# Patient Record
Sex: MALE | Race: WHITE | Employment: OTHER | URBAN - NONMETROPOLITAN AREA
[De-identification: names, ages, dates, MRNs, and addresses within clinical notes are randomized per-mention and may not be internally consistent; named-entity substitution may affect disease eponyms.]

---

## 2017-01-25 ENCOUNTER — OFFICE VISIT (OUTPATIENT)
Dept: UROLOGY | Age: 67
End: 2017-01-25

## 2017-01-25 VITALS
DIASTOLIC BLOOD PRESSURE: 80 MMHG | SYSTOLIC BLOOD PRESSURE: 130 MMHG | WEIGHT: 295 LBS | HEIGHT: 71 IN | BODY MASS INDEX: 41.3 KG/M2

## 2017-01-25 DIAGNOSIS — R33.9 URINARY RETENTION: Primary | ICD-10-CM

## 2017-01-25 DIAGNOSIS — N31.9 NEUROGENIC BLADDER: ICD-10-CM

## 2017-01-25 LAB
BILIRUBIN URINE: NEGATIVE
BLOOD URINE, POC: NEGATIVE
CHARACTER, URINE: CLEAR
COLOR, URINE: YELLOW
GLUCOSE URINE: NEGATIVE MG/DL
KETONES, URINE: NEGATIVE
LEUKOCYTE CLUMPS, URINE: NEGATIVE
NITRITE, URINE: NEGATIVE
PH, URINE: 7.5
POST VOID RESIDUAL (PVR): 14 ML
PROTEIN, URINE: NEGATIVE MG/DL
SPECIFIC GRAVITY, URINE: 1.01 (ref 1–1.03)
UROBILINOGEN, URINE: 0.2 EU/DL

## 2017-01-25 PROCEDURE — G8419 CALC BMI OUT NRM PARAM NOF/U: HCPCS | Performed by: NURSE PRACTITIONER

## 2017-01-25 PROCEDURE — 1123F ACP DISCUSS/DSCN MKR DOCD: CPT | Performed by: NURSE PRACTITIONER

## 2017-01-25 PROCEDURE — 1036F TOBACCO NON-USER: CPT | Performed by: NURSE PRACTITIONER

## 2017-01-25 PROCEDURE — G8427 DOCREV CUR MEDS BY ELIG CLIN: HCPCS | Performed by: NURSE PRACTITIONER

## 2017-01-25 PROCEDURE — G8599 NO ASA/ANTIPLAT THER USE RNG: HCPCS | Performed by: NURSE PRACTITIONER

## 2017-01-25 PROCEDURE — G8484 FLU IMMUNIZE NO ADMIN: HCPCS | Performed by: NURSE PRACTITIONER

## 2017-01-25 PROCEDURE — 3017F COLORECTAL CA SCREEN DOC REV: CPT | Performed by: NURSE PRACTITIONER

## 2017-01-25 PROCEDURE — 81003 URINALYSIS AUTO W/O SCOPE: CPT | Performed by: NURSE PRACTITIONER

## 2017-01-25 PROCEDURE — 4040F PNEUMOC VAC/ADMIN/RCVD: CPT | Performed by: NURSE PRACTITIONER

## 2017-01-25 PROCEDURE — 51798 US URINE CAPACITY MEASURE: CPT | Performed by: NURSE PRACTITIONER

## 2017-01-25 PROCEDURE — 99213 OFFICE O/P EST LOW 20 MIN: CPT | Performed by: NURSE PRACTITIONER

## 2017-01-25 RX ORDER — OXYBUTYNIN CHLORIDE 10 MG/1
10 TABLET, EXTENDED RELEASE ORAL DAILY
Qty: 30 TABLET | Refills: 2 | Status: SHIPPED | OUTPATIENT
Start: 2017-01-25 | End: 2017-03-21 | Stop reason: SDUPTHER

## 2017-01-25 RX ORDER — FUROSEMIDE 20 MG/1
20 TABLET ORAL 2 TIMES DAILY
COMMUNITY
End: 2019-08-06 | Stop reason: ALTCHOICE

## 2017-01-25 RX ORDER — POTASSIUM CHLORIDE 1.5 G/1.77G
20 POWDER, FOR SOLUTION ORAL 2 TIMES DAILY
COMMUNITY
End: 2019-08-06 | Stop reason: ALTCHOICE

## 2017-03-21 ENCOUNTER — OFFICE VISIT (OUTPATIENT)
Dept: UROLOGY | Age: 67
End: 2017-03-21

## 2017-03-21 VITALS — WEIGHT: 285 LBS | BODY MASS INDEX: 39.9 KG/M2 | HEIGHT: 71 IN

## 2017-03-21 DIAGNOSIS — R35.0 FREQUENCY OF URINATION: Primary | ICD-10-CM

## 2017-03-21 DIAGNOSIS — R33.9 RETENTION OF URINE: ICD-10-CM

## 2017-03-21 DIAGNOSIS — R39.15 URGENCY OF URINATION: ICD-10-CM

## 2017-03-21 LAB
BILIRUBIN, POC: NORMAL
BLOOD URINE, POC: NORMAL
CLARITY, POC: NORMAL
COLOR, POC: NORMAL
GLUCOSE URINE, POC: NORMAL
KETONES, POC: NORMAL
LEUKOCYTE EST, POC: NORMAL
NITRITE, POC: NORMAL
PH, POC: NORMAL
POST VOID RESIDUAL (PVR): 17 ML
PROTEIN, POC: NORMAL
SPECIFIC GRAVITY, POC: NORMAL
UROBILINOGEN, POC: NORMAL

## 2017-03-21 PROCEDURE — 4040F PNEUMOC VAC/ADMIN/RCVD: CPT | Performed by: NURSE PRACTITIONER

## 2017-03-21 PROCEDURE — G8417 CALC BMI ABV UP PARAM F/U: HCPCS | Performed by: NURSE PRACTITIONER

## 2017-03-21 PROCEDURE — 81002 URINALYSIS NONAUTO W/O SCOPE: CPT | Performed by: NURSE PRACTITIONER

## 2017-03-21 PROCEDURE — G8484 FLU IMMUNIZE NO ADMIN: HCPCS | Performed by: NURSE PRACTITIONER

## 2017-03-21 PROCEDURE — 1036F TOBACCO NON-USER: CPT | Performed by: NURSE PRACTITIONER

## 2017-03-21 PROCEDURE — 99213 OFFICE O/P EST LOW 20 MIN: CPT | Performed by: NURSE PRACTITIONER

## 2017-03-21 PROCEDURE — 51798 US URINE CAPACITY MEASURE: CPT | Performed by: NURSE PRACTITIONER

## 2017-03-21 PROCEDURE — G8599 NO ASA/ANTIPLAT THER USE RNG: HCPCS | Performed by: NURSE PRACTITIONER

## 2017-03-21 PROCEDURE — 3017F COLORECTAL CA SCREEN DOC REV: CPT | Performed by: NURSE PRACTITIONER

## 2017-03-21 PROCEDURE — G8427 DOCREV CUR MEDS BY ELIG CLIN: HCPCS | Performed by: NURSE PRACTITIONER

## 2017-03-21 PROCEDURE — 1123F ACP DISCUSS/DSCN MKR DOCD: CPT | Performed by: NURSE PRACTITIONER

## 2017-03-21 RX ORDER — OXYBUTYNIN CHLORIDE 10 MG/1
10 TABLET, EXTENDED RELEASE ORAL DAILY
Qty: 30 TABLET | Refills: 11 | Status: SHIPPED | OUTPATIENT
Start: 2017-03-21 | End: 2017-06-06

## 2017-03-21 RX ORDER — TAMSULOSIN HYDROCHLORIDE 0.4 MG/1
0.4 CAPSULE ORAL DAILY
Qty: 30 CAPSULE | Refills: 11 | Status: SHIPPED | OUTPATIENT
Start: 2017-03-21 | End: 2018-04-04 | Stop reason: SDUPTHER

## 2017-06-06 ENCOUNTER — OFFICE VISIT (OUTPATIENT)
Dept: UROLOGY | Age: 67
End: 2017-06-06

## 2017-06-06 VITALS — WEIGHT: 295 LBS | BODY MASS INDEX: 41.3 KG/M2 | HEIGHT: 71 IN

## 2017-06-06 DIAGNOSIS — R35.0 FREQUENCY OF URINATION: Primary | ICD-10-CM

## 2017-06-06 LAB
BILIRUBIN, POC: NORMAL
BLOOD URINE, POC: NORMAL
CLARITY, POC: NORMAL
COLOR, POC: NORMAL
GLUCOSE URINE, POC: NORMAL
KETONES, POC: NORMAL
LEUKOCYTE EST, POC: NORMAL
NITRITE, POC: NORMAL
PH, POC: NORMAL
PROTEIN, POC: NORMAL
SPECIFIC GRAVITY, POC: NORMAL
UROBILINOGEN, POC: NORMAL

## 2017-06-06 PROCEDURE — G8427 DOCREV CUR MEDS BY ELIG CLIN: HCPCS | Performed by: NURSE PRACTITIONER

## 2017-06-06 PROCEDURE — 99213 OFFICE O/P EST LOW 20 MIN: CPT | Performed by: NURSE PRACTITIONER

## 2017-06-06 PROCEDURE — 3017F COLORECTAL CA SCREEN DOC REV: CPT | Performed by: NURSE PRACTITIONER

## 2017-06-06 PROCEDURE — G8599 NO ASA/ANTIPLAT THER USE RNG: HCPCS | Performed by: NURSE PRACTITIONER

## 2017-06-06 PROCEDURE — 1123F ACP DISCUSS/DSCN MKR DOCD: CPT | Performed by: NURSE PRACTITIONER

## 2017-06-06 PROCEDURE — 1036F TOBACCO NON-USER: CPT | Performed by: NURSE PRACTITIONER

## 2017-06-06 PROCEDURE — 81002 URINALYSIS NONAUTO W/O SCOPE: CPT | Performed by: NURSE PRACTITIONER

## 2017-06-06 PROCEDURE — G8417 CALC BMI ABV UP PARAM F/U: HCPCS | Performed by: NURSE PRACTITIONER

## 2017-06-06 PROCEDURE — 4040F PNEUMOC VAC/ADMIN/RCVD: CPT | Performed by: NURSE PRACTITIONER

## 2017-06-06 RX ORDER — OXYBUTYNIN CHLORIDE 15 MG/1
15 TABLET, EXTENDED RELEASE ORAL DAILY
Qty: 30 TABLET | Refills: 2 | Status: SHIPPED | OUTPATIENT
Start: 2017-06-06 | End: 2017-08-15 | Stop reason: SDUPTHER

## 2017-07-18 ENCOUNTER — OFFICE VISIT (OUTPATIENT)
Dept: UROLOGY | Age: 67
End: 2017-07-18
Payer: MEDICARE

## 2017-07-18 VITALS — WEIGHT: 295 LBS | BODY MASS INDEX: 41.3 KG/M2 | HEIGHT: 71 IN

## 2017-07-18 DIAGNOSIS — N40.1 BPH (BENIGN PROSTATIC HYPERTROPHY) WITH URINARY OBSTRUCTION: ICD-10-CM

## 2017-07-18 DIAGNOSIS — N13.8 BPH (BENIGN PROSTATIC HYPERTROPHY) WITH URINARY OBSTRUCTION: ICD-10-CM

## 2017-07-18 DIAGNOSIS — R33.9 URINARY RETENTION: Primary | ICD-10-CM

## 2017-07-18 LAB
BILIRUBIN, POC: NORMAL
BLOOD URINE, POC: NORMAL
CLARITY, POC: CLEAR
COLOR, POC: YELLOW
GLUCOSE URINE, POC: NORMAL
KETONES, POC: NORMAL
LEUKOCYTE EST, POC: NORMAL
NITRITE, POC: NORMAL
PH, POC: NORMAL
POST VOID RESIDUAL (PVR): 5 ML
PROTEIN, POC: NORMAL
SPECIFIC GRAVITY, POC: NORMAL
UROBILINOGEN, POC: NORMAL

## 2017-07-18 PROCEDURE — 1123F ACP DISCUSS/DSCN MKR DOCD: CPT | Performed by: NURSE PRACTITIONER

## 2017-07-18 PROCEDURE — 3017F COLORECTAL CA SCREEN DOC REV: CPT | Performed by: NURSE PRACTITIONER

## 2017-07-18 PROCEDURE — 51798 US URINE CAPACITY MEASURE: CPT | Performed by: NURSE PRACTITIONER

## 2017-07-18 PROCEDURE — G8427 DOCREV CUR MEDS BY ELIG CLIN: HCPCS | Performed by: NURSE PRACTITIONER

## 2017-07-18 PROCEDURE — 81002 URINALYSIS NONAUTO W/O SCOPE: CPT | Performed by: NURSE PRACTITIONER

## 2017-07-18 PROCEDURE — 4040F PNEUMOC VAC/ADMIN/RCVD: CPT | Performed by: NURSE PRACTITIONER

## 2017-07-18 PROCEDURE — G8599 NO ASA/ANTIPLAT THER USE RNG: HCPCS | Performed by: NURSE PRACTITIONER

## 2017-07-18 PROCEDURE — 1036F TOBACCO NON-USER: CPT | Performed by: NURSE PRACTITIONER

## 2017-07-18 PROCEDURE — 99213 OFFICE O/P EST LOW 20 MIN: CPT | Performed by: NURSE PRACTITIONER

## 2017-07-18 PROCEDURE — G8417 CALC BMI ABV UP PARAM F/U: HCPCS | Performed by: NURSE PRACTITIONER

## 2017-08-14 ENCOUNTER — HOSPITAL ENCOUNTER (OUTPATIENT)
Age: 67
Discharge: HOME | End: 2017-08-14
Payer: MEDICARE

## 2017-08-14 DIAGNOSIS — N40.1 BPH (BENIGN PROSTATIC HYPERTROPHY) WITH URINARY OBSTRUCTION: ICD-10-CM

## 2017-08-14 DIAGNOSIS — N13.8 BPH (BENIGN PROSTATIC HYPERTROPHY) WITH URINARY OBSTRUCTION: ICD-10-CM

## 2017-08-14 LAB — PROSTATE SPECIFIC ANTIGEN: 1.26 NG/ML (ref 0–1)

## 2017-08-14 PROCEDURE — 84153 ASSAY OF PSA TOTAL: CPT

## 2017-08-14 PROCEDURE — 36415 COLL VENOUS BLD VENIPUNCTURE: CPT

## 2017-08-15 ENCOUNTER — OFFICE VISIT (OUTPATIENT)
Dept: UROLOGY | Age: 67
End: 2017-08-15
Payer: MEDICARE

## 2017-08-15 VITALS — WEIGHT: 295 LBS | HEIGHT: 71 IN | BODY MASS INDEX: 41.3 KG/M2

## 2017-08-15 DIAGNOSIS — R33.8 BPH (BENIGN PROSTATIC HYPERTROPHY) WITH URINARY RETENTION: Primary | ICD-10-CM

## 2017-08-15 DIAGNOSIS — N40.1 BPH (BENIGN PROSTATIC HYPERTROPHY) WITH URINARY RETENTION: Primary | ICD-10-CM

## 2017-08-15 LAB
BILIRUBIN, POC: NORMAL
BLOOD URINE, POC: NORMAL
CLARITY, POC: CLEAR
COLOR, POC: YELLOW
GLUCOSE URINE, POC: NORMAL
KETONES, POC: NORMAL
LEUKOCYTE EST, POC: NORMAL
NITRITE, POC: NORMAL
PH, POC: NORMAL
POST VOID RESIDUAL (PVR): 28 ML
PROTEIN, POC: NORMAL
SPECIFIC GRAVITY, POC: NORMAL
UROBILINOGEN, POC: NORMAL

## 2017-08-15 PROCEDURE — G8599 NO ASA/ANTIPLAT THER USE RNG: HCPCS | Performed by: NURSE PRACTITIONER

## 2017-08-15 PROCEDURE — 99213 OFFICE O/P EST LOW 20 MIN: CPT | Performed by: NURSE PRACTITIONER

## 2017-08-15 PROCEDURE — G8417 CALC BMI ABV UP PARAM F/U: HCPCS | Performed by: NURSE PRACTITIONER

## 2017-08-15 PROCEDURE — G8427 DOCREV CUR MEDS BY ELIG CLIN: HCPCS | Performed by: NURSE PRACTITIONER

## 2017-08-15 PROCEDURE — 1036F TOBACCO NON-USER: CPT | Performed by: NURSE PRACTITIONER

## 2017-08-15 PROCEDURE — 4040F PNEUMOC VAC/ADMIN/RCVD: CPT | Performed by: NURSE PRACTITIONER

## 2017-08-15 PROCEDURE — 3017F COLORECTAL CA SCREEN DOC REV: CPT | Performed by: NURSE PRACTITIONER

## 2017-08-15 PROCEDURE — 81002 URINALYSIS NONAUTO W/O SCOPE: CPT | Performed by: NURSE PRACTITIONER

## 2017-08-15 PROCEDURE — 51798 US URINE CAPACITY MEASURE: CPT | Performed by: NURSE PRACTITIONER

## 2017-08-15 PROCEDURE — 1123F ACP DISCUSS/DSCN MKR DOCD: CPT | Performed by: NURSE PRACTITIONER

## 2017-08-15 RX ORDER — OXYBUTYNIN CHLORIDE 15 MG/1
15 TABLET, EXTENDED RELEASE ORAL 2 TIMES DAILY
Qty: 60 TABLET | Refills: 5 | Status: SHIPPED | OUTPATIENT
Start: 2017-08-15 | End: 2017-12-19 | Stop reason: SDUPTHER

## 2017-12-19 ENCOUNTER — OFFICE VISIT (OUTPATIENT)
Dept: UROLOGY | Age: 67
End: 2017-12-19
Payer: MEDICARE

## 2017-12-19 VITALS — HEIGHT: 71 IN | BODY MASS INDEX: 39.9 KG/M2 | WEIGHT: 285 LBS

## 2017-12-19 DIAGNOSIS — R35.0 FREQUENCY OF URINATION: Primary | ICD-10-CM

## 2017-12-19 LAB
BILIRUBIN, POC: NORMAL
BLOOD URINE, POC: NORMAL
CLARITY, POC: NORMAL
COLOR, POC: NORMAL
GLUCOSE URINE, POC: NORMAL
KETONES, POC: NORMAL
LEUKOCYTE EST, POC: NORMAL
NITRITE, POC: NORMAL
PH, POC: NORMAL
POST VOID RESIDUAL (PVR): 29 ML
PROTEIN, POC: NORMAL
SPECIFIC GRAVITY, POC: NORMAL
UROBILINOGEN, POC: NORMAL

## 2017-12-19 PROCEDURE — G8484 FLU IMMUNIZE NO ADMIN: HCPCS | Performed by: NURSE PRACTITIONER

## 2017-12-19 PROCEDURE — 51798 US URINE CAPACITY MEASURE: CPT | Performed by: NURSE PRACTITIONER

## 2017-12-19 PROCEDURE — G8427 DOCREV CUR MEDS BY ELIG CLIN: HCPCS | Performed by: NURSE PRACTITIONER

## 2017-12-19 PROCEDURE — G8417 CALC BMI ABV UP PARAM F/U: HCPCS | Performed by: NURSE PRACTITIONER

## 2017-12-19 PROCEDURE — 1123F ACP DISCUSS/DSCN MKR DOCD: CPT | Performed by: NURSE PRACTITIONER

## 2017-12-19 PROCEDURE — 81002 URINALYSIS NONAUTO W/O SCOPE: CPT | Performed by: NURSE PRACTITIONER

## 2017-12-19 PROCEDURE — 4040F PNEUMOC VAC/ADMIN/RCVD: CPT | Performed by: NURSE PRACTITIONER

## 2017-12-19 PROCEDURE — 99213 OFFICE O/P EST LOW 20 MIN: CPT | Performed by: NURSE PRACTITIONER

## 2017-12-19 PROCEDURE — G8599 NO ASA/ANTIPLAT THER USE RNG: HCPCS | Performed by: NURSE PRACTITIONER

## 2017-12-19 PROCEDURE — 3017F COLORECTAL CA SCREEN DOC REV: CPT | Performed by: NURSE PRACTITIONER

## 2017-12-19 PROCEDURE — 1036F TOBACCO NON-USER: CPT | Performed by: NURSE PRACTITIONER

## 2017-12-19 RX ORDER — CLONIDINE HYDROCHLORIDE 0.1 MG/1
0.1 TABLET ORAL DAILY
COMMUNITY
End: 2018-06-19

## 2017-12-19 RX ORDER — OXYBUTYNIN CHLORIDE 15 MG/1
15 TABLET, EXTENDED RELEASE ORAL 2 TIMES DAILY
Qty: 180 TABLET | Refills: 3 | Status: SHIPPED | OUTPATIENT
Start: 2017-12-19 | End: 2018-03-26 | Stop reason: SDUPTHER

## 2017-12-19 RX ORDER — METOPROLOL SUCCINATE 50 MG/1
50 TABLET, EXTENDED RELEASE ORAL DAILY
COMMUNITY
End: 2018-06-19

## 2017-12-19 NOTE — PROGRESS NOTES
Smokeless tobacco: Never Used    Alcohol use No       Family History   Problem Relation Age of Onset    Arthritis Mother     Vision Loss Father     Cancer Brother     Stroke Maternal Uncle     Prostate Cancer Neg Hx        Review of Systems  ROS  No problems with ears, nose or throat. No problems with eyes. No chest pain, shortness of breath, abdominal pain, extremity pain or weakness, and no neurological deficits. No rashes. No swollen glands or lymph nodes.  symptoms per HPI. The remainder of the review of symptoms is negative. Exam  Vitals:    12/19/17 0945   Weight: 285 lb (129.3 kg)   Height: 5' 11\" (1.803 m)     Nursing note and vitals reviewed. Constitutional: Alert and oriented times 3, no acute distress and cooperative to examination with appropriate mood and affect. HENT:   Head:        Normocephalic and atraumatic. Mouth/Throat:         Mucous membranes are normal.   Eyes:         EOM are normal. No scleral icterus. PERRLA. Pulmonary/Chest:      Chest symmetric with normal A/P diameter,  No audible wheezes, rales, or rhonchi noted. Normal respiratory rate and rhthym. No use of accessory muscles. Abdominal:         Soft. No tenderness. Musculoskeletal:         Normal range of motion. No tenderness of lower extremities. Psychiatric:        Normal mood and affect. Labs   Urine dip demonstrates   Results for POC orders placed in visit on 12/19/17   poct post void residual   Result Value Ref Range    post void residual 29 ml   POCT Urinalysis no Micro   Result Value Ref Range    Color, UA      Clarity, UA      Glucose, UA POC      Bilirubin, UA      Ketones, UA      Spec Grav, UA      Blood, UA POC neg     pH, UA      Protein, UA POC      Urobilinogen, UA      Leukocytes, UA neg     Nitrite, UA neg          Lab Results   Component Value Date    PSA 1.26 (H) 08/14/2017       Assessment & Plan  BPH with LUTS (Urgency, frequency, nocturia)    Continue oxybutynin to 15 mg to BID.

## 2018-03-27 RX ORDER — OXYBUTYNIN CHLORIDE 15 MG/1
15 TABLET, EXTENDED RELEASE ORAL 2 TIMES DAILY
Qty: 180 TABLET | Refills: 3 | Status: SHIPPED | OUTPATIENT
Start: 2018-03-27 | End: 2018-06-19 | Stop reason: ALTCHOICE

## 2018-04-04 DIAGNOSIS — R39.15 URGENCY OF URINATION: ICD-10-CM

## 2018-04-04 DIAGNOSIS — R33.9 RETENTION OF URINE: ICD-10-CM

## 2018-04-04 RX ORDER — TAMSULOSIN HYDROCHLORIDE 0.4 MG/1
0.4 CAPSULE ORAL DAILY
Qty: 30 CAPSULE | Refills: 11 | Status: SHIPPED | OUTPATIENT
Start: 2018-04-04 | End: 2019-03-19 | Stop reason: SDUPTHER

## 2018-06-19 ENCOUNTER — OFFICE VISIT (OUTPATIENT)
Dept: UROLOGY | Age: 68
End: 2018-06-19
Payer: MEDICARE

## 2018-06-19 ENCOUNTER — HOSPITAL ENCOUNTER (OUTPATIENT)
Age: 68
Discharge: HOME | End: 2018-06-19
Payer: MEDICARE

## 2018-06-19 VITALS
BODY MASS INDEX: 39.2 KG/M2 | WEIGHT: 280 LBS | SYSTOLIC BLOOD PRESSURE: 132 MMHG | HEIGHT: 71 IN | DIASTOLIC BLOOD PRESSURE: 90 MMHG

## 2018-06-19 DIAGNOSIS — N32.81 OAB (OVERACTIVE BLADDER): ICD-10-CM

## 2018-06-19 DIAGNOSIS — N40.1 BENIGN PROSTATIC HYPERPLASIA WITH URINARY FREQUENCY: Primary | ICD-10-CM

## 2018-06-19 DIAGNOSIS — N40.1 BENIGN PROSTATIC HYPERPLASIA WITH URINARY FREQUENCY: ICD-10-CM

## 2018-06-19 DIAGNOSIS — R35.0 BENIGN PROSTATIC HYPERPLASIA WITH URINARY FREQUENCY: Primary | ICD-10-CM

## 2018-06-19 DIAGNOSIS — R35.0 BENIGN PROSTATIC HYPERPLASIA WITH URINARY FREQUENCY: ICD-10-CM

## 2018-06-19 LAB
BILIRUBIN, POC: NORMAL
BLOOD URINE, POC: NORMAL
CLARITY, POC: CLEAR
COLOR, POC: YELLOW
GLUCOSE URINE, POC: NORMAL
KETONES, POC: NORMAL
LEUKOCYTE EST, POC: NORMAL
NITRITE, POC: NORMAL
PH, POC: NORMAL
POST VOID RESIDUAL (PVR): 57 ML
PROTEIN, POC: NORMAL
SPECIFIC GRAVITY, POC: NORMAL
UROBILINOGEN, POC: NORMAL

## 2018-06-19 PROCEDURE — 81002 URINALYSIS NONAUTO W/O SCOPE: CPT | Performed by: NURSE PRACTITIONER

## 2018-06-19 PROCEDURE — 3017F COLORECTAL CA SCREEN DOC REV: CPT | Performed by: NURSE PRACTITIONER

## 2018-06-19 PROCEDURE — 84153 ASSAY OF PSA TOTAL: CPT

## 2018-06-19 PROCEDURE — G8427 DOCREV CUR MEDS BY ELIG CLIN: HCPCS | Performed by: NURSE PRACTITIONER

## 2018-06-19 PROCEDURE — 1123F ACP DISCUSS/DSCN MKR DOCD: CPT | Performed by: NURSE PRACTITIONER

## 2018-06-19 PROCEDURE — G8599 NO ASA/ANTIPLAT THER USE RNG: HCPCS | Performed by: NURSE PRACTITIONER

## 2018-06-19 PROCEDURE — 51798 US URINE CAPACITY MEASURE: CPT | Performed by: NURSE PRACTITIONER

## 2018-06-19 PROCEDURE — 99213 OFFICE O/P EST LOW 20 MIN: CPT | Performed by: NURSE PRACTITIONER

## 2018-06-19 PROCEDURE — 4040F PNEUMOC VAC/ADMIN/RCVD: CPT | Performed by: NURSE PRACTITIONER

## 2018-06-19 PROCEDURE — 36415 COLL VENOUS BLD VENIPUNCTURE: CPT

## 2018-06-19 PROCEDURE — 1036F TOBACCO NON-USER: CPT | Performed by: NURSE PRACTITIONER

## 2018-06-19 PROCEDURE — G8417 CALC BMI ABV UP PARAM F/U: HCPCS | Performed by: NURSE PRACTITIONER

## 2018-06-19 RX ORDER — IRBESARTAN AND HYDROCHLOROTHIAZIDE 300; 12.5 MG/1; MG/1
1 TABLET, FILM COATED ORAL DAILY
COMMUNITY
End: 2019-08-06 | Stop reason: ALTCHOICE

## 2018-06-20 LAB — PROSTATE SPECIFIC ANTIGEN: 1.64 NG/ML (ref 0–1)

## 2018-06-29 ENCOUNTER — TELEPHONE (OUTPATIENT)
Dept: UROLOGY | Age: 68
End: 2018-06-29

## 2018-06-29 RX ORDER — FESOTERODINE FUMARATE 4 MG/1
4 TABLET, EXTENDED RELEASE ORAL DAILY
Qty: 30 TABLET | Refills: 6 | Status: SHIPPED | OUTPATIENT
Start: 2018-06-29 | End: 2018-07-03

## 2018-06-29 NOTE — TELEPHONE ENCOUNTER
Patient's wife states that myrbetriq raised his blood pressure to 181/96. Wonders if there is another option to try.  Please advise    [facility] in [place]  DOLV: 6/19/18

## 2018-07-02 NOTE — TELEPHONE ENCOUNTER
Called patient and spoke to both him and wife, they stated that the Birda Leena was very expensive and that they did not get it filled(going to think about it first). Advised to call their insurance and to see what is in their plan and what they will cover. They agreed to do so and will call us and let us know.

## 2018-07-03 RX ORDER — OXYBUTYNIN CHLORIDE 15 MG/1
15 TABLET, EXTENDED RELEASE ORAL DAILY
Qty: 30 TABLET | Refills: 2 | Status: SHIPPED | OUTPATIENT
Start: 2018-07-03 | End: 2019-01-07

## 2018-07-30 ENCOUNTER — PROCEDURE VISIT (OUTPATIENT)
Dept: UROLOGY | Age: 68
End: 2018-07-30
Payer: MEDICARE

## 2018-07-30 VITALS
HEIGHT: 71 IN | SYSTOLIC BLOOD PRESSURE: 138 MMHG | DIASTOLIC BLOOD PRESSURE: 82 MMHG | BODY MASS INDEX: 41.34 KG/M2 | WEIGHT: 295.3 LBS

## 2018-07-30 DIAGNOSIS — N32.81 OAB (OVERACTIVE BLADDER): ICD-10-CM

## 2018-07-30 DIAGNOSIS — N40.0 BENIGN PROSTATIC HYPERPLASIA, UNSPECIFIED WHETHER LOWER URINARY TRACT SYMPTOMS PRESENT: Primary | ICD-10-CM

## 2018-07-30 LAB
BILIRUBIN URINE: NEGATIVE
BLOOD URINE, POC: NEGATIVE
CHARACTER, URINE: CLEAR
COLOR, URINE: YELLOW
GLUCOSE URINE: NEGATIVE MG/DL
KETONES, URINE: NEGATIVE
LEUKOCYTE CLUMPS, URINE: NEGATIVE
NITRITE, URINE: NEGATIVE
PH, URINE: 7
PROTEIN, URINE: NEGATIVE MG/DL
SPECIFIC GRAVITY, URINE: 1.02 (ref 1–1.03)
UROBILINOGEN, URINE: 1 EU/DL

## 2018-07-30 PROCEDURE — 52000 CYSTOURETHROSCOPY: CPT | Performed by: UROLOGY

## 2018-07-30 PROCEDURE — 99999 PR OFFICE/OUTPT VISIT,PROCEDURE ONLY: CPT | Performed by: UROLOGY

## 2018-07-30 PROCEDURE — 81003 URINALYSIS AUTO W/O SCOPE: CPT | Performed by: UROLOGY

## 2018-07-30 RX ORDER — LABETALOL 100 MG/1
100 TABLET, FILM COATED ORAL 2 TIMES DAILY
Status: ON HOLD | COMMUNITY
End: 2021-06-15 | Stop reason: HOSPADM

## 2018-07-30 RX ORDER — FINASTERIDE 5 MG/1
5 TABLET, FILM COATED ORAL DAILY
Qty: 90 TABLET | Refills: 3 | Status: SHIPPED | OUTPATIENT
Start: 2018-07-30 | End: 2019-07-16 | Stop reason: SDUPTHER

## 2018-07-30 NOTE — PROGRESS NOTES
Mr. Hina Ge was seen in follow up for cystoscopy as per plan developed by Didier Kay CNP for BPH with urinary frequency. Cystoscopy was performed without difficulty and it was well tolerated. Past Medical History:   Diagnosis Date    Arthritis     Back pain     Diarrhea     Hypertension     PFO (patent foramen ovale) 8/2012    noted on ROSITA following stroke    Rotator cuff injury     Stroke [facility])     August 19/2012    TIA (transient ischemic attack) 05/2018       Past Surgical History:   Procedure Laterality Date    CHOLECYSTECTOMY  06/25/2017    CYST INCISION AND DRAINAGE      ERCP  06/23/2017    ROTATOR CUFF REPAIR Left 3-12-13       Current Outpatient Prescriptions on File Prior to Visit   Medication Sig Dispense Refill    oxybutynin (DITROPAN XL) 15 MG extended release tablet Take 1 tablet by mouth daily 30 tablet 2    SUPER B COMPLEX/C PO Take by mouth      Multiple Vitamins-Minerals (PRESERVISION AREDS PO) Take by mouth      VITAMIN K PO Take by mouth      irbesartan-hydrochlorothiazide (AVALIDE) 300-12.5 MG per tablet Take 1 tablet by mouth daily      tamsulosin (FLOMAX) 0.4 MG capsule Take 1 capsule by mouth daily 30 capsule 11    potassium chloride (KLOR-CON) 20 MEQ packet Take 20 mEq by mouth 2 times daily      vitamin E 1000 UNITS capsule Take 1,000 Units by mouth daily      Ascorbic Acid (VITAMIN C) 500 MG tablet Take 500 mg by mouth.  Coenzyme Q10 (CO Q 10 PO) Take  by mouth.  VITAMIN D, CHOLECALCIFEROL, PO Take 4,000 Units by mouth.  furosemide (LASIX) 20 MG tablet Take 20 mg by mouth 2 times daily      [DISCONTINUED] oxybutynin (DITROPAN) 5 MG tablet Take up to 1 tablet 4 times a day as needed. 120 tablet 3     No current facility-administered medications on file prior to visit.         No Known Allergies    Family History   Problem Relation Age of Onset    Arthritis Mother     Vision Loss Father     Cancer Brother     Stroke Maternal Uncle     Prostate cystoscope was passed per urethra into the bladder. The urethra was evaluated on the way in and then again on the way out and was found to be normal.  The prostate was moderately obstructing with a large median lobe. The bladder was evaluated in its endoscopic entirety and found to be normal.  There were no tumors, stones, ulcers or foreign bodies. There were no mucosal abnormalities. The ureteral orifices were seen and were normal.  The scope was removed. The patient tolerated the procedure and there were no complications. A dose of 500 mg ciprofloxacin was given for the procedure. Impression: Large median lobe and obstructing prostate. UA today is negative. Most recent PSA is 1.64 which has increased from 1.26 the year before. He is currently on Flomax 0.4 mg daily  He tried Ditropan in the past with no improvement. He was then put on Myrbetriq. Today he reports nocturia of 2-3 times nightly as well as urinary frequency about every 1 hour during the day. He is also experiencing urinary urgency with some urge incontinence. Plan:    Return in 8 weeks with Mora Burgess. PVR on arrival  Start Finasteride. Continue Flomax   Stop Myrbetriq due to BP issues with it.

## 2018-09-18 ENCOUNTER — OFFICE VISIT (OUTPATIENT)
Dept: UROLOGY | Age: 68
End: 2018-09-18
Payer: MEDICARE

## 2018-09-18 VITALS
BODY MASS INDEX: 37.8 KG/M2 | HEIGHT: 71 IN | WEIGHT: 270 LBS | DIASTOLIC BLOOD PRESSURE: 62 MMHG | SYSTOLIC BLOOD PRESSURE: 118 MMHG

## 2018-09-18 DIAGNOSIS — R39.15 URINARY URGENCY: ICD-10-CM

## 2018-09-18 DIAGNOSIS — N40.1 BPH WITH OBSTRUCTION/LOWER URINARY TRACT SYMPTOMS: Primary | ICD-10-CM

## 2018-09-18 DIAGNOSIS — N13.8 BPH WITH OBSTRUCTION/LOWER URINARY TRACT SYMPTOMS: Primary | ICD-10-CM

## 2018-09-18 LAB — POST VOID RESIDUAL (PVR): 71 ML

## 2018-09-18 PROCEDURE — 99213 OFFICE O/P EST LOW 20 MIN: CPT | Performed by: NURSE PRACTITIONER

## 2018-09-18 PROCEDURE — G8428 CUR MEDS NOT DOCUMENT: HCPCS | Performed by: NURSE PRACTITIONER

## 2018-09-18 PROCEDURE — 1036F TOBACCO NON-USER: CPT | Performed by: NURSE PRACTITIONER

## 2018-09-18 PROCEDURE — 81003 URINALYSIS AUTO W/O SCOPE: CPT | Performed by: NURSE PRACTITIONER

## 2018-09-18 PROCEDURE — 51798 US URINE CAPACITY MEASURE: CPT | Performed by: NURSE PRACTITIONER

## 2018-09-18 PROCEDURE — 4040F PNEUMOC VAC/ADMIN/RCVD: CPT | Performed by: NURSE PRACTITIONER

## 2018-09-18 PROCEDURE — G8417 CALC BMI ABV UP PARAM F/U: HCPCS | Performed by: NURSE PRACTITIONER

## 2018-09-18 PROCEDURE — 1101F PT FALLS ASSESS-DOCD LE1/YR: CPT | Performed by: NURSE PRACTITIONER

## 2018-09-18 PROCEDURE — 1123F ACP DISCUSS/DSCN MKR DOCD: CPT | Performed by: NURSE PRACTITIONER

## 2018-09-18 PROCEDURE — 3017F COLORECTAL CA SCREEN DOC REV: CPT | Performed by: NURSE PRACTITIONER

## 2018-09-18 PROCEDURE — G8599 NO ASA/ANTIPLAT THER USE RNG: HCPCS | Performed by: NURSE PRACTITIONER

## 2018-09-18 RX ORDER — TRAMADOL HYDROCHLORIDE 50 MG/1
50 TABLET ORAL EVERY 4 HOURS PRN
COMMUNITY
End: 2019-08-06 | Stop reason: ALTCHOICE

## 2018-09-18 NOTE — PROGRESS NOTES
oriented. No cranial nerve deficit. Lily Garcia Psychiatric:        Normal mood and affect. Labs   Urine dip demonstrates   Results for POC orders placed in visit on 09/18/18   poct post void residual   Result Value Ref Range    post void residual  ml       Patients recent PSA values are as follows  Lab Results   Component Value Date    PSA 1.64 (H) 06/19/2018    PSA 1.26 (H) 08/14/2017        Recent BUN/Creatinine:  Lab Results   Component Value Date    BUN 15 06/22/2017    CREATININE 1.39 06/22/2017       Radiology  No recent imaging        Assessment & Plan:     BPH w/ LUTS    [place] f/u today for BPH w/LUTS. LUTs improving with addition of Finasteride. Discussed max benefit of medication is around 6 months, anticipate urinary symptoms to continue to improve. He remains on Flomax & Ditropan. PVR is 71 ml. PSA appropriate at 1.64     Return in about 6 months (around 3/18/2019) for BPH.     MAGDA García  Urology

## 2019-03-19 ENCOUNTER — OFFICE VISIT (OUTPATIENT)
Dept: UROLOGY | Age: 69
End: 2019-03-19
Payer: MEDICARE

## 2019-03-19 VITALS
BODY MASS INDEX: 41.3 KG/M2 | WEIGHT: 295 LBS | DIASTOLIC BLOOD PRESSURE: 60 MMHG | HEIGHT: 71 IN | SYSTOLIC BLOOD PRESSURE: 124 MMHG

## 2019-03-19 DIAGNOSIS — N40.1 BPH WITH OBSTRUCTION/LOWER URINARY TRACT SYMPTOMS: Primary | ICD-10-CM

## 2019-03-19 DIAGNOSIS — R39.15 URGENCY OF URINATION: ICD-10-CM

## 2019-03-19 DIAGNOSIS — N13.8 BPH WITH OBSTRUCTION/LOWER URINARY TRACT SYMPTOMS: Primary | ICD-10-CM

## 2019-03-19 PROCEDURE — G8599 NO ASA/ANTIPLAT THER USE RNG: HCPCS | Performed by: NURSE PRACTITIONER

## 2019-03-19 PROCEDURE — 99213 OFFICE O/P EST LOW 20 MIN: CPT | Performed by: NURSE PRACTITIONER

## 2019-03-19 PROCEDURE — 1123F ACP DISCUSS/DSCN MKR DOCD: CPT | Performed by: NURSE PRACTITIONER

## 2019-03-19 PROCEDURE — 4040F PNEUMOC VAC/ADMIN/RCVD: CPT | Performed by: NURSE PRACTITIONER

## 2019-03-19 PROCEDURE — 81002 URINALYSIS NONAUTO W/O SCOPE: CPT | Performed by: NURSE PRACTITIONER

## 2019-03-19 PROCEDURE — 3017F COLORECTAL CA SCREEN DOC REV: CPT | Performed by: NURSE PRACTITIONER

## 2019-03-19 PROCEDURE — G8484 FLU IMMUNIZE NO ADMIN: HCPCS | Performed by: NURSE PRACTITIONER

## 2019-03-19 PROCEDURE — 1101F PT FALLS ASSESS-DOCD LE1/YR: CPT | Performed by: NURSE PRACTITIONER

## 2019-03-19 PROCEDURE — G8417 CALC BMI ABV UP PARAM F/U: HCPCS | Performed by: NURSE PRACTITIONER

## 2019-03-19 PROCEDURE — G8427 DOCREV CUR MEDS BY ELIG CLIN: HCPCS | Performed by: NURSE PRACTITIONER

## 2019-03-19 PROCEDURE — 1036F TOBACCO NON-USER: CPT | Performed by: NURSE PRACTITIONER

## 2019-03-19 RX ORDER — OXYBUTYNIN CHLORIDE 15 MG/1
15 TABLET, EXTENDED RELEASE ORAL DAILY
Qty: 90 TABLET | Refills: 3 | Status: SHIPPED | OUTPATIENT
Start: 2019-03-19 | End: 2019-04-30 | Stop reason: SDUPTHER

## 2019-03-19 RX ORDER — COVID-19 ANTIGEN TEST
KIT MISCELLANEOUS
COMMUNITY
End: 2019-08-06 | Stop reason: ALTCHOICE

## 2019-03-19 RX ORDER — TAMSULOSIN HYDROCHLORIDE 0.4 MG/1
0.4 CAPSULE ORAL DAILY
Qty: 90 CAPSULE | Refills: 3 | Status: SHIPPED | OUTPATIENT
Start: 2019-03-19 | End: 2020-01-14 | Stop reason: SDUPTHER

## 2019-04-23 ENCOUNTER — TELEPHONE (OUTPATIENT)
Dept: UROLOGY | Age: 69
End: 2019-04-23

## 2019-04-23 NOTE — TELEPHONE ENCOUNTER
When the refill for the ditropan xl 15 mg was sent to the pharmacy it was changed to daily. He was taking it BID. Is it suppose to be BID or daily. Please advise. Thank you.

## 2019-04-29 ENCOUNTER — TELEPHONE (OUTPATIENT)
Dept: UROLOGY | Age: 69
End: 2019-04-29

## 2019-04-29 ENCOUNTER — HOSPITAL ENCOUNTER (OUTPATIENT)
Age: 69
Discharge: HOME | End: 2019-04-29
Payer: MEDICARE

## 2019-04-29 DIAGNOSIS — R39.15 URINARY URGENCY: ICD-10-CM

## 2019-04-29 DIAGNOSIS — R35.0 FREQUENCY OF URINATION: ICD-10-CM

## 2019-04-29 DIAGNOSIS — R39.15 URINARY URGENCY: Primary | ICD-10-CM

## 2019-04-29 LAB
AMORPHOUS: NORMAL
BACTERIA: NORMAL
BILIRUBIN URINE: NEGATIVE
BLOOD, URINE: NEGATIVE
CASTS UA: NORMAL /LPF
CHARACTER, URINE: CLEAR
COLOR: YELLOW
CRYSTALS, UA: NORMAL
EPITHELIAL CELLS, UA: NORMAL /HPF
GLUCOSE, URINE: NEGATIVE MG/DL
KETONES, URINE: NEGATIVE
LEUKOCYTE ESTERASE, URINE: NEGATIVE
MUCUS: NORMAL
NITRITE, URINE: NEGATIVE
PH UA: 5 (ref 5–9)
PROTEIN UA: NEGATIVE MG/DL
RBC UA: NORMAL /HPF
REFLEX TO URINE C & S: NORMAL
SPECIFIC GRAVITY UA: >= 1.03 (ref 1–1.03)
UROBILINOGEN, URINE: 0.2 EU/DL (ref 0–1)
WBC UA: NORMAL /HPF

## 2019-04-29 PROCEDURE — 81001 URINALYSIS AUTO W/SCOPE: CPT

## 2019-04-29 NOTE — TELEPHONE ENCOUNTER
Lets get a urine to assure no infection  Lets call pharmacy to double check that he is taking the 15 mg XR  Would be beneficial to get a bladder scan to assure he is emptying his bladder well and not in retention leading to his urinary complaints.  Thanks    [employer]

## 2019-04-29 NOTE — TELEPHONE ENCOUNTER
I called [facility] and spoke to Rochelle Franklin who stated Ditropan 15mg xl was last filled on 04/15/2019. I called the patient and spoke to Oniel Posey. She voiced understanding to have the urine checked. An appointment was scheduled for 04/30/2019 at 63 for PVR. Thank you.

## 2019-04-29 NOTE — TELEPHONE ENCOUNTER
Stephanie Driscoll the patient wife stated after the ditropan was changed to 15 mg  xl daily, it is not working. He is having more frequency, urgency, and increased in incontinence. He is up more at night voiding 3-4 times. He has a good strong stream. He denies burning, fever or chills. Please advise. Thank you.

## 2019-04-30 ENCOUNTER — NURSE ONLY (OUTPATIENT)
Dept: UROLOGY | Age: 69
End: 2019-04-30
Payer: MEDICARE

## 2019-04-30 DIAGNOSIS — R39.15 URINARY URGENCY: Primary | ICD-10-CM

## 2019-04-30 LAB — POST VOID RESIDUAL (PVR): 56 ML

## 2019-04-30 PROCEDURE — 51798 US URINE CAPACITY MEASURE: CPT | Performed by: NURSE PRACTITIONER

## 2019-04-30 RX ORDER — OXYBUTYNIN CHLORIDE 15 MG/1
15 TABLET, EXTENDED RELEASE ORAL 2 TIMES DAILY
Qty: 180 TABLET | Refills: 3 | Status: SHIPPED | OUTPATIENT
Start: 2019-04-30 | End: 2019-10-01

## 2019-04-30 NOTE — PROGRESS NOTES
Has increased urinary frequency, urgency with urge incontinence.  Was taking Ditropan 15 mg XR BID which did offer improvement, will increase back to dose, PVR was 56 ml.  F/u in 3 months in [facility], MAGDA  Urology

## 2019-04-30 NOTE — PROGRESS NOTES
Patient is complaining of urinary frequency and urgency. Patient was given new rx of Ditropan XL at his last visit. Patient had increased his Ditropan XL to BID for 2-3 days over the weekend with slight improvement. Patient wife stated that she had him discontinue BID until today's office visit. Patient had urinalysis done last night in [place]. PVR  Is 56. Patient states that he would rather have Ditropan 5mg and not the XL.

## 2019-07-16 ENCOUNTER — OFFICE VISIT (OUTPATIENT)
Dept: UROLOGY | Age: 69
End: 2019-07-16
Payer: MEDICARE

## 2019-07-16 VITALS — WEIGHT: 283 LBS | BODY MASS INDEX: 39.62 KG/M2 | HEIGHT: 71 IN

## 2019-07-16 DIAGNOSIS — N13.8 BPH WITH OBSTRUCTION/LOWER URINARY TRACT SYMPTOMS: Primary | ICD-10-CM

## 2019-07-16 DIAGNOSIS — N32.81 OAB (OVERACTIVE BLADDER): ICD-10-CM

## 2019-07-16 DIAGNOSIS — N40.1 BPH WITH OBSTRUCTION/LOWER URINARY TRACT SYMPTOMS: Primary | ICD-10-CM

## 2019-07-16 DIAGNOSIS — R39.15 URINARY URGENCY: ICD-10-CM

## 2019-07-16 PROCEDURE — G8417 CALC BMI ABV UP PARAM F/U: HCPCS | Performed by: NURSE PRACTITIONER

## 2019-07-16 PROCEDURE — 81002 URINALYSIS NONAUTO W/O SCOPE: CPT | Performed by: NURSE PRACTITIONER

## 2019-07-16 PROCEDURE — 99213 OFFICE O/P EST LOW 20 MIN: CPT | Performed by: NURSE PRACTITIONER

## 2019-07-16 PROCEDURE — 4040F PNEUMOC VAC/ADMIN/RCVD: CPT | Performed by: NURSE PRACTITIONER

## 2019-07-16 PROCEDURE — 3017F COLORECTAL CA SCREEN DOC REV: CPT | Performed by: NURSE PRACTITIONER

## 2019-07-16 PROCEDURE — G8428 CUR MEDS NOT DOCUMENT: HCPCS | Performed by: NURSE PRACTITIONER

## 2019-07-16 PROCEDURE — 1123F ACP DISCUSS/DSCN MKR DOCD: CPT | Performed by: NURSE PRACTITIONER

## 2019-07-16 PROCEDURE — 1036F TOBACCO NON-USER: CPT | Performed by: NURSE PRACTITIONER

## 2019-07-16 PROCEDURE — G8599 NO ASA/ANTIPLAT THER USE RNG: HCPCS | Performed by: NURSE PRACTITIONER

## 2019-07-16 RX ORDER — FINASTERIDE 5 MG/1
5 TABLET, FILM COATED ORAL DAILY
Qty: 90 TABLET | Refills: 3 | Status: SHIPPED | OUTPATIENT
Start: 2019-07-16 | End: 2020-01-14 | Stop reason: SDUPTHER

## 2019-07-16 NOTE — PROGRESS NOTES
oriented. No cranial nerve deficit. Clemencia Lange Psychiatric:        Normal mood and affect. Labs   Urine dip demonstrates   Results for POC orders placed in visit on 07/16/19   POCT Urinalysis no Micro   Result Value Ref Range    Color, UA      Clarity, UA      Glucose, UA POC      Bilirubin, UA      Ketones, UA      Spec Grav, UA      Blood, UA POC neg     pH, UA      Protein, UA POC      Urobilinogen, UA      Leukocytes, UA neg     Nitrite, UA neg        Patients recent PSA values are as follows  Lab Results   Component Value Date    PSA 1.64 (H) 06/19/2018    PSA 1.26 (H) 08/14/2017        Recent BUN/Creatinine:  Lab Results   Component Value Date    BUN 15 06/22/2017    CREATININE 1.39 06/22/2017       Radiology  No recent imaging        Assessment & Plan:     BPH w/ LUTS  Urinary Urgency     [place] f/u today for BPH. His LUTS are not well controlled. Reports weak stream with urgency and urge incontinence. He remains on Flomax, Finasteride and Ditropan. Cysto in 07/2018 showed moderate obstructing prostate with large medium lobe. Recommended surgical intervention with TURP which they agree to pursue. Needs scheduled for TURP w/ Dr. Padilla Nearing in the near future. Needs medical clearance    I described the procedure in detail and also described the associated risks and benefits at length. We discussed possible alternative therapies. We discussed the risks and benefits of not undergoing therapy. Patient understands these risks and benefits and desires to proceed. Return for TURP f/u .     MAGDA Bender  Urology

## 2019-07-17 ENCOUNTER — TELEPHONE (OUTPATIENT)
Dept: UROLOGY | Age: 69
End: 2019-07-17

## 2019-07-17 DIAGNOSIS — N13.8 BPH WITH OBSTRUCTION/LOWER URINARY TRACT SYMPTOMS: Primary | ICD-10-CM

## 2019-07-17 DIAGNOSIS — Z01.818 PRE-OP TESTING: ICD-10-CM

## 2019-07-17 DIAGNOSIS — N40.1 BPH WITH OBSTRUCTION/LOWER URINARY TRACT SYMPTOMS: Primary | ICD-10-CM

## 2019-07-24 ENCOUNTER — HOSPITAL ENCOUNTER (OUTPATIENT)
Dept: GENERAL RADIOLOGY | Age: 69
Discharge: HOME | End: 2019-07-24
Payer: MEDICARE

## 2019-07-24 ENCOUNTER — HOSPITAL ENCOUNTER (OUTPATIENT)
Age: 69
Discharge: HOME | End: 2019-07-24
Payer: MEDICARE

## 2019-07-24 DIAGNOSIS — Z01.818 PRE-OP TESTING: ICD-10-CM

## 2019-07-24 DIAGNOSIS — N40.1 BPH WITH OBSTRUCTION/LOWER URINARY TRACT SYMPTOMS: ICD-10-CM

## 2019-07-24 DIAGNOSIS — N13.8 BPH WITH OBSTRUCTION/LOWER URINARY TRACT SYMPTOMS: ICD-10-CM

## 2019-07-24 LAB
ANION GAP SERPL CALCULATED.3IONS-SCNC: 15 MEQ/L (ref 8–16)
BASOPHILS # BLD: 1.1 %
BASOPHILS ABSOLUTE: 0.1 THOU/MM3 (ref 0–0.1)
BUN BLDV-MCNC: 12 MG/DL (ref 7–22)
CALCIUM SERPL-MCNC: 9.3 MG/DL (ref 8.5–10.5)
CHLORIDE BLD-SCNC: 106 MEQ/L (ref 98–111)
CO2: 23 MEQ/L (ref 23–33)
CREAT SERPL-MCNC: 1 MG/DL (ref 0.4–1.2)
EKG ATRIAL RATE: 56 BPM
EKG P AXIS: 1 DEGREES
EKG P-R INTERVAL: 192 MS
EKG Q-T INTERVAL: 436 MS
EKG QRS DURATION: 110 MS
EKG QTC CALCULATION (BAZETT): 420 MS
EKG R AXIS: 26 DEGREES
EKG T AXIS: 52 DEGREES
EKG VENTRICULAR RATE: 56 BPM
EOSINOPHIL # BLD: 6.8 %
EOSINOPHILS ABSOLUTE: 0.5 THOU/MM3 (ref 0–0.4)
ERYTHROCYTE [DISTWIDTH] IN BLOOD BY AUTOMATED COUNT: 14.1 % (ref 11.5–14.5)
ERYTHROCYTE [DISTWIDTH] IN BLOOD BY AUTOMATED COUNT: 45.3 FL (ref 35–45)
GFR SERPL CREATININE-BSD FRML MDRD: 74 ML/MIN/1.73M2
GLUCOSE BLD-MCNC: 107 MG/DL (ref 70–108)
HCT VFR BLD CALC: 41.2 % (ref 42–52)
HEMOGLOBIN: 13.4 GM/DL (ref 14–18)
IMMATURE GRANS (ABS): 0.02 THOU/MM3 (ref 0–0.07)
IMMATURE GRANULOCYTES: 0 %
LYMPHOCYTES # BLD: 19.3 %
LYMPHOCYTES ABSOLUTE: 1.4 THOU/MM3 (ref 1–4.8)
MCH RBC QN AUTO: 28.9 PG (ref 26–33)
MCHC RBC AUTO-ENTMCNC: 32.5 GM/DL (ref 32.2–35.5)
MCV RBC AUTO: 88.8 FL (ref 80–94)
MONOCYTES # BLD: 7.2 %
MONOCYTES ABSOLUTE: 0.5 THOU/MM3 (ref 0.4–1.3)
NUCLEATED RED BLOOD CELLS: 0 /100 WBC
PLATELET # BLD: 262 THOU/MM3 (ref 130–400)
PMV BLD AUTO: 11.4 FL (ref 9.4–12.4)
POTASSIUM SERPL-SCNC: 4 MEQ/L (ref 3.5–5.2)
PROSTATE SPECIFIC ANTIGEN: 0.66 NG/ML (ref 0–1)
RBC # BLD: 4.64 MILL/MM3 (ref 4.7–6.1)
SEG NEUTROPHILS: 65.3 %
SEGMENTED NEUTROPHILS ABSOLUTE COUNT: 4.9 THOU/MM3 (ref 1.8–7.7)
SODIUM BLD-SCNC: 144 MEQ/L (ref 135–145)
WBC # BLD: 7.5 THOU/MM3 (ref 4.8–10.8)

## 2019-07-24 PROCEDURE — 71046 X-RAY EXAM CHEST 2 VIEWS: CPT

## 2019-07-24 PROCEDURE — 36415 COLL VENOUS BLD VENIPUNCTURE: CPT

## 2019-07-24 PROCEDURE — 85025 COMPLETE CBC W/AUTO DIFF WBC: CPT

## 2019-07-24 PROCEDURE — 84153 ASSAY OF PSA TOTAL: CPT

## 2019-07-24 PROCEDURE — 93005 ELECTROCARDIOGRAM TRACING: CPT | Performed by: NURSE PRACTITIONER

## 2019-07-24 PROCEDURE — 80048 BASIC METABOLIC PNL TOTAL CA: CPT

## 2019-07-25 PROCEDURE — 93010 ELECTROCARDIOGRAM REPORT: CPT | Performed by: INTERNAL MEDICINE

## 2019-08-06 ENCOUNTER — HOSPITAL ENCOUNTER (OUTPATIENT)
Age: 69
Discharge: HOME | End: 2019-08-06
Payer: MEDICARE

## 2019-08-06 DIAGNOSIS — N40.1 BPH WITH OBSTRUCTION/LOWER URINARY TRACT SYMPTOMS: ICD-10-CM

## 2019-08-06 DIAGNOSIS — Z01.818 PRE-OP TESTING: ICD-10-CM

## 2019-08-06 DIAGNOSIS — N13.8 BPH WITH OBSTRUCTION/LOWER URINARY TRACT SYMPTOMS: ICD-10-CM

## 2019-08-06 LAB
AMORPHOUS: NORMAL
BACTERIA: NORMAL
BILIRUBIN URINE: NEGATIVE
BLOOD, URINE: NEGATIVE
CASTS UA: NORMAL /LPF
CHARACTER, URINE: CLEAR
COLOR: YELLOW
CRYSTALS, UA: NORMAL
EPITHELIAL CELLS, UA: NORMAL /HPF
GLUCOSE, URINE: NEGATIVE MG/DL
KETONES, URINE: NEGATIVE
LEUKOCYTE ESTERASE, URINE: NEGATIVE
MUCUS: NORMAL
NITRITE, URINE: NEGATIVE
PH UA: 5.5 (ref 5–9)
PROTEIN UA: NEGATIVE MG/DL
RBC UA: NORMAL /HPF
REFLEX TO URINE C & S: NORMAL
SPECIFIC GRAVITY UA: 1.01 (ref 1–1.03)
UROBILINOGEN, URINE: 0.2 EU/DL (ref 0–1)
WBC UA: NORMAL /HPF

## 2019-08-06 PROCEDURE — 81001 URINALYSIS AUTO W/SCOPE: CPT

## 2019-08-06 RX ORDER — IRBESARTAN 300 MG/1
300 TABLET ORAL DAILY
COMMUNITY
End: 2020-05-16

## 2019-08-06 NOTE — PROGRESS NOTES
In preparation for their surgical procedure above patient was screened for Obstructive Sleep Apnea (SAM) using the STOP-Bang Questionnaire by the Pre-Admission Testing department. This is a pre-surgical screening tool for patient safety and serves as a recommendation, this WILL NOT cause cancellation of surgery. STOP-Bang Questionnaire  * Do you currently see a pulmonologist?  No     If yes STOP, do not complete. }.    1.  Do you snore loudly (able to be heard in the next room)? No    2. Do you often feel tired or sleepy during the daytime? No       3. Has anyone ever told you that you stop breathing during your sleep? No    4. Do you have or are you being treated for high blood pressure? Yes      5. BMI more than 35? BMI (Calculated): 39.1        Yes    6. Age over 48 years? 68 y.o. Yes    7. Neck Circumference greater than 17 inches for male or 16 inches for female? Measured           (visits only)            Not Applicable    8. Gender Male? Yes      TOTAL SCORE: 4    SAM - Low Risk : Yes to 0 - 2 questions  SAM - Intermediate Risk : Yes to 3 - 4 questions  SAM - High Risk : Yes to 5 - 8 questions    Adapted from:   STOP Questionnaire: A Tool to Screen Patients for Obstructive Sleep Apnea   REUBEN DominguezPCamiC., ALEXIS Rosario.B.B.S., Lauren Jack M.D., Jignesh [place]. Kristan Bright, Ph.D., ALEXIS Hernandez.B.B.S., Von Umaña M.Sc., Lawyer Rola M.D., Wes Miranda. REUBEN LopezP.C.    Anesthesiology 2008; 129:912-23 Copyright 2008, the [address] of Anesthesi[employer], [facility].   ----------------------------------------------------------------------------------------------------------------

## 2019-08-06 NOTE — PROGRESS NOTES
PAT call attempted patient unavailable left message with instructions    NPO after midnight  Bring insurance info and drivers license  Wear comfortable clean clothing  Do not bring jewelry   Shower night before and morning of surgery with a liquid antibacterial soap  Bring medications in original bottles  Follow all instructions give by your physician   needed at discharge  Call [phone] for any questions

## 2019-08-07 ENCOUNTER — PREP FOR PROCEDURE (OUTPATIENT)
Dept: UROLOGY | Age: 69
End: 2019-08-07

## 2019-08-07 RX ORDER — SODIUM CHLORIDE 9 MG/ML
INJECTION, SOLUTION INTRAVENOUS CONTINUOUS
Status: CANCELLED | OUTPATIENT
Start: 2019-08-13

## 2019-08-09 ENCOUNTER — TELEPHONE (OUTPATIENT)
Dept: UROLOGY | Age: 69
End: 2019-08-09

## 2019-08-13 ENCOUNTER — ANESTHESIA EVENT (OUTPATIENT)
Dept: OPERATING ROOM | Age: 69
End: 2019-08-13
Payer: MEDICARE

## 2019-08-13 ENCOUNTER — HOSPITAL ENCOUNTER (OUTPATIENT)
Age: 69
Discharge: HOME | End: 2019-08-14
Attending: UROLOGY | Admitting: UROLOGY
Payer: MEDICARE

## 2019-08-13 ENCOUNTER — ANESTHESIA (OUTPATIENT)
Dept: OPERATING ROOM | Age: 69
End: 2019-08-13
Payer: MEDICARE

## 2019-08-13 VITALS
OXYGEN SATURATION: 99 % | TEMPERATURE: 98.1 F | RESPIRATION RATE: 10 BRPM | DIASTOLIC BLOOD PRESSURE: 58 MMHG | SYSTOLIC BLOOD PRESSURE: 102 MMHG

## 2019-08-13 DIAGNOSIS — G89.18 POST-OP PAIN: Primary | ICD-10-CM

## 2019-08-13 PROBLEM — N13.8 BPH WITH URINARY OBSTRUCTION: Status: ACTIVE | Noted: 2019-08-13

## 2019-08-13 PROBLEM — N40.1 BPH WITH URINARY OBSTRUCTION: Status: ACTIVE | Noted: 2019-08-13

## 2019-08-13 LAB — POTASSIUM SERPL-SCNC: 4 MEQ/L (ref 3.5–5.2)

## 2019-08-13 PROCEDURE — 2580000003 HC RX 258: Performed by: NURSE PRACTITIONER

## 2019-08-13 PROCEDURE — 3700000000 HC ANESTHESIA ATTENDED CARE: Performed by: UROLOGY

## 2019-08-13 PROCEDURE — 96368 THER/DIAG CONCURRENT INF: CPT

## 2019-08-13 PROCEDURE — 88342 IMHCHEM/IMCYTCHM 1ST ANTB: CPT

## 2019-08-13 PROCEDURE — 2580000003 HC RX 258: Performed by: UROLOGY

## 2019-08-13 PROCEDURE — 3700000001 HC ADD 15 MINUTES (ANESTHESIA): Performed by: UROLOGY

## 2019-08-13 PROCEDURE — 6370000000 HC RX 637 (ALT 250 FOR IP): Performed by: NURSE PRACTITIONER

## 2019-08-13 PROCEDURE — 6360000002 HC RX W HCPCS: Performed by: UROLOGY

## 2019-08-13 PROCEDURE — 7100000000 HC PACU RECOVERY - FIRST 15 MIN: Performed by: UROLOGY

## 2019-08-13 PROCEDURE — 6360000002 HC RX W HCPCS: Performed by: NURSE PRACTITIONER

## 2019-08-13 PROCEDURE — 88305 TISSUE EXAM BY PATHOLOGIST: CPT

## 2019-08-13 PROCEDURE — 2709999900 HC NON-CHARGEABLE SUPPLY

## 2019-08-13 PROCEDURE — 36415 COLL VENOUS BLD VENIPUNCTURE: CPT

## 2019-08-13 PROCEDURE — 6360000002 HC RX W HCPCS: Performed by: ANESTHESIOLOGY

## 2019-08-13 PROCEDURE — 2500000003 HC RX 250 WO HCPCS

## 2019-08-13 PROCEDURE — 96360 HYDRATION IV INFUSION INIT: CPT

## 2019-08-13 PROCEDURE — 7100000001 HC PACU RECOVERY - ADDTL 15 MIN: Performed by: UROLOGY

## 2019-08-13 PROCEDURE — 84132 ASSAY OF SERUM POTASSIUM: CPT

## 2019-08-13 PROCEDURE — 6360000002 HC RX W HCPCS

## 2019-08-13 PROCEDURE — 2709999900 HC NON-CHARGEABLE SUPPLY: Performed by: UROLOGY

## 2019-08-13 PROCEDURE — 2720000010 HC SURG SUPPLY STERILE: Performed by: UROLOGY

## 2019-08-13 PROCEDURE — 6370000000 HC RX 637 (ALT 250 FOR IP)

## 2019-08-13 PROCEDURE — 3600000003 HC SURGERY LEVEL 3 BASE: Performed by: UROLOGY

## 2019-08-13 PROCEDURE — 2580000003 HC RX 258

## 2019-08-13 PROCEDURE — 52601 PROSTATECTOMY (TURP): CPT | Performed by: UROLOGY

## 2019-08-13 PROCEDURE — 3600000013 HC SURGERY LEVEL 3 ADDTL 15MIN: Performed by: UROLOGY

## 2019-08-13 PROCEDURE — 96361 HYDRATE IV INFUSION ADD-ON: CPT

## 2019-08-13 RX ORDER — SODIUM CHLORIDE 9 MG/ML
INJECTION, SOLUTION INTRAVENOUS CONTINUOUS
Status: DISCONTINUED | OUTPATIENT
Start: 2019-08-13 | End: 2019-08-13

## 2019-08-13 RX ORDER — HYDRALAZINE HYDROCHLORIDE 20 MG/ML
5 INJECTION INTRAMUSCULAR; INTRAVENOUS EVERY 10 MIN PRN
Status: DISCONTINUED | OUTPATIENT
Start: 2019-08-13 | End: 2019-08-13 | Stop reason: HOSPADM

## 2019-08-13 RX ORDER — MORPHINE SULFATE 2 MG/ML
2 INJECTION, SOLUTION INTRAMUSCULAR; INTRAVENOUS
Status: DISCONTINUED | OUTPATIENT
Start: 2019-08-13 | End: 2019-08-14 | Stop reason: HOSPADM

## 2019-08-13 RX ORDER — HYDROCODONE BITARTRATE AND ACETAMINOPHEN 5; 325 MG/1; MG/1
1 TABLET ORAL EVERY 4 HOURS PRN
Status: DISCONTINUED | OUTPATIENT
Start: 2019-08-13 | End: 2019-08-14 | Stop reason: HOSPADM

## 2019-08-13 RX ORDER — DOCUSATE SODIUM 100 MG/1
100 CAPSULE, LIQUID FILLED ORAL 2 TIMES DAILY
Status: DISCONTINUED | OUTPATIENT
Start: 2019-08-13 | End: 2019-08-14 | Stop reason: HOSPADM

## 2019-08-13 RX ORDER — CIPROFLOXACIN 2 MG/ML
400 INJECTION, SOLUTION INTRAVENOUS EVERY 12 HOURS
Status: COMPLETED | OUTPATIENT
Start: 2019-08-13 | End: 2019-08-14

## 2019-08-13 RX ORDER — MORPHINE SULFATE 2 MG/ML
2 INJECTION, SOLUTION INTRAMUSCULAR; INTRAVENOUS EVERY 5 MIN PRN
Status: DISCONTINUED | OUTPATIENT
Start: 2019-08-13 | End: 2019-08-13 | Stop reason: HOSPADM

## 2019-08-13 RX ORDER — LABETALOL 20 MG/4 ML (5 MG/ML) INTRAVENOUS SYRINGE
5 EVERY 5 MIN PRN
Status: DISCONTINUED | OUTPATIENT
Start: 2019-08-13 | End: 2019-08-13 | Stop reason: HOSPADM

## 2019-08-13 RX ORDER — MORPHINE SULFATE 4 MG/ML
4 INJECTION, SOLUTION INTRAMUSCULAR; INTRAVENOUS
Status: DISCONTINUED | OUTPATIENT
Start: 2019-08-13 | End: 2019-08-14 | Stop reason: HOSPADM

## 2019-08-13 RX ORDER — LOSARTAN POTASSIUM 100 MG/1
100 TABLET ORAL DAILY
Status: DISCONTINUED | OUTPATIENT
Start: 2019-08-14 | End: 2019-08-14 | Stop reason: HOSPADM

## 2019-08-13 RX ORDER — LABETALOL 100 MG/1
100 TABLET, FILM COATED ORAL 2 TIMES DAILY
Status: DISCONTINUED | OUTPATIENT
Start: 2019-08-13 | End: 2019-08-14 | Stop reason: HOSPADM

## 2019-08-13 RX ORDER — SODIUM CHLORIDE 9 MG/ML
INJECTION, SOLUTION INTRAVENOUS CONTINUOUS
Status: DISCONTINUED | OUTPATIENT
Start: 2019-08-13 | End: 2019-08-14 | Stop reason: HOSPADM

## 2019-08-13 RX ORDER — DEXAMETHASONE SODIUM PHOSPHATE 4 MG/ML
INJECTION, SOLUTION INTRA-ARTICULAR; INTRALESIONAL; INTRAMUSCULAR; INTRAVENOUS; SOFT TISSUE PRN
Status: DISCONTINUED | OUTPATIENT
Start: 2019-08-13 | End: 2019-08-13 | Stop reason: SDUPTHER

## 2019-08-13 RX ORDER — BISACODYL 10 MG
10 SUPPOSITORY, RECTAL RECTAL DAILY PRN
Status: DISCONTINUED | OUTPATIENT
Start: 2019-08-13 | End: 2019-08-14 | Stop reason: HOSPADM

## 2019-08-13 RX ORDER — FINASTERIDE 5 MG/1
5 TABLET, FILM COATED ORAL DAILY
Status: DISCONTINUED | OUTPATIENT
Start: 2019-08-14 | End: 2019-08-14 | Stop reason: HOSPADM

## 2019-08-13 RX ORDER — DIPHENHYDRAMINE HYDROCHLORIDE 50 MG/ML
12.5 INJECTION INTRAMUSCULAR; INTRAVENOUS
Status: DISCONTINUED | OUTPATIENT
Start: 2019-08-13 | End: 2019-08-13 | Stop reason: HOSPADM

## 2019-08-13 RX ORDER — ATROPA BELLADONNA AND OPIUM 16.2; 3 MG/1; MG/1
SUPPOSITORY RECTAL
Status: COMPLETED
Start: 2019-08-13 | End: 2019-08-13

## 2019-08-13 RX ORDER — SODIUM CHLORIDE 9 MG/ML
INJECTION, SOLUTION INTRAVENOUS CONTINUOUS PRN
Status: DISCONTINUED | OUTPATIENT
Start: 2019-08-13 | End: 2019-08-13 | Stop reason: SDUPTHER

## 2019-08-13 RX ORDER — SODIUM CHLORIDE 0.9 % (FLUSH) 0.9 %
10 SYRINGE (ML) INJECTION EVERY 12 HOURS SCHEDULED
Status: DISCONTINUED | OUTPATIENT
Start: 2019-08-13 | End: 2019-08-14 | Stop reason: HOSPADM

## 2019-08-13 RX ORDER — ONDANSETRON 2 MG/ML
4 INJECTION INTRAMUSCULAR; INTRAVENOUS EVERY 6 HOURS PRN
Status: DISCONTINUED | OUTPATIENT
Start: 2019-08-13 | End: 2019-08-14 | Stop reason: HOSPADM

## 2019-08-13 RX ORDER — HYDROCODONE BITARTRATE AND ACETAMINOPHEN 5; 325 MG/1; MG/1
2 TABLET ORAL EVERY 4 HOURS PRN
Status: DISCONTINUED | OUTPATIENT
Start: 2019-08-13 | End: 2019-08-14 | Stop reason: HOSPADM

## 2019-08-13 RX ORDER — MEPERIDINE HYDROCHLORIDE 25 MG/ML
12.5 INJECTION INTRAMUSCULAR; INTRAVENOUS; SUBCUTANEOUS EVERY 5 MIN PRN
Status: DISCONTINUED | OUTPATIENT
Start: 2019-08-13 | End: 2019-08-13 | Stop reason: HOSPADM

## 2019-08-13 RX ORDER — ONDANSETRON 2 MG/ML
INJECTION INTRAMUSCULAR; INTRAVENOUS PRN
Status: DISCONTINUED | OUTPATIENT
Start: 2019-08-13 | End: 2019-08-13 | Stop reason: SDUPTHER

## 2019-08-13 RX ORDER — ONDANSETRON 2 MG/ML
4 INJECTION INTRAMUSCULAR; INTRAVENOUS
Status: DISCONTINUED | OUTPATIENT
Start: 2019-08-13 | End: 2019-08-13 | Stop reason: HOSPADM

## 2019-08-13 RX ORDER — HYDROCHLOROTHIAZIDE 12.5 MG/1
12.5 CAPSULE, GELATIN COATED ORAL DAILY
Status: DISCONTINUED | OUTPATIENT
Start: 2019-08-14 | End: 2019-08-14 | Stop reason: HOSPADM

## 2019-08-13 RX ORDER — SODIUM CHLORIDE 0.9 % (FLUSH) 0.9 %
10 SYRINGE (ML) INJECTION PRN
Status: DISCONTINUED | OUTPATIENT
Start: 2019-08-13 | End: 2019-08-14 | Stop reason: HOSPADM

## 2019-08-13 RX ORDER — KETOROLAC TROMETHAMINE 30 MG/ML
INJECTION, SOLUTION INTRAMUSCULAR; INTRAVENOUS PRN
Status: DISCONTINUED | OUTPATIENT
Start: 2019-08-13 | End: 2019-08-13 | Stop reason: SDUPTHER

## 2019-08-13 RX ORDER — TAMSULOSIN HYDROCHLORIDE 0.4 MG/1
0.4 CAPSULE ORAL DAILY
Status: DISCONTINUED | OUTPATIENT
Start: 2019-08-14 | End: 2019-08-14 | Stop reason: HOSPADM

## 2019-08-13 RX ORDER — LIDOCAINE HYDROCHLORIDE 20 MG/ML
INJECTION, SOLUTION INFILTRATION; PERINEURAL PRN
Status: DISCONTINUED | OUTPATIENT
Start: 2019-08-13 | End: 2019-08-13 | Stop reason: SDUPTHER

## 2019-08-13 RX ORDER — FENTANYL CITRATE 50 UG/ML
INJECTION, SOLUTION INTRAMUSCULAR; INTRAVENOUS PRN
Status: DISCONTINUED | OUTPATIENT
Start: 2019-08-13 | End: 2019-08-13 | Stop reason: SDUPTHER

## 2019-08-13 RX ORDER — MIDAZOLAM HYDROCHLORIDE 1 MG/ML
INJECTION INTRAMUSCULAR; INTRAVENOUS PRN
Status: DISCONTINUED | OUTPATIENT
Start: 2019-08-13 | End: 2019-08-13 | Stop reason: SDUPTHER

## 2019-08-13 RX ORDER — ATROPA BELLADONNA AND OPIUM 16.2; 3 MG/1; MG/1
30 SUPPOSITORY RECTAL EVERY 8 HOURS PRN
Status: DISCONTINUED | OUTPATIENT
Start: 2019-08-13 | End: 2019-08-14 | Stop reason: HOSPADM

## 2019-08-13 RX ORDER — FENTANYL CITRATE 50 UG/ML
50 INJECTION, SOLUTION INTRAMUSCULAR; INTRAVENOUS EVERY 5 MIN PRN
Status: DISCONTINUED | OUTPATIENT
Start: 2019-08-13 | End: 2019-08-13 | Stop reason: HOSPADM

## 2019-08-13 RX ORDER — PROPOFOL 10 MG/ML
INJECTION, EMULSION INTRAVENOUS PRN
Status: DISCONTINUED | OUTPATIENT
Start: 2019-08-13 | End: 2019-08-13 | Stop reason: SDUPTHER

## 2019-08-13 RX ADMIN — LABETALOL HCL 100 MG: 100 TABLET, FILM COATED ORAL at 22:39

## 2019-08-13 RX ADMIN — FENTANYL CITRATE 50 MCG: 50 INJECTION INTRAMUSCULAR; INTRAVENOUS at 12:36

## 2019-08-13 RX ADMIN — DEXAMETHASONE SODIUM PHOSPHATE 8 MG: 4 INJECTION, SOLUTION INTRAMUSCULAR; INTRAVENOUS at 12:12

## 2019-08-13 RX ADMIN — SODIUM CHLORIDE: 9 INJECTION, SOLUTION INTRAVENOUS at 10:45

## 2019-08-13 RX ADMIN — SODIUM CHLORIDE: 9 INJECTION, SOLUTION INTRAVENOUS at 14:50

## 2019-08-13 RX ADMIN — SODIUM CHLORIDE: 9 INJECTION, SOLUTION INTRAVENOUS at 12:40

## 2019-08-13 RX ADMIN — HYDROCODONE BITARTRATE AND ACETAMINOPHEN 2 TABLET: 5; 325 TABLET ORAL at 15:24

## 2019-08-13 RX ADMIN — MORPHINE SULFATE 2 MG: 2 INJECTION, SOLUTION INTRAMUSCULAR; INTRAVENOUS at 13:50

## 2019-08-13 RX ADMIN — DOCUSATE SODIUM 100 MG: 100 CAPSULE, LIQUID FILLED ORAL at 22:38

## 2019-08-13 RX ADMIN — OXYBUTYNIN CHLORIDE 15 MG: 5 TABLET, FILM COATED, EXTENDED RELEASE ORAL at 22:40

## 2019-08-13 RX ADMIN — ONDANSETRON HYDROCHLORIDE 4 MG: 4 INJECTION, SOLUTION INTRAMUSCULAR; INTRAVENOUS at 12:12

## 2019-08-13 RX ADMIN — MIDAZOLAM HYDROCHLORIDE 2 MG: 1 INJECTION, SOLUTION INTRAMUSCULAR; INTRAVENOUS at 12:10

## 2019-08-13 RX ADMIN — FENTANYL CITRATE 50 MCG: 50 INJECTION, SOLUTION INTRAMUSCULAR; INTRAVENOUS at 13:29

## 2019-08-13 RX ADMIN — PROPOFOL 200 MG: 10 INJECTION, EMULSION INTRAVENOUS at 12:09

## 2019-08-13 RX ADMIN — KETOROLAC TROMETHAMINE 30 MG: 30 INJECTION, SOLUTION INTRAMUSCULAR; INTRAVENOUS at 12:12

## 2019-08-13 RX ADMIN — ATROPA BELLADONNA AND OPIUM 30 MG: 16.2; 3 SUPPOSITORY RECTAL at 13:32

## 2019-08-13 RX ADMIN — FENTANYL CITRATE 50 MCG: 50 INJECTION INTRAMUSCULAR; INTRAVENOUS at 12:18

## 2019-08-13 RX ADMIN — CIPROFLOXACIN 400 MG: 2 INJECTION, SOLUTION INTRAVENOUS at 17:13

## 2019-08-13 RX ADMIN — SODIUM CHLORIDE: 9 INJECTION, SOLUTION INTRAVENOUS at 12:09

## 2019-08-13 RX ADMIN — FENTANYL CITRATE 50 MCG: 50 INJECTION INTRAMUSCULAR; INTRAVENOUS at 12:12

## 2019-08-13 RX ADMIN — CEFTRIAXONE SODIUM 2 G: 2 INJECTION, POWDER, FOR SOLUTION INTRAMUSCULAR; INTRAVENOUS at 12:13

## 2019-08-13 RX ADMIN — LIDOCAINE HYDROCHLORIDE 60 MG: 20 INJECTION, SOLUTION INFILTRATION; PERINEURAL at 12:09

## 2019-08-13 ASSESSMENT — PULMONARY FUNCTION TESTS
PIF_VALUE: 10
PIF_VALUE: 12
PIF_VALUE: 13
PIF_VALUE: 10
PIF_VALUE: 11
PIF_VALUE: 12
PIF_VALUE: 10
PIF_VALUE: 12
PIF_VALUE: 10
PIF_VALUE: 10
PIF_VALUE: 11
PIF_VALUE: 11
PIF_VALUE: 10
PIF_VALUE: 11
PIF_VALUE: 13
PIF_VALUE: 14
PIF_VALUE: 10
PIF_VALUE: 1
PIF_VALUE: 1
PIF_VALUE: 0
PIF_VALUE: 11
PIF_VALUE: 10
PIF_VALUE: 11
PIF_VALUE: 13
PIF_VALUE: 3
PIF_VALUE: 15
PIF_VALUE: 9
PIF_VALUE: 12
PIF_VALUE: 11
PIF_VALUE: 12
PIF_VALUE: 10
PIF_VALUE: 12
PIF_VALUE: 3
PIF_VALUE: 10
PIF_VALUE: 11
PIF_VALUE: 10
PIF_VALUE: 2
PIF_VALUE: 11
PIF_VALUE: 10
PIF_VALUE: 10
PIF_VALUE: 1
PIF_VALUE: 11
PIF_VALUE: 12
PIF_VALUE: 12
PIF_VALUE: 11
PIF_VALUE: 9
PIF_VALUE: 12
PIF_VALUE: 11
PIF_VALUE: 12
PIF_VALUE: 1
PIF_VALUE: 0
PIF_VALUE: 10

## 2019-08-13 ASSESSMENT — PAIN DESCRIPTION - PROGRESSION: CLINICAL_PROGRESSION: NOT CHANGED

## 2019-08-13 ASSESSMENT — PAIN DESCRIPTION - DESCRIPTORS: DESCRIPTORS: DISCOMFORT

## 2019-08-13 ASSESSMENT — PAIN - FUNCTIONAL ASSESSMENT
PAIN_FUNCTIONAL_ASSESSMENT: 0-10
PAIN_FUNCTIONAL_ASSESSMENT: ACTIVITIES ARE NOT PREVENTED

## 2019-08-13 ASSESSMENT — PAIN SCALES - GENERAL
PAINLEVEL_OUTOF10: 0
PAINLEVEL_OUTOF10: 6
PAINLEVEL_OUTOF10: 0
PAINLEVEL_OUTOF10: 7
PAINLEVEL_OUTOF10: 4
PAINLEVEL_OUTOF10: 1
PAINLEVEL_OUTOF10: 0
PAINLEVEL_OUTOF10: 8

## 2019-08-13 ASSESSMENT — PAIN DESCRIPTION - ORIENTATION: ORIENTATION: MID

## 2019-08-13 ASSESSMENT — PAIN DESCRIPTION - ONSET: ONSET: ON-GOING

## 2019-08-13 ASSESSMENT — PAIN DESCRIPTION - LOCATION: LOCATION: PENIS

## 2019-08-13 ASSESSMENT — PAIN DESCRIPTION - PAIN TYPE: TYPE: ACUTE PAIN

## 2019-08-13 ASSESSMENT — PAIN DESCRIPTION - FREQUENCY: FREQUENCY: CONTINUOUS

## 2019-08-13 NOTE — BRIEF OP NOTE
Brief Postoperative Note  ______________________________________________________________    Patient: Naomi Matos  YOB: 1950  MRN: [id number]  Date of Procedure: 8/13/2019    Pre-Op Diagnosis: BPH WITH OBSTRUCTION    Post-Op Diagnosis: Same       Procedure(s):  TRANSURETHRAL RESECTION PROSTATE    Anesthesia: General    Surgeon(s):  Carmen Mckenna MD    Assistant: none    Estimated Blood Loss (mL): 844 cc    Complications: none    Specimens:   ID Type Source Tests Collected by Time Destination   A : Prostate tissue Tissue Prostate SURGICAL PATHOLOGY Carmen Mckenna MD 8/13/2019 1238        Implants:  * No implants in log *      Drains:   Urethral Catheter Latex 24 fr (Active)   Catheter Indications Urology/Urologist seeing this patient or inserted indwelling catheter 8/13/2019  1:06 PM   Site Assessment [place] 8/13/2019  1:06 PM   Urine Color Colorless 8/13/2019  1:06 PM   Urine Appearance Clear 8/13/2019  1:06 PM       Findings: excellent channel developed    Camren Mckenna MD  Date: 8/13/2019

## 2019-08-13 NOTE — INTERVAL H&P NOTE
[facility]  History and Physical Update    Pt Name: Liz Sibley  MRN: [id number]  YOB: 1950  Date of evaluation: 8/13/2019    [] I have examined the patient and reviewed the H&P/Consult and there are no changes to the patient or plans. [x] I have examined the patient and reviewed the H&P/Consult and have noted the following changes: No changes per patient.         Fabricio Perez MD  Electronically signed 8/13/2019 at 1:28 PM

## 2019-08-13 NOTE — H&P
daily        vitamin E 1000 UNITS capsule Take 1,000 Units by mouth daily        Coenzyme Q10 (CO Q 10 PO) Take  by mouth.          No current facility-administered medications for this visit.             Past Medical History  Sheldon Webb  has a past medical history of Arthritis, Back pain, Diarrhea, Hypertension, PFO (patent foramen ovale), Rotator cuff injury, Stroke ([address] and TIA (transient ischemic attack).     Past Surgical History  The patient  has a past surgical history that includes Rotator cuff repair (Left, 3-12-13); cyst incision and drainage; Cholecystectomy (06/25/2017); and ERCP (06/23/2017).    Family History  This patient's family history includes Arthritis in his mother; Cancer in his brother; Stroke in his maternal uncle; Vision Loss in his father.  Odette Andrews  reports that he has never smoked. He has never used smokeless tobacco. He reports that he does not drink alcohol or use drugs.     Subjective:      Review of Systems  No problems with ears, nose or throat. No problems with eyes. No chest pain, shortness of breath, abdominal pain, extremity pain or weakness, and no neurological deficits. No rashes.  symptoms per HPI. The remainder of the review of symptoms is negative.     Objective:      PE:   Vitals       Vitals:     07/16/19 1043   Weight: 283 lb (128.4 kg)   Height: 5' 11\" (1.803 m)            Constitutional: Alert and oriented times 3, no acute distress and cooperative to examination with appropriate mood and affect. HENT:   Head:        Normocephalic and atraumatic. Mouth/Throat:         Mucous membranes are normal.   Eyes:         EOM are normal. No scleral icterus. PERRLA. Neck:        Supple, symmetrical, trachea midline  Pulmonary/Chest:      Chest symmetric with normal A/P diameter, Normal respiratory rate and rhthym. No use of accessory muscles. Abdominal:         Soft. No tenderness. Bowel sounds present.   Musculoskeletal:         Normal range of

## 2019-08-13 NOTE — OP NOTE
[facility] 58  RECORD OF OPERATION     PATIENT NAME: Lesley Mckenzie               MEDICAL RECORD NO. [id number]                DATE OF PROCEDURE: 8/13/2019  SURGEON: Ran Guzmán MD  PRIMARY CARE PHYSICIAN: Asael Holloway DO        PREOPERATIVE DIAGNOSIS: BPH with obstruction      POSTOPERATIVE DIAGNOSIS: BPH with obstruction      PROCEDURE PERFORMED: TURP using bipolar loop     SURGEON: Isabelle Guzmán MD    ASSISTANT(S): None     ANESTHESIA: general     BLOOD LOSS:  150 cc     SPECIMENS: prostate tissue     COMPLICATIONS:  None immediately appreciated. DISCUSSION:  Aristeo De Oliveira is a 68y.o.-year-old male who has a diagnosis of BPH with obstruction. After a history and physical examination was performed, potential diagnostic and therapeutic modalities were discussed with the patient. TURP was recommended and a discussion of the risks, possible complications, possible side effects, along with the anticipated benefits were reviewed. He was given the opportunity to ask questions. Once answered, informed consent was obtained. He was brought to the operating on 8/13/2019 for the procedure. PROCEDURE: Aristeo De Oliveira was brought to the operating room and placed supine on the cystoscopy table. After initiation of general anesthesia, he was placed in a dorsal lithotomy position and his penis and groin were prepped and he was draped in the standard fashion for cystoscopy. A continuous irrigation bipolar resectoscope was passed per urethra and then pulled back into the prostatic fossa. The obstructing tissue was visualized. Plasma TURP was performed and the prostatic fossa was opened widely from the bladder neck out to the verumontanum. A very nice channel through the prostatic urethra was created. All bleeding was stopped with cautery current and following the obtaining of hemostasis, a 24 Syriac 3-way catheter was passed.   This was done using a catheter guide and it was passed without

## 2019-08-13 NOTE — ANESTHESIA PRE PROCEDURE
Value Date     07/24/2019    K 4.0 08/13/2019     07/24/2019    CO2 23 07/24/2019    BUN 12 07/24/2019    CREATININE 1.0 07/24/2019    LABGLOM 74 07/24/2019    GLUCOSE 107 07/24/2019    GLUCOSE 110 06/22/2017    PROT 8.1 06/22/2017    CALCIUM 9.3 07/24/2019    BILITOT 6.6 06/22/2017    ALKPHOS 155 06/22/2017     06/22/2017     06/22/2017       POC Tests: No results for input(s): POCGLU, POCNA, POCK, POCCL, POCBUN, POCHEMO, POCHCT in the last 72 hours. Coags:   Lab Results   Component Value Date    INR 1.04 11/06/2012       HCG (If Applicable): No results found for: PREGTESTUR, PREGSERUM, HCG, HCGQUANT     ABGs: No results found for: PHART, PO2ART, QWF4KTY, OFZ7LOT, BEART, D2SCJLGI     Type & Screen (If Applicable):  No results found for: LABABO, LABRH    Anesthesia Evaluation    Airway: Mallampati: III       Mouth opening: > = 3 FB Dental:          Pulmonary:       (-) COPD                           Cardiovascular:    (+) hypertension:,     (-) CAD    ECG reviewed  Rhythm: regular                      Neuro/Psych:   (+) CVA: residual symptoms and no interval change, TIA,              ROS comment: Left side weakness. GI/Hepatic/Renal:             Endo/Other:        (-) diabetes mellitus               Abdominal:   (+) obese,         Vascular:                                        Anesthesia Plan      general     ASA 3       Induction: intravenous. Anesthetic plan and risks discussed with patient. Plan discussed with CRNA.                   Melanie Valdes MD   8/13/2019

## 2019-08-14 VITALS
RESPIRATION RATE: 18 BRPM | DIASTOLIC BLOOD PRESSURE: 65 MMHG | HEART RATE: 56 BPM | WEIGHT: 284.8 LBS | BODY MASS INDEX: 39.87 KG/M2 | TEMPERATURE: 98.1 F | SYSTOLIC BLOOD PRESSURE: 149 MMHG | HEIGHT: 71 IN | OXYGEN SATURATION: 93 %

## 2019-08-14 LAB
ANION GAP SERPL CALCULATED.3IONS-SCNC: 14 MEQ/L (ref 8–16)
BUN BLDV-MCNC: 16 MG/DL (ref 7–22)
CALCIUM SERPL-MCNC: 8.5 MG/DL (ref 8.5–10.5)
CHLORIDE BLD-SCNC: 106 MEQ/L (ref 98–111)
CO2: 24 MEQ/L (ref 23–33)
CREAT SERPL-MCNC: 1.2 MG/DL (ref 0.4–1.2)
ERYTHROCYTE [DISTWIDTH] IN BLOOD BY AUTOMATED COUNT: 13.5 % (ref 11.5–14.5)
ERYTHROCYTE [DISTWIDTH] IN BLOOD BY AUTOMATED COUNT: 44.1 FL (ref 35–45)
GFR SERPL CREATININE-BSD FRML MDRD: 60 ML/MIN/1.73M2
GLUCOSE BLD-MCNC: 150 MG/DL (ref 70–108)
HCT VFR BLD CALC: 40 % (ref 42–52)
HEMOGLOBIN: 12.9 GM/DL (ref 14–18)
MCH RBC QN AUTO: 28.8 PG (ref 26–33)
MCHC RBC AUTO-ENTMCNC: 32.3 GM/DL (ref 32.2–35.5)
MCV RBC AUTO: 89.3 FL (ref 80–94)
PLATELET # BLD: 223 THOU/MM3 (ref 130–400)
PMV BLD AUTO: 10.8 FL (ref 9.4–12.4)
POTASSIUM SERPL-SCNC: 4.3 MEQ/L (ref 3.5–5.2)
RBC # BLD: 4.48 MILL/MM3 (ref 4.7–6.1)
SODIUM BLD-SCNC: 144 MEQ/L (ref 135–145)
WBC # BLD: 10.9 THOU/MM3 (ref 4.8–10.8)

## 2019-08-14 PROCEDURE — 96366 THER/PROPH/DIAG IV INF ADDON: CPT

## 2019-08-14 PROCEDURE — 2580000003 HC RX 258: Performed by: NURSE PRACTITIONER

## 2019-08-14 PROCEDURE — 2709999900 HC NON-CHARGEABLE SUPPLY

## 2019-08-14 PROCEDURE — 6370000000 HC RX 637 (ALT 250 FOR IP): Performed by: NURSE PRACTITIONER

## 2019-08-14 PROCEDURE — 99024 POSTOP FOLLOW-UP VISIT: CPT | Performed by: NURSE PRACTITIONER

## 2019-08-14 PROCEDURE — 36415 COLL VENOUS BLD VENIPUNCTURE: CPT

## 2019-08-14 PROCEDURE — 6360000002 HC RX W HCPCS: Performed by: NURSE PRACTITIONER

## 2019-08-14 PROCEDURE — 80048 BASIC METABOLIC PNL TOTAL CA: CPT

## 2019-08-14 PROCEDURE — 85027 COMPLETE CBC AUTOMATED: CPT

## 2019-08-14 PROCEDURE — 96360 HYDRATION IV INFUSION INIT: CPT

## 2019-08-14 PROCEDURE — 96361 HYDRATE IV INFUSION ADD-ON: CPT

## 2019-08-14 RX ORDER — HYDROCODONE BITARTRATE AND ACETAMINOPHEN 5; 325 MG/1; MG/1
1 TABLET ORAL EVERY 6 HOURS PRN
Qty: 12 TABLET | Refills: 0 | Status: SHIPPED | OUTPATIENT
Start: 2019-08-14 | End: 2019-08-17

## 2019-08-14 RX ORDER — CIPROFLOXACIN 500 MG/1
500 TABLET, FILM COATED ORAL 2 TIMES DAILY
Qty: 6 TABLET | Refills: 0 | Status: SHIPPED | OUTPATIENT
Start: 2019-08-14 | End: 2019-08-17

## 2019-08-14 RX ADMIN — FINASTERIDE 5 MG: 5 TABLET, FILM COATED ORAL at 07:53

## 2019-08-14 RX ADMIN — LABETALOL HCL 100 MG: 100 TABLET, FILM COATED ORAL at 07:54

## 2019-08-14 RX ADMIN — HYDROCHLOROTHIAZIDE 12.5 MG: 12.5 CAPSULE ORAL at 07:53

## 2019-08-14 RX ADMIN — DOCUSATE SODIUM 100 MG: 100 CAPSULE, LIQUID FILLED ORAL at 07:52

## 2019-08-14 RX ADMIN — TAMSULOSIN HYDROCHLORIDE 0.4 MG: 0.4 CAPSULE ORAL at 07:55

## 2019-08-14 RX ADMIN — SODIUM CHLORIDE: 9 INJECTION, SOLUTION INTRAVENOUS at 02:41

## 2019-08-14 RX ADMIN — OXYBUTYNIN CHLORIDE 15 MG: 5 TABLET, FILM COATED, EXTENDED RELEASE ORAL at 07:55

## 2019-08-14 RX ADMIN — CIPROFLOXACIN 400 MG: 2 INJECTION, SOLUTION INTRAVENOUS at 04:32

## 2019-08-14 ASSESSMENT — PAIN DESCRIPTION - PROGRESSION
CLINICAL_PROGRESSION: NOT CHANGED
CLINICAL_PROGRESSION: NOT CHANGED

## 2019-08-14 ASSESSMENT — PAIN DESCRIPTION - ORIENTATION
ORIENTATION: MID
ORIENTATION: MID

## 2019-08-14 ASSESSMENT — PAIN - FUNCTIONAL ASSESSMENT
PAIN_FUNCTIONAL_ASSESSMENT: ACTIVITIES ARE NOT PREVENTED
PAIN_FUNCTIONAL_ASSESSMENT: ACTIVITIES ARE NOT PREVENTED

## 2019-08-14 ASSESSMENT — PAIN DESCRIPTION - LOCATION
LOCATION: PENIS
LOCATION: PENIS

## 2019-08-14 ASSESSMENT — PAIN SCALES - GENERAL
PAINLEVEL_OUTOF10: 0
PAINLEVEL_OUTOF10: 1
PAINLEVEL_OUTOF10: 1

## 2019-08-14 ASSESSMENT — PAIN DESCRIPTION - ONSET
ONSET: ON-GOING
ONSET: ON-GOING

## 2019-08-14 ASSESSMENT — PAIN DESCRIPTION - FREQUENCY
FREQUENCY: CONTINUOUS
FREQUENCY: CONTINUOUS

## 2019-08-14 ASSESSMENT — PAIN DESCRIPTION - PAIN TYPE
TYPE: ACUTE PAIN
TYPE: ACUTE PAIN

## 2019-08-14 ASSESSMENT — PAIN DESCRIPTION - DESCRIPTORS
DESCRIPTORS: DISCOMFORT
DESCRIPTORS: DISCOMFORT

## 2019-08-14 NOTE — DISCHARGE SUMMARY
Patient Identification  Tiffany Pryor is a 68 y.o. male. :  1950  Admit Date:  2019    Discharge date: 19                                    Disposition: home    Discharge Diagnoses:   Patient Active Problem List   Diagnosis    Right basal ganglia embolic stroke [address] [address]    Urinary retention    BPH with urinary obstruction       Consults: none    Surgery: TURP    Patient Instructions: Activity: no heavy lifting, pushing, pulling for 6 weeks, no driving while on analgesics. Diet: As tolerated  Follow-up with Emil on 19. Hospital course: Patient underwent TURP with no complications. Post-operatively he remained stable. Patient is tolerating diet, urinating adequately with catheter, pain is minimal, ambulating well with assistance, having flatus and BM.       Time spent for discharge 25 minutes     MAGDA Gallardo  Urology

## 2019-08-14 NOTE — CARE COORDINATION
Plan is for reyes catheter to be removed prior to discharge. 8/14/19, 11:25 AM    Discharge plan discussed by  and . Discharge plan reviewed with patient/ family. Patient/ family verbalize understanding of discharge plan and are in agreement with plan. Understanding was demonstrated using the teach back method.

## 2019-08-16 ENCOUNTER — NURSE ONLY (OUTPATIENT)
Dept: UROLOGY | Age: 69
End: 2019-08-16

## 2019-08-16 DIAGNOSIS — N40.1 BPH WITH URINARY OBSTRUCTION: ICD-10-CM

## 2019-08-16 DIAGNOSIS — N13.8 BPH WITH URINARY OBSTRUCTION: ICD-10-CM

## 2019-08-19 ENCOUNTER — HOSPITAL ENCOUNTER (OUTPATIENT)
Age: 69
Discharge: HOME | End: 2019-08-19
Payer: MEDICARE

## 2019-08-19 ENCOUNTER — TELEPHONE (OUTPATIENT)
Dept: UROLOGY | Age: 69
End: 2019-08-19

## 2019-08-19 DIAGNOSIS — R39.15 URINARY URGENCY: ICD-10-CM

## 2019-08-19 DIAGNOSIS — R30.0 DYSURIA: ICD-10-CM

## 2019-08-19 DIAGNOSIS — R35.0 FREQUENCY OF URINATION: ICD-10-CM

## 2019-08-19 DIAGNOSIS — R39.15 URINARY URGENCY: Primary | ICD-10-CM

## 2019-08-19 LAB
AMORPHOUS: ABNORMAL
BACTERIA: ABNORMAL
BILIRUBIN URINE: NEGATIVE
BLOOD, URINE: ABNORMAL
CASTS UA: ABNORMAL /LPF
CHARACTER, URINE: ABNORMAL
COLOR: ABNORMAL
CRYSTALS, UA: ABNORMAL
EPITHELIAL CELLS, UA: ABNORMAL /HPF
GLUCOSE, URINE: NEGATIVE MG/DL
KETONES, URINE: NEGATIVE
LEUKOCYTE ESTERASE, URINE: ABNORMAL
MUCUS: ABNORMAL
NITRITE, URINE: NEGATIVE
PH UA: 6.5 (ref 5–9)
PROTEIN UA: 100 MG/DL
RBC UA: > 200 /HPF
REFLEX TO URINE C & S: ABNORMAL
SPECIFIC GRAVITY UA: 1.02 (ref 1–1.03)
UROBILINOGEN, URINE: 0.2 EU/DL (ref 0–1)
WBC UA: ABNORMAL /HPF

## 2019-08-19 PROCEDURE — 81001 URINALYSIS AUTO W/SCOPE: CPT

## 2019-08-19 PROCEDURE — 87086 URINE CULTURE/COLONY COUNT: CPT

## 2019-08-19 RX ORDER — SULFAMETHOXAZOLE AND TRIMETHOPRIM 800; 160 MG/1; MG/1
1 TABLET ORAL 2 TIMES DAILY
Qty: 14 TABLET | Refills: 0 | Status: SHIPPED | OUTPATIENT
Start: 2019-08-19 | End: 2019-08-26

## 2019-08-21 ENCOUNTER — OFFICE VISIT (OUTPATIENT)
Dept: UROLOGY | Age: 69
End: 2019-08-21
Payer: MEDICARE

## 2019-08-21 VITALS
BODY MASS INDEX: 40.46 KG/M2 | DIASTOLIC BLOOD PRESSURE: 68 MMHG | HEIGHT: 71 IN | WEIGHT: 289 LBS | SYSTOLIC BLOOD PRESSURE: 120 MMHG

## 2019-08-21 DIAGNOSIS — N40.1 BPH WITH URINARY OBSTRUCTION: Primary | ICD-10-CM

## 2019-08-21 DIAGNOSIS — N13.8 BPH WITH URINARY OBSTRUCTION: Primary | ICD-10-CM

## 2019-08-21 LAB
BILIRUBIN URINE: NEGATIVE
BLOOD URINE, POC: ABNORMAL
CHARACTER, URINE: CLEAR
COLOR, URINE: YELLOW
GLUCOSE URINE: NEGATIVE MG/DL
KETONES, URINE: NEGATIVE
LEUKOCYTE CLUMPS, URINE: ABNORMAL
NITRITE, URINE: NEGATIVE
PH, URINE: 7 (ref 5–9)
POST VOID RESIDUAL (PVR): 164 ML
PROTEIN, URINE: 100 MG/DL
SPECIFIC GRAVITY, URINE: 1.01 (ref 1–1.03)
URINE CULTURE REFLEX: NORMAL
UROBILINOGEN, URINE: 0.2 EU/DL (ref 0–1)

## 2019-08-21 PROCEDURE — 51798 US URINE CAPACITY MEASURE: CPT | Performed by: NURSE PRACTITIONER

## 2019-08-21 PROCEDURE — 99024 POSTOP FOLLOW-UP VISIT: CPT | Performed by: NURSE PRACTITIONER

## 2019-08-21 PROCEDURE — 81003 URINALYSIS AUTO W/O SCOPE: CPT | Performed by: NURSE PRACTITIONER

## 2019-08-21 NOTE — PROGRESS NOTES
of Arthritis, Back pain, Diarrhea, Hypertension, PFO (patent foramen ovale), Rotator cuff injury, Stroke ([address] and TIA (transient ischemic attack). Past Surgical History  The patient  has a past surgical history that includes Rotator cuff repair (Left, 3-12-13); cyst incision and drainage; Cholecystectomy (06/25/2017); ERCP (06/23/2017); and TURP (N/A, 8/13/2019). Family History  This patient's family history includes Arthritis in his mother; Cancer in his brother; Stroke in his maternal uncle; Vision Loss in his father. Social History  Lopez Leslie  reports that he has never smoked. He has never used smokeless tobacco. He reports that he does not drink alcohol or use drugs. Subjective:     Review of Systems  No problems with ears, nose or throat. No problems with eyes. No chest pain, shortness of breath, abdominal pain, extremity pain or weakness, and no neurological deficits. No rashes.  symptoms per HPI. The remainder of the review of symptoms is negative. Objective:     PE:   Vitals:    08/21/19 1355   BP: 120/68   Weight: 289 lb (131.1 kg)   Height: 5' 11\" (1.803 m)       Constitutional: Alert and oriented times 3, no acute distress and cooperative to examination with appropriate mood and affect. HENT:   Head:        Normocephalic and atraumatic. Mouth/Throat:         Mucous membranes are normal.   Eyes:         EOM are normal. No scleral icterus. PERRLA. Neck:        Supple, symmetrical, trachea midline  Pulmonary/Chest:      Chest symmetric with normal A/P diameter. Normal respiratory rate and rhthym. No use of accessory muscles. Abdominal:         Soft. No tenderness. Bowel sounds present. Musculoskeletal:         Normal range of motion. No edema or tenderness of lower extremities. Extremities: No cyanosis, clubbing, or edema present. Neurological:        Alert and oriented. No cranial nerve deficit. Psychiatric:        Normal mood and affect.       Labs   Urine dip demonstrates   Results for POC orders placed in visit on 08/21/19   POCT Urinalysis No Micro (Auto)   Result Value Ref Range    Glucose, Ur Negative NEGATIVE mg/dl    Bilirubin Urine Negative     Ketones, Urine Negative NEGATIVE    Specific Gravity, Urine 1.015 1.002 - 1.03    Blood, UA POC Moderate (A) NEGATIVE    pH, Urine 7.00 5.0 - 9.0    Protein, Urine 100 (A) NEGATIVE mg/dl    Urobilinogen, Urine 0.20 0.0 - 1.0 eu/dl    Nitrite, Urine Negative NEGATIVE    Leukocyte Clumps, Urine Small (A) NEGATIVE    Color, Urine Yellow YELLOW-STR    Character, Urine Clear [id number]   poct post void residual   Result Value Ref Range    post void residual 164 ml       Patients recent PSA values are as follows  Lab Results   Component Value Date    PSA 0.66 07/24/2019    PSA 1.64 (H) 06/19/2018    PSA 1.26 (H) 08/14/2017        Recent BUN/Creatinine:  Lab Results   Component Value Date    BUN 16 08/14/2019    CREATININE 1.2 08/14/2019       Surgical Pathology  FINAL DIAGNOSIS:  Prostate, TURP:   Focal high-grade prostatic intraepithelial neoplasia (high-grade-PIN).  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 Assessment & Plan:     BPH w/ Obstruction    [employer] is s/p TURP using bipolar loop with Dr. Ravi Olsen on 08/13/19. Surgical pathology revealed focal high-grade prostatic intraepithelial neoplasia, glandular and stromal hyperplasia with chronic inflammation, 14 grams removed. Pathology was discussed with the patient. He is doing okay, frequency/urgency still present, discussed this should continue to improve as he heals. Continue Flomax and finasteride as well as oxybutynin for time being. Plan to follow-up in 6 weeks in [facility], anticipate urinary symptoms to continue to improve    Return in about 6 weeks (around 10/2/2019) for BPH.     MAGDA Riley  Urology

## 2019-09-09 ENCOUNTER — HOSPITAL ENCOUNTER (OUTPATIENT)
Age: 69
Discharge: HOME | End: 2019-09-09
Payer: MEDICARE

## 2019-09-09 ENCOUNTER — TELEPHONE (OUTPATIENT)
Dept: UROLOGY | Age: 69
End: 2019-09-09

## 2019-09-09 DIAGNOSIS — R35.0 FREQUENCY OF URINATION: ICD-10-CM

## 2019-09-09 DIAGNOSIS — R30.0 DYSURIA: ICD-10-CM

## 2019-09-09 DIAGNOSIS — R39.15 URINARY URGENCY: ICD-10-CM

## 2019-09-09 DIAGNOSIS — R39.15 URINARY URGENCY: Primary | ICD-10-CM

## 2019-09-09 LAB
AMORPHOUS: ABNORMAL
BACTERIA: ABNORMAL
BILIRUBIN URINE: NEGATIVE
BLOOD, URINE: ABNORMAL
CASTS UA: ABNORMAL /LPF
CHARACTER, URINE: ABNORMAL
COLOR: YELLOW
CRYSTALS, UA: ABNORMAL
EPITHELIAL CELLS, UA: ABNORMAL /HPF
GLUCOSE, URINE: NEGATIVE MG/DL
KETONES, URINE: NEGATIVE
LEUKOCYTE ESTERASE, URINE: ABNORMAL
MUCUS: ABNORMAL
NITRITE, URINE: NEGATIVE
PH UA: 5.5 (ref 5–9)
PROTEIN UA: 30 MG/DL
RBC UA: ABNORMAL /HPF
REFLEX TO URINE C & S: ABNORMAL
SPECIFIC GRAVITY UA: 1.02 (ref 1–1.03)
UROBILINOGEN, URINE: 0.2 EU/DL (ref 0–1)
WBC UA: ABNORMAL /HPF

## 2019-09-09 PROCEDURE — 87086 URINE CULTURE/COLONY COUNT: CPT

## 2019-09-09 PROCEDURE — 81001 URINALYSIS AUTO W/SCOPE: CPT

## 2019-09-09 RX ORDER — CIPROFLOXACIN 500 MG/1
500 TABLET, FILM COATED ORAL 2 TIMES DAILY
Qty: 14 TABLET | Refills: 0 | Status: SHIPPED | OUTPATIENT
Start: 2019-09-09 | End: 2019-09-16

## 2019-09-10 LAB
ORGANISM: ABNORMAL
URINE CULTURE REFLEX: ABNORMAL

## 2019-09-12 ENCOUNTER — TELEPHONE (OUTPATIENT)
Dept: UROLOGY | Age: 69
End: 2019-09-12

## 2019-09-12 NOTE — TELEPHONE ENCOUNTER
The patient was advised of the urine results, to finish the antibiotics and discuss the incontinence at his next office visit. He voiced understanding.

## 2019-09-12 NOTE — TELEPHONE ENCOUNTER
The patient would like the urine results. The burning has improved and he stopped the AZO but will take it if the burning returns. He is still taking the the Cipro. He still has a problem with urgency and incontinence. Is there anything that can be done to improve that? Please advise.  Thank you

## 2019-09-17 RX ORDER — CIPROFLOXACIN 500 MG/1
500 TABLET, FILM COATED ORAL 2 TIMES DAILY
Qty: 42 TABLET | Refills: 0 | Status: SHIPPED | OUTPATIENT
Start: 2019-09-17 | End: 2019-10-08

## 2019-10-01 ENCOUNTER — OFFICE VISIT (OUTPATIENT)
Dept: UROLOGY | Age: 69
End: 2019-10-01
Payer: MEDICARE

## 2019-10-01 VITALS
SYSTOLIC BLOOD PRESSURE: 114 MMHG | DIASTOLIC BLOOD PRESSURE: 60 MMHG | HEIGHT: 71 IN | BODY MASS INDEX: 40.46 KG/M2 | WEIGHT: 289 LBS

## 2019-10-01 DIAGNOSIS — R39.15 URINARY URGENCY: ICD-10-CM

## 2019-10-01 DIAGNOSIS — N40.1 BPH WITH URINARY OBSTRUCTION: Primary | ICD-10-CM

## 2019-10-01 DIAGNOSIS — N39.41 URGE INCONTINENCE: ICD-10-CM

## 2019-10-01 DIAGNOSIS — N13.8 BPH WITH URINARY OBSTRUCTION: Primary | ICD-10-CM

## 2019-10-01 LAB
BILIRUBIN, POC: NORMAL
BLOOD URINE, POC: NORMAL
CLARITY, POC: NORMAL
COLOR, POC: NORMAL
GLUCOSE URINE, POC: NORMAL
KETONES, POC: NORMAL
LEUKOCYTE EST, POC: NORMAL
NITRITE, POC: NORMAL
PH, POC: NORMAL
POST VOID RESIDUAL (PVR): 154 ML
PROTEIN, POC: NORMAL
SPECIFIC GRAVITY, POC: NORMAL
UROBILINOGEN, POC: NORMAL

## 2019-10-01 PROCEDURE — 51798 US URINE CAPACITY MEASURE: CPT | Performed by: NURSE PRACTITIONER

## 2019-10-01 PROCEDURE — 99024 POSTOP FOLLOW-UP VISIT: CPT | Performed by: NURSE PRACTITIONER

## 2019-10-01 PROCEDURE — 81002 URINALYSIS NONAUTO W/O SCOPE: CPT | Performed by: NURSE PRACTITIONER

## 2019-10-01 RX ORDER — FESOTERODINE FUMARATE 4 MG/1
4 TABLET, EXTENDED RELEASE ORAL DAILY
Qty: 30 TABLET | Refills: 6 | Status: ON HOLD | OUTPATIENT
Start: 2019-10-01 | End: 2019-12-17 | Stop reason: ALTCHOICE

## 2019-11-26 ENCOUNTER — OFFICE VISIT (OUTPATIENT)
Dept: UROLOGY | Age: 69
End: 2019-11-26
Payer: MEDICARE

## 2019-11-26 ENCOUNTER — TELEPHONE (OUTPATIENT)
Dept: UROLOGY | Age: 69
End: 2019-11-26

## 2019-11-26 VITALS
HEIGHT: 71 IN | SYSTOLIC BLOOD PRESSURE: 114 MMHG | DIASTOLIC BLOOD PRESSURE: 70 MMHG | WEIGHT: 293.9 LBS | BODY MASS INDEX: 41.15 KG/M2

## 2019-11-26 DIAGNOSIS — Z01.818 PREOPERATIVE TESTING: ICD-10-CM

## 2019-11-26 DIAGNOSIS — N39.41 URGE INCONTINENCE: Primary | ICD-10-CM

## 2019-11-26 DIAGNOSIS — N13.8 BPH WITH URINARY OBSTRUCTION: ICD-10-CM

## 2019-11-26 DIAGNOSIS — N40.1 BPH WITH URINARY OBSTRUCTION: ICD-10-CM

## 2019-11-26 LAB
BILIRUBIN URINE: NEGATIVE
BLOOD URINE, POC: NEGATIVE
CHARACTER, URINE: CLEAR
COLOR, URINE: YELLOW
GLUCOSE URINE: NEGATIVE MG/DL
KETONES, URINE: NEGATIVE
LEUKOCYTE CLUMPS, URINE: ABNORMAL
NITRITE, URINE: NEGATIVE
PH, URINE: 6.5 (ref 5–9)
POST VOID RESIDUAL (PVR): 0 ML
PROTEIN, URINE: NEGATIVE MG/DL
SPECIFIC GRAVITY, URINE: 1.02 (ref 1–1.03)
UROBILINOGEN, URINE: 0.2 EU/DL (ref 0–1)

## 2019-11-26 PROCEDURE — 1036F TOBACCO NON-USER: CPT | Performed by: UROLOGY

## 2019-11-26 PROCEDURE — 81003 URINALYSIS AUTO W/O SCOPE: CPT | Performed by: UROLOGY

## 2019-11-26 PROCEDURE — 1123F ACP DISCUSS/DSCN MKR DOCD: CPT | Performed by: UROLOGY

## 2019-11-26 PROCEDURE — G8484 FLU IMMUNIZE NO ADMIN: HCPCS | Performed by: UROLOGY

## 2019-11-26 PROCEDURE — 4040F PNEUMOC VAC/ADMIN/RCVD: CPT | Performed by: UROLOGY

## 2019-11-26 PROCEDURE — 51798 US URINE CAPACITY MEASURE: CPT | Performed by: UROLOGY

## 2019-11-26 PROCEDURE — G8427 DOCREV CUR MEDS BY ELIG CLIN: HCPCS | Performed by: UROLOGY

## 2019-11-26 PROCEDURE — 3017F COLORECTAL CA SCREEN DOC REV: CPT | Performed by: UROLOGY

## 2019-11-26 PROCEDURE — 99214 OFFICE O/P EST MOD 30 MIN: CPT | Performed by: UROLOGY

## 2019-11-26 PROCEDURE — G8417 CALC BMI ABV UP PARAM F/U: HCPCS | Performed by: UROLOGY

## 2019-11-26 PROCEDURE — G8599 NO ASA/ANTIPLAT THER USE RNG: HCPCS | Performed by: UROLOGY

## 2019-11-27 ENCOUNTER — PREP FOR PROCEDURE (OUTPATIENT)
Dept: UROLOGY | Age: 69
End: 2019-11-27

## 2019-11-27 RX ORDER — SODIUM CHLORIDE 9 MG/ML
INJECTION, SOLUTION INTRAVENOUS CONTINUOUS
Status: CANCELLED | OUTPATIENT
Start: 2019-12-17

## 2019-11-29 ENCOUNTER — HOSPITAL ENCOUNTER (OUTPATIENT)
Age: 69
Discharge: HOME | End: 2019-11-29
Payer: MEDICARE

## 2019-11-29 DIAGNOSIS — N39.41 URGE INCONTINENCE: ICD-10-CM

## 2019-11-29 DIAGNOSIS — Z01.818 PREOPERATIVE TESTING: ICD-10-CM

## 2019-11-29 LAB
ANION GAP SERPL CALCULATED.3IONS-SCNC: 15 MEQ/L (ref 8–16)
BASOPHILS # BLD: 1.3 %
BASOPHILS ABSOLUTE: 0.1 THOU/MM3 (ref 0–0.1)
BUN BLDV-MCNC: 17 MG/DL (ref 7–22)
CALCIUM SERPL-MCNC: 9.5 MG/DL (ref 8.5–10.5)
CHLORIDE BLD-SCNC: 104 MEQ/L (ref 98–111)
CO2: 25 MEQ/L (ref 23–33)
CREAT SERPL-MCNC: 1.2 MG/DL (ref 0.4–1.2)
EOSINOPHIL # BLD: 7.2 %
EOSINOPHILS ABSOLUTE: 0.6 THOU/MM3 (ref 0–0.4)
ERYTHROCYTE [DISTWIDTH] IN BLOOD BY AUTOMATED COUNT: 13.9 % (ref 11.5–14.5)
ERYTHROCYTE [DISTWIDTH] IN BLOOD BY AUTOMATED COUNT: 44.8 FL (ref 35–45)
GFR SERPL CREATININE-BSD FRML MDRD: 60 ML/MIN/1.73M2
GLUCOSE BLD-MCNC: 107 MG/DL (ref 70–108)
HCT VFR BLD CALC: 42.9 % (ref 42–52)
HEMOGLOBIN: 14 GM/DL (ref 14–18)
IMMATURE GRANS (ABS): 0.03 THOU/MM3 (ref 0–0.07)
IMMATURE GRANULOCYTES: 0.4 %
LYMPHOCYTES # BLD: 22.2 %
LYMPHOCYTES ABSOLUTE: 1.8 THOU/MM3 (ref 1–4.8)
MCH RBC QN AUTO: 29.2 PG (ref 26–33)
MCHC RBC AUTO-ENTMCNC: 32.6 GM/DL (ref 32.2–35.5)
MCV RBC AUTO: 89.4 FL (ref 80–94)
MONOCYTES # BLD: 8.9 %
MONOCYTES ABSOLUTE: 0.7 THOU/MM3 (ref 0.4–1.3)
NUCLEATED RED BLOOD CELLS: 0 /100 WBC
PLATELET # BLD: 254 THOU/MM3 (ref 130–400)
PMV BLD AUTO: 11.2 FL (ref 9.4–12.4)
POTASSIUM SERPL-SCNC: 4.3 MEQ/L (ref 3.5–5.2)
RBC # BLD: 4.8 MILL/MM3 (ref 4.7–6.1)
SEG NEUTROPHILS: 60 %
SEGMENTED NEUTROPHILS ABSOLUTE COUNT: 4.9 THOU/MM3 (ref 1.8–7.7)
SODIUM BLD-SCNC: 144 MEQ/L (ref 135–145)
WBC # BLD: 8.2 THOU/MM3 (ref 4.8–10.8)

## 2019-11-29 PROCEDURE — 36415 COLL VENOUS BLD VENIPUNCTURE: CPT

## 2019-11-29 PROCEDURE — 80048 BASIC METABOLIC PNL TOTAL CA: CPT

## 2019-11-29 PROCEDURE — 85025 COMPLETE CBC W/AUTO DIFF WBC: CPT

## 2019-12-17 ENCOUNTER — ANESTHESIA EVENT (OUTPATIENT)
Dept: OPERATING ROOM | Age: 69
End: 2019-12-17
Payer: MEDICARE

## 2019-12-17 ENCOUNTER — ANESTHESIA (OUTPATIENT)
Dept: OPERATING ROOM | Age: 69
End: 2019-12-17
Payer: MEDICARE

## 2019-12-17 ENCOUNTER — HOSPITAL ENCOUNTER (OUTPATIENT)
Age: 69
Setting detail: OUTPATIENT SURGERY
Discharge: HOME | End: 2019-12-17
Attending: UROLOGY | Admitting: UROLOGY
Payer: MEDICARE

## 2019-12-17 VITALS
RESPIRATION RATE: 16 BRPM | SYSTOLIC BLOOD PRESSURE: 122 MMHG | HEART RATE: 59 BPM | HEIGHT: 71 IN | OXYGEN SATURATION: 94 % | DIASTOLIC BLOOD PRESSURE: 58 MMHG | TEMPERATURE: 98.1 F | BODY MASS INDEX: 40.52 KG/M2 | WEIGHT: 289.4 LBS

## 2019-12-17 VITALS — DIASTOLIC BLOOD PRESSURE: 60 MMHG | SYSTOLIC BLOOD PRESSURE: 134 MMHG | OXYGEN SATURATION: 92 %

## 2019-12-17 PROCEDURE — C1769 GUIDE WIRE: HCPCS | Performed by: UROLOGY

## 2019-12-17 PROCEDURE — 2580000003 HC RX 258

## 2019-12-17 PROCEDURE — 3700000001 HC ADD 15 MINUTES (ANESTHESIA): Performed by: UROLOGY

## 2019-12-17 PROCEDURE — 7100000011 HC PHASE II RECOVERY - ADDTL 15 MIN: Performed by: UROLOGY

## 2019-12-17 PROCEDURE — 3600000014 HC SURGERY LEVEL 4 ADDTL 15MIN: Performed by: UROLOGY

## 2019-12-17 PROCEDURE — 2709999900 HC NON-CHARGEABLE SUPPLY: Performed by: UROLOGY

## 2019-12-17 PROCEDURE — 6360000002 HC RX W HCPCS: Performed by: NURSE ANESTHETIST, CERTIFIED REGISTERED

## 2019-12-17 PROCEDURE — 6360000002 HC RX W HCPCS: Performed by: UROLOGY

## 2019-12-17 PROCEDURE — 3700000000 HC ANESTHESIA ATTENDED CARE: Performed by: UROLOGY

## 2019-12-17 PROCEDURE — 7100000010 HC PHASE II RECOVERY - FIRST 15 MIN: Performed by: UROLOGY

## 2019-12-17 PROCEDURE — 3600000004 HC SURGERY LEVEL 4 BASE: Performed by: UROLOGY

## 2019-12-17 PROCEDURE — 2500000003 HC RX 250 WO HCPCS: Performed by: NURSE ANESTHETIST, CERTIFIED REGISTERED

## 2019-12-17 RX ORDER — LABETALOL 20 MG/4 ML (5 MG/ML) INTRAVENOUS SYRINGE
5 EVERY 10 MIN PRN
Status: DISCONTINUED | OUTPATIENT
Start: 2019-12-17 | End: 2019-12-17 | Stop reason: HOSPADM

## 2019-12-17 RX ORDER — DIPHENHYDRAMINE HYDROCHLORIDE 50 MG/ML
12.5 INJECTION INTRAMUSCULAR; INTRAVENOUS
Status: DISCONTINUED | OUTPATIENT
Start: 2019-12-17 | End: 2019-12-17 | Stop reason: HOSPADM

## 2019-12-17 RX ORDER — SODIUM CHLORIDE 9 MG/ML
INJECTION, SOLUTION INTRAVENOUS CONTINUOUS
Status: DISCONTINUED | OUTPATIENT
Start: 2019-12-17 | End: 2019-12-17 | Stop reason: HOSPADM

## 2019-12-17 RX ORDER — METOCLOPRAMIDE HYDROCHLORIDE 5 MG/ML
10 INJECTION INTRAMUSCULAR; INTRAVENOUS
Status: DISCONTINUED | OUTPATIENT
Start: 2019-12-17 | End: 2019-12-17 | Stop reason: HOSPADM

## 2019-12-17 RX ORDER — 0.9 % SODIUM CHLORIDE 0.9 %
500 INTRAVENOUS SOLUTION INTRAVENOUS
Status: DISCONTINUED | OUTPATIENT
Start: 2019-12-17 | End: 2019-12-17 | Stop reason: HOSPADM

## 2019-12-17 RX ORDER — HYDRALAZINE HYDROCHLORIDE 20 MG/ML
5 INJECTION INTRAMUSCULAR; INTRAVENOUS EVERY 10 MIN PRN
Status: DISCONTINUED | OUTPATIENT
Start: 2019-12-17 | End: 2019-12-17 | Stop reason: HOSPADM

## 2019-12-17 RX ORDER — MIDAZOLAM HYDROCHLORIDE 1 MG/ML
INJECTION INTRAMUSCULAR; INTRAVENOUS PRN
Status: DISCONTINUED | OUTPATIENT
Start: 2019-12-17 | End: 2019-12-17 | Stop reason: SDUPTHER

## 2019-12-17 RX ORDER — PROPOFOL 10 MG/ML
INJECTION, EMULSION INTRAVENOUS PRN
Status: DISCONTINUED | OUTPATIENT
Start: 2019-12-17 | End: 2019-12-17 | Stop reason: SDUPTHER

## 2019-12-17 RX ORDER — FENTANYL CITRATE 50 UG/ML
INJECTION, SOLUTION INTRAMUSCULAR; INTRAVENOUS PRN
Status: DISCONTINUED | OUTPATIENT
Start: 2019-12-17 | End: 2019-12-17 | Stop reason: SDUPTHER

## 2019-12-17 RX ORDER — LIDOCAINE HCL/PF 100 MG/5ML
SYRINGE (ML) INJECTION PRN
Status: DISCONTINUED | OUTPATIENT
Start: 2019-12-17 | End: 2019-12-17 | Stop reason: SDUPTHER

## 2019-12-17 RX ORDER — ONDANSETRON 2 MG/ML
4 INJECTION INTRAMUSCULAR; INTRAVENOUS
Status: DISCONTINUED | OUTPATIENT
Start: 2019-12-17 | End: 2019-12-17 | Stop reason: HOSPADM

## 2019-12-17 RX ADMIN — Medication 100 MG: at 11:40

## 2019-12-17 RX ADMIN — PROPOFOL 150 MG: 10 INJECTION, EMULSION INTRAVENOUS at 11:40

## 2019-12-17 RX ADMIN — Medication 3 G: at 11:40

## 2019-12-17 RX ADMIN — FENTANYL CITRATE 100 MCG: 50 INJECTION INTRAMUSCULAR; INTRAVENOUS at 11:42

## 2019-12-17 RX ADMIN — SODIUM CHLORIDE: 9 INJECTION, SOLUTION INTRAVENOUS at 11:49

## 2019-12-17 RX ADMIN — MIDAZOLAM HYDROCHLORIDE 2 MG: 1 INJECTION, SOLUTION INTRAMUSCULAR; INTRAVENOUS at 11:37

## 2019-12-17 RX ADMIN — SODIUM CHLORIDE: 9 INJECTION, SOLUTION INTRAVENOUS at 09:17

## 2019-12-17 ASSESSMENT — PULMONARY FUNCTION TESTS
PIF_VALUE: 0
PIF_VALUE: 1

## 2019-12-17 ASSESSMENT — PAIN - FUNCTIONAL ASSESSMENT: PAIN_FUNCTIONAL_ASSESSMENT: 0-10

## 2020-01-14 ENCOUNTER — OFFICE VISIT (OUTPATIENT)
Dept: UROLOGY | Age: 70
End: 2020-01-14
Payer: MEDICARE

## 2020-01-14 ENCOUNTER — TELEPHONE (OUTPATIENT)
Dept: UROLOGY | Age: 70
End: 2020-01-14

## 2020-01-14 VITALS
HEIGHT: 71 IN | SYSTOLIC BLOOD PRESSURE: 130 MMHG | DIASTOLIC BLOOD PRESSURE: 62 MMHG | BODY MASS INDEX: 41.89 KG/M2 | WEIGHT: 299.2 LBS

## 2020-01-14 LAB
BILIRUBIN URINE: NEGATIVE
BLOOD URINE, POC: NEGATIVE
CHARACTER, URINE: CLEAR
COLOR, URINE: YELLOW
GLUCOSE URINE: NEGATIVE MG/DL
KETONES, URINE: NEGATIVE
LEUKOCYTE CLUMPS, URINE: NEGATIVE
NITRITE, URINE: NEGATIVE
PH, URINE: 5.5 (ref 5–9)
PROTEIN, URINE: NEGATIVE MG/DL
SPECIFIC GRAVITY, URINE: >= 1.03 (ref 1–1.03)
UROBILINOGEN, URINE: 0.2 EU/DL (ref 0–1)

## 2020-01-14 PROCEDURE — 4040F PNEUMOC VAC/ADMIN/RCVD: CPT | Performed by: UROLOGY

## 2020-01-14 PROCEDURE — 1036F TOBACCO NON-USER: CPT | Performed by: UROLOGY

## 2020-01-14 PROCEDURE — 3017F COLORECTAL CA SCREEN DOC REV: CPT | Performed by: UROLOGY

## 2020-01-14 PROCEDURE — 1123F ACP DISCUSS/DSCN MKR DOCD: CPT | Performed by: UROLOGY

## 2020-01-14 PROCEDURE — G8484 FLU IMMUNIZE NO ADMIN: HCPCS | Performed by: UROLOGY

## 2020-01-14 PROCEDURE — G8417 CALC BMI ABV UP PARAM F/U: HCPCS | Performed by: UROLOGY

## 2020-01-14 PROCEDURE — G8427 DOCREV CUR MEDS BY ELIG CLIN: HCPCS | Performed by: UROLOGY

## 2020-01-14 PROCEDURE — 99214 OFFICE O/P EST MOD 30 MIN: CPT | Performed by: UROLOGY

## 2020-01-14 PROCEDURE — 81003 URINALYSIS AUTO W/O SCOPE: CPT | Performed by: UROLOGY

## 2020-01-14 RX ORDER — FINASTERIDE 5 MG/1
5 TABLET, FILM COATED ORAL DAILY
Qty: 90 TABLET | Refills: 3 | Status: SHIPPED | OUTPATIENT
Start: 2020-01-14 | End: 2020-10-14 | Stop reason: ALTCHOICE

## 2020-01-14 RX ORDER — TAMSULOSIN HYDROCHLORIDE 0.4 MG/1
0.4 CAPSULE ORAL DAILY
Qty: 90 CAPSULE | Refills: 3 | Status: SHIPPED | OUTPATIENT
Start: 2020-01-14 | End: 2020-10-14

## 2020-01-14 NOTE — TELEPHONE ENCOUNTER
DO NOT TAKE ASPIRIN, PLAVIX, FISH OIL, GLUCOSAMINE CHONDROITIN, MULTIVITAMINS, VITAMIN A, VITAMIN E, VITAMIN B, COUMADIN, OR MOTRIN-LIKE DRUGS 7 DAYS PRIOR TO SURGERY AND 3 DAYS FOLLOWING     Yasemindiego Berny 1950 Diagnosis: Urinary Urgency,BPH with Obstruction    Surgical Physician: Dr. Thresea Spurling have been scheduled for the procedure marked below:      Surgery: Cystoscopy with Bladder Botox          Date: 2/18/20     Anesthesia: MAC     Place of Service: [address] [place] [address]         Please be at the Outpatient Department Second Floor Same Day Surgery by:  9:00 am        INSTRUCTIONS AS MARKED BELOW:    1.  DO NOT eat or drink anything after midnight before surgery. 2.  We prefer you shower or bathe with an antibacterial soap (Dial) the morning of surgery. 3.  Please ensure to have a  with you to transport you home. 4.  Please bring a current medication list, photo ID and insurance card(s) with you  5. Okay to take Tylenol  6. If you take Glucophage or Metformin, hold 48-hours prior to surgery  7. Take blood pressure or heart medication as directed, if taken in the morning take with a small sip of water  8. PLEASE BRING THIS LETTER WITH YOU AND SHOW IT TO THE  AT [place] [address]. 9.  Please send a copy to the Family Dr: Peterson Schneider, DO  10 May assist with your Robotic surgery  11. Do the urinalysis 7 days prior to surgery date. Order included. 12. You should receive a follow up appointment upon discharge from the hospital.  If you do not the office will call in 1-2 days to schedule.     Date: 1/14/2020

## 2020-01-14 NOTE — PROGRESS NOTES
Herminia Guzman MD        [address] [place] [id number]  Dept: [phone]  Dept Fax: [phone]: [address] [place] [address] Urology Office Note -     Patient:  Balta Penn  YOB: 1950    The patient is a 68 y.o. male who presents today for evaluation of the following problems:   Chief Complaint   Patient presents with    Follow-up     Surg. 12/17/19 Cystoscopy with urethral dilation, urge incontinence. HISTORY OF PRESENT ILLNESS:     BPH/urethral stricture  Onset was  Months ago  Overall, the problem(s) are unchanged  Severity is described as mild  Associated Symptoms: No dysuria, no gross hematuria. Current Pain Severity: 0    S/p TURP in months past  Worsening denovo urgency. Recent cystoscopy demonstrating mild fossa navicularis stricture  Many failed medications  Present with wife in office today, very frustrated    Summary of Previous Records:  Balta Penn f/u today for BPH. He is not doing well, he reports urinary urgency with urge incontinence. He is wearing a depends, soiling multiple per day. Urinary symptoms are worse than before his TURP. Wife and patient frustrated with urinary symptoms. He remains on Flomax, Finasteride & Oybutynin 15 mg XR.  ml  Urinalysis is negative, on Cipro currently  He is s/p TURP using bipolar loop with Dr. Kanwal Ellis on 08/13/19. Surgical pathology revealed focal high-grade prostatic intraepithelial neoplasia, glandular and stromal hyperplasia with chronic inflammation, 14 grams removed. He comes in today with his wife. Hx is obtained from the patient and the medical record.          Requested/reviewed records from Shana Thomas DO office and/or outside [de-identified]    (Patient's old records have been requested, reviewed and pertinent findings summarized in today's note.)    Procedures Today: N/A    Last several PSA's:  Lab Results Neg Hx      Outpatient Medications Marked as Taking for the 1/14/20 encounter (Office Visit) with Bright Jorgensen MD   Medication Sig Dispense Refill    tamsulosin (FLOMAX) 0.4 MG capsule Take 1 capsule by mouth daily 90 capsule 3    finasteride (PROSCAR) 5 MG tablet Take 1 tablet by mouth daily 90 tablet 3    HYDROCHLOROTHIAZIDE PO Take 12.5 mg by mouth daily      Potassium 99 MG TABS Take 3 tablets by mouth daily      irbesartan (AVAPRO) 300 MG tablet Take 300 mg by mouth daily      labetalol (NORMODYNE) 200 MG tablet Take 100 mg by mouth 2 times daily       Multiple Vitamins-Minerals (PRESERVISION AREDS PO) Take by mouth      Ascorbic Acid (VITAMIN C) 500 MG tablet Take 500 mg by mouth. Patient has no known allergies. Social History     Tobacco Use   Smoking Status Never Smoker   Smokeless Tobacco Never Used      (If patient a smoker, smoking cessation counseling offered)   Social History     Substance and Sexual Activity   Alcohol Use No       REVIEW OF SYSTEMS:  Constitutional: negative  Eyes: negative  Respiratory: negative  Cardiovascular: negative  Gastrointestinal: negative  Genitourinary: see HPI  Musculoskeletal: negative  Skin: negative   Neurological: negative  Hematological/Lymphatic: negative  Psychological: negative        Physical Exam:    This a 68 y.o. male  Vitals:    01/14/20 0844   BP: 130/62     Body mass index is 41.73 kg/m². Constitutional: Patient in no acute distress;         Assessment and Plan        1. BPH with urinary obstruction    2. Urgency of urination    3. 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 He is still bothered by symptoms. We did discuss botox. Discussed retention risk. Discussed interstim as possibility. Patient elects to proceed with botox 100 units.       Prescriptions Ordered:  Orders Placed This Encounter   Medications    tamsulosin

## 2020-01-14 NOTE — TELEPHONE ENCOUNTER
Patient scheduled for surgery with Dr. Jil Caballero. All pre op testing done. Patient will need urinalysis prior to surgery. Surgery consent signed. Surgery instructions to be mailed to patient.

## 2020-01-23 ENCOUNTER — PREP FOR PROCEDURE (OUTPATIENT)
Dept: UROLOGY | Age: 70
End: 2020-01-23

## 2020-01-23 RX ORDER — SODIUM CHLORIDE 9 MG/ML
INJECTION, SOLUTION INTRAVENOUS CONTINUOUS
Status: CANCELLED | OUTPATIENT
Start: 2020-02-18

## 2020-02-11 ENCOUNTER — NURSE ONLY (OUTPATIENT)
Dept: LAB | Age: 70
End: 2020-02-11

## 2020-02-11 LAB
BACTERIA: ABNORMAL /HPF
BILIRUBIN URINE: NEGATIVE
BLOOD, URINE: NEGATIVE
CASTS 2: ABNORMAL /LPF
CASTS UA: ABNORMAL /LPF
CHARACTER, URINE: CLEAR
COLOR: YELLOW
CRYSTALS, UA: ABNORMAL
EPITHELIAL CELLS, UA: ABNORMAL /HPF
GLUCOSE URINE: NEGATIVE MG/DL
KETONES, URINE: ABNORMAL
LEUKOCYTE ESTERASE, URINE: ABNORMAL
MISCELLANEOUS 2: ABNORMAL
NITRITE, URINE: NEGATIVE
PH UA: 5 (ref 5–9)
PROTEIN UA: NEGATIVE
RBC URINE: ABNORMAL /HPF
RENAL EPITHELIAL, UA: ABNORMAL
SPECIFIC GRAVITY, URINE: 1.02 (ref 1–1.03)
UROBILINOGEN, URINE: 0.2 EU/DL (ref 0–1)
WBC UA: ABNORMAL /HPF
YEAST: ABNORMAL

## 2020-02-11 NOTE — PROGRESS NOTES
In preparation for their surgical procedure above patient was screened for Obstructive Sleep Apnea (SAM) using the STOP-Bang Questionnaire by the Pre-Admission Testing department. This is a pre-surgical screening tool for patient safety and serves as a recommendation, this WILL NOT cause cancellation of surgery. STOP-Bang Questionnaire  * Do you currently see a pulmonologist?  No     If yes STOP, do not complete. Patient follows with  .    1. Do you snore loudly (able to be heard in the next room)? No    2. Do you often feel tired or sleepy during the daytime? No       3. Has anyone ever told you that you stop breathing during your sleep? No    4. Do you have or are you being treated for high blood pressure? Yes      5. BMI more than 35? BMI (Calculated): 41        Yes    6. Age over 48 years? 68 y.o. Yes    7. Neck Circumference greater than 17 inches for male or 16 inches for female? Measured           (visits only)            Not Applicable    8. Gender Male? Yes      TOTAL SCORE: 4    SAM - Low Risk : Yes to 0 - 2 questions  SAM - Intermediate Risk : Yes to 3 - 4 questions  SAM - High Risk : Yes to 5 - 8 questions    Adapted from:   STOP Questionnaire: A Tool to Screen Patients for Obstructive Sleep Apnea   MARLENA Rascon.C.P.C., Nazario Russell M.B.B.S., Stephan Cheung M.D., Kareen Mohamud. Chantelle Fay, Ph.D., ALEXIS Suárez.B.B.S., ALEXIS Vera.Sc., Savage Miranda M.D., Leta Prince. ARTHUR Barnard.P.C.    Anesthesiology 2008; 446:453-51 Copyright 2008, the [address] of Anesthesiologists, [facility].   ----------------------------------------------------------------------------------------------------------------

## 2020-02-13 ENCOUNTER — TELEPHONE (OUTPATIENT)
Dept: UROLOGY | Age: 70
End: 2020-02-13

## 2020-02-13 LAB
ORGANISM: ABNORMAL
URINE CULTURE REFLEX: ABNORMAL

## 2020-02-13 NOTE — TELEPHONE ENCOUNTER
Urine is positive for infection. Please call patient and see if he is having symptoms related. If he is symptomatic, will prescribe antibiotic.

## 2020-02-18 ENCOUNTER — ANESTHESIA EVENT (OUTPATIENT)
Dept: OPERATING ROOM | Age: 70
End: 2020-02-18
Payer: MEDICARE

## 2020-02-18 ENCOUNTER — HOSPITAL ENCOUNTER (OUTPATIENT)
Age: 70
Setting detail: OUTPATIENT SURGERY
Discharge: HOME | End: 2020-02-18
Attending: UROLOGY | Admitting: UROLOGY
Payer: MEDICARE

## 2020-02-18 ENCOUNTER — ANESTHESIA (OUTPATIENT)
Dept: OPERATING ROOM | Age: 70
End: 2020-02-18
Payer: MEDICARE

## 2020-02-18 VITALS
HEART RATE: 55 BPM | RESPIRATION RATE: 16 BRPM | WEIGHT: 297.4 LBS | DIASTOLIC BLOOD PRESSURE: 60 MMHG | SYSTOLIC BLOOD PRESSURE: 122 MMHG | TEMPERATURE: 97.2 F | HEIGHT: 71 IN | OXYGEN SATURATION: 94 % | BODY MASS INDEX: 41.64 KG/M2

## 2020-02-18 VITALS — OXYGEN SATURATION: 97 % | SYSTOLIC BLOOD PRESSURE: 150 MMHG | DIASTOLIC BLOOD PRESSURE: 63 MMHG

## 2020-02-18 LAB — POTASSIUM SERPL-SCNC: 4 MEQ/L (ref 3.5–5.2)

## 2020-02-18 PROCEDURE — 6360000002 HC RX W HCPCS: Performed by: NURSE ANESTHETIST, CERTIFIED REGISTERED

## 2020-02-18 PROCEDURE — 2709999900 HC NON-CHARGEABLE SUPPLY: Performed by: UROLOGY

## 2020-02-18 PROCEDURE — 3600000003 HC SURGERY LEVEL 3 BASE: Performed by: UROLOGY

## 2020-02-18 PROCEDURE — 6360000002 HC RX W HCPCS: Performed by: UROLOGY

## 2020-02-18 PROCEDURE — 3700000000 HC ANESTHESIA ATTENDED CARE: Performed by: UROLOGY

## 2020-02-18 PROCEDURE — 36415 COLL VENOUS BLD VENIPUNCTURE: CPT

## 2020-02-18 PROCEDURE — 3600000013 HC SURGERY LEVEL 3 ADDTL 15MIN: Performed by: UROLOGY

## 2020-02-18 PROCEDURE — 84132 ASSAY OF SERUM POTASSIUM: CPT

## 2020-02-18 PROCEDURE — 7100000010 HC PHASE II RECOVERY - FIRST 15 MIN: Performed by: UROLOGY

## 2020-02-18 PROCEDURE — 2580000003 HC RX 258

## 2020-02-18 PROCEDURE — 2500000003 HC RX 250 WO HCPCS: Performed by: NURSE ANESTHETIST, CERTIFIED REGISTERED

## 2020-02-18 PROCEDURE — 3700000001 HC ADD 15 MINUTES (ANESTHESIA): Performed by: UROLOGY

## 2020-02-18 PROCEDURE — 7100000011 HC PHASE II RECOVERY - ADDTL 15 MIN: Performed by: UROLOGY

## 2020-02-18 RX ORDER — ONDANSETRON 2 MG/ML
INJECTION INTRAMUSCULAR; INTRAVENOUS PRN
Status: DISCONTINUED | OUTPATIENT
Start: 2020-02-18 | End: 2020-02-18 | Stop reason: SDUPTHER

## 2020-02-18 RX ORDER — SODIUM CHLORIDE 9 MG/ML
INJECTION, SOLUTION INTRAVENOUS CONTINUOUS
Status: DISCONTINUED | OUTPATIENT
Start: 2020-02-18 | End: 2020-02-18 | Stop reason: HOSPADM

## 2020-02-18 RX ORDER — CEPHALEXIN 500 MG/1
500 CAPSULE ORAL 3 TIMES DAILY
Qty: 9 CAPSULE | Refills: 0 | Status: SHIPPED | OUTPATIENT
Start: 2020-02-18 | End: 2020-02-21

## 2020-02-18 RX ORDER — LIDOCAINE HCL/PF 100 MG/5ML
SYRINGE (ML) INJECTION PRN
Status: DISCONTINUED | OUTPATIENT
Start: 2020-02-18 | End: 2020-02-18 | Stop reason: SDUPTHER

## 2020-02-18 RX ORDER — FENTANYL CITRATE 50 UG/ML
INJECTION, SOLUTION INTRAMUSCULAR; INTRAVENOUS PRN
Status: DISCONTINUED | OUTPATIENT
Start: 2020-02-18 | End: 2020-02-18 | Stop reason: SDUPTHER

## 2020-02-18 RX ORDER — PROPOFOL 10 MG/ML
INJECTION, EMULSION INTRAVENOUS PRN
Status: DISCONTINUED | OUTPATIENT
Start: 2020-02-18 | End: 2020-02-18 | Stop reason: SDUPTHER

## 2020-02-18 RX ADMIN — FENTANYL CITRATE 100 MCG: 50 INJECTION, SOLUTION INTRAMUSCULAR; INTRAVENOUS at 10:53

## 2020-02-18 RX ADMIN — Medication 2 G: at 10:55

## 2020-02-18 RX ADMIN — Medication 60 MG: at 10:53

## 2020-02-18 RX ADMIN — ONDANSETRON HYDROCHLORIDE 4 MG: 4 INJECTION, SOLUTION INTRAMUSCULAR; INTRAVENOUS at 10:55

## 2020-02-18 RX ADMIN — PROPOFOL 200 MG: 10 INJECTION, EMULSION INTRAVENOUS at 10:53

## 2020-02-18 RX ADMIN — SODIUM CHLORIDE: 9 INJECTION, SOLUTION INTRAVENOUS at 10:05

## 2020-02-18 ASSESSMENT — PULMONARY FUNCTION TESTS
PIF_VALUE: 0
PIF_VALUE: 1
PIF_VALUE: 0
PIF_VALUE: 1
PIF_VALUE: 0
PIF_VALUE: 1
PIF_VALUE: 0

## 2020-02-18 ASSESSMENT — PAIN SCALES - GENERAL
PAINLEVEL_OUTOF10: 2
PAINLEVEL_OUTOF10: 0

## 2020-02-18 ASSESSMENT — PAIN DESCRIPTION - LOCATION: LOCATION: ABDOMEN

## 2020-02-18 ASSESSMENT — PAIN DESCRIPTION - PAIN TYPE: TYPE: SURGICAL PAIN

## 2020-02-18 ASSESSMENT — PAIN DESCRIPTION - ORIENTATION: ORIENTATION: LOWER

## 2020-02-18 NOTE — H&P
Jasmyne Ochoa MD  History and Physical    Patient:  Jono Marie  MRN: [id number]  YOB: 1950    HISTORY OF PRESENT ILLNESS:     The patient is a 68 y.o. male who presents with oab. Here for procedure. Patient's old records, notes and chart reviewed and summarized above. Jasmyne Ochoa MD independently reviewed the images and verified the radiology reports from:    No results found. Past Medical History:    Past Medical History:   Diagnosis Date    Arthritis     Back pain     Diarrhea     Hypertension     PFO (patent foramen ovale) 8/2012    noted on ROSITA following stroke    Rotator cuff injury     Stroke [facility])     August 19/2012    TIA (transient ischemic attack) 05/2018       Past Surgical History:    Past Surgical History:   Procedure Laterality Date    CHOLECYSTECTOMY  06/25/2017    CYST INCISION AND DRAINAGE      CYSTOSCOPY N/A 12/17/2019    CYSTOSCOPY, WITH  URETHRAL DILATION performed by Bonne Meckel, MD at [address] [place] [address]  06/23/2017    ROTATOR CUFF REPAIR Left 03/12/2013    right 4/2019    TURP N/A 8/13/2019    TRANSURETHRAL RESECTION PROSTATE performed by Jaja Chavez MD at [facility]       Medications Prior to Admission:    Prior to Admission medications    Medication Sig Start Date End Date Taking?  Authorizing Provider   tamsulosin (FLOMAX) 0.4 MG capsule Take 1 capsule by mouth daily 1/14/20  Yes Bonne Meckel, MD   finasteride (PROSCAR) 5 MG tablet Take 1 tablet by mouth daily 1/14/20 1/13/21 Yes Bonne Meckel, MD   HYDROCHLOROTHIAZIDE PO Take 12.5 mg by mouth daily   Yes Historical Provider, MD   irbesartan (AVAPRO) 300 MG tablet Take 300 mg by mouth daily   Yes Historical Provider, MD   labetalol (NORMODYNE) 200 MG tablet Take 100 mg by mouth 2 times daily    Yes Historical Provider, MD   Potassium 99 MG TABS Take 3 tablets by mouth daily    Historical Provider, MD   Multiple Vitamins-Minerals (PRESERVISION AREDS PO) Take by mouth    Historical Provider, MD   Ascorbic negative  Genitourinary: no acute issues  Musculoskeletal: negative  Skin: negative   Neurological: negative  Hematological/Lymphatic: negative  Psychological: negative    Physical Exam:      No data found. Constitutional: Patient in no acute distress; Neuro: alert and oriented to person place and time. Psych: Mood and affect normal.  Skin: Normal  Lungs: Respiratory effort normal, CTA  Cardiovascular:  Normal peripheral pulses; no murmur. Normal rhythm  Abdomen: Soft, non-tender, non-distended with no CVA, flank pain, hepatosplenomegaly or hernia. Kidneys normal.  Bladder non-tender and not distended. LABS:   No results for input(s): WBC, HGB, HCT, MCV, PLT in the last 72 hours. No results for input(s): NA, K, CL, CO2, PHOS, BUN, CREATININE in the last 72 hours. Invalid input(s): CA  Lab Results   Component Value Date    PSA 0.66 07/24/2019    PSA 1.64 (H) 06/19/2018    PSA 1.26 (H) 08/14/2017         Urinalysis: No results for input(s): COLORU, PHUR, LABCAST, WBCUA, RBCUA, MUCUS, TRICHOMONAS, YEAST, BACTERIA, CLARITYU, SPECGRAV, LEUKOCYTESUR, UROBILINOGEN, BILIRUBINUR, BLOODU in the last 72 hours.     Invalid input(s): NITRATE, GLUCOSEUKETONESUAMORPHOUS     -----------------------------------------------------------------      Assessment and Plan     Impression:    Patient Active Problem List   Diagnosis    Right basal ganglia embolic stroke ([facility] [address]    BPH with urinary obstruction       Plan:     Consent obtained; cysto botox 100 units in OR today    [address] Ran Saenz MD  9:12 AM 2/18/2020

## 2020-02-18 NOTE — ANESTHESIA POSTPROCEDURE EVALUATION
Department of Anesthesiology  Postprocedure Note    Patient: Tamara Steinberg  MRN: [id number]  YOB: 1950  Date of evaluation: 2/18/2020  Time:  11:32 AM     Procedure Summary     Date:  02/18/20 Room / Location:  [address] [place]    Anesthesia Start:  4752 Anesthesia Stop:  3536    Procedure:  CYSTOSCOPY WITH BLADDER BOTOX (N/A Bladder) Diagnosis:  (URINARY URGENCY, BPH WITH OBSTRUCTION)    Surgeon:  Yoana Watson MD Responsible Provider:  Ronny Amaya DO    Anesthesia Type:  MAC ASA Status:  3          Anesthesia Type: MAC    Marguerite Phase I: Marguerite Score: 10    Marguerite Phase II: Marguerite Score: 10    Last vitals: Reviewed and per EMR flowsheets. Anesthesia Post Evaluation    Comments: Carrie Correia  POST-ANESTHESIA NOTE       Name:  Tamara Steinberg                                         Age:  68 y.o. MRN:  [id number]      Last Vitals:  BP (!) 111/57   Pulse 58   Temp 97.2 °F (36.2 °C)   Resp 16   Ht 5' 11\" (1.803 m)   Wt 297 lb 6.4 oz (134.9 kg)   SpO2 92%   BMI 41.48 kg/m²   Patient Vitals in the past 4 hrs:  02/18/20 1122, BP:(!) 111/57, Temp:97.2 °F (36.2 °C), Pulse:58, Resp:16, SpO2:92 %    Level of Consciousness:  Awake    Respiratory:  Stable    Oxygen Saturation:  Stable    Cardiovascular:  Stable    Hydration:  Adequate    PONV:  Stable    Post-op Pain:  Adequate analgesia    Post-op Assessment:  No apparent anesthetic complications    Additional Follow-Up / Treatment / Comment:  None Peder Aase. Shirk, DO  February 18, 2020   11:32 AM

## 2020-02-18 NOTE — PROGRESS NOTES
Wife at bedside. Pt denies any discomfort or needs at this time. Call light within pt reach. yes yes

## 2020-02-19 NOTE — OP NOTE
Patient:  Aziza Ribeiro  MRN: [id number]  YOB: 1950    FACILITY: [address] [place] [address] 93.: 2/18/2020    SURGEON: Kendrick Torre MD     ASSISTANT: none    PREOPERATIVE DIAGNOSIS: URINARY URGENCY, BPH WITH OBSTRUCTION    POSTOPERATIVE DIAGNOSIS: as above    PROCEDURE PERFORMED: CYSTOSCOPY WITH BLADDER BOTOX    1. Cystourethroscopy  2. Botox injection    ANESTHESIA: Monitor Anesthesia Care    ESTIMATED BLOOD LOSS: 0 ml    COMPLICATIONS: None immediate    DRAINS: none    SPECIMENS: 0 ml    INJECTION: 100 IU of onabotulinumA toxin. INDICATIONS FOR PROCEDURE:  The patient is a 68 y.o. male who presents today with URINARY URGENCY, BPH WITH OBSTRUCTION here for CYSTOSCOPY WITH BLADDER BOTOX. After risks, benefits and alternatives of the procedure were discussed with the patient, the patient elected to proceed. DETAILS OF THE PROCEDURE:  The patient was correctly identified in the preoperative holding area. he was brought back to the operating room and placed in the dorsal lithotomy position. EPC cuffs were on, in place, and fully functional. Conscious sedation was administered by anesthesia. he was given antibiotic prophylaxis. The patient was then prepped and draped in the usual sterile fashion. After appropriate time-out was performed with all parties consenting, a 22-Yoruba cystoscope with a 30-degree lens was inserted into the bladder. A thorough and complete cystoscopy was then performed which showed no abnormalities of the bladder mucosa. The ureteral orifices were patent in the orthotopic location. An injection needle was then inserted into the cystoscope. 100 units of botulinum toxin were then systematically and sequentially injected into the posterior bladder wall with care to avoid the trigone and the ureteral orifices. A total of 20 injections with 0.5 mL per site were injected.   There was minimal bleeding following the procedure, and appropriate hemostasis was

## 2020-02-24 ENCOUNTER — TELEPHONE (OUTPATIENT)
Dept: UROLOGY | Age: 70
End: 2020-02-24

## 2020-02-24 NOTE — TELEPHONE ENCOUNTER
botox takes 1-2 weeks to take effect. If scrotal swelling worsens, it becomes painful, fever or chills please call. Elevate scrotum.

## 2020-03-10 ENCOUNTER — OFFICE VISIT (OUTPATIENT)
Dept: UROLOGY | Age: 70
End: 2020-03-10
Payer: MEDICARE

## 2020-03-10 VITALS
HEIGHT: 71 IN | WEIGHT: 290 LBS | SYSTOLIC BLOOD PRESSURE: 112 MMHG | BODY MASS INDEX: 40.6 KG/M2 | DIASTOLIC BLOOD PRESSURE: 62 MMHG

## 2020-03-10 LAB
BILIRUBIN URINE: NEGATIVE
BLOOD URINE, POC: NEGATIVE
CHARACTER, URINE: CLEAR
COLOR, URINE: YELLOW
GLUCOSE URINE: NEGATIVE MG/DL
KETONES, URINE: NEGATIVE
LEUKOCYTE CLUMPS, URINE: NEGATIVE
NITRITE, URINE: NEGATIVE
PH, URINE: 6 (ref 5–9)
POST VOID RESIDUAL (PVR): 0 ML
PROTEIN, URINE: NEGATIVE MG/DL
SPECIFIC GRAVITY, URINE: >= 1.03 (ref 1–1.03)
UROBILINOGEN, URINE: 0.2 EU/DL (ref 0–1)

## 2020-03-10 PROCEDURE — G8427 DOCREV CUR MEDS BY ELIG CLIN: HCPCS | Performed by: UROLOGY

## 2020-03-10 PROCEDURE — 99213 OFFICE O/P EST LOW 20 MIN: CPT | Performed by: UROLOGY

## 2020-03-10 PROCEDURE — 3017F COLORECTAL CA SCREEN DOC REV: CPT | Performed by: UROLOGY

## 2020-03-10 PROCEDURE — 4040F PNEUMOC VAC/ADMIN/RCVD: CPT | Performed by: UROLOGY

## 2020-03-10 PROCEDURE — 1036F TOBACCO NON-USER: CPT | Performed by: UROLOGY

## 2020-03-10 PROCEDURE — 1123F ACP DISCUSS/DSCN MKR DOCD: CPT | Performed by: UROLOGY

## 2020-03-10 PROCEDURE — G8417 CALC BMI ABV UP PARAM F/U: HCPCS | Performed by: UROLOGY

## 2020-03-10 PROCEDURE — 51798 US URINE CAPACITY MEASURE: CPT | Performed by: UROLOGY

## 2020-03-10 PROCEDURE — 81003 URINALYSIS AUTO W/O SCOPE: CPT | Performed by: UROLOGY

## 2020-03-10 PROCEDURE — G8484 FLU IMMUNIZE NO ADMIN: HCPCS | Performed by: UROLOGY

## 2020-03-10 RX ORDER — OXYCODONE HYDROCHLORIDE AND ACETAMINOPHEN 5; 325 MG/1; MG/1
1 TABLET ORAL EVERY 4 HOURS PRN
COMMUNITY
End: 2021-05-26

## 2020-03-10 NOTE — PROGRESS NOTES
Payton Cyr MD        [address] [facility] [address] [address]  Dept: [phone]  Dept Fax: [phone]: [address] [address] Urology Office Note -     Patient:  Rena Dunaway  YOB: 1950    The patient is a 68 y.o. male who presents today for evaluation of the following problems:   Chief Complaint   Patient presents with    Post-Op Check     Bladder Botox         HISTORY OF PRESENT ILLNESS:     BPH/urethral stricture  Onset was  Months ago  Overall, the problem(s) are better  Severity is described as mild  Associated Symptoms: No dysuria, no gross hematuria. Current Pain Severity: 0     S/p botox  Voiding well with about 30-40% improvement  Had recent shoulder surgery    Summary of Previous Records:  S/p TURP in months past  Worsening denovo urgency. Recent cystoscopy demonstrating mild fossa navicularis stricture  Many failed medications  Present with wife in office today, very frustrated  He is s/p TURP using bipolar loop with Dr. Zo Burns on 08/13/19. Surgical pathology revealed focal high-grade prostatic intraepithelial neoplasia, glandular and stromal hyperplasia with chronic inflammation, 14 grams removed. He comes in today with his wife. Hx is obtained from the patient and the medical record.          Requested/reviewed records from [facility],  office and/or outside [de-identified]    (Patient's old records have been requested, reviewed and pertinent findings summarized in today's note.)    Procedures Today: N/A    Last several PSA's:  Lab Results   Component Value Date    PSA 0.66 07/24/2019    PSA 1.64 (H) 06/19/2018    PSA 1.26 (H) 08/14/2017       Last total testosterone:  No results found for: TESTOSTERONE    Urinalysis today:  Results for POC orders placed in visit on 03/10/20   POCT Urinalysis No Micro (Auto)   Result Value Ref Range    Glucose, Ur Negative NEGATIVE mg/dl Bilirubin Urine Negative     Ketones, Urine Negative NEGATIVE    Specific Gravity, Urine >= 1.030 1.002 - 1.03    Blood, UA POC Negative NEGATIVE    pH, Urine 6.00 5.0 - 9.0    Protein, Urine Negative NEGATIVE mg/dl    Urobilinogen, Urine 0.20 0.0 - 1.0 eu/dl    Nitrite, Urine Negative NEGATIVE    Leukocyte Clumps, Urine Negative NEGATIVE    Color, Urine Yellow YELLOW-STR    Character, Urine Clear [id number]   poct post void residual   Result Value Ref Range    post void residual 0 ml       Last BUN and creatinine:  Lab Results   Component Value Date    BUN 17 11/29/2019     Lab Results   Component Value Date    CREATININE 1.2 11/29/2019       Imaging Reviewed during this Office Visit:   Suha Mandujano MD independently reviewed the images and verified the radiology reports from:    No results found.     PAST MEDICAL, FAMILY AND SOCIAL HISTORY:  Past Medical History:   Diagnosis Date    Arthritis     Back pain     Diarrhea     Hypertension     PFO (patent foramen ovale) 8/2012    noted on ROSITA following stroke    Rotator cuff injury     Stroke [facility])     August 19/2012    TIA (transient ischemic attack) 05/2018     Past Surgical History:   Procedure Laterality Date    CHOLECYSTECTOMY  06/25/2017    CYST INCISION AND DRAINAGE      CYSTOSCOPY N/A 12/17/2019    CYSTOSCOPY, WITH  URETHRAL DILATION performed by Delano Richter MD at [address] [facility] 2/18/2020    CYSTOSCOPY WITH BLADDER BOTOX performed by Delano Richter MD at [address] [place] [address]  06/23/2017    ROTATOR CUFF REPAIR Left 03/12/2013    right 4/2019    SHOULDER SURGERY Right 03/02/2020    TURP N/A 8/13/2019    TRANSURETHRAL RESECTION PROSTATE performed by Pipo Jack MD at [address] [place] [address] History   Problem Relation Age of Onset    Arthritis Mother     Vision Loss Father     Cancer Brother     Stroke Maternal Uncle     Prostate Cancer Neg Hx      Outpatient Medications Marked as Taking for the 3/10/20 encounter (Office Visit) with Radha Sutton Vandana Pratt MD   Medication Sig Dispense Refill    oxyCODONE-acetaminophen (PERCOCET) 5-325 MG per tablet Take 1 tablet by mouth every 4 hours as needed for Pain.  tamsulosin (FLOMAX) 0.4 MG capsule Take 1 capsule by mouth daily 90 capsule 3    finasteride (PROSCAR) 5 MG tablet Take 1 tablet by mouth daily 90 tablet 3    HYDROCHLOROTHIAZIDE PO Take 12.5 mg by mouth daily      Potassium 99 MG TABS Take 3 tablets by mouth daily      irbesartan (AVAPRO) 300 MG tablet Take 300 mg by mouth daily      labetalol (NORMODYNE) 200 MG tablet Take 100 mg by mouth 2 times daily       Multiple Vitamins-Minerals (PRESERVISION AREDS PO) Take by mouth      Ascorbic Acid (VITAMIN C) 500 MG tablet Take 500 mg by mouth. Patient has no known allergies. Social History     Tobacco Use   Smoking Status Never Smoker   Smokeless Tobacco Never Used      (If patient a smoker, smoking cessation counseling offered)   Social History     Substance and Sexual Activity   Alcohol Use No       REVIEW OF SYSTEMS:  Constitutional: negative  Eyes: negative  Respiratory: negative  Cardiovascular: negative  Gastrointestinal: negative  Genitourinary: see HPI  Musculoskeletal: negative  Skin: negative   Neurological: negative  Hematological/Lymphatic: negative  Psychological: negative        Physical Exam:    This a 68 y.o. male  Vitals:    03/10/20 1149   BP: 112/62     Body mass index is 40.45 kg/m². Constitutional: Patient in no acute distress;         Assessment and Plan        1. Urge incontinence    2. BPH with urinary obstruction               Plan:      Some mild improvement with stream  Had shoulder replacement recently  3 month follow up with karel. Did discuss interstim at this time. Will hold off on this as he is satisfied with the botox. Prescriptions Ordered:  No orders of the defined types were placed in this encounter.      Orders Placed:  Orders Placed This Encounter   Procedures    POCT Urinalysis No Micro (Auto)    poct post void residual     Bladder scan            KAUSHIK Soriano MD

## 2020-04-13 ENCOUNTER — HOSPITAL ENCOUNTER (OUTPATIENT)
Age: 70
Discharge: HOME | End: 2020-04-13
Payer: MEDICARE

## 2020-04-13 LAB
C-REACTIVE PROTEIN: 1.68 MG/DL (ref 0–1)
ERYTHROCYTE [DISTWIDTH] IN BLOOD BY AUTOMATED COUNT: 14.3 % (ref 11.5–14.5)
ERYTHROCYTE [DISTWIDTH] IN BLOOD BY AUTOMATED COUNT: 48.1 FL (ref 35–45)
HCT VFR BLD CALC: 41.9 % (ref 42–52)
HEMOGLOBIN: 13 GM/DL (ref 14–18)
MCH RBC QN AUTO: 28.7 PG (ref 26–33)
MCHC RBC AUTO-ENTMCNC: 31 GM/DL (ref 32.2–35.5)
MCV RBC AUTO: 92.5 FL (ref 80–94)
PLATELET # BLD: 299 THOU/MM3 (ref 130–400)
PMV BLD AUTO: 11.3 FL (ref 9.4–12.4)
RBC # BLD: 4.53 MILL/MM3 (ref 4.7–6.1)
SEDIMENTATION RATE, ERYTHROCYTE: 18 MM/HR (ref 0–10)
WBC # BLD: 11.6 THOU/MM3 (ref 4.8–10.8)

## 2020-04-13 PROCEDURE — 86140 C-REACTIVE PROTEIN: CPT

## 2020-04-13 PROCEDURE — 85027 COMPLETE CBC AUTOMATED: CPT

## 2020-04-13 PROCEDURE — 36415 COLL VENOUS BLD VENIPUNCTURE: CPT

## 2020-04-13 PROCEDURE — 85651 RBC SED RATE NONAUTOMATED: CPT

## 2020-04-29 ENCOUNTER — HOSPITAL ENCOUNTER (OUTPATIENT)
Age: 70
Discharge: HOME | End: 2020-04-29
Payer: MEDICARE

## 2020-04-29 ENCOUNTER — HOSPITAL ENCOUNTER (OUTPATIENT)
Dept: GENERAL RADIOLOGY | Age: 70
Discharge: HOME | End: 2020-04-29
Payer: MEDICARE

## 2020-04-29 PROCEDURE — 72052 X-RAY EXAM NECK SPINE 6/>VWS: CPT

## 2020-05-16 ENCOUNTER — APPOINTMENT (OUTPATIENT)
Dept: GENERAL RADIOLOGY | Age: 70
End: 2020-05-16
Payer: MEDICARE

## 2020-05-16 ENCOUNTER — HOSPITAL ENCOUNTER (EMERGENCY)
Age: 70
Discharge: HOME | End: 2020-05-16
Attending: EMERGENCY MEDICINE
Payer: MEDICARE

## 2020-05-16 ENCOUNTER — APPOINTMENT (OUTPATIENT)
Dept: CT IMAGING | Age: 70
End: 2020-05-16
Payer: MEDICARE

## 2020-05-16 VITALS
WEIGHT: 280 LBS | OXYGEN SATURATION: 95 % | TEMPERATURE: 98.7 F | SYSTOLIC BLOOD PRESSURE: 103 MMHG | DIASTOLIC BLOOD PRESSURE: 84 MMHG | RESPIRATION RATE: 16 BRPM | BODY MASS INDEX: 39.2 KG/M2 | HEIGHT: 71 IN | HEART RATE: 67 BPM

## 2020-05-16 LAB
ALBUMIN SERPL-MCNC: 3.9 G/DL (ref 3.5–5.1)
ALP BLD-CCNC: 112 U/L (ref 38–126)
ALT SERPL-CCNC: 13 U/L (ref 11–66)
ANION GAP SERPL CALCULATED.3IONS-SCNC: 13 MEQ/L (ref 8–16)
AST SERPL-CCNC: 13 U/L (ref 5–40)
BACTERIA: ABNORMAL /HPF
BASOPHILS # BLD: 0.7 %
BASOPHILS ABSOLUTE: 0.1 THOU/MM3 (ref 0–0.1)
BILIRUB SERPL-MCNC: 0.6 MG/DL (ref 0.3–1.2)
BILIRUBIN URINE: NEGATIVE
BLOOD, URINE: NEGATIVE
BUN BLDV-MCNC: 18 MG/DL (ref 7–22)
CALCIUM SERPL-MCNC: 9.6 MG/DL (ref 8.5–10.5)
CASTS 2: ABNORMAL /LPF
CASTS UA: ABNORMAL /LPF
CHARACTER, URINE: ABNORMAL
CHLORIDE BLD-SCNC: 101 MEQ/L (ref 98–111)
CO2: 23 MEQ/L (ref 23–33)
COLOR: YELLOW
CREAT SERPL-MCNC: 1.2 MG/DL (ref 0.4–1.2)
CRYSTALS, UA: ABNORMAL
EKG ATRIAL RATE: 69 BPM
EKG P AXIS: 33 DEGREES
EKG P-R INTERVAL: 194 MS
EKG Q-T INTERVAL: 386 MS
EKG QRS DURATION: 100 MS
EKG QTC CALCULATION (BAZETT): 413 MS
EKG R AXIS: 62 DEGREES
EKG T AXIS: 59 DEGREES
EKG VENTRICULAR RATE: 69 BPM
EOSINOPHIL # BLD: 3.8 %
EOSINOPHILS ABSOLUTE: 0.4 THOU/MM3 (ref 0–0.4)
EPITHELIAL CELLS, UA: ABNORMAL /HPF
ERYTHROCYTE [DISTWIDTH] IN BLOOD BY AUTOMATED COUNT: 14.4 % (ref 11.5–14.5)
ERYTHROCYTE [DISTWIDTH] IN BLOOD BY AUTOMATED COUNT: 46.3 FL (ref 35–45)
GFR SERPL CREATININE-BSD FRML MDRD: 60 ML/MIN/1.73M2
GLUCOSE BLD-MCNC: 125 MG/DL (ref 70–108)
GLUCOSE URINE: NEGATIVE MG/DL
HCT VFR BLD CALC: 39.5 % (ref 42–52)
HEMOGLOBIN: 12.8 GM/DL (ref 14–18)
IMMATURE GRANS (ABS): 0.04 THOU/MM3 (ref 0–0.07)
IMMATURE GRANULOCYTES: 0.3 %
KETONES, URINE: ABNORMAL
LEUKOCYTE ESTERASE, URINE: ABNORMAL
LYMPHOCYTES # BLD: 11.1 %
LYMPHOCYTES ABSOLUTE: 1.3 THOU/MM3 (ref 1–4.8)
MCH RBC QN AUTO: 28.7 PG (ref 26–33)
MCHC RBC AUTO-ENTMCNC: 32.4 GM/DL (ref 32.2–35.5)
MCV RBC AUTO: 88.6 FL (ref 80–94)
MISCELLANEOUS 2: ABNORMAL
MONOCYTES # BLD: 7.6 %
MONOCYTES ABSOLUTE: 0.9 THOU/MM3 (ref 0.4–1.3)
NITRITE, URINE: NEGATIVE
NUCLEATED RED BLOOD CELLS: 0 /100 WBC
OSMOLALITY CALCULATION: 277.2 MOSMOL/KG (ref 275–300)
PH UA: 5 (ref 5–9)
PLATELET # BLD: 231 THOU/MM3 (ref 130–400)
PMV BLD AUTO: 10.6 FL (ref 9.4–12.4)
POTASSIUM SERPL-SCNC: 4.2 MEQ/L (ref 3.5–5.2)
PRO-BNP: 199.7 PG/ML (ref 0–900)
PROTEIN UA: NEGATIVE
RBC # BLD: 4.46 MILL/MM3 (ref 4.7–6.1)
RBC URINE: ABNORMAL /HPF
RENAL EPITHELIAL, UA: ABNORMAL
SEG NEUTROPHILS: 76.5 %
SEGMENTED NEUTROPHILS ABSOLUTE COUNT: 8.8 THOU/MM3 (ref 1.8–7.7)
SODIUM BLD-SCNC: 137 MEQ/L (ref 135–145)
SPECIFIC GRAVITY, URINE: 1.02 (ref 1–1.03)
TOTAL CK: 59 U/L (ref 55–170)
TOTAL PROTEIN: 6.5 G/DL (ref 6.1–8)
TROPONIN T: < 0.01 NG/ML
UROBILINOGEN, URINE: 1 EU/DL (ref 0–1)
WBC # BLD: 11.5 THOU/MM3 (ref 4.8–10.8)
WBC UA: ABNORMAL /HPF
YEAST: ABNORMAL

## 2020-05-16 PROCEDURE — 83880 ASSAY OF NATRIURETIC PEPTIDE: CPT

## 2020-05-16 PROCEDURE — 81001 URINALYSIS AUTO W/SCOPE: CPT

## 2020-05-16 PROCEDURE — 2580000003 HC RX 258: Performed by: EMERGENCY MEDICINE

## 2020-05-16 PROCEDURE — 84484 ASSAY OF TROPONIN QUANT: CPT

## 2020-05-16 PROCEDURE — 87077 CULTURE AEROBIC IDENTIFY: CPT

## 2020-05-16 PROCEDURE — 36415 COLL VENOUS BLD VENIPUNCTURE: CPT

## 2020-05-16 PROCEDURE — 87086 URINE CULTURE/COLONY COUNT: CPT

## 2020-05-16 PROCEDURE — 82550 ASSAY OF CK (CPK): CPT

## 2020-05-16 PROCEDURE — 71045 X-RAY EXAM CHEST 1 VIEW: CPT

## 2020-05-16 PROCEDURE — 85025 COMPLETE CBC W/AUTO DIFF WBC: CPT

## 2020-05-16 PROCEDURE — 99284 EMERGENCY DEPT VISIT MOD MDM: CPT

## 2020-05-16 PROCEDURE — 76376 3D RENDER W/INTRP POSTPROCES: CPT

## 2020-05-16 PROCEDURE — 80053 COMPREHEN METABOLIC PANEL: CPT

## 2020-05-16 PROCEDURE — 93005 ELECTROCARDIOGRAM TRACING: CPT | Performed by: EMERGENCY MEDICINE

## 2020-05-16 PROCEDURE — 87186 SC STD MICRODIL/AGAR DIL: CPT

## 2020-05-16 PROCEDURE — 74176 CT ABD & PELVIS W/O CONTRAST: CPT

## 2020-05-16 PROCEDURE — 93010 ELECTROCARDIOGRAM REPORT: CPT | Performed by: INTERNAL MEDICINE

## 2020-05-16 RX ORDER — 0.9 % SODIUM CHLORIDE 0.9 %
1000 INTRAVENOUS SOLUTION INTRAVENOUS ONCE
Status: COMPLETED | OUTPATIENT
Start: 2020-05-16 | End: 2020-05-16

## 2020-05-16 RX ORDER — TELMISARTAN 40 MG/1
40 TABLET ORAL DAILY
COMMUNITY
End: 2020-10-14 | Stop reason: ALTCHOICE

## 2020-05-16 RX ADMIN — SODIUM CHLORIDE 1000 ML: 9 INJECTION, SOLUTION INTRAVENOUS at 03:37

## 2020-05-16 ASSESSMENT — ENCOUNTER SYMPTOMS
PHOTOPHOBIA: 0
DIARRHEA: 0
CONSTIPATION: 0
NAUSEA: 0
COUGH: 0
CHEST TIGHTNESS: 0
EYE ITCHING: 0
RHINORRHEA: 0
ABDOMINAL DISTENTION: 0
SORE THROAT: 0
STRIDOR: 0
SHORTNESS OF BREATH: 0
BACK PAIN: 1
ABDOMINAL PAIN: 0
WHEEZING: 0
VOMITING: 0
EYE PAIN: 0
EYE REDNESS: 0
EYE DISCHARGE: 0

## 2020-05-16 NOTE — ED PROVIDER NOTES
AST 13 5 - 40 U/L    Alkaline Phosphatase 112 38 - 126 U/L    Total Protein 6.5 6.1 - 8.0 g/dL    Alb 3.9 3.5 - 5.1 g/dL    Total Bilirubin 0.6 0.3 - 1.2 mg/dL    ALT 13 11 - 66 U/L   Troponin   Result Value Ref Range    Troponin T < 0.010 ng/ml   Brain Natriuretic Peptide   Result Value Ref Range    Pro-.7 0.0 - 900.0 pg/mL   CK   Result Value Ref Range    Total CK 59 55 - 170 U/L   Anion Gap   Result Value Ref Range    Anion Gap 13.0 8.0 - 16.0 meq/L   Glomerular Filtration Rate, Estimated   Result Value Ref Range    Est, Glom Filt Rate 60 (A) ml/min/1.73m2   Osmolality   Result Value Ref Range    Osmolality Calc 277.2 275.0 - 300 mOsmol/kg   Urine with Reflexed Micro   Result Value Ref Range    Glucose, Ur NEGATIVE NEGATIVE mg/dl    Bilirubin Urine NEGATIVE NEGATIVE    Ketones, Urine TRACE (A) NEGATIVE    Specific Gravity, Urine 1.024 1.002 - 1.03    Blood, Urine NEGATIVE NEGATIVE    pH, UA 5.0 5.0 - 9.0    Protein, UA NEGATIVE NEGATIVE    Urobilinogen, Urine 1.0 0.0 - 1.0 eu/dl    Nitrite, Urine NEGATIVE NEGATIVE    Leukocyte Esterase, Urine MODERATE (A) NEGATIVE    Color, UA YELLOW STRAW-YELL    Character, Urine CLOUDY (A) CLEAR-SL C    RBC, UA 5-10 0-2/hpf /hpf    WBC, UA 10-15 0-4/hpf /hpf    Epithelial Cells, UA 3-5 3-5/hpf /hpf    Bacteria, UA NONE SEEN FEW/NONE S /hpf    Casts UA >15 HYALINE NONE SEEN /lpf    Crystals, UA NONE SEEN NONE SEEN    Renal Epithelial, UA NONE SEEN NONE SEEN    Yeast, UA NONE SEEN NONE SEEN    CASTS 2 NONE SEEN NONE SEEN /lpf    MISCELLANEOUS 2 NONE SEEN        EMERGENCY DEPARTMENT COURSE:   Vitals:    Vitals:    05/16/20 0324 05/16/20 0414   BP: 103/84    Pulse: 67    Resp: 18 16   Temp: 98.7 °F (37.1 °C)    TempSrc: Oral    SpO2: 94% 95%   Weight: 280 lb (127 kg)    Height: 5' 11\" (1.803 m)        3:40 PM    Patient is seen and evaluated in a timely fashion.      Action:     Large bore IV  Tele monitor  Orthostatic vital  EKG  Labs  CXR  CT abdomen and pelvis with lumbar

## 2020-05-18 LAB
ORGANISM: ABNORMAL
URINE CULTURE REFLEX: ABNORMAL

## 2020-05-19 ENCOUNTER — TELEPHONE (OUTPATIENT)
Dept: PHARMACY | Age: 70
End: 2020-05-19

## 2020-05-27 ENCOUNTER — HOSPITAL ENCOUNTER (OUTPATIENT)
Dept: NUCLEAR MEDICINE | Age: 70
Discharge: HOME | End: 2020-05-27
Payer: MEDICARE

## 2020-05-27 PROCEDURE — A9503 TC99M MEDRONATE: HCPCS | Performed by: FAMILY MEDICINE

## 2020-05-27 PROCEDURE — 78306 BONE IMAGING WHOLE BODY: CPT

## 2020-05-27 PROCEDURE — 3430000000 HC RX DIAGNOSTIC RADIOPHARMACEUTICAL: Performed by: FAMILY MEDICINE

## 2020-05-27 RX ORDER — TC 99M MEDRONATE 20 MG/10ML
24 INJECTION, POWDER, LYOPHILIZED, FOR SOLUTION INTRAVENOUS
Status: COMPLETED | OUTPATIENT
Start: 2020-05-27 | End: 2020-05-27

## 2020-05-27 RX ADMIN — TC 99M MEDRONATE 24 MILLICURIE: 20 INJECTION, POWDER, LYOPHILIZED, FOR SOLUTION INTRAVENOUS at 10:45

## 2020-07-23 ENCOUNTER — HOSPITAL ENCOUNTER (OUTPATIENT)
Dept: GENERAL RADIOLOGY | Age: 70
Discharge: HOME | End: 2020-07-23
Payer: MEDICARE

## 2020-07-23 ENCOUNTER — HOSPITAL ENCOUNTER (OUTPATIENT)
Dept: INTERVENTIONAL RADIOLOGY/VASCULAR | Age: 70
Discharge: HOME | End: 2020-07-23
Payer: MEDICARE

## 2020-07-23 ENCOUNTER — HOSPITAL ENCOUNTER (OUTPATIENT)
Dept: OTHER | Age: 70
Discharge: HOME | End: 2020-07-23
Payer: MEDICARE

## 2020-07-23 VITALS
RESPIRATION RATE: 18 BRPM | DIASTOLIC BLOOD PRESSURE: 66 MMHG | WEIGHT: 280 LBS | BODY MASS INDEX: 39.2 KG/M2 | HEIGHT: 71 IN | TEMPERATURE: 97.3 F | OXYGEN SATURATION: 96 % | SYSTOLIC BLOOD PRESSURE: 139 MMHG | HEART RATE: 67 BPM

## 2020-07-23 LAB
CREAT SERPL-MCNC: 0.9 MG/DL (ref 0.4–1.2)
GFR SERPL CREATININE-BSD FRML MDRD: 83 ML/MIN/1.73M2

## 2020-07-23 PROCEDURE — 2709999900 HC NON-CHARGEABLE SUPPLY

## 2020-07-23 PROCEDURE — C1769 GUIDE WIRE: HCPCS

## 2020-07-23 PROCEDURE — C1751 CATH, INF, PER/CENT/MIDLINE: HCPCS

## 2020-07-23 PROCEDURE — 71045 X-RAY EXAM CHEST 1 VIEW: CPT

## 2020-07-23 PROCEDURE — 82565 ASSAY OF CREATININE: CPT

## 2020-07-23 PROCEDURE — 36415 COLL VENOUS BLD VENIPUNCTURE: CPT

## 2020-07-23 PROCEDURE — 36573 INSJ PICC RS&I 5 YR+: CPT

## 2020-07-23 PROCEDURE — 6360000002 HC RX W HCPCS: Performed by: INTERNAL MEDICINE

## 2020-07-23 PROCEDURE — C1894 INTRO/SHEATH, NON-LASER: HCPCS

## 2020-07-23 PROCEDURE — 36597 REPOSITION VENOUS CATHETER: CPT | Performed by: RADIOLOGY

## 2020-07-23 PROCEDURE — 2580000003 HC RX 258: Performed by: INTERNAL MEDICINE

## 2020-07-23 PROCEDURE — 96365 THER/PROPH/DIAG IV INF INIT: CPT

## 2020-07-23 PROCEDURE — 76937 US GUIDE VASCULAR ACCESS: CPT

## 2020-07-23 RX ORDER — SODIUM CHLORIDE 0.9 % (FLUSH) 0.9 %
10 SYRINGE (ML) INJECTION PRN
Status: DISCONTINUED | OUTPATIENT
Start: 2020-07-23 | End: 2020-07-24 | Stop reason: HOSPADM

## 2020-07-23 RX ORDER — LIDOCAINE HYDROCHLORIDE 10 MG/ML
5 INJECTION, SOLUTION EPIDURAL; INFILTRATION; INTRACAUDAL; PERINEURAL ONCE
Status: DISCONTINUED | OUTPATIENT
Start: 2020-07-23 | End: 2020-07-24 | Stop reason: HOSPADM

## 2020-07-23 RX ORDER — SODIUM CHLORIDE 0.9 % (FLUSH) 0.9 %
10 SYRINGE (ML) INJECTION EVERY 12 HOURS SCHEDULED
Status: DISCONTINUED | OUTPATIENT
Start: 2020-07-23 | End: 2020-07-24 | Stop reason: HOSPADM

## 2020-07-23 RX ADMIN — CEFTRIAXONE 2 G: 2 INJECTION, POWDER, FOR SOLUTION INTRAMUSCULAR; INTRAVENOUS at 09:47

## 2020-07-23 ASSESSMENT — PAIN - FUNCTIONAL ASSESSMENT: PAIN_FUNCTIONAL_ASSESSMENT: 0-10

## 2020-07-23 NOTE — PROGRESS NOTES
Patient being discharged in stable condition to IR for PICC placement. Written and verbal instructions given to patient and family, they voice no questions or concerns.

## 2020-07-23 NOTE — PROGRESS NOTES
PICC Procedure Note    Luna Duff   Admitted- 7/23/2020  8:59 AM  Admission diagnosis- No admission diagnoses are documented for this encounter. Attending Physician- No att. providers found  Ordering Physician-Dr. Ale Garner  Indication for Insertion: Antibiotic Therapy    Catheter Insertion Date- 7/23/2020   Lot Number- [id number]  Gauge-4  Lumen-single    Insertion Site- NIKOLAS Basilic  Vein Diameter- 3 mm  Catheter Length- 46 cm  Internal Length- 46 cm  Exposed Catheter Length- 0cm   Upper Arm Circumference- 38cm  Tip Confirmation System Bundle met- Yes  If X-ray required, Tip Location- yes-Left-sided PICC line is seen crossing midline but then extending superiorly towards the brachiocephalic vein recommend retraction and repositioning. Radiologist- Dr. Steven Diana    PICC insertion successful- YES attempted to repostion .  Patient sent to IR to uncoil PICC  Ultrasound- yes    Okay To Use PICC- YES, AFTER IR to uncoil PICC    Electronically signed by Jodee Dooley RN on 7/23/2020 at 1:31 PM

## 2020-07-23 NOTE — PROGRESS NOTES
12:50 -This RN received call from Leidy Rock radiologist in regards to single lumen PICC in NIKOLAS basilic vein. Radiologist reported the following finding from stat chest x-ray: Left-sided PICC line is seen crossing midline but then extending superiorly towards the brachiocephalic vein recommend retraction and repositioning. [id number] This RN attempted to retract and reposition single lumen PICC in NIKOLAS unsuccessfully. 1315- This RN called IR to reposition PICC in [facility] 34. IR agreeable and will be coming to get the patient now. Cecy Alcala RN  Updated and aware patient is going to IR to have PICC repositioned. Patient updated and aware of finding and verbalized understanding. Awake/Cooperative

## 2020-07-24 ENCOUNTER — HOSPITAL ENCOUNTER (OUTPATIENT)
Dept: GENERAL RADIOLOGY | Age: 70
Discharge: HOME | End: 2020-07-24
Payer: MEDICARE

## 2020-07-24 VITALS
RESPIRATION RATE: 16 BRPM | DIASTOLIC BLOOD PRESSURE: 74 MMHG | HEART RATE: 64 BPM | OXYGEN SATURATION: 96 % | TEMPERATURE: 97.2 F | SYSTOLIC BLOOD PRESSURE: 160 MMHG

## 2020-07-24 DIAGNOSIS — Z96.619 INFECTION OF PROSTHETIC SHOULDER JOINT, SUBSEQUENT ENCOUNTER: ICD-10-CM

## 2020-07-24 DIAGNOSIS — T84.59XD INFECTION OF PROSTHETIC SHOULDER JOINT, SUBSEQUENT ENCOUNTER: ICD-10-CM

## 2020-07-24 DIAGNOSIS — A49.8 PROPIONIBACTERIUM INFECTION: Primary | ICD-10-CM

## 2020-07-24 PROBLEM — T84.59XA INFECTION OF PROSTHETIC SHOULDER JOINT (HCC): Status: ACTIVE | Noted: 2020-07-24

## 2020-07-24 PROCEDURE — 2580000003 HC RX 258: Performed by: INTERNAL MEDICINE

## 2020-07-24 PROCEDURE — 6360000002 HC RX W HCPCS: Performed by: INTERNAL MEDICINE

## 2020-07-24 PROCEDURE — 96365 THER/PROPH/DIAG IV INF INIT: CPT

## 2020-07-24 PROCEDURE — 2709999900 HC NON-CHARGEABLE SUPPLY

## 2020-07-24 RX ORDER — SODIUM CHLORIDE 0.9 % (FLUSH) 0.9 %
5 SYRINGE (ML) INJECTION PRN
Status: CANCELLED | OUTPATIENT
Start: 2020-07-24

## 2020-07-24 RX ORDER — SODIUM CHLORIDE 0.9 % (FLUSH) 0.9 %
10 SYRINGE (ML) INJECTION PRN
Status: CANCELLED | OUTPATIENT
Start: 2020-07-24

## 2020-07-24 RX ORDER — SODIUM CHLORIDE 0.9 % (FLUSH) 0.9 %
10 SYRINGE (ML) INJECTION PRN
Status: CANCELLED | OUTPATIENT
Start: 2020-07-25

## 2020-07-24 RX ORDER — HEPARIN SODIUM (PORCINE) LOCK FLUSH IV SOLN 100 UNIT/ML 100 UNIT/ML
500 SOLUTION INTRAVENOUS PRN
Status: CANCELLED | OUTPATIENT
Start: 2020-07-25

## 2020-07-24 RX ORDER — SODIUM CHLORIDE 0.9 % (FLUSH) 0.9 %
5 SYRINGE (ML) INJECTION PRN
Status: CANCELLED | OUTPATIENT
Start: 2020-07-25

## 2020-07-24 RX ORDER — HEPARIN SODIUM (PORCINE) LOCK FLUSH IV SOLN 100 UNIT/ML 100 UNIT/ML
500 SOLUTION INTRAVENOUS PRN
Status: CANCELLED | OUTPATIENT
Start: 2020-07-24

## 2020-07-24 RX ADMIN — CEFTRIAXONE 2 G: 2 INJECTION, POWDER, FOR SOLUTION INTRAMUSCULAR; INTRAVENOUS at 12:25

## 2020-07-24 NOTE — ED NOTES
IV invanz infused and discontinued. Picc line flushed with 10 ml. 0.9 NS. Pulse regular. Extremities warm. Respirations regular and quiet. Mucous membranes pink & moist. Alert and oriented times 3. No nausea or vomiting. Range of motion within patient's limits. Skin pink, warm and dry. Calm and cooperative.      Duy Jerome RN  07/24/20 1178

## 2020-07-24 NOTE — ED NOTES
PT. Presents ambulatory with cane to exam 3 for outpt. IV antibiotics.  Pt. Has picc line to left upper arm.  __x__ Safety:       (Environmental)  [place] to environment   Ensure ID band is correct and in place/ allergy band as needed   Assess for fall risk   Initiate fall precautions as applicable (fall band, side rails, etc.)   Call light within reach   Bed in low position/ wheels locked    __x__ Pain:        Assess pain level and characteristics   Administer analgesics as ordered   Assess effectiveness of pain management and report to MD as needed    __x__ Knowledge Deficit:   Assess baseline knowledge   Provide teaching at level of understanding   Provide teaching via preferred learning method   Evaluate teaching effectiveness    __x__ Hemodynamic/Respiratory Status:       (Pre and Post Procedure Monitoring)   Assess/Monitor vital signs and LOC   Assess Baseline SpO2 prior to any sedation   Obtain weight/height   Assess vital signs/ LOC until patient meets discharge criteria   Monitor procedure site and notify MD of any issues    __x__ Infection-Risk of Central Venous Catheter:   Monitor for infection signs and symptoms (catheter site redness, temperature elevation, etc)   Assess for infection risks   Educate regarding infection prevention   Manage central venous catheter (flushes/ dressing changes per protocol)                   Olayinka Atkinson RN  07/24/20 7038

## 2020-07-25 ENCOUNTER — HOSPITAL ENCOUNTER (OUTPATIENT)
Dept: GENERAL RADIOLOGY | Age: 70
Discharge: HOME | End: 2020-07-25
Payer: MEDICARE

## 2020-07-25 VITALS
SYSTOLIC BLOOD PRESSURE: 134 MMHG | RESPIRATION RATE: 18 BRPM | OXYGEN SATURATION: 97 % | TEMPERATURE: 97.5 F | HEART RATE: 63 BPM | DIASTOLIC BLOOD PRESSURE: 63 MMHG

## 2020-07-25 DIAGNOSIS — Z96.619 INFECTION OF PROSTHETIC SHOULDER JOINT, SUBSEQUENT ENCOUNTER: ICD-10-CM

## 2020-07-25 DIAGNOSIS — A49.8 PROPIONIBACTERIUM INFECTION: Primary | ICD-10-CM

## 2020-07-25 DIAGNOSIS — T84.59XD INFECTION OF PROSTHETIC SHOULDER JOINT, SUBSEQUENT ENCOUNTER: ICD-10-CM

## 2020-07-25 PROCEDURE — 2580000003 HC RX 258: Performed by: INTERNAL MEDICINE

## 2020-07-25 PROCEDURE — 2709999900 HC NON-CHARGEABLE SUPPLY

## 2020-07-25 PROCEDURE — 96365 THER/PROPH/DIAG IV INF INIT: CPT

## 2020-07-25 PROCEDURE — 6360000002 HC RX W HCPCS: Performed by: INTERNAL MEDICINE

## 2020-07-25 RX ORDER — SODIUM CHLORIDE 0.9 % (FLUSH) 0.9 %
5 SYRINGE (ML) INJECTION PRN
Status: CANCELLED | OUTPATIENT
Start: 2020-07-26

## 2020-07-25 RX ORDER — HEPARIN SODIUM (PORCINE) LOCK FLUSH IV SOLN 100 UNIT/ML 100 UNIT/ML
500 SOLUTION INTRAVENOUS PRN
Status: CANCELLED | OUTPATIENT
Start: 2020-07-26

## 2020-07-25 RX ORDER — SODIUM CHLORIDE 0.9 % (FLUSH) 0.9 %
10 SYRINGE (ML) INJECTION PRN
Status: CANCELLED | OUTPATIENT
Start: 2020-07-26

## 2020-07-25 RX ADMIN — CEFTRIAXONE 2 G: 2 INJECTION, POWDER, FOR SOLUTION INTRAMUSCULAR; INTRAVENOUS at 11:57

## 2020-07-25 NOTE — PROGRESS NOTES
Presents ambulatory for infusion of IV antibiotic. Patient is alert and cooperative. Skin warm and dry. Color normal for ethnicity. Denies complaints or concerns.

## 2020-07-26 ENCOUNTER — HOSPITAL ENCOUNTER (OUTPATIENT)
Dept: GENERAL RADIOLOGY | Age: 70
Discharge: HOME | End: 2020-07-26
Payer: MEDICARE

## 2020-07-26 VITALS
RESPIRATION RATE: 20 BRPM | SYSTOLIC BLOOD PRESSURE: 131 MMHG | HEART RATE: 62 BPM | TEMPERATURE: 97 F | DIASTOLIC BLOOD PRESSURE: 63 MMHG

## 2020-07-26 DIAGNOSIS — Z96.619 INFECTION OF PROSTHETIC SHOULDER JOINT, SUBSEQUENT ENCOUNTER: ICD-10-CM

## 2020-07-26 DIAGNOSIS — A49.8 PROPIONIBACTERIUM INFECTION: Primary | ICD-10-CM

## 2020-07-26 DIAGNOSIS — T84.59XD INFECTION OF PROSTHETIC SHOULDER JOINT, SUBSEQUENT ENCOUNTER: ICD-10-CM

## 2020-07-26 PROCEDURE — 96365 THER/PROPH/DIAG IV INF INIT: CPT

## 2020-07-26 PROCEDURE — 2580000003 HC RX 258: Performed by: INTERNAL MEDICINE

## 2020-07-26 PROCEDURE — 2709999900 HC NON-CHARGEABLE SUPPLY

## 2020-07-26 PROCEDURE — 6360000002 HC RX W HCPCS: Performed by: INTERNAL MEDICINE

## 2020-07-26 RX ORDER — FINASTERIDE 5 MG/1
5 TABLET, FILM COATED ORAL DAILY
COMMUNITY
End: 2020-07-26

## 2020-07-26 RX ORDER — CEFTRIAXONE 2 G/1
2 INJECTION, POWDER, FOR SOLUTION INTRAMUSCULAR; INTRAVENOUS EVERY 24 HOURS
COMMUNITY
Start: 2020-07-21 | End: 2020-08-11

## 2020-07-26 RX ORDER — HEPARIN SODIUM (PORCINE) LOCK FLUSH IV SOLN 100 UNIT/ML 100 UNIT/ML
500 SOLUTION INTRAVENOUS PRN
Status: CANCELLED | OUTPATIENT
Start: 2020-07-27

## 2020-07-26 RX ORDER — SODIUM CHLORIDE 0.9 % (FLUSH) 0.9 %
10 SYRINGE (ML) INJECTION PRN
Status: CANCELLED | OUTPATIENT
Start: 2020-07-27

## 2020-07-26 RX ORDER — HYDROCHLOROTHIAZIDE 12.5 MG/1
TABLET ORAL
Status: ON HOLD | COMMUNITY
Start: 2020-07-13 | End: 2022-08-02 | Stop reason: HOSPADM

## 2020-07-26 RX ORDER — LABETALOL 100 MG/1
100 TABLET, FILM COATED ORAL 2 TIMES DAILY
COMMUNITY
End: 2020-07-26

## 2020-07-26 RX ORDER — SODIUM CHLORIDE 0.9 % (FLUSH) 0.9 %
5 SYRINGE (ML) INJECTION PRN
Status: CANCELLED | OUTPATIENT
Start: 2020-07-27

## 2020-07-26 RX ADMIN — CEFTRIAXONE 2 G: 2 INJECTION, POWDER, FOR SOLUTION INTRAMUSCULAR; INTRAVENOUS at 12:03

## 2020-07-26 NOTE — PROGRESS NOTES
Presents ambulatory for infusion of IV antibiotics. Patient is alert and cooperative. Skin warm and dry. Color normal for ethnicity. Denies complaints or concerns.

## 2020-07-26 NOTE — PLAN OF CARE
Patient and wife advised that if patient needs to use a wheelchair, he is welcome to do that. Advise nurse and she will transport patient for them. Patient appears tired at times when walking in. Verbalizes understanding.

## 2020-07-27 ENCOUNTER — HOSPITAL ENCOUNTER (OUTPATIENT)
Dept: GENERAL RADIOLOGY | Age: 70
Discharge: HOME | End: 2020-07-27
Payer: MEDICARE

## 2020-07-27 VITALS
OXYGEN SATURATION: 96 % | HEART RATE: 60 BPM | RESPIRATION RATE: 16 BRPM | HEIGHT: 71 IN | SYSTOLIC BLOOD PRESSURE: 128 MMHG | BODY MASS INDEX: 39.2 KG/M2 | WEIGHT: 280 LBS | TEMPERATURE: 98.2 F | DIASTOLIC BLOOD PRESSURE: 58 MMHG

## 2020-07-27 DIAGNOSIS — Z96.619 INFECTION OF PROSTHETIC SHOULDER JOINT, SUBSEQUENT ENCOUNTER: ICD-10-CM

## 2020-07-27 DIAGNOSIS — A49.8 PROPIONIBACTERIUM INFECTION: Primary | ICD-10-CM

## 2020-07-27 DIAGNOSIS — T84.59XD INFECTION OF PROSTHETIC SHOULDER JOINT, SUBSEQUENT ENCOUNTER: ICD-10-CM

## 2020-07-27 LAB
ANION GAP SERPL CALCULATED.3IONS-SCNC: 13 MEQ/L (ref 8–16)
BUN BLDV-MCNC: 15 MG/DL (ref 7–22)
CALCIUM SERPL-MCNC: 8.8 MG/DL (ref 8.5–10.5)
CHLORIDE BLD-SCNC: 104 MEQ/L (ref 98–111)
CO2: 24 MEQ/L (ref 23–33)
CREAT SERPL-MCNC: 0.9 MG/DL (ref 0.4–1.2)
GFR SERPL CREATININE-BSD FRML MDRD: 83 ML/MIN/1.73M2
GLUCOSE BLD-MCNC: 104 MG/DL (ref 70–108)
POTASSIUM SERPL-SCNC: 4 MEQ/L (ref 3.5–5.2)
SODIUM BLD-SCNC: 141 MEQ/L (ref 135–145)

## 2020-07-27 PROCEDURE — 36415 COLL VENOUS BLD VENIPUNCTURE: CPT

## 2020-07-27 PROCEDURE — 80048 BASIC METABOLIC PNL TOTAL CA: CPT

## 2020-07-27 PROCEDURE — 2580000003 HC RX 258: Performed by: INTERNAL MEDICINE

## 2020-07-27 PROCEDURE — 96365 THER/PROPH/DIAG IV INF INIT: CPT

## 2020-07-27 PROCEDURE — 85027 COMPLETE CBC AUTOMATED: CPT

## 2020-07-27 PROCEDURE — 6360000002 HC RX W HCPCS

## 2020-07-27 PROCEDURE — 2709999900 HC NON-CHARGEABLE SUPPLY

## 2020-07-27 PROCEDURE — 6360000002 HC RX W HCPCS: Performed by: INTERNAL MEDICINE

## 2020-07-27 RX ORDER — SODIUM CHLORIDE 0.9 % (FLUSH) 0.9 %
5 SYRINGE (ML) INJECTION PRN
Status: CANCELLED | OUTPATIENT
Start: 2020-07-28

## 2020-07-27 RX ORDER — CEFTRIAXONE 2 G/1
INJECTION, POWDER, FOR SOLUTION INTRAMUSCULAR; INTRAVENOUS
Status: COMPLETED
Start: 2020-07-27 | End: 2020-07-27

## 2020-07-27 RX ORDER — SODIUM CHLORIDE 0.9 % (FLUSH) 0.9 %
10 SYRINGE (ML) INJECTION PRN
Status: CANCELLED | OUTPATIENT
Start: 2020-07-28

## 2020-07-27 RX ORDER — HEPARIN SODIUM (PORCINE) LOCK FLUSH IV SOLN 100 UNIT/ML 100 UNIT/ML
500 SOLUTION INTRAVENOUS PRN
Status: CANCELLED | OUTPATIENT
Start: 2020-07-28

## 2020-07-27 RX ADMIN — CEFTRIAXONE 2000 MG: 2 INJECTION, POWDER, FOR SOLUTION INTRAMUSCULAR; INTRAVENOUS at 12:22

## 2020-07-27 RX ADMIN — CEFTRIAXONE 2 G: 2 INJECTION, POWDER, FOR SOLUTION INTRAMUSCULAR; INTRAVENOUS at 12:23

## 2020-07-27 NOTE — PROGRESS NOTES
IV infused and discontinued. IV site flushed with 10ml . 9NS and new swab cap applied. Pt is pink, warm and dry, breathing with ease. Mucous membranes are pink and moist. Pt is alert and oriented. No nausea or vomiting. Walking without difficulty. Pt is calm and cooperative, no complaints. Pt released in stable condition.

## 2020-07-28 ENCOUNTER — HOSPITAL ENCOUNTER (OUTPATIENT)
Dept: GENERAL RADIOLOGY | Age: 70
Discharge: HOME | End: 2020-07-28
Payer: MEDICARE

## 2020-07-28 VITALS
SYSTOLIC BLOOD PRESSURE: 111 MMHG | TEMPERATURE: 97.9 F | HEART RATE: 61 BPM | RESPIRATION RATE: 18 BRPM | DIASTOLIC BLOOD PRESSURE: 56 MMHG | OXYGEN SATURATION: 94 %

## 2020-07-28 DIAGNOSIS — Z96.619 INFECTION OF PROSTHETIC SHOULDER JOINT, SUBSEQUENT ENCOUNTER: ICD-10-CM

## 2020-07-28 DIAGNOSIS — A49.8 PROPIONIBACTERIUM INFECTION: Primary | ICD-10-CM

## 2020-07-28 DIAGNOSIS — T84.59XD INFECTION OF PROSTHETIC SHOULDER JOINT, SUBSEQUENT ENCOUNTER: ICD-10-CM

## 2020-07-28 LAB
ERYTHROCYTE [DISTWIDTH] IN BLOOD BY AUTOMATED COUNT: 14.6 % (ref 11.5–14.5)
ERYTHROCYTE [DISTWIDTH] IN BLOOD BY AUTOMATED COUNT: 48.5 FL (ref 35–45)
HCT VFR BLD CALC: 36.4 % (ref 42–52)
HEMOGLOBIN: 11.4 GM/DL (ref 14–18)
MCH RBC QN AUTO: 28.6 PG (ref 26–33)
MCHC RBC AUTO-ENTMCNC: 31.3 GM/DL (ref 32.2–35.5)
MCV RBC AUTO: 91.5 FL (ref 80–94)
PLATELET # BLD: 277 THOU/MM3 (ref 130–400)
PMV BLD AUTO: 11.5 FL (ref 9.4–12.4)
RBC # BLD: 3.98 MILL/MM3 (ref 4.7–6.1)
WBC # BLD: 8.3 THOU/MM3 (ref 4.8–10.8)

## 2020-07-28 PROCEDURE — 2580000003 HC RX 258: Performed by: INTERNAL MEDICINE

## 2020-07-28 PROCEDURE — 96374 THER/PROPH/DIAG INJ IV PUSH: CPT

## 2020-07-28 PROCEDURE — 2709999900 HC NON-CHARGEABLE SUPPLY

## 2020-07-28 PROCEDURE — 6360000002 HC RX W HCPCS: Performed by: INTERNAL MEDICINE

## 2020-07-28 RX ORDER — SODIUM CHLORIDE 0.9 % (FLUSH) 0.9 %
10 SYRINGE (ML) INJECTION PRN
Status: CANCELLED | OUTPATIENT
Start: 2020-07-29

## 2020-07-28 RX ORDER — HEPARIN SODIUM (PORCINE) LOCK FLUSH IV SOLN 100 UNIT/ML 100 UNIT/ML
500 SOLUTION INTRAVENOUS PRN
Status: CANCELLED | OUTPATIENT
Start: 2020-07-29

## 2020-07-28 RX ORDER — SODIUM CHLORIDE 0.9 % (FLUSH) 0.9 %
5 SYRINGE (ML) INJECTION PRN
Status: CANCELLED | OUTPATIENT
Start: 2020-07-29

## 2020-07-28 RX ADMIN — CEFTRIAXONE 2 G: 2 INJECTION, POWDER, FOR SOLUTION INTRAMUSCULAR; INTRAVENOUS at 12:30

## 2020-07-28 NOTE — FLOWSHEET NOTE
No s/s of reaction. Pt denies copy of AVS. Medication education reviewed. Pt breathing with ease. D/c in stable condition.

## 2020-07-28 NOTE — PROGRESS NOTES
Pt here for outpt infusion. Pt denies SOB denies fever. Pt alert and oriented. Skin pink warm and dry.

## 2020-07-28 NOTE — PROGRESS NOTES
___x_ Safety:       (Environmental)   [place] to environment   Ensure ID band is correct and in place/ allergy band as needed   Assess for fall risk   Initiate fall precautions as applicable (fall band, side rails, etc.)   Call light within reach   Bed in low position/ wheels locked    ___xx_ Pain:        Assess pain level and characteristics   Administer analgesics as ordered   Assess effectiveness of pain management and report to MD as needed    ___x Knowledge Deficit:   Assess baseline knowledge   Provide teaching at level of understanding   Provide teaching via preferred learning method   Evaluate teaching effectiveness    ___x_ Hemodynamic/Respiratory Status:       (Pre and Post Procedure Monitoring)   Assess/Monitor vital signs and LOC   Assess Baseline SpO2 prior to any sedation   Obtain weight/height   Assess vital signs/ LOC until patient meets discharge criteria   Monitor procedure site and notify MD of any issues    ___x_ Infection-Risk of Central Venous Catheter:   Monitor for infection signs and symptoms (catheter site redness, temperature elevation, etc)   Assess for infection risks   Educate regarding infection prevention   Manage central venous catheter (flushes/ dressing changes per protocol)

## 2020-07-29 ENCOUNTER — HOSPITAL ENCOUNTER (OUTPATIENT)
Dept: GENERAL RADIOLOGY | Age: 70
Discharge: HOME | End: 2020-07-29
Payer: MEDICARE

## 2020-07-29 VITALS
OXYGEN SATURATION: 95 % | HEART RATE: 68 BPM | DIASTOLIC BLOOD PRESSURE: 60 MMHG | TEMPERATURE: 98.5 F | SYSTOLIC BLOOD PRESSURE: 131 MMHG | RESPIRATION RATE: 16 BRPM

## 2020-07-29 DIAGNOSIS — Z96.619 INFECTION OF PROSTHETIC SHOULDER JOINT, SUBSEQUENT ENCOUNTER: ICD-10-CM

## 2020-07-29 DIAGNOSIS — A49.8 PROPIONIBACTERIUM INFECTION: Primary | ICD-10-CM

## 2020-07-29 DIAGNOSIS — T84.59XD INFECTION OF PROSTHETIC SHOULDER JOINT, SUBSEQUENT ENCOUNTER: ICD-10-CM

## 2020-07-29 PROCEDURE — 6360000002 HC RX W HCPCS: Performed by: INTERNAL MEDICINE

## 2020-07-29 PROCEDURE — 96365 THER/PROPH/DIAG IV INF INIT: CPT

## 2020-07-29 PROCEDURE — 2580000003 HC RX 258: Performed by: INTERNAL MEDICINE

## 2020-07-29 PROCEDURE — 2709999900 HC NON-CHARGEABLE SUPPLY

## 2020-07-29 RX ORDER — SODIUM CHLORIDE 0.9 % (FLUSH) 0.9 %
5 SYRINGE (ML) INJECTION PRN
Status: CANCELLED | OUTPATIENT
Start: 2020-07-30

## 2020-07-29 RX ORDER — SODIUM CHLORIDE 0.9 % (FLUSH) 0.9 %
10 SYRINGE (ML) INJECTION PRN
Status: CANCELLED | OUTPATIENT
Start: 2020-07-30

## 2020-07-29 RX ORDER — HEPARIN SODIUM (PORCINE) LOCK FLUSH IV SOLN 100 UNIT/ML 100 UNIT/ML
500 SOLUTION INTRAVENOUS PRN
Status: CANCELLED | OUTPATIENT
Start: 2020-07-30

## 2020-07-29 RX ADMIN — CEFTRIAXONE 2 G: 2 INJECTION, POWDER, FOR SOLUTION INTRAMUSCULAR; INTRAVENOUS at 11:59

## 2020-07-29 NOTE — PROGRESS NOTES
_MET___ Safety:       (Environmental)   [place] to environment   Ensure ID band is correct and in place/ allergy band as needed   Assess for fall risk   Initiate fall precautions as applicable (fall band, side rails, etc.)   Call light within reach   Bed in low position/ wheels locked    _MET___ Pain:        Assess pain level and characteristics   Administer analgesics as ordered   Assess effectiveness of pain management and report to MD as needed    _MET___ Knowledge Deficit:   Assess baseline knowledge   Provide teaching at level of understanding   Provide teaching via preferred learning method   Evaluate teaching effectiveness    _MET___ Hemodynamic/Respiratory Status:       (Pre and Post Procedure Monitoring)   Assess/Monitor vital signs and LOC   Assess Baseline SpO2 prior to any sedation   Obtain weight/height   Assess vital signs/ LOC until patient meets discharge criteria   Monitor procedure site and notify MD of any issues    __MET__ Infection-Risk of Central Venous Catheter:   Monitor for infection signs and symptoms (catheter site redness, temperature elevation, etc)   Assess for infection risks   Educate regarding infection prevention   Manage central venous catheter (flushes/ dressing changes per protocol)

## 2020-07-29 NOTE — PLAN OF CARE
Patient and wife advised that if patient needs a wheelchair to get in or out of the building, that we would be happy to assist with this.

## 2020-07-29 NOTE — PROGRESS NOTES
Presents ambulatory for infusion of IV antibiotics. Denies complaints or concern. Patient is alert and cooperative. Skin warm and dry. Color normal for ethnicity.

## 2020-07-30 ENCOUNTER — HOSPITAL ENCOUNTER (OUTPATIENT)
Dept: GENERAL RADIOLOGY | Age: 70
Discharge: HOME | End: 2020-07-30
Payer: MEDICARE

## 2020-07-30 VITALS
RESPIRATION RATE: 16 BRPM | TEMPERATURE: 98.3 F | HEART RATE: 64 BPM | DIASTOLIC BLOOD PRESSURE: 60 MMHG | SYSTOLIC BLOOD PRESSURE: 112 MMHG | OXYGEN SATURATION: 96 %

## 2020-07-30 DIAGNOSIS — A49.8 PROPIONIBACTERIUM INFECTION: Primary | ICD-10-CM

## 2020-07-30 DIAGNOSIS — Z96.619 INFECTION OF PROSTHETIC SHOULDER JOINT, SUBSEQUENT ENCOUNTER: ICD-10-CM

## 2020-07-30 DIAGNOSIS — T84.59XD INFECTION OF PROSTHETIC SHOULDER JOINT, SUBSEQUENT ENCOUNTER: ICD-10-CM

## 2020-07-30 PROCEDURE — 6360000002 HC RX W HCPCS: Performed by: INTERNAL MEDICINE

## 2020-07-30 PROCEDURE — 2709999900 HC NON-CHARGEABLE SUPPLY

## 2020-07-30 PROCEDURE — 2580000003 HC RX 258: Performed by: INTERNAL MEDICINE

## 2020-07-30 PROCEDURE — 96365 THER/PROPH/DIAG IV INF INIT: CPT

## 2020-07-30 RX ORDER — HEPARIN SODIUM (PORCINE) LOCK FLUSH IV SOLN 100 UNIT/ML 100 UNIT/ML
500 SOLUTION INTRAVENOUS PRN
Status: CANCELLED | OUTPATIENT
Start: 2020-07-31

## 2020-07-30 RX ORDER — SODIUM CHLORIDE 0.9 % (FLUSH) 0.9 %
5 SYRINGE (ML) INJECTION PRN
Status: CANCELLED | OUTPATIENT
Start: 2020-07-31

## 2020-07-30 RX ORDER — SODIUM CHLORIDE 0.9 % (FLUSH) 0.9 %
10 SYRINGE (ML) INJECTION PRN
Status: CANCELLED | OUTPATIENT
Start: 2020-07-31

## 2020-07-30 RX ADMIN — CEFTRIAXONE SODIUM 2 G: 2 INJECTION, POWDER, FOR SOLUTION INTRAMUSCULAR; INTRAVENOUS at 12:10

## 2020-07-30 NOTE — PROGRESS NOTES
Pt presents for rocephin infusion. Pt alert, resp even and unlabored, skin pink, warm and dry. Pt has a intact PICC line in l upper arm, PICC line flushed with 10ml's of saline without difficulty, blood return noted, site soft and without edema or redness.

## 2020-07-30 NOTE — ED NOTES
Pt resting, resp even and unlabored, skin pink, warm and dry. Rocephin infused, PICC line flushed with 10ml's of saline without difficulty and new swab cap applied. Pt declines AVS, pt released ambulatory in stable condition.       Mckenzie Gonzalez RN  07/30/20 1649

## 2020-07-31 ENCOUNTER — HOSPITAL ENCOUNTER (OUTPATIENT)
Dept: GENERAL RADIOLOGY | Age: 70
Discharge: HOME | End: 2020-07-31
Payer: MEDICARE

## 2020-07-31 VITALS
SYSTOLIC BLOOD PRESSURE: 131 MMHG | TEMPERATURE: 98.9 F | DIASTOLIC BLOOD PRESSURE: 67 MMHG | RESPIRATION RATE: 20 BRPM | HEART RATE: 68 BPM | OXYGEN SATURATION: 94 %

## 2020-07-31 DIAGNOSIS — Z96.619 INFECTION OF PROSTHETIC SHOULDER JOINT, SUBSEQUENT ENCOUNTER: ICD-10-CM

## 2020-07-31 DIAGNOSIS — A49.8 PROPIONIBACTERIUM INFECTION: Primary | ICD-10-CM

## 2020-07-31 DIAGNOSIS — T84.59XD INFECTION OF PROSTHETIC SHOULDER JOINT, SUBSEQUENT ENCOUNTER: ICD-10-CM

## 2020-07-31 PROCEDURE — 2580000003 HC RX 258: Performed by: INTERNAL MEDICINE

## 2020-07-31 PROCEDURE — 2709999900 HC NON-CHARGEABLE SUPPLY

## 2020-07-31 PROCEDURE — 96365 THER/PROPH/DIAG IV INF INIT: CPT

## 2020-07-31 PROCEDURE — 6360000002 HC RX W HCPCS: Performed by: INTERNAL MEDICINE

## 2020-07-31 RX ORDER — SODIUM CHLORIDE 0.9 % (FLUSH) 0.9 %
10 SYRINGE (ML) INJECTION PRN
Status: CANCELLED | OUTPATIENT
Start: 2020-08-01

## 2020-07-31 RX ORDER — SODIUM CHLORIDE 0.9 % (FLUSH) 0.9 %
5 SYRINGE (ML) INJECTION PRN
Status: CANCELLED | OUTPATIENT
Start: 2020-08-01

## 2020-07-31 RX ORDER — HEPARIN SODIUM (PORCINE) LOCK FLUSH IV SOLN 100 UNIT/ML 100 UNIT/ML
500 SOLUTION INTRAVENOUS PRN
Status: CANCELLED | OUTPATIENT
Start: 2020-08-01

## 2020-07-31 RX ADMIN — CEFTRIAXONE 2 G: 2 INJECTION, POWDER, FOR SOLUTION INTRAMUSCULAR; INTRAVENOUS at 11:58

## 2020-07-31 NOTE — PROGRESS NOTES
Presents ambulatory with need for IV antibiotics. Denies complaints or concerns. Patient is alert and cooperative. Skin warm and dry. Color normal for ethnicity.

## 2020-08-01 ENCOUNTER — HOSPITAL ENCOUNTER (OUTPATIENT)
Dept: GENERAL RADIOLOGY | Age: 70
Discharge: HOME | End: 2020-08-01
Payer: MEDICARE

## 2020-08-01 VITALS
OXYGEN SATURATION: 93 % | HEART RATE: 59 BPM | TEMPERATURE: 98.9 F | SYSTOLIC BLOOD PRESSURE: 121 MMHG | DIASTOLIC BLOOD PRESSURE: 60 MMHG | RESPIRATION RATE: 15 BRPM

## 2020-08-01 DIAGNOSIS — Z96.619 INFECTION OF PROSTHETIC SHOULDER JOINT, SUBSEQUENT ENCOUNTER: ICD-10-CM

## 2020-08-01 DIAGNOSIS — A49.8 PROPIONIBACTERIUM INFECTION: Primary | ICD-10-CM

## 2020-08-01 DIAGNOSIS — T84.59XD INFECTION OF PROSTHETIC SHOULDER JOINT, SUBSEQUENT ENCOUNTER: ICD-10-CM

## 2020-08-01 PROCEDURE — 2709999900 HC NON-CHARGEABLE SUPPLY

## 2020-08-01 PROCEDURE — 96365 THER/PROPH/DIAG IV INF INIT: CPT

## 2020-08-01 PROCEDURE — 2580000003 HC RX 258: Performed by: INTERNAL MEDICINE

## 2020-08-01 PROCEDURE — 6360000002 HC RX W HCPCS: Performed by: INTERNAL MEDICINE

## 2020-08-01 RX ORDER — HEPARIN SODIUM (PORCINE) LOCK FLUSH IV SOLN 100 UNIT/ML 100 UNIT/ML
500 SOLUTION INTRAVENOUS PRN
Status: CANCELLED | OUTPATIENT
Start: 2020-08-02

## 2020-08-01 RX ORDER — SODIUM CHLORIDE 0.9 % (FLUSH) 0.9 %
10 SYRINGE (ML) INJECTION PRN
Status: CANCELLED | OUTPATIENT
Start: 2020-08-02

## 2020-08-01 RX ORDER — SODIUM CHLORIDE 0.9 % (FLUSH) 0.9 %
5 SYRINGE (ML) INJECTION PRN
Status: CANCELLED | OUTPATIENT
Start: 2020-08-02

## 2020-08-01 RX ADMIN — CEFTRIAXONE 2 G: 2 INJECTION, POWDER, FOR SOLUTION INTRAMUSCULAR; INTRAVENOUS at 12:03

## 2020-08-01 NOTE — PROGRESS NOTES
PICC line flushed with saline and swab cap applied. Respirations regular and quiet. Nonlabored. Alert and oriented times 3. No nausea or vomiting. Skin pink, warm and dry. Calm and cooperative. Denies any pain. Pt released ambulatory in satisfactory condition with cane. Pt voiced understanding.

## 2020-08-01 NOTE — PROGRESS NOTES
___m_ Safety:       (Environmental)   [place] to environment   Ensure ID band is correct and in place/ allergy band as needed   Assess for fall risk   Initiate fall precautions as applicable (fall band, side rails, etc.)   Call light within reach   Bed in low position/ wheels locked    _m___ Pain:        Assess pain level and characteristics   Administer analgesics as ordered   Assess effectiveness of pain management and report to MD as needed    _m___ Knowledge Deficit:   Assess baseline knowledge   Provide teaching at level of understanding   Provide teaching via preferred learning method   Evaluate teaching effectiveness    m____ Infection-Risk of Central Venous Catheter:   Monitor for infection signs and symptoms (catheter site redness, temperature elevation, etc)   Assess for infection risks   Educate regarding infection prevention   Manage central venous catheter (flushes/ dressing changes per protocol)

## 2020-08-01 NOTE — PROGRESS NOTES
Pt presents amb with spouse for OP IV antibiotic. Rt arm in sling. Rt hand swollen. Pt complains of pain in rt shoulder today of 4.

## 2020-08-02 ENCOUNTER — HOSPITAL ENCOUNTER (OUTPATIENT)
Dept: GENERAL RADIOLOGY | Age: 70
Discharge: HOME | End: 2020-08-02
Payer: MEDICARE

## 2020-08-02 VITALS
HEART RATE: 62 BPM | TEMPERATURE: 98.2 F | SYSTOLIC BLOOD PRESSURE: 133 MMHG | RESPIRATION RATE: 15 BRPM | OXYGEN SATURATION: 97 % | DIASTOLIC BLOOD PRESSURE: 63 MMHG

## 2020-08-02 DIAGNOSIS — A49.8 PROPIONIBACTERIUM INFECTION: Primary | ICD-10-CM

## 2020-08-02 DIAGNOSIS — T84.59XD INFECTION OF PROSTHETIC SHOULDER JOINT, SUBSEQUENT ENCOUNTER: ICD-10-CM

## 2020-08-02 DIAGNOSIS — Z96.619 INFECTION OF PROSTHETIC SHOULDER JOINT, SUBSEQUENT ENCOUNTER: ICD-10-CM

## 2020-08-02 PROCEDURE — 6360000002 HC RX W HCPCS: Performed by: INTERNAL MEDICINE

## 2020-08-02 PROCEDURE — 96367 TX/PROPH/DG ADDL SEQ IV INF: CPT

## 2020-08-02 PROCEDURE — 2709999900 HC NON-CHARGEABLE SUPPLY

## 2020-08-02 PROCEDURE — 96365 THER/PROPH/DIAG IV INF INIT: CPT

## 2020-08-02 PROCEDURE — 2580000003 HC RX 258: Performed by: INTERNAL MEDICINE

## 2020-08-02 RX ORDER — SODIUM CHLORIDE 0.9 % (FLUSH) 0.9 %
10 SYRINGE (ML) INJECTION PRN
Status: CANCELLED | OUTPATIENT
Start: 2020-08-03

## 2020-08-02 RX ORDER — HEPARIN SODIUM (PORCINE) LOCK FLUSH IV SOLN 100 UNIT/ML 100 UNIT/ML
500 SOLUTION INTRAVENOUS PRN
Status: CANCELLED | OUTPATIENT
Start: 2020-08-03

## 2020-08-02 RX ORDER — SODIUM CHLORIDE 0.9 % (FLUSH) 0.9 %
5 SYRINGE (ML) INJECTION PRN
Status: CANCELLED | OUTPATIENT
Start: 2020-08-03

## 2020-08-02 RX ADMIN — CEFTRIAXONE 2 G: 2 INJECTION, POWDER, FOR SOLUTION INTRAMUSCULAR; INTRAVENOUS at 12:19

## 2020-08-02 NOTE — PROGRESS NOTES
_m___ Safety:       (Environmental)   [place] to environment   Ensure ID band is correct and in place/ allergy band as needed   Assess for fall risk   Initiate fall precautions as applicable (fall band, side rails, etc.)   Call light within reach   Bed in low position/ wheels locked    _m___ Pain:        Assess pain level and characteristics   Administer analgesics as ordered   Assess effectiveness of pain management and report to MD as needed    _m___ Knowledge Deficit:   Assess baseline knowledge   Provide teaching at level of understanding   Provide teaching via preferred learning method   Evaluate teaching effectiveness    _m___ Hemodynamic/Respiratory Status:       (Pre and Post Procedure Monitoring)   Assess/Monitor vital signs and LOC   Assess Baseline SpO2 prior to any sedation   Obtain weight/height   Assess vital signs/ LOC until patient meets discharge criteria   Monitor procedure site and notify MD of any issues    __m__ Infection-Risk of Central Venous Catheter:   Monitor for infection signs and symptoms (catheter site redness, temperature elevation, etc)   Assess for infection risks   Educate regarding infection prevention   Manage central venous catheter (flushes/ dressing changes per protocol)

## 2020-08-02 NOTE — PROGRESS NOTES
Pt presents amb with cane for OP antibiotic infusion. Has art arms sling in place for shoulder surgery. Rt hand swollen. Good rt radial pulse. Digits omar briskly.

## 2020-08-02 NOTE — PROGRESS NOTES
Respirations regular and quiet. Nonlabored. Alert and oriented times 3. No nausea or vomiting. Skin pink, warm and dry. Calm and cooperative. Denies any pain. Pt released ambulatory in satisfactory condition with cane and spouse. Sling in place rt arm.

## 2020-08-03 ENCOUNTER — HOSPITAL ENCOUNTER (OUTPATIENT)
Dept: GENERAL RADIOLOGY | Age: 70
Discharge: HOME | End: 2020-08-03
Payer: MEDICARE

## 2020-08-03 ENCOUNTER — HOSPITAL ENCOUNTER (OUTPATIENT)
Age: 70
Discharge: HOME | End: 2020-08-03
Payer: MEDICARE

## 2020-08-03 VITALS
RESPIRATION RATE: 16 BRPM | DIASTOLIC BLOOD PRESSURE: 61 MMHG | OXYGEN SATURATION: 96 % | SYSTOLIC BLOOD PRESSURE: 133 MMHG | HEART RATE: 70 BPM | TEMPERATURE: 98 F

## 2020-08-03 DIAGNOSIS — Z96.619 INFECTION OF PROSTHETIC SHOULDER JOINT, SUBSEQUENT ENCOUNTER: ICD-10-CM

## 2020-08-03 DIAGNOSIS — A49.8 PROPIONIBACTERIUM INFECTION: Primary | ICD-10-CM

## 2020-08-03 DIAGNOSIS — T84.59XD INFECTION OF PROSTHETIC SHOULDER JOINT, SUBSEQUENT ENCOUNTER: ICD-10-CM

## 2020-08-03 LAB
ANION GAP SERPL CALCULATED.3IONS-SCNC: 12 MEQ/L (ref 8–16)
BUN BLDV-MCNC: 14 MG/DL (ref 7–22)
CALCIUM SERPL-MCNC: 8.9 MG/DL (ref 8.5–10.5)
CHLORIDE BLD-SCNC: 106 MEQ/L (ref 98–111)
CO2: 25 MEQ/L (ref 23–33)
CREAT SERPL-MCNC: 1 MG/DL (ref 0.4–1.2)
GFR SERPL CREATININE-BSD FRML MDRD: 73 ML/MIN/1.73M2
GLUCOSE BLD-MCNC: 105 MG/DL (ref 70–108)
POTASSIUM SERPL-SCNC: 4.2 MEQ/L (ref 3.5–5.2)
SODIUM BLD-SCNC: 143 MEQ/L (ref 135–145)

## 2020-08-03 PROCEDURE — 85027 COMPLETE CBC AUTOMATED: CPT

## 2020-08-03 PROCEDURE — 2709999900 HC NON-CHARGEABLE SUPPLY

## 2020-08-03 PROCEDURE — 36415 COLL VENOUS BLD VENIPUNCTURE: CPT

## 2020-08-03 PROCEDURE — 2580000003 HC RX 258: Performed by: INTERNAL MEDICINE

## 2020-08-03 PROCEDURE — 96365 THER/PROPH/DIAG IV INF INIT: CPT

## 2020-08-03 PROCEDURE — 36592 COLLECT BLOOD FROM PICC: CPT

## 2020-08-03 PROCEDURE — 80048 BASIC METABOLIC PNL TOTAL CA: CPT

## 2020-08-03 PROCEDURE — 6360000002 HC RX W HCPCS: Performed by: INTERNAL MEDICINE

## 2020-08-03 RX ORDER — SODIUM CHLORIDE 0.9 % (FLUSH) 0.9 %
10 SYRINGE (ML) INJECTION PRN
Status: CANCELLED | OUTPATIENT
Start: 2020-08-04

## 2020-08-03 RX ORDER — HEPARIN SODIUM (PORCINE) LOCK FLUSH IV SOLN 100 UNIT/ML 100 UNIT/ML
500 SOLUTION INTRAVENOUS PRN
Status: CANCELLED | OUTPATIENT
Start: 2020-08-04

## 2020-08-03 RX ORDER — CEFTRIAXONE 2 G/1
INJECTION, POWDER, FOR SOLUTION INTRAMUSCULAR; INTRAVENOUS
Status: DISCONTINUED
Start: 2020-08-03 | End: 2020-08-03 | Stop reason: HOSPADM

## 2020-08-03 RX ORDER — SODIUM CHLORIDE 0.9 % (FLUSH) 0.9 %
5 SYRINGE (ML) INJECTION PRN
Status: CANCELLED | OUTPATIENT
Start: 2020-08-04

## 2020-08-03 RX ADMIN — CEFTRIAXONE 2 G: 2 INJECTION, POWDER, FOR SOLUTION INTRAMUSCULAR; INTRAVENOUS at 12:06

## 2020-08-03 NOTE — ED NOTES
Pt. Presents ambulatory to ED for outpt. IV infusion and lab draw. Pt. To exam 3 with slow and steady gait.       José Miguel Bazan RN  08/03/20 1038

## 2020-08-04 ENCOUNTER — HOSPITAL ENCOUNTER (OUTPATIENT)
Dept: GENERAL RADIOLOGY | Age: 70
Discharge: HOME | End: 2020-08-04
Payer: MEDICARE

## 2020-08-04 VITALS
OXYGEN SATURATION: 93 % | RESPIRATION RATE: 15 BRPM | HEART RATE: 67 BPM | SYSTOLIC BLOOD PRESSURE: 129 MMHG | DIASTOLIC BLOOD PRESSURE: 65 MMHG

## 2020-08-04 DIAGNOSIS — Z96.619 INFECTION OF PROSTHETIC SHOULDER JOINT, SUBSEQUENT ENCOUNTER: ICD-10-CM

## 2020-08-04 DIAGNOSIS — T84.59XD INFECTION OF PROSTHETIC SHOULDER JOINT, SUBSEQUENT ENCOUNTER: ICD-10-CM

## 2020-08-04 DIAGNOSIS — A49.8 PROPIONIBACTERIUM INFECTION: Primary | ICD-10-CM

## 2020-08-04 LAB
ERYTHROCYTE [DISTWIDTH] IN BLOOD BY AUTOMATED COUNT: 14.2 % (ref 11.5–14.5)
ERYTHROCYTE [DISTWIDTH] IN BLOOD BY AUTOMATED COUNT: 48.3 FL (ref 35–45)
HCT VFR BLD CALC: 38.2 % (ref 42–52)
HEMOGLOBIN: 11.5 GM/DL (ref 14–18)
MCH RBC QN AUTO: 28 PG (ref 26–33)
MCHC RBC AUTO-ENTMCNC: 30.1 GM/DL (ref 32.2–35.5)
MCV RBC AUTO: 93.2 FL (ref 80–94)
PLATELET # BLD: 300 THOU/MM3 (ref 130–400)
PMV BLD AUTO: 11.8 FL (ref 9.4–12.4)
RBC # BLD: 4.1 MILL/MM3 (ref 4.7–6.1)
WBC # BLD: 7.8 THOU/MM3 (ref 4.8–10.8)

## 2020-08-04 PROCEDURE — 96365 THER/PROPH/DIAG IV INF INIT: CPT

## 2020-08-04 PROCEDURE — 2709999900 HC NON-CHARGEABLE SUPPLY

## 2020-08-04 PROCEDURE — 6360000002 HC RX W HCPCS: Performed by: INTERNAL MEDICINE

## 2020-08-04 PROCEDURE — 2580000003 HC RX 258: Performed by: INTERNAL MEDICINE

## 2020-08-04 RX ORDER — SODIUM CHLORIDE 0.9 % (FLUSH) 0.9 %
5 SYRINGE (ML) INJECTION PRN
Status: CANCELLED | OUTPATIENT
Start: 2020-08-05

## 2020-08-04 RX ORDER — SODIUM CHLORIDE 0.9 % (FLUSH) 0.9 %
10 SYRINGE (ML) INJECTION PRN
Status: CANCELLED | OUTPATIENT
Start: 2020-08-05

## 2020-08-04 RX ORDER — HEPARIN SODIUM (PORCINE) LOCK FLUSH IV SOLN 100 UNIT/ML 100 UNIT/ML
500 SOLUTION INTRAVENOUS PRN
Status: CANCELLED | OUTPATIENT
Start: 2020-08-05

## 2020-08-04 RX ADMIN — CEFTRIAXONE 2 G: 2 INJECTION, POWDER, FOR SOLUTION INTRAMUSCULAR; INTRAVENOUS at 12:50

## 2020-08-04 NOTE — PROGRESS NOTES
Respirations regular and quiet. Nonlabored. Alert and oriented times 3. No nausea or vomiting. Skin pink, warm and dry. Calm and cooperative. Denies any pain. Pt released ambulatory in satisfactory condition per self.

## 2020-08-04 NOTE — PROGRESS NOTES
___m_ Safety:       (Environmental)   [place] to environment   Ensure ID band is correct and in place/ allergy band as needed   Assess for fall risk   Initiate fall precautions as applicable (fall band, side rails, etc.)   Call light within reach   Bed in low position/ wheels locked    __m_ Pain:        Assess pain level and characteristics   Administer analgesics as ordered   Assess effectiveness of pain management and report to MD as needed    __m__ Knowledge Deficit:   Assess baseline knowledge   Provide teaching at level of understanding   Provide teaching via preferred learning method   Evaluate teaching effectiveness    __m__ Hemodynamic/Respiratory Status:       (Pre and Post Procedure Monitoring)   Assess/Monitor vital signs and LOC   Assess Baseline SpO2 prior to any sedation   Obtain weight/height   Assess vital signs/ LOC until patient meets discharge criteria   Monitor procedure site and notify MD of any issues    __m__ Infection-Risk of Central Venous Catheter:   Monitor for infection signs and symptoms (catheter site redness, temperature elevation, etc)   Assess for infection risks   Educate regarding infection prevention   Manage central venous catheter (flushes/ dressing changes per protocol)

## 2020-08-05 ENCOUNTER — HOSPITAL ENCOUNTER (OUTPATIENT)
Dept: GENERAL RADIOLOGY | Age: 70
Discharge: HOME | End: 2020-08-05
Payer: MEDICARE

## 2020-08-05 VITALS
DIASTOLIC BLOOD PRESSURE: 60 MMHG | HEART RATE: 65 BPM | OXYGEN SATURATION: 95 % | TEMPERATURE: 98 F | RESPIRATION RATE: 18 BRPM | SYSTOLIC BLOOD PRESSURE: 131 MMHG

## 2020-08-05 DIAGNOSIS — T84.59XD INFECTION OF PROSTHETIC SHOULDER JOINT, SUBSEQUENT ENCOUNTER: ICD-10-CM

## 2020-08-05 DIAGNOSIS — Z96.619 INFECTION OF PROSTHETIC SHOULDER JOINT, SUBSEQUENT ENCOUNTER: ICD-10-CM

## 2020-08-05 DIAGNOSIS — A49.8 PROPIONIBACTERIUM INFECTION: Primary | ICD-10-CM

## 2020-08-05 PROCEDURE — 6360000002 HC RX W HCPCS: Performed by: INTERNAL MEDICINE

## 2020-08-05 PROCEDURE — 2580000003 HC RX 258: Performed by: INTERNAL MEDICINE

## 2020-08-05 PROCEDURE — 2709999900 HC NON-CHARGEABLE SUPPLY

## 2020-08-05 PROCEDURE — 96365 THER/PROPH/DIAG IV INF INIT: CPT

## 2020-08-05 RX ORDER — HEPARIN SODIUM (PORCINE) LOCK FLUSH IV SOLN 100 UNIT/ML 100 UNIT/ML
500 SOLUTION INTRAVENOUS PRN
Status: CANCELLED | OUTPATIENT
Start: 2020-08-06

## 2020-08-05 RX ORDER — SODIUM CHLORIDE 0.9 % (FLUSH) 0.9 %
5 SYRINGE (ML) INJECTION PRN
Status: CANCELLED | OUTPATIENT
Start: 2020-08-06

## 2020-08-05 RX ORDER — SODIUM CHLORIDE 0.9 % (FLUSH) 0.9 %
10 SYRINGE (ML) INJECTION PRN
Status: CANCELLED | OUTPATIENT
Start: 2020-08-06

## 2020-08-05 RX ADMIN — CEFTRIAXONE 2 G: 2 INJECTION, POWDER, FOR SOLUTION INTRAMUSCULAR; INTRAVENOUS at 11:57

## 2020-08-05 NOTE — ED NOTES
IV rocephin infused and discontinued, Pulse regular. Extremities warm. Respirations regular and quiet. Mucous membranes pink & moist. Alert and oriented times 3. No nausea or vomiting. Range of motion within patient's limits. Skin pink, warm and dry. Calm and cooperative.      Howie Dwyer RN  08/05/20 2975

## 2020-08-05 NOTE — ED NOTES
Pt. ambulaatory to exam 3 for oupt. IV infusion. Pulse regular. Extremities warm. Respirations regular and quiet. Mucous membranes pink & moist. Alert and oriented times 3. No nausea or vomiting. Range of motion within patient's limits. Skin pink, warm and dry. Calm and cooperative. Pt. Has splint to rt.  Arm._x___ Safety:       (Environmental)   [place] to environment   Ensure ID band is correct and in place/ allergy band as needed   Assess for fall risk   Initiate fall precautions as applicable (fall band, side rails, etc.)   Call light within reach   Bed in low position/ wheels locked    __x__ Pain:        Assess pain level and characteristics   Administer analgesics as ordered   Assess effectiveness of pain management and report to MD as needed    __x__ Knowledge Deficit:   Assess baseline knowledge   Provide teaching at level of understanding   Provide teaching via preferred learning method   Evaluate teaching effectiveness    __x__ Hemodynamic/Respiratory Status:       (Pre and Post Procedure Monitoring)   Assess/Monitor vital signs and LOC   Assess Baseline SpO2 prior to any sedation   Obtain weight/height   Assess vital signs/ LOC until patient meets discharge criteria   Monitor procedure site and notify MD of any issues    __x__ Infection-Risk of Central Venous Catheter:   Monitor for infection signs and symptoms (catheter site redness, temperature elevation, etc)   Assess for infection risks   Educate regarding infection prevention   Manage central venous catheter (flushes/ dressing changes per protocol)                   Radha Austin RN  08/05/20 [address] [facility] Therese Morris RN  08/05/20 6743

## 2020-08-06 ENCOUNTER — HOSPITAL ENCOUNTER (OUTPATIENT)
Dept: GENERAL RADIOLOGY | Age: 70
Discharge: HOME | End: 2020-08-06
Payer: MEDICARE

## 2020-08-06 VITALS
DIASTOLIC BLOOD PRESSURE: 63 MMHG | OXYGEN SATURATION: 95 % | TEMPERATURE: 96.9 F | SYSTOLIC BLOOD PRESSURE: 135 MMHG | HEART RATE: 65 BPM | RESPIRATION RATE: 18 BRPM

## 2020-08-06 DIAGNOSIS — T84.59XD INFECTION OF PROSTHETIC SHOULDER JOINT, SUBSEQUENT ENCOUNTER: ICD-10-CM

## 2020-08-06 DIAGNOSIS — A49.8 PROPIONIBACTERIUM INFECTION: Primary | ICD-10-CM

## 2020-08-06 DIAGNOSIS — Z96.619 INFECTION OF PROSTHETIC SHOULDER JOINT, SUBSEQUENT ENCOUNTER: ICD-10-CM

## 2020-08-06 PROCEDURE — 96365 THER/PROPH/DIAG IV INF INIT: CPT

## 2020-08-06 PROCEDURE — 2709999900 HC NON-CHARGEABLE SUPPLY

## 2020-08-06 PROCEDURE — 6360000002 HC RX W HCPCS: Performed by: INTERNAL MEDICINE

## 2020-08-06 PROCEDURE — 2580000003 HC RX 258: Performed by: INTERNAL MEDICINE

## 2020-08-06 RX ORDER — HEPARIN SODIUM (PORCINE) LOCK FLUSH IV SOLN 100 UNIT/ML 100 UNIT/ML
500 SOLUTION INTRAVENOUS PRN
Status: CANCELLED | OUTPATIENT
Start: 2020-08-07

## 2020-08-06 RX ORDER — SODIUM CHLORIDE 0.9 % (FLUSH) 0.9 %
5 SYRINGE (ML) INJECTION PRN
Status: CANCELLED | OUTPATIENT
Start: 2020-08-07

## 2020-08-06 RX ORDER — SODIUM CHLORIDE 0.9 % (FLUSH) 0.9 %
10 SYRINGE (ML) INJECTION PRN
Status: CANCELLED | OUTPATIENT
Start: 2020-08-07

## 2020-08-06 RX ADMIN — CEFTRIAXONE SODIUM 2 G: 2 INJECTION, POWDER, FOR SOLUTION INTRAMUSCULAR; INTRAVENOUS at 12:04

## 2020-08-06 NOTE — ED NOTES
IV rocephin infused and discontinued, picc line flushed with 10 ml. 0.9 NS. Pulse regular. Extremities warm. Respirations regular and quiet. Mucous membranes pink & moist. Alert and oriented times 3. No nausea or vomiting. Range of motion within patient's limits. Skin pink, warm and dry. Calm and cooperative.      Ruth Layton RN  08/06/20 0182

## 2020-08-06 NOTE — ED NOTES
Pt. Ambulatory to exam 3 for outpt. IV rocephin infusion.   __x__ Safety:       (Environmental)   [place] to environment   Ensure ID band is correct and in place/ allergy band as needed   Assess for fall risk   Initiate fall precautions as applicable (fall band, side rails, etc.)   Call light within reach   Bed in low position/ wheels locked    __x__ Pain:        Assess pain level and characteristics   Administer analgesics as ordered   Assess effectiveness of pain management and report to MD as needed    __x__ Knowledge Deficit:   Assess baseline knowledge   Provide teaching at level of understanding   Provide teaching via preferred learning method   Evaluate teaching effectiveness    ___x_ Hemodynamic/Respiratory Status:       (Pre and Post Procedure Monitoring)   Assess/Monitor vital signs and LOC   Assess Baseline SpO2 prior to any sedation   Obtain weight/height   Assess vital signs/ LOC until patient meets discharge criteria   Monitor procedure site and notify MD of any issues    ___x_ Infection-Risk of Central Venous Catheter:   Monitor for infection signs and symptoms (catheter site redness, temperature elevation, etc)   Assess for infection risks   Educate regarding infection prevention   Manage central venous catheter (flushes/ dressing changes per protocol)                   Jose Stratton RN  08/06/20 9996

## 2020-08-07 ENCOUNTER — HOSPITAL ENCOUNTER (OUTPATIENT)
Dept: GENERAL RADIOLOGY | Age: 70
Discharge: HOME | End: 2020-08-07
Payer: MEDICARE

## 2020-08-07 VITALS
OXYGEN SATURATION: 94 % | RESPIRATION RATE: 18 BRPM | DIASTOLIC BLOOD PRESSURE: 65 MMHG | SYSTOLIC BLOOD PRESSURE: 131 MMHG | HEART RATE: 68 BPM

## 2020-08-07 DIAGNOSIS — T84.59XD INFECTION OF PROSTHETIC SHOULDER JOINT, SUBSEQUENT ENCOUNTER: ICD-10-CM

## 2020-08-07 DIAGNOSIS — Z96.619 INFECTION OF PROSTHETIC SHOULDER JOINT, SUBSEQUENT ENCOUNTER: ICD-10-CM

## 2020-08-07 DIAGNOSIS — A49.8 PROPIONIBACTERIUM INFECTION: Primary | ICD-10-CM

## 2020-08-07 PROCEDURE — 96365 THER/PROPH/DIAG IV INF INIT: CPT

## 2020-08-07 PROCEDURE — 2709999900 HC NON-CHARGEABLE SUPPLY

## 2020-08-07 PROCEDURE — 6360000002 HC RX W HCPCS: Performed by: INTERNAL MEDICINE

## 2020-08-07 PROCEDURE — 2580000003 HC RX 258: Performed by: INTERNAL MEDICINE

## 2020-08-07 PROCEDURE — 99202 OFFICE O/P NEW SF 15 MIN: CPT

## 2020-08-07 RX ORDER — HEPARIN SODIUM (PORCINE) LOCK FLUSH IV SOLN 100 UNIT/ML 100 UNIT/ML
500 SOLUTION INTRAVENOUS PRN
Status: CANCELLED | OUTPATIENT
Start: 2020-08-08

## 2020-08-07 RX ORDER — SODIUM CHLORIDE 0.9 % (FLUSH) 0.9 %
5 SYRINGE (ML) INJECTION PRN
Status: CANCELLED | OUTPATIENT
Start: 2020-08-08

## 2020-08-07 RX ORDER — SODIUM CHLORIDE 0.9 % (FLUSH) 0.9 %
10 SYRINGE (ML) INJECTION PRN
Status: CANCELLED | OUTPATIENT
Start: 2020-08-08

## 2020-08-07 RX ADMIN — CEFTRIAXONE 2 G: 2 INJECTION, POWDER, FOR SOLUTION INTRAMUSCULAR; INTRAVENOUS at 11:59

## 2020-08-07 NOTE — PLAN OF CARE
Changed dressing to the PICC. Transparent dressing from IV start kit used to adhere PICC due to patient's problems with tape.

## 2020-08-08 ENCOUNTER — HOSPITAL ENCOUNTER (OUTPATIENT)
Dept: GENERAL RADIOLOGY | Age: 70
Discharge: HOME | End: 2020-08-08
Payer: MEDICARE

## 2020-08-08 VITALS
OXYGEN SATURATION: 95 % | DIASTOLIC BLOOD PRESSURE: 60 MMHG | TEMPERATURE: 97.3 F | HEART RATE: 62 BPM | SYSTOLIC BLOOD PRESSURE: 135 MMHG | RESPIRATION RATE: 16 BRPM

## 2020-08-08 DIAGNOSIS — Z96.619 INFECTION OF PROSTHETIC SHOULDER JOINT, SUBSEQUENT ENCOUNTER: ICD-10-CM

## 2020-08-08 DIAGNOSIS — A49.8 PROPIONIBACTERIUM INFECTION: Primary | ICD-10-CM

## 2020-08-08 DIAGNOSIS — T84.59XD INFECTION OF PROSTHETIC SHOULDER JOINT, SUBSEQUENT ENCOUNTER: ICD-10-CM

## 2020-08-08 PROCEDURE — 96365 THER/PROPH/DIAG IV INF INIT: CPT

## 2020-08-08 PROCEDURE — 6360000002 HC RX W HCPCS: Performed by: INTERNAL MEDICINE

## 2020-08-08 PROCEDURE — 2709999900 HC NON-CHARGEABLE SUPPLY

## 2020-08-08 PROCEDURE — 2580000003 HC RX 258: Performed by: INTERNAL MEDICINE

## 2020-08-08 RX ORDER — HEPARIN SODIUM (PORCINE) LOCK FLUSH IV SOLN 100 UNIT/ML 100 UNIT/ML
500 SOLUTION INTRAVENOUS PRN
Status: CANCELLED | OUTPATIENT
Start: 2020-08-09

## 2020-08-08 RX ORDER — SODIUM CHLORIDE 0.9 % (FLUSH) 0.9 %
5 SYRINGE (ML) INJECTION PRN
Status: CANCELLED | OUTPATIENT
Start: 2020-08-09

## 2020-08-08 RX ORDER — SODIUM CHLORIDE 0.9 % (FLUSH) 0.9 %
10 SYRINGE (ML) INJECTION PRN
Status: CANCELLED | OUTPATIENT
Start: 2020-08-09

## 2020-08-08 RX ADMIN — CEFTRIAXONE 2 G: 2 INJECTION, POWDER, FOR SOLUTION INTRAMUSCULAR; INTRAVENOUS at 11:53

## 2020-08-08 NOTE — ED NOTES
___x_ Safety:       (Environmental)   [place] to environment   Ensure ID band is correct and in place/ allergy band as needed   Assess for fall risk   Initiate fall precautions as applicable (fall band, side rails, etc.)   Call light within reach   Bed in low position/ wheels locked    _x___ Pain:        Assess pain level and characteristics   Administer analgesics as ordered   Assess effectiveness of pain management and report to MD as needed    __x_ Knowledge Deficit:   Assess baseline knowledge   Provide teaching at level of understanding   Provide teaching via preferred learning method   Evaluate teaching effectiveness    __x__ Hemodynamic/Respiratory Status:       (Pre and Post Procedure Monitoring)   Assess/Monitor vital signs and LOC   Assess Baseline SpO2 prior to any sedation   Obtain weight/height   Assess vital signs/ LOC until patient meets discharge criteria   Monitor procedure site and notify MD of any issues    __x__ Infection-Risk of Central Venous Catheter:   Monitor for infection signs and symptoms (catheter site redness, temperature elevation, etc)   Assess for infection risks   Educate regarding infection prevention   Manage central venous catheter (flushes/ dressing changes per protocol)                   Rosita Donaldson RN  08/08/20 8426

## 2020-08-08 NOTE — ED NOTES
IV rocephin infused and discontinued, Pulse regular. Extremities warm. Respirations regular and quiet. Mucous membranes pink & moist. Alert and oriented times 3. No nausea or vomiting. Range of motion within patient's limits. Skin pink, warm and dry. Calm and cooperative.      Diana Drew RN  08/08/20 3824

## 2020-08-09 ENCOUNTER — HOSPITAL ENCOUNTER (OUTPATIENT)
Dept: GENERAL RADIOLOGY | Age: 70
Discharge: HOME | End: 2020-08-09
Payer: MEDICARE

## 2020-08-09 VITALS
TEMPERATURE: 97 F | HEART RATE: 65 BPM | DIASTOLIC BLOOD PRESSURE: 63 MMHG | SYSTOLIC BLOOD PRESSURE: 135 MMHG | OXYGEN SATURATION: 94 % | RESPIRATION RATE: 16 BRPM

## 2020-08-09 DIAGNOSIS — A49.8 PROPIONIBACTERIUM INFECTION: Primary | ICD-10-CM

## 2020-08-09 DIAGNOSIS — T84.59XD INFECTION OF PROSTHETIC SHOULDER JOINT, SUBSEQUENT ENCOUNTER: ICD-10-CM

## 2020-08-09 DIAGNOSIS — Z96.619 INFECTION OF PROSTHETIC SHOULDER JOINT, SUBSEQUENT ENCOUNTER: ICD-10-CM

## 2020-08-09 PROCEDURE — 2580000003 HC RX 258: Performed by: INTERNAL MEDICINE

## 2020-08-09 PROCEDURE — 96365 THER/PROPH/DIAG IV INF INIT: CPT

## 2020-08-09 PROCEDURE — 2709999900 HC NON-CHARGEABLE SUPPLY

## 2020-08-09 PROCEDURE — 6360000002 HC RX W HCPCS: Performed by: INTERNAL MEDICINE

## 2020-08-09 RX ORDER — HEPARIN SODIUM (PORCINE) LOCK FLUSH IV SOLN 100 UNIT/ML 100 UNIT/ML
500 SOLUTION INTRAVENOUS PRN
Status: CANCELLED | OUTPATIENT
Start: 2020-08-10

## 2020-08-09 RX ORDER — SODIUM CHLORIDE 0.9 % (FLUSH) 0.9 %
10 SYRINGE (ML) INJECTION PRN
Status: CANCELLED | OUTPATIENT
Start: 2020-08-10

## 2020-08-09 RX ORDER — SODIUM CHLORIDE 0.9 % (FLUSH) 0.9 %
5 SYRINGE (ML) INJECTION PRN
Status: CANCELLED | OUTPATIENT
Start: 2020-08-10

## 2020-08-09 RX ADMIN — CEFTRIAXONE SODIUM 2 G: 2 INJECTION, POWDER, FOR SOLUTION INTRAMUSCULAR; INTRAVENOUS at 12:00

## 2020-08-09 NOTE — ED NOTES
IV rocephin infused and discontinued, Pulse regular. Extremities warm. Respirations regular and quiet. Mucous membranes pink & moist. Alert and oriented times 3. No nausea or vomiting. Range of motion within patient's limits. Skin pink, warm and dry. Calm and cooperative.    __x__ Safety:       (Environmental)   [place] to environment   Ensure ID band is correct and in place/ allergy band as needed   Assess for fall risk   Initiate fall precautions as applicable (fall band, side rails, etc.)   Call light within reach   Bed in low position/ wheels locked    __x__ Pain:        Assess pain level and characteristics   Administer analgesics as ordered   Assess effectiveness of pain management and report to MD as needed    _x___ Knowledge Deficit:   Assess baseline knowledge   Provide teaching at level of understanding   Provide teaching via preferred learning method   Evaluate teaching effectiveness    __x__ Hemodynamic/Respiratory Status:       (Pre and Post Procedure Monitoring)   Assess/Monitor vital signs and LOC   Assess Baseline SpO2 prior to any sedation   Obtain weight/height   Assess vital signs/ LOC until patient meets discharge criteria   Monitor procedure site and notify MD of any issues    ___x_ Infection-Risk of Central Venous Catheter:   Monitor for infection signs and symptoms (catheter site redness, temperature elevation, etc)   Assess for infection risks   Educate regarding infection prevention   Manage central venous catheter (flushes/ dressing changes per protocol)                Braulio Eisenmenger, RN  08/09/20 [facility]  08/09/20 1133

## 2020-08-10 ENCOUNTER — HOSPITAL ENCOUNTER (OUTPATIENT)
Age: 70
Discharge: HOME | End: 2020-08-10
Payer: MEDICARE

## 2020-08-10 ENCOUNTER — HOSPITAL ENCOUNTER (OUTPATIENT)
Dept: GENERAL RADIOLOGY | Age: 70
Discharge: HOME | End: 2020-08-10
Payer: MEDICARE

## 2020-08-10 VITALS
HEART RATE: 61 BPM | TEMPERATURE: 97.2 F | BODY MASS INDEX: 39.05 KG/M2 | DIASTOLIC BLOOD PRESSURE: 67 MMHG | HEIGHT: 71 IN | SYSTOLIC BLOOD PRESSURE: 137 MMHG | OXYGEN SATURATION: 97 % | RESPIRATION RATE: 14 BRPM

## 2020-08-10 DIAGNOSIS — T84.59XD INFECTION OF PROSTHETIC SHOULDER JOINT, SUBSEQUENT ENCOUNTER: ICD-10-CM

## 2020-08-10 DIAGNOSIS — A49.8 PROPIONIBACTERIUM INFECTION: Primary | ICD-10-CM

## 2020-08-10 DIAGNOSIS — Z96.619 INFECTION OF PROSTHETIC SHOULDER JOINT, SUBSEQUENT ENCOUNTER: ICD-10-CM

## 2020-08-10 LAB
BASOPHILS # BLD: 1 %
BASOPHILS ABSOLUTE: 0.1 THOU/MM3 (ref 0–0.1)
EOSINOPHIL # BLD: 6.9 %
EOSINOPHILS ABSOLUTE: 0.4 THOU/MM3 (ref 0–0.4)
ERYTHROCYTE [DISTWIDTH] IN BLOOD BY AUTOMATED COUNT: 14.2 % (ref 11.5–14.5)
ERYTHROCYTE [DISTWIDTH] IN BLOOD BY AUTOMATED COUNT: 47.5 FL (ref 35–45)
HCT VFR BLD CALC: 38.5 % (ref 42–52)
HEMOGLOBIN: 12.1 GM/DL (ref 14–18)
IMMATURE GRANS (ABS): 0.02 THOU/MM3 (ref 0–0.07)
IMMATURE GRANULOCYTES: 0.3 %
LYMPHOCYTES # BLD: 23.7 %
LYMPHOCYTES ABSOLUTE: 1.4 THOU/MM3 (ref 1–4.8)
MCH RBC QN AUTO: 28.7 PG (ref 26–33)
MCHC RBC AUTO-ENTMCNC: 31.4 GM/DL (ref 32.2–35.5)
MCV RBC AUTO: 91.4 FL (ref 80–94)
MONOCYTES # BLD: 7.1 %
MONOCYTES ABSOLUTE: 0.4 THOU/MM3 (ref 0.4–1.3)
NUCLEATED RED BLOOD CELLS: 0 /100 WBC
PLATELET # BLD: 279 THOU/MM3 (ref 130–400)
PMV BLD AUTO: 11.3 FL (ref 9.4–12.4)
RBC # BLD: 4.21 MILL/MM3 (ref 4.7–6.1)
SEG NEUTROPHILS: 61 %
SEGMENTED NEUTROPHILS ABSOLUTE COUNT: 3.6 THOU/MM3 (ref 1.8–7.7)
WBC # BLD: 5.9 THOU/MM3 (ref 4.8–10.8)

## 2020-08-10 PROCEDURE — 36415 COLL VENOUS BLD VENIPUNCTURE: CPT

## 2020-08-10 PROCEDURE — 96365 THER/PROPH/DIAG IV INF INIT: CPT

## 2020-08-10 PROCEDURE — 2580000003 HC RX 258: Performed by: INTERNAL MEDICINE

## 2020-08-10 PROCEDURE — 6360000002 HC RX W HCPCS: Performed by: INTERNAL MEDICINE

## 2020-08-10 PROCEDURE — 2709999900 HC NON-CHARGEABLE SUPPLY

## 2020-08-10 PROCEDURE — 80048 BASIC METABOLIC PNL TOTAL CA: CPT

## 2020-08-10 PROCEDURE — 85025 COMPLETE CBC W/AUTO DIFF WBC: CPT

## 2020-08-10 RX ORDER — SODIUM CHLORIDE 0.9 % (FLUSH) 0.9 %
5 SYRINGE (ML) INJECTION PRN
Status: CANCELLED | OUTPATIENT
Start: 2020-08-11

## 2020-08-10 RX ORDER — HEPARIN SODIUM (PORCINE) LOCK FLUSH IV SOLN 100 UNIT/ML 100 UNIT/ML
500 SOLUTION INTRAVENOUS PRN
Status: CANCELLED | OUTPATIENT
Start: 2020-08-11

## 2020-08-10 RX ORDER — SODIUM CHLORIDE 0.9 % (FLUSH) 0.9 %
10 SYRINGE (ML) INJECTION PRN
Status: CANCELLED | OUTPATIENT
Start: 2020-08-11

## 2020-08-10 RX ADMIN — CEFTRIAXONE SODIUM 2 G: 2 INJECTION, POWDER, FOR SOLUTION INTRAMUSCULAR; INTRAVENOUS at 12:05

## 2020-08-10 ASSESSMENT — PAIN DESCRIPTION - LOCATION
LOCATION: SHOULDER
LOCATION: SHOULDER

## 2020-08-10 ASSESSMENT — PAIN DESCRIPTION - ORIENTATION
ORIENTATION: RIGHT
ORIENTATION: RIGHT

## 2020-08-10 ASSESSMENT — PAIN SCALES - GENERAL
PAINLEVEL_OUTOF10: 2
PAINLEVEL_OUTOF10: 2

## 2020-08-10 NOTE — PROGRESS NOTES
Pt here for outpatient nursing infusion. Respirations regular and easy. Gait steady. Pt alert and oriented X's 3. Taken to exam 7 for rocephin infusion. Attempted to draw blood from PICC for labs, but draws sluggish and unable to draw quantity sufficient for labs. Lab notified of weekly blood draw.

## 2020-08-10 NOTE — PROGRESS NOTES
AVS reviewed. Pt declines copy.      _m___ Safety:       (Environmental)   [place] to environment   Ensure ID band is correct and in place/ allergy band as needed   Assess for fall risk   Initiate fall precautions as applicable (fall band, side rails, etc.)   Call light within reach   Bed in low position/ wheels locked    __m__ Pain:        Assess pain level and characteristics   Administer analgesics as ordered   Assess effectiveness of pain management and report to MD as needed    _m___ Knowledge Deficit:   Assess baseline knowledge   Provide teaching at level of understanding   Provide teaching via preferred learning method   Evaluate teaching effectiveness    _m___ Hemodynamic/Respiratory Status:       (Pre and Post Procedure Monitoring)   Assess/Monitor vital signs and LOC   Assess Baseline SpO2 prior to any sedation   Obtain weight/height   Assess vital signs/ LOC until patient meets discharge criteria   Monitor procedure site and notify MD of any issues    __m__ Infection-Risk of Central Venous Catheter:   Monitor for infection signs and symptoms (catheter site redness, temperature elevation, etc)   Assess for infection risks   Educate regarding infection prevention   Manage central venous catheter (flushes/ dressing changes per protocol)

## 2020-08-10 NOTE — PROGRESS NOTES
No adverse reaction noted from infusion. Respirations regular and easy. Denies pain. Released in satisfactory condition. Ambulates out w/ cane. Gait steady.

## 2020-08-11 ENCOUNTER — HOSPITAL ENCOUNTER (OUTPATIENT)
Dept: GENERAL RADIOLOGY | Age: 70
End: 2020-08-11

## 2020-08-11 ENCOUNTER — HOSPITAL ENCOUNTER (OUTPATIENT)
Dept: GENERAL RADIOLOGY | Age: 70
Discharge: HOME | End: 2020-08-11
Payer: MEDICARE

## 2020-08-11 VITALS
SYSTOLIC BLOOD PRESSURE: 133 MMHG | OXYGEN SATURATION: 95 % | TEMPERATURE: 97.7 F | RESPIRATION RATE: 16 BRPM | HEART RATE: 66 BPM | DIASTOLIC BLOOD PRESSURE: 65 MMHG

## 2020-08-11 DIAGNOSIS — A49.8 PROPIONIBACTERIUM INFECTION: Primary | ICD-10-CM

## 2020-08-11 DIAGNOSIS — T84.59XD INFECTION OF PROSTHETIC SHOULDER JOINT, SUBSEQUENT ENCOUNTER: ICD-10-CM

## 2020-08-11 DIAGNOSIS — Z96.619 INFECTION OF PROSTHETIC SHOULDER JOINT, SUBSEQUENT ENCOUNTER: ICD-10-CM

## 2020-08-11 LAB
ANION GAP SERPL CALCULATED.3IONS-SCNC: 12 MEQ/L (ref 8–16)
BUN BLDV-MCNC: 14 MG/DL (ref 7–22)
CALCIUM SERPL-MCNC: 9 MG/DL (ref 8.5–10.5)
CHLORIDE BLD-SCNC: 106 MEQ/L (ref 98–111)
CO2: 24 MEQ/L (ref 23–33)
CREAT SERPL-MCNC: 1 MG/DL (ref 0.4–1.2)
GFR SERPL CREATININE-BSD FRML MDRD: 73 ML/MIN/1.73M2
GLUCOSE BLD-MCNC: 125 MG/DL (ref 70–108)
POTASSIUM SERPL-SCNC: 4 MEQ/L (ref 3.5–5.2)
SODIUM BLD-SCNC: 142 MEQ/L (ref 135–145)

## 2020-08-11 PROCEDURE — 6360000002 HC RX W HCPCS: Performed by: INTERNAL MEDICINE

## 2020-08-11 PROCEDURE — 2580000003 HC RX 258: Performed by: INTERNAL MEDICINE

## 2020-08-11 PROCEDURE — 2709999900 HC NON-CHARGEABLE SUPPLY

## 2020-08-11 PROCEDURE — 96365 THER/PROPH/DIAG IV INF INIT: CPT

## 2020-08-11 RX ORDER — HEPARIN SODIUM (PORCINE) LOCK FLUSH IV SOLN 100 UNIT/ML 100 UNIT/ML
500 SOLUTION INTRAVENOUS PRN
Status: CANCELLED | OUTPATIENT
Start: 2020-08-12

## 2020-08-11 RX ORDER — SODIUM CHLORIDE 0.9 % (FLUSH) 0.9 %
10 SYRINGE (ML) INJECTION PRN
Status: CANCELLED | OUTPATIENT
Start: 2020-08-12

## 2020-08-11 RX ORDER — SODIUM CHLORIDE 0.9 % (FLUSH) 0.9 %
5 SYRINGE (ML) INJECTION PRN
Status: CANCELLED | OUTPATIENT
Start: 2020-08-12

## 2020-08-11 RX ADMIN — CEFTRIAXONE 2 G: 2 INJECTION, POWDER, FOR SOLUTION INTRAMUSCULAR; INTRAVENOUS at 13:23

## 2020-08-11 NOTE — PROGRESS NOTES
Pt resting, resp even and unlabored, skin pink, warm and dry. Rocephin infused, PICC line flushed with 10ml's of saline without difficulty and new swab cap applied. Pt released ambulatory with wife in stable condition.

## 2020-08-11 NOTE — PROGRESS NOTES
Pt presents for rocephin infusion. Pt alert and oriented times 3, resp even and unlabored, skin pink, warm and dry. PICC line intact in l upper arm, PICC line flushed with 10ml's of saline without difficulty, site soft and without edema or redness.

## 2020-08-12 ENCOUNTER — HOSPITAL ENCOUNTER (OUTPATIENT)
Dept: GENERAL RADIOLOGY | Age: 70
Discharge: HOME | End: 2020-08-12
Payer: MEDICARE

## 2020-08-12 VITALS
DIASTOLIC BLOOD PRESSURE: 65 MMHG | HEART RATE: 58 BPM | OXYGEN SATURATION: 96 % | TEMPERATURE: 96.7 F | SYSTOLIC BLOOD PRESSURE: 131 MMHG | RESPIRATION RATE: 16 BRPM

## 2020-08-12 DIAGNOSIS — Z96.619 INFECTION OF PROSTHETIC SHOULDER JOINT, SUBSEQUENT ENCOUNTER: ICD-10-CM

## 2020-08-12 DIAGNOSIS — T84.59XD INFECTION OF PROSTHETIC SHOULDER JOINT, SUBSEQUENT ENCOUNTER: ICD-10-CM

## 2020-08-12 DIAGNOSIS — A49.8 PROPIONIBACTERIUM INFECTION: Primary | ICD-10-CM

## 2020-08-12 PROCEDURE — 6360000002 HC RX W HCPCS: Performed by: INTERNAL MEDICINE

## 2020-08-12 PROCEDURE — 96365 THER/PROPH/DIAG IV INF INIT: CPT

## 2020-08-12 PROCEDURE — 2580000003 HC RX 258: Performed by: INTERNAL MEDICINE

## 2020-08-12 PROCEDURE — 99213 OFFICE O/P EST LOW 20 MIN: CPT

## 2020-08-12 PROCEDURE — 2709999900 HC NON-CHARGEABLE SUPPLY

## 2020-08-12 RX ORDER — SODIUM CHLORIDE 0.9 % (FLUSH) 0.9 %
5 SYRINGE (ML) INJECTION PRN
Status: CANCELLED | OUTPATIENT
Start: 2020-08-13

## 2020-08-12 RX ORDER — SODIUM CHLORIDE 0.9 % (FLUSH) 0.9 %
10 SYRINGE (ML) INJECTION PRN
Status: CANCELLED | OUTPATIENT
Start: 2020-08-13

## 2020-08-12 RX ORDER — HEPARIN SODIUM (PORCINE) LOCK FLUSH IV SOLN 100 UNIT/ML 100 UNIT/ML
500 SOLUTION INTRAVENOUS PRN
Status: CANCELLED | OUTPATIENT
Start: 2020-08-13

## 2020-08-12 RX ADMIN — CEFTRIAXONE SODIUM 2 G: 2 INJECTION, POWDER, FOR SOLUTION INTRAMUSCULAR; INTRAVENOUS at 12:01

## 2020-08-12 NOTE — ED NOTES
IV antibiotic infused and discontinued. Pulse regular. Extremities warm. Respirations regular and quiet. Mucous membranes pink & moist. Alert and oriented times 3. No nausea or vomiting. Range of motion within patient's limits. Skin pink, warm and dry. Calm and cooperative.      Maddie Copeland RN  08/12/20 8251

## 2020-08-12 NOTE — ED NOTES
Picc dressing change done, pt.  Tolerated well. __x__ Safety:       (Environmental)   [place] to environment   Ensure ID band is correct and in place/ allergy band as needed   Assess for fall risk   Initiate fall precautions as applicable (fall band, side rails, etc.)   Call light within reach   Bed in low position/ wheels locked    __x__ Pain:        Assess pain level and characteristics   Administer analgesics as ordered   Assess effectiveness of pain management and report to MD as needed    ___x_ Knowledge Deficit:   Assess baseline knowledge   Provide teaching at level of understanding   Provide teaching via preferred learning method   Evaluate teaching effectiveness    __x__ Hemodynamic/Respiratory Status:       (Pre and Post Procedure Monitoring)   Assess/Monitor vital signs and LOC   Assess Baseline SpO2 prior to any sedation   Obtain weight/height   Assess vital signs/ LOC until patient meets discharge criteria   Monitor procedure site and notify MD of any issues    __x__ Infection-Risk of Central Venous Catheter:   Monitor for infection signs and symptoms (catheter site redness, temperature elevation, etc)   Assess for infection risks   Educate regarding infection prevention   Manage central venous catheter (flushes/ dressing changes per protocol)                   Radha Austin, RN  08/12/20 2779

## 2020-08-12 NOTE — ED NOTES
Pt. Ambulatory to exam 3 for outpt. IV antibiotic infusion.       Braulio Eisenmenger, RN  08/12/20 2199

## 2020-08-13 ENCOUNTER — HOSPITAL ENCOUNTER (OUTPATIENT)
Dept: GENERAL RADIOLOGY | Age: 70
Discharge: HOME | End: 2020-08-13
Payer: MEDICARE

## 2020-08-13 VITALS
HEART RATE: 68 BPM | SYSTOLIC BLOOD PRESSURE: 135 MMHG | DIASTOLIC BLOOD PRESSURE: 67 MMHG | RESPIRATION RATE: 20 BRPM | TEMPERATURE: 97.9 F | OXYGEN SATURATION: 96 %

## 2020-08-13 DIAGNOSIS — T84.59XD INFECTION OF PROSTHETIC SHOULDER JOINT, SUBSEQUENT ENCOUNTER: ICD-10-CM

## 2020-08-13 DIAGNOSIS — Z96.619 INFECTION OF PROSTHETIC SHOULDER JOINT, SUBSEQUENT ENCOUNTER: ICD-10-CM

## 2020-08-13 DIAGNOSIS — A49.8 PROPIONIBACTERIUM INFECTION: Primary | ICD-10-CM

## 2020-08-13 PROCEDURE — 96365 THER/PROPH/DIAG IV INF INIT: CPT

## 2020-08-13 PROCEDURE — 6360000002 HC RX W HCPCS: Performed by: INTERNAL MEDICINE

## 2020-08-13 PROCEDURE — 2709999900 HC NON-CHARGEABLE SUPPLY

## 2020-08-13 PROCEDURE — 2580000003 HC RX 258: Performed by: INTERNAL MEDICINE

## 2020-08-13 RX ORDER — HEPARIN SODIUM (PORCINE) LOCK FLUSH IV SOLN 100 UNIT/ML 100 UNIT/ML
500 SOLUTION INTRAVENOUS PRN
Status: DISCONTINUED | OUTPATIENT
Start: 2020-08-13 | End: 2020-08-14 | Stop reason: HOSPADM

## 2020-08-13 RX ORDER — SODIUM CHLORIDE 0.9 % (FLUSH) 0.9 %
5 SYRINGE (ML) INJECTION PRN
Status: DISCONTINUED | OUTPATIENT
Start: 2020-08-13 | End: 2020-08-14 | Stop reason: HOSPADM

## 2020-08-13 RX ORDER — HEPARIN SODIUM (PORCINE) LOCK FLUSH IV SOLN 100 UNIT/ML 100 UNIT/ML
500 SOLUTION INTRAVENOUS PRN
Status: CANCELLED | OUTPATIENT
Start: 2020-08-14

## 2020-08-13 RX ORDER — SODIUM CHLORIDE 0.9 % (FLUSH) 0.9 %
5 SYRINGE (ML) INJECTION PRN
Status: CANCELLED | OUTPATIENT
Start: 2020-08-14

## 2020-08-13 RX ORDER — SODIUM CHLORIDE 0.9 % (FLUSH) 0.9 %
10 SYRINGE (ML) INJECTION PRN
Status: CANCELLED | OUTPATIENT
Start: 2020-08-14

## 2020-08-13 RX ORDER — SODIUM CHLORIDE 0.9 % (FLUSH) 0.9 %
10 SYRINGE (ML) INJECTION PRN
Status: DISCONTINUED | OUTPATIENT
Start: 2020-08-13 | End: 2020-08-14 | Stop reason: HOSPADM

## 2020-08-13 RX ADMIN — CEFTRIAXONE 2 G: 2 INJECTION, POWDER, FOR SOLUTION INTRAMUSCULAR; INTRAVENOUS at 12:03

## 2020-08-13 NOTE — PROGRESS NOTES
Patient is alert and cooperative. Skin warm and dry. Color normal for ethnicity. Presents for IV antibiotic infusion.

## 2020-08-14 ENCOUNTER — HOSPITAL ENCOUNTER (OUTPATIENT)
Dept: GENERAL RADIOLOGY | Age: 70
Discharge: HOME | End: 2020-08-14
Payer: MEDICARE

## 2020-08-14 VITALS
OXYGEN SATURATION: 95 % | TEMPERATURE: 96.7 F | HEART RATE: 60 BPM | DIASTOLIC BLOOD PRESSURE: 51 MMHG | SYSTOLIC BLOOD PRESSURE: 130 MMHG | RESPIRATION RATE: 18 BRPM

## 2020-08-14 DIAGNOSIS — T84.59XD INFECTION OF PROSTHETIC SHOULDER JOINT, SUBSEQUENT ENCOUNTER: ICD-10-CM

## 2020-08-14 DIAGNOSIS — Z96.619 INFECTION OF PROSTHETIC SHOULDER JOINT, SUBSEQUENT ENCOUNTER: ICD-10-CM

## 2020-08-14 DIAGNOSIS — A49.8 PROPIONIBACTERIUM INFECTION: Primary | ICD-10-CM

## 2020-08-14 PROCEDURE — 96365 THER/PROPH/DIAG IV INF INIT: CPT

## 2020-08-14 PROCEDURE — 2580000003 HC RX 258: Performed by: INTERNAL MEDICINE

## 2020-08-14 PROCEDURE — 6360000002 HC RX W HCPCS: Performed by: INTERNAL MEDICINE

## 2020-08-14 PROCEDURE — 2709999900 HC NON-CHARGEABLE SUPPLY

## 2020-08-14 RX ADMIN — CEFTRIAXONE SODIUM 2 G: 2 INJECTION, POWDER, FOR SOLUTION INTRAMUSCULAR; INTRAVENOUS at 12:00

## 2020-08-14 NOTE — ED NOTES
__x__ Safety:       (Environmental)   [place] to environment   Ensure ID band is correct and in place/ allergy band as needed   Assess for fall risk   Initiate fall precautions as applicable (fall band, side rails, etc.)   Call light within reach   Bed in low position/ wheels locked    __x__ Pain:        Assess pain level and characteristics   Administer analgesics as ordered   Assess effectiveness of pain management and report to MD as needed    __x__ Knowledge Deficit:   Assess baseline knowledge   Provide teaching at level of understanding   Provide teaching via preferred learning method   Evaluate teaching effectiveness    __x__ Hemodynamic/Respiratory Status:       (Pre and Post Procedure Monitoring)   Assess/Monitor vital signs and LOC   Assess Baseline SpO2 prior to any sedation   Obtain weight/height   Assess vital signs/ LOC until patient meets discharge criteria   Monitor procedure site and notify MD of any issues    _x___ Infection-Risk of Central Venous Catheter:   Monitor for infection signs and symptoms (catheter site redness, temperature elevation, etc)   Assess for infection risks   Educate regarding infection prevention   Manage central venous catheter (flushes/ dressing changes per protocol)                   Terrie Small RN  08/14/20 8354

## 2020-08-14 NOTE — ED NOTES
Iv rocephin infused and discontinued, picc line flushed with 10 ml. 0.9 NS. Pulse regular. Extremities warm. Respirations regular and quiet. Mucous membranes pink & moist. Alert and oriented times 3. No nausea or vomiting. Range of motion within patient's limits. Skin pink, warm and dry. Calm and cooperative.      Radha Austin RN  08/14/20 7081

## 2020-08-15 ENCOUNTER — HOSPITAL ENCOUNTER (OUTPATIENT)
Dept: GENERAL RADIOLOGY | Age: 70
Discharge: HOME | End: 2020-08-15
Payer: MEDICARE

## 2020-08-15 VITALS
SYSTOLIC BLOOD PRESSURE: 140 MMHG | RESPIRATION RATE: 18 BRPM | HEART RATE: 57 BPM | DIASTOLIC BLOOD PRESSURE: 66 MMHG | TEMPERATURE: 97.6 F

## 2020-08-15 DIAGNOSIS — T84.59XD INFECTION OF PROSTHETIC SHOULDER JOINT, SUBSEQUENT ENCOUNTER: ICD-10-CM

## 2020-08-15 DIAGNOSIS — Z96.619 INFECTION OF PROSTHETIC SHOULDER JOINT, SUBSEQUENT ENCOUNTER: ICD-10-CM

## 2020-08-15 DIAGNOSIS — A49.8 PROPIONIBACTERIUM INFECTION: Primary | ICD-10-CM

## 2020-08-15 PROCEDURE — 2580000003 HC RX 258: Performed by: INTERNAL MEDICINE

## 2020-08-15 PROCEDURE — 96365 THER/PROPH/DIAG IV INF INIT: CPT

## 2020-08-15 PROCEDURE — 6360000002 HC RX W HCPCS: Performed by: INTERNAL MEDICINE

## 2020-08-15 PROCEDURE — 2709999900 HC NON-CHARGEABLE SUPPLY

## 2020-08-15 RX ADMIN — CEFTRIAXONE SODIUM 2 G: 2 INJECTION, POWDER, FOR SOLUTION INTRAMUSCULAR; INTRAVENOUS at 11:58

## 2020-08-15 NOTE — PROGRESS NOTES
Presents with need for IV antibiotics. Denies complaints or concerns. Patient is alert and cooperative. Skin warm and dry. Color normal for ethnicity.

## 2020-08-15 NOTE — PROGRESS NOTES
Discharge instructions reviewed with patient and questions answered. Skin warm and dry, color normal for ethnicity. Remains alert and cooperative. Denies further questions or concerns at this time. Refill request recieved via interface from pharmacy.    Last office visit 4/1/20; pending appt None    Script set to E-scribe pending MD approval.

## 2020-08-16 ENCOUNTER — HOSPITAL ENCOUNTER (OUTPATIENT)
Dept: GENERAL RADIOLOGY | Age: 70
Discharge: HOME | End: 2020-08-16
Payer: MEDICARE

## 2020-08-16 VITALS
HEART RATE: 62 BPM | RESPIRATION RATE: 16 BRPM | SYSTOLIC BLOOD PRESSURE: 144 MMHG | TEMPERATURE: 97 F | DIASTOLIC BLOOD PRESSURE: 69 MMHG | OXYGEN SATURATION: 95 %

## 2020-08-16 DIAGNOSIS — A49.8 PROPIONIBACTERIUM INFECTION: Primary | ICD-10-CM

## 2020-08-16 DIAGNOSIS — T84.59XD INFECTION OF PROSTHETIC SHOULDER JOINT, SUBSEQUENT ENCOUNTER: ICD-10-CM

## 2020-08-16 DIAGNOSIS — Z96.619 INFECTION OF PROSTHETIC SHOULDER JOINT, SUBSEQUENT ENCOUNTER: ICD-10-CM

## 2020-08-16 PROCEDURE — 2580000003 HC RX 258: Performed by: INTERNAL MEDICINE

## 2020-08-16 PROCEDURE — 96365 THER/PROPH/DIAG IV INF INIT: CPT

## 2020-08-16 PROCEDURE — 96360 HYDRATION IV INFUSION INIT: CPT

## 2020-08-16 PROCEDURE — 6360000002 HC RX W HCPCS: Performed by: INTERNAL MEDICINE

## 2020-08-16 PROCEDURE — 2709999900 HC NON-CHARGEABLE SUPPLY

## 2020-08-16 RX ADMIN — CEFTRIAXONE SODIUM 2 G: 2 INJECTION, POWDER, FOR SOLUTION INTRAMUSCULAR; INTRAVENOUS at 11:52

## 2020-08-16 NOTE — PROGRESS NOTES
Pt presents for rocephin infusion. Pt alert and oriented times 3, resp even and unlabored, skin pink, warm and dry. Pt has a intact PICC line, PICC line flushed with 10ml's of saline without difficulty, site soft and without edema or redness.

## 2020-08-16 NOTE — PROGRESS NOTES
Pt resting, resp even and unlabored, skin pink, warm and dry. Rocephin infused, PICC line flushed with 10ml's of saline and new swabcap applied. Pt declines AVS, pt released ambulatory in stable condition.

## 2020-08-17 ENCOUNTER — HOSPITAL ENCOUNTER (OUTPATIENT)
Dept: GENERAL RADIOLOGY | Age: 70
Discharge: HOME | End: 2020-08-17
Payer: MEDICARE

## 2020-08-17 VITALS
HEART RATE: 58 BPM | OXYGEN SATURATION: 96 % | TEMPERATURE: 97.9 F | DIASTOLIC BLOOD PRESSURE: 68 MMHG | SYSTOLIC BLOOD PRESSURE: 134 MMHG | RESPIRATION RATE: 16 BRPM

## 2020-08-17 DIAGNOSIS — T84.59XD INFECTION OF PROSTHETIC SHOULDER JOINT, SUBSEQUENT ENCOUNTER: ICD-10-CM

## 2020-08-17 DIAGNOSIS — A49.8 PROPIONIBACTERIUM INFECTION: Primary | ICD-10-CM

## 2020-08-17 DIAGNOSIS — Z96.619 INFECTION OF PROSTHETIC SHOULDER JOINT, SUBSEQUENT ENCOUNTER: ICD-10-CM

## 2020-08-17 LAB
ANION GAP SERPL CALCULATED.3IONS-SCNC: 10 MEQ/L (ref 8–16)
BUN BLDV-MCNC: 13 MG/DL (ref 7–22)
CALCIUM SERPL-MCNC: 9 MG/DL (ref 8.5–10.5)
CHLORIDE BLD-SCNC: 104 MEQ/L (ref 98–111)
CO2: 27 MEQ/L (ref 23–33)
CREAT SERPL-MCNC: 0.9 MG/DL (ref 0.4–1.2)
ERYTHROCYTE [DISTWIDTH] IN BLOOD BY AUTOMATED COUNT: 14.2 % (ref 11.5–14.5)
ERYTHROCYTE [DISTWIDTH] IN BLOOD BY AUTOMATED COUNT: 48.2 FL (ref 35–45)
GFR SERPL CREATININE-BSD FRML MDRD: 83 ML/MIN/1.73M2
GLUCOSE BLD-MCNC: 99 MG/DL (ref 70–108)
HCT VFR BLD CALC: 38.9 % (ref 42–52)
HEMOGLOBIN: 11.7 GM/DL (ref 14–18)
MCH RBC QN AUTO: 27.9 PG (ref 26–33)
MCHC RBC AUTO-ENTMCNC: 30.1 GM/DL (ref 32.2–35.5)
MCV RBC AUTO: 92.6 FL (ref 80–94)
PLATELET # BLD: 277 THOU/MM3 (ref 130–400)
PMV BLD AUTO: 11.6 FL (ref 9.4–12.4)
POTASSIUM SERPL-SCNC: 4.1 MEQ/L (ref 3.5–5.2)
RBC # BLD: 4.2 MILL/MM3 (ref 4.7–6.1)
SODIUM BLD-SCNC: 141 MEQ/L (ref 135–145)
WBC # BLD: 6.9 THOU/MM3 (ref 4.8–10.8)

## 2020-08-17 PROCEDURE — 2709999900 HC NON-CHARGEABLE SUPPLY

## 2020-08-17 PROCEDURE — 36592 COLLECT BLOOD FROM PICC: CPT

## 2020-08-17 PROCEDURE — 36415 COLL VENOUS BLD VENIPUNCTURE: CPT

## 2020-08-17 PROCEDURE — 85027 COMPLETE CBC AUTOMATED: CPT

## 2020-08-17 PROCEDURE — 2580000003 HC RX 258: Performed by: INTERNAL MEDICINE

## 2020-08-17 PROCEDURE — 6360000002 HC RX W HCPCS: Performed by: INTERNAL MEDICINE

## 2020-08-17 PROCEDURE — 80048 BASIC METABOLIC PNL TOTAL CA: CPT

## 2020-08-17 PROCEDURE — 96365 THER/PROPH/DIAG IV INF INIT: CPT

## 2020-08-17 RX ADMIN — CEFTRIAXONE SODIUM 2 G: 2 INJECTION, POWDER, FOR SOLUTION INTRAMUSCULAR; INTRAVENOUS at 12:18

## 2020-08-17 NOTE — ED NOTES
IV rocephin infused and discontinued. .Pulse regular. Extremities warm. Respirations regular and quiet. Mucous membranes pink & moist. Alert and oriented times 3. No nausea or vomiting. Range of motion within patient's limits. Skin pink, warm and dry. Calm and cooperative.      José Miguel Bazan RN  08/17/20 9306

## 2020-08-17 NOTE — ED NOTES
Pt. Ambulatory to exam 3 for outpt. IV antibiotic infusion.  _x___ Safety:       (Environmental)   [place] to environment   Ensure ID band is correct and in place/ allergy band as needed   Assess for fall risk   Initiate fall precautions as applicable (fall band, side rails, etc.)   Call light within reach   Bed in low position/ wheels locked    ___x_ Pain:        Assess pain level and characteristics   Administer analgesics as ordered   Assess effectiveness of pain management and report to MD as needed    __xx__ Knowledge Deficit:   Assess baseline knowledge   Provide teaching at level of understanding   Provide teaching via preferred learning method   Evaluate teaching effectiveness    __x__ Hemodynamic/Respiratory Status:       (Pre and Post Procedure Monitoring)   Assess/Monitor vital signs and LOC   Assess Baseline SpO2 prior to any sedation   Obtain weight/height   Assess vital signs/ LOC until patient meets discharge criteria   Monitor procedure site and notify MD of any issues    __x__ Infection-Risk of Central Venous Catheter:   Monitor for infection signs and symptoms (catheter site redness, temperature elevation, etc)   Assess for infection risks   Educate regarding infection prevention   Manage central venous catheter (flushes/ dressing changes per protocol)                   Daphne Sandhu RN  08/17/20 2003

## 2020-08-18 ENCOUNTER — HOSPITAL ENCOUNTER (OUTPATIENT)
Dept: GENERAL RADIOLOGY | Age: 70
Discharge: HOME | End: 2020-08-18
Payer: MEDICARE

## 2020-08-18 VITALS
BODY MASS INDEX: 39.2 KG/M2 | WEIGHT: 280 LBS | HEART RATE: 58 BPM | RESPIRATION RATE: 16 BRPM | HEIGHT: 71 IN | TEMPERATURE: 97.7 F | SYSTOLIC BLOOD PRESSURE: 145 MMHG | OXYGEN SATURATION: 93 % | DIASTOLIC BLOOD PRESSURE: 67 MMHG

## 2020-08-18 DIAGNOSIS — T84.59XD INFECTION OF PROSTHETIC SHOULDER JOINT, SUBSEQUENT ENCOUNTER: ICD-10-CM

## 2020-08-18 DIAGNOSIS — A49.8 PROPIONIBACTERIUM INFECTION: Primary | ICD-10-CM

## 2020-08-18 DIAGNOSIS — Z96.619 INFECTION OF PROSTHETIC SHOULDER JOINT, SUBSEQUENT ENCOUNTER: ICD-10-CM

## 2020-08-18 PROCEDURE — 2709999900 HC NON-CHARGEABLE SUPPLY

## 2020-08-18 PROCEDURE — 6360000002 HC RX W HCPCS: Performed by: INTERNAL MEDICINE

## 2020-08-18 PROCEDURE — 2580000003 HC RX 258: Performed by: INTERNAL MEDICINE

## 2020-08-18 PROCEDURE — 96365 THER/PROPH/DIAG IV INF INIT: CPT

## 2020-08-18 RX ADMIN — CEFTRIAXONE SODIUM 2 G: 2 INJECTION, POWDER, FOR SOLUTION INTRAMUSCULAR; INTRAVENOUS at 12:23

## 2020-08-18 NOTE — PROGRESS NOTES
Pt here for outpatient nursing infusion. Respirations regular and easy. Gait steady w/ cane. Pt alert and oriented X's 3. Taken to exam 3 for rocephin infusion.

## 2020-08-19 ENCOUNTER — HOSPITAL ENCOUNTER (OUTPATIENT)
Dept: GENERAL RADIOLOGY | Age: 70
Discharge: HOME | End: 2020-08-19
Payer: MEDICARE

## 2020-08-19 VITALS
RESPIRATION RATE: 16 BRPM | OXYGEN SATURATION: 98 % | HEART RATE: 60 BPM | DIASTOLIC BLOOD PRESSURE: 65 MMHG | TEMPERATURE: 96.8 F | SYSTOLIC BLOOD PRESSURE: 140 MMHG

## 2020-08-19 DIAGNOSIS — T84.59XD INFECTION OF PROSTHETIC SHOULDER JOINT, SUBSEQUENT ENCOUNTER: ICD-10-CM

## 2020-08-19 DIAGNOSIS — Z96.619 INFECTION OF PROSTHETIC SHOULDER JOINT, SUBSEQUENT ENCOUNTER: ICD-10-CM

## 2020-08-19 DIAGNOSIS — A49.8 PROPIONIBACTERIUM INFECTION: Primary | ICD-10-CM

## 2020-08-19 PROCEDURE — 6360000002 HC RX W HCPCS: Performed by: INTERNAL MEDICINE

## 2020-08-19 PROCEDURE — 2580000003 HC RX 258: Performed by: INTERNAL MEDICINE

## 2020-08-19 PROCEDURE — 2709999900 HC NON-CHARGEABLE SUPPLY

## 2020-08-19 PROCEDURE — 96365 THER/PROPH/DIAG IV INF INIT: CPT

## 2020-08-19 RX ADMIN — CEFTRIAXONE 2 G: 2 INJECTION, POWDER, FOR SOLUTION INTRAMUSCULAR; INTRAVENOUS at 12:00

## 2020-08-20 ENCOUNTER — HOSPITAL ENCOUNTER (OUTPATIENT)
Dept: GENERAL RADIOLOGY | Age: 70
Discharge: HOME | End: 2020-08-20
Payer: MEDICARE

## 2020-08-20 VITALS
OXYGEN SATURATION: 95 % | BODY MASS INDEX: 39.2 KG/M2 | WEIGHT: 280 LBS | SYSTOLIC BLOOD PRESSURE: 140 MMHG | HEART RATE: 60 BPM | TEMPERATURE: 97.9 F | HEIGHT: 71 IN | RESPIRATION RATE: 16 BRPM | DIASTOLIC BLOOD PRESSURE: 64 MMHG

## 2020-08-20 DIAGNOSIS — T84.59XD INFECTION OF PROSTHETIC SHOULDER JOINT, SUBSEQUENT ENCOUNTER: ICD-10-CM

## 2020-08-20 DIAGNOSIS — Z96.619 INFECTION OF PROSTHETIC SHOULDER JOINT, SUBSEQUENT ENCOUNTER: ICD-10-CM

## 2020-08-20 DIAGNOSIS — A49.8 PROPIONIBACTERIUM INFECTION: Primary | ICD-10-CM

## 2020-08-20 PROCEDURE — 96365 THER/PROPH/DIAG IV INF INIT: CPT

## 2020-08-20 PROCEDURE — 2709999900 HC NON-CHARGEABLE SUPPLY

## 2020-08-20 PROCEDURE — 6360000002 HC RX W HCPCS: Performed by: INTERNAL MEDICINE

## 2020-08-20 PROCEDURE — 2580000003 HC RX 258: Performed by: INTERNAL MEDICINE

## 2020-08-20 RX ADMIN — CEFTRIAXONE SODIUM 2 G: 2 INJECTION, POWDER, FOR SOLUTION INTRAMUSCULAR; INTRAVENOUS at 12:01

## 2020-08-20 NOTE — PROGRESS NOTES
Released instable condition. Denies complaints or concerns. Patient is alert and cooperative. Skin warm and dry. Color normal for ethnicity.

## 2020-08-21 ENCOUNTER — HOSPITAL ENCOUNTER (OUTPATIENT)
Dept: GENERAL RADIOLOGY | Age: 70
Discharge: HOME | End: 2020-08-21
Payer: MEDICARE

## 2020-08-21 VITALS
RESPIRATION RATE: 20 BRPM | HEART RATE: 59 BPM | DIASTOLIC BLOOD PRESSURE: 66 MMHG | TEMPERATURE: 98.1 F | SYSTOLIC BLOOD PRESSURE: 137 MMHG

## 2020-08-21 DIAGNOSIS — Z96.619 INFECTION OF PROSTHETIC SHOULDER JOINT, SUBSEQUENT ENCOUNTER: ICD-10-CM

## 2020-08-21 DIAGNOSIS — T84.59XD INFECTION OF PROSTHETIC SHOULDER JOINT, SUBSEQUENT ENCOUNTER: ICD-10-CM

## 2020-08-21 DIAGNOSIS — A49.8 PROPIONIBACTERIUM INFECTION: Primary | ICD-10-CM

## 2020-08-21 PROCEDURE — 6360000002 HC RX W HCPCS: Performed by: INTERNAL MEDICINE

## 2020-08-21 PROCEDURE — 2709999900 HC NON-CHARGEABLE SUPPLY

## 2020-08-21 PROCEDURE — 2580000003 HC RX 258: Performed by: INTERNAL MEDICINE

## 2020-08-21 PROCEDURE — 96365 THER/PROPH/DIAG IV INF INIT: CPT

## 2020-08-21 PROCEDURE — 99202 OFFICE O/P NEW SF 15 MIN: CPT

## 2020-08-21 RX ORDER — CEFTRIAXONE 2 G/1
2 INJECTION, POWDER, FOR SOLUTION INTRAMUSCULAR; INTRAVENOUS EVERY 24 HOURS
COMMUNITY
Start: 2020-08-12 | End: 2020-09-02

## 2020-08-21 RX ADMIN — CEFTRIAXONE SODIUM 2 G: 2 INJECTION, POWDER, FOR SOLUTION INTRAMUSCULAR; INTRAVENOUS at 12:05

## 2020-08-21 NOTE — PLAN OF CARE
Patient's wife requesting to bring patient in early on Monday when she comes in for outpatient testing. Scheduled time changed to allow this. Patient and wife advised.

## 2020-08-21 NOTE — PROGRESS NOTES
Presents ambulatory for infusion of IV antibiotics and PICC line dressing change. Patient is alert and cooperative. Skin warm and dry. Color normal for ethnicity.

## 2020-08-22 ENCOUNTER — HOSPITAL ENCOUNTER (OUTPATIENT)
Dept: GENERAL RADIOLOGY | Age: 70
Discharge: HOME | End: 2020-08-22
Payer: MEDICARE

## 2020-08-22 VITALS
OXYGEN SATURATION: 94 % | RESPIRATION RATE: 16 BRPM | BODY MASS INDEX: 39.2 KG/M2 | HEIGHT: 71 IN | HEART RATE: 56 BPM | WEIGHT: 280 LBS | TEMPERATURE: 97 F | DIASTOLIC BLOOD PRESSURE: 65 MMHG | SYSTOLIC BLOOD PRESSURE: 134 MMHG

## 2020-08-22 DIAGNOSIS — Z96.619 INFECTION OF PROSTHETIC SHOULDER JOINT, SUBSEQUENT ENCOUNTER: ICD-10-CM

## 2020-08-22 DIAGNOSIS — T84.59XD INFECTION OF PROSTHETIC SHOULDER JOINT, SUBSEQUENT ENCOUNTER: ICD-10-CM

## 2020-08-22 DIAGNOSIS — A49.8 PROPIONIBACTERIUM INFECTION: Primary | ICD-10-CM

## 2020-08-22 PROCEDURE — 2580000003 HC RX 258: Performed by: INTERNAL MEDICINE

## 2020-08-22 PROCEDURE — 96365 THER/PROPH/DIAG IV INF INIT: CPT

## 2020-08-22 PROCEDURE — 6360000002 HC RX W HCPCS: Performed by: INTERNAL MEDICINE

## 2020-08-22 PROCEDURE — 2709999900 HC NON-CHARGEABLE SUPPLY

## 2020-08-22 RX ORDER — CEFTRIAXONE 2 G/1
INJECTION, POWDER, FOR SOLUTION INTRAMUSCULAR; INTRAVENOUS
Status: DISCONTINUED
Start: 2020-08-22 | End: 2020-08-23 | Stop reason: HOSPADM

## 2020-08-22 RX ADMIN — CEFTRIAXONE SODIUM 2 G: 2 INJECTION, POWDER, FOR SOLUTION INTRAMUSCULAR; INTRAVENOUS at 12:23

## 2020-08-22 NOTE — PROGRESS NOTES
_m___ Safety:       (Environmental)   [place] to environment   Ensure ID band is correct and in place/ allergy band as needed   Assess for fall risk   Initiate fall precautions as applicable (fall band, side rails, etc.)   Call light within reach   Bed in low position/ wheels locked    _m___ Pain:        Assess pain level and characteristics   Administer analgesics as ordered   Assess effectiveness of pain management and report to MD as needed    _m___ Knowledge Deficit:   Assess baseline knowledge   Provide teaching at level of understanding   Provide teaching via preferred learning method   Evaluate teaching effectiveness    _m___ Hemodynamic/Respiratory Status:       (Pre and Post Procedure Monitoring)   Assess/Monitor vital signs and LOC   Assess Baseline SpO2 prior to any sedation   Obtain weight/height   Assess vital signs/ LOC until patient meets discharge criteria   Monitor procedure site and notify MD of any issues    __m__ Infection-Risk of Central Venous Catheter:   Monitor for infection signs and symptoms (catheter site redness, temperature elevation, etc)   Assess for infection risks   Educate regarding infection prevention   Manage central venous catheter (flushes/ dressing changes per protocol)

## 2020-08-22 NOTE — PROGRESS NOTES
No adverse reaction noted from infusion. Respirations regular and easy. Denies pain. Released in satisfactory condition. Ambulates out w/ walker. Gait steady.

## 2020-08-22 NOTE — PROGRESS NOTES
Pt here for outpatient nursing infusion. Respirations regular and easy. Gait steady. Pt alert and oriented X's 3. Taken to exam 3 for rocephin infusion.

## 2020-08-23 ENCOUNTER — HOSPITAL ENCOUNTER (OUTPATIENT)
Dept: GENERAL RADIOLOGY | Age: 70
Discharge: HOME | End: 2020-08-23
Payer: MEDICARE

## 2020-08-23 VITALS
TEMPERATURE: 97.5 F | OXYGEN SATURATION: 94 % | HEART RATE: 58 BPM | SYSTOLIC BLOOD PRESSURE: 134 MMHG | HEIGHT: 71 IN | DIASTOLIC BLOOD PRESSURE: 67 MMHG | BODY MASS INDEX: 39.2 KG/M2 | RESPIRATION RATE: 15 BRPM | WEIGHT: 280 LBS

## 2020-08-23 DIAGNOSIS — A49.8 PROPIONIBACTERIUM INFECTION: Primary | ICD-10-CM

## 2020-08-23 DIAGNOSIS — T84.59XD INFECTION OF PROSTHETIC SHOULDER JOINT, SUBSEQUENT ENCOUNTER: ICD-10-CM

## 2020-08-23 DIAGNOSIS — Z96.619 INFECTION OF PROSTHETIC SHOULDER JOINT, SUBSEQUENT ENCOUNTER: ICD-10-CM

## 2020-08-23 PROCEDURE — 6360000002 HC RX W HCPCS: Performed by: INTERNAL MEDICINE

## 2020-08-23 PROCEDURE — 2580000003 HC RX 258: Performed by: INTERNAL MEDICINE

## 2020-08-23 PROCEDURE — 2709999900 HC NON-CHARGEABLE SUPPLY

## 2020-08-23 PROCEDURE — 96365 THER/PROPH/DIAG IV INF INIT: CPT

## 2020-08-23 RX ADMIN — CEFTRIAXONE SODIUM 2 G: 2 INJECTION, POWDER, FOR SOLUTION INTRAMUSCULAR; INTRAVENOUS at 12:07

## 2020-08-23 NOTE — PROGRESS NOTES
__m__ Safety:       (Environmental)   [place] to environment   Ensure ID band is correct and in place/ allergy band as needed   Assess for fall risk   Initiate fall precautions as applicable (fall band, side rails, etc.)   Call light within reach   Bed in low position/ wheels locked    __m__ Pain:        Assess pain level and characteristics   Administer analgesics as ordered   Assess effectiveness of pain management and report to MD as needed    _m___ Knowledge Deficit:   Assess baseline knowledge   Provide teaching at level of understanding   Provide teaching via preferred learning method   Evaluate teaching effectiveness    _m___ Hemodynamic/Respiratory Status:       (Pre and Post Procedure Monitoring)   Assess/Monitor vital signs and LOC   Assess Baseline SpO2 prior to any sedation   Obtain weight/height   Assess vital signs/ LOC until patient meets discharge criteria   Monitor procedure site and notify MD of any issues    __m__ Infection-Risk of Central Venous Catheter:   Monitor for infection signs and symptoms (catheter site redness, temperature elevation, etc)   Assess for infection risks   Educate regarding infection prevention   Manage central venous catheter (flushes/ dressing changes per protocol)

## 2020-08-23 NOTE — PROGRESS NOTES
AVS reviewed. Pt declines copy. No adverse reaction noted from rocephin infusion. Respirations regular and easy. Denies pain. Released in satisfactory condition. Ambulates out w/ cane. Gait steady.

## 2020-08-24 ENCOUNTER — HOSPITAL ENCOUNTER (OUTPATIENT)
Dept: GENERAL RADIOLOGY | Age: 70
Discharge: HOME | End: 2020-08-24
Payer: MEDICARE

## 2020-08-24 VITALS
DIASTOLIC BLOOD PRESSURE: 69 MMHG | TEMPERATURE: 97.4 F | OXYGEN SATURATION: 95 % | HEART RATE: 59 BPM | SYSTOLIC BLOOD PRESSURE: 130 MMHG | RESPIRATION RATE: 16 BRPM

## 2020-08-24 DIAGNOSIS — A49.8 PROPIONIBACTERIUM INFECTION: Primary | ICD-10-CM

## 2020-08-24 DIAGNOSIS — Z96.619 INFECTION OF PROSTHETIC SHOULDER JOINT, SUBSEQUENT ENCOUNTER: ICD-10-CM

## 2020-08-24 DIAGNOSIS — T84.59XD INFECTION OF PROSTHETIC SHOULDER JOINT, SUBSEQUENT ENCOUNTER: ICD-10-CM

## 2020-08-24 LAB
ANION GAP SERPL CALCULATED.3IONS-SCNC: 12 MEQ/L (ref 8–16)
BUN BLDV-MCNC: 13 MG/DL (ref 7–22)
CALCIUM SERPL-MCNC: 9.3 MG/DL (ref 8.5–10.5)
CHLORIDE BLD-SCNC: 106 MEQ/L (ref 98–111)
CO2: 25 MEQ/L (ref 23–33)
CREAT SERPL-MCNC: 1.1 MG/DL (ref 0.4–1.2)
ERYTHROCYTE [DISTWIDTH] IN BLOOD BY AUTOMATED COUNT: 14.6 % (ref 11.5–14.5)
ERYTHROCYTE [DISTWIDTH] IN BLOOD BY AUTOMATED COUNT: 48.6 FL (ref 35–45)
GFR SERPL CREATININE-BSD FRML MDRD: 66 ML/MIN/1.73M2
GLUCOSE BLD-MCNC: 125 MG/DL (ref 70–108)
HCT VFR BLD CALC: 39.6 % (ref 42–52)
HEMOGLOBIN: 12.1 GM/DL (ref 14–18)
MCH RBC QN AUTO: 28.3 PG (ref 26–33)
MCHC RBC AUTO-ENTMCNC: 30.6 GM/DL (ref 32.2–35.5)
MCV RBC AUTO: 92.7 FL (ref 80–94)
PLATELET # BLD: 244 THOU/MM3 (ref 130–400)
PMV BLD AUTO: 11.8 FL (ref 9.4–12.4)
POTASSIUM SERPL-SCNC: 3.9 MEQ/L (ref 3.5–5.2)
RBC # BLD: 4.27 MILL/MM3 (ref 4.7–6.1)
SODIUM BLD-SCNC: 143 MEQ/L (ref 135–145)
WBC # BLD: 6.1 THOU/MM3 (ref 4.8–10.8)

## 2020-08-24 PROCEDURE — 2709999900 HC NON-CHARGEABLE SUPPLY

## 2020-08-24 PROCEDURE — 85027 COMPLETE CBC AUTOMATED: CPT

## 2020-08-24 PROCEDURE — 36591 DRAW BLOOD OFF VENOUS DEVICE: CPT

## 2020-08-24 PROCEDURE — 2580000003 HC RX 258: Performed by: INTERNAL MEDICINE

## 2020-08-24 PROCEDURE — 96365 THER/PROPH/DIAG IV INF INIT: CPT

## 2020-08-24 PROCEDURE — 80048 BASIC METABOLIC PNL TOTAL CA: CPT

## 2020-08-24 PROCEDURE — 6360000002 HC RX W HCPCS: Performed by: INTERNAL MEDICINE

## 2020-08-24 RX ADMIN — CEFTRIAXONE SODIUM 2 G: 2 INJECTION, POWDER, FOR SOLUTION INTRAMUSCULAR; INTRAVENOUS at 09:45

## 2020-08-24 NOTE — ED NOTES
IV  Rocephin infused and discontinued, picc flushed with 10 ml. 0.9 NS. .  ___x_ Safety:       (Environmental)   [place] to environment   Ensure ID band is correct and in place/ allergy band as needed   Assess for fall risk   Initiate fall precautions as applicable (fall band, side rails, etc.)   Call light within reach   Bed in low position/ wheels locked    ___x_ Pain:        Assess pain level and characteristics   Administer analgesics as ordered   Assess effectiveness of pain management and report to MD as needed    ___x_ Knowledge Deficit:   Assess baseline knowledge   Provide teaching at level of understanding   Provide teaching via preferred learning method   Evaluate teaching effectiveness    ___x_ Hemodynamic/Respiratory Status:       (Pre and Post Procedure Monitoring)   Assess/Monitor vital signs and LOC   Assess Baseline SpO2 prior to any sedation   Obtain weight/height   Assess vital signs/ LOC until patient meets discharge criteria   Monitor procedure site and notify MD of any issues    __x__ Infection-Risk of Central Venous Catheter:   Monitor for infection signs and symptoms (catheter site redness, temperature elevation, etc)   Assess for infection risks   Educate regarding infection prevention   Manage central venous catheter (flushes/ dressing changes per protocol)                   Maurice Cornejo RN  08/24/20 1939

## 2020-08-25 ENCOUNTER — HOSPITAL ENCOUNTER (OUTPATIENT)
Dept: GENERAL RADIOLOGY | Age: 70
Discharge: HOME | End: 2020-08-25
Payer: MEDICARE

## 2020-08-25 VITALS
SYSTOLIC BLOOD PRESSURE: 138 MMHG | DIASTOLIC BLOOD PRESSURE: 65 MMHG | RESPIRATION RATE: 18 BRPM | TEMPERATURE: 97.2 F | HEART RATE: 28 BPM | OXYGEN SATURATION: 96 %

## 2020-08-25 DIAGNOSIS — A49.8 PROPIONIBACTERIUM INFECTION: Primary | ICD-10-CM

## 2020-08-25 DIAGNOSIS — Z96.619 INFECTION OF PROSTHETIC SHOULDER JOINT, SUBSEQUENT ENCOUNTER: ICD-10-CM

## 2020-08-25 DIAGNOSIS — T84.59XD INFECTION OF PROSTHETIC SHOULDER JOINT, SUBSEQUENT ENCOUNTER: ICD-10-CM

## 2020-08-25 PROCEDURE — 6360000002 HC RX W HCPCS: Performed by: INTERNAL MEDICINE

## 2020-08-25 PROCEDURE — 2580000003 HC RX 258: Performed by: INTERNAL MEDICINE

## 2020-08-25 PROCEDURE — 2709999900 HC NON-CHARGEABLE SUPPLY

## 2020-08-25 PROCEDURE — 96365 THER/PROPH/DIAG IV INF INIT: CPT

## 2020-08-25 RX ADMIN — CEFTRIAXONE SODIUM 2 G: 2 INJECTION, POWDER, FOR SOLUTION INTRAMUSCULAR; INTRAVENOUS at 12:05

## 2020-08-25 NOTE — ED NOTES
Pt. Presents ambulatory to ED for oupt. Infusion of IV antibiotics.      Rosita Donaldson RN  08/25/20 7902

## 2020-08-25 NOTE — ED NOTES
IV rocephin infused and discontinued, picc line flushedw with  10 ml 0.9 NS. Pulse regular. Extremities warm. Respirations regular and quiet. Mucous membranes pink & moist. Alert and oriented times 3. No nausea or vomiting. Range of motion within patient's limits. Skin pink, warm and dry. Calm and cooperative. __x__ Safety:       (Environmental)   [place] to environment   Ensure ID band is correct and in place/ allergy band as needed   Assess for fall risk   Initiate fall precautions as applicable (fall band, side rails, etc.)   Call light within reach   Bed in low position/ wheels locked    __x__ Pain:        Assess pain level and characteristics   Administer analgesics as ordered   Assess effectiveness of pain management and report to MD as needed    _x___ Knowledge Deficit:   Assess baseline knowledge   Provide teaching at level of understanding   Provide teaching via preferred learning method   Evaluate teaching effectiveness    ___x_ Hemodynamic/Respiratory Status:       (Pre and Post Procedure Monitoring)   Assess/Monitor vital signs and LOC   Assess Baseline SpO2 prior to any sedation   Obtain weight/height   Assess vital signs/ LOC until patient meets discharge criteria   Monitor procedure site and notify MD of any issues    ___x_ Infection-Risk of Central Venous Catheter:   Monitor for infection signs and symptoms (catheter site redness, temperature elevation, etc)   Assess for infection risks   Educate regarding infection prevention   Manage central venous catheter (flushes/ dressing changes per protocol)                   Mercedes Hale RN  08/25/20 2816

## 2020-08-26 ENCOUNTER — HOSPITAL ENCOUNTER (OUTPATIENT)
Dept: GENERAL RADIOLOGY | Age: 70
Discharge: HOME | End: 2020-08-26
Payer: MEDICARE

## 2020-08-26 VITALS
OXYGEN SATURATION: 95 % | TEMPERATURE: 98.4 F | HEART RATE: 56 BPM | SYSTOLIC BLOOD PRESSURE: 145 MMHG | DIASTOLIC BLOOD PRESSURE: 70 MMHG | RESPIRATION RATE: 15 BRPM

## 2020-08-26 DIAGNOSIS — T84.59XD INFECTION OF PROSTHETIC SHOULDER JOINT, SUBSEQUENT ENCOUNTER: ICD-10-CM

## 2020-08-26 DIAGNOSIS — Z96.619 INFECTION OF PROSTHETIC SHOULDER JOINT, SUBSEQUENT ENCOUNTER: ICD-10-CM

## 2020-08-26 DIAGNOSIS — A49.8 PROPIONIBACTERIUM INFECTION: Primary | ICD-10-CM

## 2020-08-26 PROCEDURE — 6360000002 HC RX W HCPCS: Performed by: INTERNAL MEDICINE

## 2020-08-26 PROCEDURE — 96365 THER/PROPH/DIAG IV INF INIT: CPT

## 2020-08-26 PROCEDURE — 2580000003 HC RX 258: Performed by: INTERNAL MEDICINE

## 2020-08-26 PROCEDURE — 2709999900 HC NON-CHARGEABLE SUPPLY

## 2020-08-26 RX ADMIN — CEFTRIAXONE SODIUM 2 G: 2 INJECTION, POWDER, FOR SOLUTION INTRAMUSCULAR; INTRAVENOUS at 12:05

## 2020-08-26 NOTE — PROGRESS NOTES
__m__ Safety:       (Environmental)   [place] to environment   Ensure ID band is correct and in place/ allergy band as needed   Assess for fall risk   Initiate fall precautions as applicable (fall band, side rails, etc.)   Call light within reach   Bed in low position/ wheels locked    _m__ Pain:        Assess pain level and characteristics   Administer analgesics as ordered   Assess effectiveness of pain management and report to MD as needed    ___m_ Knowledge Deficit:   Assess baseline knowledge   Provide teaching at level of understanding   Provide teaching via preferred learning method   Evaluate teaching effectiveness    __m__ Hemodynamic/Respiratory Status:       (Pre and Post Procedure Monitoring)   Assess/Monitor vital signs and LOC   Assess Baseline SpO2 prior to any sedation   Obtain weight/height   Assess vital signs/ LOC until patient meets discharge criteria   Monitor procedure site and notify MD of any issues

## 2020-08-26 NOTE — PROGRESS NOTES
Pt presents amb with cane and spouse for OP antibiotic infusion. Denies any complaints. Resting in recliner.

## 2020-08-27 ENCOUNTER — HOSPITAL ENCOUNTER (OUTPATIENT)
Dept: GENERAL RADIOLOGY | Age: 70
Discharge: HOME | End: 2020-08-27
Payer: MEDICARE

## 2020-08-27 VITALS
TEMPERATURE: 97.7 F | RESPIRATION RATE: 24 BRPM | HEART RATE: 57 BPM | DIASTOLIC BLOOD PRESSURE: 56 MMHG | SYSTOLIC BLOOD PRESSURE: 119 MMHG

## 2020-08-27 DIAGNOSIS — T84.59XD INFECTION OF PROSTHETIC SHOULDER JOINT, SUBSEQUENT ENCOUNTER: ICD-10-CM

## 2020-08-27 DIAGNOSIS — Z96.619 INFECTION OF PROSTHETIC SHOULDER JOINT, SUBSEQUENT ENCOUNTER: ICD-10-CM

## 2020-08-27 DIAGNOSIS — A49.8 PROPIONIBACTERIUM INFECTION: Primary | ICD-10-CM

## 2020-08-27 PROCEDURE — 6360000002 HC RX W HCPCS: Performed by: INTERNAL MEDICINE

## 2020-08-27 PROCEDURE — 96365 THER/PROPH/DIAG IV INF INIT: CPT

## 2020-08-27 PROCEDURE — 2709999900 HC NON-CHARGEABLE SUPPLY

## 2020-08-27 PROCEDURE — 2580000003 HC RX 258: Performed by: INTERNAL MEDICINE

## 2020-08-27 RX ADMIN — CEFTRIAXONE SODIUM 2 G: 2 INJECTION, POWDER, FOR SOLUTION INTRAMUSCULAR; INTRAVENOUS at 12:04

## 2020-08-27 NOTE — PROGRESS NOTES
Presents ambulatory for daily infusion of IV antibiotics. Denies complaints or concerns. Patient is alert and cooperative. Skin warm and dry. Color normal for ethnicity.

## 2020-08-28 ENCOUNTER — HOSPITAL ENCOUNTER (OUTPATIENT)
Dept: GENERAL RADIOLOGY | Age: 70
Discharge: HOME | End: 2020-08-28
Payer: MEDICARE

## 2020-08-28 VITALS
OXYGEN SATURATION: 96 % | DIASTOLIC BLOOD PRESSURE: 62 MMHG | SYSTOLIC BLOOD PRESSURE: 130 MMHG | TEMPERATURE: 98.4 F | RESPIRATION RATE: 20 BRPM | HEART RATE: 62 BPM

## 2020-08-28 DIAGNOSIS — T84.59XD INFECTION OF PROSTHETIC SHOULDER JOINT, SUBSEQUENT ENCOUNTER: ICD-10-CM

## 2020-08-28 DIAGNOSIS — Z96.619 INFECTION OF PROSTHETIC SHOULDER JOINT, SUBSEQUENT ENCOUNTER: ICD-10-CM

## 2020-08-28 DIAGNOSIS — A49.8 PROPIONIBACTERIUM INFECTION: Primary | ICD-10-CM

## 2020-08-28 PROCEDURE — 96365 THER/PROPH/DIAG IV INF INIT: CPT

## 2020-08-28 PROCEDURE — 2709999900 HC NON-CHARGEABLE SUPPLY

## 2020-08-28 PROCEDURE — 6360000002 HC RX W HCPCS: Performed by: INTERNAL MEDICINE

## 2020-08-28 PROCEDURE — 2580000003 HC RX 258: Performed by: INTERNAL MEDICINE

## 2020-08-28 RX ADMIN — CEFTRIAXONE SODIUM 2 G: 2 INJECTION, POWDER, FOR SOLUTION INTRAMUSCULAR; INTRAVENOUS at 12:35

## 2020-08-28 NOTE — PROGRESS NOTES
No adverse reaction noted from infusion. PICC line dressing changed per protocol, sterile technique maintained (see flowsheet for details).

## 2020-08-28 NOTE — PROGRESS NOTES
Respirations regular and easy. Denies pain. Released in satisfactory condition. Ambulates out w/ cane. Gait steady. AVS reviewed. Pt declines copy.      _m___ Safety:       (Environmental)   [place] to environment   Ensure ID band is correct and in place/ allergy band as needed   Assess for fall risk   Initiate fall precautions as applicable (fall band, side rails, etc.)   Call light within reach   Bed in low position/ wheels locked    _m___ Pain:        Assess pain level and characteristics   Administer analgesics as ordered   Assess effectiveness of pain management and report to MD as needed    _m___ Knowledge Deficit:   Assess baseline knowledge   Provide teaching at level of understanding   Provide teaching via preferred learning method   Evaluate teaching effectiveness    _m___ Hemodynamic/Respiratory Status:       (Pre and Post Procedure Monitoring)   Assess/Monitor vital signs and LOC   Assess Baseline SpO2 prior to any sedation   Obtain weight/height   Assess vital signs/ LOC until patient meets discharge criteria   Monitor procedure site and notify MD of any issues    __m__ Infection-Risk of Central Venous Catheter:   Monitor for infection signs and symptoms (catheter site redness, temperature elevation, etc)   Assess for infection risks   Educate regarding infection prevention   Manage central venous catheter (flushes/ dressing changes per protocol)

## 2020-08-29 ENCOUNTER — HOSPITAL ENCOUNTER (OUTPATIENT)
Dept: GENERAL RADIOLOGY | Age: 70
Discharge: HOME | End: 2020-08-29
Payer: MEDICARE

## 2020-08-29 VITALS
DIASTOLIC BLOOD PRESSURE: 60 MMHG | OXYGEN SATURATION: 96 % | RESPIRATION RATE: 16 BRPM | HEART RATE: 60 BPM | TEMPERATURE: 97.3 F | SYSTOLIC BLOOD PRESSURE: 133 MMHG

## 2020-08-29 DIAGNOSIS — T84.59XD INFECTION OF PROSTHETIC SHOULDER JOINT, SUBSEQUENT ENCOUNTER: ICD-10-CM

## 2020-08-29 DIAGNOSIS — A49.8 PROPIONIBACTERIUM INFECTION: Primary | ICD-10-CM

## 2020-08-29 DIAGNOSIS — Z96.619 INFECTION OF PROSTHETIC SHOULDER JOINT, SUBSEQUENT ENCOUNTER: ICD-10-CM

## 2020-08-29 PROCEDURE — 6360000002 HC RX W HCPCS: Performed by: INTERNAL MEDICINE

## 2020-08-29 PROCEDURE — 2580000003 HC RX 258: Performed by: INTERNAL MEDICINE

## 2020-08-29 PROCEDURE — 2709999900 HC NON-CHARGEABLE SUPPLY

## 2020-08-29 PROCEDURE — 96365 THER/PROPH/DIAG IV INF INIT: CPT

## 2020-08-29 RX ADMIN — CEFTRIAXONE SODIUM 2 G: 2 INJECTION, POWDER, FOR SOLUTION INTRAMUSCULAR; INTRAVENOUS at 12:01

## 2020-08-29 NOTE — PROGRESS NOTES
_met__ Safety:       (Environmental)   [place] to environment   Ensure ID band is correct and in place/ allergy band as needed   Assess for fall risk   Initiate fall precautions as applicable (fall band, side rails, etc.)   Call light within reach   Bed in low position/ wheels locked    _met__ Pain:        Assess pain level and characteristics   Administer analgesics as ordered   Assess effectiveness of pain management and report to MD as needed    _met___ Knowledge Deficit:   Assess baseline knowledge   Provide teaching at level of understanding   Provide teaching via preferred learning method   Evaluate teaching effectiveness    __met__ Hemodynamic/Respiratory Status:       (Pre and Post Procedure Monitoring)   Assess/Monitor vital signs and LOC   Assess Baseline SpO2 prior to any sedation   Obtain weight/height   Assess vital signs/ LOC until patient meets discharge criteria   Monitor procedure site and notify MD of any issues    _met___ Infection-Risk of Central Venous Catheter:   Monitor for infection signs and symptoms (catheter site redness, temperature elevation, etc)   Assess for infection risks   Educate regarding infection prevention   Manage central venous catheter (flushes/ dressing changes per protocol)

## 2020-08-30 ENCOUNTER — HOSPITAL ENCOUNTER (OUTPATIENT)
Dept: GENERAL RADIOLOGY | Age: 70
Discharge: HOME | End: 2020-08-30
Payer: MEDICARE

## 2020-08-30 VITALS
HEART RATE: 59 BPM | RESPIRATION RATE: 16 BRPM | SYSTOLIC BLOOD PRESSURE: 132 MMHG | TEMPERATURE: 97 F | DIASTOLIC BLOOD PRESSURE: 65 MMHG | OXYGEN SATURATION: 95 %

## 2020-08-30 DIAGNOSIS — Z96.619 INFECTION OF PROSTHETIC SHOULDER JOINT, SUBSEQUENT ENCOUNTER: ICD-10-CM

## 2020-08-30 DIAGNOSIS — A49.8 PROPIONIBACTERIUM INFECTION: Primary | ICD-10-CM

## 2020-08-30 DIAGNOSIS — T84.59XD INFECTION OF PROSTHETIC SHOULDER JOINT, SUBSEQUENT ENCOUNTER: ICD-10-CM

## 2020-08-30 PROCEDURE — 6360000002 HC RX W HCPCS: Performed by: INTERNAL MEDICINE

## 2020-08-30 PROCEDURE — 2709999900 HC NON-CHARGEABLE SUPPLY

## 2020-08-30 PROCEDURE — 96365 THER/PROPH/DIAG IV INF INIT: CPT

## 2020-08-30 PROCEDURE — 2580000003 HC RX 258: Performed by: INTERNAL MEDICINE

## 2020-08-30 RX ADMIN — CEFTRIAXONE SODIUM 2 G: 2 INJECTION, POWDER, FOR SOLUTION INTRAMUSCULAR; INTRAVENOUS at 11:56

## 2020-08-30 NOTE — ED NOTES
IV rocephin infused and discontiniued,, picc line flushed easily with 10 ml. 0.9 NS. Pulse regular. Extremities warm. Respirations regular and quiet. Mucous membranes pink & moist. Alert and oriented times 3. No nausea or vomiting. Range of motion within patient's limits. Skin pink, warm and dry. Calm and cooperative.    __x__ Safety:       (Environmental)   [place] to environment   Ensure ID band is correct and in place/ allergy band as needed   Assess for fall risk   Initiate fall precautions as applicable (fall band, side rails, etc.)   Call light within reach   Bed in low position/ wheels locked    ___x_ Pain:        Assess pain level and characteristics   Administer analgesics as ordered   Assess effectiveness of pain management and report to MD as needed    __x__ Knowledge Deficit:   Assess baseline knowledge   Provide teaching at level of understanding   Provide teaching via preferred learning method   Evaluate teaching effectiveness    __x__ Hemodynamic/Respiratory Status:       (Pre and Post Procedure Monitoring)   Assess/Monitor vital signs and LOC   Assess Baseline SpO2 prior to any sedation   Obtain weight/height   Assess vital signs/ LOC until patient meets discharge criteria   Monitor procedure site and notify MD of any issues    ___x_ Infection-Risk of Central Venous Catheter:   Monitor for infection signs and symptoms (catheter site redness, temperature elevation, etc)   Assess for infection risks   Educate regarding infection prevention   Manage central venous catheter (flushes/ dressing changes per protocol)                Maurice Morton RN  08/30/20 [address] [facility]  08/30/20 0196

## 2020-08-31 ENCOUNTER — HOSPITAL ENCOUNTER (OUTPATIENT)
Dept: GENERAL RADIOLOGY | Age: 70
Discharge: HOME | End: 2020-08-31
Payer: MEDICARE

## 2020-08-31 VITALS
RESPIRATION RATE: 16 BRPM | HEART RATE: 56 BPM | TEMPERATURE: 97.8 F | DIASTOLIC BLOOD PRESSURE: 71 MMHG | OXYGEN SATURATION: 97 % | SYSTOLIC BLOOD PRESSURE: 148 MMHG

## 2020-08-31 DIAGNOSIS — T84.59XD INFECTION OF PROSTHETIC SHOULDER JOINT, SUBSEQUENT ENCOUNTER: ICD-10-CM

## 2020-08-31 DIAGNOSIS — Z96.619 INFECTION OF PROSTHETIC SHOULDER JOINT, SUBSEQUENT ENCOUNTER: ICD-10-CM

## 2020-08-31 DIAGNOSIS — A49.8 PROPIONIBACTERIUM INFECTION: Primary | ICD-10-CM

## 2020-08-31 PROCEDURE — 80048 BASIC METABOLIC PNL TOTAL CA: CPT

## 2020-08-31 PROCEDURE — 2709999900 HC NON-CHARGEABLE SUPPLY

## 2020-08-31 PROCEDURE — 2580000003 HC RX 258: Performed by: INTERNAL MEDICINE

## 2020-08-31 PROCEDURE — 96365 THER/PROPH/DIAG IV INF INIT: CPT

## 2020-08-31 PROCEDURE — 6360000002 HC RX W HCPCS: Performed by: INTERNAL MEDICINE

## 2020-08-31 PROCEDURE — 85027 COMPLETE CBC AUTOMATED: CPT

## 2020-08-31 PROCEDURE — 36415 COLL VENOUS BLD VENIPUNCTURE: CPT

## 2020-08-31 RX ADMIN — CEFTRIAXONE SODIUM 2 G: 2 INJECTION, POWDER, FOR SOLUTION INTRAMUSCULAR; INTRAVENOUS at 12:18

## 2020-08-31 NOTE — PLAN OF CARE
10/2/2019         [facility]: [facility] [facility]  [address] [facility] [address]        Dear Colleague,    Thank you for referring your patient, Kentrell Kirkpatrick, to the [facility]. Please see a copy of my visit note below.    Oncology/Hematology Visit Note  Oct 2, 2019    Reason for Visit: follow up of non-small cell lung cancer.  Adenocarcinoma stage IV  Progressed on carboplatin pemetrexed and pembrolizumab, met with Dr. Muller therapy switched to docetaxel  plus ramucirumab-started on 09/20/2019    She comes here today for follow-up    Interval History:  Patient had cough he went to urgent care chest x-ray was done he was prescribed Levaquin he finished Levaquin 2 days ago.  He reports improvement in cough he denies fever chills sweats denies shortness of breath denies chest pain denies nausea vomiting diarrhea denies abdominal pain denies bleeding denies neuropathy      Review of Systems:  14 point ROS of systems including Constitutional, Eyes, Respiratory, Cardiovascular, Gastroenterology, Genitourinary, Integumentary, Muscularskeletal, Psychiatric were all negative except for pertinent positives noted in my HPI.      Current Outpatient Medications   Medication Sig Dispense Refill     albuterol (PROAIR HFA) 108 (90 Base) MCG/ACT inhaler Inhale 2 puffs into the lungs every 6 hours 8.5 g 3     albuterol (PROAIR HFA/PROVENTIL HFA/VENTOLIN HFA) 108 (90 Base) MCG/ACT inhaler Inhale 2 puffs into the lungs every 4 hours (while awake) 1 Inhaler 0     albuterol (PROVENTIL) (2.5 MG/3ML) 0.083% neb solution Take 1 vial (2.5 mg) by nebulization every 4 hours as needed for shortness of breath / dyspnea or wheezing 100 vial 11     calcium polycarbophil (FIBERCON) 625 MG tablet Take 2 tablets by mouth daily       dexamethasone (DECADRON) 4 MG tablet TAKE 1 TABLET (4 MG) BY MOUTH 2 TIMES DAILY TO BE TAKEN DAY BEFORE, DAY OF, AND DAY AFTER INFUSION.. 6 tablet 11     finasteride (PROSCAR) 5 MG tablet Take 1  Patient has a doctor appointment tomorrow morning and will check when his last day is for sure. tablet (5 mg) by mouth daily 30 tablet 5     guaiFENesin-codeine (ROBITUSSIN AC) 100-10 MG/5ML solution Take 5-10 mLs by mouth At Bedtime for 4 days 40 mL 0     LORazepam (ATIVAN) 0.5 MG tablet Take 1 tablet (0.5 mg) by mouth every 6 hours as needed (Nausea/Vomiting) 30 tablet 3     Melatonin 10 MG TABS tablet Take 10 mg by mouth nightly as needed for sleep       MULTI VITAMIN MENS OR TABS 1 TABLET DAILY       ondansetron (ZOFRAN) 8 MG tablet Take 1 tablet (8 mg) by mouth every 8 hours as needed for nausea 30 tablet 11     prochlorperazine (COMPAZINE) 10 MG tablet Take 1 tablet (10 mg) by mouth every 6 hours as needed (Nausea/Vomiting) 30 tablet 11     ranitidine (ZANTAC) 75 MG tablet Take 75 mg by mouth as needed for heartburn       tamsulosin (FLOMAX) 0.4 MG capsule Take 1 capsule (0.4 mg) by mouth daily 90 capsule 1     tiotropium (SPIRIVA HANDIHALER) 18 MCG inhaled capsule Inhale 1 capsule (18 mcg) into the lungs daily 1 capsule 11       Physical Examination:  General: The patient is a pleasant male in no acute distress.  /74   Pulse 72   Temp 97  F (36.1  C) (Tympanic)   Resp 16   Ht 1.829 m (6')   Wt 78.5 kg (173 lb 2 oz)   SpO2 97%   BMI 23.48 kg/m     HEENT: EOMI, PERRL. Sclerae are anicteric. Oral mucosa is pink and moist with no lesions or thrush.   Lymph: Neck is supple with no lymphadenopathy in the cervical or supraclavicular areas.   Heart: Regular rate and rhythm.   Lungs: Clear to auscultation bilaterally.   GI: Bowel sounds present, soft, nontender with no palpable hepatosplenomegaly or masses.   Extremities: No lower extremity edema noted bilaterally.   Skin: No rashes, petechiae, or bruising noted on exposed skin.    Laboratory Data:  Results for orders placed or performed in visit on 10/02/19 (from the past 24 hour(s))   Comprehensive metabolic panel   Result Value Ref Range    Sodium 138 133 - 144 mmol/L    Potassium 4.6 3.4 - 5.3 mmol/L    Chloride 107 94 - 109 mmol/L    Carbon  Dioxide 30 20 - 32 mmol/L    Anion Gap 1 (L) 3 - 14 mmol/L    Glucose 93 70 - 99 mg/dL    Urea Nitrogen 12 7 - 30 mg/dL    Creatinine 1.12 0.66 - 1.25 mg/dL    GFR Estimate 66 >60 mL/min/[1.73_m2]    GFR Estimate If Black 77 >60 mL/min/[1.73_m2]    Calcium 9.0 8.5 - 10.1 mg/dL    Bilirubin Total 1.0 0.2 - 1.3 mg/dL    Albumin 3.7 3.4 - 5.0 g/dL    Protein Total 7.5 6.8 - 8.8 g/dL    Alkaline Phosphatase 81 40 - 150 U/L    ALT 42 0 - 70 U/L    AST 47 (H) 0 - 45 U/L   CBC with platelets differential   Result Value Ref Range    WBC 4.4 4.0 - 11.0 10e9/L    RBC Count 4.20 (L) 4.4 - 5.9 10e12/L    Hemoglobin 12.8 (L) 13.3 - 17.7 g/dL    Hematocrit 38.9 (L) 40.0 - 53.0 %    MCV 93 78 - 100 fl    MCH 30.5 26.5 - 33.0 pg    MCHC 32.9 31.5 - 36.5 g/dL    RDW 14.7 10.0 - 15.0 %    Platelet Count 303 150 - 450 10e9/L    Diff Method Automated Method     % Neutrophils 37.0 %    % Lymphocytes 30.8 %    % Monocytes 27.6 %    % Eosinophils 0.9 %    % Basophils 1.4 %    % Immature Granulocytes 2.3 %    Nucleated RBCs 0 0 /100    Absolute Neutrophil 1.6 1.6 - 8.3 10e9/L    Absolute Lymphocytes 1.3 0.8 - 5.3 10e9/L    Absolute Monocytes 1.2 0.0 - 1.3 10e9/L    Absolute Eosinophils 0.0 0.0 - 0.7 10e9/L    Absolute Basophils 0.1 0.0 - 0.2 10e9/L    Abs Immature Granulocytes 0.1 0 - 0.4 10e9/L    Absolute Nucleated RBC 0.0     Ovalocytes Slight     Platelet Estimate       Automated count confirmed.  Platelet morphology is normal.         Assessment and Plan:    This is a 67-year-old male with    Non-small cell lung cancer stage IV  Progressed on carboplatin pemetrexed and pembrolizumab, met with Dr. Muller therapy switched to docetaxel  plus ramucirumab-started on 09/20/2019  he comes here today for follow-up  -Overall he has been tolerating treatment well  -Labs reviewed stable counts  -Scheduled for cycle 2 on 10/11-appointment with [place]  -Scheduled for CT scan after cycle 2  and follow-up with Dr. Muller to discuss the results of  CT scan  -Scheduled for MRI of the brain in the next week-  -Patient wants to get his chemotherapy at [facility] so I will schedule for cycle 3 with [place] on 11/01      Left malignant pleural effusion  He had a Pleurx -Pleurx removed  -Advised to call clinic in the event of cough shortness of breath       Health maintenance  Give flu shot today      Patient advised to call clinic or go to ER in the event of fever chills sweats cough shortness of breath nausea vomiting diarrhea abdominal pain bleeding or any changes in health condition-patient and wife verbalized understanding      KISHA Nunez CNP  Hem/Onc   [facility]               Again, thank you for allowing me to participate in the care of your patient.        Sincerely,        KISHA Nunez CNP

## 2020-08-31 NOTE — PROGRESS NOTES
Pt resting, resp even and unlabored, skin pink, warm and dry. Rocephin infused, PICC line flushed with 10ml's of saline without difficulty and new swab cap applied. Pt released ambulatory in stable condition.

## 2020-08-31 NOTE — PROGRESS NOTES
Presents ambulatory for infusion of IV antibiotics. Denies complaints or concerns. Patient is alert and cooperative. Skin warm and dry. Color normal for ethnicity.

## 2020-09-01 ENCOUNTER — HOSPITAL ENCOUNTER (OUTPATIENT)
Dept: GENERAL RADIOLOGY | Age: 70
Discharge: HOME | End: 2020-09-01
Payer: MEDICARE

## 2020-09-01 VITALS
DIASTOLIC BLOOD PRESSURE: 64 MMHG | SYSTOLIC BLOOD PRESSURE: 125 MMHG | HEART RATE: 54 BPM | OXYGEN SATURATION: 96 % | TEMPERATURE: 98 F | RESPIRATION RATE: 18 BRPM

## 2020-09-01 DIAGNOSIS — T84.59XD INFECTION OF PROSTHETIC SHOULDER JOINT, SUBSEQUENT ENCOUNTER: ICD-10-CM

## 2020-09-01 DIAGNOSIS — A49.8 PROPIONIBACTERIUM INFECTION: Primary | ICD-10-CM

## 2020-09-01 DIAGNOSIS — Z96.619 INFECTION OF PROSTHETIC SHOULDER JOINT, SUBSEQUENT ENCOUNTER: ICD-10-CM

## 2020-09-01 LAB
ANION GAP SERPL CALCULATED.3IONS-SCNC: 11 MEQ/L (ref 8–16)
BUN BLDV-MCNC: 13 MG/DL (ref 7–22)
CALCIUM SERPL-MCNC: 9.2 MG/DL (ref 8.5–10.5)
CHLORIDE BLD-SCNC: 109 MEQ/L (ref 98–111)
CO2: 24 MEQ/L (ref 23–33)
CREAT SERPL-MCNC: 0.9 MG/DL (ref 0.4–1.2)
ERYTHROCYTE [DISTWIDTH] IN BLOOD BY AUTOMATED COUNT: 14.7 % (ref 11.5–14.5)
ERYTHROCYTE [DISTWIDTH] IN BLOOD BY AUTOMATED COUNT: 49.1 FL (ref 35–45)
GFR SERPL CREATININE-BSD FRML MDRD: 83 ML/MIN/1.73M2
GLUCOSE BLD-MCNC: 91 MG/DL (ref 70–108)
HCT VFR BLD CALC: 39.2 % (ref 42–52)
HEMOGLOBIN: 12.2 GM/DL (ref 14–18)
MCH RBC QN AUTO: 28.8 PG (ref 26–33)
MCHC RBC AUTO-ENTMCNC: 31.1 GM/DL (ref 32.2–35.5)
MCV RBC AUTO: 92.5 FL (ref 80–94)
PLATELET # BLD: 217 THOU/MM3 (ref 130–400)
PMV BLD AUTO: 12 FL (ref 9.4–12.4)
POTASSIUM SERPL-SCNC: 4.2 MEQ/L (ref 3.5–5.2)
RBC # BLD: 4.24 MILL/MM3 (ref 4.7–6.1)
SODIUM BLD-SCNC: 144 MEQ/L (ref 135–145)
WBC # BLD: 5.9 THOU/MM3 (ref 4.8–10.8)

## 2020-09-01 PROCEDURE — 2709999900 HC NON-CHARGEABLE SUPPLY

## 2020-09-01 PROCEDURE — 6360000002 HC RX W HCPCS: Performed by: INTERNAL MEDICINE

## 2020-09-01 PROCEDURE — 2580000003 HC RX 258: Performed by: INTERNAL MEDICINE

## 2020-09-01 PROCEDURE — 96365 THER/PROPH/DIAG IV INF INIT: CPT

## 2020-09-01 RX ADMIN — CEFTRIAXONE SODIUM 2 G: 2 INJECTION, POWDER, FOR SOLUTION INTRAMUSCULAR; INTRAVENOUS at 11:59

## 2020-09-02 ENCOUNTER — HOSPITAL ENCOUNTER (OUTPATIENT)
Dept: GENERAL RADIOLOGY | Age: 70
Discharge: HOME | End: 2020-09-02
Payer: MEDICARE

## 2020-09-02 VITALS
RESPIRATION RATE: 16 BRPM | TEMPERATURE: 97.6 F | SYSTOLIC BLOOD PRESSURE: 131 MMHG | OXYGEN SATURATION: 96 % | DIASTOLIC BLOOD PRESSURE: 65 MMHG | HEART RATE: 54 BPM

## 2020-09-02 DIAGNOSIS — T84.59XD INFECTION OF PROSTHETIC SHOULDER JOINT, SUBSEQUENT ENCOUNTER: ICD-10-CM

## 2020-09-02 DIAGNOSIS — Z96.619 INFECTION OF PROSTHETIC SHOULDER JOINT, SUBSEQUENT ENCOUNTER: ICD-10-CM

## 2020-09-02 DIAGNOSIS — A49.8 PROPIONIBACTERIUM INFECTION: Primary | ICD-10-CM

## 2020-09-02 PROCEDURE — 2580000003 HC RX 258: Performed by: INTERNAL MEDICINE

## 2020-09-02 PROCEDURE — 96365 THER/PROPH/DIAG IV INF INIT: CPT

## 2020-09-02 PROCEDURE — 6360000002 HC RX W HCPCS: Performed by: INTERNAL MEDICINE

## 2020-09-02 PROCEDURE — 2709999900 HC NON-CHARGEABLE SUPPLY

## 2020-09-02 RX ORDER — CEFTRIAXONE 2 G/1
INJECTION, POWDER, FOR SOLUTION INTRAMUSCULAR; INTRAVENOUS
Status: DISCONTINUED
Start: 2020-09-02 | End: 2020-09-03 | Stop reason: HOSPADM

## 2020-09-02 RX ADMIN — CEFTRIAXONE SODIUM 2 G: 2 INJECTION, POWDER, FOR SOLUTION INTRAMUSCULAR; INTRAVENOUS at 12:17

## 2020-09-02 NOTE — PROGRESS NOTES
No adverse reaction from rocephin infusion noted. Respirations regular and easy. Denies pain. Released in satisfactory condition. Ambulates out w/ cane. Gait steady. AVS reviewed. Pt declines copy.      __m__ Safety:       (Environmental)   [place] to environment   Ensure ID band is correct and in place/ allergy band as needed   Assess for fall risk   Initiate fall precautions as applicable (fall band, side rails, etc.)   Call light within reach   Bed in low position/ wheels locked    _m___ Pain:        Assess pain level and characteristics   Administer analgesics as ordered   Assess effectiveness of pain management and report to MD as needed    _m___ Knowledge Deficit:   Assess baseline knowledge   Provide teaching at level of understanding   Provide teaching via preferred learning method   Evaluate teaching effectiveness    __m__ Hemodynamic/Respiratory Status:       (Pre and Post Procedure Monitoring)   Assess/Monitor vital signs and LOC   Assess Baseline SpO2 prior to any sedation   Obtain weight/height   Assess vital signs/ LOC until patient meets discharge criteria   Monitor procedure site and notify MD of any issues    __m__ Infection-Risk of Central Venous Catheter:   Monitor for infection signs and symptoms (catheter site redness, temperature elevation, etc)   Assess for infection risks   Educate regarding infection prevention   Manage central venous catheter (flushes/ dressing changes per protocol)

## 2020-09-03 ENCOUNTER — HOSPITAL ENCOUNTER (OUTPATIENT)
Dept: GENERAL RADIOLOGY | Age: 70
Discharge: HOME | End: 2020-09-03
Payer: MEDICARE

## 2020-09-03 VITALS
HEART RATE: 62 BPM | RESPIRATION RATE: 16 BRPM | HEIGHT: 71 IN | SYSTOLIC BLOOD PRESSURE: 139 MMHG | BODY MASS INDEX: 39.2 KG/M2 | OXYGEN SATURATION: 93 % | DIASTOLIC BLOOD PRESSURE: 65 MMHG | WEIGHT: 280 LBS | TEMPERATURE: 97.6 F

## 2020-09-03 DIAGNOSIS — A49.8 PROPIONIBACTERIUM INFECTION: Primary | ICD-10-CM

## 2020-09-03 DIAGNOSIS — Z96.619 INFECTION OF PROSTHETIC SHOULDER JOINT, SUBSEQUENT ENCOUNTER: ICD-10-CM

## 2020-09-03 DIAGNOSIS — T84.59XD INFECTION OF PROSTHETIC SHOULDER JOINT, SUBSEQUENT ENCOUNTER: ICD-10-CM

## 2020-09-03 PROCEDURE — 2580000003 HC RX 258: Performed by: INTERNAL MEDICINE

## 2020-09-03 PROCEDURE — 96365 THER/PROPH/DIAG IV INF INIT: CPT

## 2020-09-03 PROCEDURE — 2709999900 HC NON-CHARGEABLE SUPPLY

## 2020-09-03 PROCEDURE — 6360000002 HC RX W HCPCS: Performed by: INTERNAL MEDICINE

## 2020-09-03 RX ORDER — CEFTRIAXONE 2 G/1
INJECTION, POWDER, FOR SOLUTION INTRAMUSCULAR; INTRAVENOUS
Status: DISCONTINUED
Start: 2020-09-03 | End: 2020-09-04 | Stop reason: HOSPADM

## 2020-09-03 RX ADMIN — CEFTRIAXONE SODIUM 2 G: 2 INJECTION, POWDER, FOR SOLUTION INTRAMUSCULAR; INTRAVENOUS at 12:11

## 2020-09-03 NOTE — PROGRESS NOTES
AVS reviewed. Pt declines copy.     _m___ Safety:       (Environmental)   [place] to environment   Ensure ID band is correct and in place/ allergy band as needed   Assess for fall risk   Initiate fall precautions as applicable (fall band, side rails, etc.)   Call light within reach   Bed in low position/ wheels locked    _m___ Pain:        Assess pain level and characteristics   Administer analgesics as ordered   Assess effectiveness of pain management and report to MD as needed    _m___ Knowledge Deficit:   Assess baseline knowledge   Provide teaching at level of understanding   Provide teaching via preferred learning method   Evaluate teaching effectiveness    _m___ Hemodynamic/Respiratory Status:       (Pre and Post Procedure Monitoring)   Assess/Monitor vital signs and LOC   Assess Baseline SpO2 prior to any sedation   Obtain weight/height   Assess vital signs/ LOC until patient meets discharge criteria   Monitor procedure site and notify MD of any issues    _m___ Infection-Risk of Central Venous Catheter:   Monitor for infection signs and symptoms (catheter site redness, temperature elevation, etc)   Assess for infection risks   Educate regarding infection prevention   Manage central venous catheter (flushes/ dressing changes per protocol)

## 2020-09-03 NOTE — PROGRESS NOTES
Pt here for outpatient nursing infusion and PICC line removal. Respirations regular and easy. Gait steady w/ cane. Pt alert and oriented X's 3. Taken to exam 3 for procedure and infusion.

## 2020-09-04 ENCOUNTER — HOSPITAL ENCOUNTER (OUTPATIENT)
Dept: GENERAL RADIOLOGY | Age: 70
End: 2020-09-04

## 2020-09-16 ENCOUNTER — HOSPITAL ENCOUNTER (OUTPATIENT)
Dept: WOUND CARE | Age: 70
Discharge: HOME | End: 2020-09-16
Payer: MEDICARE

## 2020-09-16 ENCOUNTER — HOSPITAL ENCOUNTER (OUTPATIENT)
Age: 70
Discharge: HOME | End: 2020-09-16
Payer: MEDICARE

## 2020-09-16 VITALS
TEMPERATURE: 98.1 F | SYSTOLIC BLOOD PRESSURE: 130 MMHG | DIASTOLIC BLOOD PRESSURE: 60 MMHG | HEART RATE: 58 BPM | OXYGEN SATURATION: 95 % | RESPIRATION RATE: 16 BRPM

## 2020-09-16 DIAGNOSIS — Z96.619 INFECTION OF PROSTHETIC SHOULDER JOINT, SUBSEQUENT ENCOUNTER: ICD-10-CM

## 2020-09-16 DIAGNOSIS — T84.59XD INFECTION OF PROSTHETIC SHOULDER JOINT, SUBSEQUENT ENCOUNTER: ICD-10-CM

## 2020-09-16 DIAGNOSIS — A49.8 PROPIONIBACTERIUM INFECTION: ICD-10-CM

## 2020-09-16 LAB — SEDIMENTATION RATE, ERYTHROCYTE: 13 MM/HR (ref 0–10)

## 2020-09-16 PROCEDURE — 85651 RBC SED RATE NONAUTOMATED: CPT

## 2020-09-16 PROCEDURE — 36415 COLL VENOUS BLD VENIPUNCTURE: CPT

## 2020-09-16 PROCEDURE — 86140 C-REACTIVE PROTEIN: CPT

## 2020-09-16 PROCEDURE — 99212 OFFICE O/P EST SF 10 MIN: CPT

## 2020-09-16 ASSESSMENT — PAIN DESCRIPTION - ORIENTATION: ORIENTATION: RIGHT

## 2020-09-16 ASSESSMENT — PAIN DESCRIPTION - LOCATION: LOCATION: SHOULDER

## 2020-09-16 ASSESSMENT — PAIN SCALES - GENERAL: PAINLEVEL_OUTOF10: 3

## 2020-09-16 ASSESSMENT — PAIN DESCRIPTION - PAIN TYPE: TYPE: ACUTE PAIN

## 2020-09-16 NOTE — CONSULTS
[facility] and Procedure Note      Del SCHAFER [address] [facility] RECORD NUMBER:  [id number]  AGE: 65 y.o. GENDER: male  : 1950  EPISODE DATE:  2020    Subjective:     Chief Complaint   Patient presents with    Wound Check     ID         HISTORY OF PRESENT ILLNESS   Is a 61-year-old male patient referred to the wound clinic for a second opinion. He had previous history of right shoulder replacement which was done on March. 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 He had a poly-exchange. His culture grew Propionibacterium and was treated with 6 weeks of IV antibiotic. The PICC line was removed 2 weeks ago. He was followed by Dr. Andrew Luna. Patient has persistent pain and swelling and has limited range of movement on the right shoulder and was brought to the clinic for opinion his last blood work was reviewed unfortunately there was not any C-reactive protein or sedimentation rate to see the status of his inflammation. Wife reports that there is redness. He denies any fever or chills.   PAST MEDICAL HISTORY                 Diagnosis Date    Arthritis     Back pain     Diarrhea     Hypertension     Infection of prosthetic shoulder joint ([address] 2020    PFO (patent foramen ovale) 2012    noted on ROSITA following stroke    Propionibacterium infection 2020    Rotator cuff injury     Stroke [facility])         TIA (transient ischemic attack) 2018     PAST SURGICAL HISTORY     PAST SURGICAL HISTORY    Past Surgical History:   Procedure Laterality Date    CHOLECYSTECTOMY  2017    CYST INCISION AND DRAINAGE      CYSTOSCOPY N/A 2019    CYSTOSCOPY, WITH  URETHRAL DILATION performed by William Ulloa MD at [address] [facility] 2020    CYSTOSCOPY WITH BLADDER BOTOX performed by William Ulloa MD at [address] [place] [address]  2017 given to patient and signed by patient or POA. Discharge Instructions       Visit Discharge/Physician Orders: Labs ordered today CRP, ESR  - continue physical therapy  - continue taking probiotic daily  - we will call you once blood work is reviewed and let you know if you need antibiotics     Follow up visit:   4 Weeks 10/14/2020 at     Keep next scheduled appointment. Please give 24 hour notice if unable to keep appointment. [phone]    If you experience any of the following, please call the Wound Care Service during business hours: Monday through Friday 8:00 am - 4:30 pm  ([phone]). *Increase in pain   *Temperature over 101   *Increase in drainage from your wound or a foul odor   *Uncontrolled swelling   *Need for compression bandage changes due to slippage, breakthrough drainage    If you need medical attention outside of business hours, please contact your Primary Care Doctor or go to the nearest emergency room.                    Electronically signed by Rosemary Pond MD on 9/16/2020 at 9:16 AM

## 2020-09-16 NOTE — PLAN OF CARE
Problem: Wound:  Goal: Will show signs of wound healing; wound closure and no evidence of infection  Description: Will show signs of wound healing; wound closure and no evidence of infection  Outcome: Ongoing   Pt. Seen today for ID for right shoulder see AVS for new orders. Follow up in 1 month. Care plan reviewed with patient and wife. Patient and wife verbalize understanding of the plan of care and contribute to goal setting.

## 2020-09-17 LAB — C-REACTIVE PROTEIN: 0.23 MG/DL (ref 0–1)

## 2020-09-24 ENCOUNTER — TELEPHONE (OUTPATIENT)
Dept: WOUND CARE | Age: 70
End: 2020-09-24

## 2020-09-24 NOTE — TELEPHONE ENCOUNTER
Patients wife called asking about patients labs. Spoke with Dr. Florencio Soares who states levels are good and no antibiotics are needed at this time. Informed patient and wife. We will continue to monitor and see patient in clinic in 3 weeks.

## 2020-10-13 ENCOUNTER — OFFICE VISIT (OUTPATIENT)
Dept: UROLOGY | Age: 70
End: 2020-10-13
Payer: MEDICARE

## 2020-10-13 VITALS — HEIGHT: 71 IN | TEMPERATURE: 97.4 F | BODY MASS INDEX: 40.6 KG/M2 | WEIGHT: 290 LBS

## 2020-10-13 LAB
BILIRUBIN URINE: NEGATIVE
BLOOD URINE, POC: NEGATIVE
CHARACTER, URINE: CLEAR
COLOR, URINE: YELLOW
GLUCOSE URINE: NEGATIVE MG/DL
KETONES, URINE: NEGATIVE
LEUKOCYTE CLUMPS, URINE: ABNORMAL
NITRITE, URINE: NEGATIVE
PH, URINE: 5.5 (ref 5–9)
PROTEIN, URINE: NEGATIVE MG/DL
SPECIFIC GRAVITY, URINE: 1.02 (ref 1–1.03)
UROBILINOGEN, URINE: 0.2 EU/DL (ref 0–1)

## 2020-10-13 PROCEDURE — 99214 OFFICE O/P EST MOD 30 MIN: CPT | Performed by: UROLOGY

## 2020-10-13 PROCEDURE — G8417 CALC BMI ABV UP PARAM F/U: HCPCS | Performed by: UROLOGY

## 2020-10-13 PROCEDURE — 1123F ACP DISCUSS/DSCN MKR DOCD: CPT | Performed by: UROLOGY

## 2020-10-13 PROCEDURE — 4040F PNEUMOC VAC/ADMIN/RCVD: CPT | Performed by: UROLOGY

## 2020-10-13 PROCEDURE — G8484 FLU IMMUNIZE NO ADMIN: HCPCS | Performed by: UROLOGY

## 2020-10-13 PROCEDURE — 81003 URINALYSIS AUTO W/O SCOPE: CPT | Performed by: UROLOGY

## 2020-10-13 PROCEDURE — G8427 DOCREV CUR MEDS BY ELIG CLIN: HCPCS | Performed by: UROLOGY

## 2020-10-13 PROCEDURE — 1036F TOBACCO NON-USER: CPT | Performed by: UROLOGY

## 2020-10-13 PROCEDURE — 3017F COLORECTAL CA SCREEN DOC REV: CPT | Performed by: UROLOGY

## 2020-10-13 NOTE — PROGRESS NOTES
Lucia Jones MD        [address] [place] [address] [id number]  Dept: [phone]  Dept Fax: [phone]: [address] [address] Urology Office Note -     Patient:  Daniel Starks  YOB: 1950    The patient is a 65 y.o. male who presents today for evaluation of the following problems:   Chief Complaint   Patient presents with    Follow-up     bph/urgency/urge incontinence         HISTORY OF PRESENT ILLNESS:     OAB   Onset was  Months ago  Overall, the problem(s) are unchanged  Severity is described as mild  Associated Symptoms: No dysuria, no gross hematuria. Current Pain Severity: 0     S/p botox months ago  Voiding well with about 30-40% improvement  Had recent shoulder surgery and has some setbacks from that. We discussed holding off on doing anything with his OAB until his shoulder issues are better. We discussed interstim briefly as next line therapy. Patient reports rash on his back. This rash does not itch. His will talk to Dr. Steffanie Florence about this. Could of been a reaction to the abx he was on for his infected PICC line. 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 Hx of HGPIN focal        Summary of Previous Records:  S/p TURP in months past  Worsening denovo urgency. Recent cystoscopy demonstrating mild fossa navicularis stricture  Many failed medications  Present with wife in office today, very frustrated  He is s/p TURP using bipolar loop with Dr. William Wade on 08/13/19. Surgical pathology revealed focal high-grade prostatic intraepithelial neoplasia, glandular and stromal hyperplasia with chronic inflammation, 14 grams removed. He comes in today with his wife. Hx is obtained from the patient and the medical record.          Requested/reviewed records from Marcello Iyer,  office and/or outside [de-identified]    (Patient's old records have been requested, reviewed and pertinent findings summarized in today's note.)    Procedures Today: N/A    Last several PSA's:  Lab Results   Component Value Date    PSA 0.66 07/24/2019    PSA 1.64 (H) 06/19/2018    PSA 1.26 (H) 08/14/2017       Last total testosterone:  No results found for: TESTOSTERONE    Urinalysis today:  Results for POC orders placed in visit on 10/13/20   POCT Urinalysis No Micro (Auto)   Result Value Ref Range    Glucose, Ur Negative NEGATIVE mg/dl    Bilirubin Urine Negative     Ketones, Urine Negative NEGATIVE    Specific Gravity, Urine 1.025 1.002 - 1.030    Blood, UA POC Negative NEGATIVE    pH, Urine 5.50 5.0 - 9.0    Protein, Urine Negative NEGATIVE mg/dl    Urobilinogen, Urine 0.20 0.0 - 1.0 eu/dl    Nitrite, Urine Negative NEGATIVE    Leukocyte Clumps, Urine Trace (A) NEGATIVE    Color, Urine Yellow YELLOW-STRAW    Character, Urine Clear [id number].CLOUD       Last BUN and creatinine:  Lab Results   Component Value Date    BUN 13 08/31/2020     Lab Results   Component Value Date    CREATININE 0.9 08/31/2020       Imaging Reviewed during this Office Visit:   Silvia Raya MD independently reviewed the images and verified the radiology reports from:    No results found.     PAST MEDICAL, FAMILY AND SOCIAL HISTORY:  Past Medical History:   Diagnosis Date    Arthritis     Back pain     Diarrhea     Hypertension     Infection of prosthetic shoulder joint ([address] 7/24/2020    PFO (patent foramen ovale) 8/2012    noted on ROSITA following stroke    Propionibacterium infection 7/24/2020    Rotator cuff injury     Stroke [facility])     August 19/2012    TIA (transient ischemic attack) 05/2018     Past Surgical History:   Procedure Laterality Date    CHOLECYSTECTOMY  06/25/2017    CYST INCISION AND DRAINAGE      CYSTOSCOPY N/A 12/17/2019    CYSTOSCOPY, WITH  URETHRAL DILATION performed by Lona Migule MD at [facility] 2/18/2020    CYSTOSCOPY WITH BLADDER BOTOX performed by Gian Tan MD at [address] [facility] ERCP  06/23/2017    ROTATOR CUFF REPAIR Left 03/12/2013    right 4/2019    SHOULDER SURGERY Right 03/02/2020    SHOULDER SURGERY  06/2020    frozen shoulder, repair nerve elbow and palm-dr alesha GRAY N/A 8/13/2019    TRANSURETHRAL RESECTION PROSTATE performed by Patsy Rodriguez MD at [address] [place] [address] History   Problem Relation Age of Onset    Arthritis Mother     Vision Loss Father     Cancer Brother     Stroke Maternal Uncle     Prostate Cancer Neg Hx      Outpatient Medications Marked as Taking for the 10/13/20 encounter (Office Visit) with Gian Tan MD   Medication Sig Dispense Refill    hydrochlorothiazide (HYDRODIURIL) 12.5 MG tablet TAKE 1 TABLET BY MOUTH ONCE DAILY      telmisartan (MICARDIS) 40 MG tablet Take 40 mg by mouth daily      oxyCODONE-acetaminophen (PERCOCET) 5-325 MG per tablet Take 1 tablet by mouth every 4 hours as needed for Pain.  tamsulosin (FLOMAX) 0.4 MG capsule Take 1 capsule by mouth daily 90 capsule 3    finasteride (PROSCAR) 5 MG tablet Take 1 tablet by mouth daily 90 tablet 3    Potassium 99 MG TABS Take 3 tablets by mouth daily      labetalol (NORMODYNE) 200 MG tablet Take 100 mg by mouth 2 times daily       Multiple Vitamins-Minerals (PRESERVISION AREDS PO) Take by mouth      Ascorbic Acid (VITAMIN C) 500 MG tablet Take 1,000 mg by mouth          Adhesive tape  Social History     Tobacco Use   Smoking Status Never Smoker   Smokeless Tobacco Never Used      (If patient a smoker, smoking cessation counseling offered)   Social History     Substance and Sexual Activity   Alcohol Use No       REVIEW OF SYSTEMS:  Constitutional: negative  Eyes: negative  Respiratory: negative  Cardiovascular: negative  Gastrointestinal: negative  Genitourinary: see HPI  Musculoskeletal: negative  Skin: negative   Neurological: negative  Hematological/Lymphatic: negative  Psychological: negative        Physical Exam:    This a 65 y.o. male  Vitals:    10/13/20 0938   Temp: 97.4 °F (36.3 °C)     Body mass index is 40.45 kg/m². Constitutional: Patient in no acute distress;         Assessment and Plan        1. BPH with urinary obstruction    2. Urgency of urination    3. OAB (overactive bladder)    4. 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 They are dealing with those issues at this time. We will hold off on anything with his OAB at this time. Discussed interstim briefly as next line therapy. Return in 3 months to reassess. Prescriptions Ordered:  No orders of the defined types were placed in this encounter.      Orders Placed:  Orders Placed This Encounter   Procedures    POCT Urinalysis No Micro (Auto)            KAUSHIK Hernandez MD

## 2020-10-14 ENCOUNTER — HOSPITAL ENCOUNTER (OUTPATIENT)
Dept: WOUND CARE | Age: 70
Discharge: HOME | End: 2020-10-14
Payer: MEDICARE

## 2020-10-14 VITALS
SYSTOLIC BLOOD PRESSURE: 133 MMHG | OXYGEN SATURATION: 94 % | TEMPERATURE: 95.9 F | DIASTOLIC BLOOD PRESSURE: 63 MMHG | RESPIRATION RATE: 16 BRPM | HEART RATE: 61 BPM

## 2020-10-14 PROCEDURE — 99212 OFFICE O/P EST SF 10 MIN: CPT

## 2020-10-14 ASSESSMENT — PAIN DESCRIPTION - LOCATION: LOCATION: SHOULDER

## 2020-10-14 ASSESSMENT — PAIN DESCRIPTION - ORIENTATION: ORIENTATION: RIGHT

## 2020-10-14 ASSESSMENT — PAIN DESCRIPTION - PAIN TYPE: TYPE: ACUTE PAIN

## 2020-10-14 NOTE — PLAN OF CARE
Problem: Wound:  Goal: Will show signs of wound healing; wound closure and no evidence of infection  Description: Will show signs of wound healing; wound closure and no evidence of infection  Outcome: Ongoing     Patient presents to wound clinic for right shoulder infection. No s/s of infection noted. See AVS for discharge instructions. Follow up visit:  As needed. Call with any concerns. Care plan reviewed with patient and wife. Patient and wife verbalize understanding of the plan of care and contribute to goal setting.

## 2020-10-14 NOTE — PROGRESS NOTES
[facility]          Progress Note and Procedure Note      Del SCHAFER [address] RECORD NUMBER:  [id number]  AGE: 65 y.o. GENDER: male  : 1950  EPISODE DATE:  10/14/2020    Subjective:     SUBJECTIVE     Chief Complaint   Patient presents with    Other     infection            HISTORY OF PRESENT ILLNESS      Tashia Castillo is a 65 y.o. male who presents today for wound/ulcer evaluation. He had previous history of right shoulder replacement which was done on March was complicated by infection had polyexchange he had positive culture for Propionibacterium which was treated with 6 weeks of antibiotics. He still has a limited range of movement. He denies any fever or chills. He was seen here for second opinion.   Follow-up lab work for markers of inflammation was normal however he has a very stiff right shoulder joint no open wound or drainage     PAST MEDICAL HISTORY         Diagnosis Date    Arthritis     Back pain     Diarrhea     Hypertension     Infection of prosthetic shoulder joint ([address] 2020    PFO (patent foramen ovale) 2012    noted on ROSITA following stroke    Propionibacterium infection 2020    Rotator cuff injury     Stroke [facility])         TIA (transient ischemic attack) 2018         PAST SURGICAL HISTORY     Past Surgical History:   Procedure Laterality Date    CHOLECYSTECTOMY  2017    CYST INCISION AND DRAINAGE      CYSTOSCOPY N/A 2019    CYSTOSCOPY, WITH  URETHRAL DILATION performed by Hayes Contreras MD at [facility] 2020    CYSTOSCOPY WITH BLADDER BOTOX performed by Hayes Contreras MD at [facility]  2017    ROTATOR CUFF REPAIR Left 2013    right 2019    SHOULDER SURGERY Right 2020    SHOULDER SURGERY  2020    frozen shoulder, repair nerve elbow and palm-dr alesha GRAY N/A 2019    TRANSURETHRAL RESECTION PROSTATE performed by Kayy Cardenas MD at [facility] HISTORY         Family History   Problem Relation Age of Onset    Arthritis Mother     Vision Loss Father     Cancer Brother     Stroke Maternal Uncle     Prostate Cancer Neg Hx      SOCIAL HISTORY       Social History     Tobacco Use    Smoking status: Never Smoker    Smokeless tobacco: Never Used   Substance Use Topics    Alcohol use: No    Drug use: No     ALLERGIES         Allergies   Allergen Reactions    Adhesive Tape Rash     MEDICATIONS         Current Outpatient Medications on File Prior to Encounter   Medication Sig Dispense Refill    hydrochlorothiazide (HYDRODIURIL) 12.5 MG tablet TAKE 1 TABLET BY MOUTH ONCE DAILY      oxyCODONE-acetaminophen (PERCOCET) 5-325 MG per tablet Take 1 tablet by mouth every 4 hours as needed for Pain.  Potassium 99 MG TABS Take 3 tablets by mouth daily      labetalol (NORMODYNE) 200 MG tablet Take 100 mg by mouth 2 times daily       Multiple Vitamins-Minerals (PRESERVISION AREDS PO) Take by mouth      Ascorbic Acid (VITAMIN C) 500 MG tablet Take 1,000 mg by mouth       [DISCONTINUED] oxybutynin (DITROPAN) 5 MG tablet Take up to 1 tablet 4 times a day as needed. 120 tablet 3     No current facility-administered medications on file prior to encounter. REVIEW OF SYSTEMS:     Constitutional: no fever, no night sweats, no fatigue. Head: no head ache , no head injury, no migranes. Eye: no blurring of vision, no double vision.   Ears: no hearing difficulty, no tinnitus  Mouth/throat: no ulceration, dental caries , dysphagia  Lungs: no cough, no shortness of breath, no wheeze  CVS: no palpitation, no chest pain, no shortness of breath  GI: no abdominal pain, no nausea , no vomiting, no constipation  ALEJANDRO: no dysuria, frequency and urgency, no hematuria, no kidney stones  Musculoskeletal: Right shoulder limited range of movement   endocrine: no polyuria, polydipsia, no cold or heat intolerance  Hematology: no anemia, no easy brusing or bleeding, no hx of clotting disorder  Dermatology: no skin rash, no eczema, no pruritis,  Psychiatry: no depression, no anxiety,no panic attacks, no suicide ideation        PHYSICAL EXAM      tympanic temperature is 95.9 °F (35.5 °C). His blood pressure is 133/63 and his pulse is 61. His respiration is 16 and oxygen saturation is 94%. Wt Readings from Last 3 Encounters:   10/13/20 290 lb (131.5 kg)   09/03/20 280 lb (127 kg)   08/23/20 280 lb (127 kg)          General Appearance  Awake, alert, oriented,  not  In acute distress  HEENT - normocephalic, atraumatic, pink conjunctiva,  anicteric sclera  Neck - Supple, no mass  Abdomen - soft, not distended, nontender, no organomegally,  Neurologic -oriented   Skin - No bruising or bleeding  Extremities -there is limited range of movement on the right shoulder the scar has healed there was no swelling he was not tender to touch       Assessment:   Right shoulder infection of prosthesis he had completed treatment he still has limited range of movement. His markers of inflammation were normal.  We will repeat in 4 weeks. He  He will see his surgeon tomorrow    Patient Active Problem List   Diagnosis Code    Right basal ganglia embolic stroke ([facility] D39.3    BPH with urinary obstruction N40.1, N13.8    Propionibacterium infection A49.8    Infection of prosthetic shoulder joint ([facility] T84.59XA, Z96.619        Plan:     Patient examined and evaluated   Please see attached Discharge Instructions    Written patient dismissal instructions given to patient and signed by patient or POA. Discharge Instructions       Visit Discharge/Physician Orders: Labs ordered today CRP, ESR  - continue physical therapy  - continue taking probiotic daily  - we will call you once blood work is reviewed and let you know if you need antibiotics      Follow up visit:  as needed.   Keep next scheduled appointment. Please give 24 hour notice if unable to keep appointment.  [phone]     If you experience any of the following, please call the Wound Care Service during business hours: Monday through Friday 8:00 am - 4:30 pm  ([phone]).               *Increase in pain              *Temperature over 101              *Increase in drainage from your wound or a foul odor              *Uncontrolled swelling              *Need for compression bandage changes due to slippage, breakthrough drainage     If you need medical attention outside of business hours, please contact your Primary Care Doctor or go to the nearest emergency room.            Electronically signed by Mart Johnson MD on 10/14/2020 at 9:47 AM

## 2020-11-11 ENCOUNTER — HOSPITAL ENCOUNTER (OUTPATIENT)
Age: 70
Discharge: HOME | End: 2020-11-11
Payer: MEDICARE

## 2020-11-11 DIAGNOSIS — T84.59XA INFECTION OF PROSTHETIC SHOULDER JOINT, INITIAL ENCOUNTER (HCC): ICD-10-CM

## 2020-11-11 DIAGNOSIS — Z96.619 INFECTION OF PROSTHETIC SHOULDER JOINT, INITIAL ENCOUNTER (HCC): ICD-10-CM

## 2020-11-11 LAB
C-REACTIVE PROTEIN: 0.25 MG/DL (ref 0–1)
ERYTHROCYTE [DISTWIDTH] IN BLOOD BY AUTOMATED COUNT: 14.4 % (ref 11.5–14.5)
ERYTHROCYTE [DISTWIDTH] IN BLOOD BY AUTOMATED COUNT: 45.8 FL (ref 35–45)
HCT VFR BLD CALC: 43.9 % (ref 42–52)
HEMOGLOBIN: 14.3 GM/DL (ref 14–18)
MCH RBC QN AUTO: 28.4 PG (ref 26–33)
MCHC RBC AUTO-ENTMCNC: 32.6 GM/DL (ref 32.2–35.5)
MCV RBC AUTO: 87.3 FL (ref 80–94)
PLATELET # BLD: 239 THOU/MM3 (ref 130–400)
PMV BLD AUTO: 11.3 FL (ref 9.4–12.4)
RBC # BLD: 5.03 MILL/MM3 (ref 4.7–6.1)
SEDIMENTATION RATE, ERYTHROCYTE: 10 MM/HR (ref 0–10)
WBC # BLD: 6.1 THOU/MM3 (ref 4.8–10.8)

## 2020-11-11 PROCEDURE — 86140 C-REACTIVE PROTEIN: CPT

## 2020-11-11 PROCEDURE — 85027 COMPLETE CBC AUTOMATED: CPT

## 2020-11-11 PROCEDURE — 36415 COLL VENOUS BLD VENIPUNCTURE: CPT

## 2020-11-11 PROCEDURE — 85651 RBC SED RATE NONAUTOMATED: CPT

## 2020-11-12 ENCOUNTER — HOSPITAL ENCOUNTER (OUTPATIENT)
Age: 70
Discharge: HOME | End: 2020-11-12
Payer: MEDICARE

## 2020-11-12 LAB
ANION GAP SERPL CALCULATED.3IONS-SCNC: 12 MEQ/L (ref 8–16)
BUN BLDV-MCNC: 13 MG/DL (ref 7–22)
CALCIUM SERPL-MCNC: 9.2 MG/DL (ref 8.5–10.5)
CHLORIDE BLD-SCNC: 106 MEQ/L (ref 98–111)
CO2: 24 MEQ/L (ref 23–33)
CREAT SERPL-MCNC: 0.9 MG/DL (ref 0.4–1.2)
EKG ATRIAL RATE: 62 BPM
EKG P AXIS: 23 DEGREES
EKG P-R INTERVAL: 206 MS
EKG Q-T INTERVAL: 412 MS
EKG QRS DURATION: 112 MS
EKG QTC CALCULATION (BAZETT): 418 MS
EKG R AXIS: 64 DEGREES
EKG T AXIS: 36 DEGREES
EKG VENTRICULAR RATE: 62 BPM
ERYTHROCYTE [DISTWIDTH] IN BLOOD BY AUTOMATED COUNT: 14.3 % (ref 11.5–14.5)
ERYTHROCYTE [DISTWIDTH] IN BLOOD BY AUTOMATED COUNT: 45.3 FL (ref 35–45)
GFR SERPL CREATININE-BSD FRML MDRD: 83 ML/MIN/1.73M2
GLUCOSE BLD-MCNC: 138 MG/DL (ref 70–108)
HCT VFR BLD CALC: 42.4 % (ref 42–52)
HEMOGLOBIN: 13.9 GM/DL (ref 14–18)
MCH RBC QN AUTO: 28.6 PG (ref 26–33)
MCHC RBC AUTO-ENTMCNC: 32.8 GM/DL (ref 32.2–35.5)
MCV RBC AUTO: 87.2 FL (ref 80–94)
PLATELET # BLD: 233 THOU/MM3 (ref 130–400)
PMV BLD AUTO: 11.7 FL (ref 9.4–12.4)
POTASSIUM SERPL-SCNC: 3.9 MEQ/L (ref 3.5–5.2)
RBC # BLD: 4.86 MILL/MM3 (ref 4.7–6.1)
SODIUM BLD-SCNC: 142 MEQ/L (ref 135–145)
WBC # BLD: 6.5 THOU/MM3 (ref 4.8–10.8)

## 2020-11-12 PROCEDURE — 36415 COLL VENOUS BLD VENIPUNCTURE: CPT

## 2020-11-12 PROCEDURE — 93005 ELECTROCARDIOGRAM TRACING: CPT | Performed by: ORTHOPAEDIC SURGERY

## 2020-11-12 PROCEDURE — 85027 COMPLETE CBC AUTOMATED: CPT

## 2020-11-12 PROCEDURE — 80048 BASIC METABOLIC PNL TOTAL CA: CPT

## 2020-11-13 PROCEDURE — 93010 ELECTROCARDIOGRAM REPORT: CPT | Performed by: NUCLEAR MEDICINE

## 2020-11-16 ENCOUNTER — TELEPHONE (OUTPATIENT)
Dept: WOUND CARE | Age: 70
End: 2020-11-16

## 2020-11-16 NOTE — TELEPHONE ENCOUNTER
Will have Dr. Rainer Cobb review lab work and will call patient with results. Wife notified and voices understanding.

## 2020-11-18 ENCOUNTER — TELEPHONE (OUTPATIENT)
Dept: WOUND CARE | Age: 70
End: 2020-11-18

## 2020-11-18 NOTE — TELEPHONE ENCOUNTER
Reviewed lab work with Dr. Alena Patel. Updated wife on result given by physician. She voices understanding.

## 2020-11-19 ENCOUNTER — OFFICE VISIT (OUTPATIENT)
Dept: CARDIOLOGY CLINIC | Age: 70
End: 2020-11-19
Payer: MEDICARE

## 2020-11-19 VITALS
WEIGHT: 285 LBS | SYSTOLIC BLOOD PRESSURE: 138 MMHG | HEIGHT: 71 IN | DIASTOLIC BLOOD PRESSURE: 84 MMHG | HEART RATE: 72 BPM | BODY MASS INDEX: 39.9 KG/M2

## 2020-11-19 PROCEDURE — G8417 CALC BMI ABV UP PARAM F/U: HCPCS | Performed by: NUCLEAR MEDICINE

## 2020-11-19 PROCEDURE — 4040F PNEUMOC VAC/ADMIN/RCVD: CPT | Performed by: NUCLEAR MEDICINE

## 2020-11-19 PROCEDURE — 99204 OFFICE O/P NEW MOD 45 MIN: CPT | Performed by: NUCLEAR MEDICINE

## 2020-11-19 PROCEDURE — G8427 DOCREV CUR MEDS BY ELIG CLIN: HCPCS | Performed by: NUCLEAR MEDICINE

## 2020-11-19 PROCEDURE — 1123F ACP DISCUSS/DSCN MKR DOCD: CPT | Performed by: NUCLEAR MEDICINE

## 2020-11-19 PROCEDURE — 3017F COLORECTAL CA SCREEN DOC REV: CPT | Performed by: NUCLEAR MEDICINE

## 2020-11-19 PROCEDURE — 1036F TOBACCO NON-USER: CPT | Performed by: NUCLEAR MEDICINE

## 2020-11-19 PROCEDURE — G8484 FLU IMMUNIZE NO ADMIN: HCPCS | Performed by: NUCLEAR MEDICINE

## 2020-11-19 ASSESSMENT — ENCOUNTER SYMPTOMS
DIARRHEA: 0
RECTAL PAIN: 0
COLOR CHANGE: 0
CONSTIPATION: 0
ABDOMINAL DISTENTION: 0
PHOTOPHOBIA: 0
VOMITING: 0
ANAL BLEEDING: 0
FACIAL SWELLING: 0
CHEST TIGHTNESS: 0
SHORTNESS OF BREATH: 1
ABDOMINAL PAIN: 0
BACK PAIN: 1
NAUSEA: 0
BLOOD IN STOOL: 0

## 2020-11-20 ENCOUNTER — HOSPITAL ENCOUNTER (OUTPATIENT)
Dept: NON INVASIVE DIAGNOSTICS | Age: 70
Discharge: HOME | End: 2020-11-20
Payer: MEDICARE

## 2020-11-20 LAB
LV EF: 60 %
LVEF MODALITY: NORMAL

## 2020-11-20 PROCEDURE — 93306 TTE W/DOPPLER COMPLETE: CPT

## 2020-11-23 ENCOUNTER — HOSPITAL ENCOUNTER (OUTPATIENT)
Dept: NON INVASIVE DIAGNOSTICS | Age: 70
Discharge: HOME | End: 2020-11-23
Payer: MEDICARE

## 2020-11-23 PROCEDURE — 6360000002 HC RX W HCPCS

## 2020-11-23 PROCEDURE — 93017 CV STRESS TEST TRACING ONLY: CPT | Performed by: NUCLEAR MEDICINE

## 2020-11-23 PROCEDURE — 78452 HT MUSCLE IMAGE SPECT MULT: CPT

## 2020-11-23 PROCEDURE — A9500 TC99M SESTAMIBI: HCPCS | Performed by: NUCLEAR MEDICINE

## 2020-11-23 PROCEDURE — 3430000000 HC RX DIAGNOSTIC RADIOPHARMACEUTICAL: Performed by: NUCLEAR MEDICINE

## 2020-11-23 RX ADMIN — Medication 33.6 MILLICURIE: at 13:45

## 2020-11-23 RX ADMIN — Medication 9.9 MILLICURIE: at 12:55

## 2020-11-24 ENCOUNTER — TELEPHONE (OUTPATIENT)
Dept: CARDIOLOGY CLINIC | Age: 70
End: 2020-11-24

## 2020-11-24 RX ORDER — ISOSORBIDE MONONITRATE 30 MG/1
30 TABLET, EXTENDED RELEASE ORAL DAILY
Qty: 30 TABLET | Refills: 1 | Status: SHIPPED | OUTPATIENT
Start: 2020-11-24 | End: 2021-03-22 | Stop reason: SDUPTHER

## 2020-11-24 NOTE — TELEPHONE ENCOUNTER
Robyn Peña at Dr. Charli Albarran office notified. She states Dr. Shiloh Ken requesting last office note. Office note faxed and I told Robyn Peña we will let her know as soon as heart cath is scheduled. Her phone #[phone].

## 2020-11-24 NOTE — TELEPHONE ENCOUNTER
Pt notified and voiced understanding, and heart cath /covid order given to scheduling, and med pending

## 2020-11-24 NOTE — TELEPHONE ENCOUNTER
Per bart, dr Parviz Lal will be calling pt tonight to discuss heart cath. Family notified. LM with Itz Simmons at surgeons office.

## 2020-11-25 NOTE — TELEPHONE ENCOUNTER
I had a half hour discussion with the patient his wife and the orthos surgeon in [place] over the phone  Surgery is urgent almost emergent   No room for cath or any delay   In fact he had infected joint and contra indication for invasive cardiac procedures at this point  The patient and his wife understand the risk of MI and even death during surgery and are willing to proceed  I will see post on in few weeks after second OR which is 2-3 months from now

## 2020-11-27 ENCOUNTER — TELEPHONE (OUTPATIENT)
Dept: CARDIOLOGY CLINIC | Age: 70
End: 2020-11-27

## 2020-11-27 NOTE — TELEPHONE ENCOUNTER
Pt wife Carolyne Rueda called asking since cath is not going to be for months if pt should still start the isosobide . Pt has not started the medication yet. Dr. Baxter Payment advise?

## 2020-12-10 ENCOUNTER — TELEPHONE (OUTPATIENT)
Dept: CARDIOLOGY CLINIC | Age: 70
End: 2020-12-10

## 2020-12-10 RX ORDER — CLONIDINE HYDROCHLORIDE 0.1 MG/1
0.1 TABLET ORAL EVERY 12 HOURS PRN
COMMUNITY
End: 2020-12-11 | Stop reason: ALTCHOICE

## 2020-12-10 NOTE — TELEPHONE ENCOUNTER
Monica called from Gavi August on nurse line faxing pt b/p log for review. Ph# [phone]. Recent b/p running high.

## 2020-12-11 RX ORDER — VALSARTAN 160 MG/1
160 TABLET ORAL DAILY
COMMUNITY
End: 2020-12-11 | Stop reason: SDUPTHER

## 2020-12-11 RX ORDER — VALSARTAN 160 MG/1
160 TABLET ORAL DAILY
Qty: 30 TABLET | Refills: 11 | Status: SHIPPED | OUTPATIENT
Start: 2020-12-11 | End: 2021-02-12

## 2020-12-11 NOTE — TELEPHONE ENCOUNTER
Linsey Townsend at [facility] home notified and voiced understanding. Script faxed to [phone].

## 2020-12-30 NOTE — TELEPHONE ENCOUNTER
Received another clearance form for the right shoulder. Wife states they need to go back in and finish the surgery on 1-13-21. Is he clear?

## 2021-02-12 ENCOUNTER — OFFICE VISIT (OUTPATIENT)
Dept: CARDIOLOGY CLINIC | Age: 71
End: 2021-02-12
Payer: MEDICARE

## 2021-02-12 VITALS
HEART RATE: 69 BPM | BODY MASS INDEX: 38.5 KG/M2 | DIASTOLIC BLOOD PRESSURE: 70 MMHG | HEIGHT: 71 IN | WEIGHT: 275 LBS | SYSTOLIC BLOOD PRESSURE: 126 MMHG

## 2021-02-12 DIAGNOSIS — I25.10 CORONARY ARTERY DISEASE INVOLVING NATIVE CORONARY ARTERY OF NATIVE HEART WITHOUT ANGINA PECTORIS: ICD-10-CM

## 2021-02-12 DIAGNOSIS — I10 ESSENTIAL HYPERTENSION: ICD-10-CM

## 2021-02-12 DIAGNOSIS — R06.02 SHORTNESS OF BREATH: Primary | ICD-10-CM

## 2021-02-12 PROCEDURE — G8417 CALC BMI ABV UP PARAM F/U: HCPCS | Performed by: NUCLEAR MEDICINE

## 2021-02-12 PROCEDURE — 3017F COLORECTAL CA SCREEN DOC REV: CPT | Performed by: NUCLEAR MEDICINE

## 2021-02-12 PROCEDURE — G8484 FLU IMMUNIZE NO ADMIN: HCPCS | Performed by: NUCLEAR MEDICINE

## 2021-02-12 PROCEDURE — 1036F TOBACCO NON-USER: CPT | Performed by: NUCLEAR MEDICINE

## 2021-02-12 PROCEDURE — G8427 DOCREV CUR MEDS BY ELIG CLIN: HCPCS | Performed by: NUCLEAR MEDICINE

## 2021-02-12 PROCEDURE — 4040F PNEUMOC VAC/ADMIN/RCVD: CPT | Performed by: NUCLEAR MEDICINE

## 2021-02-12 PROCEDURE — 99214 OFFICE O/P EST MOD 30 MIN: CPT | Performed by: NUCLEAR MEDICINE

## 2021-02-12 PROCEDURE — 1123F ACP DISCUSS/DSCN MKR DOCD: CPT | Performed by: NUCLEAR MEDICINE

## 2021-02-12 RX ORDER — TAMSULOSIN HYDROCHLORIDE 0.4 MG/1
0.4 CAPSULE ORAL DAILY
COMMUNITY
End: 2021-05-26

## 2021-02-12 RX ORDER — AMOXICILLIN AND CLAVULANATE POTASSIUM 875; 125 MG/1; MG/1
1 TABLET, FILM COATED ORAL 2 TIMES DAILY
COMMUNITY
End: 2021-05-26

## 2021-02-12 RX ORDER — LOSARTAN POTASSIUM 100 MG/1
50 TABLET ORAL DAILY
COMMUNITY

## 2021-02-12 NOTE — PROGRESS NOTES
[address] [place] [address] [address]  [address]  [facility] [address]  Dept: [phone]  Dept Fax: [phone]  Loc: [phone]    Visit Date: 2/12/2021    Abimael Acosta is a 65 y.o. male who presents todayfor:  Chief Complaint   Patient presents with    Follow-up    Shortness of Breath    Coronary Artery Disease    Hypertension   had abnormal stress test   Was in the process of getting infected shoulder surgery   No cath done  We decided to treat medically due to his condition   No cath for now  Bp seems stable   Known HTN   Denies chest pain   Limited activity   Very limited patient   Here for discussion         HPI:  HPI  Past Medical History:   Diagnosis Date    Arthritis     Back pain     Diarrhea     Hypertension     Infection of prosthetic shoulder joint ([address] 7/24/2020    PFO (patent foramen ovale) 8/2012    noted on ROSITA following stroke    Propionibacterium infection 7/24/2020    Rotator cuff injury     Stroke [facility])     August 19/2012    TIA (transient ischemic attack) 05/2018      Past Surgical History:   Procedure Laterality Date    CHOLECYSTECTOMY  06/25/2017    CYST INCISION AND DRAINAGE      CYSTOSCOPY N/A 12/17/2019    CYSTOSCOPY, WITH  URETHRAL DILATION performed by Zenon Cardoso MD at [address] [place] [address] 2/18/2020    CYSTOSCOPY WITH BLADDER BOTOX performed by Zenon Cardoso MD at [address] ERCP  06/23/2017    ROTATOR CUFF REPAIR Left 03/12/2013    right 4/2019    SHOULDER SURGERY Right 03/02/2020    SHOULDER SURGERY  06/2020    frozen shoulder, repair nerve elbow and palm-dr eduardo     TURP N/A 8/13/2019    TRANSURETHRAL RESECTION PROSTATE performed by Mae Mir MD at [facility] History   Problem Relation Age of Onset    Arthritis Mother     Vision Loss Father     Cancer Brother     Stroke Maternal Uncle     Prostate Cancer Neg Hx      Social History     Tobacco Use    Smoking status: Never Smoker  Smokeless tobacco: Never Used   Substance Use Topics    Alcohol use: No      Current Outpatient Medications   Medication Sig Dispense Refill    amoxicillin-clavulanate (AUGMENTIN) 875-125 MG per tablet Take 1 tablet by mouth 2 times daily      losartan (COZAAR) 100 MG tablet Take 100 mg by mouth daily      tamsulosin (FLOMAX) 0.4 MG capsule Take 0.4 mg by mouth daily      Probiotic Product (PROBIOTIC-10 PO) Take by mouth      isosorbide mononitrate (IMDUR) 30 MG extended release tablet Take 1 tablet by mouth daily 30 tablet 1    hydrochlorothiazide (HYDRODIURIL) 12.5 MG tablet TAKE 1 TABLET BY MOUTH ONCE DAILY      oxyCODONE-acetaminophen (PERCOCET) 5-325 MG per tablet Take 1 tablet by mouth every 4 hours as needed for Pain.  Potassium 99 MG TABS Take 3 tablets by mouth daily      labetalol (NORMODYNE) 200 MG tablet Take 100 mg by mouth 2 times daily       Multiple Vitamins-Minerals (PRESERVISION AREDS PO) Take by mouth      Ascorbic Acid (VITAMIN C) 500 MG tablet Take 1,000 mg by mouth        No current facility-administered medications for this visit.       Allergies   Allergen Reactions    Adhesive Tape Rash     Health Maintenance   Topic Date Due    Hepatitis C screen  1950    COVID-19 Vaccine (1 of 2) 07/19/1964    DTaP/Tdap/Td vaccine (1 - Tdap) 07/19/1967    Lipid screen  07/19/1988    Colon cancer screen colonoscopy  07/19/1998    Pneumococcal 65+ years Vaccine (1 of 1 - PPSV23) 07/19/2013    Annual Wellness Visit (AWV)  06/23/2019    Flu vaccine (1) 09/01/2020    Potassium monitoring  11/12/2021    Creatinine monitoring  11/12/2021    Shingles Vaccine  Completed    Hepatitis A vaccine  Aged Out    Hepatitis B vaccine  Aged Out    Hib vaccine  Aged Out    Meningococcal (ACWY) vaccine  Aged Out       Subjective:  Review of Systems  General:   No fever, no chills, some fatigue or weight loss  Pulmonary:    some dyspnea, no wheezing  Cardiac:    Denies recent chest pain, GI:     No nausea or vomiting, no abdominal pain  Neuro:    No dizziness or light headedness,   Musculoskeletal: Limited patient   Extremities:   No edema, no obvious claudication       Objective:  Physical Exam  /70   Pulse 69   Ht 5' 11\" (1.803 m)   Wt 275 lb (124.7 kg)   BMI 38.35 kg/m²   General:   Well developed, well nourished  Lungs:   Clear to auscultation  Heart:    Normal S1 S2, Slight murmur. no rubs, no gallops  Abdomen:   Soft, non tender, no organomegalies, positive bowel sounds  Extremities:   No edema, no cyanosis, good peripheral pulses  Neurological:   Awake, alert, oriented. No obvious focal deficits  Musculoskelatal:  No obvious deformities    Assessment:      Diagnosis Orders   1. Shortness of breath     2. Coronary artery disease involving native coronary artery of native heart without angina pectoris     3. Essential hypertension     as above  Higher risk patient   As above not easy to assess symptoms   Cath vs not cath     Plan:  No follow-ups on file. Discussed options at length  Cath vs continue medical RX  He is to think options  Continue risk factor modification and medical management  Thank you for allowing me to participate in the care of your patient. Please don't hesitate to contact me regarding any further issues related to the patient care    Orders Placed:  No orders of the defined types were placed in this encounter. Medications Prescribed:  No orders of the defined types were placed in this encounter. Discussed use, benefit, and side effects of prescribed medications. All patient questions answered. Pt voicedunderstanding. Instructed to continue current medications, diet and exercise. Continue risk factor modification and medical management. Patient agreed with treatment plan. Follow up as directed.     Electronically signedby Crystal Steel MD on 2/12/2021 at 1:19 PM

## 2021-02-26 NOTE — ED NOTES
IV rocephin infused and discontinued, Picc line flushed with 1 ml. 0.9 NS. Pulse regular. Extremities warm. Respirations regular and quiet. Mucous membranes pink & moist. Alert and oriented times 3. No nausea or vomiting. Range of motion within patient's limits. Skin pink, warm and dry. Calm and cooperative.  __x__ Safety:       (Environmental)   Martha to environment   Ensure ID band is correct and in place/ allergy band as needed   Assess for fall risk   Initiate fall precautions as applicable (fall band, side rails, etc.)   Call light within reach   Bed in low position/ wheels locked    __x__ Pain:        Assess pain level and characteristics   Administer analgesics as ordered   Assess effectiveness of pain management and report to MD as needed    __x__ Knowledge Deficit:   Assess baseline knowledge   Provide teaching at level of understanding   Provide teaching via preferred learning method   Evaluate teaching effectiveness    __x__ Hemodynamic/Respiratory Status:       (Pre and Post Procedure Monitoring)   Assess/Monitor vital signs and LOC   Assess Baseline SpO2 prior to any sedation   Obtain weight/height   Assess vital signs/ LOC until patient meets discharge criteria   Monitor procedure site and notify MD of any issues    _x___ Infection-Risk of Central Venous Catheter:   Monitor for infection signs and symptoms (catheter site redness, temperature elevation, etc)   Assess for infection risks   Educate regarding infection prevention   Manage central venous catheter (flushes/ dressing changes per protocol)                   Akilah Barajas RN  08/19/20 6392
Pt. Ambulatory to exam 3 for oupt. IV antibiotics.       Venkatesh Mora RN  08/19/20 2011
16

## 2021-03-22 RX ORDER — ISOSORBIDE MONONITRATE 30 MG/1
30 TABLET, EXTENDED RELEASE ORAL DAILY
Qty: 90 TABLET | Refills: 1 | Status: SHIPPED | OUTPATIENT
Start: 2021-03-22 | End: 2021-05-26 | Stop reason: SDUPTHER

## 2021-03-22 NOTE — TELEPHONE ENCOUNTER
Morris Cam called requesting a refill on the following medications:  Requested Prescriptions     Pending Prescriptions Disp Refills    isosorbide mononitrate (IMDUR) 30 MG extended release tablet 30 tablet 1     Sig: Take 1 tablet by mouth daily     Pharmacy verified:  .pv  [facility] [place]    Date of last visit: 02/12/21  Date of next visit (if applicable): 2/62/4319

## 2021-05-26 ENCOUNTER — OFFICE VISIT (OUTPATIENT)
Dept: CARDIOLOGY CLINIC | Age: 71
End: 2021-05-26
Payer: MEDICARE

## 2021-05-26 VITALS
SYSTOLIC BLOOD PRESSURE: 118 MMHG | HEIGHT: 71 IN | DIASTOLIC BLOOD PRESSURE: 61 MMHG | BODY MASS INDEX: 38.5 KG/M2 | HEART RATE: 61 BPM | WEIGHT: 275 LBS

## 2021-05-26 DIAGNOSIS — I10 ESSENTIAL HYPERTENSION: Primary | ICD-10-CM

## 2021-05-26 DIAGNOSIS — R06.02 SHORTNESS OF BREATH: ICD-10-CM

## 2021-05-26 PROCEDURE — 99214 OFFICE O/P EST MOD 30 MIN: CPT | Performed by: NUCLEAR MEDICINE

## 2021-05-26 PROCEDURE — 3017F COLORECTAL CA SCREEN DOC REV: CPT | Performed by: NUCLEAR MEDICINE

## 2021-05-26 PROCEDURE — 4040F PNEUMOC VAC/ADMIN/RCVD: CPT | Performed by: NUCLEAR MEDICINE

## 2021-05-26 PROCEDURE — 1036F TOBACCO NON-USER: CPT | Performed by: NUCLEAR MEDICINE

## 2021-05-26 PROCEDURE — G8427 DOCREV CUR MEDS BY ELIG CLIN: HCPCS | Performed by: NUCLEAR MEDICINE

## 2021-05-26 PROCEDURE — G8417 CALC BMI ABV UP PARAM F/U: HCPCS | Performed by: NUCLEAR MEDICINE

## 2021-05-26 PROCEDURE — 1123F ACP DISCUSS/DSCN MKR DOCD: CPT | Performed by: NUCLEAR MEDICINE

## 2021-05-26 RX ORDER — ISOSORBIDE MONONITRATE 30 MG/1
30 TABLET, EXTENDED RELEASE ORAL DAILY
Qty: 90 TABLET | Refills: 1 | Status: SHIPPED | OUTPATIENT
Start: 2021-05-26 | End: 2021-09-21 | Stop reason: SDUPTHER

## 2021-05-26 NOTE — PROGRESS NOTES
[address] [facility]  [employer] .  [address]  [facility] [address]  Dept: [phone]  Dept Fax: [phone]  Loc: [phone]    Visit Date: 5/26/2021    Marcelino Lorenzo is a 65 y.o. male who presents todayfor:  Chief Complaint   Patient presents with    6 Month Follow-Up    Hypertension    Diabetes   had infected shoulder surgery   He had abnormal stress test   Wanted medical RX  Very limited patient  Not very active  Does have limitation by his back   Does have dyspnea  Dyspnea on exertion   Some right sided chest pain at times  No radiation   No cath before  Bp is stable   No dizziness  No syncope        HPI:  HPI  Past Medical History:   Diagnosis Date    Arthritis     Back pain     Diarrhea     Hypertension     Infection of prosthetic shoulder joint ([address] 7/24/2020    PFO (patent foramen ovale) 8/2012    noted on ROSITA following stroke    Propionibacterium infection 7/24/2020    Rotator cuff injury     Stroke [facility])     August 19/2012    TIA (transient ischemic attack) 05/2018      Past Surgical History:   Procedure Laterality Date    CHOLECYSTECTOMY  06/25/2017    CYST INCISION AND DRAINAGE      CYSTOSCOPY N/A 12/17/2019    CYSTOSCOPY, WITH  URETHRAL DILATION performed by Manny Wang MD at [facility] 2/18/2020    CYSTOSCOPY WITH BLADDER BOTOX performed by Manny Wang MD at [address] ERCP  06/23/2017    ROTATOR CUFF REPAIR Left 03/12/2013    right 4/2019    SHOULDER SURGERY Right 03/02/2020    SHOULDER SURGERY  06/2020    frozen shoulder, repair nerve elbow and palm-dr eduardo     TURP N/A 8/13/2019    TRANSURETHRAL RESECTION PROSTATE performed by Harsha Adnrade MD at [facility] History   Problem Relation Age of Onset    Arthritis Mother     Vision Loss Father     Cancer Brother     Stroke Maternal Uncle     Prostate Cancer Neg Hx      Social History     Tobacco Use    Smoking status: Never Smoker    Smokeless tobacco: Never Used   Substance Use Topics    Alcohol use: No      Current Outpatient Medications   Medication Sig Dispense Refill    isosorbide mononitrate (IMDUR) 30 MG extended release tablet Take 1 tablet by mouth daily 90 tablet 1    losartan (COZAAR) 100 MG tablet Take 100 mg by mouth daily      hydrochlorothiazide (HYDRODIURIL) 12.5 MG tablet TAKE 1 TABLET BY MOUTH ONCE DAILY      Potassium 99 MG TABS Take 3 tablets by mouth daily      labetalol (NORMODYNE) 100 MG tablet Take 100 mg by mouth 2 times daily       Multiple Vitamins-Minerals (PRESERVISION AREDS PO) Take by mouth      Ascorbic Acid (VITAMIN C) 500 MG tablet Take 1,000 mg by mouth        No current facility-administered medications for this visit.      Allergies   Allergen Reactions    Adhesive Tape Rash     Health Maintenance   Topic Date Due    Hepatitis C screen  Never done    COVID-19 Vaccine (1) Never done    DTaP/Tdap/Td vaccine (1 - Tdap) Never done    Lipid screen  Never done    Colon cancer screen colonoscopy  Never done    Pneumococcal 65+ years Vaccine (1 of 1 - PPSV23) Never done   [employer] Visit (AWV)  Never done    Flu vaccine (Season Ended) 09/01/2021    Potassium monitoring  11/12/2021    Creatinine monitoring  11/12/2021    Shingles Vaccine  Completed    Hepatitis A vaccine  Aged Out    Hepatitis B vaccine  Aged Out    Hib vaccine  Aged Out    Meningococcal (ACWY) vaccine  Aged Out       Subjective:  Review of Systems  General:   No fever, no chills, No fatigue or weight loss  Pulmonary:    some dyspnea, no wheezing  Cardiac:    Denies recent chest pain,   GI:     No nausea or vomiting, no abdominal pain  Neuro:    No dizziness or light headedness,   Musculoskeletal:  very limited patient   Extremities:   No edema, no obvious claudication       Objective:  Physical Exam  /61   Pulse 61   Ht 5' 11\" (1.803 m)   Wt 275 lb (124.7 kg)   BMI 38.35 kg/m²   General:   Well developed, well nourished  Lungs:   Clear to auscultation  Heart:    Normal S1 S2, Slight murmur. no rubs, no gallops  Abdomen:   Soft, non tender, no organomegalies, positive bowel sounds  Extremities:   No edema, no cyanosis, good peripheral pulses  Neurological:   Awake, alert, oriented. No obvious focal deficits  Musculoskelatal:  No obvious deformities    Assessment:      Diagnosis Orders   1. Essential hypertension     2. Shortness of breath     as above  Higher risk patient  Likely underlying CAD  Angina type symptoms      Plan:  No follow-ups on file. Discussed again  Cardiac cathterizaion, risks and benefits discussed with the patient and family at length. Patient was agreeable. Continue risk factor modification and medical management  Thank you for allowing me to participate in the care of your patient. Please don't hesitate to contact me regarding any further issues related to the patient care    Orders Placed:  No orders of the defined types were placed in this encounter. Medications Prescribed:  No orders of the defined types were placed in this encounter. Discussed use, benefit, and side effects of prescribed medications. All patient questions answered. Pt voicedunderstanding. Instructed to continue current medications, diet and exercise. Continue risk factor modification and medical management. Patient agreed with treatment plan. Follow up as directed.     Electronically signedby Solo Jasmine MD on 5/26/2021 at 11:09 AM

## 2021-05-28 RX ORDER — TAMSULOSIN HYDROCHLORIDE 0.4 MG/1
0.4 CAPSULE ORAL NIGHTLY
COMMUNITY

## 2021-06-11 ENCOUNTER — PREP FOR PROCEDURE (OUTPATIENT)
Dept: CARDIOLOGY | Age: 71
End: 2021-06-11

## 2021-06-11 RX ORDER — SODIUM CHLORIDE 9 MG/ML
INJECTION, SOLUTION INTRAVENOUS CONTINUOUS
Status: CANCELLED | OUTPATIENT
Start: 2021-06-11

## 2021-06-11 RX ORDER — SODIUM CHLORIDE 0.9 % (FLUSH) 0.9 %
5-40 SYRINGE (ML) INJECTION PRN
Status: CANCELLED | OUTPATIENT
Start: 2021-06-11

## 2021-06-11 RX ORDER — ASPIRIN 325 MG
325 TABLET ORAL ONCE
Status: CANCELLED | OUTPATIENT
Start: 2021-06-11 | End: 2021-06-11

## 2021-06-11 RX ORDER — SODIUM CHLORIDE 0.9 % (FLUSH) 0.9 %
5-40 SYRINGE (ML) INJECTION EVERY 12 HOURS SCHEDULED
Status: CANCELLED | OUTPATIENT
Start: 2021-06-11

## 2021-06-11 RX ORDER — SODIUM CHLORIDE 9 MG/ML
25 INJECTION, SOLUTION INTRAVENOUS PRN
Status: CANCELLED | OUTPATIENT
Start: 2021-06-11

## 2021-06-11 RX ORDER — NITROGLYCERIN 0.4 MG/1
0.4 TABLET SUBLINGUAL EVERY 5 MIN PRN
Status: CANCELLED | OUTPATIENT
Start: 2021-06-11

## 2021-06-14 PROBLEM — I25.10 CAD IN NATIVE ARTERY: Status: ACTIVE | Noted: 2021-06-14

## 2021-06-14 PROBLEM — Z95.0 CARDIAC PACEMAKER: Status: ACTIVE | Noted: 2021-06-14

## 2021-06-15 ENCOUNTER — FOLLOWUP TELEPHONE ENCOUNTER (OUTPATIENT)
Dept: INPATIENT UNIT | Age: 71
End: 2021-06-15

## 2021-06-15 ENCOUNTER — TELEPHONE (OUTPATIENT)
Dept: CARDIOLOGY CLINIC | Age: 71
End: 2021-06-15

## 2021-06-28 NOTE — PROGRESS NOTES
Pt presents amb with cane for OP antibiotic infusion. Gait slow, steady. Pt not wearing rt shoulder sling. Using rt arm today. Rt hand swelling decreased.
Released in stable condition. Denies complaints of concerns.
Respirations regular and quiet. Nonlabored. Alert and oriented times 3. No nausea or vomiting. Skin pink, warm and dry. Calm and cooperative. Denies any pain. Pt released ambulator with cane in satisfactory condition.
__m__ Safety:       (Environmental)   [place] to environment   Ensure ID band is correct and in place/ allergy band as needed   Assess for fall risk   Initiate fall precautions as applicable (fall band, side rails, etc.)   Call light within reach   Bed in low position/ wheels locked    _m___ Pain:        Assess pain level and characteristics   Administer analgesics as ordered   Assess effectiveness of pain management and report to MD as needed    __m_ Knowledge Deficit:   Assess baseline knowledge   Provide teaching at level of understanding   Provide teaching via preferred learning method   Evaluate teaching effectiveness    __m__ Hemodynamic/Respiratory Status:       (Pre and Post Procedure Monitoring)   Assess/Monitor vital signs and LOC   Assess Baseline SpO2 prior to any sedation   Obtain weight/height   Assess vital signs/ LOC until patient meets discharge criteria   Monitor procedure site and notify MD of any issues
room air

## 2021-06-29 ENCOUNTER — OFFICE VISIT (OUTPATIENT)
Dept: UROLOGY | Age: 71
End: 2021-06-29
Payer: MEDICARE

## 2021-06-29 ENCOUNTER — TELEPHONE (OUTPATIENT)
Dept: UROLOGY | Age: 71
End: 2021-06-29

## 2021-06-29 VITALS — HEIGHT: 71 IN | WEIGHT: 280 LBS | BODY MASS INDEX: 39.2 KG/M2

## 2021-06-29 DIAGNOSIS — N99.115 POSTPROCEDURAL STRICTURE OF URETHRA AT FOSSA NAVICULARIS: ICD-10-CM

## 2021-06-29 DIAGNOSIS — N32.81 OAB (OVERACTIVE BLADDER): ICD-10-CM

## 2021-06-29 DIAGNOSIS — N13.8 BPH WITH URINARY OBSTRUCTION: Primary | ICD-10-CM

## 2021-06-29 DIAGNOSIS — N40.1 BPH WITH URINARY OBSTRUCTION: Primary | ICD-10-CM

## 2021-06-29 DIAGNOSIS — R30.0 DYSURIA: ICD-10-CM

## 2021-06-29 LAB
BILIRUBIN URINE: NEGATIVE
BLOOD URINE, POC: NEGATIVE
CHARACTER, URINE: CLEAR
COLOR, URINE: YELLOW
GLUCOSE URINE: NEGATIVE MG/DL
KETONES, URINE: NEGATIVE
LEUKOCYTE CLUMPS, URINE: NEGATIVE
NITRITE, URINE: NEGATIVE
PH, URINE: 5.5 (ref 5–9)
POST VOID RESIDUAL (PVR): 74 ML
PROTEIN, URINE: NEGATIVE MG/DL
SPECIFIC GRAVITY, URINE: 1.02 (ref 1–1.03)
UROBILINOGEN, URINE: 0.2 EU/DL (ref 0–1)

## 2021-06-29 PROCEDURE — 4040F PNEUMOC VAC/ADMIN/RCVD: CPT | Performed by: UROLOGY

## 2021-06-29 PROCEDURE — 1036F TOBACCO NON-USER: CPT | Performed by: UROLOGY

## 2021-06-29 PROCEDURE — 51798 US URINE CAPACITY MEASURE: CPT | Performed by: UROLOGY

## 2021-06-29 PROCEDURE — 3017F COLORECTAL CA SCREEN DOC REV: CPT | Performed by: UROLOGY

## 2021-06-29 PROCEDURE — G8427 DOCREV CUR MEDS BY ELIG CLIN: HCPCS | Performed by: UROLOGY

## 2021-06-29 PROCEDURE — 99214 OFFICE O/P EST MOD 30 MIN: CPT | Performed by: UROLOGY

## 2021-06-29 PROCEDURE — G8417 CALC BMI ABV UP PARAM F/U: HCPCS | Performed by: UROLOGY

## 2021-06-29 PROCEDURE — 81003 URINALYSIS AUTO W/O SCOPE: CPT | Performed by: UROLOGY

## 2021-06-29 PROCEDURE — 1123F ACP DISCUSS/DSCN MKR DOCD: CPT | Performed by: UROLOGY

## 2021-06-29 RX ORDER — PHENAZOPYRIDINE HYDROCHLORIDE 200 MG/1
200 TABLET, FILM COATED ORAL 3 TIMES DAILY PRN
Qty: 9 TABLET | Refills: 3 | Status: SHIPPED | OUTPATIENT
Start: 2021-06-29 | End: 2021-07-02

## 2021-06-29 NOTE — PROGRESS NOTES
Endy Green MD        [address] [place] [address] [phone]  Dept: [phone]  Dept Fax: [phone]: [address] [address] Urology Office Note      Patient:  Jennifer Morgan  YOB: 1950    The patient is a 65 y.o. male who presents today for evaluation of the following problems:   Chief Complaint   Patient presents with    Follow-up    Benign Prostatic Hypertrophy     OAB        HISTORY OF PRESENT ILLNESS:     OAB   AUAss 27- severe symptoms. Unhappy  PVR 74  Here for interstim discussion    Hx of botox in past  Voiding well with about 30-40% improvement  Had recent shoulder surgery and has some setbacks from that. We discussed holding off on doing anything with his OAB until his shoulder issues are better. We discussed interstim briefly as next line therapy. Patient reports rash on his back. This rash does not itch. His will talk to Dr. Justus Tran about this. Could of been a reaction to the abx he was on for his infected PICC line. 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 Recent cystoscopy demonstrating mild fossa navicularis stricture  Many failed medications  Present with wife in office today, very frustrated  He is s/p TURP using bipolar loop with Dr. Narinder Limon on 08/13/19. Surgical pathology revealed focal high-grade prostatic intraepithelial neoplasia, glandular and stromal hyperplasia with chronic inflammation, 14 grams removed. He comes in today with his wife. Hx is obtained from the patient and the medical record.          Requested/reviewed records from Jarrod Dugan DO office and/or outside [de-identified]    (Patient's old records have been requested, reviewed and pertinent findings summarized in today's note.)    Procedures Today: N/A    Last several PSA's:  Lab Results   Component Value Date    PSA 0.66 07/24/2019    PSA 1.64 (H) 06/19/2018    PSA 1.26 (H) 08/14/2017       Last total testosterone:  No results found for: TESTOSTERONE    Urinalysis today:  Results for POC orders placed in visit on 06/29/21   POCT Urinalysis No Micro (Auto)   Result Value Ref Range    Glucose, Ur Negative NEGATIVE mg/dl    Bilirubin Urine Negative     Ketones, Urine Negative NEGATIVE    Specific Gravity, Urine 1.025 1.002 - 1.030    Blood, UA POC Negative NEGATIVE    pH, Urine 5.50 5.0 - 9.0    Protein, Urine Negative NEGATIVE mg/dl    Urobilinogen, Urine 0.20 0.0 - 1.0 eu/dl    Nitrite, Urine Negative NEGATIVE    Leukocyte Clumps, Urine Negative NEGATIVE    Color, Urine Yellow YELLOW-STRAW    Character, Urine Clear CLR-SL.CLOUD   poct post void residual   Result Value Ref Range    post void residual 74 ml       Last BUN and creatinine:  Lab Results   Component Value Date    BUN 16 06/15/2021     Lab Results   Component Value Date    CREATININE 1.0 06/15/2021       Imaging Reviewed during this Office Visit:   Brannon Bess MD independently reviewed the images and verified the radiology reports from:    No results found.     PAST MEDICAL, FAMILY AND SOCIAL HISTORY:  Past Medical History:   Diagnosis Date    Arthritis     Back pain     CAD in native artery 6/14/2021    Diarrhea     Hypertension     Infection of prosthetic shoulder joint ([address] 7/24/2020    PFO (patent foramen ovale) 8/2012    noted on ROSITA following stroke    Propionibacterium infection 7/24/2020    Rotator cuff injury     Stroke [facility])     August 19/2012    TIA (transient ischemic attack) 05/2018     Past Surgical History:   Procedure Laterality Date    CHOLECYSTECTOMY  06/25/2017    CYST INCISION AND DRAINAGE      CYSTOSCOPY N/A 12/17/2019    CYSTOSCOPY, WITH  URETHRAL DILATION performed by Billy Marshall MD at [address] 2/18/2020    CYSTOSCOPY WITH BLADDER BOTOX performed by Billy Marshall MD at [facility]  06/23/2017    ROTATOR CUFF REPAIR Left 03/12/2013    right 4/2019    SHOULDER SURGERY Right 03/02/2020   [employer] SHOULDER SURGERY  06/2020    frozen shoulder, repair nerve elbow and palm-dr alesha GRAY N/A 8/13/2019    TRANSURETHRAL RESECTION PROSTATE performed by Ann Barros MD at [address] [place] [address] History   Problem Relation Age of Onset    Arthritis Mother     Vision Loss Father     Cancer Brother     Stroke Maternal Uncle     Prostate Cancer Neg Hx      Outpatient Medications Marked as Taking for the 6/29/21 encounter (Office Visit) with Tanya Mccain MD   Medication Sig Dispense Refill    aspirin 81 MG chewable tablet Take 1 tablet by mouth daily 30 tablet 3    nitroGLYCERIN (NITROSTAT) 0.4 MG SL tablet up to max of 3 total doses.  If no relief after 1 dose, call 911. 25 tablet 1    atorvastatin (LIPITOR) 80 MG tablet Take 1 tablet by mouth nightly 30 tablet 3    metoprolol tartrate (LOPRESSOR) 25 MG tablet Take 1 tablet by mouth 2 times daily 60 tablet 3    clopidogrel (PLAVIX) 75 MG tablet Take 1 tablet by mouth daily 30 tablet 3    tamsulosin (FLOMAX) 0.4 MG capsule Take 0.4 mg by mouth daily      isosorbide mononitrate (IMDUR) 30 MG extended release tablet Take 1 tablet by mouth daily 90 tablet 1    losartan (COZAAR) 100 MG tablet Take 100 mg by mouth daily      hydrochlorothiazide (HYDRODIURIL) 12.5 MG tablet TAKE 1 TABLET BY MOUTH ONCE DAILY      Potassium 99 MG TABS Take 3 tablets by mouth daily      Multiple Vitamins-Minerals (PRESERVISION AREDS PO) Take by mouth      Ascorbic Acid (VITAMIN C) 500 MG tablet Take 1,000 mg by mouth          Adhesive tape  Social History     Tobacco Use   Smoking Status Never Smoker   Smokeless Tobacco Never Used      (If patient a smoker, smoking cessation counseling offered)   Social History     Substance and Sexual Activity   Alcohol Use No       REVIEW OF SYSTEMS:  Constitutional: negative  Eyes: negative  Respiratory: negative  Cardiovascular: negative  Gastrointestinal: negative  Genitourinary: see HPI  Musculoskeletal: negative  Skin: negative   Neurological: negative  Hematological/Lymphatic: negative  Psychological: negative        Physical Exam:    This a 65 y.o. male  There were no vitals filed for this visit. Body mass index is 39.05 kg/m². Constitutional: Patient in no acute distress;         Assessment and Plan        1. BPH with urinary obstruction    2. OAB (overactive bladder)    3. 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      Orders Placed:  Orders Placed This Encounter   Procedures    POCT Urinalysis No Micro (Auto)    poct post void residual     Bladder scan            KAUSHIK Ma MD

## 2021-07-01 LAB — URINE CULTURE, ROUTINE: NORMAL

## 2021-07-08 ENCOUNTER — PREP FOR PROCEDURE (OUTPATIENT)
Dept: CARDIOLOGY | Age: 71
End: 2021-07-08

## 2021-07-08 RX ORDER — NITROGLYCERIN 0.4 MG/1
0.4 TABLET SUBLINGUAL EVERY 5 MIN PRN
Status: CANCELLED | OUTPATIENT
Start: 2021-07-08

## 2021-07-08 RX ORDER — SODIUM CHLORIDE 0.9 % (FLUSH) 0.9 %
5-40 SYRINGE (ML) INJECTION PRN
Status: CANCELLED | OUTPATIENT
Start: 2021-07-08

## 2021-07-08 RX ORDER — ASPIRIN 325 MG
325 TABLET ORAL ONCE
Status: CANCELLED | OUTPATIENT
Start: 2021-07-08 | End: 2021-07-08

## 2021-07-08 RX ORDER — SODIUM CHLORIDE 9 MG/ML
INJECTION, SOLUTION INTRAVENOUS CONTINUOUS
Status: CANCELLED | OUTPATIENT
Start: 2021-07-08

## 2021-07-08 RX ORDER — SODIUM CHLORIDE 0.9 % (FLUSH) 0.9 %
5-40 SYRINGE (ML) INJECTION EVERY 12 HOURS SCHEDULED
Status: CANCELLED | OUTPATIENT
Start: 2021-07-08

## 2021-07-08 RX ORDER — SODIUM CHLORIDE 9 MG/ML
25 INJECTION, SOLUTION INTRAVENOUS PRN
Status: CANCELLED | OUTPATIENT
Start: 2021-07-08

## 2021-07-09 ENCOUNTER — HOSPITAL ENCOUNTER (INPATIENT)
Dept: INPATIENT UNIT | Age: 71
LOS: 1 days | Discharge: HOME | DRG: 247 | End: 2021-07-10
Attending: INTERNAL MEDICINE | Admitting: INTERNAL MEDICINE
Payer: MEDICARE

## 2021-07-09 LAB
ABO: NORMAL
ACTIVATED CLOTTING TIME: 384 SECONDS (ref 1–150)
ANION GAP SERPL CALCULATED.3IONS-SCNC: 11 MEQ/L (ref 8–16)
ANTIBODY SCREEN: NORMAL
APTT: 37.5 SECONDS (ref 22–38)
BUN BLDV-MCNC: 20 MG/DL (ref 7–22)
CALCIUM SERPL-MCNC: 9.3 MG/DL (ref 8.5–10.5)
CHLORIDE BLD-SCNC: 106 MEQ/L (ref 98–111)
CO2: 24 MEQ/L (ref 23–33)
CREAT SERPL-MCNC: 1.3 MG/DL (ref 0.4–1.2)
ERYTHROCYTE [DISTWIDTH] IN BLOOD BY AUTOMATED COUNT: 15.9 % (ref 11.5–14.5)
ERYTHROCYTE [DISTWIDTH] IN BLOOD BY AUTOMATED COUNT: 49.1 FL (ref 35–45)
GFR SERPL CREATININE-BSD FRML MDRD: 54 ML/MIN/1.73M2
GLUCOSE BLD-MCNC: 114 MG/DL (ref 70–108)
HCT VFR BLD CALC: 41.7 % (ref 42–52)
HEMOGLOBIN: 13.2 GM/DL (ref 14–18)
INR BLD: 1.02 (ref 0.85–1.13)
MCH RBC QN AUTO: 26.7 PG (ref 26–33)
MCHC RBC AUTO-ENTMCNC: 31.7 GM/DL (ref 32.2–35.5)
MCV RBC AUTO: 84.2 FL (ref 80–94)
PLATELET # BLD: 258 THOU/MM3 (ref 130–400)
PMV BLD AUTO: 10.5 FL (ref 9.4–12.4)
POTASSIUM REFLEX MAGNESIUM: 4 MEQ/L (ref 3.5–5.2)
RBC # BLD: 4.95 MILL/MM3 (ref 4.7–6.1)
RH FACTOR: NORMAL
SODIUM BLD-SCNC: 141 MEQ/L (ref 135–145)
WBC # BLD: 7.9 THOU/MM3 (ref 4.8–10.8)

## 2021-07-09 PROCEDURE — 85027 COMPLETE CBC AUTOMATED: CPT

## 2021-07-09 PROCEDURE — 2500000003 HC RX 250 WO HCPCS: Performed by: INTERNAL MEDICINE

## 2021-07-09 PROCEDURE — C1760 CLOSURE DEV, VASC: HCPCS

## 2021-07-09 PROCEDURE — 2580000003 HC RX 258: Performed by: PHYSICIAN ASSISTANT

## 2021-07-09 PROCEDURE — C1725 CATH, TRANSLUMIN NON-LASER: HCPCS

## 2021-07-09 PROCEDURE — 36415 COLL VENOUS BLD VENIPUNCTURE: CPT

## 2021-07-09 PROCEDURE — C1887 CATHETER, GUIDING: HCPCS

## 2021-07-09 PROCEDURE — 4A023N7 MEASUREMENT OF CARDIAC SAMPLING AND PRESSURE, LEFT HEART, PERCUTANEOUS APPROACH: ICD-10-PCS | Performed by: INTERNAL MEDICINE

## 2021-07-09 PROCEDURE — 6370000000 HC RX 637 (ALT 250 FOR IP): Performed by: INTERNAL MEDICINE

## 2021-07-09 PROCEDURE — 6360000002 HC RX W HCPCS

## 2021-07-09 PROCEDURE — 2580000003 HC RX 258: Performed by: INTERNAL MEDICINE

## 2021-07-09 PROCEDURE — 6370000000 HC RX 637 (ALT 250 FOR IP)

## 2021-07-09 PROCEDURE — 85347 COAGULATION TIME ACTIVATED: CPT

## 2021-07-09 PROCEDURE — 93005 ELECTROCARDIOGRAM TRACING: CPT | Performed by: PHYSICIAN ASSISTANT

## 2021-07-09 PROCEDURE — 86850 RBC ANTIBODY SCREEN: CPT

## 2021-07-09 PROCEDURE — 6360000004 HC RX CONTRAST MEDICATION: Performed by: INTERNAL MEDICINE

## 2021-07-09 PROCEDURE — B2111ZZ FLUOROSCOPY OF MULTIPLE CORONARY ARTERIES USING LOW OSMOLAR CONTRAST: ICD-10-PCS | Performed by: INTERNAL MEDICINE

## 2021-07-09 PROCEDURE — 85610 PROTHROMBIN TIME: CPT

## 2021-07-09 PROCEDURE — 86901 BLOOD TYPING SEROLOGIC RH(D): CPT

## 2021-07-09 PROCEDURE — C9607 PERC D-E COR REVASC CHRO SIN: HCPCS | Performed by: INTERNAL MEDICINE

## 2021-07-09 PROCEDURE — 92943 PRQ TRLUML REVSC CH OCC ANT: CPT | Performed by: INTERNAL MEDICINE

## 2021-07-09 PROCEDURE — C1769 GUIDE WIRE: HCPCS

## 2021-07-09 PROCEDURE — 2500000003 HC RX 250 WO HCPCS

## 2021-07-09 PROCEDURE — C1874 STENT, COATED/COV W/DEL SYS: HCPCS

## 2021-07-09 PROCEDURE — C1894 INTRO/SHEATH, NON-LASER: HCPCS

## 2021-07-09 PROCEDURE — 85730 THROMBOPLASTIN TIME PARTIAL: CPT

## 2021-07-09 PROCEDURE — 86900 BLOOD TYPING SEROLOGIC ABO: CPT

## 2021-07-09 PROCEDURE — 027036Z DILATION OF CORONARY ARTERY, ONE ARTERY WITH THREE DRUG-ELUTING INTRALUMINAL DEVICES, PERCUTANEOUS APPROACH: ICD-10-PCS | Performed by: INTERNAL MEDICINE

## 2021-07-09 PROCEDURE — 80048 BASIC METABOLIC PNL TOTAL CA: CPT

## 2021-07-09 RX ORDER — SODIUM CHLORIDE 9 MG/ML
25 INJECTION, SOLUTION INTRAVENOUS PRN
Status: DISCONTINUED | OUTPATIENT
Start: 2021-07-09 | End: 2021-07-10 | Stop reason: HOSPADM

## 2021-07-09 RX ORDER — LOSARTAN POTASSIUM 100 MG/1
100 TABLET ORAL DAILY
Status: DISCONTINUED | OUTPATIENT
Start: 2021-07-10 | End: 2021-07-10 | Stop reason: HOSPADM

## 2021-07-09 RX ORDER — ASPIRIN 81 MG/1
81 TABLET, CHEWABLE ORAL DAILY
Status: DISCONTINUED | OUTPATIENT
Start: 2021-07-09 | End: 2021-07-09 | Stop reason: SDUPTHER

## 2021-07-09 RX ORDER — ASPIRIN 325 MG
325 TABLET ORAL ONCE
Status: DISCONTINUED | OUTPATIENT
Start: 2021-07-09 | End: 2021-07-10 | Stop reason: HOSPADM

## 2021-07-09 RX ORDER — HYDROCHLOROTHIAZIDE 12.5 MG/1
12.5 CAPSULE, GELATIN COATED ORAL DAILY
Status: DISCONTINUED | OUTPATIENT
Start: 2021-07-09 | End: 2021-07-10 | Stop reason: HOSPADM

## 2021-07-09 RX ORDER — SODIUM CHLORIDE 0.9 % (FLUSH) 0.9 %
5-40 SYRINGE (ML) INJECTION EVERY 12 HOURS SCHEDULED
Status: DISCONTINUED | OUTPATIENT
Start: 2021-07-09 | End: 2021-07-09 | Stop reason: SDUPTHER

## 2021-07-09 RX ORDER — SODIUM CHLORIDE 0.9 % (FLUSH) 0.9 %
5-40 SYRINGE (ML) INJECTION EVERY 12 HOURS SCHEDULED
Status: DISCONTINUED | OUTPATIENT
Start: 2021-07-09 | End: 2021-07-10 | Stop reason: HOSPADM

## 2021-07-09 RX ORDER — ASPIRIN 81 MG/1
81 TABLET, CHEWABLE ORAL DAILY
Status: DISCONTINUED | OUTPATIENT
Start: 2021-07-10 | End: 2021-07-10 | Stop reason: HOSPADM

## 2021-07-09 RX ORDER — NITROGLYCERIN 0.4 MG/1
0.4 TABLET SUBLINGUAL EVERY 5 MIN PRN
Status: DISCONTINUED | OUTPATIENT
Start: 2021-07-09 | End: 2021-07-10 | Stop reason: HOSPADM

## 2021-07-09 RX ORDER — ACETAMINOPHEN 325 MG/1
650 TABLET ORAL EVERY 4 HOURS PRN
Status: DISCONTINUED | OUTPATIENT
Start: 2021-07-09 | End: 2021-07-10 | Stop reason: HOSPADM

## 2021-07-09 RX ORDER — SODIUM CHLORIDE 9 MG/ML
INJECTION, SOLUTION INTRAVENOUS CONTINUOUS
Status: DISCONTINUED | OUTPATIENT
Start: 2021-07-09 | End: 2021-07-09 | Stop reason: SDUPTHER

## 2021-07-09 RX ORDER — ISOSORBIDE MONONITRATE 30 MG/1
30 TABLET, EXTENDED RELEASE ORAL DAILY
Status: DISCONTINUED | OUTPATIENT
Start: 2021-07-10 | End: 2021-07-10 | Stop reason: HOSPADM

## 2021-07-09 RX ORDER — SODIUM CHLORIDE 9 MG/ML
25 INJECTION, SOLUTION INTRAVENOUS PRN
Status: DISCONTINUED | OUTPATIENT
Start: 2021-07-09 | End: 2021-07-09 | Stop reason: SDUPTHER

## 2021-07-09 RX ORDER — SODIUM CHLORIDE 0.9 % (FLUSH) 0.9 %
5-40 SYRINGE (ML) INJECTION PRN
Status: DISCONTINUED | OUTPATIENT
Start: 2021-07-09 | End: 2021-07-09 | Stop reason: SDUPTHER

## 2021-07-09 RX ORDER — ATORVASTATIN CALCIUM 80 MG/1
80 TABLET, FILM COATED ORAL NIGHTLY
Status: DISCONTINUED | OUTPATIENT
Start: 2021-07-09 | End: 2021-07-10 | Stop reason: HOSPADM

## 2021-07-09 RX ORDER — TAMSULOSIN HYDROCHLORIDE 0.4 MG/1
0.4 CAPSULE ORAL DAILY
Status: DISCONTINUED | OUTPATIENT
Start: 2021-07-09 | End: 2021-07-10 | Stop reason: HOSPADM

## 2021-07-09 RX ORDER — NITROGLYCERIN 20 MG/100ML
5-200 INJECTION INTRAVENOUS CONTINUOUS
Status: DISCONTINUED | OUTPATIENT
Start: 2021-07-09 | End: 2021-07-10 | Stop reason: HOSPADM

## 2021-07-09 RX ORDER — NITROGLYCERIN 0.4 MG/1
0.4 TABLET SUBLINGUAL EVERY 5 MIN PRN
Status: DISCONTINUED | OUTPATIENT
Start: 2021-07-09 | End: 2021-07-09 | Stop reason: SDUPTHER

## 2021-07-09 RX ORDER — SODIUM CHLORIDE 0.9 % (FLUSH) 0.9 %
5-40 SYRINGE (ML) INJECTION PRN
Status: DISCONTINUED | OUTPATIENT
Start: 2021-07-09 | End: 2021-07-10 | Stop reason: HOSPADM

## 2021-07-09 RX ADMIN — SODIUM CHLORIDE: 9 INJECTION, SOLUTION INTRAVENOUS at 11:08

## 2021-07-09 RX ADMIN — NITROGLYCERIN 40 MCG/MIN: 20 INJECTION INTRAVENOUS at 16:10

## 2021-07-09 RX ADMIN — SODIUM CHLORIDE, PRESERVATIVE FREE 10 ML: 5 INJECTION INTRAVENOUS at 20:57

## 2021-07-09 RX ADMIN — IOPAMIDOL 120 ML: 755 INJECTION, SOLUTION INTRAVENOUS at 16:00

## 2021-07-09 RX ADMIN — TAMSULOSIN HYDROCHLORIDE 0.4 MG: 0.4 CAPSULE ORAL at 17:55

## 2021-07-09 RX ADMIN — ATORVASTATIN CALCIUM 80 MG: 80 TABLET, FILM COATED ORAL at 20:57

## 2021-07-09 RX ADMIN — TICAGRELOR 90 MG: 90 TABLET ORAL at 20:57

## 2021-07-09 ASSESSMENT — PAIN DESCRIPTION - PROGRESSION: CLINICAL_PROGRESSION: NOT CHANGED

## 2021-07-09 ASSESSMENT — PAIN DESCRIPTION - ORIENTATION: ORIENTATION: RIGHT

## 2021-07-09 ASSESSMENT — PAIN DESCRIPTION - ONSET: ONSET: ON-GOING

## 2021-07-09 ASSESSMENT — PAIN DESCRIPTION - FREQUENCY: FREQUENCY: INTERMITTENT

## 2021-07-09 ASSESSMENT — PAIN DESCRIPTION - LOCATION: LOCATION: ELBOW

## 2021-07-09 ASSESSMENT — PAIN - FUNCTIONAL ASSESSMENT: PAIN_FUNCTIONAL_ASSESSMENT: PREVENTS OR INTERFERES WITH MANY ACTIVE NOT PASSIVE ACTIVITIES

## 2021-07-09 ASSESSMENT — PAIN DESCRIPTION - DESCRIPTORS: DESCRIPTORS: SHARP

## 2021-07-09 ASSESSMENT — PAIN SCALES - GENERAL
PAINLEVEL_OUTOF10: 0
PAINLEVEL_OUTOF10: 6

## 2021-07-09 NOTE — PROGRESS NOTES
Pt stable for bed transfer to 8A-19. VSS. Left groin with dressing dry and intact, bruised, but no bleeding or hematoma. Pt transferred via bed by central transporter, belongings sent with patient. Patient's wife accompanying transfer.

## 2021-07-09 NOTE — PLAN OF CARE
Care plan reviewed with patient and his wife, Mikael Chandan. Patient and his wife, Mikael Chandan, verbalize understanding of the plan of care and contribute to goal setting.      Problem: Falls - Risk of:  Goal: Will remain free from falls  Description: Will remain free from falls  Outcome: Ongoing  Goal: Absence of physical injury  Description: Absence of physical injury  Outcome: Ongoing     Problem: Skin Integrity:  Goal: Will show no infection signs and symptoms  Description: Will show no infection signs and symptoms  Outcome: Ongoing  Goal: Absence of new skin breakdown  Description: Absence of new skin breakdown  Outcome: Ongoing     Problem: Discharge Planning:  Goal: Discharged to appropriate level of care  Description: Discharged to appropriate level of care  Outcome: Ongoing

## 2021-07-09 NOTE — PRE SEDATION
[address] [address]  Sedation/Analgesia History & Physical    Pt Name: Ricky Dick  Account number: [de-identified]  MRN: [id number]  YOB: 1950  Provider Performing Procedure: Faisal Kim MD MD  Referring Provider: Faisal Kim MD   Primary Care Physician: Tessa Rivera DO  Date: 7/9/2021    PRE-PROCEDURE    Code Status: FULL CODE  Brief History/Pre-Procedure Diagnosis:   Abnormal stress  Refused CABG  CCS 3 angina   OMT     Consent: : I have discussed with the patient risks, benefits, and alternatives (and relevant risks, benefits, and side effects related to alternatives or not receiving care), and likelihood of the success. The patient and/or representative appear to understand and agree to proceed. The discussion encompasses risks, benefits, and side effects related to the alternatives and the risks related to not receiving the proposed care, treatment, and services. The indication, risks and benefits of the procedure and possible therapeutic consequences and alternatives were discussed with the patient. The patient was given the opportunity to ask questions and to have them answered to his/her satisfaction. Risks of the procedure include but are not limited to the following: Bleeding, hematoma including retroperitoneal hemmorhage, infection, pain and discomfort, injury to the aorta and other blood vessels, rhythm disturbance, low blood pressure, myocardial infarction, stroke, kidney damage/failure, myocardial perforation, allergic reactions to sedatives/contrast material, loss of pulse/vascular compromise requiring surgery, aneurysm/pseudoaneurysm formation, possible loss of a limb/hand/leg, needing blood transfusion, requiring emergent open heart surgery or emergent coronary intervention, the need for intubation/respiratory support, the requirement for defibrillation/cardioversion, and death. Alternatives to and omission of the suggested procedure were discussed.  [facility]  Prior to Admission medications    Medication Sig Start Date End Date Taking? Authorizing Provider   aspirin 81 MG chewable tablet Take 1 tablet by mouth daily 6/15/21  Yes Kaelyn Hanna MD   atorvastatin (LIPITOR) 80 MG tablet Take 1 tablet by mouth nightly 6/15/21  Yes Kaelyn Hanna MD   metoprolol tartrate (LOPRESSOR) 25 MG tablet Take 1 tablet by mouth 2 times daily 6/15/21  Yes Kaelyn Hanna MD   clopidogrel (PLAVIX) 75 MG tablet Take 1 tablet by mouth daily 6/15/21  Yes Kaelyn Hanna MD   tamsulosin (FLOMAX) 0.4 MG capsule Take 0.4 mg by mouth daily   Yes Historical Provider, MD   isosorbide mononitrate (IMDUR) 30 MG extended release tablet Take 1 tablet by mouth daily 5/26/21  Yes Kaelyn Hanna MD   losartan (COZAAR) 100 MG tablet Take 100 mg by mouth daily   Yes Historical Provider, MD   hydrochlorothiazide (HYDRODIURIL) 12.5 MG tablet TAKE 1 TABLET BY MOUTH ONCE DAILY 7/13/20  Yes Historical Provider, MD   Potassium 99 MG TABS Take 3 tablets by mouth daily   Yes Historical Provider, MD   Multiple Vitamins-Minerals (PRESERVISION AREDS PO) Take by mouth   Yes Historical Provider, MD   Ascorbic Acid (VITAMIN C) 500 MG tablet Take 1,000 mg by mouth    Yes Historical Provider, MD   nitroGLYCERIN (NITROSTAT) 0.4 MG SL tablet up to max of 3 total doses.  If no relief after 1 dose, call 911. 6/15/21   Kaelyn Hanna MD     Additional information:       VITAL SIGNS   Vitals:    07/09/21 1010   BP: 123/61   Pulse: 58   Resp: 21   Temp: 97.6 °F (36.4 °C)   SpO2: 93%       PHYSICAL:   General: No acute distress  HEENT:  Unremarkable for age  Neck: without increased JVD, carotid pulses 2+ bilaterally without bruits  Heart: RRR, S1 & S2 WNL, S4 gallop, without murmurs or rubs   NYHA: 2  Lungs: Clear to auscultation    Abdomen: BS present, without HSM, masses, or tenderness    Extremities: without C,C,E.  Pulses 2+ bilaterally  Mental Status: Alert & Oriented        PLANNED

## 2021-07-09 NOTE — BRIEF OP NOTE
[address] [address]  Sedation/Analgesia Post Sedation Record    Pt Name: Charles Roy  Account number: [de-identified]  MRN: [id number]  YOB: 1950  Procedure Performed By: MD MD Junior Lobo Oneil, [address]  Primary Care Physician: Anh Bills DO  Date: 7/9/2021    POST-PROCEDURE    Physicians/Assistants: MD MD Junior Lobo Oneil RPVI    Procedure Performed:Cath/ PCI      Sedation/Anesthesia: Versed/ Fentanyl and 2% xylocaine local anesthesia. Estimated Blood Loss: < 50 ml. Specimens Removed: None         Disposition of Specimen: N/A        Complications: No Immediate Complications.        Post-procedure Diagnosis/Findings:     LAD  PCI with recanalization and 3 overlapping LOLIS  DAPT               MD MD Junior Lobo Oneil RPVI  Electronically signed 7/9/2021 at 3:52 PM  Interventional Cardiology

## 2021-07-09 NOTE — PROGRESS NOTES
Care taken over from cath lab, left groin stable . Patient given post procedure safety instructions, patient verbalized understanding. Patient tolerating sips of water without difficulty. Family has spoken to Dr Sonali Chavez and are now at bedside. 0.9 NS and Nitro drip infusing. New depends applied to patient. Call light within reach. Will continue to monitor.

## 2021-07-10 VITALS
DIASTOLIC BLOOD PRESSURE: 63 MMHG | WEIGHT: 276.2 LBS | RESPIRATION RATE: 18 BRPM | BODY MASS INDEX: 38.67 KG/M2 | HEIGHT: 71 IN | HEART RATE: 62 BPM | SYSTOLIC BLOOD PRESSURE: 133 MMHG | OXYGEN SATURATION: 92 % | TEMPERATURE: 98.7 F

## 2021-07-10 PROBLEM — R07.9 CHEST PAIN: Status: ACTIVE | Noted: 2021-07-10

## 2021-07-10 LAB
ANION GAP SERPL CALCULATED.3IONS-SCNC: 11 MEQ/L (ref 8–16)
BUN BLDV-MCNC: 18 MG/DL (ref 7–22)
CALCIUM SERPL-MCNC: 9.3 MG/DL (ref 8.5–10.5)
CHLORIDE BLD-SCNC: 107 MEQ/L (ref 98–111)
CO2: 23 MEQ/L (ref 23–33)
CREAT SERPL-MCNC: 1.1 MG/DL (ref 0.4–1.2)
GFR SERPL CREATININE-BSD FRML MDRD: 66 ML/MIN/1.73M2
GLUCOSE BLD-MCNC: 96 MG/DL (ref 70–108)
POTASSIUM SERPL-SCNC: 4 MEQ/L (ref 3.5–5.2)
SODIUM BLD-SCNC: 141 MEQ/L (ref 135–145)

## 2021-07-10 PROCEDURE — 6370000000 HC RX 637 (ALT 250 FOR IP): Performed by: INTERNAL MEDICINE

## 2021-07-10 PROCEDURE — 2500000003 HC RX 250 WO HCPCS: Performed by: INTERNAL MEDICINE

## 2021-07-10 PROCEDURE — 1200000000 HC SEMI PRIVATE

## 2021-07-10 PROCEDURE — 2580000003 HC RX 258: Performed by: INTERNAL MEDICINE

## 2021-07-10 PROCEDURE — APPNB30 APP NON BILLABLE TIME 0-30 MINS: Performed by: PHYSICIAN ASSISTANT

## 2021-07-10 PROCEDURE — 36415 COLL VENOUS BLD VENIPUNCTURE: CPT

## 2021-07-10 PROCEDURE — 80048 BASIC METABOLIC PNL TOTAL CA: CPT

## 2021-07-10 RX ADMIN — TICAGRELOR 90 MG: 90 TABLET ORAL at 08:34

## 2021-07-10 RX ADMIN — ASPIRIN 81 MG: 81 TABLET, CHEWABLE ORAL at 08:34

## 2021-07-10 RX ADMIN — ISOSORBIDE MONONITRATE 30 MG: 30 TABLET, EXTENDED RELEASE ORAL at 08:34

## 2021-07-10 RX ADMIN — Medication 25 MG: at 08:34

## 2021-07-10 RX ADMIN — LOSARTAN POTASSIUM 100 MG: 100 TABLET ORAL at 08:34

## 2021-07-10 RX ADMIN — HYDROCHLOROTHIAZIDE 12.5 MG: 12.5 CAPSULE, GELATIN COATED ORAL at 08:34

## 2021-07-10 RX ADMIN — SODIUM CHLORIDE, PRESERVATIVE FREE 10 ML: 5 INJECTION INTRAVENOUS at 08:34

## 2021-07-10 RX ADMIN — NITROGLYCERIN 30 MCG/MIN: 20 INJECTION INTRAVENOUS at 07:41

## 2021-07-10 RX ADMIN — TAMSULOSIN HYDROCHLORIDE 0.4 MG: 0.4 CAPSULE ORAL at 08:34

## 2021-07-10 ASSESSMENT — PAIN SCALES - GENERAL: PAINLEVEL_OUTOF10: 0

## 2021-07-10 NOTE — PROGRESS NOTES
Writer reviewed discharge instructions with patient and wife. Including care for site, follow up appointments, and medication education. Verbalized understanding. Patient discharge home with wife.

## 2021-07-10 NOTE — PROCEDURES
[address] [place] [address]                            CARDIAC CATHETERIZATION    PATIENT NAME: Coretta Crowley                       :        1950  MED REC NO:   [id number]                           ROOM:       0019  ACCOUNT NO:   [de-identified]                           ADMIT DATE: 2021  PROVIDER:     Kristian Briseno MD    DATE OF PROCEDURE:  2021    INDICATION:  CCS class III angina, on optimal medical therapy, with  severely abnormal stress test with anterior wall ischemia; not a CABG  candidate; has already had stenting of the right coronary artery; now  presenting for LAD  PCI. DESCRIPTION OF PROCEDURE:  After informed consent was obtained from the  patient, he was brought to the cardiac catheterization laboratory and  prepped in sterile fashion. Left femoral artery was chosen as the  primary point of access. Preprocedure timeout was completed. After  infiltration of the left inguinal region with 2% lidocaine using  micropuncture and modified Seldinger technique under fluoroscopic  guidance and ultrasound guidance, I was able to insert a 7-Cypriot sheath  in the left femoral artery. Preprocedure angiography was performed by  Dr. Ирина Robertson. Please see his note for further details. In brief, it  demonstrated what appeared to be LAD  with diffuse disease throughout  the entire length of the vessel. He was not deemed appropriate for CABG  and the patient did not want CABG, but has anginal symptoms, on optimal  medical therapy, with abnormal stress test and significant LAD CTO_. Plan is to proceed with  PCI of the LAD. I cannulated the left main  with a 7-Cypriot JL4 guiding catheter. Heparin IV was given. ACT was  confirmed to be above 250 seconds. I used a Fielder XT and Turnpike  microcatheter that were advanced into the left main and ultimately, I  wired into the LAD  cath.    catheter was favorable as there was  narrowing towards the ostium and there was a small microchannel seen at  the level of the diagonal branch. After various projections for placement of the guidewire, I was able to  pass it down into the main body of the  and then exited out the  distal end of the . Microcatheter follows with slow counterclockwise  advancement. Once the microcatheter was past the  into the mid to  distal LAD, I did aspiration, confirmed that there was blood into the  true lumen and then I injected through a 3 mL syringe to the distal LAD  confirming that I was in the true lumen. I exchanged out for a 300 cm  BMW wire, then placed this in the  distal LAD. I backed out the  microcatheter. I passed a 2.0 x 20 compliant balloon and dilated the  distal to proximal LAD from 12 to 16 atmospheres. I did give copious  amounts of IC adenosine due to poor distal perfusion and microvascular  disease. Then, I was able to advance on the stent and Runthrough wires  through the entire LAD to the distal end of the LAD. I passed a  Carissa Cutter catheter for extra support. Then, passed a 2.0 x 26 Resolute  [place] LOLIS distally and stented at 13 atmospheres. I then passed a 2 x 38  Resolute [place] LOLIS and stented the mid LAD up to 12 atmospheres in an  overlapping fashion with the first stent deployed and then passed a 3.5  x 38 Resolute [place] LOLIS and stented the proximal LAD at 12 atmospheres in  an overlapping fashion with the second stent deployed. I postdilated  the distal stent with a 2.5 x 12 NC balloon from 12 to 22 atmospheres. I then used a 3.5 x 15 NC balloon up to 30 atmospheres from distal to  proximal.  Post-PCI angiography demonstrated CHIDI-3 flow without any  perforation, dissection, distal embolization, side branch loss, guide or  guidewire-induced trauma. All equipment was removed from the patient. The patient tolerated the procedure well. IMMEDIATE COMPLICATIONS:  None.     MEDICATIONS:  See EMR.    ACCESS:  8-Frisian Angio-Seal was used for hemostasis. ESTIMATED BLOOD LOSS:  Less than 50 mL. SUMMARY:  Successful LAD  PCI with three overlapping drug-eluting  stents. PLAN:  1. Bedrest.  2.  Optimal medical therapy. 3.  Risk factor management. 4.  Routine access care. 5.  DAPT. 6.  IV fluids. 7.  Overnight observation. 8.  Guideline-directed therapy for CAD. 9. Uptitrate antianginals. 10.  Follow up with Dr. Allyssa Ryan in two to four weeks post procedure. All the above was explained to the patient and the patient's family. They are agreeable and amenable to the above plan.         Jeff Cook MD    D: 07/09/2021 20:15:28       T: 07/09/2021 22:04:00     [email or web address]  Job#: [id number]     Doc#: [id number]    CC:

## 2021-07-10 NOTE — PROGRESS NOTES
Cardiology Progress Note      Patient:  Nieves Smith  YOB: 1950  MRN: [id number]   Acct: [de-identified]  Admit Date:  7/9/2021  Primary Cardiologist: Brayden Trinidad MD    Here for staged PCI    Subjective (Events in last 24 hours): pt awake and alert. NAD. No cp or sob. Pt had some sob last night after brilinta but states tolerable and resolved. No other complaints  On RA      Objective:   BP (!) 124/58   Pulse 56   Temp 97.6 °F (36.4 °C) (Oral)   Resp 18   Ht 5' 11\" (1.803 m)   Wt 276 lb 3.2 oz (125.3 kg)   SpO2 95%   BMI 38.52 kg/m²        TELEMETRY: nsr    Physical Exam:  General Appearance: alert and oriented to person, place and time, in no acute distress  Cardiovascular: normal rate, regular rhythm, normal S1 and S2, no murmurs, rubs, clicks, or gallops, distal pulses intact, no carotid bruits, no JVD  Pulmonary/Chest: clear to auscultation bilaterally- no wheezes, rales or rhonchi, normal air movement, no respiratory distress  Abdomen: soft, non-tender, non-distended, normal bowel sounds, no masses Extremities: no cyanosis, clubbing or edema, pulse   Skin: warm and dry  Head: normocephalic and atraumatic  Eyes: pupils equal, round, and reactive to light  Neck: supple and non-tender without mass, no thyromegaly   Neurological: alert, oriented, normal speech, no focal findings or movement disorder noted  Left groin - some ecchymosis, minimal drainage on dressing.   No hematoma, +DP, +PT    Medications:    aspirin  325 mg Oral Once    sodium chloride flush  5-40 mL Intravenous 2 times per day    aspirin  81 mg Oral Daily    ticagrelor  90 mg Oral BID    hydroCHLOROthiazide  12.5 mg Oral Daily    losartan  100 mg Oral Daily    isosorbide mononitrate  30 mg Oral Daily    tamsulosin  0.4 mg Oral Daily    atorvastatin  80 mg Oral Nightly    metoprolol tartrate  25 mg Oral BID      sodium chloride      nitroGLYCERIN 30 mcg/min (07/10/21 0741)     nitroGLYCERIN, 0.4 mg, Q5 Min PRN  sodium chloride flush, 5-40 mL, PRN  sodium chloride, 25 mL, PRN  acetaminophen, 650 mg, Q4H PRN          Lab Data:    Cardiac Enzymes:  No results for input(s): CKTOTAL, CKMB, CKMBINDEX, TROPONINI in the last 72 hours. CBC:   Lab Results   Component Value Date    WBC 7.9 07/09/2021    RBC 4.95 07/09/2021    RBC 5.80 06/22/2017    HGB 13.2 07/09/2021    HCT 41.7 07/09/2021     07/09/2021       CMP:    Lab Results   Component Value Date     07/09/2021    K 4.0 07/09/2021     07/09/2021    CO2 24 07/09/2021    BUN 20 07/09/2021    CREATININE 1.3 07/09/2021    LABGLOM 54 07/09/2021    GLUCOSE 114 07/09/2021    GLUCOSE 110 06/22/2017    CALCIUM 9.3 07/09/2021       Hepatic Function Panel:    Lab Results   Component Value Date    ALKPHOS 112 05/16/2020    ALT 13 05/16/2020    AST 13 05/16/2020    PROT 6.5 05/16/2020    PROT 8.1 06/22/2017    BILITOT 0.6 05/16/2020    BILIDIR 4.6 06/22/2017    LABALBU 3.9 05/16/2020    LABALBU 4.5 06/22/2017       Magnesium:  No results found for: MG    PT/INR:    Lab Results   Component Value Date    INR 1.02 07/09/2021       HgBA1c:  No results found for: LABA1C    FLP:    Lab Results   Component Value Date    TRIG 115 06/15/2021    HDL 33 06/15/2021    LDLCALC 106 06/15/2021       TSH:  No results found for: TSH      Assessment:    S/p cath 7/9/21  LAD  PCI with recanalization and 3 overlapping LOLIS      Plan:    Discharge home  See dc instructions  Pt wants to continue brilinta as he states sob was tolerable. I instructed to drink some caffeine to possible help with the sob if happens again. I also stressed importance of compliance and not stopping the medication.   If reoccurs and not tolerable, call office to help arrange medication change if needed  Pt agrees with plan         Electronically signed by Jean Pierre Snider PA-C on 7/10/2021 at 8:22 AM

## 2021-07-10 NOTE — PLAN OF CARE
Problem: Falls - Risk of:  Goal: Will remain free from falls  Description: Will remain free from falls  Outcome: Ongoing   Patient remains free from falls. Call light in reach. Bed locked and in low position with bed alarm on  Problem: Skin Integrity:  Goal: Will show no infection signs and symptoms  Description: Will show no infection signs and symptoms  Outcome: Ongoing   Skin warm dry and intact. Right groin excoriated. Left groin incision   Problem: Discharge Planning:  Goal: Discharged to appropriate level of care  Description: Discharged to appropriate level of care  Outcome: Ongoing   Pending improved blood pressure and shortness of breath  Problem: Pain:  Goal: Pain level will decrease  Description: Pain level will decrease  Outcome: Ongoing   Patient denies pain at this time. Care plan reviewed with patient and verbalize understanding of the plan of care and contribute to goal setting.

## 2021-07-10 NOTE — PLAN OF CARE
Problem: Falls - Risk of:  Goal: Will remain free from falls  Description: Will remain free from falls  7/10/2021 0953 by Raine Bolivar RN  Outcome: Completed  7/10/2021 0436 by Calos Fortune RN  Outcome: Ongoing  Goal: Absence of physical injury  Description: Absence of physical injury  7/10/2021 0953 by Raine Bolivar RN  Outcome: Completed  7/10/2021 0436 by Calos Fortune RN  Outcome: Ongoing     Problem: Skin Integrity:  Goal: Will show no infection signs and symptoms  Description: Will show no infection signs and symptoms  7/10/2021 0953 by Raine Bolivar RN  Outcome: Completed  7/10/2021 0436 by Calos Fortune RN  Outcome: Ongoing  Goal: Absence of new skin breakdown  Description: Absence of new skin breakdown  7/10/2021 0953 by Raine Bolivar RN  Outcome: Completed  7/10/2021 0436 by Calos Fortune RN  Outcome: Ongoing     Problem: Discharge Planning:  Goal: Discharged to appropriate level of care  Description: Discharged to appropriate level of care  7/10/2021 0953 by Raine Bolivar RN  Outcome: Completed  7/10/2021 0436 by Calos Fortune RN  Outcome: Ongoing     Problem: Pain:  Goal: Pain level will decrease  Description: Pain level will decrease  7/10/2021 0953 by Raine Bolivar RN  Outcome: Completed  7/10/2021 0436 by Calos Fortune RN  Outcome: Ongoing  Goal: Control of acute pain  Description: Control of acute pain  7/10/2021 0953 by Raine Bolivar RN  Outcome: Completed  7/10/2021 0436 by Calos Fortune RN  Outcome: Ongoing  Goal: Control of chronic pain  Description: Control of chronic pain  7/10/2021 0953 by Raine Bolivar RN  Outcome: Completed  7/10/2021 0436 by Calos Fortune RN  Outcome: Ongoing   Care plan reviewed with patient. Patient verbalize understanding of the plan of care and contribute to goal setting.

## 2021-07-11 LAB
EKG ATRIAL RATE: 49 BPM
EKG P AXIS: 7 DEGREES
EKG P-R INTERVAL: 212 MS
EKG Q-T INTERVAL: 466 MS
EKG QRS DURATION: 108 MS
EKG QTC CALCULATION (BAZETT): 420 MS
EKG R AXIS: 36 DEGREES
EKG T AXIS: 59 DEGREES
EKG VENTRICULAR RATE: 49 BPM

## 2021-07-11 PROCEDURE — 93010 ELECTROCARDIOGRAM REPORT: CPT | Performed by: INTERNAL MEDICINE

## 2021-07-12 ENCOUNTER — TELEPHONE (OUTPATIENT)
Dept: CARDIOLOGY CLINIC | Age: 71
End: 2021-07-12

## 2021-07-12 NOTE — TELEPHONE ENCOUNTER
Patient had a cath doen 07/09/21 and wife is requesting a follow up appointment with Dr Sahil Douglas.  Please advise

## 2021-07-12 NOTE — TELEPHONE ENCOUNTER
Called and talked to Jerald and appt scheduled for 8/3/2021 at 1230. She confirmed appt date, time and location.

## 2021-07-15 NOTE — H&P
The patient had no further questions and wished to proceed; the consent form was signed. MEDICAL HISTORY  [x]ASHD/ANGINA/MI/CHF   [x]Hypertension  []Diabetes  []Hyperlipidemia  []Smoking  []Family Hx of ASHD  [x]Additional information:       has a past medical history of Arthritis, Back pain, CAD in native artery, Diarrhea, Hypertension, Infection of prosthetic shoulder joint ([facility] [address] PFO (patent foramen ovale), Propionibacterium infection, Rotator cuff injury, Stroke ([facility] [address] and TIA (transient ischemic attack). SURGICAL HISTORY   has a past surgical history that includes Rotator cuff repair (Left, 03/12/2013); cyst incision and drainage; Cholecystectomy (06/25/2017); ERCP (06/23/2017); TURP (N/A, 8/13/2019); Cystoscopy (N/A, 12/17/2019); Cystoscopy (N/A, 2/18/2020); shoulder surgery (Right, 03/02/2020); shoulder surgery (06/2020); and Coronary angioplasty with stent. Additional information:       ALLERGIES   Allergies as of 07/09/2021 - Fully Reviewed 07/09/2021   Allergen Reaction Noted    Adhesive tape Rash 07/23/2020     Additional information:       MEDICATIONS   Aspirin  [x] 81 mg  [] 325 mg  [] None  Antiplatelet drug therapy use last 5 days  []No [x]Yes  Coumadin Use Last 5 Days [x]No []Yes  Other anticoagulant use last 5 days  [x]No []Yes  No current facility-administered medications for this encounter.     Current Outpatient Medications:     ticagrelor (BRILINTA) 90 MG TABS tablet, Take 1 tablet by mouth 2 times daily, Disp: 60 tablet, Rfl: 5    aspirin 81 MG chewable tablet, Take 1 tablet by mouth daily, Disp: 30 tablet, Rfl: 3    atorvastatin (LIPITOR) 80 MG tablet, Take 1 tablet by mouth nightly, Disp: 30 tablet, Rfl: 3    metoprolol tartrate (LOPRESSOR) 25 MG tablet, Take 1 tablet by mouth 2 times daily, Disp: 60 tablet, Rfl: 3    tamsulosin (FLOMAX) 0.4 MG capsule, Take 0.4 mg by mouth daily, Disp: , Rfl:     isosorbide mononitrate (IMDUR) 30 MG extended release tablet, Take 1 tablet by mouth daily, Disp: 90 tablet, Rfl: 1    losartan (COZAAR) 100 MG tablet, Take 100 mg by mouth daily, Disp: , Rfl:     hydrochlorothiazide (HYDRODIURIL) 12.5 MG tablet, TAKE 1 TABLET BY MOUTH ONCE DAILY, Disp: , Rfl:     Potassium 99 MG TABS, Take 3 tablets by mouth daily, Disp: , Rfl:     Multiple Vitamins-Minerals (PRESERVISION AREDS PO), Take by mouth, Disp: , Rfl:     Ascorbic Acid (VITAMIN C) 500 MG tablet, Take 1,000 mg by mouth , Disp: , Rfl:     nitroGLYCERIN (NITROSTAT) 0.4 MG SL tablet, up to max of 3 total doses. If no relief after 1 dose, call 911., Disp: 25 tablet, Rfl: 1  Prior to Admission medications    Medication Sig Start Date End Date Taking? Authorizing Provider   ticagrelor (BRILINTA) 90 MG TABS tablet Take 1 tablet by mouth 2 times daily 7/10/21  Yes Magali Humphrey PA-C   aspirin 81 MG chewable tablet Take 1 tablet by mouth daily 6/15/21  Yes Aline Levin MD   atorvastatin (LIPITOR) 80 MG tablet Take 1 tablet by mouth nightly 6/15/21  Yes Aline Levin MD   metoprolol tartrate (LOPRESSOR) 25 MG tablet Take 1 tablet by mouth 2 times daily 6/15/21  Yes Aline Levin MD   tamsulosin (FLOMAX) 0.4 MG capsule Take 0.4 mg by mouth daily   Yes Historical Provider, MD   isosorbide mononitrate (IMDUR) 30 MG extended release tablet Take 1 tablet by mouth daily 5/26/21  Yes Aline Levin MD   losartan (COZAAR) 100 MG tablet Take 100 mg by mouth daily   Yes Historical Provider, MD   hydrochlorothiazide (HYDRODIURIL) 12.5 MG tablet TAKE 1 TABLET BY MOUTH ONCE DAILY 7/13/20  Yes Historical Provider, MD   Potassium 99 MG TABS Take 3 tablets by mouth daily   Yes Historical Provider, MD   Multiple Vitamins-Minerals (PRESERVISION AREDS PO) Take by mouth   Yes Historical Provider, MD   Ascorbic Acid (VITAMIN C) 500 MG tablet Take 1,000 mg by mouth    Yes Historical Provider, MD   nitroGLYCERIN (NITROSTAT) 0.4 MG SL tablet up to max of 3 total doses.  If no relief after 1 dose, call 911. 6/15/21   [address], MD     Additional information:       VITAL SIGNS   Vitals:    07/10/21 0829   BP: 133/63   Pulse: 62   Resp: 18   Temp: 98.7 °F (37.1 °C)   SpO2: 92%       PHYSICAL:   General: No acute distress  HEENT:  Unremarkable for age  Neck: without increased JVD, carotid pulses 2+ bilaterally without bruits  Heart: RRR, S1 & S2 WNL, S4 gallop, without murmurs or rubs   NYHA: 2  Lungs: Clear to auscultation    Abdomen: BS present, without HSM, masses, or tenderness    Extremities: without C,C,E.  Pulses 2+ bilaterally  Mental Status: Alert & Oriented        PLANNED PROCEDURE   []Cath  [x]PCI                []Pacemaker/AICD  []ROSITA             []Cardioversion []Peripheral angiography/PTA  []Other:      SEDATION  Planned agent:[x]Midazolam []Meperidine [x]Sublimaze []Morphine  []Diazepam  []Other:     ASA Classification:  []1 []2 [x]3 []4 []5  Class 1: A normal healthy patient  Class 2: Pt with mild to moderate systemic disease  Class 3: Severe systemic disease or disturbance  Class 4: Severe systemic disorders that are already life threatening. Class 5: Moribund pt with little chances of survival, for more than 24 hours. Mallampati I Airway Classification:   []1 []2 [x]3 []4    [x]Pre-procedure diagnostic studies complete and results available. Comment:    [x]Previous sedation/anesthesia experiences assessed. Comment:    [x]The patient is an appropriate candidate to undergo the planned procedure sedation and anesthesia. (Refer to nursing sedation/analgesia documentation record)  [x]Formulation and discussion of sedation/procedure plan, risks, and expectations with patient and/or responsible adult completed. [x]Patient examined immediately prior to the procedure.  (Refer to nursing sedation/analgesia documentation record)    Denver Hakim, MD MD   Electronically signed 7/15/2021 at 12:23 AM

## 2021-08-02 NOTE — PLAN OF CARE
tolerance for a total of 15 repetitions. Measurable Endurance Goal:  Aerobic endurance goal to be measured in minutes. Start endurance training per patient tolerance at 1-8 minutes per exercise type, progressing to a total of 31-60 minutes using various modes of training (see Exercise Prescription). Progression:    [x] Weekly 5-10% intensity progression, as tolerated, during cardiac rehab sessions. [x] 13-17 Rate of Perceived Exertion on the Joi Scale (6-20). Measurable Muscular Strength Goal:  Starting at 1-5 lbs x 8 reps, progressing to 5-25 lbs x 15 reps per patient tolerance.       Electronically signed by Janet Roy RN on 8/2/21 at 2:45 PM EDT

## 2021-08-03 ENCOUNTER — OFFICE VISIT (OUTPATIENT)
Dept: CARDIOLOGY CLINIC | Age: 71
End: 2021-08-03
Payer: MEDICARE

## 2021-08-03 VITALS
HEART RATE: 55 BPM | SYSTOLIC BLOOD PRESSURE: 112 MMHG | DIASTOLIC BLOOD PRESSURE: 61 MMHG | HEIGHT: 71 IN | BODY MASS INDEX: 38.69 KG/M2 | WEIGHT: 276.4 LBS

## 2021-08-03 DIAGNOSIS — I10 ESSENTIAL HYPERTENSION: ICD-10-CM

## 2021-08-03 DIAGNOSIS — E78.01 FAMILIAL HYPERCHOLESTEROLEMIA: ICD-10-CM

## 2021-08-03 DIAGNOSIS — I25.10 CORONARY ARTERY DISEASE INVOLVING NATIVE CORONARY ARTERY OF NATIVE HEART WITHOUT ANGINA PECTORIS: Primary | ICD-10-CM

## 2021-08-03 PROCEDURE — 99213 OFFICE O/P EST LOW 20 MIN: CPT | Performed by: NUCLEAR MEDICINE

## 2021-08-03 PROCEDURE — 1111F DSCHRG MED/CURRENT MED MERGE: CPT | Performed by: NUCLEAR MEDICINE

## 2021-08-03 PROCEDURE — 1036F TOBACCO NON-USER: CPT | Performed by: NUCLEAR MEDICINE

## 2021-08-03 PROCEDURE — 1123F ACP DISCUSS/DSCN MKR DOCD: CPT | Performed by: NUCLEAR MEDICINE

## 2021-08-03 PROCEDURE — G8417 CALC BMI ABV UP PARAM F/U: HCPCS | Performed by: NUCLEAR MEDICINE

## 2021-08-03 PROCEDURE — 4040F PNEUMOC VAC/ADMIN/RCVD: CPT | Performed by: NUCLEAR MEDICINE

## 2021-08-03 PROCEDURE — 3017F COLORECTAL CA SCREEN DOC REV: CPT | Performed by: NUCLEAR MEDICINE

## 2021-08-03 PROCEDURE — G8427 DOCREV CUR MEDS BY ELIG CLIN: HCPCS | Performed by: NUCLEAR MEDICINE

## 2021-08-03 NOTE — PROGRESS NOTES
[address] [facility]  [address]  [address]  [facility] [address]  Dept: [phone]  Dept Fax: [phone]  Loc: [phone]    Visit Date: 8/3/2021    Daniel Starks is a 66 y.o. male who presents todayfor:  Chief Complaint   Patient presents with    Check-Up    Hypertension    Coronary Artery Disease    Hyperlipidemia     Had a cath   Stent of the RCA and a  LAD  No chest pain  No changes in breathing  Very limited patient  Some dyspnea with the Brilenta   Bp is stable       HPI:  HPI  Past Medical History:   Diagnosis Date    Arthritis     Back pain     CAD in native artery 6/14/2021    Diarrhea     Hypertension     Infection of prosthetic shoulder joint ([address] 7/24/2020    PFO (patent foramen ovale) 8/2012    noted on ROSITA following stroke    Propionibacterium infection 7/24/2020    Rotator cuff injury     Stroke [facility])     August 19/2012    TIA (transient ischemic attack) 05/2018      Past Surgical History:   Procedure Laterality Date    CHOLECYSTECTOMY  06/25/2017    CORONARY ANGIOPLASTY WITH STENT PLACEMENT      CYST INCISION AND DRAINAGE      CYSTOSCOPY N/A 12/17/2019    CYSTOSCOPY, WITH  URETHRAL DILATION [facility] [address] 2/18[facility]    CYSTOSCOPY WITH BLADDER BOTOX performed by Corbin Maldonado MD at [address] [facility] ERCP  06/23/2017    ROTATOR CUFF REPAIR Left 03/12/2013    right 4/2019    SHOULDER SURGERY Right 03/02/2020    SHOULDER SURGERY  06/2020    frozen shoulder, repair nerve elbow and palm-dr eduardo     TURP N/A 8/13/2019    TRANSURETHRAL RESECTION PROSTATE performed by Michelle Quesada MD at [address] [facility] History   Problem Relation Age of Onset    Arthritis Mother     Vision Loss Father     Cancer Brother     Stroke Maternal Uncle     Prostate Cancer Neg Hx      Social History     Tobacco Use    Smoking status: Never Smoker    Smokeless tobacco: Never Used   Substance Use Topics    Alcohol use: No      Current Outpatient Medications   Medication Sig Dispense Refill    ticagrelor (BRILINTA) 90 MG TABS tablet Take 1 tablet by mouth 2 times daily 60 tablet 5    aspirin 81 MG chewable tablet Take 1 tablet by mouth daily 30 tablet 3    nitroGLYCERIN (NITROSTAT) 0.4 MG SL tablet up to max of 3 total doses. If no relief after 1 dose, call 911. 25 tablet 1    metoprolol tartrate (LOPRESSOR) 25 MG tablet Take 1 tablet by mouth 2 times daily 60 tablet 3    tamsulosin (FLOMAX) 0.4 MG capsule Take 0.4 mg by mouth daily      isosorbide mononitrate (IMDUR) 30 MG extended release tablet Take 1 tablet by mouth daily 90 tablet 1    losartan (COZAAR) 100 MG tablet Take 100 mg by mouth daily      hydrochlorothiazide (HYDRODIURIL) 12.5 MG tablet TAKE 1 TABLET BY MOUTH ONCE DAILY      Potassium 99 MG TABS Take 3 tablets by mouth daily      Multiple Vitamins-Minerals (PRESERVISION AREDS PO) Take by mouth      Ascorbic Acid (VITAMIN C) 500 MG tablet Take 1,000 mg by mouth        No current facility-administered medications for this visit.      Allergies   Allergen Reactions    Adhesive Tape Rash     Health Maintenance   Topic Date Due    Hepatitis C screen  Never done    COVID-19 Vaccine (1) Never done    DTaP/Tdap/Td vaccine (1 - Tdap) Never done    Colon cancer screen colonoscopy  Never done    Pneumococcal 65+ years Vaccine (1 of 1 - PPSV23) Never done   [employer] Visit (AWV)  Never done    Flu vaccine (1) 09/01/2021    Lipid screen  06/15/2022    Potassium monitoring  07/10/2022    Creatinine monitoring  07/10/2022    Shingles Vaccine  Completed    Hepatitis A vaccine  Aged Out    Hepatitis B vaccine  Aged Out    Hib vaccine  Aged Out    Meningococcal (ACWY) vaccine  Aged Out       Subjective:  Review of Systems  General:   No fever, no chills,some fatigue or weight loss  Pulmonary:    some dyspnea, no wheezing  Cardiac:    Denies recent chest pain,   GI:     No nausea or vomiting, no abdominal pain  Neuro:    No dizziness or light headedness,   Musculoskeletal:  No recent active issues  Extremities:   No edema, no obvious claudication       Objective:  Physical Exam  /61   Pulse 55   Ht 5' 11\" (1.803 m)   Wt 276 lb 6.4 oz (125.4 kg)   BMI 38.55 kg/m²   General:   Well developed, well nourished  Lungs:   Clear to auscultation  Heart:    Normal S1 S2, Slight murmur. no rubs, no gallops  Abdomen:   Soft, non tender, no organomegalies, positive bowel sounds  Extremities:   No edema, no cyanosis, good peripheral pulses  Neurological:   Awake, alert, oriented. No obvious focal deficits  Musculoskelatal:  No obvious deformities    Assessment:      Diagnosis Orders   1. Coronary artery disease involving native coronary artery of native heart without angina pectoris     2. Essential hypertension     3. Familial hypercholesterolemia     as above  Cardiac seems stable     Plan:  No follow-ups on file. As above  Change to plavix   Continue risk factor modification and medical management  Thank you for allowing me to participate in the care of your patient. Please don't hesitate to contact me regarding any further issues related to the patient care    Orders Placed:  No orders of the defined types were placed in this encounter. Medications Prescribed:  No orders of the defined types were placed in this encounter. Discussed use, benefit, and side effects of prescribed medications. All patient questions answered. Pt voicedunderstanding. Instructed to continue current medications, diet and exercise. Continue risk factor modification and medical management. Patient agreed with treatment plan. Follow up as directed.     Electronically signedby Crystal Shah MD on 8/3/2021 at 12:54 PM

## 2021-08-04 RX ORDER — CLOPIDOGREL BISULFATE 75 MG/1
TABLET ORAL
Qty: 90 TABLET | Refills: 3 | Status: ON HOLD | OUTPATIENT
Start: 2021-08-04 | End: 2022-08-02 | Stop reason: SDUPTHER

## 2021-08-11 ENCOUNTER — HOSPITAL ENCOUNTER (OUTPATIENT)
Dept: CARDIAC REHAB | Age: 71
Setting detail: THERAPIES SERIES
Discharge: HOME | End: 2021-08-11
Payer: MEDICARE

## 2021-08-11 PROCEDURE — G0423 INTENS CARDIAC REHAB NO EXER: HCPCS

## 2021-08-11 PROCEDURE — G0422 INTENS CARDIAC REHAB W/EXERC: HCPCS

## 2021-08-11 NOTE — PROGRESS NOTES
Video Education Report - ICR/CR  Name:  J Carlos Spears     Date:  8/11/2021  MRN: [id number]     Session #:  1  Session Length: 40 min    Core Videos        []Heart Disease Risk Reduction       [x]Overview of Pritikin Eating Plan      []Move it          []Calorie Density         []Healthy Minds, Bodies, Hearts            Exercise      Healthy Mind-Set  []Improving Performance    []Smoking Cessation    []Introduction to [address]  []Aging-Enhancing the Quality of Your Life  []Becoming a Pritikin   []Biology of Weight Control    []Cooking Breakfasts   []Decoding Lab Results    and Snacks  []Diseases of Our Time - Overview   []Cooking Dinner and   []Sleep Disorders     Sides  []Targeting Your Nutrition Priorities   []Healthy Salads &   Dressings  Nutrition      []Cooking Soups and   []Dining Out - Part 2     Desserts  []Fueling a Healthy Body   []Menu Workshop     Overview  []Planning Your Eating Strategy   []The Pritikin Solution  []Vitamins and Minerals    Comments:  Video completed, group discussion

## 2021-08-11 NOTE — PLAN OF CARE
[address] [place] [address] Facility-Based Program  Individualized Cardiac Treatment Plan    Patient Name:  Mary Bach  :  1950  Age:  66 y.o. MRN:  [id number]  Diagnosis: PCI of RCA  Date of Event: 6/15/2021  Physician:  Sonali Chavez Next Office Visit:  ?  Date Entered Program: 2021  Risk Stratifications: [] Low [] Intermediate [x] High  Allergies: Allergies   Allergen Reactions    Adhesive Tape Rash       COVID -19 Screen  Do you have any of the following symptoms:  [] Fever [] Cough [] SOB [] Muscle/Body Ache [] Loss of taste/smell [x] None    Have you traveled outside of the [place]? [] Yes      [x] No    Have you been around anyone who has tested Positive for COVID 19?  [] Yes      [] No    The following Education has been completed with patient. [x] Wait in the car until we call you back to rehab. [x] You must wear a mask while in the medical center, and in cardiac rehab. Please bring your own. [x] Bring your own bottle of water with you.       Individual Cardiac Treatment Plan -EXERCISE  INITIAL 30 DAY 60 DAY 90  DAY FINAL DAY   EXERCISE  ASSESSMENT/PLAN EXERCISE  REASSESSMENT EXERCISE   REASSESSMENT EXERCISE   REASSESSMENT EXERCISE   REASSESSMENT EXERCISE  DISCHARGE/FOLLOW-UP   Date: 2021 Date: Date: Date: Date: Date:   Session #1  First Exercise Session:  2021 Session # ** Session # ** Session # ** Session # ** Session # **  Last Exercise Session:  **   Stages of Change Stages of Change Stages of Change Stages of Change Stages of Change Stages of Change   [x] Pre Contemplation  [] Contemplation  [] Preparation  [] Action  [] Maintenance  [] Relapse [] Pre Contemplation  [] Contemplation  [] Preparation  [] Action  [] Maintenance  [] Relapse [] Pre Contemplation  [] Contemplation  [] Preparation  [] Action  [] Maintenance  [] Relapse [] Pre Contemplation  [] Contemplation  [] Preparation  [] Action  [] Maintenance  [] Relapse [] Pre Contemplation  [] Contemplation  [] Preparation  [] Action  [] Maintenance  [] Relapse [] Pre Contemplation  [] Contemplation  [] Preparation  [] Action  [] Maintenance  [] Relapse   EXERCISE ASSESSMENT EXERCISE ASSESSMENT EXERCISE ASSESSMENT EXERCISE ASSESSMENT EXERCISE ASSESSMENT EXERCISE ASSESSMENT   3 Min Walk Test  Distance walked:   0.02 miles walked 3 min SOB and very deconditioned  105 ft.  1.15 METs  Max HR: 68BPM      RPE:  14    THR:    Rhythm:  NSR     6 Min Walk Test  Distance walked:   ** miles  ** ft  ** METs  Max HR:** BPM      RPE:  **  %Change ft= **    Rhythm:  **   DASI: 3.29 METs DASI: ** METs DASI: ** METs DASI: ** METs DASI: ** METs DASI: ** METs   Return to Work  [employer] on returning to work? [] Yes              [] No   [] Disabled     [x] Retired     Return to work:  Has Olam Nani returned to work? [] Yes    [] No    Return to work date set? [] Yes, **    [] No    Olam Nani is doing ** at work. Return to work:  Has Olam Nani returned to work? [] Yes    [] No    Return to work date set? [] Yes, **    [] No    Olam Nani is doing ** at work. Return to work:  Has Olam Nani returned to work? [] Yes    [] No    Return to work date set? [] Yes, **    [] No    Olam Nani is doing ** at work. Return to work:  Has Olam Nani returned to work? [] Yes    [] No    Return to work date set? [] Yes, **    [] No    Olam Nani is doing ** at work. Return to work:  Has Olam Nani returned to work? [] Yes    [] No    Return to work? [] Yes, **    [] No    *Required MET Level achieved for job duties? [] Yes    [] No   Orthopedic Limitations/  [x] Yes    [] No  If yes please list:  Shoulders and back had a stroke 9 years. Orthopedic Limitations  *If patient has orthopedic issue:   Actions/  accomodations needed to make Olam Nani successful : Orthopedic Limitations   Orthopedic Limitations   Orthopedic Limitations Orthopedic Limitations     Fall Risk  Fall risk assessed? [x] Yes      [] No    Balance Issues?   [x] Yes      [] No [] Walker [x] [employer]    [x] Safety issues reviewed      Fall Risk  *If patient is a fall risk, action needed to accommodate:  Fall Risk Fall Risk Fall Risk Fall Risk   Home Exercise  [] Yes    [x] No  Type:   Frequency:   Duration:  Home Exercise  [] Yes    [] No  Type: **  Frequency:**  Duration: ** Home Exercise  [] Yes    [] No  Type: **  Frequency: **  Duration: ** Home Exercise  [] Yes    [] No  Type: **  Frequency: **  Duration: ** Home Exercise  [] Yes    [] No  Type: **  Frequency: **  Duration: ** Home Exercise  [] Yes    [] No  Type: **  Frequency: **  Duration: **   Angina with Activity? [] Yes    [x] No  Angina Management: nitro if needed Angina with Activity? [] Yes    [] No  Angina Management: ** Angina with Activity? [] Yes    [] No  Angina Management: ** Angina with Activity? [] Yes    [] No  Angina Management: ** Angina with Activity? [] Yes    [] No  Angina Management: ** Angina with Activity?   [] Yes    [] No  Angina Management: **   EXERCISE PLAN EXERCISE PLAN EXERCISE PLAN EXERCISE PLAN EXERCISE PLAN EXERCISE PLAN   *Interventions* *Interventions* *Interventions* *Interventions* *Interventions* *Interventions*   Exercise Prescription  (per physician & CR staff) Exercise Prescription  (per physician & CR staff) Exercise Prescription  (per physician & CR staff) Exercise Prescription  (per physician & CR staff) Exercise Prescription  (per physician & CR staff) Exercise Prescription  (per physician & CR staff)   Cardiovascular Cardiovascular Cardiovascular Cardiovascular Cardiovascular Cardiovascular   Mode:    [x] Treadmill (TM)  [x] Schwinn Airdyne (AD)  [x] Arms Ergometer (AE)  [x] NuStep  [] Elliptical (E) MODE:    [] Treadmill (TM)  [] Schwinn Airdyne (AD)  [] Arms Ergometer (AE)  [] NuStep  [] Elliptical (E) MODE:    [] Treadmill (TM)  [] Schwinn Airdyne (AD)  [] Arms Ergometer (AE)  [] NuStep  [] Elliptical (E) MODE:    [] Treadmill (TM)  [] Schwinn Airdyne (AD)  [] Arms Ergometer (AE)  [] NuStep  [] Elliptical (E) MODE:    [] Treadmill (TM)  [] Schwinn Airdyne (AD)  [] Arms Ergometer (AE)  [] NuStep  [] Elliptical (E) MODE:    [] Treadmill (TM)  [] Schwinn Airdyne (AD)  [] Arms Ergometer (AE)  [] NuStep  [] Elliptical (E)   Initial Workloads  TM: [email or web address] 1.1 METs  AD: 0.2 level = 1.2 METs  NS: 4  Salcedo= 1.2 METs  AE: 0.1 level = 1.1 METs Current Workloads  TM:  @ %=  METs  AD:  level =  METs  NS:   Salcedo=  METs  AE:  level =  METs Current Workloads  TM:  @ %=  METs  AD:  level =  METs  NS:   Salcedo=  METs  AE:  level =  METs Current Workloads  TM:  @ %=  METs  AD:  level =  METs  NS:   Salcedo=  METs  AE:  level =  METs Current Workloads  TM:  @ %=  METs  AD:  level =  METs  NS:   Salcedo=  METs  AE:  level =  METs Current Workloads  TM:  @ %=  METs  AD:  level =  METs  NS:   Salcedo=  METs  AE:  level =  METs     Frequency:    ICR: 3x/week  Home: 2-3x/wk Frequency:   ICR: 3x/week  Home: 3x/wk Frequency:  ICR: 3x/week  Home: 3-4x/wk Frequency:  ICR: 3x/week  Home: 3-4x/wk Frequency:  ICR: 3x/week  Home: 3-4x/wk Frequency:  Santos Rodriguez will continue exercise at  5-7 days/week   Duration:   Total aerobic exercise = 30-45 min    5-8 min/mode Duration:  Total aerobic exercises = ** min     **min/mode Duration:  Total aerobic exercises = ** min     **min/mode Duration:  Total aerobic exercises = ** min     **min/mode Duration:  Total aerobic exercises = ** min     **min/mode Duration:  Total erobic exercise =  60-90 min   Intensity:   MET Level = 1.15  RPE = 12-15 Intensity:  Max MET Level = **  RPE = 12-15 Intensity:  Max MET Level = **  RPE = 12-15 Intensity:  Max MET Level = **  RPE = 12-15 Intensity:  Max MET Level = **  RPE = 12-15 Intensity:  Max MET Level = ** RPE = 12-15   Progression: increase aerobic activity up to 15 min over next 4 weeks by increasing time 2-3 min/week.  Progression:   Progression:   Progression: Progression: Progression:  Increase time/intensity when RPE <13, and HR is in THRR Strength Training Strength Training Strength Training Strength Training Strength Training Strength Training   [x] Yes      [] No  Upper and Lower body strength training 2x/wk    Wt: 2#       Reps:  8-15    *Increase wt. after completing 15 reps with an RPE of <12/13. [] Yes      [] No  Upper and Lower body strength training 2x/wk    Wt: **#       Reps:  8-15    *Increase wt. after completing 15 reps with an RPE of <12/13. [] Yes      [] No  Upper and Lower body strength training 2x/wk    Wt: **#       Reps:  8-15    *Increase wt. after completing 15 reps with an RPE of <12/13. [] Yes      [] No  Upper and Lower body strength training 2x/wk    Wt: **#       Reps:  8-15    *Increase wt. after completing 15 reps with an RPE of <12/13. [] Yes      [] No  Upper and Lower body strength training 2x/wk    Wt: **#       Reps:  8-15    *Increase wt. after completing 15 reps with an RPE of <12/13. Continue Strength Training at home   [] Exercise Log & Strength training handout given     Wt: **#       Reps:  8-15    *Increase wt. after completing 15 reps with an RPE of <12/13. Flexibility Flexibility Flexibility Flexibility Flexibility Flexibility   [x] Yes      [] No  25 min session of Core Strength & Flexibility 1x/per week  Attends Core Strength & Flexibility   [] Yes      [] No Attends Core Strength & Flexibility   [] Yes      [] No Attends Core Strength & Flexibility   [] Yes      [] No Attends Core Strength & Flexibility   [] Yes      [] No Continue Core Strength & Flexibility at home   Home Exercise  *Del verbalizes planning to walk 3 days/week for 5-10 min on days not in rehab. Home Exercise  *Del verbalizes planning to ** ** days/week for ** min on days not in rehab. Home Exercise  *Del verbalizes planning to ** ** days/week for ** min on days not in rehab. Home Exercise  *Del verbalizes planning to ** ** days/week for ** min on days not in rehab.  Home Exercise  *Del verbalizes planning to ** ** days/week for ** min on days not in rehab. Home Exercise  *Cheryle Monday his/her plan to ** ** days/week for ** min @ **   *Education* *Education* *Education* *Education* *Education* *Education*   RPE Scale  [x] Yes      [] No  Exercise Safety  [x] Yes      [] No  Equipment Orientation  [x] Yes      [] No  S/S to Report  [x] Yes      [] No  Warm Up/Cool Down  [x] Yes      [] No  Home Exercise  [x] Yes      [] No     All Exercise Education Completed  [] Yes      [] No   Exercise Education Recommended    Workshops  [x] Benefits of Exercise  [x] Balance Training & Fall Prevention  [x] Heart Disease Risk Reduction  [x] Yoga & Heart Health Exercise Education Attended/Date Exercise Education Attended/Date Exercise Education Attended/Date Exercise Education Attended/Date All Sessions Completed? [] Yes  [] No   *Goals* *Goals* *Goals* *Goals* *Goals* *Goals*   Initial Exercise Goals Exercise Goals  Exercise Goals   Exercise Goals  Exercise Goals  Exercise Goals   Del plans to:  [x] Attend exercise sessions 3x/wk  [x] initiate home exercise 2-3x/wk for 10-20 min  [x] Increase 6 min walk distance by 10%  [x] Attend Exercise workshops [facility] plans to:  [x] Attend exercise sessions 3x/wk  [x] continue home exercise 2-3x/wk for 20-30 min  [x] Attend Exercise workshops Del's plans to:  [x] Attend exercise sessions 3x/wk  [x] continue home exercise 3-4x/wk for 30-45 min  [x] Determine plan of exercise following rehab  [x] Attend Exercise workshops Del's plans to:  [x] Attend exercise sessions 3x/wk  [x] continue home exercise 3-4x/wk for 30-45 min  [x] Determine plan of exercise following rehab  [x] Attend Exercise workshops Del's plans to:  [x] Attend exercise sessions 3x/wk  [x] continue home exercise 3-4x/wk for 30-45 min  [x] Determine plan of exercise following rehab  [x] Attend Exercise workshops [facility] achieved exercise goals?     []  Yes    [] No  If no, why?  **  [] Increased 6 min walk distance by 10%  [] Currently exercising 30-60 min/day, 5-7days/wk   [] Plans to continue exercise on own  [] Plans to join a local fitness center to continue exercise  [] Does not plan to continue to exercise after rehab   Return to ADL or Hobbies:  Manjula Arteaga would like to improve strength and endurance so he is able to return to activity with decreased SOB, walking better.  Return to ADL or Hobbies:  Manjula Sleeper  ** Return to ADL or Hobbies:  Manjula Sleeper  ** Return to ADL or Hobbies:  Manjula Sleeper ** Return to ADL or Hobbies:  Manjula Sleeper  ** Return to ADL or Hobbies:  Manjula Sleeper  **    *MET level required for above goal:  5.0  METs MET level Achieved:  **METs MET level Achieved:  **METs MET level Achieved:  **METs MET level Achieved:  **METs MET level Achieved:  **METs     Individual Cardiac Treatment Plan - Nutrition  NUTRITION  ASSESSMENT/PLAN NUTRITION  REASSESSMENT NUTRITION   REASSESSMENT NUTRITION   REASSESSMENT NUTRITION  DISCHARGE/FOLLOW-UP NUTRITION  DISCHARGE/FOLLOW-UP   Stages of Change Stages of Change Stages of Change Stages of Change Stages of Change Stages of Change   [x] Pre Contemplation  [] Contemplation  [] Preparation  [] Action  [] Maintenance  [] Relapse [] Pre Contemplation  [] Contemplation  [] Preparation  [] Action  [] Maintenance  [] Relapse [] Pre Contemplation  [] Contemplation  [] Preparation  [] Action  [] Maintenance  [] Relapse [] Pre Contemplation  [] Contemplation  [] Preparation  [] Action  [] Maintenance  [] Relapse [] Pre Contemplation  [] Contemplation  [] Preparation  [] Action  [] Maintenance  [] Relapse [] Pre Contemplation  [] Contemplation  [] Preparation  [] Action  [] Maintenance  [] Relapse   NUTRITION ASSESSMENT NUTRITION ASSESSMENT NUTRITION ASSESSMENT NUTRITION ASSESSMENT NUTRITION ASSESSMENT NUTRITION ASSESSMENT   Weight Management  Weight: 276        Height: 5'11\"  BMI: 38.6 Weight Management  Weight: **                  Weight Management  Weight: **                  Weight Management  Weight: ** Weight Management  Weight: ** Weight Fast & Healthy     Breakfasts  [x] Salads & Dressings  [x] Savory Soups  [x] Simple Sides & Sauces  [x] Appetizers &     Snacks  [x] Delicious Desserts  [x] Plant-Based Proteins  [x] Fast Evening Meals  [x] Weekend Breakfasts  [x] Efficiency Cooking  [x] One-Pot [employer]  Sessions Completed   [facility] [employer]  Sessions Completed [facility] [employer]  Sessions Completed     [facility] [employer]  Sessions Completed [facility] [employer]    # of sessions completed:  **   *Goals* *Goals* *Goals* *Goals* *Goals* *Goals*   Del's nutritional goals are as follows:  Complete and return diet survey Del's nutritional goals are as follows:  [] Attend Nutrition Workshops  [] Attend 1:1   [] Attend Cooking Classes  [] ** Del's nutritional goals are as follows:  [] Attend Nutrition Workshops  [] Attend 1:1   [] Attend Cooking Classes  [] Complete and return diet survey  [] ** Del's nutritional goals are as follows:  [] Attend Nutrition Workshops  [] Attend 1:1   [] Attend Cooking Classes  [] ** Del's nutritional goals are as follows:  [] Attend Nutrition Workshops  [] Attend 1:1   [] Attend Cooking Classes  [] ** Del achieved nutritional goals   [] Yes    [] No  If no, why?   Use knowledge gained to continue Pritikin eating plan at home       Individual Cardiac Treatment Plan - Psychosocial  PSYCHOSOCIAL  ASSESSMENT/PLAN PSYCHOSOCIAL  REASSESSMENT PSYCHOSOCIAL   REASSESSMENT PSYCHOSOCIAL   REASSESSMENT PSYCHOSOCIAL  DISCHARGE/FOLLOW-UP PSYCHOSOCIAL  DISCHARGE/FOLLOW-UP   Stages of Change Stages of Change Stages of Change Stages of Change Stages of Change Stages of Change   [x] Pre Contemplation  [] Contemplation  [] Preparation  [] Action  [] Maintenance  [] Relapse [] Pre Contemplation  [] Contemplation  [] Preparation  [] Action  [] Maintenance  [] Relapse [] Pre Contemplation  [] Contemplation  [] Preparation  [] Action  [] Maintenance  [] Relapse [] Pre Contemplation  [] Contemplation  [] Preparation  [] Action  [] anxiety:  ** Using a scale of 0-10, 0=none, 10=very:   Rate your depression: **  Rate your anxiety:  ** Using a scale of 0-10, 0=none, 10=very:   Rate your depression: **  Rate your anxiety:  **   Signs and Symptoms of Depression Present? [x] Yes      [] No  If yes, please explain:  Frustrated  Signs and Symptoms of Depression Present? [] Yes      [] No  If yes, please explain:  ** Signs and Symptoms of Depression Present? [] Yes      [] No  If yes, please explain:  ** Signs and Symptoms of Depression Present? [] Yes      [] No  If yes, please explain:  ** Signs and Symptoms of Depression Present? [] Yes      [] No  If yes, please explain:  ** Signs and Symptoms of Depression Present? [] Yes      [] No  If yes, please explain:  **   Signs and Symptoms of Anxiety Present? [] Yes      [x] No  If yes, please explain:   Signs and Symptoms of Anxiety Present? [] Yes      [] No  If yes, please explain:  ** Signs and Symptoms of Anxiety Present? [] Yes      [] No  If yes, please explain:  ** Signs and Symptoms of Anxiety Present? [] Yes      [] No  If yes, please explain:  ** Signs and Symptoms of Anxiety Present? [] Yes      [] No  If yes, please explain:  ** Signs and Symptoms of Anxiety Present? [] Yes      [] No  If yes, please explain:  **   [] Patient has poor eye contact   [] Flat affect present. [] Signs of anxiety, anger or hostility    [] Signs social isolation present.    []  Signs of alcohol or substance abuse        PSYCHOSOCIAL PLAN PSYCHOSOCIAL PLAN PSYCHOSOCIAL PLAN PSYCHOSOCIAL PLAN PSYCHOSOCIAL PLAN PSYCHOSOCIAL PLAN   *Interventions* *Interventions* *Interventions* *Interventions* *Interventions* *Interventions*   *Please check if needed  [] Psych Consult  [] Physician Referral  [x] Stress Management Skills *Please check if needed  [] Psych Consult  [] Physician Referral  [] Stress Management Skills *Please check if needed  [] Psych Consult  [] Physician Referral  [] Stress Management Skills *Please check if needed  [] Psych Consult  [] Physician Referral  [] Stress Management Skills *Please check if needed  [] Psych Consult  [] Physician Referral  [] Stress Management Skills *Please check if needed  [] Psych Consult  [] Physician Referral  [] Stress Management Skills   Is patient currently taking anti-depressant or anti-anxiety medications? [] Yes      [x] No  If yes, please list medications:   Change in anti-depressant or anti-anxiety medications? [] Yes      [] No  If yes, please list medications:  ** Change in anti-depressant or anti-anxiety medications? [] Yes      [] No  If yes, please list medications:  ** Change in anti-depressant or anti-anxiety medications? [] Yes      [] No  If yes, please list medications:  ** Change in anti-depressant or anti-anxiety medications? [] Yes      [] No  If yes, please list medications:  ** Change in anti-depressant or anti-anxiety medications?   [] Yes      [] No  If yes, please list medications:  **   *Education* *Education* *Education* *Education* *Education* *Education*   Healthy Mind-Set Workshops Recommended  [x] Stress & Health  [x] Taking Charge of Stress  [x] New Thoughts, New Behaviors  [x] Managing Moods & Relationships Healthy Mind-Set Workshops Attended/Date Healthy Mind-Set Workshops Attended/Date Healthy Mind-Set Workshops Attended/Date Healthy Mind-Set Workshops  Completed  [] Yes      [] No Healthy Mind-Set Workshops  Completed  [] Yes      [] No   *Goals* *Goals* *Goals* *Goals* *Goals* *Goals*   Del's psychosocial goals are as follows:  Complete HADS & April & Tayo, Quality of Life Index, Cardiac Version IV Del's psychosocial goals are as follows:  [] Attend Healthy Mind-Set Workshops  [] Reduce depression symptom severity by 1 level  [] ** Del's psychosocial goals are as follows:  [] Attend Healthy Mind-Set Workshops  [] Reduce depression symptom severity by 1 level  [] ** Del's psychosocial goals are as Changes:  [] Yes      [] No Hypertension  Resting BP: **  Peak Ex BP:**  Medication Changes:  [] Yes      [] No   Lipids  HLD/DLD  [] Yes      [x] No  TOTAL CHOL:   HDL:    LDL:    TRIG:    Medication:  Lipids  Medication Changes:  [] Yes      [] No     Lipids  Medication Changes:  [] Yes      [] No     Lipids  Medication Changes:  [] Yes      [] No     Lipids    TOTAL CHOL: **  HDL:  **  LDL:  **  TRIG:  **  Medication Changes:  [] Yes      [] No Lipids    TOTAL CHOL: **  HDL:  **  LDL:  **  TRIG:  **  Medication Changes:  [] Yes      [] No   Diabetes  [] Yes      [x] No   Diabetes  Most Recent BS:  BS have been in range  [] Yes      [] No  Medication Changes  [] Yes      [] No   Diabetes  Most Recent BS:  BS have been in range  [] Yes      [] No  Medication Changes  [] Yes      [] No     Diabetes  Most Recent BS:  BS have been in range  [] Yes      [] No  Medication Changes  [] Yes      [] No     Diabetes  Most Recent BS:  BS have been in range  [] Yes      [] No  Medication Changes  [] Yes      [] No Diabetes  Most Recent BS:  BS have been in range  [] Yes      [] No  Medication Changes  [] Yes      [] No       Tobacco Use  [] Current  [] Former  [x] Never      Smokeless Tobacco use:   [] Yes      [x] No  Amount:  Tobacco Use  Change in smoking status   [] Yes      [] No    Quit date: **   Tobacco Use  Change in smoking status   [] Yes      [] No    Quit date: **   Tobacco Use  Change in smoking status   [] Yes      [] No    Quit date: ** Tobacco Use  Change in smoking status   [] Yes      [] No    Quit date: ** Tobacco Use  Change in smoking status   [] Yes      [] No    Quit date: **             Learning Barriers  Please select one:  [] Speech  [] Literacy  [] Hearing  [] Cognitive  [x] Vision  [x] Ready to Learn Learning Barriers Addressed:   [] Yes      [] No   Learning Barriers Addressed:   [] Yes      [] No   Learning Barriers Addressed:  [] Yes      [] No Learning Barriers Addressed:  [] Yes      [] No Learning Barriers Addressed:  [] Yes      [] No     RISK FACTOR/EDUCATION PLAN RISK FACTOR/EDUCATION PLAN RISK FACTOR/EDUCATION PLAN RISK FACTOR/EDUCATION PLAN RISK FACTOR/EDUCATION PLAN RISK FACTOR/EDUCATION PLAN   *Interventions* *Interventions* *Interventions* *Interventions* *Interventions* *Interventions*   Recommended Educational Videos    [x] Overview of The Pritikin Eating Plan  [x] Heart Disease Risk Reduction  [x] Move It! [x] Calorie Density  [x] Healthy Minds, Bodies, Hearts  [x] Label Reading  [x] Metabolic Syndrome & Belly   Or  [] How Our Thoughts Can Heal our Heart  [x] Dining Out-Part 1  [x] Biomechanical Limitations  [x] Facts on Fat  [x] Hypertension & Heart Disease  [x] Diseases of Our Times-Focusing on Diabetes  [x] Body Composition  [x] Nurtition Action Plan  [x] Exercise Action Plan   Completed Videos/Date      8/11/2021  Overview of The Pritikin Eating Plan Completed Videos/Date Completed Videos/Date Completed Videos/Date Recommended Educational Videos Completed    [] Yes      [] No    **If not completed, Why? **           Smoking Cessation/Relaspe Prevention Intervention needed? [] Yes      [x] No  *Pt verbalizes and agrees to attend intervention Smoking Cessation/Relapse Prevention Scheduled? [] Yes      [] No  Date:  ** Smoking Cessation/Relapse Prevention completed? [] Yes      [] No  Date: ** Smoking Cessation/Relapse Prevention completed? [] Yes      [] No  Date: ** Smoking Cessation/Relapse Prevention completed?    [] Yes      [] No  Date: ** Smoking Cessation Counseling attended  [] Yes      [] No  **If not completed, Why? **   Professional Referrals:  *Please check if needed  [] Diabetes Clinic  [] Lipid Clinic   [] Other:      Professional Referrals:  *Please check if needed  [] Diabetes Clinic  [] Lipid Clinic   [] Other:   Preventative Medication Preventative Medication Preventative Medication Preventative Medication Preventative Medication Preventative Medication Aspirin  [x] Yes    [] No  Blood Thinner: Clopidogrel/Effient/Brillinta  [] Yes    [x] No  Beta Blocker  [x] Yes    [] No  Ace Inhibitor  [] Yes    [x] No  Statin/Lipid Lowering  [] Yes    [x] No Medication Changes? [] Yes    [] No Medication Changes? [] Yes    [] No Medication Changes? [] Yes    [] No Medication Changes? [] Yes    [] No Medication Changes? [] Yes    [] No   *Education* *Education* *Education* *Education* *Education* *Education*   Does Germán Shad require any additional education? [] Yes    [x] No   Does Germán Shad require any additional education? [] Yes    [] No Does Germán Shad require any additional education? [] Yes    [] No Does Germán Shad require any additional education? [] Yes    [] No Does Germán Shad require any additional education? [] Yes    [] No Does Germán Shad require any additional education?   [] Yes    [] No   Additional Educational Videos    Exercise  [x] Improve Performance    Medical  [] Aging Enhancing Your QoL  [] Biology of Weight Control  [] Decoding Lab Results  [] Diseases of Our Time - Overview  [] Sleep Disorders    Nutrition  [] Dining Out - Part 2  [] Fueling a Healthy Body  [] Menu Workshop  [] Planning Your Eating Strategy  [] Targeting Your Nutrition Priorities  [] Vitamins & Minerals    Healthy Mind-Set  [] Smoking Cessation    Culinary  []Becoming a Pritikin    [] Cooking - Breakfast & Snacks  [] Cooking -Healhty Salads & Dressing  [] Cooking -Dinner & Sides  [] Cooking -Soups & Desserts    Overview  [] The Pritikin Solution Additional Educational Videos Completed Additional Educational Videos Completed Additional Educational Videos Completed Additional Educational Videos Completed Additional Educational Videos Completed    [] Yes    [] No   *Goals* *Goals* *Goals* *Goals* *Goals* *Goals*   Del's risk factor/education goals are as follows:    [x] Optimal BP <140/90  [] Blood Sugar <120  [x] Attend recommended video education sessions  [x] Takes medications as prescribed 100% of the time   [] ** Del's risk factor/education goals are as follows:    [x] Optimal BP <140/90  [] Blood Sugar <120  [x] Attend recommended video education sessions  [x] Takes medications as prescribed 100% of the time   [] ** Del's risk factor/education goals are as follows:    [x] Optimal BP <140/90  [] Blood Sugar <120  [x] Attend recommended video education sessions  [x] Takes medications as prescribed 100% of the time   [] ** Del's risk factor/education goals are as follows:    [x] Optimal BP <140/90  [] Blood Sugar <120  [x] Attend recommended video education sessions  [x] Takes medications as prescribed 100% of the time   [] ** Del's risk factor/education goals are as follows:    [x] Optimal BP <140/90  [] Blood Sugar <120  [x] Attend recommended video education sessions  [x] Takes medications as prescribed 100% of the time   [] ** Yosef Bruno achieved risk factor goals?   [] Yes    [] No  If no, why?  **     Monitored telemetry has revealed NSR    [x] associated symptoms SOB Monitored telemetry has revealed **  [] documented arrhythmia at increasing workloads  [] associated symptoms ** Monitored telemetry has revealed  [] documented arrhythmia at increasing workloads  [] associated symptoms ** Monitored telemetry has revealed **  [] documented arrhythmia at increasing workloads  [] associated symptoms ** Monitored telemetry has revealed **  [] documented arrhythmia at increasing workloads  [] associated symptoms ** Monitored telemetry has revealed **  [] documented arrhythmia at increasing workloads  [] associated symptoms **   Physician Response    [x] Cardiac rehab is reasonably and medically necessary for continuous cardiac monitoring surveillance  of patient's cardiac activity  [x] Initiate continuous telemetry monitoring and notify me with any concerns  [] Other   Physician Response    [x] Cardiac rehab is reasonably and medically necessary for continuous cardiac monitoring surveillance  of patient's cardiac activity  [x] Continue continuous telemetry monitoring and notify me with any concerns  [] Other     Physician Response    [x] Cardiac rehab is reasonably and medically necessary for continuous cardiac monitoring surveillance  of patient's cardiac activity  [x] Continue continuous telemetry monitoring and notify me with any concerns   [] Other     Physician Response    [x] Cardiac rehab is reasonably and medically necessary for continuous cardiac monitoring surveillance  of patient's cardiac activity  [x] Continue continuous telemetry monitoring and notify me with any concerns   [] Other     Physician Response    [x] Cardiac rehab is reasonably and medically necessary for continuous cardiac monitoring surveillance  of patient's cardiac activity  [x] Continue continuous telemetry monitoring and notify me with any concerns   [] Other

## 2021-08-16 ENCOUNTER — HOSPITAL ENCOUNTER (OUTPATIENT)
Dept: CARDIAC REHAB | Age: 71
Setting detail: THERAPIES SERIES
Discharge: HOME | End: 2021-08-16
Payer: MEDICARE

## 2021-08-16 PROCEDURE — G0423 INTENS CARDIAC REHAB NO EXER: HCPCS

## 2021-08-16 PROCEDURE — G0422 INTENS CARDIAC REHAB W/EXERC: HCPCS

## 2021-08-16 NOTE — PROGRESS NOTES
Video Education Report - ICR/CR  Name:  J Carlos Spears     Date:  8/16/2021  MRN: [id number]     Session #:  2  Session Length: 40 min    Core Videos        []Heart Disease Risk Reduction       []Overview of Pritikin Eating Plan      []Move it          []Calorie Density         []Healthy Minds, Bodies, Hearts        []Label Reading - Part 1           Exercise      Healthy Mind-Set  []Improving Performance    []Smoking Cessation    []Introduction to [address]  []Aging-Enhancing the Quality of Your Life  []Becoming a Pritikin   []Biology of Weight Control    []Cooking Breakfasts   []Decoding Lab Results    and Snacks  []Diseases of Our Time - Overview   []Cooking Dinner and   [x]Sleep Disorders     Sides  []Targeting Your Nutrition Priorities   []Healthy Salads &   Dressings  Nutrition      []Cooking Soups and   []Dining Out - Part 2     Desserts  []Fueling a Healthy Body   []Menu Workshop     Overview  []Planning Your Eating Strategy   []The Pritikin Solution  []Vitamins and Minerals    Comments:  Video completed, group discussion

## 2021-08-18 ENCOUNTER — HOSPITAL ENCOUNTER (OUTPATIENT)
Dept: CARDIAC REHAB | Age: 71
Setting detail: THERAPIES SERIES
Discharge: HOME | End: 2021-08-18
Payer: MEDICARE

## 2021-08-18 PROCEDURE — G0423 INTENS CARDIAC REHAB NO EXER: HCPCS

## 2021-08-18 PROCEDURE — G0422 INTENS CARDIAC REHAB W/EXERC: HCPCS

## 2021-08-18 NOTE — PROGRESS NOTES
Video Education Report - ICR/CR  Name:  Ricyk Dick     Date:  8/18/2021  MRN: [id number]     Session #:  3  Session Length: 40 min    Core Videos        []Heart Disease Risk Reduction       []Overview of Pritikin Eating Plan      []Move it          []Calorie Density         []Healthy Minds, Bodies, Hearts            Exercise      Healthy Mind-Set  []Improving Performance    []Smoking Cessation    []Introduction to [address]  [x]Aging-Enhancing the Quality of Your Life  []Becoming a Pritikin   []Biology of Weight Control    []Cooking Breakfasts   []Decoding Lab Results    and Snacks  []Diseases of Our Time - Overview   []Cooking Dinner and   []Sleep Disorders     Sides  []Targeting Your Nutrition Priorities   []Healthy Salads &   Dressings  Nutrition      []Cooking Soups and   []Dining Out - Part 2     Desserts  []Fueling a Healthy Body   []Menu Workshop     Overview  []Planning Your Eating Strategy   []The Pritikin Solution  []Vitamins and Minerals    Comments:  Video completed, group discussion

## 2021-08-20 ENCOUNTER — HOSPITAL ENCOUNTER (OUTPATIENT)
Dept: CARDIAC REHAB | Age: 71
Setting detail: THERAPIES SERIES
Discharge: HOME | End: 2021-08-20
Payer: MEDICARE

## 2021-08-20 PROCEDURE — G0423 INTENS CARDIAC REHAB NO EXER: HCPCS

## 2021-08-20 PROCEDURE — G0422 INTENS CARDIAC REHAB W/EXERC: HCPCS

## 2021-08-20 NOTE — PROGRESS NOTES
Sharda DENIS.:  1950    Acct Number: [de-identified]   MRN:  [id number]                             [facility] HEALTHY MIND-SET WORKSHOP             Date: 2021        Session #4    Todays class covered:    ( )  Stress and Health Workshop:  [facility] patient will learn about the body's adaptive response that is triggered by a variety of stressors. Patient will gain insight into the toll that chronic stress takes on their health, both emotionally and physically. ( x) Taking Charge of Stress Workshop:  [facility] patient will learn and practice a variety of stress management techniques. Patient will be able to effectively apply coping mechanisms in perceived stressful situations. ( ) New Thoughts New Behaviors Workshop: [facility] patient will learn and practice techniques for developing effective health and lifestyle goals. Patient will be able to effectively apply the goal setting process learned to develop at least one new personal goal.     ( ) Managing Moods & Relationships Workshop:  [facility] patient will learn how emotional and chronic stress factors can impact their hearts. They will learn healthy ways to handle stress and utilize positive coping mechanisms. In addition, [facility] patient will learn ways to improve communication skills. Marylee Elam actively participated and verbalized understanding. Total time in the Healthy Mind-Set class was 40 minutes.     Electronically signed by Myrna River RN on 2021 at 12:29 PM

## 2021-08-23 ENCOUNTER — HOSPITAL ENCOUNTER (OUTPATIENT)
Dept: CARDIAC REHAB | Age: 71
Setting detail: THERAPIES SERIES
Discharge: HOME | End: 2021-08-23
Payer: MEDICARE

## 2021-08-23 PROCEDURE — G0423 INTENS CARDIAC REHAB NO EXER: HCPCS

## 2021-08-23 PROCEDURE — G0422 INTENS CARDIAC REHAB W/EXERC: HCPCS

## 2021-08-23 NOTE — PROGRESS NOTES
Eliane DENIS.:  1950    Acct Number: [de-identified]   MRN:  [id number]                             [facility] NUTRITION WORKSHOP             Date: 2021        Session # _______    Kayla Mayfield class covered:    ()  Label Reading  (X)  Menus  ()  Targeting Nutrition Priorities  ()  Mindful Eating/Fueling a Healthy Body    Readiness to change:    ( ) Pre-contemplative   ( ) Contemplative - ambivalent about change    ( ) Action - ready to set action plan and implement   (X ) Maintenance - has made change and is trying, and or practicing different alternative behaviors     Kevin López was in the Workshop with the Dietitian for 35 minutes. The content was presented via Powerpoint, lecture, and patient participation based format. Motivational interviewing was utilized when needed, to promote change. Patient voiced understanding.     Electronically signed by Mayra Goldman RD LD [id number]  on 2021 at 3:05 PM

## 2021-08-25 ENCOUNTER — HOSPITAL ENCOUNTER (OUTPATIENT)
Dept: CARDIAC REHAB | Age: 71
Setting detail: THERAPIES SERIES
Discharge: HOME | End: 2021-08-25
Payer: MEDICARE

## 2021-08-25 PROCEDURE — G0422 INTENS CARDIAC REHAB W/EXERC: HCPCS

## 2021-08-25 PROCEDURE — G0423 INTENS CARDIAC REHAB NO EXER: HCPCS

## 2021-08-25 NOTE — PROGRESS NOTES
Video Education Report - ICR/CR  Name:  Paulie Melton     Date:  8/25/2021  MRN: [id number]     Session #:  6  Session Length: 40 min    Core Videos        []Heart Disease Risk Reduction       []Overview of Pritikin Eating Plan      []Move it          []Calorie Density         []Healthy Minds, Bodies, Hearts        []Label Reading - Part 1       []Metabolic Syndrome and Belly Fat        [x]How Our Thoughts Can Heal Our Hearts       Exercise      Healthy Mind-Set  []Improving Performance    []Smoking Cessation    []Introduction to [address]  []Aging-Enhancing the Quality of Your Life  []Becoming a Pritikin   []Biology of Weight Control    []Cooking Breakfasts   []Decoding Lab Results    and Snacks  []Diseases of Our Time - Overview   []Cooking Dinner and   []Sleep Disorders     Sides  []Targeting Your Nutrition Priorities   []Healthy Salads &   Dressings  Nutrition      []Cooking Soups and   []Dining Out - Part 2     Desserts  []Fueling a Healthy Body   []Menu Workshop     Overview  []Planning Your Eating Strategy   []The Pritikin Solution  []Vitamins and Minerals    Comments:  Video completed, group discussion

## 2021-08-27 ENCOUNTER — HOSPITAL ENCOUNTER (OUTPATIENT)
Dept: CARDIAC REHAB | Age: 71
Setting detail: THERAPIES SERIES
Discharge: HOME | End: 2021-08-27
Payer: MEDICARE

## 2021-08-27 PROCEDURE — G0423 INTENS CARDIAC REHAB NO EXER: HCPCS

## 2021-08-27 PROCEDURE — G0422 INTENS CARDIAC REHAB W/EXERC: HCPCS

## 2021-08-27 NOTE — PROGRESS NOTES
[facility] Program  Phase 2 Cardiac Rehab Weekly Progress Report      Patient prescribed exercise:  12:15: class. 3 times per week in rehab, 1-4 times per week at home for the amount of sessions/weeks specified by insurance. Current Levels:  NuStep:  12 [place] for 10 minutes, UBE: 6 [place] for 5 minutes. Progression Discussion: Increase Aerobic exercise 12 minutes to work on endurance. Attempt to increase intensity by 5-20% for each modality this week. Try to increase intensities until Keith Corona rates the exercises a 13-17 on Joi RPE.

## 2021-08-30 ENCOUNTER — HOSPITAL ENCOUNTER (OUTPATIENT)
Dept: CARDIAC REHAB | Age: 71
Setting detail: THERAPIES SERIES
Discharge: HOME | End: 2021-08-30
Payer: MEDICARE

## 2021-08-30 PROCEDURE — G0422 INTENS CARDIAC REHAB W/EXERC: HCPCS

## 2021-08-30 PROCEDURE — G0423 INTENS CARDIAC REHAB NO EXER: HCPCS

## 2021-08-30 NOTE — PLAN OF CARE
[address] [place] [address] Facility-Based Program  Individualized Cardiac Treatment Plan    Patient Name:  Eliane Welch  :  1950  Age:  66 y.o. MRN:  [id number]  Diagnosis: PCI of RCA  Date of Event: 6/15/2021  Physician:  Lakesha Sandoval Next Office Visit:  ?  Date Entered Program: 2021  Risk Stratifications: [] Low [] Intermediate [x] High  Allergies: Allergies   Allergen Reactions    Adhesive Tape Rash       COVID -19 Screen  Do you have any of the following symptoms:  [] Fever [] Cough [] SOB [] Muscle/Body Ache [] Loss of taste/smell [x] None    Have you traveled outside of the [place]? [] Yes      [x] No    Have you been around anyone who has tested Positive for COVID 19?  [] Yes      [] No    The following Education has been completed with patient. [x] Wait in the car until we call you back to rehab. [x] You must wear a mask while in the medical center, and in cardiac rehab. Please bring your own. [x] Bring your own bottle of water with you.       Individual Cardiac Treatment Plan -EXERCISE  INITIAL 30 DAY 60 DAY 90  DAY FINAL DAY   EXERCISE  ASSESSMENT/PLAN EXERCISE  REASSESSMENT EXERCISE   REASSESSMENT EXERCISE   REASSESSMENT EXERCISE   REASSESSMENT EXERCISE  DISCHARGE/FOLLOW-UP   Date: 2021 Date:2021 Date: Date: Date: Date:   Session #1  First Exercise Session:  2021 Session # 12 Session # ** Session # ** Session # ** Session # **  Last Exercise Session:  **   Stages of Change Stages of Change Stages of Change Stages of Change Stages of Change Stages of Change   [x] Pre Contemplation  [] Contemplation  [] Preparation  [] Action  [] Maintenance  [] Relapse [] Pre Contemplation  [x] Contemplation  [] Preparation  [] Action  [] Maintenance  [] Relapse [] Pre Contemplation  [] Contemplation  [] Preparation  [] Action  [] Maintenance  [] Relapse [] Pre Contemplation  [] Contemplation  [] Preparation  [] Action  [] Maintenance  [] Relapse [] Pre Contemplation  [] Contemplation  [] Preparation  [] Action  [] Maintenance  [] Relapse [] Pre Contemplation  [] Contemplation  [] Preparation  [] Action  [] Maintenance  [] Relapse   EXERCISE ASSESSMENT EXERCISE ASSESSMENT EXERCISE ASSESSMENT EXERCISE ASSESSMENT EXERCISE ASSESSMENT EXERCISE ASSESSMENT   3 Min Walk Test  Distance walked:   0.02 miles walked 3 min SOB and very deconditioned  105 ft.  1.15 METs  Max HR: 68BPM      RPE:  14    THR:    Rhythm:  NSR     6 Min Walk Test  Distance walked:   ** miles  ** ft  ** METs  Max HR:** BPM      RPE:  **  %Change ft= **    Rhythm:  **   DASI: 3.29 METs DASI: 3.29 METs DASI: ** METs DASI: ** METs DASI: ** METs DASI: ** METs   Return to Work  [place] [employer] on returning to work? [] Yes              [] No   [] Disabled     [x] Retired     Return to work:  Has Gael Finders returned to work? [] Yes    [x] No        Return to work:  Has Gael Finders returned to work? [] Yes    [] No    Return to work date set? [] Yes, **    [] No    Gael Finders is doing ** at work. Return to work:  Has Gael Finders returned to work? [] Yes    [] No    Return to work date set? [] Yes, **    [] No    Gael Finders is doing ** at work. Return to work:  Has Gael Finders returned to work? [] Yes    [] No    Return to work date set? [] Yes, **    [] No    Gael Finders is doing ** at work. Return to work:  Has Gael Finders returned to work? [] Yes    [] No    Return to work? [] Yes, **    [] No    *Required MET Level achieved for job duties? [] Yes    [] No   Orthopedic Limitations/  [x] Yes    [] No  If yes please list:  Shoulders and back had a stroke 9 years. Orthopedic Limitations  *If patient has orthopedic issue:   Actions/  accomodations needed to make Gael Finders successful :adjust to comfort he prefers Nustep but will keep increasing TM Orthopedic Limitations   Orthopedic Limitations   Orthopedic Limitations Orthopedic Limitations     Fall Risk  Fall risk assessed? [x] Yes      [] No    Balance Issues?   [x] Yes      [] No     [] Inocencia Figueroa [x] [employer]    [x] Safety issues reviewed      Fall Risk  *If patient is a fall risk, action needed to accommodate: walks with cane Fall Risk Fall Risk Fall Risk Fall Risk   Home Exercise  [] Yes    [x] No  Type:   Frequency:   Duration:  Home Exercise  [x] Yes    [] No  Type: walk  Frequency:daily   Duration: 10 min Home Exercise  [] Yes    [] No  Type: **  Frequency: **  Duration: ** Home Exercise  [] Yes    [] No  Type: **  Frequency: **  Duration: ** Home Exercise  [] Yes    [] No  Type: **  Frequency: **  Duration: ** Home Exercise  [] Yes    [] No  Type: **  Frequency: **  Duration: **   Angina with Activity? [] Yes    [x] No  Angina Management: nitro if needed Angina with Activity? [] Yes    [x] No  Angina Management: na Angina with Activity? [] Yes    [] No  Angina Management: ** Angina with Activity? [] Yes    [] No  Angina Management: ** Angina with Activity? [] Yes    [] No  Angina Management: ** Angina with Activity?   [] Yes    [] No  Angina Management: **   EXERCISE PLAN EXERCISE PLAN EXERCISE PLAN EXERCISE PLAN EXERCISE PLAN EXERCISE PLAN   *Interventions* *Interventions* *Interventions* *Interventions* *Interventions* *Interventions*   Exercise Prescription  (per physician & CR staff) Exercise Prescription  (per physician & CR staff) Exercise Prescription  (per physician & CR staff) Exercise Prescription  (per physician & CR staff) Exercise Prescription  (per physician & CR staff) Exercise Prescription  (per physician & CR staff)   Cardiovascular Cardiovascular Cardiovascular Cardiovascular Cardiovascular Cardiovascular   Mode:    [x] Treadmill (TM)  [x] Schwinn Airdyne (AD)  [x] Arms Ergometer (AE)  [x] NuStep  [] Elliptical (E) MODE:    [x] Treadmill (TM)  [x] Schwinn Airdyne (AD)  [x] Arms Ergometer (AE)  [] NuStep  [] Elliptical (E) MODE:    [] Treadmill (TM)  [] Schwinn Airdyne (AD)  [] Arms Ergometer (AE)  [] NuStep  [] Elliptical (E) MODE:    [] Treadmill (TM)  [] Schwinn Airdyne (AD)  [] Arms Ergometer (AE)  [] NuStep  [] Elliptical (E) MODE:    [] Treadmill (TM)  [] Schwinn Airdyne (AD)  [] Arms Ergometer (AE)  [] NuStep  [] Elliptical (E) MODE:    [] Treadmill (TM)  [] Schwinn Airdyne (AD)  [] Arms Ergometer (AE)  [] NuStep  [] Elliptical (E)   Initial Workloads  TM: [email or web address] 1.1 METs  AD: 0.2 level = 1.2 METs  NS: 4  Salcedo= 1.2 METs  AE: 0.1 level = 1.1 METs Current Workloads  TM: 0.6 @ %=1.5  METs    NS:  12 Salcedo= 1.7 METs  AE:0.1  level = 1.1 METs Current Workloads  TM:  @ %=  METs  AD:  level =  METs  NS:   Salcedo=  METs  AE:  level =  METs Current Workloads  TM:  @ %=  METs  AD:  level =  METs  NS:   Salcedo=  METs  AE:  level =  METs Current Workloads  TM:  @ %=  METs  AD:  level =  METs  NS:   Salcedo=  METs  AE:  level =  METs Current Workloads  TM:  @ %=  METs  AD:  level =  METs  NS:   Salcedo=  METs  AE:  level =  METs     Frequency:    ICR: 3x/week  Home: 2-3x/wk Frequency:   ICR: 3x/week  Home: 3x/wk Frequency:  ICR: 3x/week  Home: 3-4x/wk Frequency:  ICR: 3x/week  Home: 3-4x/wk Frequency:  ICR: 3x/week  Home: 3-4x/wk Frequency:  Sigrid Sarah will continue exercise at  5-7 days/week   Duration:   Total aerobic exercise = 30-45 min    5-8 min/mode Duration:  Total aerobic exercises = 25 min     12 min/mode Duration:  Total aerobic exercises = ** min     **min/mode Duration:  Total aerobic exercises = ** min     **min/mode Duration:  Total aerobic exercises = ** min     **min/mode Duration:  Total erobic exercise =  60-90 min   Intensity:   MET Level = 1.15  RPE = 12-15 Intensity:  Max MET Level = 1.7  RPE = 12-15 Intensity:  Max MET Level = **  RPE = 12-15 Intensity:  Max MET Level = **  RPE = 12-15 Intensity:  Max MET Level = **  RPE = 12-15 Intensity:  Max MET Level = ** RPE = 12-15   Progression: increase aerobic activity up to 15 min over next 4 weeks by increasing time 2-3 min/week.  Progression:Incraese to 15 min mode then increase METS 5-10% over next 4 weeks   Progression: Progression: Progression: Progression:  Increase time/intensity when RPE <13, and HR is in [facility]   [x] Yes      [] No  Upper and Lower body strength training 2x/wk    Wt: 2#       Reps:  8-15    *Increase wt. after completing 15 reps with an RPE of <12/13. [x] Yes      [] No  Upper and Lower body strength training 2x/wk    Wt: 2#       Reps:  8-15    *Increase wt. after completing 15 reps with an RPE of <12/13. [] Yes      [] No  Upper and Lower body strength training 2x/wk    Wt: **#       Reps:  8-15    *Increase wt. after completing 15 reps with an RPE of <12/13. [] Yes      [] No  Upper and Lower body strength training 2x/wk    Wt: **#       Reps:  8-15    *Increase wt. after completing 15 reps with an RPE of <12/13. [] Yes      [] No  Upper and Lower body strength training 2x/wk    Wt: **#       Reps:  8-15    *Increase wt. after completing 15 reps with an RPE of <12/13. Continue Strength Training at home   [] Exercise Log & Strength training handout given     Wt: **#       Reps:  8-15    *Increase wt. after completing 15 reps with an RPE of <12/13. Flexibility Flexibility Flexibility Flexibility Flexibility Flexibility   [x] Yes      [] No  25 min session of Core Strength & Flexibility 1x/per week  Attends Core Strength & Flexibility   [x] Yes      [] No Attends Core Strength & Flexibility   [] Yes      [] No Attends Core Strength & Flexibility   [] Yes      [] No Attends Core Strength & Flexibility   [] Yes      [] No Continue Core Strength & Flexibility at home   Home Exercise  *Del verbalizes planning to walk 3 days/week for 5-10 min on days not in rehab. Home Exercise  *Del verbalizes planning to walk 5+ days/week for 10-15 min on days not in rehab. Home Exercise  *Del verbalizes planning to ** ** days/week for ** min on days not in rehab.  Home Exercise  *Del verbalizes planning to ** ** days/week for ** min on days not in rehab. Home Exercise  *Del verbalizes planning to ** ** days/week for ** min on days not in rehab. Home Exercise  *Julián Joness his/her plan to ** ** days/week for ** min @ **   *Education* *Education* *Education* *Education* *Education* *Education*   RPE Scale  [x] Yes      [] No  Exercise Safety  [x] Yes      [] No  Equipment Orientation  [x] Yes      [] No  S/S to Report  [x] Yes      [] No  Warm Up/Cool Down  [x] Yes      [] No  Home Exercise  [x] Yes      [] No     All Exercise Education Completed  [] Yes      [] No   Exercise Education Recommended    Workshops  [x] Benefits of Exercise  [x] Balance Training & Fall Prevention  [x] Heart Disease Risk Reduction  [x] Yoga & Heart Health Exercise Education Attended/Date   Exercise Education Attended/Date Exercise Education Attended/Date Exercise Education Attended/Date All Sessions Completed?     [] Yes  [] No   *Goals* *Goals* *Goals* *Goals* *Goals* *Goals*   Initial Exercise Goals Exercise Goals  Exercise Goals   Exercise Goals  Exercise Goals  Exercise Goals   Del plans to:  [x] Attend exercise sessions 3x/wk  [x] initiate home exercise 2-3x/wk for 10-20 min  [x] Increase 6 min walk distance by 10%  [x] Attend Exercise workshops Germán Casanova plans to:  [x] Attend exercise sessions 3x/wk  [x] continue home exercise 2-3x/wk for 20-30 min  [x] Attend Exercise workshops Del's plans to:  [x] Attend exercise sessions 3x/wk  [x] continue home exercise 3-4x/wk for 30-45 min  [x] Determine plan of exercise following rehab  [x] Attend Exercise workshops Del's plans to:  [x] Attend exercise sessions 3x/wk  [x] continue home exercise 3-4x/wk for 30-45 min  [x] Determine plan of exercise following rehab  [x] Attend Exercise workshops Del's plans to:  [x] Attend exercise sessions 3x/wk  [x] continue home exercise 3-4x/wk for 30-45 min  [x] Determine plan of exercise following rehab  [x] Attend Exercise workshops Germán Casanova achieved exercise goals? []  Yes    [] No  If no, why?  **  [] Increased 6 min walk distance by 10%  [] Currently exercising 30-60 min/day, 5-7days/wk   [] Plans to continue exercise on own  [] Plans to join a local fitness center to continue exercise  [] Does not plan to continue to exercise after rehab   Return to ADL or Hobbies:  Olga Arteaga would like to improve strength and endurance so he is able to return to activity with decreased SOB, walking better. Return to ADL or Hobbies:  Shanti Cormierer that his rt shoulder is still bothering him but feels that he is building some strength so walks some without cane. Staff encourages cane use.   Return to ADL or Hobbies:  Bisilynn Comp  ** Return to ADL or Hobbies:  Roselynn Comp ** Return to ADL or Hobbies:  Bisilynn Comp  ** Return to ADL or Hobbies:  Roselynn Comp  **    *MET level required for above goal:  5.0  METs MET level Achieved:  1.7METs MET level Achieved:  **METs MET level Achieved:  **METs MET level Achieved:  **METs MET level Achieved:  **METs     Individual Cardiac Treatment Plan - Nutrition  NUTRITION  ASSESSMENT/PLAN NUTRITION  REASSESSMENT NUTRITION   REASSESSMENT NUTRITION   REASSESSMENT NUTRITION  DISCHARGE/FOLLOW-UP NUTRITION  DISCHARGE/FOLLOW-UP   Stages of Change Stages of Change Stages of Change Stages of Change Stages of Change Stages of Change   [x] Pre Contemplation  [] Contemplation  [] Preparation  [] Action  [] Maintenance  [] Relapse [] Pre Contemplation  [x] Contemplation  [] Preparation  [] Action  [] Maintenance  [] Relapse [] Pre Contemplation  [] Contemplation  [] Preparation  [] Action  [] Maintenance  [] Relapse [] Pre Contemplation  [] Contemplation  [] Preparation  [] Action  [] Maintenance  [] Relapse [] Pre Contemplation  [] Contemplation  [] Preparation  [] Action  [] Maintenance  [] Relapse [] Pre Contemplation  [] Contemplation  [] Preparation  [] Action  [] Maintenance  [] Relapse   NUTRITION ASSESSMENT NUTRITION ASSESSMENT NUTRITION ASSESSMENT NUTRITION ASSESSMENT NUTRITION ASSESSMENT NUTRITION ASSESSMENT   Weight Management  Weight: 276        Height: 5'11\"  BMI: 38.6 Weight Management  Weight: 280. Weight Management  Weight: **                  Weight Management  Weight: ** Weight Management  Weight: ** Weight Management  Weight: **                    BMI: **   Eating Plan  Current eating habits: fruit and veggies Eating Plan  Changes: more veggies Eating Plan  Changes: Eating Plan  Changes: Eating Plan  Changes: Eating Plan Improvements:   Alcohol Use  [x] none          [] daily  [] weekly      [] special   Type:   Amount:         Diet Assessment Tool:  RATE YOUR PLATE  *Given to patient to complete and return. Diet Assessment Tool:    Score: 31/69        Diet Assessment Tool: RATE YOUR PLATE  Score: **/20   NUTRITION PLAN NUTRITION PLAN NUTRITION PLAN NUTRITION PLAN NUTRITION PLAN NUTRITION PLAN   *Interventions* *Interventions* *Interventions* *Interventions* *Interventions* *Interventions*   Initial Survey given Goal Setting Discussion:   [x] Yes      [] No        Follow Up Survey Reviewed & Goals Updated:     Professional Referral  Please check if needed. [] Dietitian Consult   [] Wt. Management Referral  [] Other:  Professional Referral  Please check if needed. [] Dietitian Consult   [] Wt. Management Referral  [] Other: Professional Referral  Please check if needed. [] Dietitian Consult   [] Wt. Management Referral  [] Other: Professional Referral  Please check if needed. [] Dietitian Consult   [] Wt. Management Referral  [] Other: Professional Referral  Please check if needed. [] Dietitian Consult   [] Wt. Management Referral  [] Other: Professional Referral  Please check if needed. [] Dietitian Consult   [] Wt.  Management Referral  [] Other:   *Education* *Education* *Education* *Education* *Education* *Education*   Nutritional Education Recommended    [x] 1:1 Registered Dietitian    Workshops  [x] Label Reading   [x] Menu  [x] Targeting Nutrition Priorities  [x] Mindful Eating   Nutritional Education Attended/Date  8/26 target nutrition Nutritional Education Attended/Date Nutritional Education Attended/Date Nutritional Education Attended/Date All Sessions Completed? [] Yes  [] No   Cooking School  Recommended     [x] Adding Flavor  [x] Fast & Healthy     Breakfasts  [x] Salads & Dressings  [x] Savory Soups  [x] Simple Sides & Sauces  [x] Appetizers &     Snacks  [x] Delicious Desserts  [x] Plant-Based Proteins  [x] Fast Evening Meals  [x] Weekend Breakfasts  [x] Efficiency Cooking  [x] One-Pot [employer]  Sessions Completed see list   Cooking School  Sessions Completed [facility]  Sessions Completed     [facility]  Sessions Completed Cooking School    # of sessions completed:  **   *Goals* *Goals* *Goals* *Goals* *Goals* *Goals*   Del's nutritional goals are as follows:  Complete and return diet survey Del's nutritional goals are as follows:  [x] Attend Nutrition Workshops  [x] Attend 1:1   [] Attend Cooking Classes  [] ** Del's nutritional goals are as follows:  [] Attend Nutrition Workshops  [] Attend 1:1   [] Attend Cooking Classes  [] Complete and return diet survey  [] ** Del's nutritional goals are as follows:  [] Attend Nutrition Workshops  [] Attend 1:1   [] Attend Cooking Classes  [] ** Del's nutritional goals are as follows:  [] Attend Nutrition Workshops  [] Attend 1:1   [] Attend Cooking Classes  [] ** Del achieved nutritional goals   [] Yes    [] No  If no, why?   Use knowledge gained to continue Pritikin eating plan at home       Individual Cardiac Treatment Plan - Psychosocial  PSYCHOSOCIAL  ASSESSMENT/PLAN PSYCHOSOCIAL  REASSESSMENT PSYCHOSOCIAL   REASSESSMENT PSYCHOSOCIAL   REASSESSMENT PSYCHOSOCIAL  DISCHARGE/FOLLOW-UP PSYCHOSOCIAL  DISCHARGE/FOLLOW-UP   Stages of Change Stages of Change Stages of Change Stages of Change Stages of Change Stages of Change   [x] Pre Contemplation  [] Contemplation  [] 10=very:   Rate your depression: 2  Rate your anxiety:  0  Using a scale of 0-10, 0=none, 10=very:   Rate your depression:0  Rate your anxiety:  0 Using a scale of 0-10, 0=none, 10=very:   Rate your depression: **  Rate your anxiety:  ** Using a scale of 0-10, 0=none, 10=very:   Rate your depression: **  Rate your anxiety:  ** Using a scale of 0-10, 0=none, 10=very:   Rate your depression: **  Rate your anxiety:  ** Using a scale of 0-10, 0=none, 10=very:   Rate your depression: **  Rate your anxiety:  **   Signs and Symptoms of Depression Present? [x] Yes      [] No  If yes, please explain:  Frustrated  Signs and Symptoms of Depression Present? [x] Yes      [] No  If yes, please explain:  Frustration seems to be getting less and he seems to be doing more. Signs and Symptoms of Depression Present? [] Yes      [] No  If yes, please explain:  ** Signs and Symptoms of Depression Present? [] Yes      [] No  If yes, please explain:  ** Signs and Symptoms of Depression Present? [] Yes      [] No  If yes, please explain:  ** Signs and Symptoms of Depression Present? [] Yes      [] No  If yes, please explain:  **   Signs and Symptoms of Anxiety Present? [] Yes      [x] No  If yes, please explain:   Signs and Symptoms of Anxiety Present? [] Yes      [x] No  If yes, please explain:   Signs and Symptoms of Anxiety Present? [] Yes      [] No  If yes, please explain:  ** Signs and Symptoms of Anxiety Present? [] Yes      [] No  If yes, please explain:  ** Signs and Symptoms of Anxiety Present? [] Yes      [] No  If yes, please explain:  ** Signs and Symptoms of Anxiety Present? [] Yes      [] No  If yes, please explain:  **   [] Patient has poor eye contact   [] Flat affect present. [] Signs of anxiety, anger or hostility    [] Signs social isolation present.    []  Signs of alcohol or substance abuse        PSYCHOSOCIAL PLAN PSYCHOSOCIAL PLAN PSYCHOSOCIAL PLAN PSYCHOSOCIAL PLAN PSYCHOSOCIAL PLAN PSYCHOSOCIAL PLAN   *Interventions* *Interventions* *Interventions* *Interventions* *Interventions* *Interventions*   *Please check if needed  [] Psych Consult  [] Physician Referral  [x] Stress Management Skills *Please check if needed  [] Psych Consult  [] Physician Referral  [x] Stress Management Skills *Please check if needed  [] Psych Consult  [] Physician Referral  [] Stress Management Skills *Please check if needed  [] Psych Consult  [] Physician Referral  [] Stress Management Skills *Please check if needed  [] Psych Consult  [] Physician Referral  [] Stress Management Skills *Please check if needed  [] Psych Consult  [] Physician Referral  [] Stress Management Skills   Is patient currently taking anti-depressant or anti-anxiety medications? [] Yes      [x] No  If yes, please list medications:   Change in anti-depressant or anti-anxiety medications? [] Yes      [x] No  If yes, please list medications:  ** Change in anti-depressant or anti-anxiety medications? [] Yes      [] No  If yes, please list medications:  ** Change in anti-depressant or anti-anxiety medications? [] Yes      [] No  If yes, please list medications:  ** Change in anti-depressant or anti-anxiety medications? [] Yes      [] No  If yes, please list medications:  ** Change in anti-depressant or anti-anxiety medications?   [] Yes      [] No  If yes, please list medications:  **   *Education* *Education* *Education* *Education* *Education* *Education*   Healthy Mind-Set Workshops Recommended  [x] Stress & Health  [x] Taking Charge of Stress  [x] New Thoughts, New Behaviors  [x] Managing Moods & Relationships Healthy Mind-Set Workshops Attended/Date  8/20 taking charge of stress Healthy Mind-Set Workshops Attended/Date Healthy Mind-Set Workshops Attended/Date Healthy Mind-Set Workshops  Completed  [] Yes      [] No Healthy Mind-Set Workshops  Completed  [] Yes      [] No   *Goals* *Goals* *Goals* *Goals* *Goals* *GoalsJoy Briggs psychosocial goals are as follows:  Complete HADS & April & Tayo, Quality of Life Index, Cardiac Version IV Del's psychosocial goals are as follows:  [x] Attend Healthy Mind-Set Workshops  [x] Reduce depression symptom severity by 1 level  [] ** Del's psychosocial goals are as follows:  [] Attend Healthy Mind-Set Workshops  [] Reduce depression symptom severity by 1 level  [] ** Del's psychosocial goals are as follows:  [] Attend Healthy Mind-Set Workshops  [] Reduce depression symptom severity by 1 level  [] ** Del achieved psychosocial goals? [] Yes    [] No  If no, why?  **  [] Use methods learned to continue to reduce depression symptom severity by 1 level  [] ** Del achieved psychosocial goals?   [] Yes    [] No  If no, why?  **  [] Use methods learned to continue to reduce depression symptom severity by 1 level  [] **     Individual Cardiac Treatment Plan - Other:  Risk Factor/Education  RISK FACTOR  ASSESSMENT/PLAN RISK FACTOR  REASSESSMENT  RISK FACTOR  REASSESSMENT RISK FACTOR  REASSESSMENT RISK FACTOR   DISCHARGE/FOLLOW-UP RISK FACTOR   DISCHARGE/FOLLOW-UP   Stages of Change Stages of Change Stages of Change Stages of Change Stages of Change Stages of Change   [x] Pre Contemplation  [] Contemplation  [] Preparation  [] Action  [] Maintenance  [] Relapse [] Pre Contemplation  [x] Contemplation  [] Preparation  [] Action  [] Maintenance  [] Relapse [] Pre Contemplation  [] Contemplation  [] Preparation  [] Action  [] Maintenance  [] Relapse [] Pre Contemplation  [] Contemplation  [] Preparation  [] Action  [] Maintenance  [] Relapse [] Pre Contemplation  [] Contemplation  [] Preparation  [] Action  [] Maintenance  [] Relapse [] Pre Contemplation  [] Contemplation  [] Preparation  [] Action  [] Maintenance  [] Relapse   RISK FACTOR/EDUCATION ASSESSMENT RISK FACTOR/EDUCATION ASSESSMENT RISK FACTOR/EDUCATION ASSESSMENT RISK FACTOR/EDUCATION ASSESSMENT RISK FACTOR /EDUCATION ASSESSMENT RISK FACTOR /EDUCATION ASSESSMENT   Hypertension  [x] Yes      [] No    Resting BP: 134/70  Peak Ex BP:164/60  Medication: cozzar   Hypertension  Resting BP: 118/48  Peak Ex BP:140/60  Medication Changes:  [] Yes      [x] No Hypertension  Resting BP: **  Peak Ex BP:**  Medication Changes:  [] Yes      [] No Hypertension  Resting BP: **  Peak Ex BP:**  Medication Changes:  [] Yes      [] No Hypertension  Resting BP: **  Peak Ex BP:**  Medication Changes:  [] Yes      [] No Hypertension  Resting BP: **  Peak Ex BP:**  Medication Changes:  [] Yes      [] No   Lipids  HLD/DLD  [] Yes      [x] No  TOTAL CHOL:   HDL:    LDL:    TRIG:    Medication:  Lipids  Medication Changes:  [] Yes      [x] No     Lipids  Medication Changes:  [] Yes      [] No     Lipids  Medication Changes:  [] Yes      [] No     Lipids    TOTAL CHOL: **  HDL:  **  LDL:  **  TRIG:  **  Medication Changes:  [] Yes      [] No Lipids    TOTAL CHOL: **  HDL:  **  LDL:  **  TRIG:  **  Medication Changes:  [] Yes      [] No   Diabetes  [] Yes      [x] No   Diabetes  na   Diabetes  Most Recent BS:  BS have been in range  [] Yes      [] No  Medication Changes  [] Yes      [] No     Diabetes  Most Recent BS:  BS have been in range  [] Yes      [] No  Medication Changes  [] Yes      [] No     Diabetes  Most Recent BS:  BS have been in range  [] Yes      [] No  Medication Changes  [] Yes      [] No Diabetes  Most Recent BS:  BS have been in range  [] Yes      [] No  Medication Changes  [] Yes      [] No       Tobacco Use  [] Current  [] Former  [x] Never      Smokeless Tobacco use:   [] Yes      [x] No  Amount:  Tobacco Use  Change in smoking status   [] Yes      [x] No    Quit date:    Tobacco Use  Change in smoking status   [] Yes      [] No    Quit date: **   Tobacco Use  Change in smoking status   [] Yes      [] No    Quit date: ** Tobacco Use  Change in smoking status   [] Yes      [] No    Quit date: ** Tobacco Use  Change in smoking status   [] Yes      [] No    Quit date: **             Learning Barriers  Please select one:  [] Speech  [] Literacy  [] Hearing  [] Cognitive  [x] Vision  [x] Ready to Learn Learning Barriers Addressed:   [x] Yes      [] No   Learning Barriers Addressed:   [] Yes      [] No   Learning Barriers Addressed:  [] Yes      [] No Learning Barriers Addressed:  [] Yes      [] No Learning Barriers Addressed:  [] Yes      [] No     RISK FACTOR/EDUCATION PLAN RISK FACTOR/EDUCATION PLAN RISK FACTOR/EDUCATION PLAN RISK FACTOR/EDUCATION PLAN RISK FACTOR/EDUCATION PLAN RISK FACTOR/EDUCATION PLAN   *Interventions* *Interventions* *Interventions* *Interventions* *Interventions* *Interventions*   Recommended Educational Videos    [x] Overview of The Pritikin Eating Plan  [x] Heart Disease Risk Reduction  [x] Move It! [x] Calorie Density  [x] Healthy Minds, Bodies, Hearts  [x] Label Reading  [x] Metabolic Syndrome & Belly   Or  [] How Our Thoughts Can Heal our Heart  [x] Dining Out-Part 1  [x] Biomechanical Limitations  [x] Facts on Fat  [x] Hypertension & Heart Disease  [x] Diseases of Our Times-Focusing on Diabetes  [x] Body Composition  [x] Nurtition Action Plan  [x] Exercise Action Plan   Completed Videos/Date      8/11/2021  Overview of The Pritikin Eating Plan  8/16 Sleep  8/18 Aging  8/255 How thoughts change  8/27 Cook breakfast  8/30 Become  Completed Videos/Date Completed Videos/Date Completed Videos/Date Recommended Educational Videos Completed    [] Yes      [] No    **If not completed, Why? **           Smoking Cessation/Relaspe Prevention Intervention needed? [] Yes      [x] No  *Pt verbalizes and agrees to attend intervention Smoking Cessation/Relapse Prevention Scheduled? [] Yes      [x] No  Date:   Smoking Cessation/Relapse Prevention completed? [] Yes      [] No  Date: ** Smoking Cessation/Relapse Prevention completed? [] Yes      [] No  Date: ** Smoking Cessation/Relapse Prevention completed?    [] Yes      [] No  Date: ** Smoking Additional Educational Videos Completed Additional Educational Videos Completed Additional Educational Videos Completed Additional Educational Videos Completed    [] Yes    [] No   *Goals* *Goals* *Goals* *Goals* *Goals* *Goals*   Del's risk factor/education goals are as follows:    [x] Optimal BP <140/90  [] Blood Sugar <120  [x] Attend recommended video education sessions  [x] Takes medications as prescribed 100% of the time   [] ** Del's risk factor/education goals are as follows:    [x] Optimal BP <140/90  [] Blood Sugar <120  [x] Attend recommended video education sessions  [x] Takes medications as prescribed 100% of the time   [] ** Del's risk factor/education goals are as follows:    [x] Optimal BP <140/90  [] Blood Sugar <120  [x] Attend recommended video education sessions  [x] Takes medications as prescribed 100% of the time   [] ** Del's risk factor/education goals are as follows:    [x] Optimal BP <140/90  [] Blood Sugar <120  [x] Attend recommended video education sessions  [x] Takes medications as prescribed 100% of the time   [] ** Del's risk factor/education goals are as follows:    [x] Optimal BP <140/90  [] Blood Sugar <120  [x] Attend recommended video education sessions  [x] Takes medications as prescribed 100% of the time   [] ** Frantz Rodas achieved risk factor goals?   [] Yes    [] No  If no, why?  **     Monitored telemetry has revealed NSR    [x] associated symptoms SOB Monitored telemetry has revealed NSR  [] documented arrhythmia at increasing workloads  [x] associated symptoms SOB but he states that this getting easier Monitored telemetry has revealed  [] documented arrhythmia at increasing workloads  [] associated symptoms ** Monitored telemetry has revealed **  [] documented arrhythmia at increasing workloads  [] associated symptoms ** Monitored telemetry has revealed **  [] documented arrhythmia at increasing workloads  [] associated symptoms ** Monitored telemetry has revealed **  [] documented arrhythmia at increasing workloads  [] associated symptoms **   Physician Response    [x] Cardiac rehab is reasonably and medically necessary for continuous cardiac monitoring surveillance  of patient's cardiac activity  [x] Initiate continuous telemetry monitoring and notify me with any concerns  [] Other   Physician Response    [x] Cardiac rehab is reasonably and medically necessary for continuous cardiac monitoring surveillance  of patient's cardiac activity  [x] Continue continuous telemetry monitoring and notify me with any concerns  [] Other     Physician Response    [x] Cardiac rehab is reasonably and medically necessary for continuous cardiac monitoring surveillance  of patient's cardiac activity  [x] Continue continuous telemetry monitoring and notify me with any concerns   [] Other     Physician Response    [x] Cardiac rehab is reasonably and medically necessary for continuous cardiac monitoring surveillance  of patient's cardiac activity  [x] Continue continuous telemetry monitoring and notify me with any concerns   [] Other     Physician Response    [x] Cardiac rehab is reasonably and medically necessary for continuous cardiac monitoring surveillance  of patient's cardiac activity  [x] Continue continuous telemetry monitoring and notify me with any concerns   [] Other

## 2021-08-30 NOTE — PROGRESS NOTES
Video Education Report - ICR/CR  Name:  Blanche Hoover     Date:  8/30/2021  MRN: [id number]     Session #:  8  Session Length: 40 min    Core Videos        []Heart Disease Risk Reduction       []Overview of [facility]      []Move it          []Calorie Density         []Healthy Minds, Bodies, Hearts        []Label Reading - Part 1           Exercise      Healthy Mind-Set  []Improving Performance    []Smoking Cessation    []Introduction to [address]  []Aging-Enhancing the Quality of Your Life  [x]Becoming a Pritikin   []Biology of Weight Control    []Cooking Breakfasts   []Decoding Lab Results    and Snacks  []Diseases of Our Time - Overview   []Cooking Dinner and   []Sleep Disorders     Sides  []Targeting Your Nutrition Priorities   []Healthy Salads &   Dressings  Nutrition      []Cooking Soups and   []Dining Out - Part 2     Desserts  []Fueling a Healthy Body   []Menu Workshop     Overview  []Planning Your Eating Strategy   []The Pritikin Solution  []Vitamins and Minerals    Comments:  Video completed, group discussion

## 2021-09-01 ENCOUNTER — HOSPITAL ENCOUNTER (OUTPATIENT)
Dept: CARDIAC REHAB | Age: 71
Setting detail: THERAPIES SERIES
Discharge: HOME | End: 2021-09-01
Payer: MEDICARE

## 2021-09-01 PROCEDURE — G0422 INTENS CARDIAC REHAB W/EXERC: HCPCS

## 2021-09-01 PROCEDURE — G0423 INTENS CARDIAC REHAB NO EXER: HCPCS

## 2021-09-01 NOTE — PROGRESS NOTES
Video Education Report - ICR/CR  Name:  Jessica Cadena     Date:  9/1/2021  MRN: [id number]     Session #:  9  Session Length: 40 min    Core Videos        []Heart Disease Risk Reduction       []Overview of [facility]      []Move it          []Calorie Density         [x]Healthy Minds, Bodies, Hearts        []Label Reading - Part 1       []Metabolic Syndrome and Belly Fat            Exercise      Healthy Mind-Set  []Improving Performance    []Smoking Cessation    []Introduction to [address]  []Aging-Enhancing the Quality of Your Life  []Becoming a Pritikin   []Biology of Weight Control    []Cooking Breakfasts   []Decoding Lab Results    and Snacks  []Diseases of Our Time - Overview   []Cooking Dinner and   []Sleep Disorders     Sides  []Targeting Your Nutrition Priorities   []Healthy Salads &   Dressings  Nutrition      []Cooking Soups and   []Dining Out - Part 2     Desserts  []Fueling a Healthy Body   []Menu Workshop     Overview  []Planning Your Eating Strategy   []The Pritikin Solution  []Vitamins and Minerals    Comments:  Video completed, group discussion

## 2021-09-03 ENCOUNTER — HOSPITAL ENCOUNTER (OUTPATIENT)
Dept: CARDIAC REHAB | Age: 71
Setting detail: THERAPIES SERIES
Discharge: HOME | End: 2021-09-03
Payer: MEDICARE

## 2021-09-03 PROCEDURE — G0423 INTENS CARDIAC REHAB NO EXER: HCPCS

## 2021-09-03 PROCEDURE — G0422 INTENS CARDIAC REHAB W/EXERC: HCPCS

## 2021-09-03 NOTE — PROGRESS NOTES
Video Education Report - ICR/CR  Name:  Farbia Abreu     Date:  9/3/2021  MRN: [id number]     Session #:    Session Length: 40 min    Core Videos        []Heart Disease Risk Reduction       []Overview of [facility]      []Move it          [x]Calorie Density         []Healthy Minds, Bodies, Hearts        []Label Reading - Part 1       []Metabolic Syndrome and Belly Fat        []How Our Thoughts Can Heal Our Hearts   []Dining Out - Part 1      []Biomechanical Limitations  []Facts on Fat        []Hypertension & Heart Disease    []Diseases of Our Time - Focusing on Diabetes   []Body Composition  []Nutrition Action Plan    []Exercise Action Plan    Exercise      Healthy Mind-Set  []Improving Performance    []Smoking Cessation    []Introduction to [address]  []Aging-Enhancing the Quality of Your Life  []Becoming a Pritikin   []Biology of Weight Control    []Cooking Breakfasts   []Decoding Lab Results    and Snacks  []Diseases of Our Time - Overview   []Cooking Dinner and   []Sleep Disorders     Sides  []Targeting Your Nutrition Priorities   []Healthy Salads &   Dressings  Nutrition      []Cooking Soups and   []Dining Out - Part 2     Desserts  []Fueling a Healthy Body   []Menu Workshop     Overview  []Planning Your Eating Strategy   []The Pritikin Solution  []Vitamins and Minerals    Comments:  Video completed, group discussion

## 2021-09-08 ENCOUNTER — HOSPITAL ENCOUNTER (OUTPATIENT)
Dept: CARDIAC REHAB | Age: 71
Setting detail: THERAPIES SERIES
Discharge: HOME | End: 2021-09-08
Payer: MEDICARE

## 2021-09-08 PROCEDURE — G0423 INTENS CARDIAC REHAB NO EXER: HCPCS

## 2021-09-08 PROCEDURE — G0422 INTENS CARDIAC REHAB W/EXERC: HCPCS

## 2021-09-08 NOTE — PROGRESS NOTES
Video Education Report - ICR/CR    Name:  Holley Sandhoff     Date:  9/8/2021  MRN: [id number]     Session #:  6  Session Length: 40 min    Recommended Videos        []01 [employer] - Program Overview   34:22    []02 Overview of Pritikin Eating Plan   34:10    []03 Becoming a Freeman Thomasmer   33:08     []04 Diseases of Our Time - Part 1   34:22    []05 Calorie Density     33:39   []06 Label Reading - Part 1    32:15   []07 Move it      32.54   []08 Healthy Minds, Bodies, Hearts   32:14   []09 Dining Out - Part 1    32:28   [x]10 Heart Disease Risk Reduction   31:85   []65 Metabolic Syndrome and Belly Fat  31:52   []12 Facts on Fat     35:29   []13 Diseases of Our Time - Part 2   33:07   []14 Biology of Weight Control   32:36   []15 Biomechanical Limitations   35:20   []16 Nutrition Action Plan    34:23    Additional Videos         []17 Hypertension & Heart Disease   32:39        []18 Cooking Breakfasts and Snacks  32:00   []19 Planning Your Eating Strategy   33:30   []20 Label Reading - Part 2    32:36  []21 Cooking Soups and Desserts   31:41  []22 How Our Thoughts Can Heal Our Hearts 33:05  []23 Targeting Your Nutrition Priorities  33:58  []24 Healthy Salads & Dressings   35:32  []    Comments:  Video completed,

## 2021-09-13 ENCOUNTER — HOSPITAL ENCOUNTER (OUTPATIENT)
Dept: CARDIAC REHAB | Age: 71
Setting detail: THERAPIES SERIES
Discharge: HOME | End: 2021-09-13
Payer: MEDICARE

## 2021-09-13 PROCEDURE — G0423 INTENS CARDIAC REHAB NO EXER: HCPCS

## 2021-09-13 PROCEDURE — G0422 INTENS CARDIAC REHAB W/EXERC: HCPCS

## 2021-09-13 NOTE — PROGRESS NOTES
Video Education Report - ICR/CR    Name:  Susan Beard     Date:  9/13/2021  MRN: [id number]     Session #:  12  Session Length: 40 min    Recommended Videos        []01 [employer] - Program Overview   34:22    []02 Overview of Pritikin Eating Plan   34:10    []03 Becoming a Pritikin    33:08     []04 Diseases of Our Time - Part 1   34:22    []05 Calorie Density     33:39   [x]06 Label Reading - Part 1    32:15       Comments:  Video completed,

## 2021-09-15 ENCOUNTER — HOSPITAL ENCOUNTER (OUTPATIENT)
Dept: CARDIAC REHAB | Age: 71
Setting detail: THERAPIES SERIES
Discharge: HOME | End: 2021-09-15
Payer: MEDICARE

## 2021-09-15 PROCEDURE — G0423 INTENS CARDIAC REHAB NO EXER: HCPCS

## 2021-09-15 PROCEDURE — G0422 INTENS CARDIAC REHAB W/EXERC: HCPCS

## 2021-09-15 NOTE — PROGRESS NOTES
Alla DENIS.:  1950    MRN:  [id number]    Date: 9/15/2021      Session Length:  31 min   Session # _______    EXERCISE WORKSHOP:  Yoga & Your Heart                        Todays class addressed the history, benefits, how to get started, and contraindications of yoga. Demonstration, with patient participation, of chair yoga poses, and breathing exercises. Handout given. Readiness to change:    ( ) Pre-contemplative   ( ) Contemplative - ambivalent about change    ( x) Action - ready to set action plan and implement   ( ) Maintenance - has made change and is trying, and or practicing different alternative behaviors     Additional Notes:      Tho Quiroz was in the Workshop with the Exercise Physiologist for 31 minutes. The content was presented via Powerpoint, lecture, and patient participation based format. Motivational interviewing was utilized when needed, to promote change. Patient voiced understanding.     Electronically signed by Sam Diaz on 9/15/2021 at 12:34 PM

## 2021-09-17 ENCOUNTER — HOSPITAL ENCOUNTER (OUTPATIENT)
Dept: CARDIAC REHAB | Age: 71
Setting detail: THERAPIES SERIES
Discharge: HOME | End: 2021-09-17
Payer: MEDICARE

## 2021-09-17 PROCEDURE — G0423 INTENS CARDIAC REHAB NO EXER: HCPCS

## 2021-09-17 PROCEDURE — G0422 INTENS CARDIAC REHAB W/EXERC: HCPCS

## 2021-09-17 NOTE — PLAN OF CARE
[facility]ospital Facility-Based Program  Phase 2 Cardiac Rehab Weekly Progress Report      Patient prescribed exercise:  12:15 class. 3 times per week in rehab, 1-4 times per week at home for the amount of sessions/weeks specified by insurance. Current Levels: Treadmill: 0.6mph/0% for 7 minutes, NuStep:  16 Salcedo for 15 minutes, UBE: Level 8 for 5 minutes. Progression Discussion: Maintain Aerobic exercise 15 minutes to work on endurance. Attempt to increase intensity by 5-20% for each modality this week. Try to increase intensities until Cook Yaya rates the exercises a 13-17 on Joi RPE.

## 2021-09-17 NOTE — PROGRESS NOTES
Video Education Report - ICR/CR    Name:  Moncho Saldana     Date:  9/17/2021  MRN: [id number]     Session #:  15  Session Length: 40 min    Recommended Videos        []01 [employer] - Program Overview   34:22    []02 Overview of Pritikin Eating Plan   34:10    []03 Becoming a Pritikin    33:08     []04 Diseases of Our Time - Part 1   34:22    []05 Calorie Density     33:39   []06 Label Reading - Part 1    32:15   [x]07 Move it      32.54       Comments:  Video completed,

## 2021-09-20 ENCOUNTER — HOSPITAL ENCOUNTER (OUTPATIENT)
Dept: CARDIAC REHAB | Age: 71
Setting detail: THERAPIES SERIES
Discharge: HOME | End: 2021-09-20
Payer: MEDICARE

## 2021-09-20 PROCEDURE — G0423 INTENS CARDIAC REHAB NO EXER: HCPCS

## 2021-09-20 PROCEDURE — G0422 INTENS CARDIAC REHAB W/EXERC: HCPCS

## 2021-09-20 NOTE — PROGRESS NOTES
Video Education Report - ICR/CR    Name:  Tashia Castillo     Date:  9/20/2021  MRN: [id number]     Session #:  13  Session Length: 40 min    Recommended Videos        []01 [employer] - Program Overview   34:22    []02 Overview of Pritikin Eating Plan   34:10    []03 Becoming a Pritikin    33:08     []04 Diseases of Our Time - Part 1   34:22    []05 Calorie Density     33:39   []06 Label Reading - Part 1    32:15   []07 Move it      32.54   []08 Healthy Minds, Bodies, Hearts   32:14   []09 Dining Out - Part 1    32:28   []10 Heart Disease Risk Reduction   76:00   []27 Metabolic Syndrome and Belly Fat  31:52   []12 Facts on Fat     35:29   []13 Diseases of Our Time - Part 2   33:07   []14 Biology of Weight Control   32:36   []15 Biomechanical Limitations   35:20   []16 Nutrition Action Plan    34:23    Additional Videos         [x]17 Hypertension & Heart Disease   32:39            Comments:  Video completed,

## 2021-09-21 RX ORDER — ASPIRIN 81 MG/1
81 TABLET, CHEWABLE ORAL DAILY
Qty: 30 TABLET | Refills: 3 | Status: SHIPPED | OUTPATIENT
Start: 2021-09-21

## 2021-09-21 RX ORDER — ISOSORBIDE MONONITRATE 30 MG/1
30 TABLET, EXTENDED RELEASE ORAL DAILY
Qty: 90 TABLET | Refills: 1 | Status: SHIPPED | OUTPATIENT
Start: 2021-09-21 | End: 2022-03-01 | Stop reason: SDUPTHER

## 2021-09-21 NOTE — TELEPHONE ENCOUNTER
Clark Woodruff called requesting a refill on the following medications:  Requested Prescriptions     Pending Prescriptions Disp Refills    isosorbide mononitrate (IMDUR) 30 MG extended release tablet 90 tablet 1     Sig: Take 1 tablet by mouth daily    aspirin 81 MG chewable tablet 30 tablet 3     Sig: Take 1 tablet by mouth daily     Pharmacy verified:  .[facility] in [address] [place] [address]    Date of last visit: 08/03/2021  Date of next visit (if applicable): 9/4/3836

## 2021-09-22 ENCOUNTER — HOSPITAL ENCOUNTER (OUTPATIENT)
Dept: CARDIAC REHAB | Age: 71
Setting detail: THERAPIES SERIES
Discharge: HOME | End: 2021-09-22
Payer: MEDICARE

## 2021-09-22 PROCEDURE — G0422 INTENS CARDIAC REHAB W/EXERC: HCPCS

## 2021-09-22 PROCEDURE — G0423 INTENS CARDIAC REHAB NO EXER: HCPCS

## 2021-09-22 NOTE — PLAN OF CARE
Hospital Facility-Based Program  Phase 2 Cardiac Rehab Weekly Progress Report      Patient prescribed exercise:  12:15 class. 3 times per week in rehab, 1-4 times per week at home for the amount of sessions/weeks specified by insurance. Current Levels: Treadmill: 0.7mph/0% for 7:30 minutes,  NuStep:  16 Salcedo for 15 minutes x 2, UBE: Level 0.2 for 5 minutes. Progression Discussion: Maintain Aerobic exercise 15 minutes to work on endurance. Attempt to increase intensity by 5-20% for each modality this week. Try to increase intensities until Azalea Becerra rates the exercises a 13-17 on Joi RPE.

## 2021-09-22 NOTE — PROGRESS NOTES
Pavithra DENIS.:  1950    Acct Number: [de-identified]   MRN:  [id number]                             [facility] HEALTHY MIND-SET WORKSHOP             Date: 2021        Session #16    Todays class covered:    ( )  Stress and Health Workshop:  [facility] patient will learn about the body's adaptive response that is triggered by a variety of stressors. Patient will gain insight into the toll that chronic stress takes on their health, both emotionally and physically. ( ) Taking Charge of Stress Workshop:  [facility] patient will learn and practice a variety of stress management techniques. Patient will be able to effectively apply coping mechanisms in perceived stressful situations. ( x) New Thoughts New Behaviors Workshop: [facility] patient will learn and practice techniques for developing effective health and lifestyle goals. Patient will be able to effectively apply the goal setting process learned to develop at least one new personal goal.     ( ) Managing Moods & Relationships Workshop:  [facility] patient will learn how emotional and chronic stress factors can impact their hearts. They will learn healthy ways to handle stress and utilize positive coping mechanisms. In addition, [facility] patient will learn ways to improve communication skills. Francisco De Leon actively participated and verbalized understanding. Total time in the Healthy Mind-Set class was 40 minutes.     Electronically signed by Rufino Mascorro RN on 2021 at 12:32 PM

## 2021-09-24 ENCOUNTER — HOSPITAL ENCOUNTER (OUTPATIENT)
Dept: CARDIAC REHAB | Age: 71
Setting detail: THERAPIES SERIES
Discharge: HOME | End: 2021-09-24
Payer: MEDICARE

## 2021-09-24 PROCEDURE — G0423 INTENS CARDIAC REHAB NO EXER: HCPCS

## 2021-09-24 PROCEDURE — G0422 INTENS CARDIAC REHAB W/EXERC: HCPCS

## 2021-09-24 NOTE — PROGRESS NOTES
Video Education Report - ICR/CR    Name:  Kieran Hernandez     Date:  9/24/2021  MRN: [id number]     Session #:  16  Session Length: 40 min    Recommended Videos        []01 [employer] - Program Overview   34:22    []02 Overview of Pritikin Eating Plan   34:10    []03 Becoming a William Bonds   33:08     []04 Diseases of Our Time - Part 1   34:22    []05 Calorie Density     33:39   []06 Label Reading - Part 1    32:15   []07 Move it      32.54   []08 Healthy Minds, Bodies, Hearts   32:14   []09 Dining Out - Part 1    32:28   []10 Heart Disease Risk Reduction   46:33   []33 Metabolic Syndrome and Belly Fat  31:52   []12 Facts on Fat     35:29   []13 Diseases of Our Time - Part 2   33:07   []14 Biology of Weight Control   32:36   []15 Biomechanical Limitations   35:20   []16 Nutrition Action Plan    34:23    Additional Videos         []17 Hypertension & Heart Disease   32:39        []18 Cooking Breakfasts and Snacks  32:00   []19 Planning Your Eating Strategy   33:30   []20 Label Reading - Part 2    32:36  [x]21 Cooking Soups and Desserts   31:41  []    Comments:  Video completed,

## 2021-09-27 ENCOUNTER — HOSPITAL ENCOUNTER (OUTPATIENT)
Dept: CARDIAC REHAB | Age: 71
Setting detail: THERAPIES SERIES
Discharge: HOME | End: 2021-09-27
Payer: MEDICARE

## 2021-09-27 PROCEDURE — G0422 INTENS CARDIAC REHAB W/EXERC: HCPCS

## 2021-09-27 PROCEDURE — G0423 INTENS CARDIAC REHAB NO EXER: HCPCS

## 2021-09-27 NOTE — PLAN OF CARE
[address] [place] [address] Facility-Based Program  Individualized Cardiac Treatment Plan    Patient Name:  Sharda Brewer  :  1950  Age:  66 y.o. MRN:  [id number]  Diagnosis: PCI of RCA  Date of Event: 6/15/2021  Physician:  Noelle Jean Baptiste Next Office Visit:  ?  Date Entered Program: 2021  Risk Stratifications: [] Low [] Intermediate [x] High  Allergies: Allergies   Allergen Reactions    Adhesive Tape Rash       COVID -19 Screen  Do you have any of the following symptoms:  [] Fever [] Cough [] SOB [] Muscle/Body Ache [] Loss of taste/smell [x] None    Have you traveled outside of the [place]? [] Yes      [x] No    Have you been around anyone who has tested Positive for COVID 19?  [] Yes      [] No    The following Education has been completed with patient. [x] Wait in the car until we call you back to rehab. [x] You must wear a mask while in the medical center, and in cardiac rehab. Please bring your own. [x] Bring your own bottle of water with you.       Individual Cardiac Treatment Plan -EXERCISE  INITIAL 30 DAY 60 DAY 90  DAY FINAL DAY   EXERCISE  ASSESSMENT/PLAN EXERCISE  REASSESSMENT EXERCISE   REASSESSMENT EXERCISE   REASSESSMENT EXERCISE   REASSESSMENT EXERCISE  DISCHARGE/FOLLOW-UP   Date: 2021 Date:2021 Date: 21 Date: Date: Date:   Session #1  First Exercise Session:  2021 Session # 12 Session # 28 Session # ** Session # ** Session # **  Last Exercise Session:  **   Stages of Change Stages of Change Stages of Change Stages of Change Stages of Change Stages of Change   [x] Pre Contemplation  [] Contemplation  [] Preparation  [] Action  [] Maintenance  [] Relapse [] Pre Contemplation  [x] Contemplation  [] Preparation  [] Action  [] Maintenance  [] Relapse [] Pre Contemplation  [] Contemplation  [] Preparation  [x] Action  [] Maintenance  [] Relapse [] Pre Contemplation  [] Contemplation  [] Preparation  [] Action  [] Maintenance  [] Relapse [] Pre Contemplation  [] Contemplation  [] Preparation  [] Action  [] Maintenance  [] Relapse [] Pre Contemplation  [] Contemplation  [] Preparation  [] Action  [] Maintenance  [] Relapse   EXERCISE ASSESSMENT EXERCISE ASSESSMENT EXERCISE ASSESSMENT EXERCISE ASSESSMENT EXERCISE ASSESSMENT EXERCISE ASSESSMENT   3 Min Walk Test  Distance walked:   0.02 miles walked 3 min SOB and very deconditioned  105 ft.  1.15 METs  Max HR: 68BPM      RPE:  14    THR:    Rhythm:  NSR     6 Min Walk Test  Distance walked:   ** miles  ** ft  ** METs  Max HR:** BPM      RPE:  **  %Change ft= **    Rhythm:  **   DASI: 3.29 METs DASI: 3.29 METs DASI: 4.30 METs DASI: ** METs DASI: ** METs DASI: ** METs   Return to Work  [employer] on returning to work? [] Yes              [] No   [] Disabled     [x] Retired     Return to work:  Has Christy Mendoza returned to work? [] Yes    [x] No        Return to work:  na Return to work:  Has Christy Mendoza returned to work? [] Yes    [] No    Return to work date set? [] Yes, **    [] No    Christy Mendoza is doing ** at work. Return to work:  Has Christy Mendoza returned to work? [] Yes    [] No    Return to work date set? [] Yes, **    [] No    Christy Mendoza is doing ** at work. Return to work:  Has Christy Mendoza returned to work? [] Yes    [] No    Return to work? [] Yes, **    [] No    *Required MET Level achieved for job duties? [] Yes    [] No   Orthopedic Limitations/  [x] Yes    [] No  If yes please list:  Shoulders and back had a stroke 9 years. Orthopedic Limitations  *If patient has orthopedic issue:   Actions/  accomodations needed to make Christy Mendoza successful :adjust to comfort he prefers Nustep but will keep increasing TM Orthopedic Limitations  Only uses TM and NS Orthopedic Limitations   Orthopedic Limitations Orthopedic Limitations     Fall Risk  Fall risk assessed? [x] Yes      [] No    Balance Issues?   [x] Yes      [] No     [] Walker [x] Juan David Elizalde    [x] Safety issues reviewed      Fall Risk  *If patient is a fall risk, action needed to accommodate: walks with cane Fall Risk: walks with a cane Fall Risk Fall Risk Fall Risk   Home Exercise  [] Yes    [x] No  Type:   Frequency:   Duration:  Home Exercise  [x] Yes    [] No  Type: walk  Frequency:daily   Duration: 10 min Home Exercise  [x] Yes    [] No  Type: walk  Frequency: daily  Duration: 10 min Home Exercise  [] Yes    [] No  Type: **  Frequency: **  Duration: ** Home Exercise  [] Yes    [] No  Type: **  Frequency: **  Duration: ** Home Exercise  [] Yes    [] No  Type: **  Frequency: **  Duration: **   Angina with Activity? [] Yes    [x] No  Angina Management: nitro if needed Angina with Activity? [] Yes    [x] No  Angina Management: na Angina with Activity? [] Yes    [x] No  Angina Management: na Angina with Activity? [] Yes    [] No  Angina Management: ** Angina with Activity? [] Yes    [] No  Angina Management: ** Angina with Activity?   [] Yes    [] No  Angina Management: **   EXERCISE PLAN EXERCISE PLAN EXERCISE PLAN EXERCISE PLAN EXERCISE PLAN EXERCISE PLAN   *Interventions* *Interventions* *Interventions* *Interventions* *Interventions* *Interventions*   Exercise Prescription  (per physician & CR staff) Exercise Prescription  (per physician & CR staff) Exercise Prescription  (per physician & CR staff) Exercise Prescription  (per physician & CR staff) Exercise Prescription  (per physician & CR staff) Exercise Prescription  (per physician & CR staff)   Cardiovascular Cardiovascular Cardiovascular Cardiovascular Cardiovascular Cardiovascular   Mode:    [x] Treadmill (TM)  [x] Schwinn Airdyne (AD)  [x] Arms Ergometer (AE)  [x] NuStep  [] Elliptical (E) MODE:    [x] Treadmill (TM)  [x] Schwinn Airdyne (AD)  [x] Arms Ergometer (AE)  [] NuStep  [] Elliptical (E) MODE:    [x] Treadmill (TM)  [] Schwinn Airdyne (AD)  [] Arms Ergometer (AE)  [x] NuStep  [] Elliptical (E) MODE:    [] Treadmill (TM)  [] Schwinn Airdyne (AD)  [] Arms Ergometer (AE)  [] NuStep  [] Elliptical (E) MODE:    [] Treadmill (TM)  [] Schwinn Airdyne (AD)  [] Arms Ergometer (AE)  [] NuStep  [] Elliptical (E) MODE:    [] Treadmill (TM)  [] Schwinn Airdyne (AD)  [] Arms Ergometer (AE)  [] NuStep  [] Elliptical (E)   Initial Workloads  TM: [email or web address] 1.1 METs  AD: 0.2 level = 1.2 METs  NS: 4  Salcedo= 1.2 METs  AE: 0.1 level = 1.1 METs Current Workloads  TM: 0.6 @ %=1.5  METs    NS:  12 Salcedo= 1.7 METs  AE:0.1  level = 1.1 METs Current Workloads  TM: 0.6 @0%=1.5  METs    NS:  16 Salcedo= 1.7METs   Current Workloads  TM:  @ %=  METs  AD:  level =  METs  NS:   Salcedo=  METs  AE:  level =  METs Current Workloads  TM:  @ %=  METs  AD:  level =  METs  NS:   Salcedo=  METs  AE:  level =  METs Current Workloads  TM:  @ %=  METs  AD:  level =  METs  NS:   Salcedo=  METs  AE:  level =  METs     Frequency:    ICR: 3x/week  Home: 2-3x/wk Frequency:   ICR: 3x/week  Home: 3x/wk Frequency:  ICR: 3x/week  Home: 3-4x/wk Frequency:  ICR: 3x/week  Home: 3-4x/wk Frequency:  ICR: 3x/week  Home: 3-4x/wk Frequency:  Yosef Bruno will continue exercise at  5-7 days/week   Duration:   Total aerobic exercise = 30-45 min    5-8 min/mode Duration:  Total aerobic exercises = 25 min     12 min/mode Duration:  Total aerobic exercises = 25 min     8- 15 min/mode Duration:  Total aerobic exercises = ** min     **min/mode Duration:  Total aerobic exercises = ** min     **min/mode Duration:  Total erobic exercise =  60-90 min   Intensity:   MET Level = 1.15  RPE = 12-15 Intensity:  Max MET Level = 1.7  RPE = 12-15 Intensity:  Max MET Level = 1.7  RPE = 12-15 Intensity:  Max MET Level = **  RPE = 12-15 Intensity:  Max MET Level = **  RPE = 12-15 Intensity:  Max MET Level = ** RPE = 12-15   Progression: increase aerobic activity up to 15 min over next 4 weeks by increasing time 2-3 min/week. Progression:Incraese to 15 min mode then increase METS 5-10% over next 4 weeks   Progression:  Increase duration to 15 min/mode over the next 4 weeks as tolerated.  Progression: for ** min on days not in rehab. Home Exercise  *Del verbalizes planning to ** ** days/week for ** min on days not in rehab. Home Exercise  *Esvin Aldridge his/her plan to ** ** days/week for ** min @ **   *Education* *Education* *Education* *Education* *Education* *Education*   RPE Scale  [x] Yes      [] No  Exercise Safety  [x] Yes      [] No  Equipment Orientation  [x] Yes      [] No  S/S to Report  [x] Yes      [] No  Warm Up/Cool Down  [x] Yes      [] No  Home Exercise  [x] Yes      [] No     All Exercise Education Completed  [] Yes      [] No   Exercise Education Recommended    Workshops  [x] Benefits of Exercise  [x] Balance Training & Fall Prevention  [x] Heart Disease Risk Reduction  [x] Yoga & Heart Health Exercise Education Attended/Date   Exercise Education Attended/Date Exercise Education Attended/Date Exercise Education Attended/Date All Sessions Completed? [] Yes  [] No   *Goals* *Goals* *Goals* *Goals* *Goals* *Goals*   Initial Exercise Goals Exercise Goals  Exercise Goals   Exercise Goals  Exercise Goals  Exercise Goals   Del plans to:  [x] Attend exercise sessions 3x/wk  [x] initiate home exercise 2-3x/wk for 10-20 min  [x] Increase 6 min walk distance by 10%  [x] Attend Exercise workshops Tho Quiroz plans to:  [x] Attend exercise sessions 3x/wk  [x] continue home exercise 2-3x/wk for 20-30 min  [x] Attend Exercise workshops Del's plans to:  [x] Attend exercise sessions 3x/wk  [x] continue home exercise 3-4x/wk for 30-45 min  [x] Determine plan of exercise following rehab  [x] Attend Exercise workshops Del's plans to:  [x] Attend exercise sessions 3x/wk  [x] continue home exercise 3-4x/wk for 30-45 min  [x] Determine plan of exercise following rehab  [x] Attend Exercise workshops Del's plans to:  [x] Attend exercise sessions 3x/wk  [x] continue home exercise 3-4x/wk for 30-45 min  [x] Determine plan of exercise following rehab  [x] Attend Exercise workshops Tho Quiroz achieved exercise goals?     [] Yes    [] No  If no, why?  **  [] Increased 6 min walk distance by 10%  [] Currently exercising 30-60 min/day, 5-7days/wk   [] Plans to continue exercise on own  [] Plans to join a local fitness center to continue exercise  [] Does not plan to continue to exercise after rehab   Return to ADL or Hobbies:  Joselyn Hsieh would like to improve strength and endurance so he is able to return to activity with decreased SOB, walking better. Return to ADL or Hobbies:  Darek Capes that his rt shoulder is still bothering him but feels that he is building some strength so walks some without cane. Staff encourages cane use. Return to ADL or Hobbies:  Mena Peers to improve his strength and endurance so he is able to walk further and more steady.  Return to ADL or Hobbies:  Joselyn Hsieh ** Return to ADL or Hobbies:  Joselyn Hsieh  ** Return to ADL or Hobbies:  Joselyn Hsieh  **    *MET level required for above goal:  5.0  METs MET level Achieved:  1.7METs MET level Achieved:  1.7METs MET level Achieved:  **METs MET level Achieved:  **METs MET level Achieved:  **METs     Individual Cardiac Treatment Plan - Nutrition  NUTRITION  ASSESSMENT/PLAN NUTRITION  REASSESSMENT NUTRITION   REASSESSMENT NUTRITION   REASSESSMENT NUTRITION  DISCHARGE/FOLLOW-UP NUTRITION  DISCHARGE/FOLLOW-UP   Stages of Change Stages of Change Stages of Change Stages of Change Stages of Change Stages of Change   [x] Pre Contemplation  [] Contemplation  [] Preparation  [] Action  [] Maintenance  [] Relapse [] Pre Contemplation  [x] Contemplation  [] Preparation  [] Action  [] Maintenance  [] Relapse [] Pre Contemplation  [x] Contemplation  [] Preparation  [] Action  [] Maintenance  [] Relapse [] Pre Contemplation  [] Contemplation  [] Preparation  [] Action  [] Maintenance  [] Relapse [] Pre Contemplation  [] Contemplation  [] Preparation  [] Action  [] Maintenance  [] Relapse [] Pre Contemplation  [] Contemplation  [] Preparation  [] Action  [] Maintenance  [] Relapse   NUTRITION ASSESSMENT NUTRITION ASSESSMENT NUTRITION ASSESSMENT NUTRITION ASSESSMENT NUTRITION ASSESSMENT NUTRITION ASSESSMENT   Weight Management  Weight: 276        Height: 5'11\"  BMI: 38.6 Weight Management  Weight: 280. Weight Management  Weight: 277                 Weight Management  Weight: ** Weight Management  Weight: ** Weight Management  Weight: **                    BMI: **   Eating Plan  Current eating habits: fruit and veggies Eating Plan  Changes: more veggies Eating Plan  Changes: nothing reported Eating Plan  Changes: Eating Plan  Changes: Eating Plan Improvements:   Alcohol Use  [x] none          [] daily  [] weekly      [] special   Type:   Amount:         Diet Assessment Tool:  RATE YOUR PLATE  *Given to patient to complete and return. Diet Assessment Tool:    Score: 31/69        Diet Assessment Tool: RATE YOUR PLATE  Score: **/84   NUTRITION PLAN NUTRITION PLAN NUTRITION PLAN NUTRITION PLAN NUTRITION PLAN NUTRITION PLAN   *Interventions* *Interventions* *Interventions* *Interventions* *Interventions* *Interventions*   Initial Survey given Goal Setting Discussion:   [x] Yes      [] No        Follow Up Survey Reviewed & Goals Updated:     Professional Referral  Please check if needed. [] Dietitian Consult   [] Wt. Management Referral  [] Other:  Professional Referral  Please check if needed. [] Dietitian Consult   [] Wt. Management Referral  [] Other: Professional Referral  Please check if needed. [] Dietitian Consult   [] Wt. Management Referral  [] Other: Professional Referral  Please check if needed. [] Dietitian Consult   [] Wt. Management Referral  [] Other: Professional Referral  Please check if needed. [] Dietitian Consult   [] Wt. Management Referral  [] Other: Professional Referral  Please check if needed. [] Dietitian Consult   [] Wt.  Management Referral  [] Other:   *Education* *Education* *Education* *Education* *Education* *Education*   Nutritional Education Recommended    [x] 1:1 Registered Dietitian    Workshops  [x] Label Reading   [x] Menu  [x] Targeting Nutrition Priorities  [x] Mindful Eating   Nutritional Education Attended/Date  8/26 menu Nutritional Education Attended/Date    9/27/21 Targeting Nutrition Priorities Nutritional Education Attended/Date Nutritional Education Attended/Date All Sessions Completed? [] Yes  [] No   Cooking School  Recommended     [x] Adding Flavor  [x] Fast & Healthy     Breakfasts  [x] Salads & Dressings  [x] Savory Soups  [x] Simple Sides & Sauces  [x] Appetizers &     Snacks  [x] Delicious Desserts  [x] Plant-Based Proteins  [x] Fast Evening Meals  [x] Weekend Breakfasts  [x] Efficiency Cooking  [x] One-Pot [employer]  Sessions Completed see list   Cooking School  Sessions Completed  NA Cooking School  Sessions Completed     Cooking School  Sessions Completed Cooking School    # of sessions completed:  **   *Goals* *Goals* *Goals* *Goals* *Goals* *Goals*   Del's nutritional goals are as follows:  Complete and return diet survey Del's nutritional goals are as follows:  [x] Attend Nutrition Workshops  [x] Attend 1:1   [] Attend Cooking Classes  [] ** Del's nutritional goals are as follows:  [x] Attend Nutrition Workshops  [x] Attend 1:1   [] Attend Cooking Classes  [] Complete and return diet survey  [] ** Del's nutritional goals are as follows:  [] Attend Nutrition Workshops  [] Attend 1:1   [] Attend Cooking Classes  [] ** Del's nutritional goals are as follows:  [] Attend Nutrition Workshops  [] Attend 1:1   [] Attend Cooking Classes  [] ** Del achieved nutritional goals   [] Yes    [] No  If no, why?   Use knowledge gained to continue Pritikin eating plan at home       Individual Cardiac Treatment Plan - Psychosocial  PSYCHOSOCIAL  ASSESSMENT/PLAN PSYCHOSOCIAL  REASSESSMENT PSYCHOSOCIAL   REASSESSMENT PSYCHOSOCIAL   REASSESSMENT PSYCHOSOCIAL  DISCHARGE/FOLLOW-UP PSYCHOSOCIAL  DISCHARGE/FOLLOW-UP   Stages of Change Stages of Change Stages of Change Stages of Change Stages of Change Stages of Change   [x] Pre Contemplation  [] Contemplation  [] Preparation  [] Action  [] Maintenance  [] Relapse [] Pre Contemplation  [x] Contemplation  [] Preparation  [] Action  [] Maintenance  [] Relapse [] Pre Contemplation  [x] Contemplation  [] Preparation  [] Action  [] Maintenance  [] Relapse [] Pre Contemplation  [] Contemplation  [] Preparation  [] Action  [] Maintenance  [] Relapse [] Pre Contemplation  [] Contemplation  [] Preparation  [] Action  [] Maintenance  [] Relapse [] Pre Contemplation  [] Contemplation  [] Preparation  [] Action  [] Maintenance  [] Relapse   PSYCHOSOCIAL ASSESSMENT PSYCHOSOCIAL ASSESSMENT PSYCHOSOCIAL ASSESSMENT PSYCHOSOCIAL ASSESSMENT PSYCHOSOCIAL ASSESSMENT PSYCHOSOCIAL ASSESSMENT   Behavioral Outcomes Behavioral Outcomes Behavioral Outcomes Behavioral Outcomes Behavioral Outcomes Behavioral Outcomes   Tool Used:  April & Tayo, Quality of Life Index, Cardiac Version IV  *Given to patient to complete. Tool Used:    April & Tayo, Quality of Life Index, Cardiac Version IV     QOL Index Score: 18.79  HF:13.57  S&E:25  P&S: 16.14  Family: 30   Tool Used:     April Cr, Quality of Life Index, Cardiac Version IV    QOL Index Score: **  HF:**  S&E:**  P&S: **  Family: ** Tool Used:     April & Tayo, Quality of Life Index, Cardiac Version IV    QOL Index Score: **  HF:**  S&E:**  P&S: **  Family: **   PHQ-9 score 10  Depression Severity  []Minimal  []Mild   [x]Moderate  []Moderately Severe  []Severe    PHQ-9 score **  Depression Severity  []Minimal  []Mild   []Moderate  []Moderately Severe []Severe PHQ-9 score **  Depression Severity  []Minimal  []Mild   []Moderate  []Moderately Severe []Severe   Does patient have Family Support? [x] Yes      [] No  No signs of marital/family distress        Within the Past Month:  *Have you wished you were dead or wished you could go to sleep and not wake up?   [] Yes      [x] No  *Have you had any thoughts of killing yourself? [] Yes      [x] No          Using a scale of 0-10, 0=none, 10=very:   Rate your depression: 2  Rate your anxiety:  0  Using a scale of 0-10, 0=none, 10=very:   Rate your depression:0  Rate your anxiety:  0 Using a scale of 0-10, 0=none, 10=very:   Rate your depression: 1  Rate your anxiety:  0 Using a scale of 0-10, 0=none, 10=very:   Rate your depression: **  Rate your anxiety:  ** Using a scale of 0-10, 0=none, 10=very:   Rate your depression: **  Rate your anxiety:  ** Using a scale of 0-10, 0=none, 10=very:   Rate your depression: **  Rate your anxiety:  **   Signs and Symptoms of Depression Present? [x] Yes      [] No  If yes, please explain:  Frustrated  Signs and Symptoms of Depression Present? [x] Yes      [] No  If yes, please explain:  Frustration seems to be getting less and he seems to be doing more. Signs and Symptoms of Depression Present? [x] Yes      [] No  If yes, please explain:  Very reserved, poor eye contact Signs and Symptoms of Depression Present? [] Yes      [] No  If yes, please explain:  ** Signs and Symptoms of Depression Present? [] Yes      [] No  If yes, please explain:  ** Signs and Symptoms of Depression Present? [] Yes      [] No  If yes, please explain:  **   Signs and Symptoms of Anxiety Present? [] Yes      [x] No  If yes, please explain:   Signs and Symptoms of Anxiety Present? [] Yes      [x] No  If yes, please explain:   Signs and Symptoms of Anxiety Present? [] Yes      [x] No   Signs and Symptoms of Anxiety Present? [] Yes      [] No  If yes, please explain:  ** Signs and Symptoms of Anxiety Present? [] Yes      [] No  If yes, please explain:  ** Signs and Symptoms of Anxiety Present? [] Yes      [] No  If yes, please explain:  **   [] Patient has poor eye contact   [] Flat affect present. [] Signs of anxiety, anger or hostility    [] Signs social isolation present.    []  Signs of alcohol or substance abuse        PSYCHOSOCIAL PLAN PSYCHOSOCIAL PLAN PSYCHOSOCIAL PLAN PSYCHOSOCIAL PLAN PSYCHOSOCIAL PLAN PSYCHOSOCIAL PLAN   *Interventions* *Interventions* *Interventions* *Interventions* *Interventions* *Interventions*   *Please check if needed  [] Psych Consult  [] Physician Referral  [x] Stress Management Skills *Please check if needed  [] Psych Consult  [] Physician Referral  [x] Stress Management Skills *Please check if needed  [] Psych Consult  [] Physician Referral  [x] Stress Management Skills *Please check if needed  [] Psych Consult  [] Physician Referral  [] Stress Management Skills *Please check if needed  [] Psych Consult  [] Physician Referral  [] Stress Management Skills *Please check if needed  [] Psych Consult  [] Physician Referral  [] Stress Management Skills   Is patient currently taking anti-depressant or anti-anxiety medications? [] Yes      [x] No  If yes, please list medications:   Change in anti-depressant or anti-anxiety medications? [] Yes      [x] No  If yes, please list medications:  ** Change in anti-depressant or anti-anxiety medications? [] Yes      [x] No   Change in anti-depressant or anti-anxiety medications? [] Yes      [] No  If yes, please list medications:  ** Change in anti-depressant or anti-anxiety medications? [] Yes      [] No  If yes, please list medications:  ** Change in anti-depressant or anti-anxiety medications?   [] Yes      [] No  If yes, please list medications:  **   *Education* *Education* *Education* *Education* *Education* *Education*   Healthy Mind-Set Workshops Recommended  [x] Stress & Health  [x] Taking Charge of Stress  [x] New Thoughts, New Behaviors  [x] Managing Moods & Relationships Healthy Mind-Set Workshops Attended/Date  8/20 taking charge of stress Healthy Mind-Set Workshops Attended/Date    9/22/21New Thoughts, New Behaviors Healthy Mind-Set Workshops Attended/Date Healthy Mind-Set Workshops  Completed  [] Yes      [] No Healthy Mind-Set Workshops  Completed  [] Yes      [] No   *Goals* *Goals* *Goals* *Goals* *Goals* *Goals*   Del's psychosocial goals are as follows:  Complete HADS & April & Tayo, Quality of Life Index, Cardiac Version IV Del's psychosocial goals are as follows:  [x] Attend Healthy Mind-Set Workshops  [x] Reduce depression symptom severity by 1 level  [] ** Del's psychosocial goals are as follows:  [x] Attend Healthy Mind-Set Workshops  [x] Reduce depression symptom severity by 1 level  [] ** Del's psychosocial goals are as follows:  [] Attend Healthy Mind-Set Workshops  [] Reduce depression symptom severity by 1 level  [] ** Del achieved psychosocial goals? [] Yes    [] No  If no, why?  **  [] Use methods learned to continue to reduce depression symptom severity by 1 level  [] ** Del achieved psychosocial goals?   [] Yes    [] No  If no, why?  **  [] Use methods learned to continue to reduce depression symptom severity by 1 level  [] **     Individual Cardiac Treatment Plan - Other:  Risk Factor/Education  RISK FACTOR  ASSESSMENT/PLAN RISK FACTOR  REASSESSMENT  RISK FACTOR  REASSESSMENT RISK FACTOR  REASSESSMENT RISK FACTOR   DISCHARGE/FOLLOW-UP RISK FACTOR   DISCHARGE/FOLLOW-UP   Stages of Change Stages of Change Stages of Change Stages of Change Stages of Change Stages of Change   [x] Pre Contemplation  [] Contemplation  [] Preparation  [] Action  [] Maintenance  [] Relapse [] Pre Contemplation  [x] Contemplation  [] Preparation  [] Action  [] Maintenance  [] Relapse [] Pre Contemplation  [] Contemplation  [x] Preparation  [] Action  [] Maintenance  [] Relapse [] Pre Contemplation  [] Contemplation  [] Preparation  [] Action  [] Maintenance  [] Relapse [] Pre Contemplation  [] Contemplation  [] Preparation  [] Action  [] Maintenance  [] Relapse [] Pre Contemplation  [] Contemplation  [] Preparation  [] Action  [] Maintenance  [] Relapse   RISK FACTOR/EDUCATION ASSESSMENT RISK FACTOR/EDUCATION ASSESSMENT RISK FACTOR/EDUCATION ASSESSMENT RISK FACTOR/EDUCATION ASSESSMENT RISK FACTOR /EDUCATION ASSESSMENT RISK FACTOR /EDUCATION ASSESSMENT   Hypertension  [x] Yes      [] No    Resting BP: 134/70  Peak Ex BP:164/60  Medication: cozzar   Hypertension  Resting BP: 118/48  Peak Ex BP:140/60  Medication Changes:  [] Yes      [x] No Hypertension  Resting BP: 150/74    Peak Ex /62  Medication Changes:  [] Yes      [x] No Hypertension  Resting BP: **  Peak Ex BP:**  Medication Changes:  [] Yes      [] No Hypertension  Resting BP: **  Peak Ex BP:**  Medication Changes:  [] Yes      [] No Hypertension  Resting BP: **  Peak Ex BP:**  Medication Changes:  [] Yes      [] No   Lipids  HLD/DLD  [] Yes      [x] No  TOTAL CHOL:   HDL:    LDL:    TRIG:    Medication:  Lipids  Medication Changes:  [] Yes      [x] No     Lipids  Medication Changes:  [] Yes      [x] No     Lipids  Medication Changes:  [] Yes      [] No     Lipids    TOTAL CHOL: **  HDL:  **  LDL:  **  TRIG:  **  Medication Changes:  [] Yes      [] No Lipids    TOTAL CHOL: **  HDL:  **  LDL:  **  TRIG:  **  Medication Changes:  [] Yes      [] No   Diabetes  [] Yes      [x] No   Diabetes  na   Diabetes  na     Diabetes  Most Recent BS:  BS have been in range  [] Yes      [] No  Medication Changes  [] Yes      [] No     Diabetes  Most Recent BS:  BS have been in range  [] Yes      [] No  Medication Changes  [] Yes      [] No Diabetes  Most Recent BS:  BS have been in range  [] Yes      [] No  Medication Changes  [] Yes      [] No       Tobacco Use  [] Current  [] Former  [x] Never      Smokeless Tobacco use:   [] Yes      [x] No  Amount:  Tobacco Use  Change in smoking status   [] Yes      [x] No    Quit date:    Tobacco Use  Change in smoking status   [] Yes      [x] No       Tobacco Use  Change in smoking status   [] Yes      [] No    Quit date: ** Tobacco Use  Change in smoking status   [] Yes      [] No    Quit date: ** Tobacco Use  Change in smoking status [] Yes      [] No    Quit date: **             Learning Barriers  Please select one:  [] Speech  [] Literacy  [] Hearing  [] Cognitive  [x] Vision  [x] Ready to Learn Learning Barriers Addressed:   [x] Yes      [] No   Learning Barriers Addressed:   [x] Yes      [] No   Learning Barriers Addressed:  [] Yes      [] No Learning Barriers Addressed:  [] Yes      [] No Learning Barriers Addressed:  [] Yes      [] No     RISK FACTOR/EDUCATION PLAN RISK FACTOR/EDUCATION PLAN RISK FACTOR/EDUCATION PLAN RISK FACTOR/EDUCATION PLAN RISK FACTOR/EDUCATION PLAN RISK FACTOR/EDUCATION PLAN   *Interventions* *Interventions* *Interventions* *Interventions* *Interventions* *Interventions*   Recommended Educational Videos    [x] Overview of The Pritikin Eating Plan  [x] Heart Disease Risk Reduction  [x] Move It! [x] Calorie Density  [x] Healthy Minds, Bodies, Hearts  [x] Label Reading  [x] Metabolic Syndrome & Belly   Or  [] How Our Thoughts Can Heal our Heart  [x] Dining Out-Part 1  [x] Biomechanical Limitations  [x] Facts on Fat  [x] Hypertension & Heart Disease  [x] Diseases of Our Times-Focusing on Diabetes  [x] Body Composition  [x] Nurtition Action Plan  [x] Exercise Action Plan   Completed Videos/Date      8/11/2021  Overview of The Pritikin Eating Plan  8/16 Sleep  8/18 Aging  8/255 How thoughts change  8/27 Cook breakfast  8/30 Become  Completed Videos/Date    9/1/21  Healthy Minds, Bodies, Hearts  9/13/21  Label Reading    9/8/21   Heart Disease Risk Reduction    8/25/21   How Our Thoughts Can Heal our Heart    9/20/21  Hypertension & Heart Disease    9/17/21 Move it     Completed Videos/Date Completed Videos/Date Recommended Educational Videos Completed    [] Yes      [] No    **If not completed, Why? **           Smoking Cessation/Relaspe Prevention Intervention needed? [] Yes      [x] No  *Pt verbalizes and agrees to attend intervention Smoking Cessation/Relapse Prevention Scheduled?    [] Yes      [x] No  Date:   * Smoking Cessation/Relapse Prevention completed? [] Yes      [] No  Date: ** Smoking Cessation/Relapse Prevention completed? [] Yes      [] No  Date: ** Smoking Cessation Counseling attended  [] Yes      [] No  **If not completed, Why? **   Professional Referrals:  *Please check if needed  [] Diabetes Clinic  [] Lipid Clinic   [] Other:      Professional Referrals:  *Please check if needed  [] Diabetes Clinic  [] Lipid Clinic   [] Other:   Preventative Medication Preventative Medication Preventative Medication Preventative Medication Preventative Medication Preventative Medication   Aspirin  [x] Yes    [] No  Blood Thinner: Clopidogrel/Effient/Brillinta  [] Yes    [x] No  Beta Blocker  [x] Yes    [] No  Ace Inhibitor  [] Yes    [x] No  Statin/Lipid Lowering  [] Yes    [x] No Medication Changes? [] Yes    [x] No Medication Changes? [] Yes    [x] No Medication Changes? [] Yes    [] No Medication Changes? [] Yes    [] No Medication Changes? [] Yes    [] No   *Education* *Education* *Education* *Education* *Education* *Education*   Does Kevin López require any additional education? [] Yes    [x] No   Does [place] require any additional education? [] Yes    [x] No Does [place] require any additional education? [] Yes    [x] No Does [place] require any additional education? [] Yes    [] No Does Kevin López require any additional education? [] Yes    [] No Does Kevin López require any additional education?   [] Yes    [] No   Additional Educational Videos    Exercise  [x] Improve Performance    Medical  [] Aging Enhancing Your QoL  [] Biology of Weight Control  [] Decoding Lab Results  [] Diseases of Our Time - Overview  [] Sleep Disorders    Nutrition  [] Dining Out - Part 2  [] Fueling a Healthy Body  [] Menu Workshop  [] Planning Your Eating Strategy  [] Targeting Your Nutrition Priorities  [] Vitamins & Minerals    Healthy Mind-Set  [] Smoking Cessation    Culinary  []Becoming a Pritikin    [] Cooking - Breakfast & Snacks  [] Cooking -Healhty Salads & Dressing  [] Cooking -Dinner & Sides  [] Cooking -Soups & Desserts    Overview  [] The Pritikin Solution Additional Educational Videos Completed Additional Educational Videos Completed Additional Educational Videos Completed Additional Educational Videos Completed Additional Educational Videos Completed    [] Yes    [] No   *Goals* *Goals* *Goals* *Goals* *Goals* *Goals*   Del's risk factor/education goals are as follows:    [x] Optimal BP <140/90  [] Blood Sugar <120  [x] Attend recommended video education sessions  [x] Takes medications as prescribed 100% of the time   [] ** Del's risk factor/education goals are as follows:    [x] Optimal BP <140/90  [] Blood Sugar <120  [x] Attend recommended video education sessions  [x] Takes medications as prescribed 100% of the time   [] ** Del's risk factor/education goals are as follows:    [x] Optimal BP <140/90  [] Blood Sugar <120  [x] Attend recommended video education sessions  [x] Takes medications as prescribed 100% of the time   [] ** Del's risk factor/education goals are as follows:    [x] Optimal BP <140/90  [] Blood Sugar <120  [x] Attend recommended video education sessions  [x] Takes medications as prescribed 100% of the time   [] ** Del's risk factor/education goals are as follows:    [x] Optimal BP <140/90  [] Blood Sugar <120  [x] Attend recommended video education sessions  [x] Takes medications as prescribed 100% of the time   [] ** Lynette Nanwalek achieved risk factor goals?   [] Yes    [] No  If no, why?  **     Monitored telemetry has revealed NSR    [x] associated symptoms SOB Monitored telemetry has revealed NSR  [] documented arrhythmia at increasing workloads  [x] associated symptoms SOB but he states that this getting easier Monitored telemetry has revealed NSR   Monitored telemetry has revealed **  [] documented arrhythmia at increasing workloads  [] associated symptoms ** Monitored telemetry has revealed **  [] documented arrhythmia at increasing workloads  [] associated symptoms ** Monitored telemetry has revealed **  [] documented arrhythmia at increasing workloads  [] associated symptoms **   Physician Response    [x] Cardiac rehab is reasonably and medically necessary for continuous cardiac monitoring surveillance  of patient's cardiac activity  [x] Initiate continuous telemetry monitoring and notify me with any concerns  [] Other   Physician Response    [x] Cardiac rehab is reasonably and medically necessary for continuous cardiac monitoring surveillance  of patient's cardiac activity  [x] Continue continuous telemetry monitoring and notify me with any concerns  [] Other     Physician Response    [x] Cardiac rehab is reasonably and medically necessary for continuous cardiac monitoring surveillance  of patient's cardiac activity  [x] Continue continuous telemetry monitoring and notify me with any concerns   [] Other     Physician Response    [x] Cardiac rehab is reasonably and medically necessary for continuous cardiac monitoring surveillance  of patient's cardiac activity  [x] Continue continuous telemetry monitoring and notify me with any concerns   [] Other     Physician Response    [x] Cardiac rehab is reasonably and medically necessary for continuous cardiac monitoring surveillance  of patient's cardiac activity  [x] Continue continuous telemetry monitoring and notify me with any concerns   [] Other      Cosigned by: Sahil Ventura MD at 8/30/2021  4:26 PM   Revision History  Date/Time User Provider Type Action   8/30/2021  4:26 PM Sahil Ventura MD Physician Cosign   8/30/2021  1:42 PM Sharmila Littlejohn RN Registered Nurse Sign

## 2021-09-27 NOTE — PROGRESS NOTES
Paulie DENIS.:  1950    Acct Number: [de-identified]   MRN:  [id number]                             [facility] NUTRITION WORKSHOP             Date: 2021        Session # _______    Chance Neves class covered:    ()  Label Reading  ()  Menus  (X)  Targeting Nutrition Priorities  ()  Mindful Eating/Fueling a Healthy Body    Readiness to change:    ( ) Pre-contemplative   ( ) Contemplative - ambivalent about change    (X ) Action - ready to set action plan and implement   ( ) Maintenance - has made change and is trying, and or practicing different alternative behaviors     Zo Sanders was in the Workshop with the Dietitian for 35 minutes. The content was presented via Powerpoint, lecture, and patient participation based format. Motivational interviewing was utilized when needed, to promote change. Patient voiced understanding.     Electronically signed by Prasanna Mosher RD LD [id number]  on 2021 at 12:56 PM

## 2021-09-29 ENCOUNTER — HOSPITAL ENCOUNTER (OUTPATIENT)
Dept: CARDIAC REHAB | Age: 71
Setting detail: THERAPIES SERIES
Discharge: HOME | End: 2021-09-29
Payer: MEDICARE

## 2021-09-29 PROCEDURE — G0422 INTENS CARDIAC REHAB W/EXERC: HCPCS

## 2021-09-29 PROCEDURE — G0423 INTENS CARDIAC REHAB NO EXER: HCPCS

## 2021-09-30 NOTE — PROGRESS NOTES
Hospital Facility-Based Program  Phase 2 Cardiac Rehab Weekly Progress Report      Patient prescribed exercise:  12:15 class. 3 times per week in rehab, 1-4 times per week at home for the amount of sessions/weeks specified by insurance. Current Levels: Treadmill: 0.7mph/0% for 8 minutes,  NuStep:  16 [place] for 15 minutes, UBE: Level 8 for 5 minutes. Progression Discussion: Maintain Aerobic exercise 15 minutes to work on endurance. Attempt to increase intensity by 5-20% for each modality this week. Try to increase intensities until Petra Veliz rates the exercises a 13-17 on Joi RPE.

## 2021-10-01 ENCOUNTER — HOSPITAL ENCOUNTER (OUTPATIENT)
Dept: CARDIAC REHAB | Age: 71
Setting detail: THERAPIES SERIES
Discharge: HOME | End: 2021-10-01
Payer: MEDICARE

## 2021-10-01 PROCEDURE — G0423 INTENS CARDIAC REHAB NO EXER: HCPCS

## 2021-10-01 PROCEDURE — G0422 INTENS CARDIAC REHAB W/EXERC: HCPCS

## 2021-10-01 NOTE — PROGRESS NOTES
Video Education Report - ICR/CR  Name:  Noelle Boast     Date:  10/1/2021  MRN: [id number]     Session #:    Session Length: 40 min    Core Videos        []Heart Disease Risk Reduction       []Overview of Pritikin Eating Plan      []Move it          []Calorie Density         []Healthy Minds, Bodies, Hearts        []Label Reading - Part 1       []Metabolic Syndrome and Belly Fat        []How Our Thoughts Can Heal Our Hearts   []Dining Out - Part 1      []Biomechanical Limitations  []Facts on Fat        []Hypertension & Heart Disease    []Diseases of Our Time - Focusing on Diabetes   []Body Composition  []Nutrition Action Plan    []Exercise Action Plan    Exercise      Healthy Mind-Set  []Improving Performance    []Smoking Cessation    []Introduction to [address]  []Aging-Enhancing the Quality of Your Life  []Becoming a Pritikin   []Biology of Weight Control    []Cooking Breakfasts   []Decoding Lab Results    and Snacks  []Diseases of Our Time - Overview   [x]Cooking Dinner and   []Sleep Disorders     Sides  []Targeting Your Nutrition Priorities   []Healthy Salads &   Dressings  Nutrition      []Cooking Soups and   []Dining Out - Part 2     Desserts  []Fueling a Healthy Body   []Menu Workshop     Overview  []Planning Your Eating Strategy   []The Pritikin Solution  []Vitamins and Minerals    Comments:  Video completed, group discussion

## 2021-10-04 ENCOUNTER — HOSPITAL ENCOUNTER (OUTPATIENT)
Dept: CARDIAC REHAB | Age: 71
Setting detail: THERAPIES SERIES
Discharge: HOME | End: 2021-10-04
Payer: MEDICARE

## 2021-10-04 PROCEDURE — G0422 INTENS CARDIAC REHAB W/EXERC: HCPCS

## 2021-10-04 PROCEDURE — G0423 INTENS CARDIAC REHAB NO EXER: HCPCS

## 2021-10-04 NOTE — PROGRESS NOTES
Video Education Report - ICR/CR    Name:  Osei Garcia     Date:  10/4/2021  MRN: [id number]     Session #:    Session Length: 40 min    Recommended Videos        []01 Pritikin Solutions - Program Overview   34:22    []02 Overview of Pritikin Eating Plan   34:10    []03 Becoming a Pritikin    33:08     []04 Diseases of Our Time - Part 1   34:22    []05 Calorie Density     33:39   []06 Label Reading - Part 1    32:15   []07 Move it      32.54   []08 Healthy Minds, Bodies, Hearts   32:14   []09 Dining Out - Part 1    32:28   []10 Heart Disease Risk Reduction   83:42   []49 Metabolic Syndrome and Belly Fat  31:52   []12 Facts on Fat     35:29   []13 Diseases of Our Time - Part 2   33:07   [x]14 Biology of Weight Control   32:36       Comments:  Video completed,

## 2021-10-06 ENCOUNTER — HOSPITAL ENCOUNTER (OUTPATIENT)
Dept: CARDIAC REHAB | Age: 71
Setting detail: THERAPIES SERIES
Discharge: HOME | End: 2021-10-06
Payer: MEDICARE

## 2021-10-06 PROCEDURE — G0423 INTENS CARDIAC REHAB NO EXER: HCPCS

## 2021-10-06 PROCEDURE — G0422 INTENS CARDIAC REHAB W/EXERC: HCPCS

## 2021-10-08 ENCOUNTER — HOSPITAL ENCOUNTER (OUTPATIENT)
Dept: CARDIAC REHAB | Age: 71
Setting detail: THERAPIES SERIES
Discharge: HOME | End: 2021-10-08
Payer: MEDICARE

## 2021-10-08 PROCEDURE — G0422 INTENS CARDIAC REHAB W/EXERC: HCPCS

## 2021-10-08 PROCEDURE — G0423 INTENS CARDIAC REHAB NO EXER: HCPCS

## 2021-10-08 NOTE — PROGRESS NOTES
Hospital Facility-Based Program  Phase 2 Cardiac Rehab Weekly Progress Report      Patient prescribed exercise:  12:15 class. 3 times per week in rehab, 1-4 times per week at home for the amount of sessions/weeks specified by insurance. Current Levels: Treadmill: 0.7mph/0% for 8 minutes,  NuStep:  16 [place] for 15 minutes, UBE: Level 0.2 for 5 minutes. Progression Discussion: Maintain Aerobic exercise 15 minutes to work on endurance. Attempt to increase intensity by 5-20% for each modality this week. Try to increase intensities until Nico Roldan rates the exercises a 13-17 on Joi RPE.

## 2021-10-08 NOTE — PROGRESS NOTES
Video Education Report - ICR/CR    Name:  Hilda Savage     Date:  10/8/2021  MRN: [id number]     Session #:    Session Length: 40 min    Recommended Videos        []01 Pritikin Solutions - Program Overview   34:22    []02 Overview of Pritikin Eating Plan   34:10    []03 Becoming a Pritikin    33:08     []04 Diseases of Our Time - Part 1   34:22    []05 Calorie Density     33:39   []06 Label Reading - Part 1    32:15   []07 Move it      32.54   []08 Healthy Minds, Bodies, Hearts   32:14   []09 Dining Out - Part 1    32:28   []10 Heart Disease Risk Reduction   50:21   []90 Metabolic Syndrome and Belly Fat  31:52   []12 Facts on Fat     35:29   []13 Diseases of Our Time - Part 2   33:07   []14 Biology of Weight Control   32:36   []15 Biomechanical Limitations   35:20   []16 Nutrition Action Plan    34:23    Additional Videos         []17 Hypertension & Heart Disease   32:39        []18 Cooking Breakfasts and Snacks  32:00   []19 Planning Your Eating Strategy   33:30   []20 Label Reading - Part 2    32:36  []21 Cooking Soups and Desserts   31:41  []22 How Our Thoughts Can Heal Our Hearts 33:05  []23 Targeting Your Nutrition Priorities  33:58  []24 Healthy Salads & Dressings   35:32  [x]25 Dining Out - Part 2    32:35      Comments:  Video completed,

## 2021-10-11 ENCOUNTER — HOSPITAL ENCOUNTER (OUTPATIENT)
Dept: CARDIAC REHAB | Age: 71
Setting detail: THERAPIES SERIES
Discharge: HOME | End: 2021-10-11
Payer: MEDICARE

## 2021-10-11 PROCEDURE — G0422 INTENS CARDIAC REHAB W/EXERC: HCPCS

## 2021-10-11 PROCEDURE — G0423 INTENS CARDIAC REHAB NO EXER: HCPCS

## 2021-10-13 ENCOUNTER — TELEPHONE (OUTPATIENT)
Dept: CARDIOLOGY CLINIC | Age: 71
End: 2021-10-13

## 2021-10-13 ENCOUNTER — HOSPITAL ENCOUNTER (OUTPATIENT)
Dept: CARDIAC REHAB | Age: 71
Setting detail: THERAPIES SERIES
Discharge: HOME | End: 2021-10-13
Payer: MEDICARE

## 2021-10-13 PROCEDURE — G0422 INTENS CARDIAC REHAB W/EXERC: HCPCS

## 2021-10-13 NOTE — PROGRESS NOTES
Emily DENIS.:  1950    MRN:  [id number]    Date: 10/13/2021      Session Length:  31 min   Session # _______    EXERCISE WORKSHOP:  Understanding the Benefits of Exercise                        Todays class addressed ways to build on the foundation of patient's core exercise program to achieve greater health benefits. Discussion of the SAID (Specific Adaptation to Imposed Demands) and FITT (Frequency, Intensity, Time, and Type) principles, and how these principles impact fitness levels. Techniques on overcoming muscle soreness and safety concerns was also addressed. In addition, the importance of a varied weekly exercise routine in order to improve overall fitness level was also discussed. Readiness to change:    ( ) Pre-contemplative   ( ) Contemplative - ambivalent about change    ( x) Action  ready to set action plan and implement   ( ) Maintenance  has made change and is trying, and or practicing different alternative behaviors     Additional Notes:      Naya Thorpe was in the Workshop with the Exercise Physiologist for 31 minutes. The content was presented via Powerpoint, lecture, and patient participation based format. Motivational interviewing was utilized when needed, to promote change. Patient voiced understanding.     Electronically signed by Sonia Garcia on 10/13/2021 at 12:28 PM

## 2021-10-13 NOTE — TELEPHONE ENCOUNTER
Cardiac Rehab in [facility] called stating pt came in for cardiac rehab and was not feeling well. C/O being very sluggish, dizziness and over \"not feeling wee\". Resting HR 54-56, w/exercise 60. Usually resting HR 60-68 and w/exercise 70-low 80's. Called pt to see if he would be willing to come in to NP or PA. LM for pt to return call.

## 2021-10-13 NOTE — PROGRESS NOTES
Phase 2 Cardiac Rehab  [facility] Event  Physician Notification    Patient Name: Andria Urrutia    :     1950  Diagnosis  S/P PCI on 21    Physician:    Dr. Darny Caba    10/13/2021    ()Chest Pain/Discomfort  ()New Arrhythmia/Ectopy  ()New Signs/Symptoms  ()Findings Exceed Acceptable Parameters  (x)Other: Unusual Sinus Ole Tata, feeling tired, dizzy, lightheaded, sluggish. Description of event:  Pt states he feels \"tipsy\" today. Upon further investigation, pt has been getting progressively worse with fatigue, dizziness, etc.  Staff noticed a decrease of resting and exercise HR. Approx 14-20 beats from previous sessions. BP also decreased with exercise, which is unusual for this patient. No c/o chest pain, SOB. Pt did not feel he needed to go to the ED. All vitals stable. Exercise was discontinued. Staff left a message at Dr. Savana Guillermo office to call pt at home. Pt was advised to come to the ED if he experiences any chest discomfort, SOB or increasing dizziness. Pt and SO voiced understanding. Action taken:    ()Transported to ED  ()Rapid [facility] Called  (x) sent patient home  (x)Called Physician's Office  ()Appointment Scheduled    Please contact Cardiac Rehab with any changes, or to put Cardiac Rehab on hold.          Cardiac Rehab Staff     [phone]

## 2021-10-15 ENCOUNTER — APPOINTMENT (OUTPATIENT)
Dept: CARDIAC REHAB | Age: 71
End: 2021-10-15
Payer: MEDICARE

## 2021-10-15 ENCOUNTER — HOSPITAL ENCOUNTER (OUTPATIENT)
Dept: CARDIAC REHAB | Age: 71
Setting detail: THERAPIES SERIES
Discharge: HOME | End: 2021-10-15
Payer: MEDICARE

## 2021-10-15 NOTE — PROGRESS NOTES
Hospital Facility-Based Program  Phase 2 Cardiac Rehab Weekly Progress Report      Patient prescribed exercise:  12:15 class. 3 times per week in rehab, 1-4 times per week at home for the amount of sessions/weeks specified by insurance. Current Levels: Treadmill: 0.6mph/0% for 8 minutes,  NuStep:  16 [place] for 15 minutes, UBE: Level 8 [place] for 5 minutes. Progression Discussion: Maintain Aerobic exercise 15 minutes to work on endurance. Attempt to increase intensity by 5-20% for each modality this week. Try to increase intensities until Yasmin Winchester rates the exercises a 13-17 on Joi RPE.

## 2021-10-18 ENCOUNTER — APPOINTMENT (OUTPATIENT)
Dept: CARDIAC REHAB | Age: 71
End: 2021-10-18
Payer: MEDICARE

## 2021-10-18 ENCOUNTER — HOSPITAL ENCOUNTER (OUTPATIENT)
Dept: CARDIAC REHAB | Age: 71
Setting detail: THERAPIES SERIES
End: 2021-10-18
Payer: MEDICARE

## 2021-10-19 ENCOUNTER — OFFICE VISIT (OUTPATIENT)
Dept: CARDIOLOGY CLINIC | Age: 71
End: 2021-10-19
Payer: MEDICARE

## 2021-10-19 VITALS
WEIGHT: 281 LBS | HEIGHT: 71 IN | HEART RATE: 56 BPM | SYSTOLIC BLOOD PRESSURE: 108 MMHG | DIASTOLIC BLOOD PRESSURE: 60 MMHG | BODY MASS INDEX: 39.34 KG/M2

## 2021-10-19 DIAGNOSIS — E78.01 FAMILIAL HYPERCHOLESTEROLEMIA: ICD-10-CM

## 2021-10-19 DIAGNOSIS — I25.10 CORONARY ARTERY DISEASE INVOLVING NATIVE CORONARY ARTERY OF NATIVE HEART WITHOUT ANGINA PECTORIS: Primary | ICD-10-CM

## 2021-10-19 DIAGNOSIS — Z98.61 S/P PERCUTANEOUS TRANSLUMINAL CORONARY ANGIOPLASTY: ICD-10-CM

## 2021-10-19 DIAGNOSIS — I10 ESSENTIAL HYPERTENSION: ICD-10-CM

## 2021-10-19 PROCEDURE — G8427 DOCREV CUR MEDS BY ELIG CLIN: HCPCS | Performed by: NURSE PRACTITIONER

## 2021-10-19 PROCEDURE — G8417 CALC BMI ABV UP PARAM F/U: HCPCS | Performed by: NURSE PRACTITIONER

## 2021-10-19 PROCEDURE — G8484 FLU IMMUNIZE NO ADMIN: HCPCS | Performed by: NURSE PRACTITIONER

## 2021-10-19 PROCEDURE — 3017F COLORECTAL CA SCREEN DOC REV: CPT | Performed by: NURSE PRACTITIONER

## 2021-10-19 PROCEDURE — 99213 OFFICE O/P EST LOW 20 MIN: CPT | Performed by: NURSE PRACTITIONER

## 2021-10-19 PROCEDURE — 1036F TOBACCO NON-USER: CPT | Performed by: NURSE PRACTITIONER

## 2021-10-19 PROCEDURE — 1123F ACP DISCUSS/DSCN MKR DOCD: CPT | Performed by: NURSE PRACTITIONER

## 2021-10-19 PROCEDURE — 4040F PNEUMOC VAC/ADMIN/RCVD: CPT | Performed by: NURSE PRACTITIONER

## 2021-10-19 NOTE — PROGRESS NOTES
Follow-up. He denies having any chest pain, palpitations or GILMA. He has shortness of breath but states no more than usual.   He has intermittent dizziness. He was sent home from cardiac rehab due to hypotension.

## 2021-10-19 NOTE — PROGRESS NOTES
[facility] PROFESSIONAL SERVICES  [facility]   [address] [facility]   [address] [address]   Dept: [phone]   Dept Fax: [phone]: [phone]      Chief Complaint   Patient presents with    Follow-up    Coronary Artery Disease       Cardiologist:  Dr. Yuli Hanna    Telephone call to office on 10/13/21 from Cardiac rehab at [facility]; patient not feeling well. Sluggish, dizziness. Resting HR 54-56, with exercise 60 (usually 60 -70). Presents to office today for follow-up and evaluation. Patient last seen in the office on 8/3/2021 per Dr. Yuli Hanna. He presented at that time for follow-up regarding his hypertension, CAD and hyperlipidemia. He recently had a cardiac catheterization with stent to the RCA and noted  of the LAD. Successful LAD  PCI with three overlapping drug-eluting stents. He was doing well at that time. No chest pain or changes in his breathing. Had had some mild dyspnea with the Brilinta and his blood pressure was noted to be stable. Brilinta was changed to Plavix. He was to continue risk factor modification and medical management. He was to begin cardiac rehab. Today's visit:   Subjective:  \"Just feel loopy\" since starting the Metoprolol - only change that was made to his medications  Had informed Cardiac Rehab staff of how he was feeling - BP noted to be running on low side. Would not let him participate in rehab until seen and cleared by Cardiology.    No chest pain or dyspnea  No palpitations  No overt dizziness or lightheadedness, no sycope or near syncope  No swelling in LE    General:   No fever, no chills, No fatigue or weight loss/gain  Pulmonary:    No dyspnea, no wheezing  Cardiac:    Denies recent chest discomfort or palpitations  GI:     No nausea or vomiting, no abdominal pain, no black or     tarry stools, no blood in stools  Neuro:      No dizziness or light headedness; just feels funny  Musculoskeletal:  No recent active issues, no new musculoskeletal pain  Extremities:   No swelling or claudication symptoms      Past Medical History:   Diagnosis Date    Arthritis     Back pain     CAD in native artery 6/14/2021    Diarrhea     Hypertension     Infection of prosthetic shoulder joint ([address] 7/24/2020    PFO (patent foramen ovale) 8/2012    noted on ROSITA following stroke    Propionibacterium infection 7/24/2020    Rotator cuff injury     Stroke [facility])     August 19/2012    TIA (transient ischemic attack) 05/2018       Allergies   Allergen Reactions    Adhesive Tape Rash       Current Outpatient Medications   Medication Sig Dispense Refill    metoprolol tartrate (LOPRESSOR) 25 MG tablet Take 1/2 (one-half) tablet by mouth twice daily 180 tablet 3    isosorbide mononitrate (IMDUR) 30 MG extended release tablet Take 1 tablet by mouth daily 90 tablet 1    aspirin 81 MG chewable tablet Take 1 tablet by mouth daily 30 tablet 3    clopidogrel (PLAVIX) 75 MG tablet Take 300 mg the first dose then 75 after (Patient taking differently: Take 75 mg by mouth daily ) 90 tablet 3    nitroGLYCERIN (NITROSTAT) 0.4 MG SL tablet up to max of 3 total doses. If no relief after 1 dose, call 911. 25 tablet 1    tamsulosin (FLOMAX) 0.4 MG capsule Take 0.4 mg by mouth daily      losartan (COZAAR) 100 MG tablet Take 100 mg by mouth daily      hydrochlorothiazide (HYDRODIURIL) 12.5 MG tablet TAKE 1 TABLET BY MOUTH ONCE DAILY      Potassium 99 MG TABS Take 3 tablets by mouth daily      Multiple Vitamins-Minerals (PRESERVISION AREDS PO) Take by mouth      Ascorbic Acid (VITAMIN C) 500 MG tablet Take 1,000 mg by mouth        No current facility-administered medications for this visit.        Social History     Socioeconomic History    Marital status:      Spouse name: Glenn Castro Number of children: 4    Years of education: None    Highest education level: None   Occupational History    None   Tobacco Use    Smoking status: Never Smoker    Smokeless tobacco: Never Used   Vaping Use    Vaping Use: Never used   Substance and Sexual Activity    Alcohol use: No    Drug use: No    Sexual activity: None   Other Topics Concern    None   Social History Narrative    None     Social Determinants of Health     Financial Resource Strain:     Difficulty of Paying Living Expenses:    Food Insecurity:     Worried About Running Out of Food in the Last Year:     Ran Out of Food in the Last Year:    Transportation Needs:     Lack of Transportation (Medical):  Lack of Transportation (Non-Medical):    Physical Activity:     Days of Exercise per Week:     Minutes of Exercise per Session:    Stress:     Feeling of Stress :    Social Connections:     Frequency of Communication with Friends and Family:     Frequency of Social Gatherings with Friends and Family:     Attends Holiness Services:     Active Member of Clubs or Organizations:     Attends Club or Organization Meetings:     Marital Status:    Intimate Partner Violence:     Fear of Current or Ex-Partner:     Emotionally Abused:     Physically Abused:     Sexually Abused:        Family History   Problem Relation Age of Onset    Arthritis Mother     Vision Loss Father     Cancer Brother     Stroke Maternal Uncle     Prostate Cancer Neg Hx        Blood pressure 108/60, pulse 56, height 5' 11\" (1.803 m), weight 281 lb (127.5 kg). General:   Well developed, well nourished, no acute distress;     accompanied by spouse  Lungs:   Clear to auscultation, good aeration  Heart:    Normal S1 S2, No murmur, rubs, or gallops  Abdomen:   Obese, soft, non tender, no organomegalies, positive    bowel sounds  Extremities:   No edema, no cyanosis, good peripheral pulses  Neurological:   Awake, alert, oriented.  No obvious focal deficits  Musculoskeletal:  No obvious deformities    EK21  Electrocardiogram, 12-lead   Order: [id number]  Status:  Final result   Visible to patient:  Yes (Santoshhart) Next appt:  Today at 10:00 AM in Cardiology (Vibha Shah, APRN - CNP)   0 Result Notes   Ref Range & Units 21 1051   Ventricular Rate BPM 49    Atrial Rate BPM 49    P-R Interval ms 212    QRS Duration ms 108    Q-T Interval ms 466    QTc Calculation (Bazett) ms 420    P Axis degrees 7    R Axis degrees 36    T Axis degrees 59    Resulting Agency  [facility]      Narrative & Impression    Sinus bradycardia with 1st degree A-V block  Inferior infarct (cited on or before 2020)  Abnormal ECG  When compared with ECG of 2021 12:57,  Sinus rhythm has replaced Junctional rhythm  Confirmed by Jamin Torre MD, [facility] ([id number]) on 2021 2:06:02 AM               Cath: 2021  CARDIAC CATHETERIZATION     PATIENT NAME: Erlinda Leon                       :        1950  MED REC NO:   [id number]                           ROOM:       0019  ACCOUNT NO:   [de-identified]                           ADMIT DATE: 2021  PROVIDER:     Allen Wilkins MD     DATE OF PROCEDURE:  2021     INDICATION:  CCS class III angina, on optimal medical therapy, with  severely abnormal stress test with anterior wall ischemia; not a CABG  candidate; has already had stenting of the right coronary artery; now  presenting for LAD  PCI.     DESCRIPTION OF PROCEDURE:  After informed consent was obtained from the  patient, he was brought to the cardiac catheterization laboratory and  prepped in sterile fashion. Left femoral artery was chosen as the  primary point of access. Preprocedure timeout was completed. After  infiltration of the left inguinal region with 2% lidocaine using  micropuncture and modified Seldinger technique under fluoroscopic  guidance and ultrasound guidance, I was able to insert a 7-[place] sheath  in the left femoral artery. Preprocedure angiography was performed by  Dr. Lupe Marroquin. Please see his note for further details.   In brief, it  demonstrated what appeared to be LAD  with diffuse disease throughout  the entire length of the vessel. He was not deemed appropriate for CABG  and the patient did not want CABG, but has anginal symptoms, on optimal  medical therapy, with abnormal stress test and significant LAD CTO_. Plan is to proceed with  PCI of the LAD. I cannulated the left main  with a 7-Citizen of [place] JL4 guiding catheter. Heparin IV was given. ACT was  confirmed to be above 250 seconds. I used a Fielder XT and Turnpike  microcatheter that were advanced into the left main and ultimately, I  wired into the LAD  cath.  catheter was favorable as there was  narrowing towards the ostium and there was a small microchannel seen at  the level of the diagonal branch.     After various projections for placement of the guidewire, I was able to  pass it down into the main body of the  and then exited out the  distal end of the . Microcatheter follows with slow counterclockwise  advancement. Once the microcatheter was past the  into the mid to  distal LAD, I did aspiration, confirmed that there was blood into the  true lumen and then I injected through a 3 mL syringe to the distal LAD  confirming that I was in the true lumen. I exchanged out for a 300 cm  BMW wire, then placed this in the  distal LAD. I backed out the  microcatheter. I passed a 2.0 x 20 compliant balloon and dilated the  distal to proximal LAD from 12 to 16 atmospheres. I did give copious  amounts of IC adenosine due to poor distal perfusion and microvascular  disease. Then, I was able to advance on the stent and Runthrough wires  through the entire LAD to the distal end of the LAD. I passed a  [employer] catheter for extra support. Then, passed a 2.0 x 26 Resolute  Doc LOLIS distally and stented at 13 atmospheres.   I then passed a 2 x 38  Resolute [place] LOLIS and stented the mid LAD up to 12 atmospheres in an  overlapping fashion with the first stent deployed and then passed a 3.5  x 38 Resolute Doc LOLIS and stented the proximal LAD at 12 atmospheres in  an overlapping fashion with the second stent deployed. I postdilated  the distal stent with a 2.5 x 12 NC balloon from 12 to 22 atmospheres. I then used a 3.5 x 15 NC balloon up to 30 atmospheres from distal to  proximal.  Post-PCI angiography demonstrated CHIDI-3 flow without any  perforation, dissection, distal embolization, side branch loss, guide or  guidewire-induced trauma. All equipment was removed from the patient. The patient tolerated the procedure well.     IMMEDIATE COMPLICATIONS:  None.     MEDICATIONS:  See EMR.     ACCESS:  8-Swiss Angio-Seal was used for hemostasis.     ESTIMATED BLOOD LOSS:  Less than 50 mL.     SUMMARY:  Successful LAD  PCI with three overlapping drug-eluting  stents.     PLAN:  1. Bedrest.  2.  Optimal medical therapy. 3.  Risk factor management. 4.  Routine access care. 5.  DAPT. 6.  IV fluids. 7.  Overnight observation. 8.  Guideline-directed therapy for CAD. 9. Uptitrate antianginals. 10.  Follow up with Dr. Ron Moctezuma in two to four weeks post procedure.     All the above was explained to the patient and the patient's family. They are agreeable and amenable to the above plan.     Armida Tran MD   D: 07/09/2021 53:88:95       6/14/21: Cath - RCA and LAD disease; PCI RCA; for staged PCI LAD. Lexiscan stress test: 11/23/2020  Summary   This Nuclear Medicine study was markedly abnormal .   anterior septal ischemia   inferior ischemia   possible multivessel CAD   normal EF      Recommendation   Clinical correlation is recommended. Invasive ischemia workup is recommended if clinically indicated.       Signatures      ----------------------------------------------------------------   Electronically signed by Harshad Jeter MD (Interpreting   Cardiologist) on 11/23/2020 at 17:07    Echo: 11/20/2020  Transthoracic Echocardiography Report (TTE)      Demographics      Patient Name    Ina Zheng HANH Gender Male      MR #            [id number]    Race                                                    Ethnicity      Account #       [de-identified]    Room Number      Accession       [id number]   Date of Study        11/20/2020   Number      Date of Birth   1950   [facility]erring Phys[facility]  [facility]      Age             65 year(s)   Erica Monk, [facility]                                   Interpreting         German Garduno MD                                Physician     Procedure     Type of Study      TTE procedure:ECHOCARDIOGRAM COMPLETE 2D W DOPPLER W COLOR. Procedure Date  Date: 11/20/2020 Start: 12:53 PM     Study Location: Echo Lab  Technical Quality: Limited visualization due to restricted mobility.     Indications:Preop cardiac evaluation.     Additional Medical History:Family history of coronary artery disease,  hypertension, hyperlipidemia     Patient Status: Routine     Height: 71 inches Weight: 285.01 pounds BSA: 2.45 m^2 BMI: 39.75 kg/m^2     BP: 138/84 mmHg      Conclusions      Summary   Ejection fraction is visually estimated at 60%. Overall left ventricular function is normal.      Signature      ----------------------------------------------------------------   Electronically signed by German Garduno MD (Interpreting   physician) on 11/20/2020 at 03:49 PM   ----------------------------------------------------------------      Findings      Mitral Valve   The mitral valve structure was normal with normal leaflet separation. DOPPLER: The transmitral velocity was within the normal range with no   evidence for mitral stenosis. There was no evidence of mitral   regurgitation. Aortic Valve   The aortic valve was trileaflet with normal thickness and cuspal   separation. DOPPLER: Transaortic velocity was within the normal range with   no evidence of aortic stenosis.  There was no evidence of aortic   regurgitation. Tricuspid Valve   Tricuspid valve was not well visualized. Mild tricuspid regurgitation. Pulmonic Valve   The pulmonic valve was not well visualized . Trivial pulmonic regurgitation visualized. Left Atrium   Left atrial size was normal. No evidence of thrombus in the left atrial   appendage. Left Ventricle   Ejection fraction is visually estimated at 60%. Overall left ventricular function is normal.      Right Atrium   Right atrial size was normal.      Right Ventricle   The right ventricular size was normal with normal systolic function and   wall thickness. Pericardial Effusion   The pericardium was normal in appearance with no evidence of a pericardial   effusion. Pleural Effusion   No evidence of pleural effusion. Aorta / Great Vessels   -Aortic root dimension within normal limits. -IVC size is within normal limits with normal respiratory phasic changes.      M-Mode/2D Measurements & Calculations      LV Diastolic   LV Systolic Dimension:    AV Cusp Separation: 2.4 cmLA   Dimension: 5.2 3.4 cm                    Dimension: 4.1 cmAO Root   cm             LV Volume Diastolic: 294  Dimension: 3.5 cmLA Area: 16.3   LV FS:34.6 %   ml                        cm^2   LV PW          LV Volume Systolic: 96.44   Diastolic: 1.3 ml   cm             LV EDV/LV EDV Index: 130   Septum         ml/53 m^2LV ESV/LV ESV    RV Diastolic Dimension: 3 cm   Diastolic: 1.3 Index: 81.66 ml/18 m^2   cm             EF Calculated: 66.7 %     LA/Aorta: 1.17                                            Ascending Aorta: 3.3 cm                                            LA volume/Index: 37.1 ml /15m^2     Doppler Measurements & Calculations      MV Peak E-Wave: 82.7  AV Peak Velocity: 145  LVOT Peak Velocity: 83.6 cm/s   cm/s                  cm/s                   LVOT Mean Velocity: 59.1 cm/s   MV Peak A-Wave: 106   AV Peak Gradient: 8.41 LVOT Peak Gradient: 3   cm/s mmHg                   mmHgLVOT Mean Gradient: 2   MV E/A Ratio: 0.78    AV Mean Velocity: 90.1 mmHg   MV Peak Gradient:     cm/s   2.74 mmHg             AV Mean Gradient: 4    TV Peak E-Wave: 50.6 cm/s                         mmHg                   TV Peak A-Wave: 53.6 cm/s   MV Deceleration Time: AV VTI: 29.7 cm   303 msec                                     TV Peak Gradient: 1.02 mmHg   MV P1/2t: 89 msec   MVA by PHT:2.47 cm^2  LVOT VTI: 18.1 cm      PV Peak Velocity: 75 cm/s                         IVRT: 81 msec          PV Peak Gradient: 2.25 mmHg   MV E' Septal   Velocity: 6.3 cm/s   MV A' Septal          AV DVI (VTI): 0.61AV   Velocity: 8.5 cm/s    DVI (Vmax):0.58   MV E' Lateral   Velocity: 6.3 cm/s   MV A' Lateral   Velocity: 12.6 cm/s   E/E' septal: 13.13   E/E' lateral: 13.13   MR Velocity: 528 cm/s     http://[facility] in [facility] [email or web address] [email or web address]? DocKey=[id number]%[id number]%3d   Diagnosis Orders   1. Coronary artery disease involving native coronary artery of native heart without angina pectoris     2. Essential hypertension     3. Familial hypercholesterolemia     4. S/P percutaneous transluminal coronary angioplasty         No orders of the defined types were placed in this encounter. Continue Dr Marissa Rinaldi current treatment plan with exception of:     Patient Instructions   Take the Metroprolol as 12.5 mg (one-half tablet) twice a day. Monitor the blood pressure once daily for the next 2 days - or check it if not feeling well. Call the office if the top number continues to run under 110. If staying above 110 then may return to Cardiac rehab on Friday. Continue other medications as prescribed. Follow-up with PCP as scheduled. Follow-up with Dr. Josefina Maradiaga at [facility] on 2/3/22 as scheduled or sooner if need.        Return in about 4 months (around 2/3/2022), or if symptoms worsen or fail to improve, for  Peggy Shah, APRN - CNP

## 2021-10-19 NOTE — PATIENT INSTRUCTIONS
Take the Metroprolol as 12.5 mg (one-half tablet) twice a day. Monitor the blood pressure once daily for the next 2 days - or check it if not feeling well. Call the office if the top number continues to run under 110. If staying above 110 then may return to Cardiac rehab on Friday. Continue other medications as prescribed. Follow-up with PCP as scheduled. Follow-up with Dr. Julieth Tomlinson at [facility] on 2/3/22 as scheduled or sooner if need.

## 2021-10-20 ENCOUNTER — APPOINTMENT (OUTPATIENT)
Dept: CARDIAC REHAB | Age: 71
End: 2021-10-20
Payer: MEDICARE

## 2021-10-20 ENCOUNTER — HOSPITAL ENCOUNTER (OUTPATIENT)
Dept: CARDIAC REHAB | Age: 71
Setting detail: THERAPIES SERIES
End: 2021-10-20
Payer: MEDICARE

## 2021-10-22 ENCOUNTER — HOSPITAL ENCOUNTER (OUTPATIENT)
Dept: CARDIAC REHAB | Age: 71
Setting detail: THERAPIES SERIES
Discharge: HOME | End: 2021-10-22
Payer: MEDICARE

## 2021-10-22 PROCEDURE — G0423 INTENS CARDIAC REHAB NO EXER: HCPCS

## 2021-10-22 PROCEDURE — G0422 INTENS CARDIAC REHAB W/EXERC: HCPCS

## 2021-10-22 NOTE — PROGRESS NOTES
Delores Fleming YOB: 1950    Acct Number: [de-identified]   MRN:  [id number]                             [facility] HEALTHY MIND-SET WORKSHOP             Date: 10/22/2021        Session #    Todays class covered: ( x)  Stress and Health Workshop:  [facility] patient will learn about the body's adaptive response that is triggered by a variety of stressors. Patient will gain insight into the toll that chronic stress takes on their health, both emotionally and physically. ( ) Taking Charge of Stress Workshop:  [facility] patient will learn and practice a variety of stress management techniques. Patient will be able to effectively apply coping mechanisms in perceived stressful situations. ( ) New Thoughts New Behaviors Workshop: [facility] patient will learn and practice techniques for developing effective health and lifestyle goals. Patient will be able to effectively apply the goal setting process learned to develop at least one new personal goal.     ( ) Managing Moods & Relationships Workshop:  [facility] patient will learn how emotional and chronic stress factors can impact their hearts. They will learn healthy ways to handle stress and utilize positive coping mechanisms. In addition, [facility] patient will learn ways to improve communication skills. Nomi Rueda actively participated and verbalized understanding. Total time in the Healthy Mind-Set class was 40 minutes.     Electronically signed by Rasta Grossman RN on 10/22/2021 at 12:29 PM

## 2021-10-25 ENCOUNTER — HOSPITAL ENCOUNTER (OUTPATIENT)
Dept: CARDIAC REHAB | Age: 71
Setting detail: THERAPIES SERIES
Discharge: HOME | End: 2021-10-25
Payer: MEDICARE

## 2021-10-25 PROCEDURE — G0422 INTENS CARDIAC REHAB W/EXERC: HCPCS

## 2021-10-25 PROCEDURE — G0423 INTENS CARDIAC REHAB NO EXER: HCPCS

## 2021-10-25 NOTE — PROGRESS NOTES
Video Education Report - ICR/CR  Name:  Laura Sinclair     Date:  10/25/2021  MRN: [id number]     Session #:    Session Length: 40min    Core Videos        []Heart Disease Risk Reduction       []Overview of Pritikin Eating Plan      []Move it          []Calorie Density         []Healthy Minds, Bodies, Hearts        []Label Reading - Part 1       []Metabolic Syndrome and Belly Fat        []How Our Thoughts Can Heal Our Hearts   []Dining Out - Part 1      []Biomechanical Limitations  []Facts on Fat        []Hypertension & Heart Disease    []Diseases of Our Time - Focusing on Diabetes   []Body Composition  []Nutrition Action Plan    []Exercise Action Plan    Exercise      Healthy Mind-Set  []Improving Performance    []Smoking Cessation    []Introduction to [address]  []Aging-Enhancing the Quality of Your Life  []Becoming a Pritikin   []Biology of Weight Control    []Cooking Breakfasts   []Decoding Lab Results    and Snacks  []Diseases of Our Time - Overview   []Cooking Dinner and   []Sleep Disorders     Sides  []Targeting Your Nutrition Priorities   []Healthy Salads &   Dressings  Nutrition      []Cooking Soups and   []Dining Out - Part 2     Desserts  []Fueling a Healthy Body   []Menu Workshop     Overview  []Planning Your Eating Strategy   []The Pritikin Solution  [x]Vitamins and Minerals    Comments:  Video completed, group discussion

## 2021-10-25 NOTE — PLAN OF CARE
high.  Pt walks very slow. Limit TM due to risk of falling [address] Exercise  [] Yes    [x] No  Type:   Frequency:   Duration:  Home Exercise  [x] Yes    [] No  Type: walk  Frequency:daily   Duration: 10 min Home Exercise  [x] Yes    [] No  Type: walk  Frequency: daily  Duration: 10 min Home Exercise  [x] Yes    [] No  Type: walk  Frequency: daily  Duration: ? Home Exercise  [] Yes    [] No  Type: **  Frequency: **  Duration: ** Home Exercise  [] Yes    [] No  Type: **  Frequency: **  Duration: **   Angina with Activity? [] Yes    [x] No  Angina Management: nitro if needed Angina with Activity? [] Yes    [x] No  Angina Management: na Angina with Activity? [] Yes    [x] No  Angina Management: na Angina with Activity? [] Yes    [x] No  Angina Management: na Angina with Activity? [] Yes    [] No  Angina Management: ** Angina with Activity?   [] Yes    [] No  Angina Management: **   EXERCISE PLAN EXERCISE PLAN EXERCISE PLAN EXERCISE PLAN EXERCISE PLAN EXERCISE PLAN   *Interventions* *Interventions* *Interventions* *Interventions* *Interventions* *Interventions*   Exercise Prescription  (per physician & CR staff) Exercise Prescription  (per physician & CR staff) Exercise Prescription  (per physician & CR staff) Exercise Prescription  (per physician & CR staff) Exercise Prescription  (per physician & CR staff) Exercise Prescription  (per physician & CR staff)   Cardiovascular Cardiovascular Cardiovascular Cardiovascular Cardiovascular Cardiovascular   Mode:    [x] Treadmill (TM)  [x] Schwinn Airdyne (AD)  [x] Arms Ergometer (AE)  [x] NuStep  [] Elliptical (E) MODE:    [x] Treadmill (TM)  [x] Schwinn Airdyne (AD)  [x] Arms Ergometer (AE)  [] NuStep  [] Elliptical (E) MODE:    [x] Treadmill (TM)  [] Schwinn Airdyne (AD)  [] Arms Ergometer (AE)  [x] NuStep  [] Elliptical (E) MODE:    [x] Treadmill (TM)  [] Schwinn Airdyne (AD)  [x] Arms Ergometer (AE)  [x] NuStep  [] Elliptical (E) MODE:    [] Treadmill (TM)  [] Schwinn Airdyne (AD)  [] Arms Ergometer (AE)  [] NuStep  [] Elliptical (E) MODE:    [] Treadmill (TM)  [] Schwinn Airdyne (AD)  [] Arms Ergometer (AE)  [] NuStep  [] Elliptical (E)   Initial Workloads  TM: [email or web address] 1.1 METs  AD: 0.2 level = 1.2 METs  NS: 4  Salcedo= 1.2 METs  AE: 0.1 level = 1.1 METs Current Workloads  TM: 0.6 @ %=1.5  METs    NS:  12 Salcedo= 1.7 METs  AE:0.1  level = 1.1 METs Current Workloads  TM: 0.6 @0%=1.5  METs    NS:  16 Salcedo= 1.7METs   Current Workloads  TM: 0.7 @0 %= 1.5 METs    NS:   16Watts= 1.7 METs  AE:  10 Salcedo = 1.7 METs Current Workloads  TM:  @ %=  METs  AD:  level =  METs  NS:   Salcedo=  METs  AE:  level =  METs Current Workloads  TM:  @ %=  METs  AD:  level =  METs  NS:   Salcedo=  METs  AE:  level =  METs     Frequency:    ICR: 3x/week  Home: 2-3x/wk Frequency:   ICR: 3x/week  Home: 3x/wk Frequency:  ICR: 3x/week  Home: 3-4x/wk Frequency:  ICR: 3x/week  Home: 3-4x/wk Frequency:  ICR: 3x/week  Home: 3-4x/wk Frequency:  Ángel Eppersons will continue exercise at  5-7 days/week   Duration:   Total aerobic exercise = 30-45 min    5-8 min/mode Duration:  Total aerobic exercises = 25 min     12 min/mode Duration:  Total aerobic exercises = 25 min     8- 15 min/mode Duration:  Total aerobic exercises =25 min     8-15min/mode Duration:  Total aerobic exercises = ** min     **min/mode Duration:  Total erobic exercise =  60-90 min   Intensity:   MET Level = 1.15  RPE = 12-15 Intensity:  Max MET Level = 1.7  RPE = 12-15 Intensity:  Max MET Level = 1.7  RPE = 12-15 Intensity:  Max MET Level = 1.7  RPE = 12-15 Intensity:  Max MET Level = **  RPE = 12-15 Intensity:  Max MET Level = ** RPE = 12-15   Progression: increase aerobic activity up to 15 min over next 4 weeks by increasing time 2-3 min/week. Progression:Incraese to 15 min mode then increase METS 5-10% over next 4 weeks   Progression:  Increase duration to 15 min/mode over the next 4 weeks as tolerated.  Progression:Pt is not able to progress. Encourage toIncrease duration to 15 min/mode over the next 4 Progression: Progression:  Increase time/intensity when RPE <13, and HR is in [facility]   [x] Yes      [] No  Upper and Lower body strength training 2x/wk    Wt: 2#       Reps:  8-15    *Increase wt. after completing 15 reps with an RPE of <12/13. [x] Yes      [] No  Upper and Lower body strength training 2x/wk    Wt: 2#       Reps:  8-15    *Increase wt. after completing 15 reps with an RPE of <12/13. [x] Yes      [] No  Upper and Lower body strength training 2x/wk    Wt: 2#       Reps:  8-15    *Increase wt. after completing 15 reps with an RPE of <12/13. [x] Yes      [] No  Upper and Lower body strength training 2x/wk    Wt: 2#       Reps:  8-15    *Increase wt. after completing 15 reps with an RPE of <12/13. [] Yes      [] No  Upper and Lower body strength training 2x/wk    Wt: **#       Reps:  8-15    *Increase wt. after completing 15 reps with an RPE of <12/13. Continue Strength Training at home   [] Exercise Log & Strength training handout given     Wt: **#       Reps:  8-15    *Increase wt. after completing 15 reps with an RPE of <12/13. Flexibility Flexibility Flexibility Flexibility Flexibility Flexibility   [x] Yes      [] No  25 min session of Core Strength & Flexibility 1x/per week  Attends Core Strength & Flexibility   [x] Yes      [] No Attends Core Strength & Flexibility   [x] Yes      [] No Attends Core Strength & Flexibility   [x] Yes      [] No Attends Core Strength & Flexibility   [] Yes      [] No Continue Core Strength & Flexibility at home   Home Exercise  *Del verbalizes planning to walk 3 days/week for 5-10 min on days not in rehab. Home Exercise  *Del verbalizes planning to walk 5+ days/week for 10-15 min on days not in rehab.  Home Exercise  *Del verbalizes planning to walk 3-4 days/week for 15 min on days not in rehab. Home Exercise  *Del verbalizes planning to walk 3-4 days/week for 15 min on days not in rehab. Home Exercise  *Del verbalizes planning to ** ** days/week for ** min on days not in rehab. Home Exercise  *Houghton Seats his/her plan to ** ** days/week for ** min @ **   *Education* *Education* *Education* *Education* *Education* *Education*   RPE Scale  [x] Yes      [] No  Exercise Safety  [x] Yes      [] No  Equipment Orientation  [x] Yes      [] No  S/S to Report  [x] Yes      [] No  Warm Up/Cool Down  [x] Yes      [] No  Home Exercise  [x] Yes      [] No     All Exercise Education Completed  [] Yes      [] No   Exercise Education Recommended    Workshops  [x] Benefits of Exercise  [x] Balance Training & Fall Prevention  [x] Heart Disease Risk Reduction  [x] Yoga & Heart Health Exercise Education Attended/Date   Exercise Education Attended/Date Exercise Education Attended/Date    9/15/21yoga    10/13/21  Benefits of Exercise Exercise Education Attended/Date All Sessions Completed?     [] Yes  [] No   *Goals* *Goals* *Goals* *Goals* *Goals* *Goals*   Initial Exercise Goals Exercise Goals  Exercise Goals   Exercise Goals  Exercise Goals  Exercise Goals   Del plans to:  [x] Attend exercise sessions 3x/wk  [x] initiate home exercise 2-3x/wk for 10-20 min  [x] Increase 6 min walk distance by 10%  [x] Attend Exercise workshops ANNE plans to:  [x] Attend exercise sessions 3x/wk  [x] continue home exercise 2-3x/wk for 20-30 min  [x] Attend Exercise workshops Del's plans to:  [x] Attend exercise sessions 3x/wk  [x] continue home exercise 3-4x/wk for 30-45 min  [x] Determine plan of exercise following rehab  [x] Attend Exercise workshops Del's plans to:  [x] Attend exercise sessions 3x/wk  [x] continue home exercise 3-4x/wk for 30-45 min  [x] Determine plan of exercise following rehab  [x] Attend Exercise workshops Del's plans to:  [x] Attend exercise sessions 3x/wk  [x] continue home exercise 3-4x/wk for 30-45 min  [x] Determine plan of exercise following rehab  [x] Attend Exercise workshops Sarah Hinds achieved exercise goals? []  Yes    [] No  If no, why?  **  [] Increased 6 min walk distance by 10%  [] Currently exercising 30-60 min/day, 5-7days/wk   [] Plans to continue exercise on own  [] Plans to join a local fitness center to continue exercise  [] Does not plan to continue to exercise after rehab   Return to ADL or Hobbies:  Sarah Hinds would like to improve strength and endurance so he is able to return to activity with decreased SOB, walking better. Return to ADL or Hobbies:  Katty Ordonez that his rt shoulder is still bothering him but feels that he is building some strength so walks some without cane. Staff encourages cane use. Return to ADL or Hobbies:  Melanie Ye to improve his strength and endurance so he is able to walk further and more steady. Return to ADL or Hobbies:  Monika Esquivel  Continues to improve his strength and endurance so he is able to walk further and more steady.  Return to ADL or Hobbies:  Sarah Hinds  ** Return to ADL or Hobbies:  Sarah Hinds  **    *MET level required for above goal:  5.0  METs MET level Achieved:  1.7METs MET level Achieved:  1.7METs MET level Achieved:  1.7METs MET level Achieved:  **METs MET level Achieved:  **METs     Individual Cardiac Treatment Plan - Nutrition  NUTRITION  ASSESSMENT/PLAN NUTRITION  REASSESSMENT NUTRITION   REASSESSMENT NUTRITION   REASSESSMENT NUTRITION  DISCHARGE/FOLLOW-UP NUTRITION  DISCHARGE/FOLLOW-UP   Stages of Change Stages of Change Stages of Change Stages of Change Stages of Change Stages of Change   [x] Pre Contemplation  [] Contemplation  [] Preparation  [] Action  [] Maintenance  [] Relapse [] Pre Contemplation  [x] Contemplation  [] Preparation  [] Action  [] Maintenance  [] Relapse [] Pre Contemplation  [x] Contemplation  [] Preparation  [] Action  [] Maintenance  [] Relapse [] Pre Contemplation  [x] Contemplation  [] Preparation  [] Action  [] Maintenance  [] Relapse [] Pre Contemplation  [] Contemplation  [] Preparation  [] Action  [] Maintenance  [] Relapse [] Pre Contemplation  [] Contemplation  [] Preparation  [] Action  [] Maintenance  [] Relapse   NUTRITION ASSESSMENT NUTRITION ASSESSMENT NUTRITION ASSESSMENT NUTRITION ASSESSMENT NUTRITION ASSESSMENT NUTRITION ASSESSMENT   Weight Management  Weight: 276        Height: 5'11\"  BMI: 38.6 Weight Management  Weight: 280. Weight Management  Weight: 277                 Weight Management  Weight: 281 Weight Management  Weight: ** Weight Management  Weight: **                    BMI: **   Eating Plan  Current eating habits: fruit and veggies Eating Plan  Changes: more veggies Eating Plan  Changes: nothing reported Eating Plan  Changes: more fish and soup Eating Plan  Changes: Eating Plan Improvements:   Alcohol Use  [x] none          [] daily  [] weekly      [] special   Type:   Amount:         Diet Assessment Tool:  RATE YOUR PLATE  *Given to patient to complete and return. Diet Assessment Tool:    Score: 31/69        Diet Assessment Tool: RATE YOUR PLATE  Score: **/98   NUTRITION PLAN NUTRITION PLAN NUTRITION PLAN NUTRITION PLAN NUTRITION PLAN NUTRITION PLAN   *Interventions* *Interventions* *Interventions* *Interventions* *Interventions* *Interventions*   Initial Survey given Goal Setting Discussion:   [x] Yes      [] No        Follow Up Survey Reviewed & Goals Updated:     Professional Referral  Please check if needed. [] Dietitian Consult   [] Wt. Management Referral  [] Other:  Professional Referral  Please check if needed. [] Dietitian Consult   [] Wt. Management Referral  [] Other: Professional Referral  Please check if needed. [] Dietitian Consult   [] Wt. Management Referral  [] Other: Professional Referral  Please check if needed. [] Dietitian Consult   [] Wt. Management Referral  [] Other: Professional Referral  Please check if needed. [] Dietitian Consult   [] Wt.  Management Referral  [] Other: Professional Referral  Please check if needed. [] Dietitian Consult   [] Wt. Management Referral  [] Other:   *Education* *Education* *Education* *Education* *Education* *Education*   Nutritional Education Recommended    [x] 1:1 Registered Dietitian    Workshops  [x] Label Reading   [x] Menu  [x] Targeting Nutrition Priorities  [x] Mindful Eating   Nutritional Education Attended/Date  8/26 menu Nutritional Education Attended/Date    9/27/21 Targeting Nutrition Priorities Nutritional Education Attended/Date     Nutritional Education Attended/Date All Sessions Completed? [] Yes  [] No   Cooking School  Recommended     [x] Adding Flavor  [x] Fast & Healthy     Breakfasts  [x] Salads & Dressings  [x] Savory Soups  [x] Simple Sides & Sauces  [x] Appetizers &     Snacks  [x] Delicious Desserts  [x] Plant-Based Proteins  [x] Fast Evening Meals  [x] Weekend Breakfasts  [x] Efficiency Cooking  [x] One-Pot [employer]  Sessions Completed see list   Cooking School  Sessions Completed  NA Cooking School  Sessions Completed     Cooking School  Sessions Completed Cooking School    # of sessions completed:  **   *Goals* *Goals* *Goals* *Goals* *Goals* *Goals*   Del's nutritional goals are as follows:  Complete and return diet survey Del's nutritional goals are as follows:  [x] Attend Nutrition Workshops  [x] Attend 1:1   [] Attend Cooking Classes  [] ** Del's nutritional goals are as follows:  [x] Attend Nutrition Workshops  [x] Attend 1:1   [] Attend Cooking Classes  [] Complete and return diet survey  [] ** Del's nutritional goals are as follows:  [x] Attend Nutrition Workshops  [x] Attend 1:1   [] Attend Cooking Classes  [] ** Del's nutritional goals are as follows:  [] Attend Nutrition Workshops  [] Attend 1:1   [] Attend Cooking Classes  [] ** Del achieved nutritional goals   [] Yes    [] No  If no, why?   Use knowledge gained to continue Pritikin eating plan at home       Individual Cardiac Treatment Plan - Psychosocial  PSYCHOSOCIAL  ASSESSMENT/PLAN PSYCHOSOCIAL  REASSESSMENT PSYCHOSOCIAL   REASSESSMENT PSYCHOSOCIAL   REASSESSMENT PSYCHOSOCIAL  DISCHARGE/FOLLOW-UP PSYCHOSOCIAL  DISCHARGE/FOLLOW-UP   Stages of Change Stages of Change Stages of Change Stages of Change Stages of Change Stages of Change   [x] Pre Contemplation  [] Contemplation  [] Preparation  [] Action  [] Maintenance  [] Relapse [] Pre Contemplation  [x] Contemplation  [] Preparation  [] Action  [] Maintenance  [] Relapse [] Pre Contemplation  [x] Contemplation  [] Preparation  [] Action  [] Maintenance  [] Relapse [] Pre Contemplation  [x] Contemplation  [] Preparation  [] Action  [] Maintenance  [] Relapse [] Pre Contemplation  [] Contemplation  [] Preparation  [] Action  [] Maintenance  [] Relapse [] Pre Contemplation  [] Contemplation  [] Preparation  [] Action  [] Maintenance  [] Relapse   PSYCHOSOCIAL ASSESSMENT PSYCHOSOCIAL ASSESSMENT PSYCHOSOCIAL ASSESSMENT PSYCHOSOCIAL ASSESSMENT PSYCHOSOCIAL ASSESSMENT PSYCHOSOCIAL ASSESSMENT   Behavioral Outcomes Behavioral Outcomes Behavioral Outcomes Behavioral Outcomes Behavioral Outcomes Behavioral Outcomes   Tool Used:  April & Tayo, Quality of Life Index, Cardiac Version IV  *Given to patient to complete.  Tool Used:    April & Tayo, Quality of Life Index, Cardiac Version IV     QOL Index Score: 18.79  HF:13.57  S&E:25  P&S: 16.14  Family: 30   Tool Used:     April & Cr, Quality of Life Index, Cardiac Version IV    QOL Index Score: **  HF:**  S&E:**  P&S: **  Family: ** Tool Used:     April & Cr, Quality of Life Index, Cardiac Version IV    QOL Index Score: **  HF:**  S&E:**  P&S: **  Family: **   PHQ-9 score 10  Depression Severity  []Minimal  []Mild   [x]Moderate  []Moderately Severe  []Severe    PHQ-9 score **  Depression Severity  []Minimal  []Mild   []Moderate  []Moderately Severe []Severe PHQ-9 score **  Depression Severity  []Minimal  []Mild []Moderate  []Moderately Severe []Severe   Does patient have Family Support? [x] Yes      [] No  No signs of marital/family distress        Within the Past Month:  *Have you wished you were dead or wished you could go to sleep and not wake up? [] Yes      [x] No  *Have you had any thoughts of killing yourself? [] Yes      [x] No          Using a scale of 0-10, 0=none, 10=very:   Rate your depression: 2  Rate your anxiety:  0  Using a scale of 0-10, 0=none, 10=very:   Rate your depression:0  Rate your anxiety:  0 Using a scale of 0-10, 0=none, 10=very:   Rate your depression: 1  Rate your anxiety:  0 Using a scale of 0-10, 0=none, 10=very:   Rate your depression: 0  Rate your anxiety:   Using a scale of 0-10, 0=none, 10=very:   Rate your depression: **  Rate your anxiety:  ** Using a scale of 0-10, 0=none, 10=very:   Rate your depression: **  Rate your anxiety:  **   Signs and Symptoms of Depression Present? [x] Yes      [] No  If yes, please explain:  Frustrated  Signs and Symptoms of Depression Present? [x] Yes      [] No  If yes, please explain:  Frustration seems to be getting less and he seems to be doing more. Signs and Symptoms of Depression Present? [x] Yes      [] No  If yes, please explain:  Very reserved, poor eye contact Signs and Symptoms of Depression Present? [x] Yes      [] No  If yes, please explain:  Same as previous Signs and Symptoms of Depression Present? [] Yes      [] No  If yes, please explain:  ** Signs and Symptoms of Depression Present? [] Yes      [] No  If yes, please explain:  **   Signs and Symptoms of Anxiety Present? [] Yes      [x] No  If yes, please explain:   Signs and Symptoms of Anxiety Present? [] Yes      [x] No  If yes, please explain:   Signs and Symptoms of Anxiety Present? [] Yes      [x] No   Signs and Symptoms of Anxiety Present? [] Yes      [x] No   Signs and Symptoms of Anxiety Present?     [] Yes      [] No  If yes, please explain: ** Signs and Symptoms of Anxiety Present? [] Yes      [] No  If yes, please explain:  **   [] Patient has poor eye contact   [] Flat affect present. [] Signs of anxiety, anger or hostility    [] Signs social isolation present. []  Signs of alcohol or substance abuse        PSYCHOSOCIAL PLAN PSYCHOSOCIAL PLAN PSYCHOSOCIAL PLAN PSYCHOSOCIAL PLAN PSYCHOSOCIAL PLAN PSYCHOSOCIAL PLAN   *Interventions* *Interventions* *Interventions* *Interventions* *Interventions* *Interventions*   *Please check if needed  [] Psych Consult  [] Physician Referral  [x] Stress Management Skills *Please check if needed  [] Psych Consult  [] Physician Referral  [x] Stress Management Skills *Please check if needed  [] Psych Consult  [] Physician Referral  [x] Stress Management Skills *Please check if needed  [] Psych Consult  [] Physician Referral  [x] Stress Management Skills *Please check if needed  [] Psych Consult  [] Physician Referral  [] Stress Management Skills *Please check if needed  [] Psych Consult  [] Physician Referral  [] Stress Management Skills   Is patient currently taking anti-depressant or anti-anxiety medications? [] Yes      [x] No  If yes, please list medications:   Change in anti-depressant or anti-anxiety medications? [] Yes      [x] No  If yes, please list medications:  ** Change in anti-depressant or anti-anxiety medications? [] Yes      [x] No   Change in anti-depressant or anti-anxiety medications? [] Yes      [x] No   Change in anti-depressant or anti-anxiety medications? [] Yes      [] No  If yes, please list medications:  ** Change in anti-depressant or anti-anxiety medications?   [] Yes      [] No  If yes, please list medications:  **   *Education* *Education* *Education* *Education* *Education* *Education*   Healthy Mind-Set Workshops Recommended  [x] Stress & Health  [x] Taking Charge of Stress  [x] New Thoughts, New Behaviors  [x] Managing Moods & Relationships Healthy Mind-Set Workshops Attended/Date  8/20 taking charge of stress Healthy Mind-Set Workshops Attended/Date    9/22/21New Thoughts, New Behaviors Healthy Mind-Set Workshops Attended/Date  10/6/21manage moods    10/22/21stress and health Healthy Mind-Set Workshops  Completed  [] Yes      [] No Healthy Mind-Set Workshops  Completed  [] Yes      [] No   *Goals* *Goals* *Goals* *Goals* *Goals* *Goals*   Del's psychosocial goals are as follows:  Complete HADS & April & Tayo, Quality of Life Index, Cardiac Version IV Del's psychosocial goals are as follows:  [x] Attend Healthy Mind-Set Workshops  [x] Reduce depression symptom severity by 1 level  [] ** Del's psychosocial goals are as follows:  [x] Attend Healthy Mind-Set Workshops  [x] Reduce depression symptom severity by 1 level  [] ** Del's psychosocial goals are as follows:  [x] Attend Healthy Mind-Set Workshops  [x] Reduce depression symptom severity by 1 level  [] ** Del achieved psychosocial goals? [] Yes    [] No  If no, why?  **  [] Use methods learned to continue to reduce depression symptom severity by 1 level  [] ** Del achieved psychosocial goals?   [] Yes    [] No  If no, why?  **  [] Use methods learned to continue to reduce depression symptom severity by 1 level  [] **     Individual Cardiac Treatment Plan - Other:  Risk Factor/Education  RISK FACTOR  ASSESSMENT/PLAN RISK FACTOR  REASSESSMENT  RISK FACTOR  REASSESSMENT RISK FACTOR  REASSESSMENT RISK FACTOR   DISCHARGE/FOLLOW-UP RISK FACTOR   DISCHARGE/FOLLOW-UP   Stages of Change Stages of Change Stages of Change Stages of Change Stages of Change Stages of Change   [x] Pre Contemplation  [] Contemplation  [] Preparation  [] Action  [] Maintenance  [] Relapse [] Pre Contemplation  [x] Contemplation  [] Preparation  [] Action  [] Maintenance  [] Relapse [] Pre Contemplation  [] Contemplation  [x] Preparation  [] Action  [] Maintenance  [] Relapse [] Pre Contemplation  [x] Contemplation  [] Preparation  [] Action  [] Maintenance  [] Relapse [] Pre Contemplation  [] Contemplation  [] Preparation  [] Action  [] Maintenance  [] Relapse [] Pre Contemplation  [] Contemplation  [] Preparation  [] Action  [] Maintenance  [] Relapse   RISK FACTOR/EDUCATION ASSESSMENT RISK FACTOR/EDUCATION ASSESSMENT RISK FACTOR/EDUCATION ASSESSMENT RISK FACTOR/EDUCATION ASSESSMENT RISK FACTOR /EDUCATION ASSESSMENT RISK FACTOR /EDUCATION ASSESSMENT   Hypertension  [x] Yes      [] No    Resting BP: 134/70  Peak Ex BP:164/60  Medication: cozzar   Hypertension  Resting BP: 118/48  Peak Ex BP:140/60  Medication Changes:  [] Yes      [x] No Hypertension  Resting BP: 150/74    Peak Ex /62  Medication Changes:  [] Yes      [x] No Hypertension  Resting BP: 134/60  Peak Ex BP:132/72  Medication Changes:  [] Yes      [x] No Hypertension  Resting BP: **  Peak Ex BP:**  Medication Changes:  [] Yes      [] No Hypertension  Resting BP: **  Peak Ex BP:**  Medication Changes:  [] Yes      [] No   Lipids  HLD/DLD  [] Yes      [x] No  TOTAL CHOL:   HDL:    LDL:    TRIG:    Medication:  Lipids  Medication Changes:  [] Yes      [x] No     Lipids  Medication Changes:  [] Yes      [x] No     Lipids  Medication Changes:  [] Yes      [x] No     Lipids    TOTAL CHOL: **  HDL:  **  LDL:  **  TRIG:  **  Medication Changes:  [] Yes      [] No Lipids    TOTAL CHOL: **  HDL:  **  LDL:  **  TRIG:  **  Medication Changes:  [] Yes      [] No   Diabetes  [] Yes      [x] No   Diabetes  na   Diabetes  na     Diabetes  na     Diabetes  Most Recent BS:  BS have been in range  [] Yes      [] No  Medication Changes  [] Yes      [] No Diabetes  Most Recent BS:  BS have been in range  [] Yes      [] No  Medication Changes  [] Yes      [] No       Tobacco Use  [] Current  [] Former  [x] Never      Smokeless Tobacco use:   [] Yes      [x] No  Amount:  Tobacco Use  Change in smoking status   [] Yes      [x] No    Quit date:    Tobacco Use  Change in smoking status   [] Yes      [x] No Tobacco Use  Change in smoking status   [] Yes      [x] No     Tobacco Use  Change in smoking status   [] Yes      [] No    Quit date: ** Tobacco Use  Change in smoking status   [] Yes      [] No    Quit date: **             Learning Barriers  Please select one:  [] Speech  [] Literacy  [] Hearing  [] Cognitive  [x] Vision  [x] Ready to Learn Learning Barriers Addressed:   [x] Yes      [] No   Learning Barriers Addressed:   [x] Yes      [] No   Learning Barriers Addressed:  [x] Yes      [] No Learning Barriers Addressed:  [] Yes      [] No Learning Barriers Addressed:  [] Yes      [] No     RISK FACTOR/EDUCATION PLAN RISK FACTOR/EDUCATION PLAN RISK FACTOR/EDUCATION PLAN RISK FACTOR/EDUCATION PLAN RISK FACTOR/EDUCATION PLAN RISK FACTOR/EDUCATION PLAN   *Interventions* *Interventions* *Interventions* *Interventions* *Interventions* *Interventions*   Recommended Educational Videos    [x] Overview of The Pritikin Eating Plan  [x] Heart Disease Risk Reduction  [x] Move It! [x] Calorie Density  [x] Healthy Minds, Bodies, Hearts  [x] Label Reading  [x] Metabolic Syndrome & Belly   Or  [] How Our Thoughts Can Heal our Heart  [x] Dining Out-Part 1  [x] Biomechanical Limitations  [x] Facts on Fat  [x] Hypertension & Heart Disease  [x] Diseases of Our Times-Focusing on Diabetes  [x] Body Composition  [x] Nurtition Action Plan  [x] Exercise Action Plan   Completed Videos/Date      8/11/2021  Overview of The Pritikin Eating Plan  8/16 Sleep  8/18 Aging  8/255 How thoughts change  8/27 Cook breakfast  8/30 Become  Completed Videos/Date    9/1/21  Healthy Minds, Bodies, Hearts  9/13/21  Label Reading    9/8/21   Heart Disease Risk Reduction    8/25/21   How Our Thoughts Can Heal our Heart    9/20/21  Hypertension & Heart Disease    9/17/21 Move it     Completed Videos/Date    10/1/21 cook dinners and sides    10/4/21 bio of weight control.     10/8/21 dining out 2    10/11/21metabolic syndrome    73/02/04 vitamins and minerals Completed Videos/Date Recommended Educational Videos Completed    [] Yes      [] No    **If not completed, Why? **           Smoking Cessation/Relaspe Prevention Intervention needed? [] Yes      [x] No  *Pt verbalizes and agrees to attend intervention Smoking Cessation/Relapse Prevention Scheduled? [] Yes      [x] No  Date:   * na Smoking Cessation/Relapse Prevention completed? [] Yes      [] No  Date: ** Smoking Cessation Counseling attended  [] Yes      [] No  **If not completed, Why? **   Professional Referrals:  *Please check if needed  [] Diabetes Clinic  [] Lipid Clinic   [] Other:      Professional Referrals:  *Please check if needed  [] Diabetes Clinic  [] Lipid Clinic   [] Other:   Preventative Medication Preventative Medication Preventative Medication Preventative Medication Preventative Medication Preventative Medication   Aspirin  [x] Yes    [] No  Blood Thinner: Clopidogrel/Effient/Brillinta  [] Yes    [x] No  Beta Blocker  [x] Yes    [] No  Ace Inhibitor  [] Yes    [x] No  Statin/Lipid Lowering  [] Yes    [x] No Medication Changes? [] Yes    [x] No Medication Changes? [] Yes    [x] No Medication Changes? [x] Yes    [] No  Metoprolol reduced Medication Changes? [] Yes    [] No Medication Changes? [] Yes    [] No   *Education* *Education* *Education* *Education* *Education* *Education*   Does Syliva Primas require any additional education? [] Yes    [x] No   Does Syliva Primas require any additional education? [] Yes    [x] No Does Syliva Primas require any additional education? [] Yes    [x] No Does Syliva Primas require any additional education? [] Yes    [x] No Does Syliva Primas require any additional education? [] Yes    [] No Does Syliva Primas require any additional education?   [] Yes    [] No   Additional Educational Videos    Exercise  [x] Improve Performance    Medical  [] Aging Enhancing Your QoL  [] Biology of Weight Control  [] Decoding Lab Results  [] Diseases of Our Time - Overview  [] Sleep Disorders    Nutrition  [] Dining Out - Part 2  [] Fueling a Healthy Body  [] Menu Workshop  [] Planning Your Eating Strategy  [] Targeting Your Nutrition Priorities  [] Vitamins & Minerals    Healthy Mind-Set  [] Smoking Cessation    Culinary  []Becoming a Pritikin    [] Cooking - Breakfast & Snacks  [] Cooking -Healhty Salads & Dressing  [] Cooking -Dinner & Sides  [] Cooking -Soups & Desserts    Overview  [] The Pritikin Solution Additional Educational Videos Completed Additional Educational Videos Completed Additional Educational Videos Completed Additional Educational Videos Completed Additional Educational Videos Completed    [] Yes    [] No   *Goals* *Goals* *Goals* *Goals* *Goals* *Goals*   Del's risk factor/education goals are as follows:    [x] Optimal BP <140/90  [] Blood Sugar <120  [x] Attend recommended video education sessions  [x] Takes medications as prescribed 100% of the time   [] ** Del's risk factor/education goals are as follows:    [x] Optimal BP <140/90  [] Blood Sugar <120  [x] Attend recommended video education sessions  [x] Takes medications as prescribed 100% of the time   [] ** Del's risk factor/education goals are as follows:    [x] Optimal BP <140/90  [] Blood Sugar <120  [x] Attend recommended video education sessions  [x] Takes medications as prescribed 100% of the time   [] ** Del's risk factor/education goals are as follows:    [x] Optimal BP <140/90  [] Blood Sugar <120  [x] Attend recommended video education sessions  [x] Takes medications as prescribed 100% of the time   [] ** Del's risk factor/education goals are as follows:    [x] Optimal BP <140/90  [] Blood Sugar <120  [x] Attend recommended video education sessions  [x] Takes medications as prescribed 100% of the time   [] ** Billie Knapp achieved risk factor goals?   [] Yes    [] No  If no, why?  **     Monitored telemetry has revealed NSR    [x] associated symptoms SOB Monitored telemetry has revealed NSR  [] documented arrhythmia at increasing workloads  [x] associated symptoms SOB but he states that this getting easier Monitored telemetry has revealed NSR   Monitored telemetry has revealed Sinus kwame/ NSR   Monitored telemetry has revealed **  [] documented arrhythmia at increasing workloads  [] associated symptoms ** Monitored telemetry has revealed **  [] documented arrhythmia at increasing workloads  [] associated symptoms **   Physician Response    [x] Cardiac rehab is reasonably and medically necessary for continuous cardiac monitoring surveillance  of patient's cardiac activity  [x] Initiate continuous telemetry monitoring and notify me with any concerns  [] Other   Physician Response    [x] Cardiac rehab is reasonably and medically necessary for continuous cardiac monitoring surveillance  of patient's cardiac activity  [x] Continue continuous telemetry monitoring and notify me with any concerns  [] Other     Physician Response    [x] Cardiac rehab is reasonably and medically necessary for continuous cardiac monitoring surveillance  of patient's cardiac activity  [x] Continue continuous telemetry monitoring and notify me with any concerns   [] Other     Physician Response    [x] Cardiac rehab is reasonably and medically necessary for continuous cardiac monitoring surveillance  of patient's cardiac activity  [x] Continue continuous telemetry monitoring and notify me with any concerns   [] Other     Physician Response    [x] Cardiac rehab is reasonably and medically necessary for continuous cardiac monitoring surveillance  of patient's cardiac activity  [x] Continue continuous telemetry monitoring and notify me with any concerns   [] Other      Cosigned by: Sepideh Johnson MD at 8/30/2021  4:26 PM   Revision History  Date/Time User Provider Type Action   8/30/2021  4:26 PM Sepideh Johnson MD Physician Cosign   8/30/2021  1:42 PM Jeff Moreno, RN Registered Nurse Sign     Cosigned by: Sepideh Johnson MD at 9/28/2021  9:14 AM   Revision History  Date/Time User Provider Type Action   9/28/2021  9:14 AM Alonzo Wyatt MD Physician Cosign   9/27/2021  2:05 PM Justo Amin Exercise Physiologist Sign

## 2021-10-27 ENCOUNTER — HOSPITAL ENCOUNTER (OUTPATIENT)
Dept: CARDIAC REHAB | Age: 71
Setting detail: THERAPIES SERIES
Discharge: HOME | End: 2021-10-27
Payer: MEDICARE

## 2021-10-27 PROCEDURE — G0423 INTENS CARDIAC REHAB NO EXER: HCPCS

## 2021-10-27 PROCEDURE — G0422 INTENS CARDIAC REHAB W/EXERC: HCPCS

## 2021-10-27 NOTE — PROGRESS NOTES
Video Education Report - ICR/CR  Name:  Reji Pro     Date:  10/27/2021  MRN: [id number]     Session #:    Session Length: 40 min    Core Videos        []Heart Disease Risk Reduction       []Overview of Pritikin Eating Plan      []Move it          []Calorie Density         []Healthy Minds, Bodies, Hearts        []Label Reading - Part 1       []Metabolic Syndrome and Belly Fat        []How Our Thoughts Can Heal Our Hearts   []Dining Out - Part 1      []Biomechanical Limitations  []Facts on Fat        []Hypertension & Heart Disease    []Diseases of Our Time - Focusing on Diabetes   []Body Composition  [x]Nutrition Action Plan    []Exercise Action Plan    Exercise      Healthy Mind-Set  []Improving Performance    []Smoking Cessation    []Introduction to [address]  []Aging-Enhancing the Quality of Your Life  []Becoming a Pritikin   []Biology of Weight Control    []Cooking Breakfasts   []Decoding Lab Results    and Snacks  []Diseases of Our Time - Overview   []Cooking Dinner and   []Sleep Disorders     Sides  []Targeting Your Nutrition Priorities   []Healthy Salads &   Dressings  Nutrition      []Cooking Soups and   []Dining Out - Part 2     Desserts  []Fueling a Healthy Body   []Menu Workshop     Overview  []Planning Your Eating Strategy   []The Pritikin Solution  []Vitamins and Minerals    Comments:  Video completed, group discussion

## 2021-10-29 ENCOUNTER — HOSPITAL ENCOUNTER (OUTPATIENT)
Dept: CARDIAC REHAB | Age: 71
Setting detail: THERAPIES SERIES
Discharge: HOME | End: 2021-10-29
Payer: MEDICARE

## 2021-10-29 PROCEDURE — G0422 INTENS CARDIAC REHAB W/EXERC: HCPCS

## 2021-10-29 PROCEDURE — G0423 INTENS CARDIAC REHAB NO EXER: HCPCS

## 2021-10-29 NOTE — PROGRESS NOTES
Video Education Report - ICR/CR    Name:  Hyacinth Dick     Date:  10/29/2021  MRN: [id number]     Session #:    Session Length: 40 min    Recommended Videos        []01 Pritikin [employer] - Program Overview   34:22    []02 Overview of Pritikin Eating Plan   34:10    []03 Becoming a Pritikin    33:08     [x]04 Diseases of Our Time - Part 1   34:22    []05 Calorie Density     33:39   []06 Label Reading - Part 1    32:15       Comments:  Video completed,

## 2021-10-29 NOTE — PROGRESS NOTES
[facility]ospital Facility-Based Program  Phase 2 Cardiac Rehab Weekly Progress Report      Patient prescribed exercise:  12:15 class. 3 times per week in rehab, 1-4 times per week at home for the amount of sessions/weeks specified by insurance. Current Levels: Treadmill: 0.7mph/0% for 8 minutes,  NuStep:  18 [place] for 15 minutes, UBE: Level 12 [place] for 5 minutes. Progression Discussion: Maintain Aerobic exercise 15 minutes to work on endurance. Attempt to increase intensity by 5-20% for each modality this week. Try to increase intensities until Angeles Hernandez rates the exercises a 13-17 on Joi RPE.

## 2021-11-01 ENCOUNTER — HOSPITAL ENCOUNTER (OUTPATIENT)
Dept: CARDIAC REHAB | Age: 71
Setting detail: THERAPIES SERIES
Discharge: HOME | End: 2021-11-01
Payer: MEDICARE

## 2021-11-01 PROCEDURE — G0423 INTENS CARDIAC REHAB NO EXER: HCPCS

## 2021-11-01 PROCEDURE — G0422 INTENS CARDIAC REHAB W/EXERC: HCPCS

## 2021-11-01 NOTE — PROGRESS NOTES
Video Education Report - ICR/CR  Name:  Guerline Jiang     Date:  11/1/2021  MRN: [id number]     Session #:    Session Length: 40 min    Core Videos        []Heart Disease Risk Reduction       []Overview of Pritikin Eating Plan      []Move it          []Calorie Density         []Healthy Minds, Bodies, Hearts        []Label Reading - Part 1       []Metabolic Syndrome and Belly Fat        []How Our Thoughts Can Heal Our Hearts   []Dining Out - Part 1      []Biomechanical Limitations  []Facts on Fat        []Hypertension & Heart Disease    []Diseases of Our Time - Focusing on Diabetes   []Body Composition  []Nutrition Action Plan    [x]Exercise Action Plan    Exercise      Healthy Mind-Set  []Improving Performance    []Smoking Cessation    []Introduction to [address]  []Aging-Enhancing the Quality of Your Life  []Becoming a Pritikin   []Biology of Weight Control    []Cooking Breakfasts   []Decoding Lab Results    and Snacks  []Diseases of Our Time - Overview   []Cooking Dinner and   []Sleep Disorders     Sides  []Targeting Your Nutrition Priorities   []Healthy Salads &   Dressings  Nutrition      []Cooking Soups and   []Dining Out - Part 2     Desserts  []Fueling a Healthy Body   []Menu Workshop     Overview  []Planning Your Eating Strategy   []The Pritikin Solution  []Vitamins and Minerals    Comments:  Video completed, group discussion

## 2021-11-03 ENCOUNTER — HOSPITAL ENCOUNTER (OUTPATIENT)
Dept: CARDIAC REHAB | Age: 71
Setting detail: THERAPIES SERIES
Discharge: HOME | End: 2021-11-03
Payer: MEDICARE

## 2021-11-03 PROCEDURE — G0423 INTENS CARDIAC REHAB NO EXER: HCPCS

## 2021-11-03 PROCEDURE — G0422 INTENS CARDIAC REHAB W/EXERC: HCPCS

## 2021-11-03 NOTE — PROGRESS NOTES
Manjinder DENIS.:  [id number]    MRN:  [id number]    Date: 11/3/2021      Session Length:  31 min   Session # _______    EXERCISE WORKSHOP:  Balance Training and Fall Prevention                        Todays class addressed the importance of patient's sensorimotor skills (vision, proprioception, and the vestibular system) in maintaining their ability to balance at any age. Reviewed a variety of balancing and strength training exercises that are appropriate for patient's current level of function. Reviewed common causes of poor balance, possible solutions to these problems, and ways to modify the physical environment in order to minimize fall risks. Readiness to change:    ( ) Pre-contemplative   ( ) Contemplative - ambivalent about change    ( x) Action - ready to set action plan and implement   ( ) Maintenance - has made change and is trying, and or practicing different alternative behaviors     Additional Notes:      Rachael Ocampo was in the Workshop with the Exercise Physiologist for 31 minutes. The content was presented via Powerpoint, lecture, and patient participation based format. Motivational interviewing was utilized when needed, to promote change. Patient voiced understanding.     Electronically signed by Felipa Lawson on 11/3/2021 at 12:56 PM

## 2021-11-05 ENCOUNTER — HOSPITAL ENCOUNTER (OUTPATIENT)
Dept: CARDIAC REHAB | Age: 71
Setting detail: THERAPIES SERIES
Discharge: HOME | End: 2021-11-05
Payer: MEDICARE

## 2021-11-05 PROCEDURE — G0422 INTENS CARDIAC REHAB W/EXERC: HCPCS

## 2021-11-05 PROCEDURE — G0423 INTENS CARDIAC REHAB NO EXER: HCPCS

## 2021-11-05 NOTE — PROGRESS NOTES
Video Education Report - ICR/CR    Name:  Laura Sinclair     Date:  11/5/2021  MRN: [id number]     Session #:    Session Length: 40 min    Recommended Videos        []01 Pritikin [employer] - Program Overview   34:22    []02 Overview of Pritikin Eating Plan   34:10    []03 Becoming a Pritikin    33:08     []04 Diseases of Our Time - Part 1   34:22    []05 Calorie Density     33:39   []06 Label Reading - Part 1    32:15   []07 Move it      32.54   []08 Healthy Minds, Bodies, Hearts   32:14   []09 Dining Out - Part 1    32:28   []10 Heart Disease Risk Reduction   77:36   []61 Metabolic Syndrome and Belly Fat  31:52   []12 Facts on Fat     35:29   []13 Diseases of Our Time - Part 2   33:07   []14 Biology of Weight Control   32:36   []15 Biomechanical Limitations   35:20   []16 Nutrition Action Plan    34:23    Additional Videos         []17 Hypertension & Heart Disease   32:39        []18 Cooking Breakfasts and Snacks  32:00   []19 Planning Your Eating Strategy   33:30   []20 Label Reading - Part 2    32:36  []21 Cooking Soups and Desserts   31:41  []22 How Our Thoughts Can Heal Our Hearts 33:05  []23 Targeting Your Nutrition Priorities  33:58  []24 Healthy Salads & Dressings   35:32  []25 Dining Out - Part 2    32:35  []26 Cooking Dinner and Sides   35:06  [x]27 Sleep Disorders     33:14  Comments:  Video completed,

## 2021-11-05 NOTE — PROGRESS NOTES
Hospital Facility-Based Program  Phase 2 Cardiac Rehab Weekly Progress Report      Patient prescribed exercise:  12:15 class. 3 times per week in rehab, 1-4 times per week at home for the amount of sessions/weeks specified by insurance. Current Levels: Treadmill: 0.7mph/0% for 9 minutes,  NuStep:  20 [place] for 15 minutes, UBE: Level 10 for 5 minutes    Progression Discussion: Maintain exercise 15 minutes to work on endurance. Attempt to increase intensity by 5-20% for each modality this week. Try to increase intensities until [address] rates the exercises a 13-17 on Joi RPE.

## 2021-11-08 ENCOUNTER — HOSPITAL ENCOUNTER (OUTPATIENT)
Dept: CARDIAC REHAB | Age: 71
Setting detail: THERAPIES SERIES
Discharge: HOME | End: 2021-11-08
Payer: MEDICARE

## 2021-11-08 PROCEDURE — G0422 INTENS CARDIAC REHAB W/EXERC: HCPCS

## 2021-11-08 PROCEDURE — G0423 INTENS CARDIAC REHAB NO EXER: HCPCS

## 2021-11-08 NOTE — PROGRESS NOTES
Hilda DENIS.:  1950    Acct Number: [de-identified]   MRN:  [id number]                             [facility] NUTRITION WORKSHOP             Date: 2021        Session # _______    Katty Garcia class covered:    ()  Label Reading  (x)  Menus  ()  Targeting Nutrition Priorities  ()  Mindful Eating/Fueling a Healthy Body    Readiness to change:    ( ) Pre-contemplative   (x ) Contemplative - ambivalent about change    ( ) Action - ready to set action plan and implement   ( ) Maintenance - has made change and is trying, and or practicing different alternative behaviors     Dragan Brito was in the Workshop with the Dietitian for 35 minutes. The content was presented via Powerpoint, lecture, and patient participation based format. Motivational interviewing was utilized when needed, to promote change. Patient voiced understanding.     Electronically signed by Jose Luis Lawson RD LD [address]  on 2021 at 3:04 PM

## 2021-11-08 NOTE — PROGRESS NOTES
Bethanie Batista YOB: 1950    Acct Number: [de-identified]   MRN:  [id number]                             [facility] NUTRITION 1:1 Counseling             Date: 2021       Session # _______   1:1 Counseling Session    GOALS:  1. Encouraged continued exercise. Billie Knapp reports he will finish ICR and intends on continuing to move his body as he is able. Billie Knapp reports making some changes since starting Pritikin ICR including  Feeling mildly stronger with his legs, though still seems discouraged in his mobility. Reports they have been eating more soup. Billie Knapp & his wife reported limitations with their physical ability to prep many foods. Encouraged them to consider community offerings for senior meal options. Receptiveness to education/goals:  (X ) Agreeable ( ) No Interest   ( ) Refused  Evaluation of education:  (X ) Indicates understanding   ( ) Needs reinforcement     () Unsuccessful     Readiness to change:    ( ) Pre-contemplative   (x ) Contemplative - ambivalent about change    ( ) Action - ready to set action plan and implement   ( ) Maintenance - has made change and is trying, and or practicing different alternative behaviors     Exercise Habits:  Billie Knapp reports on days off he goes to appointments. Food Recall:  Breakfast:  V8 juice, cereal with blue berries, milk,   Lunch:  2 cheese sandwiches   Dinner:  Ham, sweet potatoe, broccoli  Snacks:  Ice cream, toast and butter, chips    Billie Knapp was counseled by the Dietitian for 40 minutes. Motivational Interviewing was used to help promote change. Patient voiced understanding.      Electronically signed by Agnieszka Jeter RD LD [address]  on 2021 at 12:44 PM

## 2021-11-10 ENCOUNTER — HOSPITAL ENCOUNTER (OUTPATIENT)
Dept: CARDIAC REHAB | Age: 71
Setting detail: THERAPIES SERIES
Discharge: HOME | End: 2021-11-10
Payer: MEDICARE

## 2021-11-10 PROCEDURE — G0422 INTENS CARDIAC REHAB W/EXERC: HCPCS

## 2021-11-10 PROCEDURE — G0423 INTENS CARDIAC REHAB NO EXER: HCPCS

## 2021-11-10 NOTE — PROGRESS NOTES
Video Education Report - ICR/CR  Name:  Devon Paris     Date:  11/10/2021  MRN: [id number]     Session #:    Session Length: 40 min    Core Videos        []Heart Disease Risk Reduction       []Overview of Pritikin Eating Plan      []Move it          []Calorie Density         []Healthy Minds, Bodies, Hearts        []Label Reading - Part 1       []Metabolic Syndrome and Belly Fat        []How Our Thoughts Can Heal Our Hearts   []Dining Out - Part 1      []Biomechanical Limitations  []Facts on Fat        []Hypertension & Heart Disease    []Diseases of Our Time - Focusing on Diabetes   []Body Composition  []Nutrition Action Plan    []Exercise Action Plan    Exercise      Healthy Mind-Set  []Improving Performance    []Smoking Cessation    [x]Introduction to [address]  []Aging-Enhancing the Quality of Your Life  []Becoming a Pritikin   []Biology of Weight Control    []Cooking Breakfasts   []Decoding Lab Results    and Snacks  []Diseases of Our Time - Overview   []Cooking Dinner and   []Sleep Disorders     Sides  []Targeting Your Nutrition Priorities   []Healthy Salads &   Dressings  Nutrition      []Cooking Soups and   []Dining Out - Part 2     Desserts  []Fueling a Healthy Body   []Menu Workshop     Overview  []Planning Your Eating Strategy   []The Pritikin Solution  []Vitamins and Minerals    Comments:  Video completed, group discussion

## 2021-11-12 ENCOUNTER — HOSPITAL ENCOUNTER (OUTPATIENT)
Dept: CARDIAC REHAB | Age: 71
Setting detail: THERAPIES SERIES
Discharge: HOME | End: 2021-11-12
Payer: MEDICARE

## 2021-11-12 ENCOUNTER — HOSPITAL ENCOUNTER (OUTPATIENT)
Dept: CARDIAC REHAB | Age: 71
Setting detail: THERAPIES SERIES
End: 2021-11-12
Payer: MEDICARE

## 2021-11-12 PROCEDURE — G0422 INTENS CARDIAC REHAB W/EXERC: HCPCS

## 2021-11-12 NOTE — PROGRESS NOTES
Hospital Facility-Based Program  Phase 2 Cardiac Rehab Weekly Progress Report      Patient prescribed exercise:  12;15 class. 3 times per week in rehab, 1-4 times per week at home for the amount of sessions/weeks specified by insurance. Current Levels:  NuStep:  20 [place] for 20-30 minutes, UBE: 14 for 5 minutes. Progression Discussion: Maintain Aerobic exercise 15 minutes to work on endurance. Attempt to increase intensity by 5-20% for each modality this week. Try to increase intensities until Hulan Postal rates the exercises a 13-17 on Joi RPE.

## 2021-11-15 ENCOUNTER — HOSPITAL ENCOUNTER (OUTPATIENT)
Dept: CARDIAC REHAB | Age: 71
Setting detail: THERAPIES SERIES
Discharge: HOME | End: 2021-11-15
Payer: MEDICARE

## 2021-11-15 PROCEDURE — G0422 INTENS CARDIAC REHAB W/EXERC: HCPCS

## 2021-11-15 PROCEDURE — G0423 INTENS CARDIAC REHAB NO EXER: HCPCS

## 2021-11-15 NOTE — PROGRESS NOTES
Video Education Report - ICR/CR  Name:  Marisela Burkitt     Date:  11/15/2021  MRN: [id number]     Session #:    Session Length: 40 min    Core Videos        []Heart Disease Risk Reduction       []Overview of Pritikin Eating Plan      []Move it          []Calorie Density         []Healthy Minds, Bodies, Hearts        []Label Reading - Part 1       []Metabolic Syndrome and Belly Fat        []How Our Thoughts Can Heal Our Hearts   []Dining Out - Part 1      []Biomechanical Limitations  []Facts on Fat        []Hypertension & Heart Disease    []Diseases of Our Time - Focusing on Diabetes   []Body Composition  []Nutrition Action Plan    []Exercise Action Plan    Exercise      Healthy Mind-Set  []Improving Performance    []Smoking Cessation    []Introduction to [address]  []Aging-Enhancing the Quality of Your Life  [x]Becoming a Pritikin   []Biology of Weight Control    []Cooking Breakfasts   []Decoding Lab Results    and Snacks  []Diseases of Our Time - Overview   []Cooking Dinner and   []Sleep Disorders     Sides  []Targeting Your Nutrition Priorities   []Healthy Salads &   Dressings  Nutrition      []Cooking Soups and   []Dining Out - Part 2     Desserts  []Fueling a Healthy Body   []Menu Workshop     Overview  []Planning Your Eating Strategy   []The Pritikin Solution  []Vitamins and Minerals    Comments:  Video completed, group discussion

## 2021-11-15 NOTE — PLAN OF CARE
[address] [place] [address] Facility-Based Program  Individualized Cardiac Treatment Plan    Patient Name:  Anita Stroud  :  1950  Age:  66 y.o. MRN:  [id number]  Diagnosis: PCI of RCA  Date of Event: 6/15/2021  Physician:  Kaylin Mcelroy Next Office Visit:  ?  Date Entered Program: 2021  Risk Stratifications: [] Low [] Intermediate [x] High  Allergies: Allergies   Allergen Reactions    Adhesive Tape Rash       COVID -19 Screen  Do you have any of the following symptoms:  [] Fever [] Cough [] SOB [] Muscle/Body Ache [] Loss of taste/smell [x] None    Have you traveled outside of the [place]? [] Yes      [x] No    Have you been around anyone who has tested Positive for COVID 19?  [] Yes      [] No    The following Education has been completed with patient. [x] Wait in the car until we call you back to rehab. [x] You must wear a mask while in the medical center, and in cardiac rehab. Please bring your own. [x] Bring your own bottle of water with you.       Individual Cardiac Treatment Plan -EXERCISE  INITIAL 30 DAY 60 DAY 90 DAY FINAL DAY   EXERCISE  ASSESSMENT/PLAN EXERCISE  REASSESSMENT EXERCISE   REASSESSMENT EXERCISE   REASSESSMENT EXERCISE  DISCHARGE/FOLLOW-UP   Date: 2021 Date:2021 Date: 21 Date: 10/25/21 Date:11/15/21   Session #1  First Exercise Session:  2021 Session # 12 Session # 28 Session # 47 Session # 70  Last Exercise Session:  11/15/21   Stages of Change Stages of Change Stages of Change Stages of Change Stages of Change   [x] Pre Contemplation  [] Contemplation  [] Preparation  [] Action  [] Maintenance  [] Relapse [] Pre Contemplation  [x] Contemplation  [] Preparation  [] Action  [] Maintenance  [] Relapse [] Pre Contemplation  [] Contemplation  [] Preparation  [x] Action  [] Maintenance  [] Relapse [] Pre Contemplation  [] Contemplation  [x] Preparation  [] Action  [] Maintenance  [] Relapse [] Pre Contemplation  [] Contemplation  [x] Preparation  [] Action  [] Maintenance  [] Relapse   EXERCISE ASSESSMENT EXERCISE ASSESSMENT EXERCISE ASSESSMENT EXERCISE ASSESSMENT EXERCISE ASSESSMENT   3 Min Walk Test  Distance walked:   0.02 miles walked 3 min SOB and very deconditioned  105 ft.  1.15 METs  Max HR: 68BPM      RPE:  14    THR:    Rhythm:  NSR    6 Min Walk Test  Not done   DASI: 3.29 METs DASI: 3.29 METs DASI: 4.30 METs DASI:4.3 METs DASI: 4.3 METs   Return to Work  [employer] on returning to work? [] Yes              [] No   [] Disabled     [x] Retired     Return to work:  Has Avila Nguyen returned to work? [] Yes    [x] No        Return to work:  na Return to work:  na Return to work:  [facility] Limitations/  [x] Yes    [] No  If yes please list:  Shoulders and back had a stroke 9 years. Orthopedic Limitations  *If patient has orthopedic issue:   Actions/  accomodations needed to make Avila Nguyen successful :adjust to comfort he prefers Nustep but will keep increasing TM Orthopedic Limitations  Only uses TM and NS Orthopedic Limitations NS and TM   Orthopedic Limitations  Only does NS   Fall Risk  Fall risk assessed? [x] Yes      [] No    Balance Issues? [x] Yes      [] No     [] Walker [x] Cane    [x] Safety issues reviewed      Fall Risk  *If patient is a fall risk, action needed to accommodate: walks with cane Fall Risk: walks with a cane Fall Risk very high. Pt walks very slow. Limit TM due to risk of falling Fall Risk  Very high. Staff has encouraged pt to use cane. Home Exercise  [] Yes    [x] No  Type:   Frequency:   Duration:  Home Exercise  [x] Yes    [] No  Type: walk  Frequency:daily   Duration: 10 min Home Exercise  [x] Yes    [] No  Type: walk  Frequency: daily  Duration: 10 min Home Exercise  [x] Yes    [] No  Type: walk  Frequency: daily  Duration: ? Home Exercise  [] Yes    [x] No     Angina with Activity? [] Yes    [x] No  Angina Management: nitro if needed Angina with Activity?   [] Yes    [x] No  Angina Management: na Angina with Activity? [] Yes    [x] No  Angina Management: na Angina with Activity? [] Yes    [x] No  Angina Management: na Angina with Activity?   [] Yes    [x] No     EXERCISE PLAN EXERCISE PLAN EXERCISE PLAN EXERCISE PLAN EXERCISE PLAN   *Interventions* *Interventions* *Interventions* *Interventions* *Interventions*   Exercise Prescription  (per physician & CR staff) Exercise Prescription  (per physician & CR staff) Exercise Prescription  (per physician & CR staff) Exercise Prescription  (per physician & CR staff) Exercise Prescription  (per physician & CR staff)   Cardiovascular Cardiovascular Cardiovascular Cardiovascular Cardiovascular   Mode:    [x] Treadmill (TM)  [x] Schwinn Airdyne (AD)  [x] Arms Ergometer (AE)  [x] NuStep  [] Elliptical (E) MODE:    [x] Treadmill (TM)  [x] Schwinn Airdyne (AD)  [x] Arms Ergometer (AE)  [] NuStep  [] Elliptical (E) MODE:    [x] Treadmill (TM)  [] Schwinn Airdyne (AD)  [] Arms Ergometer (AE)  [x] NuStep  [] Elliptical (E) MODE:    [x] Treadmill (TM)  [] Schwinn Airdyne (AD)  [x] Arms Ergometer (AE)  [x] NuStep  [] Elliptical (E) MODE:    [] Treadmill (TM)  [] Schwinn Airdyne (AD)  [x] Arms Ergometer (AE)  [x] NuStep  [] Elliptical (E)   Initial Workloads  TM: [email or web address] 1.1 METs  AD: 0.2 level = 1.2 METs  NS: 4  Salcedo= 1.2 METs  AE: 0.1 level = 1.1 METs Current Workloads  TM: 0.6 @ %=1.5  METs    NS:  12 Salcedo= 1.7 METs  AE:0.1  level = 1.1 METs Current Workloads  TM: 0.6 @0%=1.5  METs    NS:  16 Salcedo= 1.7METs   Current Workloads  TM: 0.7 @0 %= 1.5 METs    NS:   16Watts= 1.7 METs  AE:  10 Salcedo = 1.7 METs Current Workloads    NS:  20 Salcedo= 1.8METs  AE: 14 Salcedo = 1.1 METs     Frequency:    ICR: 3x/week  Home: 2-3x/wk Frequency:   ICR: 3x/week  Home: 3x/wk Frequency:  ICR: 3x/week  Home: 3-4x/wk Frequency:  ICR: 3x/week  Home: 3-4x/wk Frequency:  Alfonso Perez will continue exercise at  5-7 days/week   Duration:   Total aerobic exercise = 30-45 min    5-8 min/mode Duration:  Total aerobic exercises = 25 min     12 min/mode Duration:  Total aerobic exercises = 25 min     8- 15 min/mode Duration:  Total aerobic exercises =25 min     8-15min/mode Duration:  Total erobic exercise =  60-90 min   Intensity:   MET Level = 1.15  RPE = 12-15 Intensity:  Max MET Level = 1.7  RPE = 12-15 Intensity:  Max MET Level = 1.7  RPE = 12-15 Intensity:  Max MET Level = 1.7  RPE = 12-15 Intensity:  Max MET Level = 1.8 RPE = 12-15   Progression: increase aerobic activity up to 15 min over next 4 weeks by increasing time 2-3 min/week. Progression:Incraese to 15 min mode then increase METS 5-10% over next 4 weeks   Progression:  Increase duration to 15 min/mode over the next 4 weeks as tolerated. Progression:Pt is not able to progress. Encourage toIncrease duration to 15 min/mode over the next 4 Progression:  Increase time/intensity when RPE <13, and HR is in [facility]   [x] Yes      [] No  Upper and Lower body strength training 2x/wk    Wt: 2#       Reps:  8-15    *Increase wt. after completing 15 reps with an RPE of <12/13. [x] Yes      [] No  Upper and Lower body strength training 2x/wk    Wt: 2#       Reps:  8-15    *Increase wt. after completing 15 reps with an RPE of <12/13. [x] Yes      [] No  Upper and Lower body strength training 2x/wk    Wt: 2#       Reps:  8-15    *Increase wt. after completing 15 reps with an RPE of <12/13. [x] Yes      [] No  Upper and Lower body strength training 2x/wk    Wt: 2#       Reps:  8-15    *Increase wt. after completing 15 reps with an RPE of <12/13. Continue Strength Training at home   [x] Exercise Log & Strength training handout given     Wt: 3#       Reps:  8-15    *Increase wt. after completing 15 reps with an RPE of <12/13.    Flexibility Flexibility Flexibility Flexibility Flexibility   [x] Yes      [] No  25 min session of Core Strength & Flexibility 1x/per week  Attends Core Strength & Flexibility   [x] Yes      [] No Attends Core Strength & Flexibility   [x] Yes      [] No Attends Core Strength & Flexibility   [x] Yes      [] No Continue Core Strength & Flexibility at home   [address] verbalizes planning to walk 3 days/week for 5-10 min on days not in rehab. Home Exercise  *Del verbalizes planning to walk 5+ days/week for 10-15 min on days not in rehab. Home Exercise  *Del verbalizes planning to walk 3-4 days/week for 15 min on days not in rehab. Home Exercise  *Del verbalizes planning to walk 3-4 days/week for 15 min on days not in rehab. [address] has no intention to exercise on his own after rehab. *Education* *Education* *Education* *Education* *Education*   RPE Scale  [x] Yes      [] No  Exercise Safety  [x] Yes      [] No  Equipment Orientation  [x] Yes      [] No  S/S to Report  [x] Yes      [] No  Warm Up/Cool Down  [x] Yes      [] No  Home Exercise  [x] Yes      [] No    All Exercise Education Completed  [x] Yes      [] No   Exercise Education Recommended    Workshops  [x] Benefits of Exercise  [x] Balance Training & Fall Prevention  [x] Heart Disease Risk Reduction  [x] Yoga & Heart Health Exercise Education Attended/Date   Exercise Education Attended/Date Exercise Education Attended/Date    9/15/21yoga    10/13/21  Benefits of Exercise All Sessions Completed?     [] Yes  [] No  11/3/21 Balance and fall prevention   *Goals* *Goals* *Goals* *Goals* *Goals*   Initial Exercise Goals Exercise Goals  Exercise Goals   Exercise Goals  Exercise Goals   Del plans to:  [x] Attend exercise sessions 3x/wk  [x] initiate home exercise 2-3x/wk for 10-20 min  [x] Increase 6 min walk distance by 10%  [x] Attend Exercise workshops Yvonne Ramos plans to:  [x] Attend exercise sessions 3x/wk  [x] continue home exercise 2-3x/wk for 20-30 min  [x] Attend Exercise workshops [place] plans to:  [x] Attend exercise sessions 3x/wk  [x] continue home exercise 3-4x/wk for 30-45 min  [x] Determine plan of exercise following rehab  [x] Attend Exercise workshops Del's plans to:  [x] Attend exercise sessions 3x/wk  [x] continue home exercise 3-4x/wk for 30-45 min  [x] Determine plan of exercise following rehab  [x] Attend Exercise workshops Zena Atkins achieved exercise goals? []  Yes    [x] No  If no, why?  **  [] Increased 6 min walk distance by 10%  [] Currently exercising 30-60 min/day, 5-7days/wk   [] Plans to continue exercise on own  [] Plans to join a local fitness center to continue exercise  [x] Does not plan to continue to exercise after rehab   Return to ADL or Hobbies:  Zena Atkins would like to improve strength and endurance so he is able to return to activity with decreased SOB, walking better. Return to ADL or Hobbies:  Jr Edenl that his rt shoulder is still bothering him but feels that he is building some strength so walks some without cane. Staff encourages cane use. Return to ADL or Hobbies:  [place] to improve his strength and endurance so he is able to walk further and more steady. Return to ADL or Hobbies:  Pat Sibley  Continues to improve his strength and endurance so he is able to walk further and more steady. Return to ADL or Hobbies:  [place] to work on his strength and endurance to be able to walk further.    *MET level required for above goal:  5.0  METs MET level Achieved:  1.7METs MET level Achieved:  1.7METs MET level Achieved:  1.7METs MET level Achieved: 1.8 METs     Individual Cardiac Treatment Plan - Nutrition  NUTRITION  ASSESSMENT/PLAN NUTRITION  REASSESSMENT NUTRITION   REASSESSMENT NUTRITION   REASSESSMENT NUTRITION  DISCHARGE/FOLLOW-UP   Stages of Change Stages of Change Stages of Change Stages of Change Stages of Change   [x] Pre Contemplation  [] Contemplation  [] Preparation  [] Action  [] Maintenance  [] Relapse [] Pre Contemplation  [x] Contemplation  [] Preparation  [] Action  [] Maintenance  [] Relapse [] Pre Contemplation  [x] Contemplation  [] Preparation  [] Action  [] Maintenance  [] Relapse [] Pre Contemplation  [x] Contemplation  [] Preparation  [] Action  [] Maintenance  [] Relapse [x] Pre Contemplation  [] Contemplation  [] Preparation  [] Action  [] Maintenance  [] Relapse   NUTRITION ASSESSMENT NUTRITION ASSESSMENT NUTRITION ASSESSMENT NUTRITION ASSESSMENT NUTRITION ASSESSMENT   Weight Management  Weight: 276        Height: 5'11\"  BMI: 38.6 Weight Management  Weight: 280. Weight Management  Weight: 277                 Weight Management  Weight: 281 Weight Management  Weight: 284.8                   BMI: 39   Eating Plan  Current eating habits: fruit and veggies Eating Plan  Changes: more veggies Eating Plan  Changes: nothing reported Eating Plan  Changes: more fish and soup Eating Plan Improvements: none reported   Alcohol Use  [x] none          [] daily  [] weekly      [] special   Type:   Amount:        Diet Assessment Tool:  RATE YOUR PLATE  *Given to patient to complete and return. Diet Assessment Tool:    Score: 31/69       Diet Assessment Tool: RATE YOUR PLATE  Score: **/86   NUTRITION PLAN NUTRITION PLAN NUTRITION PLAN NUTRITION PLAN NUTRITION PLAN   *Interventions* *Interventions* *Interventions* *Interventions* *Interventions*   Initial Survey given Goal Setting Discussion:   [x] Yes      [] No       Follow Up Survey Reviewed & Goals Updated:     Professional Referral  Please check if needed. [] Dietitian Consult   [] Wt. Management Referral  [] Other:  Professional Referral  Please check if needed. [] Dietitian Consult   [] Wt. Management Referral  [] Other: Professional Referral  Please check if needed. [] Dietitian Consult   [] Wt. Management Referral  [] Other: Professional Referral  Please check if needed. [] Dietitian Consult   [] Wt. Management Referral  [] Other: Professional Referral  Please check if needed. [] Dietitian Consult   [] Wt.  Management Referral  [] Other:   *Education* *Education* *Education* *Education* *Education*   Nutritional Education Recommended    [x] 1:1 Registered Dietitian    Workshops  [x] Label Reading   [x] Menu  [x] Targeting Nutrition Priorities  [x] Mindful Eating   Nutritional Education Attended/Date  8/26 menu Nutritional Education Attended/Date    9/27/21 Targeting Nutrition Priorities Nutritional Education Attended/Date     All Sessions Completed? [] Yes  [x] No  11/8/21 menu   Cooking School  Recommended     [x] Adding Flavor  [x] Fast & Healthy     Breakfasts  [x] Salads & Dressings  [x] Savory Soups  [x] Simple Sides & Sauces  [x] Appetizers &     Snacks  [x] Delicious Desserts  [x] Plant-Based Proteins  [x] Fast Evening Meals  [x] Weekend Breakfasts  [x] Efficiency Cooking  [x] One-Pot [employer]  Sessions Completed see list   Cooking School  Sessions Completed  NA Cooking School  Sessions Completed     Cooking School    # of sessions completed:  **  na   *Goals* *Goals* *Goals* *Goals* *Goals*   Del's nutritional goals are as follows:  Complete and return diet survey Del's nutritional goals are as follows:  [x] Attend Nutrition Workshops  [x] Attend 1:1   [] Attend Cooking Classes  [] ** Del's nutritional goals are as follows:  [x] Attend Nutrition Workshops  [x] Attend 1:1   [] Attend Cooking Classes  [] Complete and return diet survey  [] ** Del's nutritional goals are as follows:  [x] Attend Nutrition Workshops  [x] Attend 1:1   [] Attend Cooking Classes  [] ** Del achieved nutritional goals   [] Yes    [x] No  If no, why?   Use knowledge gained to continue Pritikin eating plan at home       Individual Cardiac Treatment Plan - Psychosocial  PSYCHOSOCIAL  ASSESSMENT/PLAN PSYCHOSOCIAL  REASSESSMENT PSYCHOSOCIAL   REASSESSMENT PSYCHOSOCIAL   REASSESSMENT PSYCHOSOCIAL  DISCHARGE/FOLLOW-UP   Stages of Change Stages of Change Stages of Change Stages of Change Stages of Change   [x] Pre Contemplation  [] Contemplation  [] Preparation  [] Action  [] Maintenance  [] Relapse [] Pre Contemplation  [x] Contemplation  [] Preparation  [] Action  [] Maintenance  [] Relapse [] Pre Contemplation  [x] Contemplation  [] Preparation  [] Action  [] Maintenance  [] Relapse [] Pre Contemplation  [x] Contemplation  [] Preparation  [] Action  [] Maintenance  [] Relapse [] Pre Contemplation  [x] Contemplation  [] Preparation  [] Action  [] Maintenance  [] Relapse   PSYCHOSOCIAL ASSESSMENT PSYCHOSOCIAL ASSESSMENT PSYCHOSOCIAL ASSESSMENT PSYCHOSOCIAL ASSESSMENT PSYCHOSOCIAL ASSESSMENT   Behavioral Outcomes Behavioral Outcomes Behavioral Outcomes Behavioral Outcomes Behavioral Outcomes   Tool Used:  April & Tayo, Quality of Life Index, Cardiac Version IV  *Given to patient to complete. Tool Used:    April & Tayo, Quality of Life Index, Cardiac Version IV     QOL Index Score: 18.79  HF:13.57  S&E:25  P&S: 16.14  Family: 30   Tool Used:     April & Tayo, Quality of Life Index, Cardiac Version IV    QOL Index Score: **  HF:**  S&E:**  P&S: **  Family: **   PHQ-9 score 10  Depression Severity  []Minimal  []Mild   [x]Moderate  []Moderately Severe  []Severe    PHQ-9 score **  Depression Severity  []Minimal  []Mild   []Moderate  []Moderately Severe []Severe   Does patient have Family Support? [x] Yes      [] No  No signs of marital/family distress       Within the Past Month:  *Have you wished you were dead or wished you could go to sleep and not wake up? [] Yes      [x] No  *Have you had any thoughts of killing yourself?   [] Yes      [x] No         Using a scale of 0-10, 0=none, 10=very:   Rate your depression: 2  Rate your anxiety:  0  Using a scale of 0-10, 0=none, 10=very:   Rate your depression:0  Rate your anxiety:  0 Using a scale of 0-10, 0=none, 10=very:   Rate your depression: 1  Rate your anxiety:  0 Using a scale of 0-10, 0=none, 10=very:   Rate your depression: 0  Rate your anxiety:   Using a scale of 0-10, 0=none, 10=very:   Rate your depression: 0  Rate your anxiety:  0   Signs and Symptoms of Depression Present? [x] Yes      [] No  If yes, please explain:  Frustrated  Signs and Symptoms of Depression Present? [x] Yes      [] No  If yes, please explain:  Frustration seems to be getting less and he seems to be doing more. Signs and Symptoms of Depression Present? [x] Yes      [] No  If yes, please explain:  Very reserved, poor eye contact Signs and Symptoms of Depression Present? [x] Yes      [] No  If yes, please explain:  Same as previous Signs and Symptoms of Depression Present? [x] Yes      [] No  If yes, please explain:  No change   Signs and Symptoms of Anxiety Present? [] Yes      [x] No  If yes, please explain:   Signs and Symptoms of Anxiety Present? [] Yes      [x] No  If yes, please explain:   Signs and Symptoms of Anxiety Present? [] Yes      [x] No   Signs and Symptoms of Anxiety Present? [] Yes      [x] No   Signs and Symptoms of Anxiety Present? [] Yes      [x] No     [] Patient has poor eye contact   [] Flat affect present. [] Signs of anxiety, anger or hostility    [] Signs social isolation present. []  Signs of alcohol or substance abuse       PSYCHOSOCIAL PLAN PSYCHOSOCIAL PLAN PSYCHOSOCIAL PLAN PSYCHOSOCIAL PLAN PSYCHOSOCIAL PLAN   *Interventions* *Interventions* *Interventions* *Interventions* *Interventions*   *Please check if needed  [] Psych Consult  [] Physician Referral  [x] Stress Management Skills *Please check if needed  [] Psych Consult  [] Physician Referral  [x] Stress Management Skills *Please check if needed  [] Psych Consult  [] Physician Referral  [x] Stress Management Skills *Please check if needed  [] Psych Consult  [] Physician Referral  [x] Stress Management Skills *Please check if needed  [] Psych Consult  [] Physician Referral  [x] Stress Management Skills   Is patient currently taking anti-depressant or anti-anxiety medications?   [] Yes      [x] No  If yes, please list medications: Change in anti-depressant or anti-anxiety medications? [] Yes      [x] No  If yes, please list medications:  ** Change in anti-depressant or anti-anxiety medications? [] Yes      [x] No   Change in anti-depressant or anti-anxiety medications? [] Yes      [x] No   Change in anti-depressant or anti-anxiety medications? [] Yes      [x] No     *Education* *Education* *Education* *Education* *Education*   Healthy Mind-Set Workshops Recommended  [x] Stress & Health  [x] Taking Charge of Stress  [x] New Thoughts, New Behaviors  [x] Managing Moods & Relationships Healthy Mind-Set Workshops Attended/Date  8/20 taking charge of stress Healthy Mind-Set Workshops Attended/Date    9/22/21New Thoughts, New Behaviors Healthy Mind-Set Workshops Attended/Date  10/6/21manage moods    10/22/21stress and health Healthy Mind-Set Workshops  Completed  [x] Yes      [] No     *Goals* *Goals* *Goals* *Goals* *Goals*   Del's psychosocial goals are as follows:  Complete HADS & April & Tayo, Quality of Life Index, Cardiac Version IV Del's psychosocial goals are as follows:  [x] Attend Healthy Mind-Set Workshops  [x] Reduce depression symptom severity by 1 level  [] ** Del's psychosocial goals are as follows:  [x] Attend Healthy Mind-Set Workshops  [x] Reduce depression symptom severity by 1 level  [] ** Del's psychosocial goals are as follows:  [x] Attend Healthy Mind-Set Workshops  [x] Reduce depression symptom severity by 1 level  [] ** Del achieved psychosocial goals?   [x] Yes    [] No  If no, why?  **  [x] Use methods learned to continue to reduce depression symptom severity by 1 level  [] **     Individual Cardiac Treatment Plan - Other:  Risk Factor/Education  RISK FACTOR  ASSESSMENT/PLAN RISK FACTOR  REASSESSMENT  RISK FACTOR  REASSESSMENT RISK FACTOR  REASSESSMENT RISK FACTOR   DISCHARGE/FOLLOW-UP   Stages of Change Stages of Change Stages of Change Stages of Change Stages of Change   [x] Pre Contemplation  [] Contemplation  [] Preparation  [] Action  [] Maintenance  [] Relapse [] Pre Contemplation  [x] Contemplation  [] Preparation  [] Action  [] Maintenance  [] Relapse [] Pre Contemplation  [] Contemplation  [x] Preparation  [] Action  [] Maintenance  [] Relapse [] Pre Contemplation  [x] Contemplation  [] Preparation  [] Action  [] Maintenance  [] Relapse [] Pre Contemplation  [x] Contemplation  [] Preparation  [] Action  [] Maintenance  [] Relapse   RISK FACTOR/EDUCATION ASSESSMENT RISK FACTOR/EDUCATION ASSESSMENT RISK FACTOR/EDUCATION ASSESSMENT RISK FACTOR/EDUCATION ASSESSMENT RISK FACTOR /EDUCATION ASSESSMENT   Hypertension  [x] Yes      [] No    Resting BP: 134/70  Peak Ex BP:164/60  Medication: cozzar   Hypertension  Resting BP: 118/48  Peak Ex BP:140/60  Medication Changes:  [] Yes      [x] No Hypertension  Resting BP: 150/74    Peak Ex /62  Medication Changes:  [] Yes      [x] No Hypertension  Resting BP: 134/60  Peak Ex BP:132/72  Medication Changes:  [] Yes      [x] No Hypertension  Resting BP: 124/82  Peak Ex BP:138/74  Medication Changes:  [] Yes      [x] No   Lipids  HLD/DLD  [] Yes      [x] No  TOTAL CHOL:   HDL:    LDL:    TRIG:    Medication:  Lipids  Medication Changes:  [] Yes      [x] No     Lipids  Medication Changes:  [] Yes      [x] No     Lipids  Medication Changes:  [] Yes      [x] No     Lipids      Medication Changes:  [] Yes      [x] No   Diabetes  [] Yes      [x] No   Diabetes  na   Diabetes  na     Diabetes  na     Diabetes  na       Tobacco Use  [] Current  [] Former  [x] Never      Smokeless Tobacco use:   [] Yes      [x] No  Amount:  Tobacco Use  Change in smoking status   [] Yes      [x] No    Quit date:    Tobacco Use  Change in smoking status   [] Yes      [x] No       Tobacco Use  Change in smoking status   [] Yes      [x] No     Tobacco Use  Change in smoking status   [] Yes      [x] No                Learning Barriers  Please select one:  [] Speech  [] Literacy  [] Hearing  [] Cognitive  [x] Vision  [x] Ready to Learn Learning Barriers Addressed:   [x] Yes      [] No   Learning Barriers Addressed:   [x] Yes      [] No   Learning Barriers Addressed:  [x] Yes      [] No Learning Barriers Addressed:  [x] Yes      [] No     RISK FACTOR/EDUCATION PLAN RISK FACTOR/EDUCATION PLAN RISK FACTOR/EDUCATION PLAN RISK FACTOR/EDUCATION PLAN RISK FACTOR/EDUCATION PLAN   *Interventions* *Interventions* *Interventions* *Interventions* *Interventions*   Recommended Educational Videos    [x] Overview of The Pritikin Eating Plan  [x] Heart Disease Risk Reduction  [x] Move It! [x] Calorie Density  [x] Healthy Minds, Bodies, Hearts  [x] Label Reading  [x] Metabolic Syndrome & Belly   Or  [] How Our Thoughts Can Heal our Heart  [x] Dining Out-Part 1  [x] Biomechanical Limitations  [x] Facts on Fat  [x] Hypertension & Heart Disease  [x] Diseases of Our Times-Focusing on Diabetes  [x] Body Composition  [x] Nurtition Action Plan  [x] Exercise Action Plan   Completed Videos/Date      8/11/2021  Overview of The Pritikin Eating Plan  8/16 Sleep  8/18 Aging  8/255 How thoughts change  8/27 Cook breakfast  8/30 Become  Completed Videos/Date    9/1/21  Healthy Minds, Bodies, Hearts  9/13/21  Label Reading    9/8/21   Heart Disease Risk Reduction    8/25/21   How Our Thoughts Can Heal our Heart    9/20/21  Hypertension & Heart Disease    9/17/21 Move it     Completed Videos/Date    10/1/21 cook dinners and sides    10/4/21 bio of weight control. 10/8/21 dining out 2    10/11/21metabolic syndrome    25/78/87 vitamins and minerals Recommended Educational Videos Completed    [x] Yes      [] No    10/27/21 Nutrition action plan    11/1/21 exercise action plan    11/10/21 intro to yoga    11/5/21 sleep disorders  11/15/21 becoming a pritikin           Smoking Cessation/Relaspe Prevention Intervention needed?   [] Yes      [x] No  *Pt verbalizes and agrees to attend intervention Smoking Cessation/Relapse Prevention Scheduled? [] Yes      [x] No  Date:   * na    Professional Referrals:  *Please check if needed  [] Diabetes Clinic  [] Lipid Clinic   [] Other:     Professional Referrals:  *Please check if needed  [] Diabetes Clinic  [] Lipid Clinic   [] Other:   Preventative Medication Preventative Medication Preventative Medication Preventative Medication Preventative Medication   Aspirin  [x] Yes    [] No  Blood Thinner: Clopidogrel/Effient/Brillinta  [] Yes    [x] No  Beta Blocker  [x] Yes    [] No  Ace Inhibitor  [] Yes    [x] No  Statin/Lipid Lowering  [] Yes    [x] No Medication Changes? [] Yes    [x] No Medication Changes? [] Yes    [x] No Medication Changes? [x] Yes    [] No  Metoprolol reduced Medication Changes? [] Yes    [x] No   *Education* *Education* *Education* *Education* *Education*   Does Zena Wilbert require any additional education? [] Yes    [x] No   Does Zena Wilbert require any additional education? [] Yes    [x] No Does Zena Wilbert require any additional education? [] Yes    [x] No Does Zena Wilbert require any additional education? [] Yes    [x] No Does Zena Wilbert require any additional education?   [] Yes    [x] No   Additional Educational Videos    Exercise  [x] Improve Performance    Medical  [] Aging Enhancing Your QoL  [] Biology of Weight Control  [] Decoding Lab Results  [] Diseases of Our Time - Overview  [] Sleep Disorders    Nutrition  [] Dining Out - Part 2  [] Fueling a Healthy Body  [] Menu Workshop  [] Planning Your Eating Strategy  [] Targeting Your Nutrition Priorities  [] Vitamins & Minerals    Healthy Mind-Set  [] Smoking Cessation    Culinary  []Becoming a Pritikin    [] Cooking - Breakfast & Snacks  [] Cooking -Healhty Salads & Dressing  [] Cooking -Dinner & Sides  [] Cooking -Soups & Desserts    Overview  [] The Pritikin Solution Additional Educational Videos Completed Additional Educational Videos Completed Additional Educational Videos Completed Additional Educational Videos Completed    [] Yes    [] No *Goals* *Goals* *Goals* *Goals* *Goals*   Del's risk factor/education goals are as follows:    [x] Optimal BP <140/90  [] Blood Sugar <120  [x] Attend recommended video education sessions  [x] Takes medications as prescribed 100% of the time   [] ** Del's risk factor/education goals are as follows:    [x] Optimal BP <140/90  [] Blood Sugar <120  [x] Attend recommended video education sessions  [x] Takes medications as prescribed 100% of the time   [] ** Del's risk factor/education goals are as follows:    [x] Optimal BP <140/90  [] Blood Sugar <120  [x] Attend recommended video education sessions  [x] Takes medications as prescribed 100% of the time   [] ** Del's risk factor/education goals are as follows:    [x] Optimal BP <140/90  [] Blood Sugar <120  [x] Attend recommended video education sessions  [x] Takes medications as prescribed 100% of the time   [] ** Del achieved risk factor goals?   [x] Yes    [] No       Monitored telemetry has revealed NSR    [x] associated symptoms SOB Monitored telemetry has revealed NSR  [] documented arrhythmia at increasing workloads  [x] associated symptoms SOB but he states that this getting easier Monitored telemetry has revealed NSR   Monitored telemetry has revealed Sinus kwame/ NSR   Monitored telemetry has revealed NSR     Physician Response    [x] Cardiac rehab is reasonably and medically necessary for continuous cardiac monitoring surveillance  of patient's cardiac activity  [x] Initiate continuous telemetry monitoring and notify me with any concerns  [] Other   Physician Response    [x] Cardiac rehab is reasonably and medically necessary for continuous cardiac monitoring surveillance  of patient's cardiac activity  [x] Continue continuous telemetry monitoring and notify me with any concerns  [] Other     Physician Response    [x] Cardiac rehab is reasonably and medically necessary for continuous cardiac monitoring surveillance  of patient's cardiac activity  [x] Continue continuous telemetry monitoring and notify me with any concerns   [] Other     Physician Response    [x] Cardiac rehab is reasonably and medically necessary for continuous cardiac monitoring surveillance  of patient's cardiac activity  [x] Continue continuous telemetry monitoring and notify me with any concerns   [] Other          Cosigned by: Anatoly Lees MD at 8/30/2021  4:26 PM   Revision History  Date/Time User Provider Type Action   8/30/2021  4:26 PM Anatoly Lees MD Physician Cosign   8/30/2021  1:42 PM Kika Talbot RN Registered Nurse Sign     Cosigned by: Anatoly Lees MD at 9/28/2021  9:14 AM   Revision History  Date/Time User Provider Type Action   9/28/2021  9:14 AM Anatoly Lees MD Physician Cosign   9/27/2021  2:05 PM Cade Werner Exercise Physiologist Sign     Cosigned by: Anatoly Lees MD at 10/25/2021  2:07 PM       Revision History    Date/Time User Provider Type Action   10/25/2021  2:07 PM Anatoly Lees MD Physician Cosign   10/25/2021  1:56 PM Cade Werner Exercise Physiologist Addend   10/25/2021  1:56 PM Cade Werner Exercise Physiologist Sign    View Details Report

## 2021-12-02 ENCOUNTER — TELEPHONE (OUTPATIENT)
Dept: CARDIOLOGY CLINIC | Age: 71
End: 2021-12-02

## 2021-12-02 ENCOUNTER — OFFICE VISIT (OUTPATIENT)
Dept: CARDIOLOGY CLINIC | Age: 71
End: 2021-12-02
Payer: MEDICARE

## 2021-12-02 VITALS — SYSTOLIC BLOOD PRESSURE: 136 MMHG | HEART RATE: 60 BPM | DIASTOLIC BLOOD PRESSURE: 66 MMHG

## 2021-12-02 DIAGNOSIS — E78.01 FAMILIAL HYPERCHOLESTEROLEMIA: Primary | ICD-10-CM

## 2021-12-02 DIAGNOSIS — I10 PRIMARY HYPERTENSION: ICD-10-CM

## 2021-12-02 DIAGNOSIS — I25.10 CORONARY ARTERY DISEASE INVOLVING NATIVE CORONARY ARTERY OF NATIVE HEART WITHOUT ANGINA PECTORIS: ICD-10-CM

## 2021-12-02 DIAGNOSIS — R42 DIZZINESS: ICD-10-CM

## 2021-12-02 PROCEDURE — G8417 CALC BMI ABV UP PARAM F/U: HCPCS | Performed by: NUCLEAR MEDICINE

## 2021-12-02 PROCEDURE — G8484 FLU IMMUNIZE NO ADMIN: HCPCS | Performed by: NUCLEAR MEDICINE

## 2021-12-02 PROCEDURE — 99214 OFFICE O/P EST MOD 30 MIN: CPT | Performed by: NUCLEAR MEDICINE

## 2021-12-02 PROCEDURE — 1036F TOBACCO NON-USER: CPT | Performed by: NUCLEAR MEDICINE

## 2021-12-02 PROCEDURE — 1123F ACP DISCUSS/DSCN MKR DOCD: CPT | Performed by: NUCLEAR MEDICINE

## 2021-12-02 PROCEDURE — 4040F PNEUMOC VAC/ADMIN/RCVD: CPT | Performed by: NUCLEAR MEDICINE

## 2021-12-02 PROCEDURE — 3017F COLORECTAL CA SCREEN DOC REV: CPT | Performed by: NUCLEAR MEDICINE

## 2021-12-02 PROCEDURE — G8428 CUR MEDS NOT DOCUMENT: HCPCS | Performed by: NUCLEAR MEDICINE

## 2021-12-02 NOTE — PROGRESS NOTES
[id number] [facility] [address]  [address] [place] [address]  Dept: [phone]  Dept Fax: [phone]  Loc: [phone]    Visit Date: 12/2/2021    Haleigh Mclean is a 66 y.o. male who presents todayfor:  Chief Complaint   Patient presents with    Check-Up    Coronary Artery Disease    Hypertension    Dizziness    Hyperlipidemia   having recurrent dizziness  meds were cut back by my PA  Still having these episodes  Lasting minutes  No syncope  Does have multiple stents  No chest pain   No changes in breathing  Does have lower BP at times  On statins for hyperlipidemia          HPI:  HPI  Past Medical History:   Diagnosis Date    Arthritis     Back pain     CAD in native artery 6/14/2021    Diarrhea     Hypertension     Infection of prosthetic shoulder joint ([address] 7/24/2020    PFO (patent foramen ovale) 8/2012    noted on ROSITA following stroke    Propionibacterium infection 7/24/2020    Rotator cuff injury     Stroke [facility])     August 19/2012    TIA (transient ischemic attack) 05/2018      Past Surgical History:   Procedure Laterality Date    CHOLECYSTECTOMY  06/25/2017    CORONARY ANGIOPLASTY WITH STENT PLACEMENT      CYST INCISION AND DRAINAGE      CYSTOSCOPY N/A 12/17/2019    CYSTOSCOPY, WITH  [facility]THRAL DILATION [facility]    CYSTOSCOPY WITH BLADDER BOTOX performed by Nancy Steele MD at [address] [place] [address]  06/23/2017    ROTATOR CUFF REPAIR Left 03/12/2013    right 4/2019    SHOULDER SURGERY Right 03/02/2020    SHOULDER SURGERY  06/2020    frozen shoulder, repair nerve elbow and palm-dr eduardo     TURP N/A 8/13/2019    TRANSURETHRAL RESECTION PROSTATE performed by Darlyn Jacques MD at [address] [place] [address] History   Problem Relation Age of Onset    Arthritis Mother     Vision Loss Father     Cancer Brother     Stroke Maternal Uncle     Prostate Cancer Neg Hx      Social History Tobacco Use    Smoking status: Never Smoker    Smokeless tobacco: Never Used   Substance Use Topics    Alcohol use: No      Current Outpatient Medications   Medication Sig Dispense Refill    metoprolol tartrate (LOPRESSOR) 25 MG tablet Take 1/2 (one-half) tablet by mouth twice daily 180 tablet 3    isosorbide mononitrate (IMDUR) 30 MG extended release tablet Take 1 tablet by mouth daily 90 tablet 1    aspirin 81 MG chewable tablet Take 1 tablet by mouth daily 30 tablet 3    clopidogrel (PLAVIX) 75 MG tablet Take 300 mg the first dose then 75 after (Patient taking differently: Take 75 mg by mouth daily ) 90 tablet 3    nitroGLYCERIN (NITROSTAT) 0.4 MG SL tablet up to max of 3 total doses. If no relief after 1 dose, call 911. 25 tablet 1    tamsulosin (FLOMAX) 0.4 MG capsule Take 0.4 mg by mouth daily      losartan (COZAAR) 100 MG tablet Take 100 mg by mouth daily      hydrochlorothiazide (HYDRODIURIL) 12.5 MG tablet TAKE 1 TABLET BY MOUTH ONCE DAILY      Potassium 99 MG TABS Take 3 tablets by mouth daily      Multiple Vitamins-Minerals (PRESERVISION AREDS PO) Take by mouth      Ascorbic Acid (VITAMIN C) 500 MG tablet Take 1,000 mg by mouth        No current facility-administered medications for this visit.      Allergies   Allergen Reactions    Adhesive Tape Rash     Health Maintenance   Topic Date Due    Hepatitis C screen  Never done    COVID-19 Vaccine (1) Never done    DTaP/Tdap/Td vaccine (1 - Tdap) Never done    Colon cancer screen colonoscopy  Never done    Pneumococcal 65+ years Vaccine (1 of 1 - PPSV23) Never done   [employer] Visit (AWV)  Never done    Flu vaccine (1) Never done    Potassium monitoring  07/10/2022    Creatinine monitoring  07/10/2022    Lipid screen  06/15/2026    Shingles Vaccine  Completed    Hepatitis A vaccine  Aged Out    Hepatitis B vaccine  Aged Out    Hib vaccine  Aged Out    Meningococcal (ACWY) vaccine  Aged Out       Subjective:  Review of Systems  General:   No fever, no chills, some fatigue or weight loss  Pulmonary:    some dyspnea, no wheezing  Cardiac:    Denies recent chest pain,   GI:     No nausea or vomiting, no abdominal pain  Neuro:     No dizziness or light headedness,   Musculoskeletal:  No recent active issues  Extremities:   No edema, no obvious claudication       Objective:  Physical Exam  /66   Pulse 60   General:   Well developed, well nourished  Lungs:   Clear to auscultation  Heart:    Normal S1 S2, Slight murmur. no rubs, no gallops  Abdomen:   Soft, non tender, no organomegalies, positive bowel sounds  Extremities:   No edema, no cyanosis, good peripheral pulses  Neurological:   Awake, alert, oriented. No obvious focal deficits  Musculoskelatal:  No obvious deformities    Assessment:      Diagnosis Orders   1. Familial hypercholesterolemia     2. Coronary artery disease involving native coronary artery of native heart without angina pectoris     3. Primary hypertension     4. Dizziness     as above  Dizziness is likely meds related     Plan:  No follow-ups on file. 24 holter  Cut losartan in half  Change flomax to bedtime  Continue risk factor modification and medical management  Thank you for allowing me to participate in the care of your patient. Please don't hesitate to contact me regarding any further issues related to the patient care    Orders Placed:  No orders of the defined types were placed in this encounter. Medications Prescribed:  No orders of the defined types were placed in this encounter. Discussed use, benefit, and side effects of prescribed medications. All patient questions answered. Pt voicedunderstanding. Instructed to continue current medications, diet and exercise. Continue risk factor modification and medical management. Patient agreed with treatment plan. Follow up as directed.     Electronically signedby Angel Sanchez MD on 12/2/2021 at 3:13 PM

## 2021-12-02 NOTE — TELEPHONE ENCOUNTER
Patient  Wife  Shikha Lloyd called today concerned about her husbands dizziness. OV: 12/16/21 with Hank Doshi wants to know if Angeles Mary should be seen sooner.  Please advise patient

## 2021-12-02 NOTE — TELEPHONE ENCOUNTER
Patient recently has seen Vibha in office and is still having a lot of dizziness. Wife thinks patient needs to be seen. Appt scheduled for today at 315. She confirmed appt.

## 2021-12-28 NOTE — PROGRESS NOTES
Pt's wife called back and wants to go ahead with 2 hr Holter as pt is still dizzy. Ordered and given to scheduling.

## 2022-01-01 NOTE — PROGRESS NOTES
The patient is Watcher - Medium risk of patient condition declining or worsening    Shift Goals  Clinical Goals: no seizures, eat  Patient Goals: FELICE  Family Goals: no seizures, eat, updates    Progress made toward(s) clinical / shift goals:    Problem: Knowledge Deficit - Standard  Goal: Patient and family/care givers will demonstrate understanding of plan of care, disease process/condition, diagnostic tests and medications  Note: Temps remain 97-97.5 rectally with double blanket swaddle and hat in place- MD aware. No seizure activity noted. Pt more alert this afternoon.      Problem: Respiratory  Goal: Patient will achieve/maintain optimum respiratory ventilation and gas exchange  Note: Pt placed on RA early afternoon and remains on RA with sats >92%.      Problem: Nutrition - Standard  Goal: Patient's nutritional and fluid intake will be adequate or improve  Note: Improved po t/o day. Voiding well- no stool.      Problem: Skin Integrity  Goal: Skin integrity is maintained or improved  Note: Pt does not demonstrates ability to turn self in bed without assistance of staff. Family understands importance in prevention of skin breakdown, ulcers, and potential infection. Hourly rounding in effect. RN skin check complete.   Devices in place include: PIV, .  Skin assessed under devices: Yes.      New born rash noted- comes and goes- more noticeable with irritability.             Viv DENIS.:  1950    Acct Number: [de-identified]   MRN:  [id number]                             [facility] HEALTHY MIND-SET WORKSHOP             Date: 10/6/2021        Session #    Todays class covered:    ( )  Stress and Health Workshop:  [facility] patient will learn about the body's adaptive response that is triggered by a variety of stressors. Patient will gain insight into the toll that chronic stress takes on their health, both emotionally and physically. ( ) Taking Charge of Stress Workshop:  [facility] patient will learn and practice a variety of stress management techniques. Patient will be able to effectively apply coping mechanisms in perceived stressful situations. ( ) New Thoughts New Behaviors Workshop: [facility] patient will learn and practice techniques for developing effective health and lifestyle goals. Patient will be able to effectively apply the goal setting process learned to develop at least one new personal goal.     (x ) Managing Moods & Relationships Workshop:  [facility] patient will learn how emotional and chronic stress factors can impact their hearts. They will learn healthy ways to handle stress and utilize positive coping mechanisms. In addition, [facility] patient will learn ways to improve communication skills. Roselyn Ohara actively participated and verbalized understanding. Total time in the Healthy Mind-Set class was 40 minutes.     Electronically signed by Yelitza Levine RN on 10/6/2021 at 12:30 PM

## 2022-01-05 ENCOUNTER — TELEPHONE (OUTPATIENT)
Dept: CARDIOLOGY CLINIC | Age: 72
End: 2022-01-05

## 2022-01-05 DIAGNOSIS — R42 DIZZINESS: Primary | ICD-10-CM

## 2022-01-10 ENCOUNTER — HOSPITAL ENCOUNTER (OUTPATIENT)
Dept: NON INVASIVE DIAGNOSTICS | Age: 72
Discharge: HOME | End: 2022-01-10
Payer: MEDICARE

## 2022-01-10 DIAGNOSIS — R42 DIZZINESS: ICD-10-CM

## 2022-01-10 PROCEDURE — 93246 EXT ECG>7D<15D RECORDING: CPT

## 2022-01-10 NOTE — PROCEDURES
14 Day Continuous Cardiac Monitor was applied to patient. Instructions were given and skin/monitor prep and application was demonstrated. Patient was instructed to remove monitor on 01/24/2022 and mail back to [employer].

## 2022-01-25 ENCOUNTER — OFFICE VISIT (OUTPATIENT)
Dept: UROLOGY | Age: 72
End: 2022-01-25
Payer: MEDICARE

## 2022-01-25 VITALS
WEIGHT: 288 LBS | SYSTOLIC BLOOD PRESSURE: 138 MMHG | HEIGHT: 71 IN | BODY MASS INDEX: 40.32 KG/M2 | DIASTOLIC BLOOD PRESSURE: 72 MMHG

## 2022-01-25 DIAGNOSIS — N32.81 OAB (OVERACTIVE BLADDER): ICD-10-CM

## 2022-01-25 DIAGNOSIS — N13.8 BPH WITH URINARY OBSTRUCTION: Primary | ICD-10-CM

## 2022-01-25 DIAGNOSIS — N40.1 BPH WITH URINARY OBSTRUCTION: Primary | ICD-10-CM

## 2022-01-25 LAB — POST VOID RESIDUAL (PVR): 357 ML

## 2022-01-25 PROCEDURE — 1123F ACP DISCUSS/DSCN MKR DOCD: CPT | Performed by: UROLOGY

## 2022-01-25 PROCEDURE — 4040F PNEUMOC VAC/ADMIN/RCVD: CPT | Performed by: UROLOGY

## 2022-01-25 PROCEDURE — 51798 US URINE CAPACITY MEASURE: CPT | Performed by: UROLOGY

## 2022-01-25 PROCEDURE — G8417 CALC BMI ABV UP PARAM F/U: HCPCS | Performed by: UROLOGY

## 2022-01-25 PROCEDURE — G8484 FLU IMMUNIZE NO ADMIN: HCPCS | Performed by: UROLOGY

## 2022-01-25 PROCEDURE — 1036F TOBACCO NON-USER: CPT | Performed by: UROLOGY

## 2022-01-25 PROCEDURE — 99214 OFFICE O/P EST MOD 30 MIN: CPT | Performed by: UROLOGY

## 2022-01-25 PROCEDURE — G8427 DOCREV CUR MEDS BY ELIG CLIN: HCPCS | Performed by: UROLOGY

## 2022-01-25 PROCEDURE — 3017F COLORECTAL CA SCREEN DOC REV: CPT | Performed by: UROLOGY

## 2022-01-25 NOTE — PROGRESS NOTES
Teri Edmonds MD        [address] [place], [place] [id number]  Dept: [phone]  Dept Fax: [phone]: [address] [address] Urology Office Note      Patient:  Uday Soliman  YOB: 1950    The patient is a 66 y.o. 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 Recent cystoscopy demonstrating mild fossa navicularis stricture  Many failed medications  Present with wife in office today, very frustrated  He is s/p TURP using bipolar loop with Dr. Enriqueta Forte on 08/13/19. Surgical pathology revealed focal high-grade prostatic intraepithelial neoplasia, glandular and stromal hyperplasia with chronic inflammation, 14 grams removed. He comes in today with his wife. Hx is obtained from the patient and the medical record.          Requested/reviewed records from Mallory De,  office and/or outside physician/EMR    (Patient's old records have been requested, reviewed and pertinent findings summarized in today's note.)    Procedures Today: N/A    Last several PSA's:  Lab Results   Component Value Date    PSA 0.66 07/24/2019    PSA 1.64 (H) 06/19/2018    PSA 1.26 (H) 08/14/2017       Last total testosterone:  No results found for: TESTOSTERONE    Urinalysis today:  Results for POC orders placed in visit on 01/25/22   poct post void residual   Result Value Ref Range    post void residual 357 ml       Last BUN and creatinine:  Lab Results   Component Value Date    BUN 18 07/10/2021     Lab Results   Component Value Date    CREATININE 1.1 07/10/2021       Imaging Reviewed during this Office Visit:   Matias Barton MD independently reviewed the images and verified the radiology reports from:    No results found.     PAST MEDICAL, FAMILY AND SOCIAL HISTORY:  Past Medical History:   Diagnosis Date    Arthritis     Back pain     CAD in native artery 6/14/2021    Diarrhea     Hypertension     Infection of prosthetic shoulder joint ([address] 7/24/2020    PFO (patent foramen ovale) 8/2012    noted on ROSITA following stroke    Propionibacterium infection 7/24/2020    Rotator cuff injury     Stroke [facility])     August 19/2012    TIA (transient ischemic attack) 05/2018     Past Surgical History:   Procedure Laterality Date    CHOLECYSTECTOMY  06/25/2017    CORONARY ANGIOPLASTY WITH STENT PLACEMENT      CYST INCISION AND DRAINAGE      CYSTOSCOPY N/A 12/17/2019    CYSTOSCOPY, WITH  URETHRAL DILATION [facility] Mer[facility]    CYSTOSCOPY WITH BLADDER BOTOX performed by Merissa Guzmán MD at [facility]  06/23/2017    ROTATOR CUFF REPAIR Left 03/12/2013    right 4/2019    SHOULDER SURGERY Right 03/02/2020    SHOULDER SURGERY  06/2020    frozen shoulder, repair nerve elbow and palm-dr eduardo     TURP N/A 8/13/2019    TRANSURETHRAL RESECTION PROSTATE performed by Dheeraj He MD at [address] [place] [address] History   Problem Relation Age of Onset    Arthritis Mother     Vision Loss Father     Cancer Brother     Stroke Maternal Uncle     Prostate Cancer Neg Hx      Outpatient Medications Marked as Taking for the 1/25/22 encounter (Office Visit) with Merissa Guzmán MD   Medication Sig Dispense Refill    isosorbide mononitrate (IMDUR) 30 MG extended release tablet Take 1 tablet by mouth daily 90 tablet 1    aspirin 81 MG chewable tablet Take 1 tablet by mouth daily 30 tablet 3    clopidogrel (PLAVIX) 75 MG tablet Take 300 mg the first dose then 75 after (Patient taking differently: Take 75 mg by mouth daily ) 90 tablet 3    nitroGLYCERIN (NITROSTAT) 0.4 MG SL tablet up to max of 3 total doses. If no relief after 1 dose, call 911. 25 tablet 1    tamsulosin (FLOMAX) 0.4 MG capsule Take 0.4 mg by mouth nightly       losartan (COZAAR) 100 MG tablet Take 50 mg by mouth daily       hydrochlorothiazide (HYDRODIURIL) 12.5 MG tablet TAKE 1 TABLET BY MOUTH ONCE DAILY      Potassium 99 MG TABS Take 3 tablets by mouth daily      Multiple Vitamins-Minerals (PRESERVISION AREDS PO) Take by mouth      Ascorbic Acid (VITAMIN C) 500 MG tablet Take 1,000 mg by mouth          Adhesive tape  Social History     Tobacco Use   Smoking Status Never Smoker   Smokeless Tobacco Never Used      (If patient a smoker, smoking cessation counseling offered)   Social History     Substance and Sexual Activity   Alcohol Use No       REVIEW OF SYSTEMS:  Constitutional: negative  Eyes: negative  Respiratory: negative  Cardiovascular: negative  Gastrointestinal: negative  Genitourinary: see HPI  Musculoskeletal: negative  Skin: negative   Neurological: negative  Hematological/Lymphatic: negative  Psychological: negative        Physical Exam:    This a 66 y.o. male  Vitals:    01/25/22 1138   BP: 138/72     Body mass index is 40.17 kg/m². Constitutional: Patient in no acute distress;         Assessment and Plan        1. BPH with urinary obstruction    2. OAB (overactive bladder)               Plan:     Recent heart cath--Getting cardiac stents placed. Would need clearance before any surgery  Also getting orthopedic workup. Still very bothersome oab- not at treatment goal.  hx of turp/botox/dilation. Pt is considering interstim when other medical issues improve. Follow up in six months      Prescriptions Ordered:  No orders of the defined types were placed in this encounter.      Orders Placed:  Orders Placed This Encounter   Procedures    POCT Urinalysis No Micro (Auto)    poct post void residual     Bladder scan            KAUSHIK Lopez MD

## 2022-01-31 NOTE — PROCEDURES
[address] [place] [address]                                 EVENT MONITOR    PATIENT NAME: Jason Goldman                       :        1950  MED REC NO:   [id number]                           ROOM:  ACCOUNT NO:   [de-identified]                           ADMIT DATE: 01/10/2022  PROVIDER:     Lawrence Freire M.D.    TEST TYPE:  EVENT MONITOR    CLINICAL HISTORY AND INDICATION:  This is a patient with palpitation. EVENT MONITOR DESCRIPTION:  Event monitor was attached to the patient  01/10/2022 to 2022. EVENT MONITOR FINDINGS:  Baseline rhythm showed sinus rhythm with  episodes of sinus tachycardia noted on multiple occasions. Nonspecific  PACs and PVCs were noted. One episode of three beats of  supraventricular tachycardia was also noted. Otherwise, unremarkable  event monitor. CONCLUSION:  1. Pretty much sinus rhythm with no significant arrhythmias. 2.  Nonspecific extrasystole. 3.  Episodes of sinus tachycardia was noted on several occasions that  need to be correlated with the patient activity at that time.         Erasto Stein M.D.    D: 2022 7:22:48       T: 2022 7:48:34     [email or web address]  Job#: [id number]     Doc#: [id number]    CC:

## 2022-03-01 RX ORDER — ISOSORBIDE MONONITRATE 30 MG/1
30 TABLET, EXTENDED RELEASE ORAL DAILY
Qty: 90 TABLET | Refills: 1 | Status: SHIPPED | OUTPATIENT
Start: 2022-03-01

## 2022-04-13 PROCEDURE — 72141 MRI NECK SPINE W/O DYE: CPT

## 2022-04-15 ENCOUNTER — HOSPITAL ENCOUNTER (OUTPATIENT)
Dept: MRI IMAGING | Age: 72
Discharge: HOME | End: 2022-04-13
Payer: MEDICARE

## 2022-04-15 DIAGNOSIS — M54.12 BRACHIAL NEURITIS: ICD-10-CM

## 2022-04-15 DIAGNOSIS — M50.320 DEGENERATION OF INTERVERTEBRAL DISC OF MID-CERVICAL REGION, UNSPECIFIED SPINAL LEVEL: ICD-10-CM

## 2022-04-15 DIAGNOSIS — M48.02 SPINAL STENOSIS IN CERVICAL REGION: ICD-10-CM

## 2022-04-27 ENCOUNTER — TELEPHONE (OUTPATIENT)
Dept: CARDIOLOGY CLINIC | Age: 72
End: 2022-04-27

## 2022-04-27 NOTE — TELEPHONE ENCOUNTER
Zain Tim wife called to schedule an appt for Germán Casanova for a pre op clearance, with Dr Agus Riddle. DOLV=12-02-21, Surgery is TBA, he is having disc surgery in his neck, surgery will be at [facility], by Doctor Joanne Renteria, phone is [phone]. Zain Tim wife is going to call surgeons office to see if have a form for Agus Riddle to fill out, and if he needs pre op testing before he sees Agus Riddle.

## 2022-04-27 NOTE — TELEPHONE ENCOUNTER
Spoke to patient's wife Leon Morin. Appt scheduled for pre-op clearance 5/9 with Dr Mackenzie Thomas.

## 2022-05-09 ENCOUNTER — OFFICE VISIT (OUTPATIENT)
Dept: CARDIOLOGY CLINIC | Age: 72
End: 2022-05-09
Payer: MEDICARE

## 2022-05-09 VITALS
DIASTOLIC BLOOD PRESSURE: 75 MMHG | SYSTOLIC BLOOD PRESSURE: 126 MMHG | BODY MASS INDEX: 41.3 KG/M2 | HEIGHT: 71 IN | WEIGHT: 295 LBS | HEART RATE: 63 BPM

## 2022-05-09 DIAGNOSIS — E78.01 FAMILIAL HYPERCHOLESTEROLEMIA: ICD-10-CM

## 2022-05-09 DIAGNOSIS — I25.10 CORONARY ARTERY DISEASE INVOLVING NATIVE CORONARY ARTERY OF NATIVE HEART WITHOUT ANGINA PECTORIS: ICD-10-CM

## 2022-05-09 DIAGNOSIS — I10 PRIMARY HYPERTENSION: Primary | ICD-10-CM

## 2022-05-09 PROCEDURE — 1123F ACP DISCUSS/DSCN MKR DOCD: CPT | Performed by: NUCLEAR MEDICINE

## 2022-05-09 PROCEDURE — 3017F COLORECTAL CA SCREEN DOC REV: CPT | Performed by: NUCLEAR MEDICINE

## 2022-05-09 PROCEDURE — 99214 OFFICE O/P EST MOD 30 MIN: CPT | Performed by: NUCLEAR MEDICINE

## 2022-05-09 PROCEDURE — G8427 DOCREV CUR MEDS BY ELIG CLIN: HCPCS | Performed by: NUCLEAR MEDICINE

## 2022-05-09 PROCEDURE — G8417 CALC BMI ABV UP PARAM F/U: HCPCS | Performed by: NUCLEAR MEDICINE

## 2022-05-09 PROCEDURE — 4040F PNEUMOC VAC/ADMIN/RCVD: CPT | Performed by: NUCLEAR MEDICINE

## 2022-05-09 PROCEDURE — 1036F TOBACCO NON-USER: CPT | Performed by: NUCLEAR MEDICINE

## 2022-05-09 NOTE — PROGRESS NOTES
[place] [address] [place] [address].  [address]  [facility] [address]  Dept: [phone]  Dept Fax: [phone]  Loc: [phone]    Visit Date: 5/9/2022    Trung Anderson is a 66 y.o. male who presents todayfor:  Chief Complaint   Patient presents with    Follow-up    Coronary Artery Disease    Hypertension    Hyperlipidemia   going for neck surgery   Here for clearance  No chest pain  No changes in breathing  Very limited patient  Not much walking or doing   Previous stents  BP is stable  Less dizziness  No syncope  On no statins for hyperlipidemia  Doesn't want to take them         HPI:  HPI  Past Medical History:   Diagnosis Date    Arthritis     Back pain     CAD in native artery 6/14/2021    Diarrhea     Hypertension     Infection of prosthetic shoulder joint ([address] 7/24/2020    PFO (patent foramen ovale) 8/2012    noted on ROSITA following stroke    Propionibacterium infection 7/24/2020    Rotator cuff injury     Stroke [facility])     August 19/2012    TIA (transient ischemic attack) 05/2018      Past Surgical History:   Procedure Laterality Date    CHOLECYSTECTOMY  06/25/2017    CORONARY ANGIOPLASTY WITH STENT PLACEMENT      CYST INCISION AND DRAINAGE      CYSTOSCOPY N/A 12/17/2019    CYSTOSCOPY, WITH  URETHRAL DILATION performed by Radha Mccrary MD at [address] [place] [address] 2/18/2020    CYSTOSCOPY WITH BLADDER BOTOX performed by Radha Mccrary MD at [address] [place] [address]  06/23/2017    ROTATOR CUFF REPAIR Left 03/12/2013    right 4/2019    SHOULDER SURGERY Right 03/02/2020    SHOULDER SURGERY  06/2020    frozen shoulder, repair nerve elbow and palm-dr eduardo     TURP N/A 8/13/2019    TRANSURETHRAL RESECTION PROSTATE performed by Talya Zuleta MD at [address] [place] [address] History   Problem Relation Age of Onset    Arthritis Mother     Vision Loss Father     Cancer Brother     Stroke Maternal Uncle     Prostate Cancer Neg Hx      Social History Tobacco Use    Smoking status: Never Smoker    Smokeless tobacco: Never Used   Substance Use Topics    Alcohol use: No      Current Outpatient Medications   Medication Sig Dispense Refill    isosorbide mononitrate (IMDUR) 30 MG extended release tablet Take 1 tablet by mouth daily 90 tablet 1    aspirin 81 MG chewable tablet Take 1 tablet by mouth daily 30 tablet 3    clopidogrel (PLAVIX) 75 MG tablet Take 300 mg the first dose then 75 after (Patient taking differently: Take 75 mg by mouth daily ) 90 tablet 3    nitroGLYCERIN (NITROSTAT) 0.4 MG SL tablet up to max of 3 total doses. If no relief after 1 dose, call 911. 25 tablet 1    tamsulosin (FLOMAX) 0.4 MG capsule Take 0.4 mg by mouth nightly       losartan (COZAAR) 100 MG tablet Take 50 mg by mouth daily       hydrochlorothiazide (HYDRODIURIL) 12.5 MG tablet TAKE 1 TABLET BY MOUTH ONCE DAILY      Potassium 99 MG TABS Take 3 tablets by mouth daily      Multiple Vitamins-Minerals (PRESERVISION AREDS PO) Take by mouth      Ascorbic Acid (VITAMIN C) 500 MG tablet Take 1,000 mg by mouth        No current facility-administered medications for this visit.      Allergies   Allergen Reactions    Adhesive Tape Rash     Health Maintenance   Topic Date Due    Annual Wellness Visit (AWV)  Never done    COVID-19 Vaccine (1) Never done    Depression Screen  Never done    Hepatitis C screen  Never done    DTaP/Tdap/Td vaccine (1 - Tdap) Never done    Colorectal Cancer Screen  Never done    Pneumococcal 65+ years Vaccine (1 - PCV) Never done    Potassium  07/10/2022    Creatinine  07/10/2022    Flu vaccine (Season Ended) 09/01/2022    Lipids  06/15/2026    Shingles vaccine  Completed    Hepatitis A vaccine  Aged Out    Hepatitis B vaccine  Aged Out    Hib vaccine  Aged Out    Meningococcal (ACWY) vaccine  Aged Out       Subjective:  Review of Systems  General:   No fever, no chills, some fatigue or weight loss  Pulmonary:    some dyspnea, no wheezing  Cardiac:    Denies recent chest pain,   GI:     No nausea or vomiting, no abdominal pain  Neuro:     No dizziness or light headedness,   Musculoskeletal:  severe limitation   Extremities:   No edema, no obvious claudication       Objective:  Physical Exam  /75   Pulse 63   Ht 5' 11\" (1.803 m)   Wt 295 lb (133.8 kg)   BMI 41.14 kg/m²   General:   Well developed, well nourished  Lungs:   Clear to auscultation  Heart:    Normal S1 S2, Slight murmur. no rubs, no gallops  Abdomen:   Soft, non tender, no organomegalies, positive bowel sounds  Extremities:   No edema, no cyanosis, good peripheral pulses  Neurological:   Awake, alert, oriented. No obvious focal deficits  Musculoskelatal:  No obvious deformities    Assessment:      Diagnosis Orders   1. Primary hypertension     2. Coronary artery disease involving native coronary artery of native heart without angina pectoris     3. Familial hypercholesterolemia     discussed the risk   Discussed the meds  Discussed statins   Discussed surgery risk       Plan:  No follow-ups on file. Discussed Mi risk at length  Discussed statins   He is not interested in any cholesterol meds  Discussed MI risk   Continue risk factor modification and medical management  Thank you for allowing me to participate in the care of your patient. Please don't hesitate to contact me regarding any further issues related to the patient care    Orders Placed:  No orders of the defined types were placed in this encounter. Medications Prescribed:  No orders of the defined types were placed in this encounter. Discussed use, benefit, and side effects of prescribed medications. All patient questions answered. Pt voicedunderstanding. Instructed to continue current medications, diet and exercise. Continue risk factor modification and medical management. Patient agreed with treatment plan. Follow up as directed.     Electronically signedby Marcelina Marc MD on 5/9/2022 at 1:19 PM

## 2022-05-13 ENCOUNTER — HOSPITAL ENCOUNTER (OUTPATIENT)
Age: 72
Discharge: HOME | End: 2022-05-13
Payer: MEDICARE

## 2022-05-13 LAB
INFLUENZA A: NOT DETECTED
INFLUENZA B: NOT DETECTED
SARS-COV-2 RNA, RT PCR: NOT DETECTED

## 2022-05-13 PROCEDURE — 87636 SARSCOV2 & INF A&B AMP PRB: CPT

## 2022-05-18 ENCOUNTER — APPOINTMENT (OUTPATIENT)
Dept: GENERAL RADIOLOGY | Age: 72
End: 2022-05-18
Payer: MEDICARE

## 2022-05-18 ENCOUNTER — HOSPITAL ENCOUNTER (EMERGENCY)
Age: 72
Discharge: HOME | End: 2022-05-18
Attending: EMERGENCY MEDICINE
Payer: MEDICARE

## 2022-05-18 VITALS
SYSTOLIC BLOOD PRESSURE: 135 MMHG | BODY MASS INDEX: 39.9 KG/M2 | TEMPERATURE: 98.6 F | WEIGHT: 285 LBS | RESPIRATION RATE: 16 BRPM | HEART RATE: 80 BPM | OXYGEN SATURATION: 94 % | DIASTOLIC BLOOD PRESSURE: 66 MMHG | HEIGHT: 71 IN

## 2022-05-18 DIAGNOSIS — Z98.1 STATUS POST CERVICAL SPINAL FUSION: ICD-10-CM
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
## 2022-05-18 LAB — SARS-COV-2, NAAT: NOT  DETECTED

## 2022-05-18 PROCEDURE — 72040 X-RAY EXAM NECK SPINE 2-3 VW: CPT

## 2022-05-18 PROCEDURE — 87635 SARS-COV-2 COVID-19 AMP PRB: CPT

## 2022-05-18 PROCEDURE — 99284 EMERGENCY DEPT VISIT MOD MDM: CPT

## 2022-05-18 ASSESSMENT — PAIN - FUNCTIONAL ASSESSMENT: PAIN_FUNCTIONAL_ASSESSMENT: NONE - DENIES PAIN

## 2022-05-18 NOTE — ED NOTES
Pt resting in bed, no complaints. Spouse at bedside, call light in reach.       Jimmie Ruiz RN  05/18/22 0063

## 2022-05-18 NOTE — ED NOTES
New ETA for the EMS. They will be here in about an hour. Pt and his spouse given shelby and a popsickle. No complaints, call light in reach.       Argelia Helton RN  05/18/22 6507

## 2022-05-18 NOTE — ED PROVIDER NOTES
[address]  [address] [address] [address]  Phone: [phone]    eMERGENCY dEPARTMENT eNCOUnter           CHIEF COMPLAINT       Chief Complaint   Patient presents with    Fall     fell into the recliner, a little unsteady ever since surgery 2 days ago on cervical spine. Nurses Notes reviewed and I agree except as noted in the HPI. HISTORY OF PRESENT ILLNESS    Susan Beard is a 66 y.o. male who presented via private vehicle with above-mentioned complaints. He is status post cervical fusion 2 days ago. He said that he was moving out of his recliner by leaning on his walker. He fell forward and hit his face on the recliner. He denies headache or neck pain. He denies focal weakness or numbness. He is worried because he had recent neck surgery. He said that he has been slightly unsteady on his feet since his surgery. REVIEW OF SYSTEMS     None except as mentioned above    PAST MEDICAL HISTORY    has a past medical history of Arthritis, Back pain, CAD in native artery, Diarrhea, Hypertension, Infection of prosthetic shoulder joint ([address] PFO (patent foramen ovale), Propionibacterium infection, Rotator cuff injury, Stroke ([address] and TIA (transient ischemic attack). SURGICAL HISTORY      has a past surgical history that includes Rotator cuff repair (Left, 03/12/2013); cyst incision and drainage; Cholecystectomy (06/25/2017); ERCP (06/23/2017); TURP (N/A, 8/13/2019); Cystoscopy (N/A, 12/17/2019); Cystoscopy (N/A, 2/18/2020); shoulder surgery (Right, 03/02/2020); shoulder surgery (06/2020); Coronary angioplasty with stent; and back surgery (05/16/2022).     CURRENT MEDICATIONS       Previous Medications    ASCORBIC ACID (VITAMIN C) 500 MG TABLET    Take 1,000 mg by mouth     ASPIRIN 81 MG CHEWABLE TABLET    Take 1 tablet by mouth daily    CLOPIDOGREL (PLAVIX) 75 MG TABLET    Take 300 mg the first dose then 75 after    HYDROCHLOROTHIAZIDE (HYDRODIURIL) 12.5 MG TABLET    TAKE 1 TABLET BY MOUTH ONCE DAILY    ISOSORBIDE MONONITRATE (IMDUR) 30 MG EXTENDED RELEASE TABLET    Take 1 tablet by mouth daily    LOSARTAN (COZAAR) 100 MG TABLET    Take 50 mg by mouth daily     MULTIPLE VITAMINS-MINERALS (PRESERVISION AREDS PO)    Take by mouth    NITROGLYCERIN (NITROSTAT) 0.4 MG SL TABLET    up to max of 3 total doses. If no relief after 1 dose, call 911. POTASSIUM 99 MG TABS    Take 3 tablets by mouth daily    TAMSULOSIN (FLOMAX) 0.4 MG CAPSULE    Take 0.4 mg by mouth nightly        ALLERGIES     is allergic to adhesive tape. FAMILY HISTORY     He indicated that his mother is . He indicated that his father is . He indicated that two of his three sisters are alive. He indicated that his brother is . He indicated that the status of his maternal uncle is unknown. He indicated that the status of his neg hx is unknown.   family history includes Arthritis in his mother; Cancer in his brother; Stroke in his maternal uncle; Vision Loss in his father. SOCIAL HISTORY      reports that he has never smoked. He has never used smokeless tobacco. He reports that he does not drink alcohol and does not use drugs. PHYSICAL EXAM     INITIAL VITALS:  height is 5' 11\" (1.803 m) and weight is 285 lb (129.3 kg). His oral temperature is 98.6 °F (37 °C). His blood pressure is 135/66 and his pulse is 80. His respiration is 16 and oxygen saturation is 94%. Physical Exam  Vitals and nursing note reviewed. Constitutional:       General: He is not in acute distress. Appearance: He is well-developed. HENT:      Head: Atraumatic. Eyes:      Conjunctiva/sclera: Conjunctivae normal.      Pupils: Pupils are equal, round, and reactive to light. Neck:      Thyroid: No thyromegaly. Vascular: No JVD. Trachea: No tracheal deviation. Comments: C-collar is in place. Neck was examined was aligned stabilization.   There is mild diffuse tenderness. Cardiovascular:      Rate and Rhythm: Normal rate and regular rhythm. Heart sounds: No murmur heard. No friction rub. No gallop. Pulmonary:      Effort: Pulmonary effort is normal.      Breath sounds: Normal breath sounds. Abdominal:      General: Bowel sounds are normal.      Palpations: Abdomen is soft. Tenderness: There is no abdominal tenderness. Musculoskeletal:      Comments: He has bilateral trace pretibial pitting edema. No calf tenderness. Neurological:      Mental Status: He is alert. Cranial Nerves: No cranial nerve deficit. Motor: No weakness. Comments: He has normal strength throughout. DIFFERENTIAL DIAGNOSIS:       DIAGNOSTIC RESULTS       RADIOLOGY:   C-spine x-ray showed no fracture or dislocation. LABS:   Labs Reviewed   COVID-19, RAPID       EMERGENCY DEPARTMENT COURSE:   Vitals:    Vitals:    05/18/22 1410   BP: 135/66   Pulse: 80   Resp: 16   Temp: 98.6 °F (37 °C)   TempSrc: Oral   SpO2: 94%   Weight: 285 lb (129.3 kg)   Height: 5' 11\" (1.803 m)     He remained awake and alert, he denied neck pain or focal weakness or numbness. I discussed diagnosis and treatment plan with him and his wife. He was discharged home in good condition. FINAL IMPRESSION      1. Fall, initial encounter    2. Status post cervical spinal fusion          DISPOSITION/PLAN   Discharged home in good condition.     PATIENT REFERRED TO:  Dedra [address]  [place]  [phone]    In 2 days        DISCHARGE MEDICATIONS:  New Prescriptions    No medications on file       (Please note that portions of this note were completed with a voice recognition program.  Efforts were made to edit the dictations but occasionally words are mis-transcribed.)    MD Gina De Guzman MD  05/18/22 9544

## 2022-05-18 NOTE — ED NOTES
Pt pink, warm and dry, breathing with ease. Pt discharged to [facility] for rehab. AVS reviewed including follow up appointments, diagnosis, and care of self at home. Denies questions or concerns. Pt remains alert and oriented. Pt discharged in stable condition per EMS.       Lorren Kanner, RN  05/18/22 7580

## 2022-05-18 NOTE — ED NOTES
ETA for EMS is 1700. Pt resting in bed, no complaints, declined something to drink at this time. Call light in reach, spouse at bedside.       Steven Barrett, SHAUN  05/18/22 1906

## 2022-05-18 NOTE — ED TRIAGE NOTES
Pt had cervical surgery 2 days ago. Pt fell into his recliner as he was trying to sit down. Pt has his neck brace on but states he heard a crack. Denies any pain of neck or elsewhere. Pt uses a walker and was supposed to start physical therapy today.

## 2022-05-26 NOTE — PLAN OF CARE
Advised patient and wife that we are able to change the PICC dressing as needed if it does not feel well.
Satisfactory

## 2022-07-23 ENCOUNTER — HOSPITAL ENCOUNTER (INPATIENT)
Age: 72
LOS: 8 days | Discharge: NURSING, REHAB OR LONG-TERM CARE | DRG: 178 | End: 2022-08-02
Attending: EMERGENCY MEDICINE | Admitting: INTERNAL MEDICINE
Payer: MEDICARE

## 2022-07-23 ENCOUNTER — APPOINTMENT (OUTPATIENT)
Dept: GENERAL RADIOLOGY | Age: 72
DRG: 178 | End: 2022-07-23
Payer: MEDICARE

## 2022-07-23 DIAGNOSIS — U07.1 COVID-19: Primary | ICD-10-CM

## 2022-07-23 DIAGNOSIS — I63.9 RIGHT BASAL GANGLIA EMBOLIC STROKE (HCC): ICD-10-CM

## 2022-07-23 DIAGNOSIS — R53.1 GENERAL WEAKNESS: ICD-10-CM

## 2022-07-23 LAB
ANION GAP SERPL CALCULATED.3IONS-SCNC: 14 MEQ/L (ref 8–16)
BASOPHILS # BLD: 0.7 %
BASOPHILS ABSOLUTE: 0 THOU/MM3 (ref 0–0.1)
BUN BLDV-MCNC: 17 MG/DL (ref 7–22)
CALCIUM SERPL-MCNC: 8.6 MG/DL (ref 8.5–10.5)
CHLORIDE BLD-SCNC: 103 MEQ/L (ref 98–111)
CO2: 22 MEQ/L (ref 23–33)
CREAT SERPL-MCNC: 1.3 MG/DL (ref 0.4–1.2)
EOSINOPHIL # BLD: 0.2 %
EOSINOPHILS ABSOLUTE: 0 THOU/MM3 (ref 0–0.4)
ERYTHROCYTE [DISTWIDTH] IN BLOOD BY AUTOMATED COUNT: 14.6 % (ref 11.5–14.5)
ERYTHROCYTE [DISTWIDTH] IN BLOOD BY AUTOMATED COUNT: 46.1 FL (ref 35–45)
GFR SERPL CREATININE-BSD FRML MDRD: 54 ML/MIN/1.73M2
GLUCOSE BLD-MCNC: 158 MG/DL (ref 70–108)
HCT VFR BLD CALC: 43 % (ref 42–52)
HEMOGLOBIN: 14.1 GM/DL (ref 14–18)
IMMATURE GRANS (ABS): 0.01 THOU/MM3 (ref 0–0.07)
IMMATURE GRANULOCYTES: 0.2 %
LYMPHOCYTES # BLD: 14.7 %
LYMPHOCYTES ABSOLUTE: 0.6 THOU/MM3 (ref 1–4.8)
MCH RBC QN AUTO: 28.4 PG (ref 26–33)
MCHC RBC AUTO-ENTMCNC: 32.8 GM/DL (ref 32.2–35.5)
MCV RBC AUTO: 86.7 FL (ref 80–94)
MONOCYTES # BLD: 15.2 %
MONOCYTES ABSOLUTE: 0.6 THOU/MM3 (ref 0.4–1.3)
NUCLEATED RED BLOOD CELLS: 0 /100 WBC
PLATELET # BLD: 133 THOU/MM3 (ref 130–400)
PLATELET ESTIMATE: ADEQUATE
PMV BLD AUTO: 11.1 FL (ref 9.4–12.4)
POTASSIUM REFLEX MAGNESIUM: 3.6 MEQ/L (ref 3.5–5.2)
PRO-BNP: 556.1 PG/ML (ref 0–900)
RBC # BLD: 4.96 MILL/MM3 (ref 4.7–6.1)
SCAN OF BLOOD SMEAR: NORMAL
SEG NEUTROPHILS: 69 %
SEGMENTED NEUTROPHILS ABSOLUTE COUNT: 2.9 THOU/MM3 (ref 1.8–7.7)
SODIUM BLD-SCNC: 139 MEQ/L (ref 135–145)
TROPONIN T: < 0.01 NG/ML
WBC # BLD: 4.2 THOU/MM3 (ref 4.8–10.8)

## 2022-07-23 PROCEDURE — 2580000003 HC RX 258

## 2022-07-23 PROCEDURE — 93005 ELECTROCARDIOGRAM TRACING: CPT | Performed by: EMERGENCY MEDICINE

## 2022-07-23 PROCEDURE — 96372 THER/PROPH/DIAG INJ SC/IM: CPT

## 2022-07-23 PROCEDURE — G0378 HOSPITAL OBSERVATION PER HR: HCPCS

## 2022-07-23 PROCEDURE — 71045 X-RAY EXAM CHEST 1 VIEW: CPT

## 2022-07-23 PROCEDURE — 6360000002 HC RX W HCPCS: Performed by: INTERNAL MEDICINE

## 2022-07-23 PROCEDURE — 99223 1ST HOSP IP/OBS HIGH 75: CPT | Performed by: INTERNAL MEDICINE

## 2022-07-23 PROCEDURE — 99285 EMERGENCY DEPT VISIT HI MDM: CPT

## 2022-07-23 PROCEDURE — 96360 HYDRATION IV INFUSION INIT: CPT

## 2022-07-23 PROCEDURE — 96361 HYDRATE IV INFUSION ADD-ON: CPT

## 2022-07-23 PROCEDURE — 80048 BASIC METABOLIC PNL TOTAL CA: CPT

## 2022-07-23 PROCEDURE — 83880 ASSAY OF NATRIURETIC PEPTIDE: CPT

## 2022-07-23 PROCEDURE — 36415 COLL VENOUS BLD VENIPUNCTURE: CPT

## 2022-07-23 PROCEDURE — 85025 COMPLETE CBC W/AUTO DIFF WBC: CPT

## 2022-07-23 PROCEDURE — 2580000003 HC RX 258: Performed by: INTERNAL MEDICINE

## 2022-07-23 PROCEDURE — 84484 ASSAY OF TROPONIN QUANT: CPT

## 2022-07-23 RX ORDER — SODIUM CHLORIDE 9 MG/ML
1000 INJECTION, SOLUTION INTRAVENOUS CONTINUOUS
Status: DISCONTINUED | OUTPATIENT
Start: 2022-07-23 | End: 2022-07-23 | Stop reason: SDUPTHER

## 2022-07-23 RX ORDER — SODIUM CHLORIDE 0.9 % (FLUSH) 0.9 %
10 SYRINGE (ML) INJECTION EVERY 12 HOURS SCHEDULED
Status: DISCONTINUED | OUTPATIENT
Start: 2022-07-23 | End: 2022-08-02 | Stop reason: HOSPADM

## 2022-07-23 RX ORDER — TAMSULOSIN HYDROCHLORIDE 0.4 MG/1
0.4 CAPSULE ORAL NIGHTLY
Status: DISCONTINUED | OUTPATIENT
Start: 2022-07-24 | End: 2022-08-02 | Stop reason: HOSPADM

## 2022-07-23 RX ORDER — NITROGLYCERIN 0.4 MG/1
0.4 TABLET SUBLINGUAL EVERY 5 MIN PRN
Status: DISCONTINUED | OUTPATIENT
Start: 2022-07-23 | End: 2022-08-02 | Stop reason: HOSPADM

## 2022-07-23 RX ORDER — ASPIRIN 81 MG/1
81 TABLET, CHEWABLE ORAL DAILY
Status: DISCONTINUED | OUTPATIENT
Start: 2022-07-24 | End: 2022-08-02 | Stop reason: HOSPADM

## 2022-07-23 RX ORDER — POTASSIUM CHLORIDE 600 MG/1
8 TABLET, FILM COATED, EXTENDED RELEASE ORAL
Status: DISCONTINUED | OUTPATIENT
Start: 2022-07-24 | End: 2022-08-02 | Stop reason: HOSPADM

## 2022-07-23 RX ORDER — ONDANSETRON 2 MG/ML
4 INJECTION INTRAMUSCULAR; INTRAVENOUS EVERY 6 HOURS PRN
Status: DISCONTINUED | OUTPATIENT
Start: 2022-07-23 | End: 2022-08-02 | Stop reason: HOSPADM

## 2022-07-23 RX ORDER — ACETAMINOPHEN 325 MG/1
650 TABLET ORAL EVERY 6 HOURS PRN
Status: DISCONTINUED | OUTPATIENT
Start: 2022-07-23 | End: 2022-08-02 | Stop reason: HOSPADM

## 2022-07-23 RX ORDER — SODIUM CHLORIDE 9 MG/ML
INJECTION, SOLUTION INTRAVENOUS CONTINUOUS
Status: DISCONTINUED | OUTPATIENT
Start: 2022-07-23 | End: 2022-08-01

## 2022-07-23 RX ORDER — SODIUM CHLORIDE 9 MG/ML
INJECTION, SOLUTION INTRAVENOUS PRN
Status: DISCONTINUED | OUTPATIENT
Start: 2022-07-23 | End: 2022-08-02 | Stop reason: HOSPADM

## 2022-07-23 RX ORDER — CLOPIDOGREL BISULFATE 75 MG/1
75 TABLET ORAL DAILY
Status: DISCONTINUED | OUTPATIENT
Start: 2022-07-24 | End: 2022-08-02 | Stop reason: HOSPADM

## 2022-07-23 RX ORDER — POLYETHYLENE GLYCOL 3350 17 G/17G
17 POWDER, FOR SOLUTION ORAL DAILY PRN
Status: DISCONTINUED | OUTPATIENT
Start: 2022-07-23 | End: 2022-08-02 | Stop reason: HOSPADM

## 2022-07-23 RX ORDER — SODIUM CHLORIDE 0.9 % (FLUSH) 0.9 %
10 SYRINGE (ML) INJECTION PRN
Status: DISCONTINUED | OUTPATIENT
Start: 2022-07-23 | End: 2022-08-02 | Stop reason: HOSPADM

## 2022-07-23 RX ORDER — ENOXAPARIN SODIUM 100 MG/ML
30 INJECTION SUBCUTANEOUS 2 TIMES DAILY
Status: DISCONTINUED | OUTPATIENT
Start: 2022-07-23 | End: 2022-08-02 | Stop reason: HOSPADM

## 2022-07-23 RX ORDER — ONDANSETRON 4 MG/1
4 TABLET, ORALLY DISINTEGRATING ORAL EVERY 8 HOURS PRN
Status: DISCONTINUED | OUTPATIENT
Start: 2022-07-23 | End: 2022-08-02 | Stop reason: HOSPADM

## 2022-07-23 RX ORDER — HYDROCHLOROTHIAZIDE 12.5 MG/1
12.5 CAPSULE, GELATIN COATED ORAL DAILY
Status: DISCONTINUED | OUTPATIENT
Start: 2022-07-24 | End: 2022-07-25

## 2022-07-23 RX ORDER — ACETAMINOPHEN 650 MG/1
650 SUPPOSITORY RECTAL EVERY 6 HOURS PRN
Status: DISCONTINUED | OUTPATIENT
Start: 2022-07-23 | End: 2022-08-02 | Stop reason: HOSPADM

## 2022-07-23 RX ORDER — ISOSORBIDE MONONITRATE 30 MG/1
30 TABLET, EXTENDED RELEASE ORAL DAILY
Status: DISCONTINUED | OUTPATIENT
Start: 2022-07-24 | End: 2022-08-02 | Stop reason: HOSPADM

## 2022-07-23 RX ADMIN — SODIUM CHLORIDE 1000 ML: 9 INJECTION, SOLUTION INTRAVENOUS at 16:09

## 2022-07-23 RX ADMIN — SODIUM CHLORIDE 1000 ML: 9 INJECTION, SOLUTION INTRAVENOUS at 17:46

## 2022-07-23 RX ADMIN — SODIUM CHLORIDE: 9 INJECTION, SOLUTION INTRAVENOUS at 22:03

## 2022-07-23 RX ADMIN — ENOXAPARIN SODIUM 30 MG: 100 INJECTION SUBCUTANEOUS at 22:04

## 2022-07-23 ASSESSMENT — ENCOUNTER SYMPTOMS
CONSTIPATION: 0
CHOKING: 0
NAUSEA: 0
EYE REDNESS: 0
SHORTNESS OF BREATH: 1
SORE THROAT: 0
WHEEZING: 0
COUGH: 1
VOMITING: 0
DIARRHEA: 0

## 2022-07-23 ASSESSMENT — PAIN - FUNCTIONAL ASSESSMENT
PAIN_FUNCTIONAL_ASSESSMENT: NONE - DENIES PAIN
PAIN_FUNCTIONAL_ASSESSMENT: ACTIVITIES ARE NOT PREVENTED
PAIN_FUNCTIONAL_ASSESSMENT: NONE - DENIES PAIN

## 2022-07-23 ASSESSMENT — LIFESTYLE VARIABLES
HOW OFTEN DO YOU HAVE A DRINK CONTAINING ALCOHOL: NEVER
HOW MANY STANDARD DRINKS CONTAINING ALCOHOL DO YOU HAVE ON A TYPICAL DAY: PATIENT DOES NOT DRINK

## 2022-07-23 ASSESSMENT — PAIN SCALES - GENERAL: PAINLEVEL_OUTOF10: 0

## 2022-07-23 NOTE — H&P
HISTORY AND PHYSICAL             Date: 7/23/2022        Patient Name: Jason Ruggiero     YOB: 1950      Age:  74 y.o.     Chief Complaint     Chief Complaint   Patient presents with    Difficulty Walking          History Obtained From   patient    History of Present Illness   Patient is a 64-year-old  male who presents to the ED via EMS from home for difficulty walking that started today  He was diagnosed with COVID yesterday at an outside facility and was started on PAXLOVID  He was unable to feed this morning has been having difficulty ambulating this morning  He denies any fever but reports nonproductive cough no chest pain or palpitation no shortness of breath  Denies any nausea vomiting or diarrhea no abdominal discomfort no dysuria fine no tingling but certainly reports generalized weakness  No evidence of syncope or presyncopal episodes    Past Medical History     Past Medical History:   Diagnosis Date    Arthritis     Back pain     CAD in native artery 6/14/2021    Diarrhea     Hypertension     Infection of prosthetic shoulder joint ([address] 7/24/2020    PFO (patent foramen ovale) 8/2012    noted on ROSITA following stroke    Propionibacterium infection 7/24/2020    Rotator cuff injury     Stroke [facility])     August 19/2012    TIA (transient ischemic attack) 05/2018        Past Surgical History     Past Surgical History:   Procedure Laterality Date    BACK SURGERY  05/16/2022    C5-6    BREAST CYST INCISION AND DRAINAGE      CHOLECYSTECTOMY  06/25/2017    CORONARY ANGIOPLASTY WITH STENT PLACEMENT      CYSTOSCOPY N/A 12/17/2019    CYSTOSCOPY, WITH  URETHRAL DILATION performed by Chelsey Bermudez MD at [address] 2/18/2020    CYSTOSCOPY WITH BLADDER BOTOX performed by Chelsey Bermudez MD at [address]    ERCP  06/23/2017    ROTATOR CUFF REPAIR Left 03/12/2013    right 4/2019    SHOULDER SURGERY Right 03/02/2020    SHOULDER SURGERY  06/2020    frozen shoulder, repair nerve elbow and palm-dr eduardo TRANSURETHRAL RESECTION OF PROSTATE N/A 8/13/2019    TRANSURETHRAL RESECTION PROSTATE performed by Malaika Camargo MD at [place] [facility]        Medications Prior to Admission     Prior to Admission medications    Medication Sig Start Date End Date Taking? Authorizing Provider   isosorbide mononitrate (IMDUR) 30 MG extended release tablet Take 1 tablet by mouth daily 3/1/22  Yes Karen Cobb MD   aspirin 81 MG chewable tablet Take 1 tablet by mouth daily 9/21/21  Yes Karen Cobb MD   clopidogrel (PLAVIX) 75 MG tablet Take 300 mg the first dose then 75 after  Patient taking differently: Take 75 mg by mouth in the morning. 8/4/21  Yes Karen Cobb MD   nitroGLYCERIN (NITROSTAT) 0.4 MG SL tablet up to max of 3 total doses.  If no relief after 1 dose, call 911. 6/15/21  Yes Karen Cobb MD   tamsulosin (FLOMAX) 0.4 MG capsule Take 0.4 mg by mouth nightly    Yes Historical Provider, MD   losartan (COZAAR) 100 MG tablet Take 50 mg by mouth daily    Yes Historical Provider, MD   hydrochlorothiazide (HYDRODIURIL) 12.5 MG tablet TAKE 1 TABLET BY MOUTH ONCE DAILY 7/13/20  Yes Historical Provider, MD   Potassium 99 MG TABS Take 3 tablets by mouth daily   Yes Historical Provider, MD   Multiple Vitamins-Minerals (PRESERVISION AREDS PO) Take by mouth   Yes Historical Provider, MD   Ascorbic Acid (VITAMIN C) 500 MG tablet Take 1,000 mg by mouth    Yes Historical Provider, MD        Allergies   Adhesive tape    Social History     Social History       Tobacco History       Smoking Status  Never      Smokeless Tobacco Use  Never              Alcohol History       Alcohol Use Status  No              Drug Use       Drug Use Status  No              Sexual Activity       Sexually Active  Not Asked                    Family History     Family History   Problem Relation Age of Onset    Arthritis Mother     Vision Loss Father     Cancer Brother     Stroke Maternal Uncle     Prostate Cancer Neg Hx        Review of Systems   Review of Systems  All the systems were reviewed and were negative except for those described in the history of present illness  Physical Exam   BP (!) 142/76   Pulse 67   Temp 98.3 °F (36.8 °C) (Oral)   Resp 21   Ht 5' 11\" (1.803 m)   Wt 280 lb (127 kg)   SpO2 93%   BMI 39.05 kg/m²     Physical Exam  General examination:alert oriented appears to be fairly comfortable at rest on room air   Head: Normocephalic atraumatic  Eyes: Pupils equal react light and commendation  Extraocular muscles intact  ENT: Unremarkable  Neck: Supple  Heart: Sounds are normal no murmurs  Lungs: Air entry equal bilaterally no wheeze no crackles  Abdomen: Obese no tenderness no organomegaly bowel sounds are present  Extremities: Trace pedal edema  CNS: No focal neurological deficits  Skin: No rashes or bruises noted    Labs      Recent Results (from the past 24 hour(s))   EKG 12 Lead    Collection Time: 07/23/22 12:47 PM   Result Value Ref Range    Ventricular Rate 74 BPM    Atrial Rate 74 BPM    P-R Interval 210 ms    QRS Duration 98 ms    Q-T Interval 380 ms    QTc Calculation (Bazett) 421 ms    P Axis 36 degrees    R Axis 32 degrees    T Axis 52 degrees   CBC with Auto Differential    Collection Time: 07/23/22  1:47 PM   Result Value Ref Range    WBC 4.2 (L) 4.8 - 10.8 thou/mm3    RBC 4.96 4.70 - 6.10 mill/mm3    Hemoglobin 14.1 14.0 - 18.0 gm/dl    Hematocrit 43.0 42.0 - 52.0 %    MCV 86.7 80.0 - 94.0 fL    MCH 28.4 26.0 - 33.0 pg    MCHC 32.8 32.2 - 35.5 gm/dl    RDW-CV 14.6 (H) 11.5 - 14.5 %    RDW-SD 46.1 (H) 35.0 - 45.0 fL   Basic Metabolic Panel w/ Reflex to MG    Collection Time: 07/23/22  1:47 PM   Result Value Ref Range    Sodium 139 135 - 145 meq/L    Potassium reflex Magnesium 3.6 3.5 - 5.2 meq/L    Chloride 103 98 - 111 meq/L    CO2 22 (L) 23 - 33 meq/L    Glucose 158 (H) 70 - 108 mg/dL    BUN 17 7 - 22 mg/dL    Creatinine 1.3 (H) 0.4 - 1.2 mg/dL    Calcium 8.6 8.5 - 10.5 mg/dL   Troponin    Collection Time: 07/23/22  1:47 PM   Result Value Ref Range    Troponin T < 0.010 ng/ml   Brain Natriuretic Peptide    Collection Time: 07/23/22  1:47 PM   Result Value Ref Range    Pro-.1 0.0 - 900.0 pg/mL   Anion Gap    Collection Time: 07/23/22  1:47 PM   Result Value Ref Range    Anion Gap 14.0 8.0 - 16.0 meq/L   Glomerular Filtration Rate, Estimated    Collection Time: 07/23/22  1:47 PM   Result Value Ref Range    Est, Glom Filt Rate 54 (A) ml/min/1.73m2        Imaging/Diagnostics Last 24 Hours   XR CHEST PORTABLE    Result Date: 7/23/2022  PROCEDURE: XR CHEST PORTABLE CLINICAL INFORMATION: Shortness of breath TECHNIQUE: Mobile AP chest radiograph. COMPARISON: Multiple AP chest radiographs 7/23/2020 FINDINGS: Cardiomediastinal silhouette is within normal limits. There are no lung consolidations. Degenerative changes in the thoracic spine are poorly visualized. There is a partially visualized right shoulder prosthesis. Soft tissues are unremarkable. No acute intrathoracic process. **This report has been created using voice recognition software. It may contain minor errors which are inherent in voice recognition technology. ** Final report electronically signed by Dr. Stephanie Qureshi on 7/23/2022 2:13 PM      Assessment / Plan      1. COVID-positive  Currently asymptomatic from respiratory standpoint, patient on room air chest x-ray is negative  Will continue to monitor in the hospital with trending acute phase reactants and D-dimer  Will not give any active treatment and will hold steroids as well for now    2. Generalized weakness  Etiology unclear possibly COVID has a role to play  Check CPK and monitor  PT OT to evaluate and treat    3. History of stroke in the past  Minimal residual left-sided weakness noted  Continue aspirin and statins  PT to eval and treat    4. History of coronary artery disease  S/p angioplasty, currently asymptomatic  Continue aspirin Plavix statins and beta-blockers    5. Hypertension  Currently fairly stable we will hold losartan in view of poor renal function start on 5 mg of amlodipine and monitor    6.   Acute renal insufficiency  Mildly elevated serum creatinine possibly related to dehydration, will hold all nephrotoxic agents including losartan and replace with amlodipine  Monitor renal function and electrolytes        Electronically signed by Jessica Flanagan MD on 7/23/22 at 3:51 PM EDT

## 2022-07-23 NOTE — ED NOTES
PT is laying in bed at this time and awaiting transport to the floor. Pt denies any needs at this time. Call light is within reach. Will continue to monitor pt. No distress noted.       Hollie Gonzalez RN  07/23/22 5606

## 2022-07-23 NOTE — ED NOTES
PT wife states that the patient is incontinent of urine and wears depends at home.       Yara Moore RN  07/23/22 5369

## 2022-07-23 NOTE — ED NOTES
PT is laying in bed at this time without any resp difficulty. Pt denies any needs at this time. PT is updated on POC and is repositioned. PT depends are dry at this time. Call light is within reach and will continue to monitor.       Igor Randhawa RN  07/23/22 3627

## 2022-07-23 NOTE — ED PROVIDER NOTES

## 2022-07-23 NOTE — ED NOTES
Dr. Laura Bautista in to try to walk the patient and patient is weak and has slowed and limited movement.  Pt respers are regular at rest.      Kenia Lopez RN  07/23/22 8157

## 2022-07-23 NOTE — ED TRIAGE NOTES
Pt presents to the ED via EMS from home for difficulty walking that started today. Pt states \"I tell them to move and they don't\". Pt denies pain. Pt states he tested positive for COVID yesterday.  Pt states he is always SOB and states this is his normal

## 2022-07-23 NOTE — ED NOTES
Called [facility]k for pt to be transported to the floor with this RN. [facility] states that it is ok to bring pt up to the floor at this time. Pt is in stable condition.       Dayna Chatterjee RN  07/23/22 Mak Siddiqui RN  07/23/22 2147

## 2022-07-23 NOTE — ED NOTES
ED to inpatient nurses report    Chief Complaint   Patient presents with    Difficulty Walking      Present to ED from home  LOC: alert and orientated to name, place, date  Vital signs   Vitals:    07/23/22 1258 07/23/22 1429 07/23/22 1540   BP: 117/64 (!) 142/76    Pulse: 75 68 67   Resp: 20 18 21   Temp: 98.3 °F (36.8 °C)     TempSrc: Oral     SpO2: 92% 93% 93%   Weight: 280 lb (127 kg)     Height: 5' 11\" (1.803 m)        Oxygen Baseline 95-91% on RA    Current needs required none  LDAs:   Peripheral IV 07/23/22 Left Forearm (Active)   Site Assessment Clean, dry & intact 07/23/22 1301   Line Status Normal saline locked 07/23/22 1301   Dressing Status Clean, dry & intact 07/23/22 1301     Mobility: Requires assistance * 2  Pending ED orders: none  Present condition: stable    C-SSRS    Swallow Screening      Electronically signed by Gabbie Mahoney RN on 7/23/2022 at 4:30 PM       Gabbie Mahoney [facility]  07/23/22 7985

## 2022-07-23 NOTE — ED NOTES
Assumed pt care from [facility]. PT is laying in bed comfortably and states that he is not any more SOB than he normally is. PT denies any pain or needs at this time. Call light is within reach and will continue to monitor pt.       Meera Flores RN  07/23/22 3198

## 2022-07-23 NOTE — ED PROVIDER NOTES
[place] ENCOUNTER          Pt Name: Isha Oleary  MRN: [id number]  [place] 1950  Date of evaluation: 7/23/2022  Treating Resident Physician: Diamante Santamaria MD  Supervising Physician: Dr. Esvin Ordaz    History obtained from chart review and the patient. CHIEF COMPLAINT       Chief Complaint   Patient presents with    Difficulty Walking         HISTORY OF PRESENT ILLNESS    HPI  Isha Oleary is a 74 y.o. male who presents to the emergency department for evaluation of leg weakness. Patient states he was ambulating fine yesterday and today since he woke up that he has not been able to move his legs very well, feel weak. Has been using a walker the past few days. Does have some residual weakness from a stroke 10 years ago. Wife states that she had to feed him this morning because his arms were so weak. Tested positive for COVID yesterday at [facility]. Is taking Paxlovid. States his only symptom is increased productive cough, clear. States shortness of breath is at his baseline. Denies pulmonary history, chest pain, fever, abdominal pain, diarrhea, constipation. The patient has no other acute complaints at this time. REVIEW OF SYSTEMS   Review of Systems   Constitutional:  Positive for fatigue. Negative for diaphoresis and fever. HENT:  Negative for congestion, ear pain, nosebleeds, sneezing and sore throat. Eyes:  Negative for redness and visual disturbance. Respiratory:  Positive for cough and shortness of breath. Negative for choking and wheezing. Cardiovascular:  Negative for chest pain and leg swelling. Gastrointestinal:  Negative for constipation, diarrhea, nausea and vomiting. Genitourinary:  Negative for flank pain and hematuria. Musculoskeletal:  Positive for gait problem and myalgias. Skin:  Negative for rash and wound. Neurological:  Positive for weakness (lower extremity).  Negative for tremors, syncope and numbness. Psychiatric/Behavioral:  Negative for hallucinations and suicidal ideas. The patient is not nervous/anxious.         PAST MEDICAL AND SURGICAL HISTORY     Past Medical History:   Diagnosis Date    Arthritis     Back pain     CAD in native artery 6/14/2021    Diarrhea     Hypertension     Infection of prosthetic shoulder joint ([address] 7/24/2020    PFO (patent foramen ovale) 8/2012    noted on ROSITA following stroke    Propionibacterium infection 7/24/2020    Rotator cuff injury     Stroke [facility])     August 19/2012    TIA (transient ischemic attack) 05/2018     Past Surgical History:   Procedure Laterality Date    BACK SURGERY  05/16/2022    C5-6    BREAST CYST INCISION AND DRAINAGE      CHOLECYSTECTOMY  06/25/2017    CORONARY ANGIOPLASTY WITH STENT PLACEMENT      CYSTOSCOPY N/A 12/17/2019    CYSTOSCOPY, WITH  URETHRAL DILATION performed by Ursula Cranker, MD at [facility] 2/18/2020    CYSTOSCOPY WITH BLADDER BOTOX performed by Ursula Cranker, MD at [address]    ERCP  06/23/2017    ROTATOR CUFF REPAIR Left 03/12/2013    right 4/2019    SHOULDER SURGERY Right 03/02/2020    SHOULDER SURGERY  06/2020    frozen shoulder, repair nerve elbow and palm-dr eduardo     TRANSURETHRAL RESECTION OF PROSTATE N/A 8/13/2019    TRANSURETHRAL RESECTION PROSTATE performed by Jadyn Cutler MD at [address]     Current Facility-Administered Medications:     0.9 % sodium chloride infusion, 1,000 mL, IntraVENous, Continuous, Trent Ricardo MD    Current Outpatient Medications:     isosorbide mononitrate (IMDUR) 30 MG extended release tablet, Take 1 tablet by mouth daily, Disp: 90 tablet, Rfl: 1    aspirin 81 MG chewable tablet, Take 1 tablet by mouth daily, Disp: 30 tablet, Rfl: 3    clopidogrel (PLAVIX) 75 MG tablet, Take 300 mg the first dose then 75 after (Patient taking differently: Take 75 mg by mouth in the morning.), Disp: 90 tablet, Rfl: 3    nitroGLYCERIN (NITROSTAT) 0.4 MG SL tablet, up to max of 3 total doses. If no relief after 1 dose, call 911., Disp: 25 tablet, Rfl: 1    tamsulosin (FLOMAX) 0.4 MG capsule, Take 0.4 mg by mouth nightly , Disp: , Rfl:     losartan (COZAAR) 100 MG tablet, Take 50 mg by mouth daily , Disp: , Rfl:     hydrochlorothiazide (HYDRODIURIL) 12.5 MG tablet, TAKE 1 TABLET BY MOUTH ONCE DAILY, Disp: , Rfl:     Potassium 99 MG TABS, Take 3 tablets by mouth daily, Disp: , Rfl:     Multiple Vitamins-Minerals (PRESERVISION AREDS PO), Take by mouth, Disp: , Rfl:     Ascorbic Acid (VITAMIN C) 500 MG tablet, Take 1,000 mg by mouth , Disp: , Rfl:       SOCIAL HISTORY     Social History     Social History Narrative    Not on file     Social History     Tobacco Use    Smoking status: Never    Smokeless tobacco: Never   Vaping Use    Vaping Use: Never used   Substance Use Topics    Alcohol use: No    Drug use: No         ALLERGIES     Allergies   Allergen Reactions    Adhesive Tape Rash         FAMILY HISTORY     Family History   Problem Relation Age of Onset    Arthritis Mother     Vision Loss Father     Cancer Brother     Stroke Maternal Uncle     Prostate Cancer Neg Hx          PREVIOUS RECORDS   Previous records reviewed:  ED 5/18/2022, cardiologist office visit 5/9/2022 . PHYSICAL EXAM     ED Triage Vitals [07/23/22 1258]   BP Temp Temp Source Heart Rate Resp SpO2 Height Weight   117/64 98.3 °F (36.8 °C) Oral 75 20 92 % 5' 11\" (1.803 m) 280 lb (127 kg)     Initial vital signs and nursing assessment reviewed and normal. Body mass index is 39.05 kg/m². Pulsoximetry is abnormal per my interpretation. Additional Vital Signs:  Vitals:    07/23/22 1540   BP:    Pulse: 67   Resp: 21   Temp:    SpO2: 93%       Physical Exam  Vitals and nursing note reviewed. Constitutional:       General: He is not in acute distress. Appearance: He is obese. He is not diaphoretic. HENT:      Head: Normocephalic and atraumatic.       Right Ear: External ear normal.      Left Ear: External ear normal.      Nose: Nose normal. No congestion. Mouth/Throat:      Mouth: Mucous membranes are moist.      Pharynx: No posterior oropharyngeal erythema. Eyes:      General: No scleral icterus. Right eye: No discharge. Left eye: No discharge. Extraocular Movements: Extraocular movements intact. Conjunctiva/sclera: Conjunctivae normal.      Pupils: Pupils are equal, round, and reactive to light. Cardiovascular:      Rate and Rhythm: Normal rate and regular rhythm. Pulses: Normal pulses. Heart sounds: Normal heart sounds. No murmur heard. No friction rub. No gallop. Pulmonary:      Effort: Pulmonary effort is normal. No respiratory distress. Breath sounds: Normal breath sounds. No wheezing, rhonchi or rales. Chest:      Chest wall: No tenderness. Abdominal:      General: There is no distension. Palpations: Abdomen is soft. Tenderness: There is no abdominal tenderness. There is no guarding. Musculoskeletal:         General: No tenderness or signs of injury. Cervical back: Rigidity (post cervical fusion) present. No tenderness. Right lower leg: No edema. Left lower leg: No edema. Skin:     General: Skin is warm. Capillary Refill: Capillary refill takes less than 2 seconds. Findings: No bruising, erythema or rash. Neurological:      General: No focal deficit present. Mental Status: He is alert and oriented to person, place, and time. Cranial Nerves: No cranial nerve deficit. Motor: Weakness (4/5 thigh flexors bilaterally. 5/5 calf) present. Gait: Gait abnormal (unable to ambulate without assistance). Psychiatric:         Mood and Affect: Mood normal.         Thought Content: Thought content normal.         Judgment: Judgment normal.           MEDICAL DECISION MAKING   Initial Assessment:   74 y.o. male with PMHx of CAD, HTN, HLD, cervical fusion presenting with lower extremity weakness. Patient tested positive for COVID yesterday. SPO2 is 92% patient states this is his normal, although has no pulmonary history. Physical exam remarkable only for 4/5 lower extremity weakness. Working differential diagnosis includes but is not limited to:  CHF, ACS, COPD, COVID-19, electrolyte imbalance, statin induced myopathy  Plan: EKG  Labs: CBC, BMP, BNP, troponin. Imaging: CXR        ED RESULTS   Laboratory results:  Labs Reviewed   CBC WITH AUTO DIFFERENTIAL - Abnormal; Notable for the following components:       Result Value    WBC 4.2 (*)     RDW-CV 14.6 (*)     RDW-SD 46.1 (*)     All other components within normal limits   BASIC METABOLIC PANEL W/ REFLEX TO MG FOR LOW K - Abnormal; Notable for the following components:    CO2 22 (*)     Glucose 158 (*)     Creatinine 1.3 (*)     All other components within normal limits   GLOMERULAR FILTRATION RATE, ESTIMATED - Abnormal; Notable for the following components:    Est, Glom Filt Rate 54 (*)     All other components within normal limits   TROPONIN   BRAIN NATRIURETIC PEPTIDE   ANION GAP       Radiologic studies results:  XR CHEST PORTABLE   Final Result      No acute intrathoracic process. **This report has been created using voice recognition software. It may contain minor errors which are inherent in voice recognition technology. **      Final report electronically signed by Dr. Jaren Anton on 7/23/2022 2:13 PM          ED Medications administered this visit:   Medications   0.9 % sodium chloride infusion (has no administration in time range)         ED COURSE        Patient arrived from home via squad. He was hemodynamically stable and maintaining adequate saturations. He was not in respiratory distress. Labs and imaging unremarkable except for creatinine of 1.3. Patient is having significant lower extremity weakness where he was unable to ambulate within the emergency department.   Do not feel it would be appropriate to discharge to home so decision was made to admit to the hospitalist for generalized weakness in the setting of COVID-19. MEDICATION CHANGES     New Prescriptions    No medications on file         FINAL DISPOSITION     Final diagnoses:   General weakness   COVID-19     Condition: stable  Dispo: Admit to med/surg floor      This transcription was electronically signed. Parts of this transcriptions may have been dictated by use of voice recognition software and electronically transcribed, and parts may have been transcribed with the assistance of an ED scribe. The transcription may contain errors not detected in proofreading. Please refer to my supervising physician's documentation if my documentation differs.     Electronically Signed: Joseph Gallo MD, 07/23/22, 3:52 PM         Joseph Gallo MD  Resident  07/23/22 3077

## 2022-07-24 LAB
ALBUMIN SERPL-MCNC: 3.4 G/DL (ref 3.5–5.1)
ALP BLD-CCNC: 78 U/L (ref 38–126)
ALT SERPL-CCNC: 26 U/L (ref 11–66)
ANION GAP SERPL CALCULATED.3IONS-SCNC: 13 MEQ/L (ref 8–16)
AST SERPL-CCNC: 30 U/L (ref 5–40)
BASOPHILS # BLD: 1.2 %
BASOPHILS ABSOLUTE: 0 THOU/MM3 (ref 0–0.1)
BILIRUB SERPL-MCNC: 0.3 MG/DL (ref 0.3–1.2)
BUN BLDV-MCNC: 17 MG/DL (ref 7–22)
CALCIUM SERPL-MCNC: 8.3 MG/DL (ref 8.5–10.5)
CHLORIDE BLD-SCNC: 105 MEQ/L (ref 98–111)
CO2: 22 MEQ/L (ref 23–33)
CREAT SERPL-MCNC: 1.1 MG/DL (ref 0.4–1.2)
D-DIMER QUANTITATIVE: 833 NG/ML FEU (ref 0–500)
EKG ATRIAL RATE: 74 BPM
EKG P AXIS: 36 DEGREES
EKG P-R INTERVAL: 210 MS
EKG Q-T INTERVAL: 380 MS
EKG QRS DURATION: 98 MS
EKG QTC CALCULATION (BAZETT): 421 MS
EKG R AXIS: 32 DEGREES
EKG T AXIS: 52 DEGREES
EKG VENTRICULAR RATE: 74 BPM
EOSINOPHIL # BLD: 1.2 %
EOSINOPHILS ABSOLUTE: 0 THOU/MM3 (ref 0–0.4)
ERYTHROCYTE [DISTWIDTH] IN BLOOD BY AUTOMATED COUNT: 14.6 % (ref 11.5–14.5)
ERYTHROCYTE [DISTWIDTH] IN BLOOD BY AUTOMATED COUNT: 46.8 FL (ref 35–45)
GFR SERPL CREATININE-BSD FRML MDRD: 65 ML/MIN/1.73M2
GLUCOSE BLD-MCNC: 97 MG/DL (ref 70–108)
HCT VFR BLD CALC: 41.3 % (ref 42–52)
HEMOGLOBIN: 13.3 GM/DL (ref 14–18)
IMMATURE GRANS (ABS): 0.01 THOU/MM3 (ref 0–0.07)
IMMATURE GRANULOCYTES: 0.3 %
LACTIC ACID: 0.7 MMOL/L (ref 0.5–2)
LYMPHOCYTES # BLD: 37.2 %
LYMPHOCYTES ABSOLUTE: 1.2 THOU/MM3 (ref 1–4.8)
MAGNESIUM: 2.3 MG/DL (ref 1.6–2.4)
MCH RBC QN AUTO: 28.2 PG (ref 26–33)
MCHC RBC AUTO-ENTMCNC: 32.2 GM/DL (ref 32.2–35.5)
MCV RBC AUTO: 87.5 FL (ref 80–94)
MONOCYTES # BLD: 20.1 %
MONOCYTES ABSOLUTE: 0.7 THOU/MM3 (ref 0.4–1.3)
NUCLEATED RED BLOOD CELLS: 0 /100 WBC
PLATELET # BLD: 147 THOU/MM3 (ref 130–400)
PMV BLD AUTO: 10.9 FL (ref 9.4–12.4)
POTASSIUM REFLEX MAGNESIUM: 3.4 MEQ/L (ref 3.5–5.2)
PROCALCITONIN: 0.13 NG/ML (ref 0.01–0.09)
RBC # BLD: 4.72 MILL/MM3 (ref 4.7–6.1)
SEG NEUTROPHILS: 40 %
SEGMENTED NEUTROPHILS ABSOLUTE COUNT: 1.3 THOU/MM3 (ref 1.8–7.7)
SODIUM BLD-SCNC: 140 MEQ/L (ref 135–145)
TOTAL PROTEIN: 6.1 G/DL (ref 6.1–8)
WBC # BLD: 3.3 THOU/MM3 (ref 4.8–10.8)

## 2022-07-24 PROCEDURE — 83605 ASSAY OF LACTIC ACID: CPT

## 2022-07-24 PROCEDURE — 6370000000 HC RX 637 (ALT 250 FOR IP): Performed by: INTERNAL MEDICINE

## 2022-07-24 PROCEDURE — G0378 HOSPITAL OBSERVATION PER HR: HCPCS

## 2022-07-24 PROCEDURE — 96361 HYDRATE IV INFUSION ADD-ON: CPT

## 2022-07-24 PROCEDURE — 85379 FIBRIN DEGRADATION QUANT: CPT

## 2022-07-24 PROCEDURE — 80053 COMPREHEN METABOLIC PANEL: CPT

## 2022-07-24 PROCEDURE — 85025 COMPLETE CBC W/AUTO DIFF WBC: CPT

## 2022-07-24 PROCEDURE — 36415 COLL VENOUS BLD VENIPUNCTURE: CPT

## 2022-07-24 PROCEDURE — 6360000002 HC RX W HCPCS: Performed by: INTERNAL MEDICINE

## 2022-07-24 PROCEDURE — 93010 ELECTROCARDIOGRAM REPORT: CPT | Performed by: INTERNAL MEDICINE

## 2022-07-24 PROCEDURE — 83735 ASSAY OF MAGNESIUM: CPT

## 2022-07-24 PROCEDURE — 2580000003 HC RX 258: Performed by: INTERNAL MEDICINE

## 2022-07-24 PROCEDURE — 84145 PROCALCITONIN (PCT): CPT

## 2022-07-24 PROCEDURE — 99233 SBSQ HOSP IP/OBS HIGH 50: CPT | Performed by: INTERNAL MEDICINE

## 2022-07-24 PROCEDURE — 96372 THER/PROPH/DIAG INJ SC/IM: CPT

## 2022-07-24 RX ORDER — POTASSIUM CHLORIDE 20 MEQ/1
20 TABLET, EXTENDED RELEASE ORAL ONCE
Status: COMPLETED | OUTPATIENT
Start: 2022-07-24 | End: 2022-07-24

## 2022-07-24 RX ADMIN — ASPIRIN 81 MG: 81 TABLET, CHEWABLE ORAL at 11:36

## 2022-07-24 RX ADMIN — POTASSIUM CHLORIDE 8 MEQ: 600 TABLET, FILM COATED, EXTENDED RELEASE ORAL at 16:55

## 2022-07-24 RX ADMIN — ACETAMINOPHEN 325MG 650 MG: 325 TABLET ORAL at 05:25

## 2022-07-24 RX ADMIN — ENOXAPARIN SODIUM 30 MG: 100 INJECTION SUBCUTANEOUS at 21:07

## 2022-07-24 RX ADMIN — ISOSORBIDE MONONITRATE 30 MG: 30 TABLET, EXTENDED RELEASE ORAL at 11:38

## 2022-07-24 RX ADMIN — TAMSULOSIN HYDROCHLORIDE 0.4 MG: 0.4 CAPSULE ORAL at 21:07

## 2022-07-24 RX ADMIN — HYDROCHLOROTHIAZIDE 12.5 MG: 12.5 CAPSULE ORAL at 11:38

## 2022-07-24 RX ADMIN — CLOPIDOGREL BISULFATE 75 MG: 75 TABLET, FILM COATED ORAL at 11:36

## 2022-07-24 RX ADMIN — SODIUM CHLORIDE: 9 INJECTION, SOLUTION INTRAVENOUS at 16:56

## 2022-07-24 RX ADMIN — SODIUM CHLORIDE: 9 INJECTION, SOLUTION INTRAVENOUS at 05:22

## 2022-07-24 RX ADMIN — ENOXAPARIN SODIUM 30 MG: 100 INJECTION SUBCUTANEOUS at 11:37

## 2022-07-24 RX ADMIN — POTASSIUM CHLORIDE 20 MEQ: 1500 TABLET, EXTENDED RELEASE ORAL at 11:42

## 2022-07-24 ASSESSMENT — PAIN DESCRIPTION - DESCRIPTORS: DESCRIPTORS: ACHING

## 2022-07-24 ASSESSMENT — PAIN DESCRIPTION - ORIENTATION: ORIENTATION: MID;LOWER

## 2022-07-24 ASSESSMENT — PAIN DESCRIPTION - LOCATION: LOCATION: BACK

## 2022-07-24 ASSESSMENT — PAIN SCALES - GENERAL: PAINLEVEL_OUTOF10: 4

## 2022-07-24 NOTE — PROGRESS NOTES
Hospitalist Progress Note  [facility]       Patient: Bethanie Batista  Unit/Bed: 8V-92/086-B  YOB: 1950  MRN: [id number]  Acct: [de-identified]   AdmittingDiagnosis: General weakness [R53.1]  Generalized weakness [R53.1]  COVID-19 [U07.1]  Admit Date: 7/23/2022  Hospital Day: 0    Subjective:    Continued weakness otherwise denies any new or acute symptoms    Objective:   BP (!) 146/66   Pulse 63   Temp (!) 96.2 °F (35.7 °C) (Oral)   Resp 18   Ht 5' 11\" (1.803 m)   Wt 280 lb (127 kg)   SpO2 92%   BMI 39.05 kg/m²     Intake/Output Summary (Last 24 hours) at 7/24/2022 1119  Last data filed at 7/24/2022 7238  Gross per 24 hour   Intake 340 ml   Output 750 ml   Net -410 ml     Physical Exam    General examination:alert oriented appears to be fairly comfortable at rest on room air  Head: Normocephalic atraumatic  Eyes: Pupils equal react light and commendation  Extraocular muscles intact  ENT: Unremarkable  Neck: Supple  Heart: Sounds are normal no murmurs  Lungs: Air entry equal bilaterally no wheeze no crackles  Abdomen: Obese no tenderness no organomegaly bowel sounds are present  Extremities: Trace pedal edema  CNS: No focal neurological deficits  Skin: No rashes or bruises noted    DVT Prophylaxis: Lovenox    Data:  CBC:   Lab Results   Component Value Date/Time    WBC 3.3 07/24/2022 05:03 AM    HGB 13.3 07/24/2022 05:03 AM    HCT 41.3 07/24/2022 05:03 AM    MCV 87.5 07/24/2022 05:03 AM     07/24/2022 05:03 AM     BMP:   Lab Results   Component Value Date/Time     07/24/2022 05:03 AM    K 3.4 07/24/2022 05:03 AM     07/24/2022 05:03 AM    CO2 22 07/24/2022 05:03 AM    BUN 17 07/24/2022 05:03 AM    CREATININE 1.1 07/24/2022 05:03 AM    CALCIUM 8.3 07/24/2022 05:03 AM     ABGs: No results found for: PH, PCO2, PO2, HCO3, O2SAT  Troponin: No results found for: TROPONINI   LFTs   Lab Results   Component Value Date/Time    AST 30 07/24/2022 05:03 AM    ALT 26 07/24/2022 05:03 AM    BILITOT 0.3 07/24/2022 05:03 AM    ALKPHOS 78 07/24/2022 05:03 AM          Imaging   XR CHEST PORTABLE    Result Date: 7/23/2022  PROCEDURE: XR CHEST PORTABLE CLINICAL INFORMATION: Shortness of breath TECHNIQUE: Mobile AP chest radiograph. COMPARISON: Multiple AP chest radiographs 7/23/2020 FINDINGS: Cardiomediastinal silhouette is within normal limits. There are no lung consolidations. Degenerative changes in the thoracic spine are poorly visualized. There is a partially visualized right shoulder prosthesis. Soft tissues are unremarkable. No acute intrathoracic process. **This report has been created using voice recognition software. It may contain minor errors which are inherent in voice recognition technology. ** Final report electronically signed by Dr. Maria T Trivedi on 7/23/2022 2:13 PM      Assessment/Plan:    1. COVID-positive  Continues to be stable, D-dimer is a bit elevated we will continue Lovenox 30 twice daily and repeat in a.m. consider CTA chest if significantly elevated    2. Generalized weakness  Etiology unclear possibly COVID has a role to play  Check CPK and monitor  PT OT to evaluate and treat    3. History of stroke in the past  Minimal residual left-sided weakness noted  Continue aspirin and statins  PT to eval and treat    4. History of coronary artery disease  S/p angioplasty, currently asymptomatic  Continue aspirin Plavix statins and beta-blockers    5. Hypertension  Currently fairly stable we will hold losartan in view of poor renal function start on 5 mg of amlodipine and monitor    6. Acute renal insufficiency  Improved, and resolved    7. Hypokalemia  Will administer extra dose of 20 mEq of KCl this morning, repeat lites in a.m.     Electronically signed by Gayle Graves MD on 7/24/2022 at 11:19 AM    Rounding Hospitalist

## 2022-07-25 ENCOUNTER — APPOINTMENT (OUTPATIENT)
Dept: CT IMAGING | Age: 72
DRG: 178 | End: 2022-07-25
Payer: MEDICARE

## 2022-07-25 ENCOUNTER — APPOINTMENT (OUTPATIENT)
Dept: MRI IMAGING | Age: 72
DRG: 178 | End: 2022-07-25
Payer: MEDICARE

## 2022-07-25 LAB
ALBUMIN SERPL-MCNC: 3.5 G/DL (ref 3.5–5.1)
ALP BLD-CCNC: 77 U/L (ref 38–126)
ALT SERPL-CCNC: 23 U/L (ref 11–66)
ANION GAP SERPL CALCULATED.3IONS-SCNC: 10 MEQ/L (ref 8–16)
AST SERPL-CCNC: 26 U/L (ref 5–40)
BASOPHILS # BLD: 0.9 %
BASOPHILS ABSOLUTE: 0 THOU/MM3 (ref 0–0.1)
BILIRUB SERPL-MCNC: 0.2 MG/DL (ref 0.3–1.2)
BUN BLDV-MCNC: 15 MG/DL (ref 7–22)
C-REACTIVE PROTEIN: 1.03 MG/DL (ref 0–1)
CALCIUM SERPL-MCNC: 8.7 MG/DL (ref 8.5–10.5)
CHLORIDE BLD-SCNC: 109 MEQ/L (ref 98–111)
CO2: 24 MEQ/L (ref 23–33)
CREAT SERPL-MCNC: 0.9 MG/DL (ref 0.4–1.2)
D-DIMER QUANTITATIVE: 594 NG/ML FEU (ref 0–500)
EKG ATRIAL RATE: 70 BPM
EKG P AXIS: 54 DEGREES
EKG P-R INTERVAL: 194 MS
EKG Q-T INTERVAL: 404 MS
EKG QRS DURATION: 102 MS
EKG QTC CALCULATION (BAZETT): 436 MS
EKG R AXIS: 65 DEGREES
EKG T AXIS: 53 DEGREES
EKG VENTRICULAR RATE: 70 BPM
EOSINOPHIL # BLD: 6.7 %
EOSINOPHILS ABSOLUTE: 0.2 THOU/MM3 (ref 0–0.4)
ERYTHROCYTE [DISTWIDTH] IN BLOOD BY AUTOMATED COUNT: 14.5 % (ref 11.5–14.5)
ERYTHROCYTE [DISTWIDTH] IN BLOOD BY AUTOMATED COUNT: 46.5 FL (ref 35–45)
GFR SERPL CREATININE-BSD FRML MDRD: 82 ML/MIN/1.73M2
GLUCOSE BLD-MCNC: 150 MG/DL (ref 70–108)
GLUCOSE BLD-MCNC: 98 MG/DL (ref 70–108)
HCT VFR BLD CALC: 42 % (ref 42–52)
HEMOGLOBIN: 13.5 GM/DL (ref 14–18)
IMMATURE GRANS (ABS): 0.01 THOU/MM3 (ref 0–0.07)
IMMATURE GRANULOCYTES: 0.3 %
LYMPHOCYTES # BLD: 40.8 %
LYMPHOCYTES ABSOLUTE: 1.4 THOU/MM3 (ref 1–4.8)
MAGNESIUM: 2.2 MG/DL (ref 1.6–2.4)
MCH RBC QN AUTO: 28.4 PG (ref 26–33)
MCHC RBC AUTO-ENTMCNC: 32.1 GM/DL (ref 32.2–35.5)
MCV RBC AUTO: 88.2 FL (ref 80–94)
MONOCYTES # BLD: 13.8 %
MONOCYTES ABSOLUTE: 0.5 THOU/MM3 (ref 0.4–1.3)
NUCLEATED RED BLOOD CELLS: 0 /100 WBC
PLATELET # BLD: 153 THOU/MM3 (ref 130–400)
PMV BLD AUTO: 10.8 FL (ref 9.4–12.4)
POTASSIUM REFLEX MAGNESIUM: 3.5 MEQ/L (ref 3.5–5.2)
PROCALCITONIN: 0.1 NG/ML (ref 0.01–0.09)
RBC # BLD: 4.76 MILL/MM3 (ref 4.7–6.1)
SEG NEUTROPHILS: 37.5 %
SEGMENTED NEUTROPHILS ABSOLUTE COUNT: 1.3 THOU/MM3 (ref 1.8–7.7)
SODIUM BLD-SCNC: 143 MEQ/L (ref 135–145)
TOTAL PROTEIN: 6.1 G/DL (ref 6.1–8)
WBC # BLD: 3.4 THOU/MM3 (ref 4.8–10.8)

## 2022-07-25 PROCEDURE — 36415 COLL VENOUS BLD VENIPUNCTURE: CPT

## 2022-07-25 PROCEDURE — 82948 REAGENT STRIP/BLOOD GLUCOSE: CPT

## 2022-07-25 PROCEDURE — 93010 ELECTROCARDIOGRAM REPORT: CPT | Performed by: INTERNAL MEDICINE

## 2022-07-25 PROCEDURE — 2580000003 HC RX 258: Performed by: INTERNAL MEDICINE

## 2022-07-25 PROCEDURE — 85379 FIBRIN DEGRADATION QUANT: CPT

## 2022-07-25 PROCEDURE — 96372 THER/PROPH/DIAG INJ SC/IM: CPT

## 2022-07-25 PROCEDURE — 83735 ASSAY OF MAGNESIUM: CPT

## 2022-07-25 PROCEDURE — 97116 GAIT TRAINING THERAPY: CPT

## 2022-07-25 PROCEDURE — 70496 CT ANGIOGRAPHY HEAD: CPT

## 2022-07-25 PROCEDURE — 93005 ELECTROCARDIOGRAM TRACING: CPT | Performed by: INTERNAL MEDICINE

## 2022-07-25 PROCEDURE — 80053 COMPREHEN METABOLIC PANEL: CPT

## 2022-07-25 PROCEDURE — 97166 OT EVAL MOD COMPLEX 45 MIN: CPT

## 2022-07-25 PROCEDURE — 85025 COMPLETE CBC W/AUTO DIFF WBC: CPT

## 2022-07-25 PROCEDURE — 1200000000 HC SEMI PRIVATE

## 2022-07-25 PROCEDURE — 4A03X5D MEASUREMENT OF ARTERIAL FLOW, INTRACRANIAL, EXTERNAL APPROACH: ICD-10-PCS

## 2022-07-25 PROCEDURE — 86140 C-REACTIVE PROTEIN: CPT

## 2022-07-25 PROCEDURE — 84145 PROCALCITONIN (PCT): CPT

## 2022-07-25 PROCEDURE — 6360000002 HC RX W HCPCS: Performed by: INTERNAL MEDICINE

## 2022-07-25 PROCEDURE — G0378 HOSPITAL OBSERVATION PER HR: HCPCS

## 2022-07-25 PROCEDURE — 70551 MRI BRAIN STEM W/O DYE: CPT

## 2022-07-25 PROCEDURE — 6360000004 HC RX CONTRAST MEDICATION: Performed by: INTERNAL MEDICINE

## 2022-07-25 PROCEDURE — 97530 THERAPEUTIC ACTIVITIES: CPT

## 2022-07-25 PROCEDURE — 97535 SELF CARE MNGMENT TRAINING: CPT

## 2022-07-25 PROCEDURE — 99232 SBSQ HOSP IP/OBS MODERATE 35: CPT | Performed by: INTERNAL MEDICINE

## 2022-07-25 PROCEDURE — 70450 CT HEAD/BRAIN W/O DYE: CPT

## 2022-07-25 PROCEDURE — 6370000000 HC RX 637 (ALT 250 FOR IP): Performed by: INTERNAL MEDICINE

## 2022-07-25 PROCEDURE — 70498 CT ANGIOGRAPHY NECK: CPT

## 2022-07-25 PROCEDURE — 97162 PT EVAL MOD COMPLEX 30 MIN: CPT

## 2022-07-25 RX ORDER — HYDROCHLOROTHIAZIDE 12.5 MG/1
25 CAPSULE, GELATIN COATED ORAL DAILY
Status: DISCONTINUED | OUTPATIENT
Start: 2022-07-25 | End: 2022-08-02 | Stop reason: HOSPADM

## 2022-07-25 RX ORDER — AMLODIPINE BESYLATE 10 MG/1
10 TABLET ORAL DAILY
Status: DISCONTINUED | OUTPATIENT
Start: 2022-07-25 | End: 2022-08-02 | Stop reason: HOSPADM

## 2022-07-25 RX ADMIN — ENOXAPARIN SODIUM 30 MG: 100 INJECTION SUBCUTANEOUS at 22:11

## 2022-07-25 RX ADMIN — IOPAMIDOL 80 ML: 755 INJECTION, SOLUTION INTRAVENOUS at 13:09

## 2022-07-25 RX ADMIN — ASPIRIN 81 MG: 81 TABLET, CHEWABLE ORAL at 08:59

## 2022-07-25 RX ADMIN — ISOSORBIDE MONONITRATE 30 MG: 30 TABLET, EXTENDED RELEASE ORAL at 08:57

## 2022-07-25 RX ADMIN — HYDROCHLOROTHIAZIDE 25 MG: 12.5 CAPSULE, GELATIN COATED ORAL at 08:57

## 2022-07-25 RX ADMIN — AMLODIPINE BESYLATE 10 MG: 10 TABLET ORAL at 10:00

## 2022-07-25 RX ADMIN — ENOXAPARIN SODIUM 30 MG: 100 INJECTION SUBCUTANEOUS at 09:03

## 2022-07-25 RX ADMIN — CLOPIDOGREL BISULFATE 75 MG: 75 TABLET, FILM COATED ORAL at 09:00

## 2022-07-25 RX ADMIN — TAMSULOSIN HYDROCHLORIDE 0.4 MG: 0.4 CAPSULE ORAL at 22:11

## 2022-07-25 RX ADMIN — SODIUM CHLORIDE, PRESERVATIVE FREE 10 ML: 5 INJECTION INTRAVENOUS at 22:11

## 2022-07-25 RX ADMIN — POTASSIUM CHLORIDE 8 MEQ: 600 TABLET, FILM COATED, EXTENDED RELEASE ORAL at 08:58

## 2022-07-25 NOTE — PROGRESS NOTES
[address] [address]  INPATIENT PHYSICAL THERAPY  EVALUATION  [facility] ORTHOPEDICS 7K - 7K-25/025-A    Time In: 7156  Time Out: 1131  Timed Code Treatment Minutes: 25 Minutes  Minutes: 35          Date: 2022  Patient Name: Dianne Mcdermott,  Gender:  male        MRN: [id number]  : 1950  (74 y.o.)      Referring Practitioner: Angie Antony MD  Diagnosis: general weakness  Additional Pertinent Hx: Per H&P : Patient is a 68-year-old  male who presents to the ED via EMS from home for difficulty walking that started today  He was diagnosed with COVID yesterday at an outside facility and was started on PAXLOVID  He was unable to feed this morning has been having difficulty ambulating this morning  He denies any fever but reports nonproductive cough no chest pain or palpitation no shortness of breath  Denies any nausea vomiting or diarrhea no abdominal discomfort no dysuria fine no tingling but certainly reports generalized weakness  No evidence of syncope or presyncopal episodes     Restrictions/Precautions:  Restrictions/Precautions: General Precautions, Fall Risk, Isolation  Position Activity Restriction  Spinal Precautions: Recent hx of cervical fusion May 2022. Cervical collar DC'd ~ 1 month ago per pt report. Other position/activity restrictions: COVID+, R UE deficits from previous sx, L side residual deficits from hx CVA    Subjective:  Chart Reviewed: Yes  Patient assessed for rehabilitation services?: Yes  Family / Caregiver Present: No  Subjective: RN approved session, pt agrees. Pt noted to be delayed in reponse at times but with appropriate response to all questions. When asked where he plans to d/c states he may need a therapy stay prior to return home. This PT did note at the conclusion of the session when standing directly in front of the pt following mobility assessment that he may have had a minor L side facial droop.  This PT asked the pt at that time if he had this as a residual deficit from previous strokes, he denies this. Pt's speech, and limb movements, appropriate at this time, pt was positioned safely in the chair with call light in reach and RN notified at that time. RN, Prince Pinedo, aware of this observation during the session, and was preparing to enter the pt's room to assess this, and was noted to follow up with the provider regarding this. General:  Overall Orientation Status: Within Functional Limits (not formally assessed)  Vision: Within Functional Limits  Hearing: Exceptions to IVELISSEMedcurrent  Hearing Exceptions: Hard of hearing/hearing concerns     Pain: 0/10: denies    Vitals: Oxygen: 95%  Heart Rate: 69 bpm  Maintained WFL    Social/Functional History:    Lives With: Spouse  Type of Home: House  Home Layout: One level  Home Access: Stairs to enter with rails  Entrance Stairs - Number of Steps: 1 OLVIN  Entrance Stairs - Rails: Both  Home Equipment: Walker, rolling, Cane     Bathroom Shower/Tub: Walk-in shower (built in bench that pt does use at baseline)  Bathroom Toilet: Standard  Bathroom Equipment: Grab bars in shower    Receives Help From: Family (for minimal hands on assist. Pt's wife also has COVID-19 at this time.)  ADL Assistance: Needs assistance  Bath: Minimal assistance  Dressing: Minimal assistance  Toileting: Independent     Homemaking Responsibilities: No  Ambulation Assistance: Independent  Transfer Assistance: Independent    Active : No (wife drives)  Occupation: Retired  Additional Comments: Pt using AD within the last week when sick. Normally indep with no AD. Pt states 2 falls in past year.     OBJECTIVE:  Range of Motion:  Bilateral Lower Extremity: WFL    Strength:  Right Lower Extremity: InThrMa Hip flexion 4+/5, knee extension 4+/5, knee flexion 4+/5, DF 4+/5, PF 4+/5    Left Lower Extremity: InThrMa Hip flexion 4/5, knee extension 4+/5, knee flexion 4+/5, DF 4+/5, PF 4+/5  Pt states L LE residual weakness from previous CVA    Balance:  Static Sitting Balance: Contact Guard Assistance, Minimal Assistance  Dynamic Sitting Balance: Contact Guard Assistance, Minimal Assistance  Static Standing Balance: Contact Guard Assistance  Dynamic Standing Balance: Minimal Assistance  Min assist provided with the Tinetti. Pt required min A x3 times to assist with bringing trunk upright for improved posture with sitting in the chair. Bed Mobility:  Not Tested    Transfers:  Sit to Stand: Minimal Assistance, with increased time for completion, cues for hand placement  Stand to Sit:Minimal Assistance, with increased time for completion, cues for hand placement  Min A required for force production to stand and to support slow lowering to the chair. Ambulation:  Minimal Assistance, with cues for safety, with verbal cues , with increased time for completion  Distance: 14'  Surface: Level Tile  Device:Rolling Walker  Gait Deviations: Forward Flexed Posture, Slow Sofi, Decreased Step Length Bilaterally, Decreased Gait Speed, Decreased Heel Strike Bilaterally, Wide Base of Support, Mild Path Deviations, Unsteady Gait, and Decreased Terminal Knee Extension  *Pt noted to ambulate with decreased step length, wide FLOR, and with RW too anteriorly requiring cues and min A to prompt pt to maintain steps within this. Cues to bring line of sight superiorly. Min A provided to support fwd flexed posture and RW progression with change in direction and to maintain this closer to the pt's body. Exercise:none this session    Functional Outcome Measures: Completed  Balance Score: 5  Gait Score: 7  Tinetti Total Score: 12/28 with high fall risk  AM-PAC Inpatient Mobility without Stair Climbing Raw Score : 15  AM-PAC Inpatient without Stair Climbing T-Scale Score : 43.03  Risk Indicators:  Less than/equal to 18 = high risk  19-23 Moderate risk  Greater than/equal to 24 = low risk    ASSESSMENT:  Activity Tolerance:  Patient tolerance of  treatment: fair.  Pt noted to tolerate mobility well, required min assist for mobility with safety with cues for safety. Pt noted to have general deconditioning and would benefit from a therapy stay. Pt also with noted minor L facial droop at conclusion of session, RN aware at this time and in to assess this and follow up with the provider. Treatment Initiated: Treatment and education initiated within context of evaluation. Evaluation time included review of current medical information, gathering information related to past medical, social and functional history, completion of standardized testing, formal and informal observation of tasks, assessment of data and development of plan of care and goals. Treatment time included skilled education and facilitation of tasks to increase safety and independence with functional mobility for improved independence and quality of life. Assessment: Body Structures, Functions, Activity Limitations Requiring Skilled Therapeutic Intervention: Decreased functional mobility , Decreased strength, Decreased safe awareness, Decreased balance, Decreased endurance, Decreased posture  Assessment: This patient is a 74 y. o. who presents with general weakness. This is a decline from the patient's baseline status of mod I with no AD and living with his spouse. Pt is noted to have a high fall risk, he scored a 12/18 on the Tinetti. The patient is observed to have deficits in strength, balance, activity tolerance, and safety awareness and would benefit from skilled PT services to progress functional mobility, safety awareness, and to decrease overall risk of falls. Pt may benefit from a therapy stay to progress his safety with functional mobility with assist from staff, use of RW, and continued PT.     Therapy Prognosis: Good    Requires PT Follow-Up: Yes    Discharge Recommendations:  Discharge Recommendations: Continue to assess pending progress, 24 hour supervision or assist, [address], Patient would benefit from continued therapy after discharge    Patient Education:      . Patient Education  Education Given To: Patient  Education Provided: Role of Therapy, Plan of Care, Transfer Training, Energy Conservation, Fall Prevention Strategies  Education Method: Verbal, Demonstration  Education Outcome: Continued education needed       Equipment Recommendations:  Equipment Needed: No (pt has a RW)    Plan:  Current Treatment Recommendations: Strengthening, Balance training, Functional mobility training, Transfer training, Endurance training, Neuromuscular re-education, Stair training, Gait training, Cognitive reorientation, Home exercise program, Safety education & training, Patient/Caregiver education & training, Therapeutic activities, Equipment evaluation, education, & procurement  Plan:  (5x C)    Goals:  Patient goals : to get up and walk by himself  Short Term Goals  Time Frame for Short term goals: by hospital d/c  Short term goal 1: Pt to demo supine <->sit with SBA for ease with getting in and out of bed  Short term goal 2: Pt to complete sit <->stand with RW and SBA for improved ability to go between surfaces  Short term goal 3: Pt to ambulate =40' with RW and SBA for improved mobility to move in his environment  Short term goal 4: Pt to improve his Tinetti score to >=19/28 to progress to the moderate fall risk category  Short term goal 5: Pt to ascend/descend 1 step with B rail and CGA for ability to enter his home  Long Term Goals  Time Frame for Long term goals : NA due to short ELOS    Following session, patient left in safe position with all fall risk precautions in place.

## 2022-07-25 NOTE — SIGNIFICANT EVENT
A CODE STROKE was called for the patient at 12:23 PM of 7/25/22. The patient had a history of CVAs and TIAs in the past. This morning, a mild facial droop was noted on the R side of the patient's face. The nurse in charge of the patient did not notice this facial droop earlier. No extremity weakness, loss in sensation, or other neurologic deficit was noted. The patient was brought down to the CT lab for additional analysis. CT imaging did not reveal any acute findings. He was brought back up to his room in [address] [place] [address] afterwards.     Dakotah Woods DO

## 2022-07-25 NOTE — CARE COORDINATION
7/25/22, 9:19 AM EDT  DISCHARGE PLANNING EVALUATION:    Cem Bass       Admitted: 7/23/2022/ 53 Jessica Lopez day: 0   Location: -25/025-A Reason for admit: General weakness [R53.1]  Generalized weakness [R53.1]  COVID-19 [U07.1]   PMH:  has a past medical history of Arthritis, Back pain, CAD in native artery, Diarrhea, Hypertension, Infection of prosthetic shoulder joint ([facility] [address] PFO (patent foramen ovale), Propionibacterium infection, Rotator cuff injury, Stroke ([address] and TIA (transient ischemic attack). To ED emergency department for evaluation of leg weakness. Patient states he was ambulating fine yesterday and today since he woke up that he has not been able to move his legs very well, feel weak  Procedure: na  Barriers to Discharge: + covid, on room air, PT/OT evals, Lovenox and Plavix, B/P 199/96. No fevers. HCTZ, Imdur, K+ 3.4/3.5 with replacement. IVF,   WBC 3.4  Neruo checks, Neurology consulted. notified of pt's right sided facial droop noted upon assessment and with physical therapy. No changes in assessment except for facial droop. Called code stroke at 46. Stat CT head. CT head:  No acute intracranial pathology. Acute and chronic sinus disease. Information called to [facility] at 12:54 PM   CTA head:  1. No significant hemodynamic stenosis in the right and left common and internal carotid arteries. 2. Antegrade flow in the right and left vertebral arteries. 3. Atherosclerotic calcification aortic arch. 4. Atherosclerotic calcification in the cavernous segments of both internal carotid arteries. No evidence of severe stenosis. 5. Narrowing in the distal branches of the right middle cerebral artery. There is antegrade flow in the left middle cerebral artery. 6. There is antegrade flow in the anterior and posterior cerebral arteries. 7. There is narrowing in the mid basilar artery. 8. Areas of abnormal attenuation in the left and right basal ganglia and in the white matter   9. Diffusion MRI scan may be helpful for better evaluation      PCP: Darcy Simmons, DO   %  Patient's Healthcare Decision Maker: Named in [facility]    Patient Goals/Plan/Treatment Preferences: Met with Ángel Gan since pt was made IP status; IMM completed. He is from home with wife, has PCP, insurance verified. He shared he uses no DME except for a cane while going out of home. He and wife had planned to go to [facility]CF post covid. SW already saw pt this am; see her note. Transportation/Food Security/Housekeeping Addressed:  No issues identified.

## 2022-07-25 NOTE — PLAN OF CARE
Problem: Discharge Planning  Goal: Discharge to home or other facility with appropriate resources  Outcome: Progressing Towards Goal  Flowsheets (Taken 7/25/2022 0850)  Discharge to home or other facility with appropriate resources:   Identify barriers to discharge with patient and caregiver   Arrange for needed discharge resources and transportation as appropriate     Problem: Safety - Adult  Goal: Free from fall injury  Outcome: Progressing Towards Goal  Call light within reach. Bed alarm on . Problem: Skin/Tissue Integrity  Goal: Absence of new skin breakdown  Description: 1. Monitor for areas of redness and/or skin breakdown  Outcome: Progressing Towards Goal     Problem: Pain  Goal: Verbalizes/displays adequate comfort level or baseline comfort level  Outcome: Progressing Towards Goal     Problem: Chronic Conditions and Co-morbidities  Goal: Patient's chronic conditions and co-morbidity symptoms are monitored and maintained or improved  Outcome: Progressing Towards Goal  Flowsheets (Taken 7/25/2022 0850)  Care Plan - Patient's Chronic Conditions and Co-Morbidity Symptoms are Monitored and Maintained or Improved:   Monitor and assess patient's chronic conditions and comorbid symptoms for stability, deterioration, or improvement   Collaborate with multidisciplinary team to address chronic and comorbid conditions and prevent exacerbation or deterioration  Care plan reviewed with patient. Patient verbalize understanding of the plan of care and contribute to goal setting.

## 2022-07-25 NOTE — PROGRESS NOTES
Dr Robyn Greene notified of pt's right sided facial droop noted upon assessment and with physical therapy. No changes in assessment except for facial droop. Called code stroke at 1225 blood sugar 150, vitals done and charted. Rapid response tream present and RN transported pt to CT via bed with resource RN and [employer] and Dr. Andre Ely. Pt transported pt to room via be and has no c/o voiced at this time. Updated pt's Wife Dashawn Jacobs. Dr. Carolyn Yang phoned CTA results which are positive. Notified Dr. Robyn Greene  of results at 33 64 74 and new order rcvd for Neurology Consult. Consult to Riri Epperson called and spoke with re: pt. New order rcvd from Dr. Firman Boast for MRI brain. MRI completed. 1920 Report given to night shift RN.

## 2022-07-25 NOTE — PROGRESS NOTES
Hospitalist Progress Note  [facility]       Patient: Sawyer Amador  Unit/Bed: 0J-37/560-Z  YOB: 1950  MRN: [id number]  Acct: [de-identified]   AdmittingDiagnosis: General weakness [R53.1]  Generalized weakness [R53.1]  COVID-19 [U07.1]  Admit Date: 7/23/2022  Hospital Day: 0    Subjective  Patient continues to have difficulty standing up from a sitting position in the bed as he has a history of left-sided weakness secondary to CVA and failed right shoulder surgery    Objective:   BP (!) 153/72   Pulse 62   Temp 97.8 °F (36.6 °C) (Oral)   Resp 20   Ht 5' 11\" (1.803 m)   Wt 280 lb (127 kg)   SpO2 96%   BMI 39.05 kg/m²     Intake/Output Summary (Last 24 hours) at 7/25/2022 1138  Last data filed at 7/25/2022 7652  Gross per 24 hour   Intake 2526 ml   Output 475 ml   Net 2051 ml     Physical Exam  General examination:alert oriented appears to be fairly comfortable at rest on room air  Head: Normocephalic atraumatic  Eyes: Pupils equal react light and commendation  Extraocular muscles intact  ENT: Unremarkable  Neck: Supple  Heart: Sounds are normal no murmurs  Lungs: Air entry equal bilaterally no wheeze no crackles  Abdomen: Obese no tenderness no organomegaly bowel sounds are present  Extremities: Trace pedal edema  CNS: No focal neurological deficits  Skin: No rashes or bruises noted     DVT Prophylaxis: Lovenox      Data:  CBC:   Lab Results   Component Value Date/Time    WBC 3.4 07/25/2022 05:48 AM    HGB 13.5 07/25/2022 05:48 AM    HCT 42.0 07/25/2022 05:48 AM    MCV 88.2 07/25/2022 05:48 AM     07/25/2022 05:48 AM     BMP:   Lab Results   Component Value Date/Time     07/25/2022 05:48 AM    K 3.5 07/25/2022 05:48 AM     07/25/2022 05:48 AM    CO2 24 07/25/2022 05:48 AM    BUN 15 07/25/2022 05:48 AM    CREATININE 0.9 07/25/2022 05:48 AM    CALCIUM 8.7 07/25/2022 05:48 AM     ABGs: No results found for: PH, PCO2, PO2, HCO3, O2SAT  Troponin: No results found for: TROPONINI   LFTs   Lab Results   Component Value Date/Time    AST 26 07/25/2022 05:48 AM    ALT 23 07/25/2022 05:48 AM    BILITOT 0.2 07/25/2022 05:48 AM    ALKPHOS 77 07/25/2022 05:48 AM          Imaging   XR CHEST PORTABLE    Result Date: 7/23/2022  PROCEDURE: XR CHEST PORTABLE CLINICAL INFORMATION: Shortness of breath TECHNIQUE: Mobile AP chest radiograph. COMPARISON: Multiple AP chest radiographs 7/23/2020 FINDINGS: Cardiomediastinal silhouette is within normal limits. There are no lung consolidations. Degenerative changes in the thoracic spine are poorly visualized. There is a partially visualized right shoulder prosthesis. Soft tissues are unremarkable. No acute intrathoracic process. **This report has been created using voice recognition software. It may contain minor errors which are inherent in voice recognition technology. ** Final report electronically signed by Dr. Vadim Hebert on 7/23/2022 2:13 PM      Assessment/Plan:    1. COVID-positive  Continues to be stable, D-dimer is a bit elevated but improging, continue Lovenox 30 twice daily   Other acute phase reactants CRP and procalcitonin all have also improved this morning    2. Generalized weakness  Secondary to acute illness and frail right shoulder surgery/pain and left-sided CVAs contributing , continue PT OT, will need transfer to short-term rehab    3. History of stroke in the past  Minimal residual left-sided weakness noted  Continue aspirin and statins  Continue PT  and OT    4. History of coronary artery disease  S/p angioplasty, currently asymptomatic  Continue aspirin Plavix statins and beta-blockers    5.   Hypertension  Currently fairly stable we will hold losartan in view of poor renal function start on 5 mg of amlodipine and monitor      Electronically signed by Jermaine Carrion MD on 7/25/2022 at 11:38 AM    [facility]  Right shoulder surgery

## 2022-07-25 NOTE — CARE COORDINATION
7/25/22, 12:21 PM EDT    DISCHARGE PLANNING EVALUATION    Spoke with patient's wife. She shares that family has been considering ecf, requests referral to [facility]. She was told by ecf that they could accept 5 days after covid + test, unsure if this is accurate. Call made to [facility], message left with referral information, waiting call back.

## 2022-07-25 NOTE — PROGRESS NOTES
[address]  INPATIENT OCCUPATIONAL THERAPY  [facility] ORTHOPEDICS 7K  EVALUATION    Time:   Time In: 7224  Time Out: 3383  Timed Code Treatment Minutes: 23 Minutes  Minutes: 35          Date: 2022  Patient Name: Delores Fleming,   Gender: male      MRN: [id number]  : 1950  (74 y.o.)  Referring Practitioner: García Montenegro MD  Diagnosis: generalized weakness  Additional Pertinent Hx: Per MD chart review: Pt is a 74 y.o. male who presents to the emergency department for evaluation of leg weakness. Patient states he was ambulating fine yesterday and today since he woke up that he has not been able to move his legs very well, feel weak. Has been using a walker the past few days. Does have some residual weakness from a stroke 10 years ago. Wife states that she had to feed him this morning because his arms were so weak. Tested positive for COVID yesterday (22) at [facility]. Is taking Paxlovid. States his only symptom is increased productive cough, clear. States shortness of breath is his baseline. Pt has significant PMH including cervical fusion in may 2022, stroke, and rotator cuff injuries as well as failed left shoulder replacements. Restrictions/Precautions:  Restrictions/Precautions: General Precautions, Fall Risk, Isolation  Position Activity Restriction  Spinal Precautions: Recent hx of cervical fusion May 2022. Cervical collar DC'd ~ 1 month ago per pt report. Subjective  Chart Reviewed: Yes, Labs, Orders, Progress Notes, History and Physical  Response to previous treatment: Patient reporting fatigue but able to participate  Family / Caregiver Present: No    Subjective: Nursing approved OT evaluation. Pt supine in bed upon arrival. pleasant and agreeable for OT evaluation.     Pain: 0/10:     Vitals: Oxygen: 94% at rest and with activity    Social/Functional History:  Lives With: Spouse  Type of Home: House  Home Layout: One level  Home Access: Stairs to enter with rails  Entrance Stairs - Number of Steps: 1 OLVIN  Home Equipment: Walker, rolling, Cane   Bathroom Shower/Tub: Walk-in shower (built in bench that pt does use at baseline)  Bathroom Toilet: Standard  Bathroom Equipment: Grab bars in [address] Help From: Family (for minimal hands on assist. Pt's wife also has COVID-19 at this time.)  ADL Assistance: Needs assistance  Homemaking Responsibilities: No  Ambulation Assistance: Independent  Transfer Assistance: Independent    Active : No  Occupation: Retired       VISION:WNL    HEARING:  WNL    COGNITION: WFL and [address]:  Right Upper Extremity: Impaired - hx of multiple joint replacements at shoulder  Left Upper Extremity:  WFL    STRENGTH:  Right Upper Extremity: Impaired - hx of rotator cuff injuries and multiple joint replacements at shoulder  Left Upper Extremity:  Impaired - hx of rotator cuff injury    SENSATION:   WFL    ADL:   Grooming: Stand By Assistance. To perform hand hygiene at the sink. Pt observed to lean forearms on walker secondary to decreased activity tolerance in standing. Lower Extremity Dressing: Moderate Assistance. For pull up brief while seated on toilet. Education provided on possible use of AE for energy conservation and improved reach. No AE used this session. Toileting: Minimal Assistance. Required for donning pull up over hips. Pt able to doff from hips in standing with CGA. Toilet Transfer: Minimal Assistance. From standard height toilet. Pt has Std height toilet at home . BALANCE:  Standing Balance: Contact Guard Assistance. BED MOBILITY:  Supine to Sit: Moderate Assistance Pt reports he sleeps in recliner at home      TRANSFERS:  Sit to Stand:  Minimal Assistance. From EOB  To/From Bed and Chair: Contact Guard Assistance. Once in standing position using FWW    FUNCTIONAL MOBILITY:  Assistive Device: Rolling Walker  Assist Level:  Contact Guard Assistance. Distance:  To and from bathroom  With RW       Activity Tolerance:  Patient tolerance of  treatment: fair. SOB noted with functional mobility. SpO2 WNL. Assessment:  Assessment: Pt presents with decreased functional mobility, decreased ADL status, decreased strength, and decreased endurance secondary to new diagnoses of COVID-19. (tested + on 7/22/22 at [facility]). Pt requries skilled OT services to improve strength, balance, activity tolerance, standing tolerance, and ability to perform self care tasks to progress to next level of care. WIthout OT, pt is at risk for further decline in functional abilities, falls, and debility. Performance deficits / Impairments: Decreased functional mobility , Decreased ADL status, Decreased strength, Decreased ROM, Decreased endurance  Prognosis: Good  REQUIRES OT FOLLOW-UP: Yes  Decision Making: Medium Complexity    Treatment Initiated: Treatment and education initiated within context of evaluation. Evaluation time included review of current medical information, gathering information related to past medical, social and functional history, completion of standardized testing, formal and informal observation of tasks, assessment of data and development of plan of care and goals. Treatment time included skilled education and facilitation of tasks to increase safety and independence with ADL's for improved functional independence and quality of life.     Discharge Recommendations:  Subacute/Skilled Nursing Facility    Patient Education:     Patient Education  Education Given To: Patient  Education Provided: Role of Therapy, Plan of Care, Transfer Training, Energy Conservation, Precautions  Education Method: Demonstration, Verbal  Barriers to Learning: None  Education Outcome: Verbalized understanding, Demonstrated understanding    Equipment Recommendations:  Equipment Needed: No (May benefit from [facility] if pt does not DC to subacute/SNF)    Plan:  Times per Week: 3-5x  Times per Day: Daily  Current Treatment Recommendations: Balance training, Strengthening, Functional mobility training, Endurance training, Self-Care / ADL, Safety education & training. See long-term goal time frame for expected duration of plan of care. If no long-term goals established, a short length of stay is anticipated. Goals:  Patient goals : To return home but is agreeable to skilled nursing/subacute care  Short Term Goals  Time Frame for Short term goals: until DC  Short Term Goal 1: Pt will safely naviagate to/from bathroom utilizing least restrictive device with Supervision to access ADLs. Short Term Goal 2: Pt will tolerate x3-5 minute dynamic standing task to improve independence and safety with sinkside ADLs. Short Term Goal 3: Pt will improve independence with LB ADLs to CGA using AE as needed. Short Term Goal 4: Pt will demonstrate understanding of A/AROM of B UE in order to reduce effects of immobility to B UE shoulder joints. Following session, patient left in safe position with all fall risk precautions in place.

## 2022-07-26 LAB
ALBUMIN SERPL-MCNC: 3.9 G/DL (ref 3.5–5.1)
ALP BLD-CCNC: 82 U/L (ref 38–126)
ALT SERPL-CCNC: 23 U/L (ref 11–66)
ANION GAP SERPL CALCULATED.3IONS-SCNC: 13 MEQ/L (ref 8–16)
AST SERPL-CCNC: 26 U/L (ref 5–40)
BASOPHILS # BLD: 0.5 %
BASOPHILS ABSOLUTE: 0 THOU/MM3 (ref 0–0.1)
BILIRUB SERPL-MCNC: 0.4 MG/DL (ref 0.3–1.2)
BUN BLDV-MCNC: 15 MG/DL (ref 7–22)
CALCIUM SERPL-MCNC: 9.1 MG/DL (ref 8.5–10.5)
CHLORIDE BLD-SCNC: 105 MEQ/L (ref 98–111)
CO2: 25 MEQ/L (ref 23–33)
CREAT SERPL-MCNC: 0.9 MG/DL (ref 0.4–1.2)
EOSINOPHIL # BLD: 8.1 %
EOSINOPHILS ABSOLUTE: 0.3 THOU/MM3 (ref 0–0.4)
ERYTHROCYTE [DISTWIDTH] IN BLOOD BY AUTOMATED COUNT: 14.1 % (ref 11.5–14.5)
ERYTHROCYTE [DISTWIDTH] IN BLOOD BY AUTOMATED COUNT: 44.3 FL (ref 35–45)
GFR SERPL CREATININE-BSD FRML MDRD: 82 ML/MIN/1.73M2
GLUCOSE BLD-MCNC: 105 MG/DL (ref 70–108)
HCT VFR BLD CALC: 42.5 % (ref 42–52)
HEMOGLOBIN: 13.8 GM/DL (ref 14–18)
IMMATURE GRANS (ABS): 0.01 THOU/MM3 (ref 0–0.07)
IMMATURE GRANULOCYTES: 0.3 %
LYMPHOCYTES # BLD: 32.1 %
LYMPHOCYTES ABSOLUTE: 1.3 THOU/MM3 (ref 1–4.8)
MCH RBC QN AUTO: 27.9 PG (ref 26–33)
MCHC RBC AUTO-ENTMCNC: 32.5 GM/DL (ref 32.2–35.5)
MCV RBC AUTO: 86 FL (ref 80–94)
MONOCYTES # BLD: 10.2 %
MONOCYTES ABSOLUTE: 0.4 THOU/MM3 (ref 0.4–1.3)
NUCLEATED RED BLOOD CELLS: 0 /100 WBC
PLATELET # BLD: 179 THOU/MM3 (ref 130–400)
PMV BLD AUTO: 10.6 FL (ref 9.4–12.4)
POTASSIUM REFLEX MAGNESIUM: 3.7 MEQ/L (ref 3.5–5.2)
RBC # BLD: 4.94 MILL/MM3 (ref 4.7–6.1)
SARS-COV-2, NAAT: DETECTED
SEG NEUTROPHILS: 48.8 %
SEGMENTED NEUTROPHILS ABSOLUTE COUNT: 1.9 THOU/MM3 (ref 1.8–7.7)
SODIUM BLD-SCNC: 143 MEQ/L (ref 135–145)
TOTAL PROTEIN: 6.6 G/DL (ref 6.1–8)
WBC # BLD: 3.9 THOU/MM3 (ref 4.8–10.8)

## 2022-07-26 PROCEDURE — 6360000002 HC RX W HCPCS: Performed by: INTERNAL MEDICINE

## 2022-07-26 PROCEDURE — 6370000000 HC RX 637 (ALT 250 FOR IP): Performed by: INTERNAL MEDICINE

## 2022-07-26 PROCEDURE — 97110 THERAPEUTIC EXERCISES: CPT

## 2022-07-26 PROCEDURE — 2580000003 HC RX 258: Performed by: INTERNAL MEDICINE

## 2022-07-26 PROCEDURE — 85025 COMPLETE CBC W/AUTO DIFF WBC: CPT

## 2022-07-26 PROCEDURE — 1200000000 HC SEMI PRIVATE

## 2022-07-26 PROCEDURE — 36415 COLL VENOUS BLD VENIPUNCTURE: CPT

## 2022-07-26 PROCEDURE — 97535 SELF CARE MNGMENT TRAINING: CPT

## 2022-07-26 PROCEDURE — 80053 COMPREHEN METABOLIC PANEL: CPT

## 2022-07-26 PROCEDURE — 87635 SARS-COV-2 COVID-19 AMP PRB: CPT

## 2022-07-26 PROCEDURE — 99233 SBSQ HOSP IP/OBS HIGH 50: CPT | Performed by: INTERNAL MEDICINE

## 2022-07-26 RX ORDER — POTASSIUM CHLORIDE 20 MEQ/1
TABLET, EXTENDED RELEASE ORAL
Status: DISCONTINUED
Start: 2022-07-26 | End: 2022-07-26 | Stop reason: WASHOUT

## 2022-07-26 RX ADMIN — ISOSORBIDE MONONITRATE 30 MG: 30 TABLET, EXTENDED RELEASE ORAL at 11:00

## 2022-07-26 RX ADMIN — ASPIRIN 81 MG: 81 TABLET, CHEWABLE ORAL at 11:01

## 2022-07-26 RX ADMIN — ENOXAPARIN SODIUM 30 MG: 100 INJECTION SUBCUTANEOUS at 11:01

## 2022-07-26 RX ADMIN — AMLODIPINE BESYLATE 10 MG: 10 TABLET ORAL at 11:01

## 2022-07-26 RX ADMIN — POTASSIUM CHLORIDE 8 MEQ: 600 TABLET, FILM COATED, EXTENDED RELEASE ORAL at 11:01

## 2022-07-26 RX ADMIN — CLOPIDOGREL BISULFATE 75 MG: 75 TABLET, FILM COATED ORAL at 11:02

## 2022-07-26 RX ADMIN — TAMSULOSIN HYDROCHLORIDE 0.4 MG: 0.4 CAPSULE ORAL at 20:29

## 2022-07-26 RX ADMIN — SODIUM CHLORIDE, PRESERVATIVE FREE 10 ML: 5 INJECTION INTRAVENOUS at 20:29

## 2022-07-26 RX ADMIN — HYDROCHLOROTHIAZIDE 25 MG: 12.5 CAPSULE, GELATIN COATED ORAL at 11:02

## 2022-07-26 RX ADMIN — SODIUM CHLORIDE, PRESERVATIVE FREE 10 ML: 5 INJECTION INTRAVENOUS at 11:01

## 2022-07-26 RX ADMIN — ENOXAPARIN SODIUM 30 MG: 100 INJECTION SUBCUTANEOUS at 20:29

## 2022-07-26 ASSESSMENT — PAIN DESCRIPTION - ORIENTATION: ORIENTATION: MID;LOWER

## 2022-07-26 ASSESSMENT — ENCOUNTER SYMPTOMS
COUGH: 1
PHOTOPHOBIA: 0
SHORTNESS OF BREATH: 1
VOMITING: 0
VOICE CHANGE: 0
NAUSEA: 0
ABDOMINAL PAIN: 0

## 2022-07-26 ASSESSMENT — PAIN DESCRIPTION - LOCATION: LOCATION: ARM;BACK

## 2022-07-26 ASSESSMENT — PAIN DESCRIPTION - DESCRIPTORS: DESCRIPTORS: ACHING

## 2022-07-26 ASSESSMENT — PAIN - FUNCTIONAL ASSESSMENT: PAIN_FUNCTIONAL_ASSESSMENT: ACTIVITIES ARE NOT PREVENTED

## 2022-07-26 NOTE — PROGRESS NOTES
Hospitalist Progress Note  [facility]       Patient: Manjinder Goodman  Unit/Bed: -58/910-T  YOB: 1950  MRN: [id number]  Acct: [de-identified]   AdmittingDiagnosis: General weakness [R53.1]  Generalized weakness [R53.1]  XIPMD-03 [U07.1]  Admit Date: 7/23/2022  Hospital Day: 1    Subjective: There was some concern of new facial droop yesterday patient underwent X extensive neuroimaging no new or acute stroke noted.   This morning denies any new symptoms today    Objective:   BP (!) 161/71   Pulse 72   Temp 97.7 °F (36.5 °C) (Oral)   Resp 18   Ht 5' 11\" (1.803 m)   Wt 280 lb (127 kg)   SpO2 92%   BMI 39.05 kg/m²     Intake/Output Summary (Last 24 hours) at 7/26/2022 1319  Last data filed at 7/26/2022 1247  Gross per 24 hour   Intake 460 ml   Output 400 ml   Net 60 ml     Physical Exam    General examination:alert oriented appears to be fairly comfortable at rest on room air  Head: Normocephalic atraumatic  Eyes: Pupils equal react light and commendation  Extraocular muscles intact  ENT: Unremarkable  Neck: Supple  Heart: Sounds are normal no murmurs  Lungs: Air entry equal bilaterally no wheeze no crackles  Abdomen: Obese no tenderness no organomegaly bowel sounds are present  Extremities: Trace pedal edema  CNS: No focal neurological deficits  Skin: No rashes or bruises noted     DVT Prophylaxis: Lovenox    Data:  CBC:   Lab Results   Component Value Date/Time    WBC 3.9 07/26/2022 06:17 AM    HGB 13.8 07/26/2022 06:17 AM    HCT 42.5 07/26/2022 06:17 AM    MCV 86.0 07/26/2022 06:17 AM     07/26/2022 06:17 AM     BMP:   Lab Results   Component Value Date/Time     07/26/2022 06:17 AM    K 3.7 07/26/2022 06:17 AM     07/26/2022 06:17 AM    CO2 25 07/26/2022 06:17 AM    BUN 15 07/26/2022 06:17 AM    CREATININE 0.9 07/26/2022 06:17 AM    CALCIUM 9.1 07/26/2022 06:17 AM     ABGs: No results found for: PH, PCO2, PO2, HCO3, O2SAT  Troponin: No results found for: TROPONINI LFTs   Lab Results   Component Value Date/Time    AST 26 07/26/2022 06:17 AM    ALT 23 07/26/2022 06:17 AM    BILITOT 0.4 07/26/2022 06:17 AM    ALKPHOS 82 07/26/2022 06:17 AM          Imaging   CTA HEAD W WO CONTRAST (CODE STROKE)    Result Date: 7/25/2022  PROCEDURE: CTA HEAD W WO CONTRAST, CTA NECK W WO CONTRAST CLINICAL INFORMATION: new right facial droop. COMPARISON: CT scan of the brain obtained on the same day. . TECHNIQUE: 1 mm axial images were obtained through the head and neck after the fast bolus administration of contrast. A noncontrast localizer was obtained. 3-D reconstructions were performed on a dedicated 3-D workstation. These include multiplanar MPR images and multiplanar MIP images. Centerline reconstructions were obtained of the carotid systems. Isovue intravenous contrast was given. All carotid artery measurements are performed utilizing Nascet criteria. This exam was analyzed using viz.  contact CT LVO. All CT scans at this facility use dose modulation, iterative reconstruction, and/or weight-based dosing when appropriate to reduce radiation dose to as low as reasonably achievable. FINDINGS: CTA NECK: Aortic arch and branches: Atherosclerotic calcification in the aortic arch. Calcification versus stent placement in the left anterior attending coronary artery. Right common carotid artery/ICA: There is no significant hemodynamic stenosis in the right common and internal carotid arteries. Left common carotid artery/ICA: There is no significant hemodynamic stenosis in the left common and internal carotid arteries. Vertebral arteries: Antegrade flow in the right and left vertebral arteries. CTA HEAD: Internal carotid arteries: Atherosclerotic calcification in the cavernous segments of both internal carotid arteries. No evidence of severe stenosis. . Middle cerebral arteries: Narrowing in the distal branches of the right middle cerebral artery.  There is antegrade flow in the left middle cerebral TECHNIQUE: 2-D multiplanar noncontrast CT brain All CT scans at this facility use dose modulation, iterative reconstruction, and/or weight-based dosing when appropriate to reduce radiation dose to as low as reasonably achievable. COMPARISON: No prior study. FINDINGS: No acute hemorrhage. No acute infarction identified. Generalized cerebral volume loss. Bilateral basal ganglia chronic infarcts. Diminished attenuation right parietal white matter. Which may be related to remote white matter ischemic change or chronic small vessel ischemic changes. Subtle periventricular white matter diminished attenuation bilaterally. No intra-axial or extra-axial fluid collections. Ventricles are consistent with degree of cerebral volume loss. There is no midline shift. Mild ethmoid sinus disease left ethmoid sinus chambers. Acute sphenoid sinus disease. Remaining paranasal sinuses and mastoid air cells are clear. No calvarial fracture. 1. No acute intracranial pathology 2. Acute and chronic sinus disease. Information called to Leonardo CHU at 12:54 PM **This report has been created using voice recognition software. It may contain minor errors which are inherent in voice recognition technology. ** Final report electronically signed by Dr. Hortensia Powers on 7/25/2022 12:59 PM    CTA NECK W [address] [employer] (CODE STROKE)    Result Date: 7/25/2022  PROCEDURE: CTA HEAD W WO CONTRAST, CTA NECK W WO CONTRAST CLINICAL INFORMATION: new right facial droop. COMPARISON: CT scan of the brain obtained on the same day. . TECHNIQUE: 1 mm axial images were obtained through the head and neck after the fast bolus administration of contrast. A noncontrast localizer was obtained. 3-D reconstructions were performed on a dedicated 3-D workstation. These include multiplanar MPR images and multiplanar MIP images. Centerline reconstructions were obtained of the carotid systems. Isovue intravenous contrast was given.  All carotid artery measurements are performed utilizing Nascet criteria. This exam was analyzed using [facility].  contact CT LVO. All CT scans at this facility use dose modulation, iterative reconstruction, and/or weight-based dosing when appropriate to reduce radiation dose to as low as reasonably achievable. FINDINGS: CTA NECK: Aortic arch and branches: Atherosclerotic calcification in the aortic arch. Calcification versus stent placement in the left anterior attending coronary artery. Right common carotid artery/ICA: There is no significant hemodynamic stenosis in the right common and internal carotid arteries. Left common carotid artery/ICA: There is no significant hemodynamic stenosis in the left common and internal carotid arteries. Vertebral arteries: Antegrade flow in the right and left vertebral arteries. CTA HEAD: Internal carotid arteries: Atherosclerotic calcification in the cavernous segments of both internal carotid arteries. No evidence of severe stenosis. . Middle cerebral arteries: Narrowing in the distal branches of the right middle cerebral artery. There is antegrade flow in the left middle cerebral artery. Anterior cerebral arteries: Antegrade flow in the anterior cerebral arteries bilaterally. . Vertebral arteries: Antegrade flow in the right and left vertebral arteries. Basilar artery: Narrowing in the mid basilar artery. Superior cerebellar arteries: Antegrade flow in the right and to lesser extent left superior cerebellar arteries. . Posterior cerebral arteries: Antegrade flow in the posterior cerebral arteries bilaterally. No aneurysms, stenoses or occlusions are noted. The superior sagittal sinus, vein of Charli, internal cerebral veins, straight sinus, transverse sinuses and sigmoid sinuses are patent. Axial source data: Areas of abnormal attenuation in the left basal ganglia and right basal ganglia. Diminished attenuation in the white matter.       1. No significant hemodynamic stenosis in the right and left common and internal carotid arteries. 2. Antegrade flow in the right and left vertebral arteries. 3. Atherosclerotic calcification aortic arch. 4. Atherosclerotic calcification in the cavernous segments of both internal carotid arteries. No evidence of severe stenosis. 5. Narrowing in the distal branches of the right middle cerebral artery. There is antegrade flow in the left middle cerebral artery. 6. There is antegrade flow in the anterior and posterior cerebral arteries. 7. There is narrowing in the mid basilar artery. 8. Areas of abnormal attenuation in the left and right basal ganglia and in the white matter 9. Diffusion MRI scan may be helpful for better evaluation 10. Nurse Gwendolyn Marcbo on the floor notified  of these results at 1:45 PM. **This report has been created using voice recognition software. It may contain minor errors which are inherent in voice recognition technology. ** Final report electronically signed by DR Rodríguez Hankins on 7/25/2022 2:03 PM    XR CHEST PORTABLE    Result Date: 7/23/2022  PROCEDURE: XR CHEST PORTABLE CLINICAL INFORMATION: Shortness of breath TECHNIQUE: Mobile AP chest radiograph. COMPARISON: Multiple AP chest radiographs 7/23/2020 FINDINGS: Cardiomediastinal silhouette is within normal limits. There are no lung consolidations. Degenerative changes in the thoracic spine are poorly visualized. There is a partially visualized right shoulder prosthesis. Soft tissues are unremarkable. No acute intrathoracic process. **This report has been created using voice recognition software. It may contain minor errors which are inherent in voice recognition technology. ** Final report electronically signed by Dr. Laura Hastings on 7/23/2022 2:13 PM    MRI BRAIN WO CONTRAST    Result Date: 7/25/2022  MR Brain without gadolinium. Comparison: CT,SR - CT HEAD WO CONTRAST - 07/25/2022 12:47 PM EDT Findings: No restricted diffusion. Old infarct in the right frontal corona radiata extending into the right basal ganglia.  Additional

## 2022-07-26 NOTE — PROGRESS NOTES
[address]  Occupational Therapy  Daily Note  Time:   Time In: 1023  Time Out: 1045  Timed Code Treatment Minutes: 30 Minutes  Minutes: 30          Date: 2022  Patient Name: Sawyer Amador,   Gender: male      Room: 7-025-A  MRN: [id number]  : 1950  (74 y.o.)  Referring Practitioner: Jasmine Bhatia MD  Diagnosis: generalized weakness  Additional Pertinent Hx: Per MD chart review: Pt is a 74 y.o. male who presents to the emergency department for evaluation of leg weakness. Patient states he was ambulating fine yesterday and today since he woke up that he has not been able to move his legs very well, feel weak. Has been using a walker the past few days. Does have some residual weakness from a stroke 10 years ago. Wife states that she had to feed him this morning because his arms were so weak. Tested positive for COVID yesterday (22) at [facility]. Is taking Paxlovid. States his only symptom is increased productive cough, clear. States shortness of breath is his baseline. Pt has significant PMH including cervical fusion in may 2022, stroke, and rotator cuff injuries as well as failed left shoulder replacements. Restrictions/Precautions:  Restrictions/Precautions: General Precautions, Fall Risk, Isolation  Position Activity Restriction  Spinal Precautions: Recent hx of cervical fusion May 2022. Cervical collar DC'd ~ 1 month ago per pt report. Other position/activity restrictions: COVID+, R UE deficits from previous sx, L side residual deficits from hx CVA     SUBJECTIVE: Pleasant and cooperative. RN approved session. Pt denies pain. PAIN: Denies    Vitals: Vitals not assessed per clinical judgement, see nursing flowsheet    COGNITION: WFL    ADL:   Grooming: Stand By Assistance. Pt wiped his hands with a washcloth  Toileting: Moderate Assistance. Pt had help to doff his Depends and with wiping his perineum.  Pt did wipe his own anterior perineum . BALANCE:  Sitting Balance:  Stand By Assistance. Doing UE exercises while sitting up at the edge of the chair  Standing Balance: Contact Guard Assistance. Finishing his perineum care    BED MOBILITY:  Supine to Sit: Minimal Assistance, with head of bed raised cues to try to use the bedrail on his L side to help if able to reach it  Scooting: Minimal Assistance, with rail      TRANSFERS:  Sit to Stand:  [address] From the edge of bed  Stand to Sit: [address] To the chair    FUNCTIONAL MOBILITY:  Assistive Device: Rolling Walker  Assist Level:  Contact Guard Assistance. Distance:  18 ft in his room  Pt had even steps, forward posture and his R hand had some difficulty with getting his R hand on the  of the walker. No difficulty noted with steering the walker. ADDITIONAL ACTIVITIES:  Pt completed BUE AAROM exercises with a towel on the table. He showed some increased shoulder movement with flexion and external rotation. Pt had more limitations in his R shoulder than his L shoulder. Toleated each exercise well. Demonstration provided. Exercises completed to increase pt's strength or endurance for ease of doing self care and functional mobility. ASSESSMENT:     Activity Tolerance:  Patient tolerance of  treatment: fair. Pt stood for 1.5 minutes while he had help with getting his Depends in place. He remained reclined in the chair following session.      Discharge Recommendations: Subacute/skilled nursing facility  Equipment Recommendations: Equipment Needed: No (May benefit from [facility] if pt does not DC to subacute/SNF)  Plan: Times per Week: 3-5x  Times per Day: Daily  Current Treatment Recommendations: Balance training, Strengthening, Functional mobility training, Endurance training, Self-Care / ADL, Safety education & training    Patient Education  Patient Education: Home Exercise Program and benefit of doing shoulder ROM with emphasis on external

## 2022-07-26 NOTE — CARE COORDINATION
7/26/22, 8:20 AM EDT    DISCHARGE PLANNING EVALUATION    Spoke with [facility] admissions to clarify when they could accept patient. Facility requires a negative covid test and cannot admit until there is a negative test and patient is 10 days past first positive. Discussed with family.   Second choice is [facility]

## 2022-07-26 NOTE — PLAN OF CARE
Problem: Safety - Adult  Goal: Free from fall injury  Outcome: Progressing Towards Goal  Flowsheets (Taken 7/26/2022 1310)  Free From Fall Injury: Instruct family/caregiver on patient safety     Problem: Discharge Planning  Goal: Discharge to home or other facility with appropriate resources  Outcome: Progressing Towards Goal  Flowsheets (Taken 7/25/2022 0850 by Adriane Esparza, RN)  Discharge to home or other facility with appropriate resources:   Identify barriers to discharge with patient and caregiver   Arrange for needed discharge resources and transportation as appropriate     Problem: Skin/Tissue Integrity  Goal: Absence of new skin breakdown  Description: 1. Monitor for areas of redness and/or skin breakdown  2. Assess vascular access sites hourly  3. Every 4-6 hours minimum:  Change oxygen saturation probe site  4. Every 4-6 hours:  If on nasal continuous positive airway pressure, respiratory therapy assess nares and determine need for appliance change or resting period.   Outcome: Progressing Towards Goal  Note: No new skin breakdown noted this shift      Problem: Pain  Goal: Verbalizes/displays adequate comfort level or baseline comfort level  Outcome: Progressing Towards Goal  Flowsheets (Taken 7/23/2022 1906 by Arabella Sullivan RN)  Verbalizes/displays adequate comfort level or baseline comfort level:   Assess pain using appropriate pain scale   Administer analgesics based on type and severity of pain and evaluate response   Implement non-pharmacological measures as appropriate and evaluate response     Problem: Chronic Conditions and Co-morbidities  Goal: Patient's chronic conditions and co-morbidity symptoms are monitored and maintained or improved  Outcome: Progressing Towards Goal  Flowsheets (Taken 7/25/2022 0850 by Adriane Esparza, RN)  Care Plan - Patient's Chronic Conditions and Co-Morbidity Symptoms are Monitored and Maintained or Improved:   Monitor and assess patient's chronic conditions and comorbid symptoms for stability, deterioration, or improvement   Collaborate with multidisciplinary team to address chronic and comorbid conditions and prevent exacerbation or deterioration

## 2022-07-26 NOTE — PROGRESS NOTES
Neurology Progress Note    Date:7/26/2022       MXAY:[id number]  Patient Name:Del Sargent     YOB: 1950     Age:72 y.o. Requesting Physician: Gayle Graves MD     Reason for Consult:  Evaluate for stroke alert    HPI: Mario Adhikari who is a 74 y.o. male with a history of generalized weakness, CAD, cardiac pacemaker, right basal ganglia embolic stroke, HTN, PFO, TIA was noted to be working with therapy today when PT noted right sided facial droop that she had not noticed prior. Stroke alert initiated at 1226. LKW 11:40 am today. Initial NIH 1 for mild right facial droop. Glucose 150. Currently on ASA and plavix. Also noted to currently be in isolation for COVID-19. Denies associated symptoms. Time of symptoms onset/last time seen at baseline:  1140. Time of stroke alert:  1226. Time of neurology arrival:  1228. Vascular risk factors:  HTN, hx of CVA, PFO, TIA  Initial Glucose: 150 . Old deficits from prior stroke:  mild left sided weakness  INR in ED if anticoagulated: NA  BP in ED:  153/72  Initial NIHSS: 1  IV tPA administerd:  NA  Time of Initial Imaging Read:  1254  Thrombectomy performed: NA  Puncture time:  NA    Interval History 7/26/22: Patient reports doing better today with generalized weakness and fatigue improving. Facial droop improving. Denies complaints or new onset numbness, tingling, tremors, headaches, vision changes, difficulty speaking. Review of Systems   Review of Systems   Constitutional:  Negative for chills and fever. HENT:  Negative for tinnitus and voice change. Eyes:  Negative for photophobia and visual disturbance. Respiratory:  Positive for cough and shortness of breath. Cardiovascular:  Negative for chest pain and palpitations. Gastrointestinal:  Negative for abdominal pain, nausea and vomiting. Genitourinary:  Negative for dysuria. Musculoskeletal:  Negative for arthralgias and myalgias. Skin:  Negative for rash.    Neurological:  Positive for facial asymmetry (improving). Negative for dizziness, weakness, numbness and headaches. Psychiatric/Behavioral:  The patient is not nervous/anxious. Medications   Scheduled Meds:    hydroCHLOROthiazide  25 mg Oral Daily    amLODIPine  10 mg Oral Daily    tamsulosin  0.4 mg Oral Nightly    clopidogrel  75 mg Oral Daily    aspirin  81 mg Oral Daily    isosorbide mononitrate  30 mg Oral Daily    sodium chloride flush  10 mL IntraVENous 2 times per day    enoxaparin  30 mg SubCUTAneous BID    potassium chloride  8 mEq Oral Daily with breakfast     Continuous Infusions:    sodium chloride      sodium chloride 75 mL/hr at 07/24/22 1656     PRN Meds: nitroGLYCERIN, sodium chloride flush, sodium chloride, ondansetron **OR** ondansetron, polyethylene glycol, acetaminophen **OR** acetaminophen  Medications Prior to Admission:   No current facility-administered medications on file prior to encounter. Current Outpatient Medications on File Prior to Encounter   Medication Sig Dispense Refill    isosorbide mononitrate (IMDUR) 30 MG extended release tablet Take 1 tablet by mouth daily 90 tablet 1    aspirin 81 MG chewable tablet Take 1 tablet by mouth daily 30 tablet 3    clopidogrel (PLAVIX) 75 MG tablet Take 300 mg the first dose then 75 after (Patient taking differently: Take 75 mg by mouth in the morning.) 90 tablet 3    nitroGLYCERIN (NITROSTAT) 0.4 MG SL tablet up to max of 3 total doses.  If no relief after 1 dose, call 911. 25 tablet 1    tamsulosin (FLOMAX) 0.4 MG capsule Take 0.4 mg by mouth nightly       losartan (COZAAR) 100 MG tablet Take 50 mg by mouth daily       hydrochlorothiazide (HYDRODIURIL) 12.5 MG tablet TAKE 1 TABLET BY MOUTH ONCE DAILY      Potassium 99 MG TABS Take 3 tablets by mouth daily      Multiple Vitamins-Minerals (PRESERVISION AREDS PO) Take by mouth      Ascorbic Acid (VITAMIN C) 500 MG tablet Take 1,000 mg by mouth          Past History    Past Medical History:   has a past medical General: Normal range of motion. Cervical back: Normal range of motion. Right lower leg: No edema. Left lower leg: No edema. Skin:     General: Skin is warm. Findings: No rash. Neurological:      Mental Status: He is alert and oriented to person, place, and time. Coordination: Finger-Nose-Finger Test and Heel to [place] tai Test normal.      Deep Tendon Reflexes: Strength normal.   Psychiatric:         Mood and Affect: Mood normal.         Speech: Speech normal.         Behavior: Behavior normal.     Neurologic Exam     Mental Status   Oriented to person, place, and time. Follows 1 step commands. Attention: normal. Concentration: normal.   Speech: speech is normal   Level of consciousness: alert  Normal comprehension. Cranial Nerves     CN II   Visual fields full to confrontation. Right visual field deficit: none  Left visual field deficit: none     CN III, IV, VI   Pupils are equal, round, and reactive to light. Extraocular motions are normal.     CN V   Facial sensation intact. Right facial sensation deficit: none  Left facial sensation deficit: none    CN VII   Facial expression full, symmetric. Right facial weakness: central (mild)  Left facial weakness: none    CN VIII   CN VIII normal.   Hearing: intact    CN IX, X   CN IX normal.   CN X normal.   Palate: symmetric    CN XI   CN XI normal.   Right trapezius strength: normal  Left trapezius strength: normal    CN XII   CN XII normal.   Tongue: not atrophic  Fasciculations: absent  Tongue deviation: none    Motor Exam     Strength   Strength 5/5 throughout.      Sensory Exam   Light touch normal.     Gait, Coordination, and Reflexes     Coordination   Finger to nose coordination: normal  Heel to shin coordination: normal    Tremor   Resting tremor: absent     Labs/Imaging/Diagnostics   Labs:  CBC:  Recent Labs     07/24/22  0503 07/25/22  0548 07/26/22  0617   WBC 3.3* 3.4* 3.9*   RBC 4.72 4.76 4.94   HGB 13.3* 13.5* 13.8* HCT 41.3* 42.0 42.5   MCV 87.5 88.2 86.0    153 179     CHEMISTRIES:  Recent Labs     07/24/22  0503 07/25/22  0548 07/26/22  0617    143 143   K 3.4* 3.5 3.7    109 105   CO2 22* 24 25   BUN 17 15 15   CREATININE 1.1 0.9 0.9   GLUCOSE 97 98 105   MG 2.3 2.2  --      COAGULATION STUDIES:No results for input(s): PROTIME, INR, APTT in the last 72 hours. LIVER PROFILE:  Recent Labs     07/24/22  0503 07/25/22  0548 07/26/22  0617   AST 30 26 26   ALT 26 23 23   BILITOT 0.3 0.2* 0.4   ALKPHOS 78 77 82     CHOLESTEROL AND A1C:No results for input(s): LDLCALC, HDL, CHOL, TRIG, LABA1C in the last 720 hours. Imaging Last 24 Hours:  CTA HEAD W WO CONTRAST (CODE STROKE)    Result Date: 7/25/2022  PROCEDURE: CTA HEAD W WO CONTRAST, CTA NECK W WO CONTRAST CLINICAL INFORMATION: new right facial droop. COMPARISON: CT scan of the brain obtained on the same day. . TECHNIQUE: 1 mm axial images were obtained through the head and neck after the fast bolus administration of contrast. A noncontrast localizer was obtained. 3-D reconstructions were performed on a dedicated 3-D workstation. These include multiplanar MPR images and multiplanar MIP images. Centerline reconstructions were obtained of the carotid systems. Isovue intravenous contrast was given. All carotid artery measurements are performed utilizing Nascet criteria. This exam was analyzed using viz.  contact CT LVO. All CT scans at this facility use dose modulation, iterative reconstruction, and/or weight-based dosing when appropriate to reduce radiation dose to as low as reasonably achievable. FINDINGS: CTA NECK: Aortic arch and branches: Atherosclerotic calcification in the aortic arch. Calcification versus stent placement in the left anterior attending coronary artery. Right common carotid artery/ICA: There is no significant hemodynamic stenosis in the right common and internal carotid arteries.  Left common carotid artery/ICA: There is no significant hemodynamic stenosis in the left common and internal carotid arteries. Vertebral arteries: Antegrade flow in the right and left vertebral arteries. CTA HEAD: Internal carotid arteries: Atherosclerotic calcification in the cavernous segments of both internal carotid arteries. No evidence of severe stenosis. . Middle cerebral arteries: Narrowing in the distal branches of the right middle cerebral artery. There is antegrade flow in the left middle cerebral artery. Anterior cerebral arteries: Antegrade flow in the anterior cerebral arteries bilaterally. . Vertebral arteries: Antegrade flow in the right and left vertebral arteries. Basilar artery: Narrowing in the mid basilar artery. Superior cerebellar arteries: Antegrade flow in the right and to lesser extent left superior cerebellar arteries. . Posterior cerebral arteries: Antegrade flow in the posterior cerebral arteries bilaterally. No aneurysms, stenoses or occlusions are noted. The superior sagittal sinus, vein of Charli, internal cerebral veins, straight sinus, transverse sinuses and sigmoid sinuses are patent. Axial source data: Areas of abnormal attenuation in the left basal ganglia and right basal ganglia. Diminished attenuation in the white matter. 1. No significant hemodynamic stenosis in the right and left common and internal carotid arteries. 2. Antegrade flow in the right and left vertebral arteries. 3. Atherosclerotic calcification aortic arch. 4. Atherosclerotic calcification in the cavernous segments of both internal carotid arteries. No evidence of severe stenosis. 5. Narrowing in the distal branches of the right middle cerebral artery. There is antegrade flow in the left middle cerebral artery. 6. There is antegrade flow in the anterior and posterior cerebral arteries. 7. There is narrowing in the mid basilar artery. 8. Areas of abnormal attenuation in the left and right basal ganglia and in the white matter 9.  Diffusion MRI scan may be helpful for better evaluation 10. Nurse Cristina Clarke on the floor notified  of these results at 1:45 PM. **This report has been created using voice recognition software. It may contain minor errors which are inherent in voice recognition technology. ** Final report electronically signed by DR Perez Mittal on 7/25/2022 2:03 PM    CT HEAD WO CONTRAST (CODE STROKE)    Result Date: 7/25/2022  PROCEDURE: CT HEAD WO CONTRAST CLINICAL INFORMATION: new right facial droop . TECHNIQUE: 2-D multiplanar noncontrast CT brain All CT scans at this facility use dose modulation, iterative reconstruction, and/or weight-based dosing when appropriate to reduce radiation dose to as low as reasonably achievable. COMPARISON: No prior study. FINDINGS: No acute hemorrhage. No acute infarction identified. Generalized cerebral volume loss. Bilateral basal ganglia chronic infarcts. Diminished attenuation right parietal white matter. Which may be related to remote white matter ischemic change or chronic small vessel ischemic changes. Subtle periventricular white matter diminished attenuation bilaterally. No intra-axial or extra-axial fluid collections. Ventricles are consistent with degree of cerebral volume loss. There is no midline shift. Mild ethmoid sinus disease left ethmoid sinus chambers. Acute sphenoid sinus disease. Remaining paranasal sinuses and mastoid air cells are clear. No calvarial fracture. 1. No acute intracranial pathology 2. Acute and chronic sinus disease. Information called to [facility] at 12:54 PM **This report has been created using voice recognition software. It may contain minor errors which are inherent in voice recognition technology. ** Final report electronically signed by Dr. Michael Dow on 7/25/2022 12:59 PM    CTA NECK W [address] (CODE STROKE)    Result Date: 7/25/2022  PROCEDURE: CTA HEAD W WO CONTRAST, CTA NECK W WO CONTRAST CLINICAL INFORMATION: new right facial droop.  COMPARISON: CT scan of the brain obtained on the same arteries bilaterally. No aneurysms, stenoses or occlusions are noted. The superior sagittal sinus, vein of Charli, internal cerebral veins, straight sinus, transverse sinuses and sigmoid sinuses are patent. Axial source data: Areas of abnormal attenuation in the left basal ganglia and right basal ganglia. Diminished attenuation in the white matter. 1. No significant hemodynamic stenosis in the right and left common and internal carotid arteries. 2. Antegrade flow in the right and left vertebral arteries. 3. Atherosclerotic calcification aortic arch. 4. Atherosclerotic calcification in the cavernous segments of both internal carotid arteries. No evidence of severe stenosis. 5. Narrowing in the distal branches of the right middle cerebral artery. There is antegrade flow in the left middle cerebral artery. 6. There is antegrade flow in the anterior and posterior cerebral arteries. 7. There is narrowing in the mid basilar artery. 8. Areas of abnormal attenuation in the left and right basal ganglia and in the white matter 9. Diffusion MRI scan may be helpful for better evaluation 10. Nurse Quinn France on the floor notified  of these results at 1:45 PM. **This report has been created using voice recognition software. It may contain minor errors which are inherent in voice recognition technology. ** Final report electronically signed by DR Dominique Mayen on 7/25/2022 2:03 PM    MRI BRAIN WO CONTRAST    Result Date: 7/25/2022  MR Brain without gadolinium. Comparison: CT,SR - CT HEAD WO CONTRAST - 07/25/2022 12:47 PM EDT Findings: No restricted diffusion. Old infarct in the right frontal corona radiata extending into the right basal ganglia. Additional old lacunar infarct in the left basal ganglia. Small amount of hemosiderin deposition in the periphery of the old lacunar infarcts. Nonspecific periventricular and scattered frontoparietal white matter T2/FLAIR signal abnormality. Mild generalized atrophy.  No intracranial mass or acute hemorrhage. No midline shift. No hydrocephalus. Vascular flow voids are intact. Mucosal thickening in the left frontal sinus, left ethmoid air cells, bilateral maxillary sinuses, and left sphenoid sinus. Mastoid air cells are clear. Orbital contents are unremarkable. No focal bone lesion. 1. No restricted diffusion to suggest acute infarct. 2. Old infarct in the right corona radiata extending to the right basal ganglia. Old lacunar infarct in the left basal ganglia. 3. Mild atrophy. Nonspecific periventricular and frontoparietal white matter signal abnormality may relate to chronic small vessel ischemic changes. This document has been electronically signed by: Genoveva Eduardo MD on 07/25/2022 07:25 PM     Assessment and Plan:        Right sided facial droop, improving   Imaging  CT Head: negative for acute intracranial hemorrhage   CTA of head and neck: negative for significant hemodynamic stenosis, full read above. No need for carotid ultrasound. MRI of brain without contrast: no restricted diffusion to suggest acute infarct. Old infarct R corona radiata extending to R basal ganglia. Old lacunar infarct in L basal ganglia, full read above. Stat CT head is needed if the patient develops new-onset altered mental status, a severe headache, or new-onset neurologic deficit  Risk factors and medications  Hypertension management with goal blood pressure of <130/80 unless otherwise specified. Keep well hydrated. Initiate normal saline at 75 ml/hr as needed. Continue Asprin 81 mg/Plavix 75 mg daily  LDL goal of 45-70. Smoking and alcohol cessation when applicable. Provide stroke eduction for individualized risk factors. DVT prophylaxis with SCDs and SQ lovenox   Other recommendations  Dysphagia screen prior to oral intake  PT/OT/SLP Consult  EKG/Telemetry to monitor for atrial fibrillation  NIHSS every shift. Neuro checks per unit unless otherwise specified. Head of bed 45 degree.     Continue to treat underlying medical condition. Neurology work-up completed at this time. Please call with any questions, concerns, or new focal neurologic deficits. Thank you for this consult. This patient's case was discussed with Dr. Josue Noel who is in agreement with assessment and plan.      Electronically signed by Ke Jeffers PA-C on 7/26/22 at 8:05 PM EDT

## 2022-07-26 NOTE — CARE COORDINATION
7/26/22, 9:24 AM EDT    DISCHARGE ON GOING EVALUATION    Waqar Mcdonald day: 1  Location: -25/025-A Reason for admit: General weakness [R53.1]  Generalized weakness [R53.1]  COVID-19 [U07.1]   Procedure:   7/25 MRI Brain WO Contrast 1. No restricted diffusion to suggest acute infarct. 2. Old infarct in the right corona radiata extending to the right basal   ganglia. Old lacunar infarct in the left basal ganglia. 3. Mild atrophy. Nonspecific periventricular and frontoparietal white   matter signal abnormality may relate to chronic small vessel ischemic   changes. 7/25 CTA Head Neck W WO Contrast 1. No significant hemodynamic stenosis in the right and left common and internal carotid arteries. 2. Antegrade flow in the right and left vertebral arteries. 3. Atherosclerotic calcification aortic arch. 4. Atherosclerotic calcification in the cavernous segments of both internal carotid arteries. No evidence of severe stenosis. 5. Narrowing in the distal branches of the right middle cerebral artery. There is antegrade flow in the left middle cerebral artery. 6. There is antegrade flow in the anterior and posterior cerebral arteries. 7. There is narrowing in the mid basilar artery. 8. Areas of abnormal attenuation in the left and right basal ganglia and in the white matter   9. Diffusion MRI scan may be helpful for better evaluation   10. Nurse Kitty Maurer on the floor notified  of these results at 1:45 PM.   7/25 CT Head WO Contrast 1. No acute intracranial pathology   2. Acute and chronic sinus disease  Barriers to Discharge: Neurology consult, room air oxygen, IV fluids, Plavix, Lovenox. PCP: Zoltan Galvez DO  Readmission Risk Score: 16.2%  Patient Goals/Plan/Treatment Preferences: From home with spouse. Referral made to [facility]. SW following. Refer to SW note. Await Covid swab, onset of symptoms for isolation protocols per [facility] Infection Control, Tonya Sutton RN.  Will follow.

## 2022-07-26 NOTE — CONSULTS
Neurology Stroke Alert Note    Date:7/25/2022       ZMOE:[id number]  Patient Name:Del Samuels     YOB: 1950     Age:72 y.o. Requesting Physician: Alexandria Shaw MD     Reason for Consult:  Evaluate for stroke alert    HPI: Zarina Cohn who is a 74 y.o. male with a history of generalized weakness, CAD, cardiac pacemaker, right basal ganglia embolic stroke, HTN, PFO, TIA was noted to be working with therapy today when PT noted right sided facial droop that she had not noticed prior. Stroke alert initiated at 1226. LKW 11:40 am today. Initial NIH 1 for mild right facial droop. Glucose 150. Currently on ASA and plavix. Also noted to currently be in isolation for COVID-19. Denies associated symptoms. Time of symptoms onset/last time seen at baseline:  1140. Time of stroke alert:  1226. Time of neurology arrival:  1228. Vascular risk factors:  HTN, hx of CVA, PFO, TIA  Initial Glucose: 150 . Old deficits from prior stroke:  mild left sided weakness  INR in ED if anticoagulated: NA  BP in ED:  153/72  Initial NIHSS: 1  IV tPA administerd:  NA  Time of Initial Imaging Read:  1254  Thrombectomy performed: NA  Puncture time:  NA    Review of Systems   Review of Systems   Neurological:  Positive for facial asymmetry.      Medications   Scheduled Meds:    hydroCHLOROthiazide  25 mg Oral Daily    amLODIPine  10 mg Oral Daily    tamsulosin  0.4 mg Oral Nightly    clopidogrel  75 mg Oral Daily    aspirin  81 mg Oral Daily    isosorbide mononitrate  30 mg Oral Daily    sodium chloride flush  10 mL IntraVENous 2 times per day    enoxaparin  30 mg SubCUTAneous BID    potassium chloride  8 mEq Oral Daily with breakfast     Continuous Infusions:    sodium chloride      sodium chloride 75 mL/hr at 07/24/22 1656     PRN Meds: nitroGLYCERIN, sodium chloride flush, sodium chloride, ondansetron **OR** ondansetron, polyethylene glycol, acetaminophen **OR** acetaminophen  Medications Prior to Admission:   No current facility-administered medications on file prior to encounter. Current Outpatient Medications on File Prior to Encounter   Medication Sig Dispense Refill    isosorbide mononitrate (IMDUR) 30 MG extended release tablet Take 1 tablet by mouth daily 90 tablet 1    aspirin 81 MG chewable tablet Take 1 tablet by mouth daily 30 tablet 3    clopidogrel (PLAVIX) 75 MG tablet Take 300 mg the first dose then 75 after (Patient taking differently: Take 75 mg by mouth in the morning.) 90 tablet 3    nitroGLYCERIN (NITROSTAT) 0.4 MG SL tablet up to max of 3 total doses. If no relief after 1 dose, call 911. 25 tablet 1    tamsulosin (FLOMAX) 0.4 MG capsule Take 0.4 mg by mouth nightly       losartan (COZAAR) 100 MG tablet Take 50 mg by mouth daily       hydrochlorothiazide (HYDRODIURIL) 12.5 MG tablet TAKE 1 TABLET BY MOUTH ONCE DAILY      Potassium 99 MG TABS Take 3 tablets by mouth daily      Multiple Vitamins-Minerals (PRESERVISION AREDS PO) Take by mouth      Ascorbic Acid (VITAMIN C) 500 MG tablet Take 1,000 mg by mouth          Past History    Past Medical History:   has a past medical history of Arthritis, Back pain, CAD in native artery, Diarrhea, Hypertension, Infection of prosthetic shoulder joint ([address] PFO (patent foramen ovale), Propionibacterium infection, Rotator cuff injury, Stroke ([address] and TIA (transient ischemic attack). Social History:   reports that he has never smoked. He has never used smokeless tobacco. He reports that he does not drink alcohol and does not use drugs.      Family History:   Family History   Problem Relation Age of Onset    Arthritis Mother     Vision Loss Father     Cancer Brother     Stroke Maternal Uncle     Prostate Cancer Neg Hx        Physical Examination        NIH Stroke Scale  1a Level of consciousness 0=alert; keenly responsive   1b LOC questions 0=Performs both tasks correctly   1c LOC commands 0=Performs both tasks correctly   2 Best Gaze 0=normal   3 Visual 0=No visual General: Normal range of motion. Cervical back: Normal range of motion. Right lower leg: No edema. Left lower leg: No edema. Skin:     General: Skin is warm. Findings: No rash. Neurological:      Mental Status: He is alert and oriented to person, place, and time. Coordination: Finger-Nose-Finger Test and Heel to [facility] Test normal.      Deep Tendon Reflexes: Strength normal.   Psychiatric:         Mood and Affect: Mood normal.         Speech: Speech normal.         Behavior: Behavior normal.     Neurologic Exam     Mental Status   Oriented to person, place, and time. Follows 1 step commands. Attention: normal. Concentration: normal.   Speech: speech is normal   Level of consciousness: alert  Normal comprehension. Cranial Nerves     CN II   Visual fields full to confrontation. Right visual field deficit: none  Left visual field deficit: none     CN III, IV, VI   Pupils are equal, round, and reactive to light. Extraocular motions are normal.     CN V   Facial sensation intact. Right facial sensation deficit: none  Left facial sensation deficit: none    CN VII   Facial expression full, symmetric. Right facial weakness: central  Left facial weakness: none    CN VIII   CN VIII normal.   Hearing: intact    CN IX, X   CN IX normal.   CN X normal.   Palate: symmetric    CN XI   CN XI normal.   Right trapezius strength: normal  Left trapezius strength: normal    CN XII   CN XII normal.   Tongue: not atrophic  Fasciculations: absent  Tongue deviation: none    Motor Exam     Strength   Strength 5/5 throughout.      Sensory Exam   Light touch normal.     Gait, Coordination, and Reflexes     Coordination   Finger to nose coordination: normal  Heel to shin coordination: normal    Tremor   Resting tremor: absent     Labs/Imaging/Diagnostics   Labs:  CBC:  Recent Labs     07/23/22  1347 07/24/22  0503 07/25/22  0548   WBC 4.2* 3.3* 3.4*   RBC 4.96 4.72 4.76   HGB 14.1 13.3* 13.5*   HCT 43.0 41.3* 42.0   MCV 86.7 87.5 88.2    147 153     CHEMISTRIES:  Recent Labs     07/23/22  1347 07/24/22  0503 07/25/22  0548    140 143   K 3.6 3.4* 3.5    105 109   CO2 22* 22* 24   BUN 17 17 15   CREATININE 1.3* 1.1 0.9   GLUCOSE 158* 97 98   MG  --  2.3 2.2     COAGULATION STUDIES:No results for input(s): PROTIME, INR, APTT in the last 72 hours. LIVER PROFILE:  Recent Labs     07/24/22  0503 07/25/22  0548   AST 30 26   ALT 26 23   BILITOT 0.3 0.2*   ALKPHOS 78 77     CHOLESTEROL AND A1C:No results for input(s): LDLCALC, HDL, CHOL, TRIG, LABA1C in the last 720 hours. Imaging Last 24 Hours:  CTA HEAD W WO CONTRAST (CODE STROKE)    Result Date: 7/25/2022  PROCEDURE: CTA HEAD W WO CONTRAST, CTA NECK W WO CONTRAST CLINICAL INFORMATION: new right facial droop. COMPARISON: CT scan of the brain obtained on the same day. . TECHNIQUE: 1 mm axial images were obtained through the head and neck after the fast bolus administration of contrast. A noncontrast localizer was obtained. 3-D reconstructions were performed on a dedicated 3-D workstation. These include multiplanar MPR images and multiplanar MIP images. Centerline reconstructions were obtained of the carotid systems. Isovue intravenous contrast was given. All carotid artery measurements are performed utilizing Nascet criteria. This exam was analyzed using viz. AI contact CT LVO. All CT scans at this facility use dose modulation, iterative reconstruction, and/or weight-based dosing when appropriate to reduce radiation dose to as low as reasonably achievable. FINDINGS: CTA NECK: Aortic arch and branches: Atherosclerotic calcification in the aortic arch. Calcification versus stent placement in the left anterior attending coronary artery. Right common carotid artery/ICA: There is no significant hemodynamic stenosis in the right common and internal carotid arteries.  Left common carotid artery/ICA: There is no significant hemodynamic stenosis in the left common and internal carotid arteries. Vertebral arteries: Antegrade flow in the right and left vertebral arteries. CTA HEAD: Internal carotid arteries: Atherosclerotic calcification in the cavernous segments of both internal carotid arteries. No evidence of severe stenosis. . Middle cerebral arteries: Narrowing in the distal branches of the right middle cerebral artery. There is antegrade flow in the left middle cerebral artery. Anterior cerebral arteries: Antegrade flow in the anterior cerebral arteries bilaterally. . Vertebral arteries: Antegrade flow in the right and left vertebral arteries. Basilar artery: Narrowing in the mid basilar artery. Superior cerebellar arteries: Antegrade flow in the right and to lesser extent left superior cerebellar arteries. . Posterior cerebral arteries: Antegrade flow in the posterior cerebral arteries bilaterally. No aneurysms, stenoses or occlusions are noted. The superior sagittal sinus, vein of Charli, internal cerebral veins, straight sinus, transverse sinuses and sigmoid sinuses are patent. Axial source data: Areas of abnormal attenuation in the left basal ganglia and right basal ganglia. Diminished attenuation in the white matter. 1. No significant hemodynamic stenosis in the right and left common and internal carotid arteries. 2. Antegrade flow in the right and left vertebral arteries. 3. Atherosclerotic calcification aortic arch. 4. Atherosclerotic calcification in the cavernous segments of both internal carotid arteries. No evidence of severe stenosis. 5. Narrowing in the distal branches of the right middle cerebral artery. There is antegrade flow in the left middle cerebral artery. 6. There is antegrade flow in the anterior and posterior cerebral arteries. 7. There is narrowing in the mid basilar artery. 8. Areas of abnormal attenuation in the left and right basal ganglia and in the white matter 9. Diffusion MRI scan may be helpful for better evaluation 10.  Nurse Miryam Bingham on the floor notified  of these results at 1:45 PM. **This report has been created using voice recognition software. It may contain minor errors which are inherent in voice recognition technology. ** Final report electronically signed by DR Millicent Vasquez on 7/25/2022 2:03 PM    CT HEAD WO CONTRAST (CODE STROKE)    Result Date: 7/25/2022  PROCEDURE: CT HEAD WO CONTRAST CLINICAL INFORMATION: new right facial droop . TECHNIQUE: 2-D multiplanar noncontrast CT brain All CT scans at this facility use dose modulation, iterative reconstruction, and/or weight-based dosing when appropriate to reduce radiation dose to as low as reasonably achievable. COMPARISON: No prior study. FINDINGS: No acute hemorrhage. No acute infarction identified. Generalized cerebral volume loss. Bilateral basal ganglia chronic infarcts. Diminished attenuation right parietal white matter. Which may be related to remote white matter ischemic change or chronic small vessel ischemic changes. Subtle periventricular white matter diminished attenuation bilaterally. No intra-axial or extra-axial fluid collections. Ventricles are consistent with degree of cerebral volume loss. There is no midline shift. Mild ethmoid sinus disease left ethmoid sinus chambers. Acute sphenoid sinus disease. Remaining paranasal sinuses and mastoid air cells are clear. No calvarial fracture. 1. No acute intracranial pathology 2. Acute and chronic sinus disease. Information called to Leonardo CHU at 12:54 PM **This report has been created using voice recognition software. It may contain minor errors which are inherent in voice recognition technology. ** Final report electronically signed by Dr. Paul Guerrero on 7/25/2022 12:59 PM    CTA NECK W [address] (CODE STROKE)    Result Date: 7/25/2022  PROCEDURE: CTA HEAD W WO CONTRAST, CTA NECK W WO CONTRAST CLINICAL INFORMATION: new right facial droop. COMPARISON: CT scan of the brain obtained on the same day. . TECHNIQUE: 1 mm axial images were obtained through the head and neck after the fast bolus administration of contrast. A noncontrast localizer was obtained. 3-D reconstructions were performed on a dedicated 3-D workstation. These include multiplanar MPR images and multiplanar MIP images. Centerline reconstructions were obtained of the carotid systems. Isovue intravenous contrast was given. All carotid artery measurements are performed utilizing Nascet criteria. This exam was analyzed using [facility].  contact CT LVO. All CT scans at this facility use dose modulation, iterative reconstruction, and/or weight-based dosing when appropriate to reduce radiation dose to as low as reasonably achievable. FINDINGS: CTA NECK: Aortic arch and branches: Atherosclerotic calcification in the aortic arch. Calcification versus stent placement in the left anterior attending coronary artery. Right common carotid artery/ICA: There is no significant hemodynamic stenosis in the right common and internal carotid arteries. Left common carotid artery/ICA: There is no significant hemodynamic stenosis in the left common and internal carotid arteries. Vertebral arteries: Antegrade flow in the right and left vertebral arteries. CTA HEAD: Internal carotid arteries: Atherosclerotic calcification in the cavernous segments of both internal carotid arteries. No evidence of severe stenosis. . Middle cerebral arteries: Narrowing in the distal branches of the right middle cerebral artery. There is antegrade flow in the left middle cerebral artery. Anterior cerebral arteries: Antegrade flow in the anterior cerebral arteries bilaterally. . Vertebral arteries: Antegrade flow in the right and left vertebral arteries. Basilar artery: Narrowing in the mid basilar artery. Superior cerebellar arteries: Antegrade flow in the right and to lesser extent left superior cerebellar arteries. . Posterior cerebral arteries: Antegrade flow in the posterior cerebral arteries bilaterally.  No aneurysms, stenoses or occlusions are noted. The superior sagittal sinus, vein of Charli, internal cerebral veins, straight sinus, transverse sinuses and sigmoid sinuses are patent. Axial source data: Areas of abnormal attenuation in the left basal ganglia and right basal ganglia. Diminished attenuation in the white matter. 1. No significant hemodynamic stenosis in the right and left common and internal carotid arteries. 2. Antegrade flow in the right and left vertebral arteries. 3. Atherosclerotic calcification aortic arch. 4. Atherosclerotic calcification in the cavernous segments of both internal carotid arteries. No evidence of severe stenosis. 5. Narrowing in the distal branches of the right middle cerebral artery. There is antegrade flow in the left middle cerebral artery. 6. There is antegrade flow in the anterior and posterior cerebral arteries. 7. There is narrowing in the mid basilar artery. 8. Areas of abnormal attenuation in the left and right basal ganglia and in the white matter 9. Diffusion MRI scan may be helpful for better evaluation 10. Nurse Sakshi Ems on the floor notified  of these results at 1:45 PM. **This report has been created using voice recognition software. It may contain minor errors which are inherent in voice recognition technology. ** Final report electronically signed by DR Chloe Quiroz on 7/25/2022 2:03 PM    MRI BRAIN WO CONTRAST    Result Date: 7/25/2022  MR Brain without gadolinium. Comparison: CT,SR - CT HEAD WO CONTRAST - 07/25/2022 12:47 PM EDT Findings: No restricted diffusion. Old infarct in the right frontal corona radiata extending into the right basal ganglia. Additional old lacunar infarct in the left basal ganglia. Small amount of hemosiderin deposition in the periphery of the old lacunar infarcts. Nonspecific periventricular and scattered frontoparietal white matter T2/FLAIR signal abnormality. Mild generalized atrophy. No intracranial mass or acute hemorrhage.  No midline shift. No hydrocephalus. Vascular flow voids are intact. Mucosal thickening in the left frontal sinus, left ethmoid air cells, bilateral maxillary sinuses, and left sphenoid sinus. Mastoid air cells are clear. Orbital contents are unremarkable. No focal bone lesion. 1. No restricted diffusion to suggest acute infarct. 2. Old infarct in the right corona radiata extending to the right basal ganglia. Old lacunar infarct in the left basal ganglia. 3. Mild atrophy. Nonspecific periventricular and frontoparietal white matter signal abnormality may relate to chronic small vessel ischemic changes. This document has been electronically signed by: Chinmay Pro MD on 07/25/2022 07:25 PM     Assessment and Plan:        Right sided facial droop  Imaging  CT Head: negative for acute intracranial hemorrhage   CTA of head and neck: negative for significant hemodynamic stenosis, full read above. No need for carotid ultrasound. MRI of brain without contrast: no restricted diffusion to suggest acute infarct. Old infarct R corona radiata extending to R basal ganglia. Old lacunar infarct in L basal ganglia, full read above. Stat CT head is needed if the patient develops new-onset altered mental status, a severe headache, or new-onset neurologic deficit  Risk factors and medications  Hypertension management with goal blood pressure of <130/80 unless otherwise specified. Keep well hydrated. Initiate normal saline at 75 ml/hr as needed. Continue Asprin 81 mg/Plavix 75 mg daily  LDL goal of 45-70. Smoking and alcohol cessation when applicable. Provide stroke eduction for individualized risk factors. DVT prophylaxis with SCDs and SQ lovenox (hold heparin/lovenox for 24 hours after IV tPA or IA intervention)  Other recommendations  Dysphagia screen prior to oral intake  PT/OT/SLP Consult  EKG/Telemetry to monitor for atrial fibrillation  NIHSS every shift. Neuro checks per unit unless otherwise specified.   Head of bed 45 degree. Neurology following. Please call with any questions, concerns, or new focal neurologic deficits. Thank you for this consult. This patient was seen & evaluated in conjunction with Dr. Charanjit Short who is in agreement with assessment and plan.      Electronically signed by Alin Boalños PA-C on 7/25/22 at 8:51 PM EDT

## 2022-07-26 NOTE — PROGRESS NOTES
[address] [address]  INPATIENT PHYSICAL THERAPY  DAILY NOTE  STR ORTHOPEDICS 7K - 7K-25/025-A    Time In: 1500  Time Out: 1525  Timed Code Treatment Minutes: 25 Minutes  Minutes: 25          Date: 2022  Patient Name: Reji Pro,  Gender:  male        MRN: [id number]  : 1950  (74 y.o.)     Referring Practitioner: Anshul Hall MD  Diagnosis: general weakness  Additional Pertinent Hx: Per H&P : Patient is a 77-year-old  male who presents to the ED via EMS from home for difficulty walking that started today  He was diagnosed with COVID yesterday at an outside facility and was started on PAXLOVID  He was unable to feed this morning has been having difficulty ambulating this morning  He denies any fever but reports nonproductive cough no chest pain or palpitation no shortness of breath  Denies any nausea vomiting or diarrhea no abdominal discomfort no dysuria fine no tingling but certainly reports generalized weakness  No evidence of syncope or presyncopal episodes. There was some concern of new facial droop yesterday () patient underwent X extensive neuroimaging no new or acute stroke noted. This morning denies any new symptoms today     Prior Level of Function:  Lives With: Spouse  Type of Home: House  Home Layout: One level  Home Access: Stairs to enter with rails  Entrance Stairs - Number of Steps: 1 OLVIN  Entrance Stairs - Rails: Both  Home Equipment: Walker, rolling, Cane   Bathroom Shower/Tub: Walk-in shower (built in bench that pt does use at baseline)  Bathroom Toilet: Standard  Bathroom Equipment: Grab bars in shower    Receives Help From: Family (for minimal hands on assist. Pt's wife also has COVID-19 at this time.)  ADL Assistance: Needs assistance  Homemaking Responsibilities: No  Ambulation Assistance: Independent  Transfer Assistance: Independent  Active : No (wife drives)  Additional Comments: Pt using AD within the last week when sick.  Normally indep with no activity today. No drop in SpO2 or significant SOB noted. Equipment Recommendations:Equipment Needed: No (pt has a RW)  Discharge Recommendations: Continue to assess pending progress, Subacte/Skilled Nursing Facility, 24 hour assistance or supervision, and Patient would benefit from continued PT at discharge  Plan: Current Treatment Recommendations: Strengthening, Balance training, Functional mobility training, Transfer training, Endurance training, Neuromuscular re-education, Stair training, Gait training, Cognitive reorientation, Home exercise program, Safety education & training, Patient/Caregiver education & training, Therapeutic activities, Equipment evaluation, education, & procurement  Plan:  (5x C)    Patient Education  Patient Education: Plan of Care, Reviewed Prior Education, Verbal Exercise Instruction, Activity Pacing, Pursed Lip Breathing    Goals:  Patient goals : to get up and walk by himself  Short Term Goals  Time Frame for Short term goals: by hospital d/c  Short term goal 1: Pt to demo supine <->sit with SBA for ease with getting in and out of bed  Short term goal 2: Pt to complete sit <->stand with RW and SBA for improved ability to go between surfaces  Short term goal 3: Pt to ambulate =40' with RW and SBA for improved mobility to move in his environment  Short term goal 4: Pt to improve his Tinetti score to >=19/28 to progress to the moderate fall risk category  Short term goal 5: Pt to ascend/descend 1 step with B rail and CGA for ability to enter his home  Long Term Goals  Time Frame for Long term goals : NA due to short ELOS    Following session, patient left in safe position with all fall risk precautions in place.

## 2022-07-27 LAB
ANION GAP SERPL CALCULATED.3IONS-SCNC: 14 MEQ/L (ref 8–16)
BASOPHILS # BLD: 0.8 %
BASOPHILS ABSOLUTE: 0 THOU/MM3 (ref 0–0.1)
BUN BLDV-MCNC: 15 MG/DL (ref 7–22)
CALCIUM SERPL-MCNC: 9.4 MG/DL (ref 8.5–10.5)
CHLORIDE BLD-SCNC: 104 MEQ/L (ref 98–111)
CO2: 22 MEQ/L (ref 23–33)
CREAT SERPL-MCNC: 0.9 MG/DL (ref 0.4–1.2)
EOSINOPHIL # BLD: 6.5 %
EOSINOPHILS ABSOLUTE: 0.3 THOU/MM3 (ref 0–0.4)
ERYTHROCYTE [DISTWIDTH] IN BLOOD BY AUTOMATED COUNT: 14.1 % (ref 11.5–14.5)
ERYTHROCYTE [DISTWIDTH] IN BLOOD BY AUTOMATED COUNT: 43.9 FL (ref 35–45)
GFR SERPL CREATININE-BSD FRML MDRD: 82 ML/MIN/1.73M2
GLUCOSE BLD-MCNC: 177 MG/DL (ref 70–108)
HCT VFR BLD CALC: 44 % (ref 42–52)
HEMOGLOBIN: 14.6 GM/DL (ref 14–18)
IMMATURE GRANS (ABS): 0.01 THOU/MM3 (ref 0–0.07)
IMMATURE GRANULOCYTES: 0.2 %
LYMPHOCYTES # BLD: 22.4 %
LYMPHOCYTES ABSOLUTE: 1.1 THOU/MM3 (ref 1–4.8)
MCH RBC QN AUTO: 28.5 PG (ref 26–33)
MCHC RBC AUTO-ENTMCNC: 33.2 GM/DL (ref 32.2–35.5)
MCV RBC AUTO: 85.9 FL (ref 80–94)
MONOCYTES # BLD: 5.9 %
MONOCYTES ABSOLUTE: 0.3 THOU/MM3 (ref 0.4–1.3)
NUCLEATED RED BLOOD CELLS: 0 /100 WBC
PLATELET # BLD: 183 THOU/MM3 (ref 130–400)
PMV BLD AUTO: 10.5 FL (ref 9.4–12.4)
POTASSIUM REFLEX MAGNESIUM: 3.8 MEQ/L (ref 3.5–5.2)
RBC # BLD: 5.12 MILL/MM3 (ref 4.7–6.1)
SEG NEUTROPHILS: 64.2 %
SEGMENTED NEUTROPHILS ABSOLUTE COUNT: 3.3 THOU/MM3 (ref 1.8–7.7)
SODIUM BLD-SCNC: 140 MEQ/L (ref 135–145)
WBC # BLD: 5.1 THOU/MM3 (ref 4.8–10.8)

## 2022-07-27 PROCEDURE — 97110 THERAPEUTIC EXERCISES: CPT

## 2022-07-27 PROCEDURE — 97535 SELF CARE MNGMENT TRAINING: CPT

## 2022-07-27 PROCEDURE — 92523 SPEECH SOUND LANG COMPREHEN: CPT

## 2022-07-27 PROCEDURE — 84630 ASSAY OF ZINC: CPT

## 2022-07-27 PROCEDURE — 2580000003 HC RX 258: Performed by: INTERNAL MEDICINE

## 2022-07-27 PROCEDURE — 85025 COMPLETE CBC W/AUTO DIFF WBC: CPT

## 2022-07-27 PROCEDURE — 94669 MECHANICAL CHEST WALL OSCILL: CPT

## 2022-07-27 PROCEDURE — 36415 COLL VENOUS BLD VENIPUNCTURE: CPT

## 2022-07-27 PROCEDURE — 97530 THERAPEUTIC ACTIVITIES: CPT

## 2022-07-27 PROCEDURE — 6370000000 HC RX 637 (ALT 250 FOR IP): Performed by: INTERNAL MEDICINE

## 2022-07-27 PROCEDURE — 92610 EVALUATE SWALLOWING FUNCTION: CPT

## 2022-07-27 PROCEDURE — 6360000002 HC RX W HCPCS: Performed by: INTERNAL MEDICINE

## 2022-07-27 PROCEDURE — 1200000000 HC SEMI PRIVATE

## 2022-07-27 PROCEDURE — 80048 BASIC METABOLIC PNL TOTAL CA: CPT

## 2022-07-27 PROCEDURE — 99232 SBSQ HOSP IP/OBS MODERATE 35: CPT

## 2022-07-27 PROCEDURE — 6370000000 HC RX 637 (ALT 250 FOR IP)

## 2022-07-27 PROCEDURE — 94760 N-INVAS EAR/PLS OXIMETRY 1: CPT

## 2022-07-27 RX ORDER — ALBUTEROL SULFATE 2.5 MG/3ML
2.5 SOLUTION RESPIRATORY (INHALATION) EVERY 6 HOURS PRN
Status: DISCONTINUED | OUTPATIENT
Start: 2022-07-27 | End: 2022-08-02 | Stop reason: HOSPADM

## 2022-07-27 RX ORDER — ASCORBIC ACID 500 MG
500 TABLET ORAL DAILY
Status: DISCONTINUED | OUTPATIENT
Start: 2022-07-27 | End: 2022-08-02 | Stop reason: HOSPADM

## 2022-07-27 RX ORDER — GUAIFENESIN/DEXTROMETHORPHAN 100-10MG/5
5 SYRUP ORAL EVERY 4 HOURS PRN
Status: DISCONTINUED | OUTPATIENT
Start: 2022-07-27 | End: 2022-07-28

## 2022-07-27 RX ORDER — LOSARTAN POTASSIUM 50 MG/1
50 TABLET ORAL DAILY
Status: DISCONTINUED | OUTPATIENT
Start: 2022-07-28 | End: 2022-08-02 | Stop reason: HOSPADM

## 2022-07-27 RX ORDER — VITAMIN B COMPLEX
1000 TABLET ORAL DAILY
Status: DISCONTINUED | OUTPATIENT
Start: 2022-07-27 | End: 2022-08-02 | Stop reason: HOSPADM

## 2022-07-27 RX ADMIN — SODIUM CHLORIDE, PRESERVATIVE FREE 10 ML: 5 INJECTION INTRAVENOUS at 11:27

## 2022-07-27 RX ADMIN — ASPIRIN 81 MG: 81 TABLET, CHEWABLE ORAL at 11:28

## 2022-07-27 RX ADMIN — POTASSIUM CHLORIDE 8 MEQ: 600 TABLET, FILM COATED, EXTENDED RELEASE ORAL at 11:28

## 2022-07-27 RX ADMIN — ENOXAPARIN SODIUM 30 MG: 100 INJECTION SUBCUTANEOUS at 19:44

## 2022-07-27 RX ADMIN — AMLODIPINE BESYLATE 10 MG: 10 TABLET ORAL at 11:28

## 2022-07-27 RX ADMIN — OXYCODONE HYDROCHLORIDE AND ACETAMINOPHEN 500 MG: 500 TABLET ORAL at 13:33

## 2022-07-27 RX ADMIN — TAMSULOSIN HYDROCHLORIDE 0.4 MG: 0.4 CAPSULE ORAL at 19:43

## 2022-07-27 RX ADMIN — ENOXAPARIN SODIUM 30 MG: 100 INJECTION SUBCUTANEOUS at 11:27

## 2022-07-27 RX ADMIN — SODIUM CHLORIDE, PRESERVATIVE FREE 10 ML: 5 INJECTION INTRAVENOUS at 19:44

## 2022-07-27 RX ADMIN — CLOPIDOGREL BISULFATE 75 MG: 75 TABLET, FILM COATED ORAL at 11:28

## 2022-07-27 RX ADMIN — HYDROCHLOROTHIAZIDE 25 MG: 12.5 CAPSULE, GELATIN COATED ORAL at 11:28

## 2022-07-27 RX ADMIN — Medication 1000 UNITS: at 13:33

## 2022-07-27 RX ADMIN — ISOSORBIDE MONONITRATE 30 MG: 30 TABLET, EXTENDED RELEASE ORAL at 11:28

## 2022-07-27 ASSESSMENT — PAIN SCALES - GENERAL: PAINLEVEL_OUTOF10: 0

## 2022-07-27 NOTE — CARE COORDINATION
7/27/22, 2:18 PM EDT    DISCHARGE PLANNING EVALUATION    Spoke with patient's wife about discharge plan. Explained that [facility] has confirmed they will not accept until patient has a neg covid test.  [facility] will not accept until after 10 days post first positive test.    Discussed possible options for ecfs accepting covid + patients, including [address] [place] [address], [address] at [place]. She does not want to consider these facilities due to distance. She will discuss with family and plans to call [facility] again. She is aware that patient will likely be ready for discharge before [facility] accept.

## 2022-07-27 NOTE — PROGRESS NOTES
[facility]  Occupational Therapy  Daily Note  Time:    Time In: 1510  Time Out: 1533  Timed Code Treatment Minutes: 23 Minutes  Minutes: 23          Date: 2022  Patient Name: Isha Oleary,   Gender: male      Room: [facility]25/025-A  MRN: [id number]  : 1950  (74 y.o.)  Referring Practitioner: Heidi Ma MD  Diagnosis: generalized weakness  Additional Pertinent Hx: Per MD chart review: Pt is a 74 y.o. male who presents to the emergency department for evaluation of leg weakness. Patient states he was ambulating fine yesterday and today since he woke up that he has not been able to move his legs very well, feel weak. Has been using a walker the past few days. Does have some residual weakness from a stroke 10 years ago. Wife states that she had to feed him this morning because his arms were so weak. Tested positive for COVID yesterday (22) at [facility]. Is taking Paxlovid. States his only symptom is increased productive cough, clear. States shortness of breath is his baseline. Pt has significant PMH including cervical fusion in may 2022, stroke, and rotator cuff injuries as well as failed left shoulder replacements. Restrictions/Precautions:  Restrictions/Precautions: General Precautions, Fall Risk, Isolation  Position Activity Restriction  Spinal Precautions: Recent hx of cervical fusion May 2022. Cervical collar DC'd ~ 1 month ago per pt report. Other position/activity restrictions: COVID+, R UE deficits from previous sx, L side residual deficits from hx CVA      SUBJECTIVE: Pt sitting up in bedside chair agreeable to OT session. Pt stating he is feeling better today then when he was admitted     PAIN: 0 /10:      Vitals: Vitals not assessed per clinical judgement, see nursing flowsheet    COGNITION: Slow Processing and Decreased Recall    ADL:   Lower Extremity Dressing: Maximum Assistance. [place]/donning new depends.  Pt stating that at home he work them on his feet while seated on toilet  Toileting: Moderate Assistance. For clothign mangement   Toilet Transfer: Minimal Assistance. From standard toilet  . BALANCE:  Standing Balance: Contact Guard Assistance. With bilateral UE support on walker     BED MOBILITY:  Not Tested    TRANSFERS:  Sit to Stand:  Contact Guard Assistance. From bedside chair   Stand to Sit: [employer]. Into bedside chair     FUNCTIONAL MOBILITY:  Assistive Device: Rolling Walker  Assist Level:  Contact Guard Assistance. Distance: To and from bathroom and around room on several occasions   Pt ambulated throughout room on several trials locating and obtaining specific items stated by therapy in prep for locating items needed during aDL tasks . Pt with increase SOB requiring 1 seated rest breaks during        ASSESSMENT:     Activity Tolerance:  Patient tolerance of  treatment: fair. Discharge Recommendations: Subacute/skilled nursing facility  Equipment Recommendations: Equipment Needed: No (May benefit from [facility] if pt does not DC to subacute/SNF)  Plan: Times per Week: 3-5x  Times per Day: Daily  Current Treatment Recommendations: Balance training, Strengthening, Functional mobility training, Endurance training, Self-Care / ADL, Safety education & training    Patient Education  Patient Education:  safety with transfers     Goals  Short Term Goals  Time Frame for Short term goals: until DC  Short Term Goal 1: Pt will safely naviagate to/from bathroom utilizing least restrictive device with Supervision to access ADLs. Short Term Goal 2: Pt will tolerate x3-5 minute dynamic standing task to improve independence and safety with sinkside ADLs. Short Term Goal 3: Pt will improve independence with LB ADLs to CGA using AE as needed. Short Term Goal 4: Pt will demonstrate understanding of A/AROM of B UE in order to reduce effects of immobility to B UE shoulder joints.     Following session, patient left in safe

## 2022-07-27 NOTE — PLAN OF CARE
Problem: Safety - Adult  Goal: Free from fall injury  7/27/2022 0107 by Lili Miranda RN  Outcome: Progressing Towards Goal  7/26/2022 1314 by Neha Arevalo RN  Outcome: Progressing Towards Goal  Flowsheets (Taken 7/26/2022 1310)  Free From Fall Injury: Instruct family/caregiver on patient safety     Problem: Skin/Tissue Integrity  Goal: Absence of new skin breakdown  Description: 1. Monitor for areas of redness and/or skin breakdown  2. Assess vascular access sites hourly  3. Every 4-6 hours minimum:  Change oxygen saturation probe site  4. Every 4-6 hours:  If on nasal continuous positive airway pressure, respiratory therapy assess nares and determine need for appliance change or resting period.   7/27/2022 0107 by Lili Miranda RN  Outcome: Progressing Towards Goal  7/26/2022 1314 by Neha Arevalo RN  Outcome: Progressing Towards Goal  Note: No new skin breakdown noted this shift      Problem: Pain  Goal: Verbalizes/displays adequate comfort level or baseline comfort level  7/27/2022 0107 by Lili Miranda RN  Outcome: Progressing Towards Goal  Flowsheets (Taken 7/26/2022 2015)  Verbalizes/displays adequate comfort level or baseline comfort level: Assess pain using appropriate pain scale  7/26/2022 1314 by Neha Arevalo RN  Outcome: Progressing Towards Goal  Flowsheets (Taken 7/23/2022 1906 by Hugh Gifford RN)  Verbalizes/displays adequate comfort level or baseline comfort level:   Assess pain using appropriate pain scale   Administer analgesics based on type and severity of pain and evaluate response   Implement non-pharmacological measures as appropriate and evaluate response

## 2022-07-27 NOTE — PROGRESS NOTES
Hospitalist Progress Note    Patient:  Anusha Barcenas      Unit/Bed:7K-25/025-A    YOB: 1950    MRN: [id number]       Acct: [de-identified]     PCP: Roosevelt Tomlinson DO    Date of Admission: 7/23/2022    Assessment/Plan:    COVID-19 infection   -Currently satting 96% on room air, which is his baseline. Titrate oxygen as needed to maintain SPO2 greater than 90%. Does not meet requirements for steroid therapy or antiviral therapy at this time   -Chest x-ray negative for acute intrathoracic process   -Continue vitamin D, vitamin C, zinc   -As needed mucolytic's and antitussives   -As needed bronchodilators   -Pulmonary toilet: IS, Acapella, cough, deep breathe    Generalized weakness secondary to acute COVID-19 infection and physical deconditioning   -PT/OT consulted, appreciate recs: Recommending SNF   -Social work consulted and following for placement    History of stroke in the past   -Reportedly has minimal residual left-sided weakness noted. -CT head negative for acute intracranial pathology, acute on chronic sinus disease   -CTA head and neck negative for significant hemodynamic stenosis, antegrade flow in the right and left vertebral arteries, atherosclerotic disease noted in aortic arch, cavernous segments of both internal carotid arteries, no evidence of severe stenosis, narrowing in the distal branches of the right middle cerebral artery, antegrade flow in the left middle cerebral artery, antegrade flow in the anterior and posterior cerebral arteries, narrowing in the mid basilar artery   -MRI of brain: No restricted diffusion to suggest acute infarct, old infarct right corona radiata extending to right basal ganglia, old lacunar infarct in left basal ganglia   -NIHSS every shift, every 4 hours neuro checks   -PT/OT consulted and following   -SLP consulted and following   -Neurology consulted: No need for carotid ultrasound at this time. We will sign off.   Follow-up outpatient 1 to 2 weeks. Coronary artery disease   - Follows Dr. Ochoa Palm  - S/p C 7/9/21 w/ successful PCI to LAD w/ three overlapping drug-eluting stents by Dr. Nestor Song   -ECG notable for normal sinus rhythm with artifact baseline, without ischemic changes.  -Continue DAPT, HTC Z, Norvasc, Imdur, ARB   -As needed nitroglycerin    Essential hypertension   -Fairly stable, does have intermittent hypertensive pressures.   -Continue amlodipine, Imdur, hydrochlorothiazide, ARB    Obesity   -BMI 39.05 kg/m²   -Discussed and educated on lifestyle modifications        Expected discharge date: Pending pre-CERT to nursing home    Disposition:    [] Home       [] TCU       [] Rehab       [] Psych       [x] SNF       [] [place]       [] Other-    Chief Complaint: Difficulty walking    Hospital Course: Per HPI documented 7/23/22: \"Patient is a 75-year-old  male who presents to the ED via EMS from home for difficulty walking that started today  He was diagnosed with COVID yesterday at an outside facility and was started on PAXLOVID  He was unable to feed this morning has been having difficulty ambulating this morning  He denies any fever but reports nonproductive cough no chest pain or palpitation no shortness of breath  Denies any nausea vomiting or diarrhea no abdominal discomfort no dysuria fine no tingling but certainly reports generalized weakness  No evidence of syncope or presyncopal episodes\"    7/27/22: Resumed care patient today. Patient sitting up in bedside recliner, well-appearing, no apparent distress. Remains afebrile, satting 96% on room air, with hemodynamically stable vital signs. No acute events overnight. Patient states he is feeling well this morning. No complaints this AM.  States that he feels that his weakness is improving. Patient is awaiting precertification for nursing home.     Subjective (past 24 hours):     Patient denies chest pain, inspiratory chest pain, palpitations, dizziness, shortness of breath at rest, BERGERON, abdominal pain, nausea, vomiting, diarrhea, fever, chills. Medications:  Reviewed    Infusion Medications    sodium chloride      sodium chloride 75 mL/hr at 07/24/22 1656     Scheduled Medications    Vitamin D  1,000 Units Oral Daily    ascorbic acid  500 mg Oral Daily    hydroCHLOROthiazide  25 mg Oral Daily    amLODIPine  10 mg Oral Daily    tamsulosin  0.4 mg Oral Nightly    clopidogrel  75 mg Oral Daily    aspirin  81 mg Oral Daily    isosorbide mononitrate  30 mg Oral Daily    sodium chloride flush  10 mL IntraVENous 2 times per day    enoxaparin  30 mg SubCUTAneous BID    potassium chloride  8 mEq Oral Daily with breakfast     PRN Meds: guaiFENesin-dextromethorphan, albuterol, nitroGLYCERIN, sodium chloride flush, sodium chloride, ondansetron **OR** ondansetron, polyethylene glycol, acetaminophen **OR** acetaminophen      Intake/Output Summary (Last 24 hours) at 7/27/2022 6500  Last data filed at 7/27/2022 1457  Gross per 24 hour   Intake 500 ml   Output --   Net 500 ml       Diet:  ADULT DIET; Regular; Low Fat/Low Chol/High Fiber/2 gm Na    Exam:  /70   Pulse 78   Temp 97.6 °F (36.4 °C) (Oral)   Resp 18   Ht 5' 11\" (1.803 m)   Wt 280 lb (127 kg)   SpO2 93%   BMI 39.05 kg/m²     General appearance: No apparent distress, appears stated age and cooperative. HEENT: Pupils equal, round, and reactive to light. Conjunctivae/corneas clear. Neck: Supple, with full range of motion. No jugular venous distention. Trachea midline. Respiratory:  Normal respiratory effort, able to speak full clear sentences. Diminished to auscultation, bilaterally without Rales/Wheezes/Rhonchi. Cardiovascular: Regular rate and rhythm with normal S1/S2 without murmurs, rubs or gallops. Abdomen: Soft, non-tender, non-distended with normal bowel sounds. Musculoskeletal: passive and active ROM x 4 extremities.   5/5 strength bilateral upper extremities and bilateral lower extremities. Skin: Skin color, texture, turgor normal.  No rashes or lesions. Neurologic:  Neurovascularly intact without any focal sensory/motor deficits. Cranial nerves: II-XII intact, grossly non-focal.  Psychiatric: Alert and oriented, thought content appropriate, normal insight  Capillary Refill: Brisk,< 3 seconds   Peripheral Pulses: +2 palpable, equal bilaterally       Labs:   Recent Labs     07/25/22  0548 07/26/22  0617 07/27/22  0901   WBC 3.4* 3.9* 5.1   HGB 13.5* 13.8* 14.6   HCT 42.0 42.5 44.0    179 183     Recent Labs     07/25/22  0548 07/26/22  0617 07/27/22  0901    143 140   K 3.5 3.7 3.8    105 104   CO2 24 25 22*   BUN 15 15 15   CREATININE 0.9 0.9 0.9   CALCIUM 8.7 9.1 9.4     Recent Labs     07/25/22  0548 07/26/22  0617   AST 26 26   ALT 23 23   BILITOT 0.2* 0.4   ALKPHOS 77 82     No results for input(s): INR in the last 72 hours. No results for input(s): Assunta Liao in the last 72 hours. Microbiology:      Urinalysis:      Lab Results   Component Value Date/Time    NITRU Negative 06/29/2021 09:37 AM    WBCUA 10-15 05/16/2020 04:45 AM    BACTERIA NONE SEEN 05/16/2020 04:45 AM    RBCUA 5-10 05/16/2020 04:45 AM    BLOODU Negative 06/29/2021 09:37 AM    BLOODU NEGATIVE 05/16/2020 04:45 AM    SPECGRAV 1.020 09/09/2019 10:28 AM    GLUCOSEU Negative 06/29/2021 09:37 AM       Radiology:  XR CHEST PORTABLE    Result Date: 7/23/2022  PROCEDURE: XR CHEST PORTABLE CLINICAL INFORMATION: Shortness of breath TECHNIQUE: Mobile AP chest radiograph. COMPARISON: Multiple AP chest radiographs 7/23/2020 FINDINGS: Cardiomediastinal silhouette is within normal limits. There are no lung consolidations. Degenerative changes in the thoracic spine are poorly visualized. There is a partially visualized right shoulder prosthesis. Soft tissues are unremarkable. No acute intrathoracic process. **This report has been created using voice recognition software.  It may contain minor errors which are inherent in voice recognition technology. ** Final report electronically signed by Dr. Shantal Thurman on 7/23/2022 2:13 PM      DVT prophylaxis: [x] Lovenox                                 [] SCDs                                 [] SQ Heparin                                 [] Encourage ambulation           [] Already on Anticoagulation     Code Status: Full Code    PT/OT Eval Status: Per OT note documented 7/27/2022: \"ASSESSMENT:  Activity Tolerance:  Patient tolerance of  treatment: fair. Discharge Recommendations: Subacute/skilled nursing facility  Equipment Recommendations: Equipment Needed: No (May benefit from [facility] if pt does not DC to subacute/SNF)  Plan: Times per Week: 3-5x  Times per Day: Daily  Current Treatment Recommendations: Balance training, Strengthening, Functional mobility training, Endurance training, Self-Care / ADL, Safety education & training\"    Per PT note documented 7/27/2022: \"ASSESSMENT:  Assessment: Patient progressing toward established goals. Activity Tolerance:  Patient tolerance of  treatment: good.       Equipment Recommendations:Equipment Needed: No (pt has a RW)  Discharge Recommendations: [address], Home with Home Health PT, and Patient would benefit from continued PT at discharge  Plan: Current Treatment Recommendations: Strengthening, Balance training, Functional mobility training, Transfer training, Endurance training, Neuromuscular re-education, Stair training, Gait training, Cognitive reorientation, Home exercise program, Safety education & training, Patient/Caregiver education & training, Therapeutic activities, Equipment evaluation, education, & procurement  Plan:  (5x C)\"        Tele:   [] yes             [x] no    Active Hospital Problems    Diagnosis Date Noted    Generalized weakness [R53.1] 07/23/2022     Priority: Medium       Electronically signed by Serafina Lundborg, APRN - CNP on 7/27/2022 at 10:14 PM

## 2022-07-27 NOTE — PROGRESS NOTES
[facility]  SPEECH THERAPY  [facility] ORTHOPEDICS 7K  Speech - Language - Cognitive Evaluation + Clinical Swallow Evaluation    SLP Individual Minutes  Time In: 2947  Time Out: 1902  Minutes: 36  Timed Code Treatment Minutes: 0 Minutes     Speech, Language, Cognitive Evaluation: 21 minutes  Clinical Swallow Evaluation: 15 minutes    Date: 2022  Patient Name: Yumiko Steele      CSN: [id number]   : 1950  (74 y.o.)  Gender: male   Referring Physician:  MAGDA Shankar CNP  Diagnosis: Generalized weakness  Precautions: Fall Risk  History of Present Illness/Injury: Patient was admitted to [facility] with above diagnosis. Per chart review, \"Del Buck who is a 74 y.o. male with a history of generalized weakness, CAD, cardiac pacemaker, right basal ganglia embolic stroke, HTN, PFO, TIA was noted to be working with therapy today when PT noted right sided facial droop that she had not noticed prior. Stroke alert initiated at 1226. LKW 11:40 am today. Initial NIH 1 for mild right facial droop. Glucose 150. Currently on ASA and plavix. Also noted to currently be in isolation for COVID-19. Denies associated symptoms. \" ST consulted to complete clinical swallow evaluation and napqyc-ngsipzfn-gnrasaqwv evaluation to assess swallow function and cognitive-linguistic functioning to determine safest level of PO intake and to assist in discharge planning. Past Medical History:   Diagnosis Date    Arthritis     Back pain     CAD in native artery 2021    Diarrhea     Hypertension     Infection of prosthetic shoulder joint ([address] 2020    PFO (patent foramen ovale) 2012    noted on ROSITA following stroke    Propionibacterium infection 2020    Rotator cuff injury     Stroke [facility])         TIA (transient ischemic attack) 2018       Pain: No pain reported. Subjective:  RN [facility] approved completion of clinical swallow evaluation and ymsesw-yptojadc-gorxiuqod evaluation this date. ASSESSMENT   CN V (Trigeminal) Closes and Opens Mandible WFL    Rotary Jaw Movement WFL      CN VII (Facial) Cheeks Hold Food out of Sulci WFL    Opens, Closes/Seals, Protrudes, Retracts Lips WFL    General Appearance WFL    Sensation WFL      CN X (Vagus - Pharyngeal) Raises Back of Tongue WFL      CN XI (Accessory) Lifts Soft Palate WFL      CN XII (Hypoglossal) Elevates Tongue Up and Back WFL    Protrusion   WFL    Lateralizes Tongue WFL    Sensation WFL      Other Observations Dentition Missing some upper and lower dentition    Vocal Quality WFL    Cough WFL     PATIENT WAS EVALUATED USING:  Thin Liquids, Puree, and Coarse Solids    ORAL PHASE:  Impaired:  Impaired AP Movement and Reduced Bolus Formation    PHARYNGEAL PHASE:  Impaired:  Suspected Pharyngeal Residue and Suspected Decreased Airway Protection    SIGNS AND SYMPTOMS OF LARYNGEAL PENETRATION / ASPIRATION:  Throat Clear    INSTRUMENTAL EVALUATION: Instrumental evaluation not indicated at this time. DIET RECOMMENDATIONS:  Regular with thin liquids    STRATEGIES: Set-up with Meals, Small Bite/Sip, No Straw, Alternate Solids and Liquids, Limit Distractions, and Monitor for Fatigue          RECOMMENDATIONS/ASSESSMENT:  DIAGNOSTIC IMPRESSIONS:  Clinical Swallow Evaluation: Patient presents with WFL/ Mod I oral dysphagia with inability to fully discern potential presence of pharyngeal phase deficits without formal instrumentation. Demonstration of adequate labial seal and intra-oral pressure for PO trials of thin via straw this date. Patient with adequate bolus control and manipulation. Demonstration of timely mastication with effective textural breakdown. Patient with decreased bolus formation and ap transit resulting in mild oral residue remaining on lingual body that cleared with utilization of liquid wash. Suspected timely swallow trigger.  Concerns for airway invasion events s/t throat clear present following PO trials of thin via straw x3 and thin via cup x1. Of course, pharyngeal dysfunction and totality of airway invasion events cannot be formally assessed without presence of instrumental assessment; however, not clinically indicated at this time. Patient reporting no straw use prior to hospitalization and RN reporting clear lung sounds this date. Recommendations for continuation of regular diet with thin liquids (NO STRAW). Speech, Language, Cognitive Evaluation: Patient presents with mild cognitive impairment characterized by a score of 25/30 on the [facility] with deficits noted within the domains of executive functioning, delayed recall, complex attention and thought organization. Expressive and receptive language are [facility] for basic  and complex daily communication. No dysarthria or dysphonia were noted during time of evaluation with speech intelligibility approximating 100% across all domains at the conversational level. At this time, recommendations for continuation of skilled ST services to address aforementioned deficits in order to encourage independence during ADL/IADL management with the least amount of supervision/assistance. Patient will require intermittent supervision/support to aid in managing medications, finances, and ADL tasks.      Rehabilitation Potential: good  Discharge Recommendations: Outpatient Speech Therapy    EDUCATION:  Learner: Patient  Education:  Reviewed results and recommendations of this evaluation, Reviewed diet and strategies, Reviewed signs, symptoms and risks of aspiration, Reviewed ST goals and Plan of Care, Reviewed recommendations for follow-up, Education Related to Potential Risks and Complications Due to Impairment/Illness/Injury, Education Related to Prevention of Recurrence of Impairment/Illness/Injury, and Education Related to [employer] and Wellness  Evaluation of Education: Verbalizes understanding and Family not present    PLAN:  Skilled SLP intervention on acute care 3-5 x per week or until goals met and/or pt plateaus in function. Specific interventions for next session may include: skilled dietary analysis and thought organization. PATIENT GOAL:    Return to prior level of function. SHORT TERM GOALS:  Short-term Goals  Timeframe for Short-term Goals: 2 weeks  Goal 1: Patient will consume a regular diet with thin liquids with no overt s/s of aspiration to assist with nurtition/hydration measures safely. Goal 2: ST will complete instrumental assessment if further concerns are conveyed by nursing or if pulmonary status declines. Goal 3: Patient will complete complex problem solving, verbal/visual reasoning, and executive functioning (medications, finances) with 80% accuracy given minimal cues to encourage independence in ADL tasks. Goal 4: Patient will complete recall tasks (immediate, delayed, working) with a focus on memory strategies with 80% accuracy given minimal cues to improve recall of functional daily and medical information. Goal 5: Patient will complete attention tasks (selective, divided, alternating) with no more than 3 errors/redirections to improve attention during ADL tasks. Goal 6: Patient will complete thought organization tasks (calendar/schedule use, divergent/convergent naming) with 80% accuracy given minimal cues to improve processing speed and thought organization. LONG TERM GOALS:  No long term goals d/t short ELOS.     SAE Read, Speech Pathology Intern

## 2022-07-27 NOTE — PROGRESS NOTES
[address] [address]  INPATIENT PHYSICAL THERAPY  DAILY NOTE  [facility] 7K - 7K-25/025-A    Time In: 1033  Time Out: 1111  Timed Code Treatment Minutes: 26 Minutes  Minutes: 26          Date: 2022  Patient Name: Marisela Burkitt,  Gender:  male        MRN: [id number]  : 1950  (74 y.o.)     Referring Practitioner: Italia Rey MD  Diagnosis: general weakness  Additional Pertinent Hx: Per H&P : Patient is a 75-year-old  male who presents to the ED via EMS from home for difficulty walking that started today  He was diagnosed with COVID yesterday at an outside facility and was started on PAXLOVID  He was unable to feed this morning has been having difficulty ambulating this morning  He denies any fever but reports nonproductive cough no chest pain or palpitation no shortness of breath  Denies any nausea vomiting or diarrhea no abdominal discomfort no dysuria fine no tingling but certainly reports generalized weakness  No evidence of syncope or presyncopal episodes. There was some concern of new facial droop yesterday () patient underwent X extensive neuroimaging no new or acute stroke noted. This morning denies any new symptoms today     Prior Level of Function:  Lives With: Spouse  Type of Home: House  Home Layout: One level  Home Access: Stairs to enter with rails  Entrance Stairs - Number of Steps: 1 OLVIN  Entrance Stairs - Rails: Both  Home Equipment: Walker, rolling, Cane   Bathroom Shower/Tub: Walk-in shower (built in bench that pt does use at baseline)  Bathroom Toilet: Standard  Bathroom Equipment: Grab bars in shower    Receives Help From: Family (for minimal hands on assist. Pt's wife also has COVID-19 at this time.)  ADL Assistance: Needs assistance  Homemaking Responsibilities: No  Ambulation Assistance: Independent  Transfer Assistance: Independent  Active : No (wife drives)  Additional Comments: Pt using AD within the last week when sick.  Normally indep with no Home with Home Health PT, and Patient would benefit from continued PT at discharge  Plan: Current Treatment Recommendations: Strengthening, Balance training, Functional mobility training, Transfer training, Endurance training, Neuromuscular re-education, Stair training, Gait training, Cognitive reorientation, Home exercise program, Safety education & training, Patient/Caregiver education & training, Therapeutic activities, Equipment evaluation, education, & procurement  Plan:  (5x C)    Patient Education  Patient Education: Plan of Care, Home Exercise Program, Transfers, Gait, Up in Chair for All Meals, Verbal Exercise Instruction    Goals:  Patient goals : to get up and walk by himself  Short Term Goals  Time Frame for Short term goals: by hospital d/c  Short term goal 1: Pt to demo supine <->sit with SBA for ease with getting in and out of bed  Short term goal 2: Pt to complete sit <->stand with RW and SBA for improved ability to go between surfaces  Short term goal 3: Pt to ambulate =40' with RW and SBA for improved mobility to move in his environment  Short term goal 4: Pt to improve his Tinetti score to >=19/28 to progress to the moderate fall risk category  Short term goal 5: Pt to ascend/descend 1 step with B rail and CGA for ability to enter his home  Long Term Goals  Time Frame for Long term goals : NA due to short ELOS    Following session, patient left in safe position with all fall risk precautions in place.

## 2022-07-28 LAB
ANION GAP SERPL CALCULATED.3IONS-SCNC: 14 MEQ/L (ref 8–16)
BASOPHILS # BLD: 1.1 %
BASOPHILS ABSOLUTE: 0 THOU/MM3 (ref 0–0.1)
BUN BLDV-MCNC: 14 MG/DL (ref 7–22)
CALCIUM SERPL-MCNC: 9.1 MG/DL (ref 8.5–10.5)
CHLORIDE BLD-SCNC: 103 MEQ/L (ref 98–111)
CO2: 21 MEQ/L (ref 23–33)
CREAT SERPL-MCNC: 1 MG/DL (ref 0.4–1.2)
EOSINOPHIL # BLD: 6.9 %
EOSINOPHILS ABSOLUTE: 0.3 THOU/MM3 (ref 0–0.4)
ERYTHROCYTE [DISTWIDTH] IN BLOOD BY AUTOMATED COUNT: 14 % (ref 11.5–14.5)
ERYTHROCYTE [DISTWIDTH] IN BLOOD BY AUTOMATED COUNT: 45 FL (ref 35–45)
GFR SERPL CREATININE-BSD FRML MDRD: 73 ML/MIN/1.73M2
GLUCOSE BLD-MCNC: 108 MG/DL (ref 70–108)
HCT VFR BLD CALC: 44.4 % (ref 42–52)
HEMOGLOBIN: 14.4 GM/DL (ref 14–18)
IMMATURE GRANS (ABS): 0.02 THOU/MM3 (ref 0–0.07)
IMMATURE GRANULOCYTES: 0.4 %
LYMPHOCYTES # BLD: 18.5 %
LYMPHOCYTES ABSOLUTE: 0.8 THOU/MM3 (ref 1–4.8)
MCH RBC QN AUTO: 28.7 PG (ref 26–33)
MCHC RBC AUTO-ENTMCNC: 32.4 GM/DL (ref 32.2–35.5)
MCV RBC AUTO: 88.6 FL (ref 80–94)
MONOCYTES # BLD: 9.1 %
MONOCYTES ABSOLUTE: 0.4 THOU/MM3 (ref 0.4–1.3)
NUCLEATED RED BLOOD CELLS: 0 /100 WBC
PLATELET # BLD: 178 THOU/MM3 (ref 130–400)
PMV BLD AUTO: 10.4 FL (ref 9.4–12.4)
POTASSIUM REFLEX MAGNESIUM: 3.8 MEQ/L (ref 3.5–5.2)
RBC # BLD: 5.01 MILL/MM3 (ref 4.7–6.1)
SEG NEUTROPHILS: 64 %
SEGMENTED NEUTROPHILS ABSOLUTE COUNT: 2.9 THOU/MM3 (ref 1.8–7.7)
SODIUM BLD-SCNC: 138 MEQ/L (ref 135–145)
WBC # BLD: 4.5 THOU/MM3 (ref 4.8–10.8)

## 2022-07-28 PROCEDURE — 94760 N-INVAS EAR/PLS OXIMETRY 1: CPT

## 2022-07-28 PROCEDURE — 6370000000 HC RX 637 (ALT 250 FOR IP): Performed by: INTERNAL MEDICINE

## 2022-07-28 PROCEDURE — 1200000000 HC SEMI PRIVATE

## 2022-07-28 PROCEDURE — 99232 SBSQ HOSP IP/OBS MODERATE 35: CPT

## 2022-07-28 PROCEDURE — 97110 THERAPEUTIC EXERCISES: CPT

## 2022-07-28 PROCEDURE — 6370000000 HC RX 637 (ALT 250 FOR IP)

## 2022-07-28 PROCEDURE — 97530 THERAPEUTIC ACTIVITIES: CPT

## 2022-07-28 PROCEDURE — 6360000002 HC RX W HCPCS: Performed by: INTERNAL MEDICINE

## 2022-07-28 PROCEDURE — 85025 COMPLETE CBC W/AUTO DIFF WBC: CPT

## 2022-07-28 PROCEDURE — 97116 GAIT TRAINING THERAPY: CPT

## 2022-07-28 PROCEDURE — 2580000003 HC RX 258: Performed by: INTERNAL MEDICINE

## 2022-07-28 PROCEDURE — 80048 BASIC METABOLIC PNL TOTAL CA: CPT

## 2022-07-28 PROCEDURE — 94669 MECHANICAL CHEST WALL OSCILL: CPT

## 2022-07-28 PROCEDURE — 36415 COLL VENOUS BLD VENIPUNCTURE: CPT

## 2022-07-28 RX ORDER — GUAIFENESIN/DEXTROMETHORPHAN 100-10MG/5
5 SYRUP ORAL EVERY 6 HOURS
Status: DISCONTINUED | OUTPATIENT
Start: 2022-07-28 | End: 2022-08-01

## 2022-07-28 RX ADMIN — SODIUM CHLORIDE, PRESERVATIVE FREE 10 ML: 5 INJECTION INTRAVENOUS at 10:38

## 2022-07-28 RX ADMIN — ENOXAPARIN SODIUM 30 MG: 100 INJECTION SUBCUTANEOUS at 10:36

## 2022-07-28 RX ADMIN — TAMSULOSIN HYDROCHLORIDE 0.4 MG: 0.4 CAPSULE ORAL at 20:33

## 2022-07-28 RX ADMIN — GUAIFENESIN AND DEXTROMETHORPHAN 5 ML: 100; 10 SYRUP ORAL at 20:34

## 2022-07-28 RX ADMIN — GUAIFENESIN AND DEXTROMETHORPHAN 5 ML: 100; 10 SYRUP ORAL at 16:55

## 2022-07-28 RX ADMIN — POTASSIUM CHLORIDE 8 MEQ: 600 TABLET, FILM COATED, EXTENDED RELEASE ORAL at 10:36

## 2022-07-28 RX ADMIN — CLOPIDOGREL BISULFATE 75 MG: 75 TABLET, FILM COATED ORAL at 13:31

## 2022-07-28 RX ADMIN — SODIUM CHLORIDE, PRESERVATIVE FREE 10 ML: 5 INJECTION INTRAVENOUS at 20:34

## 2022-07-28 RX ADMIN — ENOXAPARIN SODIUM 30 MG: 100 INJECTION SUBCUTANEOUS at 20:33

## 2022-07-28 RX ADMIN — HYDROCHLOROTHIAZIDE 25 MG: 12.5 CAPSULE, GELATIN COATED ORAL at 13:31

## 2022-07-28 RX ADMIN — ASPIRIN 81 MG: 81 TABLET, CHEWABLE ORAL at 10:36

## 2022-07-28 RX ADMIN — AMLODIPINE BESYLATE 10 MG: 10 TABLET ORAL at 10:36

## 2022-07-28 RX ADMIN — OXYCODONE HYDROCHLORIDE AND ACETAMINOPHEN 500 MG: 500 TABLET ORAL at 10:36

## 2022-07-28 RX ADMIN — Medication 1000 UNITS: at 10:36

## 2022-07-28 RX ADMIN — LOSARTAN POTASSIUM 50 MG: 50 TABLET, FILM COATED ORAL at 10:36

## 2022-07-28 RX ADMIN — ISOSORBIDE MONONITRATE 30 MG: 30 TABLET, EXTENDED RELEASE ORAL at 13:31

## 2022-07-28 ASSESSMENT — PAIN SCALES - GENERAL: PAINLEVEL_OUTOF10: 0

## 2022-07-28 NOTE — PROGRESS NOTES
Hospitalist Progress Note    Patient:  Marisela Burkitt      Unit/Bed:7K-25/025-A    YOB: 1950    MRN: [id number]       Acct: [de-identified]     PCP: Pricilla Zheng DO    Date of Admission: 7/23/2022    Assessment/Plan:    COVID-19 infection   -Currently satting 92% on room air, which is his baseline. Titrate oxygen as needed to maintain SPO2 greater than 90%. Does not meet requirements for steroid therapy or antiviral therapy at this time   -Chest x-ray negative for acute intrathoracic process   -Continue vitamin D, vitamin C, zinc   - Scheduled mucolytic's and antitussives   -As needed bronchodilators   -Pulmonary toilet: IS, Acapella, cough, deep breathe    Generalized weakness secondary to acute COVID-19 infection and physical deconditioning   -PT/OT consulted, appreciate recs: Recommending SNF   -Social work consulted and following for placement    History of stroke in the past   -Reportedly has minimal residual left-sided weakness noted. -CT head negative for acute intracranial pathology, acute on chronic sinus disease   -CTA head and neck negative for significant hemodynamic stenosis, antegrade flow in the right and left vertebral arteries, atherosclerotic disease noted in aortic arch, cavernous segments of both internal carotid arteries, no evidence of severe stenosis, narrowing in the distal branches of the right middle cerebral artery, antegrade flow in the left middle cerebral artery, antegrade flow in the anterior and posterior cerebral arteries, narrowing in the mid basilar artery   -MRI of brain: No restricted diffusion to suggest acute infarct, old infarct right corona radiata extending to right basal ganglia, old lacunar infarct in left basal ganglia   -NIHSS every shift, every 4 hours neuro checks   -PT/OT consulted and following   -SLP consulted and following   -Neurology consulted: No need for carotid ultrasound at this time. We will sign off.   Follow-up outpatient 1 to 2 weeks. Coronary artery disease   - Follows Dr. Ashley Major  - S/p LHC 7/9/21 w/ successful PCI to LAD w/ three overlapping drug-eluting stents by Dr. Johny Jaramillo   -ECG notable for normal sinus rhythm with artifact baseline, without ischemic changes.  -Continue DAPT, HTC Z, Norvasc, Imdur, ARB   -As needed nitroglycerin    Essential hypertension   -Fairly stable, does have intermittent hypertensive pressures.   -Continue amlodipine, Imdur, hydrochlorothiazide, ARB    Obesity   -BMI 39.05 kg/m²   -Discussed and educated on lifestyle modifications          Expected discharge date: Pending pre-CERT to nursing home    Disposition:    [] Home       [] TCU       [] Rehab       [] Psych       [x] SNF       [] [facility]       [] Other-    Chief Complaint: Difficulty walking    Hospital Course: Per HPI documented 7/23/22: \"Patient is a 47-year-old  male who presents to the ED via EMS from home for difficulty walking that started today  He was diagnosed with COVID yesterday at an outside facility and was started on PAXLOVID  He was unable to feed this morning has been having difficulty ambulating this morning  He denies any fever but reports nonproductive cough no chest pain or palpitation no shortness of breath  Denies any nausea vomiting or diarrhea no abdominal discomfort no dysuria fine no tingling but certainly reports generalized weakness  No evidence of syncope or presyncopal episodes\"    7/27/22: Resumed care patient today. Patient sitting up in bedside recliner, well-appearing, no apparent distress. Remains afebrile, satting 96% on room air, with hemodynamically stable vital signs. No acute events overnight. Patient states he is feeling well this morning. No complaints this AM.  States that he feels that his weakness is improving. Patient is awaiting precertification for nursing home. 7/25/22: Patient sitting up in bedside recliner, well-appearing, no apparent distress.   Remains afebrile, satting 92% on room air, with hemodynamically stable vital signs. No acute events overnight per nursing staff. No acute events reported per patient. Patient denies pain. States that he feels like his weakness is still improving. Electrolytes within defined limits, H&H stable, no leukocytosis noted. Awaiting precertification to SNF. Subjective (past 24 hours):     Patient denies chest pain, inspiratory chest pain, palpitations, dizziness, shortness of breath at rest, BERGERON, abdominal pain, nausea, vomiting, diarrhea, fever, chills, headache, generalized body aches. Does report of some nasal congestion. Medications:  Reviewed    Infusion Medications    sodium chloride      sodium chloride 75 mL/hr at 07/24/22 1656     Scheduled Medications    Vitamin D  1,000 Units Oral Daily    ascorbic acid  500 mg Oral Daily    losartan  50 mg Oral Daily    hydroCHLOROthiazide  25 mg Oral Daily    amLODIPine  10 mg Oral Daily    tamsulosin  0.4 mg Oral Nightly    clopidogrel  75 mg Oral Daily    aspirin  81 mg Oral Daily    isosorbide mononitrate  30 mg Oral Daily    sodium chloride flush  10 mL IntraVENous 2 times per day    enoxaparin  30 mg SubCUTAneous BID    potassium chloride  8 mEq Oral Daily with breakfast     PRN Meds: guaiFENesin-dextromethorphan, albuterol, nitroGLYCERIN, sodium chloride flush, sodium chloride, ondansetron **OR** ondansetron, polyethylene glycol, acetaminophen **OR** acetaminophen    No intake or output data in the 24 hours ending 07/28/22 1515      Diet:  ADULT DIET; Regular; Low Fat/Low Chol/High Fiber/2 gm Na    Exam:  BP (!) 151/65   Pulse 78   Temp 97.7 °F (36.5 °C) (Oral)   Resp 20   Ht 5' 11\" (1.803 m)   Wt 280 lb (127 kg)   SpO2 92%   BMI 39.05 kg/m²     General appearance: No apparent distress, appears stated age and cooperative. HEENT: Pupils equal, round, and reactive to light. Conjunctivae/corneas clear. Neck: Supple, with full range of motion.  No jugular venous distention. Trachea midline. Respiratory:  Normal respiratory effort, able to speak full clear sentences. Clear but diminished in the bases to auscultation, bilaterally without Rales/Wheezes/Rhonchi. Cardiovascular: Regular rate and rhythm with normal S1/S2 without murmurs, rubs or gallops. Abdomen: Soft, non-tender, non-distended with normal bowel sounds. Musculoskeletal: passive and active ROM x 4 extremities. 5/5 strength bilateral upper extremities and bilateral lower extremities. Skin: Skin color, texture, turgor normal.  No rashes or lesions. Neurologic:  Neurovascularly intact without any focal sensory/motor deficits. Cranial nerves: II-XII intact, grossly non-focal.  Psychiatric: Alert and oriented, thought content appropriate, normal insight  Capillary Refill: Brisk,< 3 seconds   Peripheral Pulses: +2 palpable, equal bilaterally       Labs:   Recent Labs     07/26/22  0617 07/27/22  0901 07/28/22  0616   WBC 3.9* 5.1 4.5*   HGB 13.8* 14.6 14.4   HCT 42.5 44.0 44.4    183 178     Recent Labs     07/26/22  0617 07/27/22  0901 07/28/22  0616    140 138   K 3.7 3.8 3.8    104 103   CO2 25 22* 21*   BUN 15 15 14   CREATININE 0.9 0.9 1.0   CALCIUM 9.1 9.4 9.1     Recent Labs     07/26/22  0617   AST 26   ALT 23   BILITOT 0.4   ALKPHOS 82     No results for input(s): INR in the last 72 hours. No results for input(s): Franca Ill in the last 72 hours.     Microbiology:      Urinalysis:      Lab Results   Component Value Date/Time    NITRU Negative 06/29/2021 09:37 AM    WBCUA 10-15 05/16/2020 04:45 AM    BACTERIA NONE SEEN 05/16/2020 04:45 AM    RBCUA 5-10 05/16/2020 04:45 AM    BLOODU Negative 06/29/2021 09:37 AM    BLOODU NEGATIVE 05/16/2020 04:45 AM    SPECGRAV 1.020 09/09/2019 10:28 AM    GLUCOSEU Negative 06/29/2021 09:37 AM       Radiology:  XR CHEST PORTABLE    Result Date: 7/23/2022  PROCEDURE: XR CHEST PORTABLE CLINICAL INFORMATION: Shortness of breath TECHNIQUE: Mobile AP chest radiograph. COMPARISON: Multiple AP chest radiographs 7/23/2020 FINDINGS: Cardiomediastinal silhouette is within normal limits. There are no lung consolidations. Degenerative changes in the thoracic spine are poorly visualized. There is a partially visualized right shoulder prosthesis. Soft tissues are unremarkable. No acute intrathoracic process. **This report has been created using voice recognition software. It may contain minor errors which are inherent in voice recognition technology. ** Final report electronically signed by Dr. Geoffrey Dee on 7/23/2022 2:13 PM      DVT prophylaxis: [x] Lovenox                                 [] SCDs                                 [] SQ Heparin                                 [] Encourage ambulation           [] Already on Anticoagulation     Code Status: Full Code    PT/OT Eval Status: Per OT note documented 7/27/2022: \"ASSESSMENT:  Activity Tolerance:  Patient tolerance of  treatment: fair. Discharge Recommendations: Subacute/skilled nursing facility  Equipment Recommendations: Equipment Needed: No (May benefit from [facility] if pt does not DC to subacute/SNF)  Plan: Times per Week: 3-5x  Times per Day: Daily  Current Treatment Recommendations: Balance training, Strengthening, Functional mobility training, Endurance training, Self-Care / ADL, Safety education & training\"    Per PT note documented 7/27/2022: \"ASSESSMENT:  Assessment: Patient progressing toward established goals. Activity Tolerance:  Patient tolerance of  treatment: good.       Equipment Recommendations:Equipment Needed: No (pt has a RW)  Discharge Recommendations: [address], Home with Home Health PT, and Patient would benefit from continued PT at discharge  Plan: Current Treatment Recommendations: Strengthening, Balance training, Functional mobility training, Transfer training, Endurance training, Neuromuscular re-education, Stair training, Gait training, Cognitive reorientation, Home exercise program, Safety education & training, Patient/Caregiver education & training, Therapeutic activities, Equipment evaluation, education, & procurement  Plan:  (5x C)\"        Tele:   [] yes             [x] no    Active Hospital Problems    Diagnosis Date Noted    Generalized weakness [R53.1] 07/23/2022     Priority: Medium       Electronically signed by MAGDA Muñoz CNP on 7/28/2022 at 3:15 PM

## 2022-07-28 NOTE — CARE COORDINATION
7/28/22, 3:27 PM EDT    DISCHARGE PLANNING EVALUATION    Wife agreed to referral to [facility]. Referral made, facility is reviewing.

## 2022-07-28 NOTE — CARE COORDINATION
7/28/22, 12:09 PM EDT    DISCHARGE PLANNING EVALUATION   Spoke with patient's wife. She would consider bringing patient home if patient were testing negative for covid. She is not comfortable taking him home still testing positive. She is still reluctant to consider ecfs that will accept covid + due to distance and concerns about care. She is wanting patient to go to [facility] or second choice still [place] [facility] once they can accept 10 days past initial covid diagnosis, which would be Aug 1, if facilites could accept.

## 2022-07-28 NOTE — PLAN OF CARE
Problem: Discharge Planning  Goal: Discharge to home or other facility with appropriate resources  Outcome: Progressing  Flowsheets (Taken 7/27/2022 2136)  Discharge to home or other facility with appropriate resources:   Identify barriers to discharge with patient and caregiver   Arrange for needed discharge resources and transportation as appropriate   Identify discharge learning needs (meds, wound care, etc)     Problem: Safety - Adult  Goal: Free from fall injury  Outcome: Progressing  Flowsheets  Taken 7/27/2022 2136 by Valerie Ritter RN  Free From Fall Injury: Instruct family/caregiver on patient safety  Taken 7/27/2022 1440 by Hamida Guerrero RN  Free From Fall Injury: Instruct family/caregiver on patient safety     Problem: Skin/Tissue Integrity  Goal: Absence of new skin breakdown  Description: 1. Monitor for areas of redness and/or skin breakdown  2. Assess vascular access sites hourly  3. Every 4-6 hours minimum:  Change oxygen saturation probe site  4. Every 4-6 hours:  If on nasal continuous positive airway pressure, respiratory therapy assess nares and determine need for appliance change or resting period. Outcome: Progressing  Note: No new skin issues identified this shift. See flowsheets.       Problem: Pain  Goal: Verbalizes/displays adequate comfort level or baseline comfort level  Outcome: Progressing  Flowsheets (Taken 7/27/2022 2136)  Verbalizes/displays adequate comfort level or baseline comfort level:   Encourage patient to monitor pain and request assistance   Assess pain using appropriate pain scale   Administer analgesics based on type and severity of pain and evaluate response     Problem: Chronic Conditions and Co-morbidities  Goal: Patient's chronic conditions and co-morbidity symptoms are monitored and maintained or improved  Outcome: Progressing  Flowsheets (Taken 7/27/2022 2136)  Care Plan - Patient's Chronic Conditions and Co-Morbidity Symptoms are Monitored and Maintained or Improved: Monitor and assess patient's chronic conditions and comorbid symptoms for stability, deterioration, or improvement   Care plan reviewed with patient. Patient verbalizes understanding of the plan of care and contribute to goal setting.

## 2022-07-28 NOTE — PROGRESS NOTES
[facility]  INPATIENT PHYSICAL THERAPY  DAILY NOTE  [facility] ORTHOPEDICS 7K - 7K-25/025-A      Time In: 0840  Time Out: 09  Timed Code Treatment Minutes: 44 Minutes  Minutes: 39          Date: 2022  Patient Name: Yumiko Steele,  Gender:  male        MRN: [id number]  : 1950  (74 y.o.)     Referring Practitioner: Akilah Oliva MD  Diagnosis: general weakness  Additional Pertinent Hx: Per H&P : Patient is a 73-year-old  male who presents to the ED via EMS from home for difficulty walking that started today  He was diagnosed with COVID yesterday at an outside facility and was started on PAXLOVID  He was unable to feed this morning has been having difficulty ambulating this morning  He denies any fever but reports nonproductive cough no chest pain or palpitation no shortness of breath  Denies any nausea vomiting or diarrhea no abdominal discomfort no dysuria fine no tingling but certainly reports generalized weakness  No evidence of syncope or presyncopal episodes. There was some concern of new facial droop yesterday () patient underwent X extensive neuroimaging no new or acute stroke noted. This morning denies any new symptoms today     Prior Level of Function:  Lives With: Spouse  Type of Home: House  Home Layout: One level  Home Access: Stairs to enter with rails  Entrance Stairs - Number of Steps: 1 OLVIN  Entrance Stairs - Rails: Both  Home Equipment: Walker, rolling, Cane   Bathroom Shower/Tub: Walk-in shower (built in bench that pt does use at baseline)  Bathroom Toilet: Standard  Bathroom Equipment: Grab bars in shower    Receives Help From: Family (for minimal hands on assist. Pt's wife also has COVID-19 at this time.)  ADL Assistance: Needs assistance  Homemaking Responsibilities: No  Ambulation Assistance: Independent  Transfer Assistance: Independent  Active : No (wife drives)  Additional Comments: Pt using AD within the last week when sick.  Normally indep with no AD. Pt states 2 falls in past year. Restrictions/Precautions:  Restrictions/Precautions: General Precautions, Fall Risk, Isolation  Position Activity Restriction  Spinal Precautions: Recent hx of cervical fusion May 2022. Cervical collar DC'd ~ 1 month ago per pt report. Other position/activity restrictions: COVID+, R UE deficits from previous sx, L side residual deficits from hx CVA       SUBJECTIVE: pt agreeable for therapy, he was hard to understand at times but eager to get up     PAIN: 0/10:       Vitals: Vitals not assessed per clinical judgement, see nursing flowsheet    OBJECTIVE:  Bed Mobility:  Rolling to Left: Contact Guard Assistance, with rail,     Supine to Sit: [address], with rail  - pt did move slow and took extra time to complete- pt reported that he doesn't sleep in bed at home and that he sleeps in a lift chair   Transfers:  Sit to Stand: [address], to SBA- pt moved slow to stand and slightly unsteady w/ initial standing balance he would reach for his walker   Stand to Sit:Stand By Assistance  Cues for right UE   Ambulation:  Contact Guard Assistance to SBA   Distance: 65x1  Surface: Level Tile  Device:Rolling Walker  Gait Deviations:  very slow nigel, noted occ cues needed for right UE on walker as he would reposition it frequently and in a poor position at times     Balance:  Pt standing w/ no UE at support with CGA while he completed upper trunk rotations with min trunk sway and wide base of support, pt completed reaching task w/o UE a support with CGA for balance he would frequently reach out to the walker at times   -standing w/ UE at support pt completed controlled tapping w/ CGA however he was very reluctant to trial w/o UE at support   Exercise:  Patient was guided in 1 set(s) 15 reps of exercise to both lower extremities.   Seated marches, Long arc quads, Standing heel/toe raises, Standing marches, Standing hip abduction/adduction, Standing hamstring curls, Mini squats, and standing ex with UE at support and cues for posture  . Exercises were completed for increased independence with functional mobility. Functional Outcome Measures: Completed  Balance Score: 8  Gait Score: 7  Tinetti Total Score: 15  Risk Indicators:  Less than/equal to 18 = high risk  19-23 Moderate risk  Greater than/equal to 24 = low risk    AM-PAC Inpatient Mobility without Stair Climbing Raw Score : 15  AM-PAC Inpatient without Stair Climbing T-Scale Score : 43.03    ASSESSMENT:  Assessment: Patient progressing toward established goals. Pt cont to demonstrate generalized weakness and decreased endurance. He did score a 15/28 which was an improvement from eval however still putting him at a high fall risk. Pt would benefit from cont skilled therapy   Activity Tolerance:  Patient tolerance of  treatment: fair.         Equipment Recommendations:Equipment Needed: No (pt has a RW)  Discharge Recommendations: Continue to assess pending progress and SNF vs home with 24 hour assist and home health PT   Plan: Current Treatment Recommendations: Strengthening, Balance training, Functional mobility training, Transfer training, Endurance training, Neuromuscular re-education, Stair training, Gait training, Cognitive reorientation, Home exercise program, Safety education & training, Patient/Caregiver education & training, Therapeutic activities, Equipment evaluation, education, & procurement  Plan:  (5x C)    Patient Education  Patient Education: Plan of Care, discussed discharge plans     Goals:  Patient goals : to get up and walk by himself  Short Term Goals  Time Frame for Short term goals: by hospital d/c  Short term goal 1: Pt to demo supine <->sit with SBA for ease with getting in and out of bed  Short term goal 2: Pt to complete sit <->stand with RW and SBA for improved ability to go between surfaces  Short term goal 3: Pt to ambulate =40' with RW and SBA for improved mobility to move in his environment  Short term goal 4: Pt to improve his Tinetti score to >=19/28 to progress to the moderate fall risk category  Short term goal 5: Pt to ascend/descend 1 step with B rail and CGA for ability to enter his home  Long Term Goals  Time Frame for Long term goals : NA due to short ELOS    Following session, patient left in safe position with all fall risk precautions in place.

## 2022-07-29 LAB
ANION GAP SERPL CALCULATED.3IONS-SCNC: 14 MEQ/L (ref 8–16)
BASOPHILS # BLD: 0.7 %
BASOPHILS ABSOLUTE: 0 THOU/MM3 (ref 0–0.1)
BUN BLDV-MCNC: 16 MG/DL (ref 7–22)
CALCIUM SERPL-MCNC: 9.2 MG/DL (ref 8.5–10.5)
CHLORIDE BLD-SCNC: 100 MEQ/L (ref 98–111)
CO2: 24 MEQ/L (ref 23–33)
CREAT SERPL-MCNC: 1 MG/DL (ref 0.4–1.2)
EOSINOPHIL # BLD: 4.5 %
EOSINOPHILS ABSOLUTE: 0.2 THOU/MM3 (ref 0–0.4)
ERYTHROCYTE [DISTWIDTH] IN BLOOD BY AUTOMATED COUNT: 14.1 % (ref 11.5–14.5)
ERYTHROCYTE [DISTWIDTH] IN BLOOD BY AUTOMATED COUNT: 44.7 FL (ref 35–45)
GFR SERPL CREATININE-BSD FRML MDRD: 73 ML/MIN/1.73M2
GLUCOSE BLD-MCNC: 117 MG/DL (ref 70–108)
HCT VFR BLD CALC: 44 % (ref 42–52)
HEMOGLOBIN: 14.3 GM/DL (ref 14–18)
IMMATURE GRANS (ABS): 0.03 THOU/MM3 (ref 0–0.07)
IMMATURE GRANULOCYTES: 0.7 %
LYMPHOCYTES # BLD: 24.9 %
LYMPHOCYTES ABSOLUTE: 1.1 THOU/MM3 (ref 1–4.8)
MCH RBC QN AUTO: 28.1 PG (ref 26–33)
MCHC RBC AUTO-ENTMCNC: 32.5 GM/DL (ref 32.2–35.5)
MCV RBC AUTO: 86.4 FL (ref 80–94)
MONOCYTES # BLD: 14.1 %
MONOCYTES ABSOLUTE: 0.6 THOU/MM3 (ref 0.4–1.3)
NUCLEATED RED BLOOD CELLS: 0 /100 WBC
PLATELET # BLD: 177 THOU/MM3 (ref 130–400)
PMV BLD AUTO: 11.2 FL (ref 9.4–12.4)
POTASSIUM REFLEX MAGNESIUM: 3.7 MEQ/L (ref 3.5–5.2)
RBC # BLD: 5.09 MILL/MM3 (ref 4.7–6.1)
SEG NEUTROPHILS: 55.1 %
SEGMENTED NEUTROPHILS ABSOLUTE COUNT: 2.4 THOU/MM3 (ref 1.8–7.7)
SODIUM BLD-SCNC: 138 MEQ/L (ref 135–145)
WBC # BLD: 4.3 THOU/MM3 (ref 4.8–10.8)

## 2022-07-29 PROCEDURE — 6360000002 HC RX W HCPCS: Performed by: INTERNAL MEDICINE

## 2022-07-29 PROCEDURE — 6370000000 HC RX 637 (ALT 250 FOR IP)

## 2022-07-29 PROCEDURE — 36415 COLL VENOUS BLD VENIPUNCTURE: CPT

## 2022-07-29 PROCEDURE — 2580000003 HC RX 258: Performed by: INTERNAL MEDICINE

## 2022-07-29 PROCEDURE — 85025 COMPLETE CBC W/AUTO DIFF WBC: CPT

## 2022-07-29 PROCEDURE — 99231 SBSQ HOSP IP/OBS SF/LOW 25: CPT

## 2022-07-29 PROCEDURE — 6370000000 HC RX 637 (ALT 250 FOR IP): Performed by: INTERNAL MEDICINE

## 2022-07-29 PROCEDURE — 1200000000 HC SEMI PRIVATE

## 2022-07-29 PROCEDURE — 80048 BASIC METABOLIC PNL TOTAL CA: CPT

## 2022-07-29 RX ADMIN — CLOPIDOGREL BISULFATE 75 MG: 75 TABLET, FILM COATED ORAL at 08:45

## 2022-07-29 RX ADMIN — LOSARTAN POTASSIUM 50 MG: 50 TABLET, FILM COATED ORAL at 08:46

## 2022-07-29 RX ADMIN — AMLODIPINE BESYLATE 10 MG: 10 TABLET ORAL at 08:46

## 2022-07-29 RX ADMIN — GUAIFENESIN AND DEXTROMETHORPHAN 5 ML: 100; 10 SYRUP ORAL at 08:46

## 2022-07-29 RX ADMIN — ENOXAPARIN SODIUM 30 MG: 100 INJECTION SUBCUTANEOUS at 20:45

## 2022-07-29 RX ADMIN — SODIUM CHLORIDE, PRESERVATIVE FREE 10 ML: 5 INJECTION INTRAVENOUS at 08:46

## 2022-07-29 RX ADMIN — POTASSIUM CHLORIDE 8 MEQ: 600 TABLET, FILM COATED, EXTENDED RELEASE ORAL at 08:46

## 2022-07-29 RX ADMIN — GUAIFENESIN AND DEXTROMETHORPHAN 5 ML: 100; 10 SYRUP ORAL at 16:58

## 2022-07-29 RX ADMIN — ENOXAPARIN SODIUM 30 MG: 100 INJECTION SUBCUTANEOUS at 08:46

## 2022-07-29 RX ADMIN — HYDROCHLOROTHIAZIDE 25 MG: 12.5 CAPSULE, GELATIN COATED ORAL at 08:45

## 2022-07-29 RX ADMIN — GUAIFENESIN AND DEXTROMETHORPHAN 5 ML: 100; 10 SYRUP ORAL at 20:45

## 2022-07-29 RX ADMIN — ASPIRIN 81 MG: 81 TABLET, CHEWABLE ORAL at 08:46

## 2022-07-29 RX ADMIN — ISOSORBIDE MONONITRATE 30 MG: 30 TABLET, EXTENDED RELEASE ORAL at 08:45

## 2022-07-29 RX ADMIN — OXYCODONE HYDROCHLORIDE AND ACETAMINOPHEN 500 MG: 500 TABLET ORAL at 08:46

## 2022-07-29 RX ADMIN — GUAIFENESIN AND DEXTROMETHORPHAN 5 ML: 100; 10 SYRUP ORAL at 03:45

## 2022-07-29 RX ADMIN — TAMSULOSIN HYDROCHLORIDE 0.4 MG: 0.4 CAPSULE ORAL at 20:45

## 2022-07-29 RX ADMIN — Medication 1000 UNITS: at 08:46

## 2022-07-29 ASSESSMENT — PAIN SCALES - GENERAL
PAINLEVEL_OUTOF10: 0
PAINLEVEL_OUTOF10: 0

## 2022-07-29 ASSESSMENT — PAIN DESCRIPTION - ORIENTATION: ORIENTATION: MID;LOWER

## 2022-07-29 ASSESSMENT — PAIN DESCRIPTION - DESCRIPTORS: DESCRIPTORS: ACHING

## 2022-07-29 ASSESSMENT — PAIN DESCRIPTION - LOCATION: LOCATION: BACK

## 2022-07-29 NOTE — PROGRESS NOTES
Hospitalist Progress Note    Patient:  Haleigh Mclean      Unit/Bed:7K-25/025-A    YOB: 1950    MRN: [id number]       Acct: [de-identified]     PCP: Toya Todd DO    Date of Admission: 7/23/2022    Assessment/Plan:    COVID-19 infection   -Currently satting 94% on room air, which is his baseline. Titrate oxygen as needed to maintain SPO2 greater than 90%. Does not meet requirements for steroid therapy or antiviral therapy at this time   -Chest x-ray negative for acute intrathoracic process   -Continue vitamin D, vitamin C, zinc   - Scheduled mucolytic's and antitussives   -As needed bronchodilators   -Pulmonary toilet: IS, Acapella, cough, deep breathe    Generalized weakness secondary to acute COVID-19 infection and physical deconditioning   -PT/OT consulted, appreciate recs: Recommending SNF   -Social work consulted and following for placement    History of stroke in the past   -Reportedly has minimal residual left-sided weakness noted. -CT head negative for acute intracranial pathology, acute on chronic sinus disease   -CTA head and neck negative for significant hemodynamic stenosis, antegrade flow in the right and left vertebral arteries, atherosclerotic disease noted in aortic arch, cavernous segments of both internal carotid arteries, no evidence of severe stenosis, narrowing in the distal branches of the right middle cerebral artery, antegrade flow in the left middle cerebral artery, antegrade flow in the anterior and posterior cerebral arteries, narrowing in the mid basilar artery   -MRI of brain: No restricted diffusion to suggest acute infarct, old infarct right corona radiata extending to right basal ganglia, old lacunar infarct in left basal ganglia   -NIHSS every shift, every 4 hours neuro checks   -PT/OT consulted and following   -SLP consulted and following   -Neurology consulted: No need for carotid ultrasound at this time. We will sign off.   Follow-up outpatient 1 to 2 weeks. Coronary artery disease   - Follows Dr. Lily Terrell  - S/p LHC 7/9/21 w/ successful PCI to LAD w/ three overlapping drug-eluting stents by Dr. Lopez Bass   -ECG notable for normal sinus rhythm with artifact baseline, without ischemic changes.  -Continue DAPT, HTC Z, Norvasc, Imdur, ARB   -As needed nitroglycerin    Essential hypertension   -Fairly stable, does have intermittent hypertensive pressures.   -Continue amlodipine, Imdur, hydrochlorothiazide, ARB    Obesity   -BMI 39.05 kg/m²   -Discussed and educated on lifestyle modifications          Expected discharge date: Pending pre-CERT to nursing home    Disposition:    [] Home       [] TCU       [] Rehab       [] Psych       [x] SNF       [] [facility]       [] Other-    Chief Complaint: Difficulty walking    Hospital Course: Per HPI documented 7/23/22: \"Patient is a 68-year-old  male who presents to the ED via EMS from home for difficulty walking that started today  He was diagnosed with COVID yesterday at an outside facility and was started on PAXLOVID  He was unable to feed this morning has been having difficulty ambulating this morning  He denies any fever but reports nonproductive cough no chest pain or palpitation no shortness of breath  Denies any nausea vomiting or diarrhea no abdominal discomfort no dysuria fine no tingling but certainly reports generalized weakness  No evidence of syncope or presyncopal episodes\"    7/27/22: Resumed care patient today. Patient sitting up in bedside recliner, well-appearing, no apparent distress. Remains afebrile, satting 96% on room air, with hemodynamically stable vital signs. No acute events overnight. Patient states he is feeling well this morning. No complaints this AM.  States that he feels that his weakness is improving. Patient is awaiting precertification for nursing home. 7/28/22: Patient sitting up in bedside recliner, well-appearing, no apparent distress.   Remains afebrile, Na    Exam:  BP (!) 143/65   Pulse 71   Temp 97.6 °F (36.4 °C) (Oral)   Resp 18   Ht 5' 11\" (1.803 m)   Wt 280 lb (127 kg)   SpO2 91%   BMI 39.05 kg/m²     General appearance: No apparent distress, appears stated age and cooperative. HEENT: Pupils equal, round, and reactive to light. Conjunctivae/corneas clear. Neck: Supple, with full range of motion. No jugular venous distention. Trachea midline. Respiratory:  Normal respiratory effort, able to speak full clear sentences. Clear but diminished in the bases to auscultation, bilaterally without Rales/Wheezes/Rhonchi. Cardiovascular: Regular rate and rhythm with normal S1/S2 without murmurs, rubs or gallops. Abdomen: Soft, non-tender, non-distended with normal bowel sounds. Musculoskeletal: passive and active ROM x 4 extremities. 5/5 strength bilateral upper extremities and bilateral lower extremities. Skin: Skin color, texture, turgor normal.  No rashes or lesions. Neurologic:  Neurovascularly intact without any focal sensory/motor deficits. Cranial nerves: II-XII intact, grossly non-focal.  Psychiatric: Alert and oriented, thought content appropriate, normal insight  Capillary Refill: Brisk,< 3 seconds   Peripheral Pulses: +2 palpable, equal bilaterally       Labs:   Recent Labs     07/27/22  0901 07/28/22  0616   WBC 5.1 4.5*   HGB 14.6 14.4   HCT 44.0 44.4    178       Recent Labs     07/27/22  0901 07/28/22  0616    138   K 3.8 3.8    103   CO2 22* 21*   BUN 15 14   CREATININE 0.9 1.0   CALCIUM 9.4 9.1       No results for input(s): AST, ALT, BILIDIR, BILITOT, ALKPHOS in the last 72 hours. No results for input(s): INR in the last 72 hours. No results for input(s): Kaleen Hope in the last 72 hours.     Microbiology:      Urinalysis:      Lab Results   Component Value Date/Time    NITRU Negative 06/29/2021 09:37 AM    WBCUA 10-15 05/16/2020 04:45 AM    BACTERIA NONE SEEN 05/16/2020 04:45 AM    RBCUA 5-10 05/16/2020 04:45 AM    BLOODU Negative 06/29/2021 09:37 AM    BLOODU NEGATIVE 05/16/2020 04:45 AM    SPECGRAV 1.020 09/09/2019 10:28 AM    GLUCOSEU Negative 06/29/2021 09:37 AM       Radiology:  XR CHEST PORTABLE    Result Date: 7/23/2022  PROCEDURE: XR CHEST PORTABLE CLINICAL INFORMATION: Shortness of breath TECHNIQUE: Mobile AP chest radiograph. COMPARISON: Multiple AP chest radiographs 7/23/2020 FINDINGS: Cardiomediastinal silhouette is within normal limits. There are no lung consolidations. Degenerative changes in the thoracic spine are poorly visualized. There is a partially visualized right shoulder prosthesis. Soft tissues are unremarkable. No acute intrathoracic process. **This report has been created using voice recognition software. It may contain minor errors which are inherent in voice recognition technology. ** Final report electronically signed by Dr. Ly Short on 7/23/2022 2:13 PM      DVT prophylaxis: [x] Lovenox                                 [] SCDs                                 [] SQ Heparin                                 [] Encourage ambulation           [] Already on Anticoagulation     Code Status: Full Code    PT/OT Eval Status: Per OT note documented 7/27/2022: \"ASSESSMENT:  Activity Tolerance:  Patient tolerance of  treatment: fair. Discharge Recommendations: Subacute/skilled nursing facility  Equipment Recommendations: Equipment Needed: No (May benefit from [facility] if pt does not DC to subacute/SNF)  Plan: Times per Week: 3-5x  Times per Day: Daily  Current Treatment Recommendations: Balance training, Strengthening, Functional mobility training, Endurance training, Self-Care / ADL, Safety education & training\"    Per PT note documented 7/27/2022: \"ASSESSMENT:  Assessment: Patient progressing toward established goals. Activity Tolerance:  Patient tolerance of  treatment: good.       Equipment Recommendations:Equipment Needed: No (pt has a RW)  Discharge Recommendations: Subacte/Skilled Nursing Facility, Home with Home Health PT, and Patient would benefit from continued PT at discharge  Plan: Current Treatment Recommendations: Strengthening, Balance training, Functional mobility training, Transfer training, Endurance training, Neuromuscular re-education, Stair training, Gait training, Cognitive reorientation, Home exercise program, Safety education & training, Patient/Caregiver education & training, Therapeutic activities, Equipment evaluation, education, & procurement  Plan:  (5x C)\"        Tele:   [] yes             [x] no    Active Hospital Problems    Diagnosis Date Noted    Generalized weakness [R53.1] 07/23/2022     Priority: Medium       Electronically signed by MAGDA Macdonald CNP on 7/29/2022 at 7:52 AM

## 2022-07-29 NOTE — CARE COORDINATION
7/29/22, 2:32 PM EDT    DISCHARGE PLANNING EVALUATION    Spoke with [facility] admissions. Facility is still reviewing, they have concerns that patient would stay only a few days before moving to facility of patient and family's choice closer to home. Facility has not yet made a decision.

## 2022-07-29 NOTE — PLAN OF CARE
Problem: Discharge Planning  Goal: Discharge to home or other facility with appropriate resources  Outcome: Progressing  Flowsheets (Taken 7/28/2022 2211)  Discharge to home or other facility with appropriate resources:   Arrange for needed discharge resources and transportation as appropriate   Identify discharge learning needs (meds, wound care, etc)   Identify barriers to discharge with patient and caregiver     Problem: Safety - Adult  Goal: Free from fall injury  Outcome: Progressing  Flowsheets (Taken 7/28/2022 2211)  Free From Fall Injury: Instruct family/caregiver on patient safety     Problem: Skin/Tissue Integrity  Goal: Absence of new skin breakdown  Description: 1. Monitor for areas of redness and/or skin breakdown  2. Assess vascular access sites hourly  3. Every 4-6 hours minimum:  Change oxygen saturation probe site  4. Every 4-6 hours:  If on nasal continuous positive airway pressure, respiratory therapy assess nares and determine need for appliance change or resting period. Outcome: Progressing  Note: No new skin findings noted this shift.      Problem: Pain  Goal: Verbalizes/displays adequate comfort level or baseline comfort level  Outcome: Progressing  Flowsheets (Taken 7/28/2022 2211)  Verbalizes/displays adequate comfort level or baseline comfort level:   Encourage patient to monitor pain and request assistance   Assess pain using appropriate pain scale   Administer analgesics based on type and severity of pain and evaluate response     Problem: Chronic Conditions and Co-morbidities  Goal: Patient's chronic conditions and co-morbidity symptoms are monitored and maintained or improved  Outcome: Progressing  Flowsheets (Taken 7/28/2022 2211)  Care Plan - Patient's Chronic Conditions and Co-Morbidity Symptoms are Monitored and Maintained or Improved: Monitor and assess patient's chronic conditions and comorbid symptoms for stability, deterioration, or improvement   Care plan reviewed with patient. Patient verbalizes understanding of the plan of care and contribute to goal setting.

## 2022-07-29 NOTE — PLAN OF CARE
Problem: Discharge Planning  Goal: Discharge to home or other facility with appropriate resources  Outcome: Adequate for Discharge  Flowsheets (Taken 7/29/2022 1446)  Discharge to home or other facility with appropriate resources:   Identify barriers to discharge with patient and caregiver   Arrange for needed discharge resources and transportation as appropriate   Identify discharge learning needs (meds, wound care, etc)  Note: Pt plans SNF at discharge. Care manager and social working helping with discharge needs. Problem: Safety - Adult  Goal: Free from fall injury  Outcome: Progressing  Flowsheets (Taken 7/29/2022 1446)  Free From Fall Injury:   Instruct family/caregiver on patient safety   Based on caregiver fall risk screen, instruct family/caregiver to ask for assistance with transferring infant if caregiver noted to have fall risk factors  Note: No falls noted this shift. Patient ambulates with x1-2 staff assistance with some difficulty. Bed kept in low position. Safe environment maintained. Bedside table & call light in reach. Uses call light appropriately when needing assistance. Problem: Skin/Tissue Integrity  Goal: Absence of new skin breakdown  Description: 1. Monitor for areas of redness and/or skin breakdown  2. Assess vascular access sites hourly  3. Every 4-6 hours minimum:  Change oxygen saturation probe site  4. Every 4-6 hours:  If on nasal continuous positive airway pressure, respiratory therapy assess nares and determine need for appliance change or resting period. Outcome: Progressing  Note: No new skin impairments noted. Pt understands the importance of frequent repositioning in order to prevent any skin breakdown. Scrotum red and excoriated. Zinc paste applied liberally.       Problem: Pain  Goal: Verbalizes/displays adequate comfort level or baseline comfort level  Outcome: Progressing  Flowsheets (Taken 7/29/2022 1446)  Verbalizes/displays adequate comfort level or

## 2022-07-30 PROCEDURE — 6370000000 HC RX 637 (ALT 250 FOR IP): Performed by: INTERNAL MEDICINE

## 2022-07-30 PROCEDURE — 6360000002 HC RX W HCPCS: Performed by: INTERNAL MEDICINE

## 2022-07-30 PROCEDURE — 2580000003 HC RX 258: Performed by: INTERNAL MEDICINE

## 2022-07-30 PROCEDURE — 99231 SBSQ HOSP IP/OBS SF/LOW 25: CPT

## 2022-07-30 PROCEDURE — 1200000000 HC SEMI PRIVATE

## 2022-07-30 PROCEDURE — 6370000000 HC RX 637 (ALT 250 FOR IP)

## 2022-07-30 RX ADMIN — AMLODIPINE BESYLATE 10 MG: 10 TABLET ORAL at 10:52

## 2022-07-30 RX ADMIN — OXYCODONE HYDROCHLORIDE AND ACETAMINOPHEN 500 MG: 500 TABLET ORAL at 10:52

## 2022-07-30 RX ADMIN — POTASSIUM CHLORIDE 8 MEQ: 600 TABLET, FILM COATED, EXTENDED RELEASE ORAL at 10:53

## 2022-07-30 RX ADMIN — ENOXAPARIN SODIUM 30 MG: 100 INJECTION SUBCUTANEOUS at 20:12

## 2022-07-30 RX ADMIN — ASPIRIN 81 MG: 81 TABLET, CHEWABLE ORAL at 10:52

## 2022-07-30 RX ADMIN — SODIUM CHLORIDE, PRESERVATIVE FREE 10 ML: 5 INJECTION INTRAVENOUS at 10:53

## 2022-07-30 RX ADMIN — ENOXAPARIN SODIUM 30 MG: 100 INJECTION SUBCUTANEOUS at 10:52

## 2022-07-30 RX ADMIN — SODIUM CHLORIDE, PRESERVATIVE FREE 10 ML: 5 INJECTION INTRAVENOUS at 19:35

## 2022-07-30 RX ADMIN — CLOPIDOGREL BISULFATE 75 MG: 75 TABLET, FILM COATED ORAL at 10:53

## 2022-07-30 RX ADMIN — Medication 1000 UNITS: at 10:52

## 2022-07-30 RX ADMIN — LOSARTAN POTASSIUM 50 MG: 50 TABLET, FILM COATED ORAL at 10:53

## 2022-07-30 RX ADMIN — ISOSORBIDE MONONITRATE 30 MG: 30 TABLET, EXTENDED RELEASE ORAL at 10:53

## 2022-07-30 RX ADMIN — HYDROCHLOROTHIAZIDE 25 MG: 12.5 CAPSULE, GELATIN COATED ORAL at 10:53

## 2022-07-30 RX ADMIN — TAMSULOSIN HYDROCHLORIDE 0.4 MG: 0.4 CAPSULE ORAL at 20:12

## 2022-07-30 NOTE — PROGRESS NOTES
Hospitalist Progress Note    Patient:  Emily Sorensen      Unit/Bed:7K-25/025-A    YOB: 1950    MRN: [id number]       Acct: [de-identified]     PCP: Mike Crawford DO    Date of Admission: 7/23/2022    Assessment/Plan:    COVID-19 infection   -Currently satting 94% on room air, which is his baseline. Titrate oxygen as needed to maintain SPO2 greater than 90%. Does not meet requirements for steroid therapy or antiviral therapy at this time   -Chest x-ray negative for acute intrathoracic process   -Continue vitamin D, vitamin C, zinc   - Scheduled mucolytic's and antitussives   -As needed bronchodilators   -Pulmonary toilet: IS, Acapella, cough, deep breathe    Generalized weakness secondary to acute COVID-19 infection and physical deconditioning   -PT/OT consulted, appreciate recs: Recommending SNF   -Social work consulted and following for placement    History of stroke in the past   -Reportedly has minimal residual left-sided weakness noted. -CT head negative for acute intracranial pathology, acute on chronic sinus disease   -CTA head and neck negative for significant hemodynamic stenosis, antegrade flow in the right and left vertebral arteries, atherosclerotic disease noted in aortic arch, cavernous segments of both internal carotid arteries, no evidence of severe stenosis, narrowing in the distal branches of the right middle cerebral artery, antegrade flow in the left middle cerebral artery, antegrade flow in the anterior and posterior cerebral arteries, narrowing in the mid basilar artery   -MRI of brain: No restricted diffusion to suggest acute infarct, old infarct right corona radiata extending to right basal ganglia, old lacunar infarct in left basal ganglia   -NIHSS every shift, every 4 hours neuro checks   -PT/OT consulted and following   -SLP consulted and following   -Neurology consulted: No need for carotid ultrasound at this time. We will sign off.   Follow-up outpatient 1 to 2 weeks. Coronary artery disease   - Follows Dr. Del Landaverde  - S/p C 7/9/21 w/ successful PCI to LAD w/ three overlapping drug-eluting stents by Dr. Shani Short   -ECG notable for normal sinus rhythm with artifact baseline, without ischemic changes.  -Continue DAPT, HTC Z, Norvasc, Imdur, ARB   -As needed nitroglycerin    Essential hypertension   -Fairly controlled, does have intermittent hypertensive pressures.   -Continue amlodipine, Imdur, hydrochlorothiazide, ARB    Obesity   -BMI 39.05 kg/m²   -Discussed and educated on lifestyle modifications          Expected discharge date: Pending pre-CERT to nursing home    Disposition:    [] Home       [] TCU       [] Rehab       [] Psych       [x] SNF       [] [facility]       [] Other-    Chief Complaint: Difficulty walking    Hospital Course: Per HPI documented 7/23/22: \"Patient is a 40-year-old  male who presents to the ED via EMS from home for difficulty walking that started today  He was diagnosed with COVID yesterday at an outside facility and was started on PAXLOVID  He was unable to feed this morning has been having difficulty ambulating this morning  He denies any fever but reports nonproductive cough no chest pain or palpitation no shortness of breath  Denies any nausea vomiting or diarrhea no abdominal discomfort no dysuria fine no tingling but certainly reports generalized weakness  No evidence of syncope or presyncopal episodes\"    7/27/22: Resumed care patient today. Patient sitting up in bedside recliner, well-appearing, no apparent distress. Remains afebrile, satting 96% on room air, with hemodynamically stable vital signs. No acute events overnight. Patient states he is feeling well this morning. No complaints this AM.  States that he feels that his weakness is improving. Patient is awaiting precertification for nursing home. 7/28/22: Patient sitting up in bedside recliner, well-appearing, no apparent distress.   Remains afebrile, satting 92% on room air, with hemodynamically stable vital signs. No acute events overnight per nursing staff. No acute events reported per patient. Patient denies pain. States that he feels like his weakness is still improving. Electrolytes within defined limits, H&H stable, no leukocytosis noted. Awaiting precertification to SNF.    7/29/22: Remains afebrile, satting 94% on RA, with hemodynamically stable vital signs. No acute events overnight per patient. States he still feels as if his weakness is the same, but denies pain and has no new complaints this morning. Per chart review, appears SW is making a referral to [facility]. Waiting placement to SNF.     7/30/22: Remains afebrile, with hemodynamically stable vital signs. No acute events overnight. States that he still feels subjectively weak. No new complaints by the patient. Reports his last bowel movement was this morning. States he is eating and drinking well. Still awaiting precertification to nursing home. Have not gotten response from [facility] yet. Subjective (past 24 hours):     Patient denies chest pain, palpitations, dizziness, shortness of breath at rest, BERGERON, abdominal pain, nausea, vomiting, diarrhea, fever, chills.     Medications:  Reviewed    Infusion Medications    sodium chloride      sodium chloride 75 mL/hr at 07/24/22 1656     Scheduled Medications    guaiFENesin-dextromethorphan  5 mL Oral Q6H    Vitamin D  1,000 Units Oral Daily    ascorbic acid  500 mg Oral Daily    losartan  50 mg Oral Daily    hydroCHLOROthiazide  25 mg Oral Daily    amLODIPine  10 mg Oral Daily    tamsulosin  0.4 mg Oral Nightly    clopidogrel  75 mg Oral Daily    aspirin  81 mg Oral Daily    isosorbide mononitrate  30 mg Oral Daily    sodium chloride flush  10 mL IntraVENous 2 times per day    enoxaparin  30 mg SubCUTAneous BID    potassium chloride  8 mEq Oral Daily with breakfast     PRN Meds: albuterol, nitroGLYCERIN, sodium chloride flush, sodium chloride, ondansetron **OR** ondansetron, polyethylene glycol, acetaminophen **OR** acetaminophen      Intake/Output Summary (Last 24 hours) at 7/30/2022 1713  Last data filed at 7/30/2022 1326  Gross per 24 hour   Intake 480 ml   Output 1200 ml   Net -720 ml         Diet:  ADULT DIET; Regular; Low Fat/Low Chol/High Fiber/2 gm Na    Exam:  BP (!) 176/85   Pulse 63   Temp 97.3 °F (36.3 °C) (Oral)   Resp 16   Ht 5' 11\" (1.803 m)   Wt 280 lb (127 kg)   SpO2 93%   BMI 39.05 kg/m²     General appearance: No apparent distress, appears stated age and cooperative. HEENT: Pupils equal, round, and reactive to light. Conjunctivae/corneas clear. Neck: Supple, with full range of motion. No jugular venous distention. Trachea midline. Respiratory:  Normal respiratory effort, able to speak full clear sentences. Clear to auscultation, bilaterally without Rales/Wheezes/Rhonchi. Cardiovascular: Regular rate and rhythm with normal S1/S2 without murmurs, rubs or gallops. Abdomen: Soft, non-tender, non-distended with normal bowel sounds. Musculoskeletal: passive and active ROM x 4 extremities. 5/5 strength bilateral upper extremities and bilateral lower extremities. Skin: Skin color, texture, turgor normal.  No rashes or lesions. Neurologic:  Neurovascularly intact without any focal sensory/motor deficits. Cranial nerves: II-XII intact, grossly non-focal.  Psychiatric: Alert and oriented, thought content appropriate, normal insight  Capillary Refill: Brisk,< 3 seconds   Peripheral Pulses: +2 palpable, equal bilaterally       Labs:   Recent Labs     07/28/22  0616 07/29/22  0719   WBC 4.5* 4.3*   HGB 14.4 14.3   HCT 44.4 44.0    177     Recent Labs     07/28/22  0616 07/29/22  0719    138   K 3.8 3.7    100   CO2 21* 24   BUN 14 16   CREATININE 1.0 1.0   CALCIUM 9.1 9.2     No results for input(s): AST, ALT, BILIDIR, BILITOT, ALKPHOS in the last 72 hours.     No results for input(s): INR in the last 72 hours. No results for input(s): Josr Lam in the last 72 hours. Microbiology:      Urinalysis:      Lab Results   Component Value Date/Time    NITRU Negative 06/29/2021 09:37 AM    WBCUA 10-15 05/16/2020 04:45 AM    BACTERIA NONE SEEN 05/16/2020 04:45 AM    RBCUA 5-10 05/16/2020 04:45 AM    BLOODU Negative 06/29/2021 09:37 AM    BLOODU NEGATIVE 05/16/2020 04:45 AM    SPECGRAV 1.020 09/09/2019 10:28 AM    GLUCOSEU Negative 06/29/2021 09:37 AM       Radiology:  XR CHEST PORTABLE    Result Date: 7/23/2022  PROCEDURE: XR CHEST PORTABLE CLINICAL INFORMATION: Shortness of breath TECHNIQUE: Mobile AP chest radiograph. COMPARISON: Multiple AP chest radiographs 7/23/2020 FINDINGS: Cardiomediastinal silhouette is within normal limits. There are no lung consolidations. Degenerative changes in the thoracic spine are poorly visualized. There is a partially visualized right shoulder prosthesis. Soft tissues are unremarkable. No acute intrathoracic process. **This report has been created using voice recognition software. It may contain minor errors which are inherent in voice recognition technology. ** Final report electronically signed by Dr. Ty Dupont on 7/23/2022 2:13 PM      DVT prophylaxis: [x] Lovenox                                 [] SCDs                                 [] SQ Heparin                                 [] Encourage ambulation           [] Already on Anticoagulation     Code Status: Full Code    PT/OT Eval Status: Per OT note documented 7/27/2022: \"ASSESSMENT:  Activity Tolerance:  Patient tolerance of  treatment: fair.         Discharge Recommendations: Subacute/skilled nursing facility  Equipment Recommendations: Equipment Needed: No (May benefit from [facility] if pt does not DC to subacute/SNF)  Plan: Times per Week: 3-5x  Times per Day: Daily  Current Treatment Recommendations: Balance training, Strengthening, Functional mobility training, Endurance training, Self-Care / ADL, Safety education & training\"    Per PT note documented 7/29/2022: \"ASSESSMENT:  Assessment: Patient progressing toward established goals. Pt cont to demonstrate generalized weakness and decreased endurance. He did score a 15/28 which was an improvement from eval however still putting him at a high fall risk. Pt would benefit from cont skilled therapy   Activity Tolerance:  Patient tolerance of  treatment: fair.        Equipment Recommendations:Equipment Needed: No (pt has a RW)  Discharge Recommendations: Continue to assess pending progress and SNF vs home with 24 hour assist and home health PT   Plan: Current Treatment Recommendations: Strengthening, Balance training, Functional mobility training, Transfer training, Endurance training, Neuromuscular re-education, Stair training, Gait training, Cognitive reorientation, Home exercise program, Safety education & training, Patient/Caregiver education & training, Therapeutic activities, Equipment evaluation, education, & procurement  Plan:  (5x C)\"        Tele:   [] yes             [x] no    Active Hospital Problems    Diagnosis Date Noted    Generalized weakness [R53.1] 07/23/2022     Priority: Medium       Electronically signed by MAGDA Pacheco CNP on 7/30/2022 at 5:13 PM

## 2022-07-31 LAB
ANION GAP SERPL CALCULATED.3IONS-SCNC: 13 MEQ/L (ref 8–16)
BASOPHILS # BLD: 0.9 %
BASOPHILS ABSOLUTE: 0 THOU/MM3 (ref 0–0.1)
BUN BLDV-MCNC: 18 MG/DL (ref 7–22)
CALCIUM SERPL-MCNC: 9 MG/DL (ref 8.5–10.5)
CHLORIDE BLD-SCNC: 100 MEQ/L (ref 98–111)
CO2: 25 MEQ/L (ref 23–33)
CREAT SERPL-MCNC: 1 MG/DL (ref 0.4–1.2)
EOSINOPHIL # BLD: 6.3 %
EOSINOPHILS ABSOLUTE: 0.3 THOU/MM3 (ref 0–0.4)
ERYTHROCYTE [DISTWIDTH] IN BLOOD BY AUTOMATED COUNT: 14.1 % (ref 11.5–14.5)
ERYTHROCYTE [DISTWIDTH] IN BLOOD BY AUTOMATED COUNT: 43.8 FL (ref 35–45)
GFR SERPL CREATININE-BSD FRML MDRD: 73 ML/MIN/1.73M2
GLUCOSE BLD-MCNC: 103 MG/DL (ref 70–108)
HCT VFR BLD CALC: 44.1 % (ref 42–52)
HEMOGLOBIN: 14.7 GM/DL (ref 14–18)
IMMATURE GRANS (ABS): 0.03 THOU/MM3 (ref 0–0.07)
IMMATURE GRANULOCYTES: 0.6 %
LYMPHOCYTES # BLD: 25.4 %
LYMPHOCYTES ABSOLUTE: 1.2 THOU/MM3 (ref 1–4.8)
MCH RBC QN AUTO: 28.4 PG (ref 26–33)
MCHC RBC AUTO-ENTMCNC: 33.3 GM/DL (ref 32.2–35.5)
MCV RBC AUTO: 85.3 FL (ref 80–94)
MONOCYTES # BLD: 10.3 %
MONOCYTES ABSOLUTE: 0.5 THOU/MM3 (ref 0.4–1.3)
NUCLEATED RED BLOOD CELLS: 0 /100 WBC
PLATELET # BLD: 238 THOU/MM3 (ref 130–400)
PMV BLD AUTO: 11 FL (ref 9.4–12.4)
POTASSIUM REFLEX MAGNESIUM: 3.6 MEQ/L (ref 3.5–5.2)
RBC # BLD: 5.17 MILL/MM3 (ref 4.7–6.1)
SEG NEUTROPHILS: 56.5 %
SEGMENTED NEUTROPHILS ABSOLUTE COUNT: 2.6 THOU/MM3 (ref 1.8–7.7)
SODIUM BLD-SCNC: 138 MEQ/L (ref 135–145)
WBC # BLD: 4.6 THOU/MM3 (ref 4.8–10.8)

## 2022-07-31 PROCEDURE — 99232 SBSQ HOSP IP/OBS MODERATE 35: CPT

## 2022-07-31 PROCEDURE — 1200000000 HC SEMI PRIVATE

## 2022-07-31 PROCEDURE — 6370000000 HC RX 637 (ALT 250 FOR IP)

## 2022-07-31 PROCEDURE — 6360000002 HC RX W HCPCS: Performed by: INTERNAL MEDICINE

## 2022-07-31 PROCEDURE — 2580000003 HC RX 258: Performed by: INTERNAL MEDICINE

## 2022-07-31 PROCEDURE — 85025 COMPLETE CBC W/AUTO DIFF WBC: CPT

## 2022-07-31 PROCEDURE — 36415 COLL VENOUS BLD VENIPUNCTURE: CPT

## 2022-07-31 PROCEDURE — 80048 BASIC METABOLIC PNL TOTAL CA: CPT

## 2022-07-31 PROCEDURE — 6370000000 HC RX 637 (ALT 250 FOR IP): Performed by: INTERNAL MEDICINE

## 2022-07-31 RX ADMIN — HYDROCHLOROTHIAZIDE 25 MG: 12.5 CAPSULE, GELATIN COATED ORAL at 09:12

## 2022-07-31 RX ADMIN — TAMSULOSIN HYDROCHLORIDE 0.4 MG: 0.4 CAPSULE ORAL at 20:02

## 2022-07-31 RX ADMIN — CLOPIDOGREL BISULFATE 75 MG: 75 TABLET, FILM COATED ORAL at 09:12

## 2022-07-31 RX ADMIN — AMLODIPINE BESYLATE 10 MG: 10 TABLET ORAL at 09:11

## 2022-07-31 RX ADMIN — ENOXAPARIN SODIUM 30 MG: 100 INJECTION SUBCUTANEOUS at 20:02

## 2022-07-31 RX ADMIN — OXYCODONE HYDROCHLORIDE AND ACETAMINOPHEN 500 MG: 500 TABLET ORAL at 09:11

## 2022-07-31 RX ADMIN — ENOXAPARIN SODIUM 30 MG: 100 INJECTION SUBCUTANEOUS at 09:11

## 2022-07-31 RX ADMIN — LOSARTAN POTASSIUM 50 MG: 50 TABLET, FILM COATED ORAL at 09:12

## 2022-07-31 RX ADMIN — ISOSORBIDE MONONITRATE 30 MG: 30 TABLET, EXTENDED RELEASE ORAL at 09:12

## 2022-07-31 RX ADMIN — POTASSIUM CHLORIDE 8 MEQ: 600 TABLET, FILM COATED, EXTENDED RELEASE ORAL at 09:12

## 2022-07-31 RX ADMIN — Medication 1000 UNITS: at 09:11

## 2022-07-31 RX ADMIN — SODIUM CHLORIDE, PRESERVATIVE FREE 10 ML: 5 INJECTION INTRAVENOUS at 09:11

## 2022-07-31 RX ADMIN — ASPIRIN 81 MG: 81 TABLET, CHEWABLE ORAL at 09:11

## 2022-07-31 ASSESSMENT — PAIN SCALES - GENERAL: PAINLEVEL_OUTOF10: 0

## 2022-07-31 NOTE — PROGRESS NOTES
Hospitalist Progress Note    Patient:  Jason Shinwall      Unit/Bed:7K-25/025-A    YOB: 1950    MRN: [id number]       Acct: [de-identified]     PCP: Pablo Bautista DO    Date of Admission: 7/23/2022    Assessment/Plan:    COVID-19 infection   -Currently satting 92% on room air, which is his baseline. Titrate oxygen as needed to maintain SPO2 greater than 90%. Does not meet requirements for steroid therapy or antiviral therapy at this time   -Chest x-ray negative for acute intrathoracic process   -Continue vitamin D, vitamin C, zinc   - Scheduled mucolytic's and antitussives   -As needed bronchodilators   -Pulmonary toilet: IS, Acapella, cough, deep breathe    Generalized weakness secondary to acute COVID-19 infection and physical deconditioning   -PT/OT consulted, appreciate recs: Recommending SNF   -Social work consulted and following for placement    History of stroke in the past   -Reportedly has minimal residual left-sided weakness noted. -CT head negative for acute intracranial pathology, acute on chronic sinus disease   -CTA head and neck negative for significant hemodynamic stenosis, antegrade flow in the right and left vertebral arteries, atherosclerotic disease noted in aortic arch, cavernous segments of both internal carotid arteries, no evidence of severe stenosis, narrowing in the distal branches of the right middle cerebral artery, antegrade flow in the left middle cerebral artery, antegrade flow in the anterior and posterior cerebral arteries, narrowing in the mid basilar artery   -MRI of brain: No restricted diffusion to suggest acute infarct, old infarct right corona radiata extending to right basal ganglia, old lacunar infarct in left basal ganglia   -NIHSS every shift, every 4 hours neuro checks   -PT/OT consulted and following   -SLP consulted and following   -Neurology consulted: No need for carotid ultrasound at this time. We will sign off.   Follow-up outpatient 1 to 2 weeks. Coronary artery disease   - Follows Dr. Constance Taylor  - S/p LHC 7/9/21 w/ successful PCI to LAD w/ three overlapping drug-eluting stents by Dr. Sherri Hobson   -ECG notable for normal sinus rhythm with artifact baseline, without ischemic changes.  -Continue DAPT, HTCZ, Norvasc, Imdur, ARB   -As needed nitroglycerin    Essential hypertension   -Fairly controlled, does have intermittent hypertensive pressures.   -Continue amlodipine, Imdur, hydrochlorothiazide, ARB    Obesity   -BMI 39.05 kg/m²   -Discussed and educated on lifestyle modifications          Expected discharge date: Pending pre-CERT to nursing home    Disposition:    [] Home       [] TCU       [] Rehab       [] Psych       [x] SNF       [] [place]       [] Other-    Chief Complaint: Difficulty walking    Hospital Course: Per HPI documented 7/23/22: \"Patient is a 77-year-old  male who presents to the ED via EMS from home for difficulty walking that started today  He was diagnosed with COVID yesterday at an outside facility and was started on PAXLOVID  He was unable to feed this morning has been having difficulty ambulating this morning  He denies any fever but reports nonproductive cough no chest pain or palpitation no shortness of breath  Denies any nausea vomiting or diarrhea no abdominal discomfort no dysuria fine no tingling but certainly reports generalized weakness  No evidence of syncope or presyncopal episodes\"    7/27/22: Resumed care patient today. Patient sitting up in bedside recliner, well-appearing, no apparent distress. Remains afebrile, satting 96% on room air, with hemodynamically stable vital signs. No acute events overnight. Patient states he is feeling well this morning. No complaints this AM.  States that he feels that his weakness is improving. Patient is awaiting precertification for nursing home. 7/28/22: Patient sitting up in bedside recliner, well-appearing, no apparent distress.   Remains afebrile, satting 92% on room air, with hemodynamically stable vital signs. No acute events overnight per nursing staff. No acute events reported per patient. Patient denies pain. States that he feels like his weakness is still improving. Electrolytes within defined limits, H&H stable, no leukocytosis noted. Awaiting precertification to SNF.    7/29/22: Remains afebrile, satting 94% on RA, with hemodynamically stable vital signs. No acute events overnight per patient. States he still feels as if his weakness is the same, but denies pain and has no new complaints this morning. Per chart review, appears SW is making a [place] [facility] [place]. Waiting placement to SNF.     7/30/22: Remains afebrile, with hemodynamically stable vital signs. No acute events overnight. States that he still feels subjectively weak. No new complaints by the patient. Reports his last bowel movement was this morning. States he is eating and drinking well. Still awaiting precertification to nursing home. Have not gotten response from [place] yet. 7/31/22: Remains afebrile, satting 92% on room air, with hemodynamically stable vital signs. Well-appearing on exam, nontoxic, no apparent distress. No acute events overnight. Patient denies pain. Patient states he still very weak. Patient informed me that his wife and him discussed going to [facility], patient stating he no longer wants to go to [facility] at this time. I informed the patient that I would let social work know first thing tomorrow on Monday to see if he will see home or his second choice of [facility] would accept him after an initial COVID diagnosis but informed the patient that if he did not except we need to find placement for him and that he cannot stay in the hospital for weeks on and waiting for these facilities to accept him. Patient then stated he would go home if he could not get into facilities of his choosing.   Informed patient that I would discuss this with social work to see what I can figure out for placement for him. Subjective (past 24 hours):     Patient denies chest pain, palpitations, dizziness, shortness of breath at rest, BERGERON, abdominal pain, nausea, vomiting, diarrhea, fever, chills,, cough, congestion, body aches, headache. Reports that he has had a large bowel movement yesterday and this morning. States he is eating all his meals, drinking plenty of fluids. Denies pain. Medications:  Reviewed    Infusion Medications    sodium chloride      sodium chloride 75 mL/hr at 07/24/22 1656     Scheduled Medications    guaiFENesin-dextromethorphan  5 mL Oral Q6H    Vitamin D  1,000 Units Oral Daily    ascorbic acid  500 mg Oral Daily    losartan  50 mg Oral Daily    hydroCHLOROthiazide  25 mg Oral Daily    amLODIPine  10 mg Oral Daily    tamsulosin  0.4 mg Oral Nightly    clopidogrel  75 mg Oral Daily    aspirin  81 mg Oral Daily    isosorbide mononitrate  30 mg Oral Daily    sodium chloride flush  10 mL IntraVENous 2 times per day    enoxaparin  30 mg SubCUTAneous BID    potassium chloride  8 mEq Oral Daily with breakfast     PRN Meds: albuterol, nitroGLYCERIN, sodium chloride flush, sodium chloride, ondansetron **OR** ondansetron, polyethylene glycol, acetaminophen **OR** acetaminophen      Intake/Output Summary (Last 24 hours) at 7/31/2022 1439  Last data filed at 7/31/2022 1315  Gross per 24 hour   Intake 860 ml   Output 400 ml   Net 460 ml         Diet:  ADULT DIET; Regular; Low Fat/Low Chol/High Fiber/2 gm Na    Exam:  /60   Pulse 74   Temp 97.8 °F (36.6 °C) (Oral)   Resp 20   Ht 5' 11\" (1.803 m)   Wt 280 lb (127 kg)   SpO2 92%   BMI 39.05 kg/m²     General appearance: No apparent distress, appears stated age and cooperative. HEENT: Pupils equal, round, and reactive to light. Conjunctivae/corneas clear. Neck: Supple, with full range of motion. No jugular venous distention. Trachea midline.   Respiratory:  Normal lung consolidations. Degenerative changes in the thoracic spine are poorly visualized. There is a partially visualized right shoulder prosthesis. Soft tissues are unremarkable. No acute intrathoracic process. **This report has been created using voice recognition software. It may contain minor errors which are inherent in voice recognition technology. ** Final report electronically signed by Dr. Ly Short on 7/23/2022 2:13 PM      DVT prophylaxis: [x] Lovenox                                 [] SCDs                                 [] SQ Heparin                                 [] Encourage ambulation           [] Already on Anticoagulation     Code Status: Full Code    PT/OT Eval Status: Per OT note documented 7/27/2022: \"ASSESSMENT:  Activity Tolerance:  Patient tolerance of  treatment: fair. Discharge Recommendations: Subacute/skilled nursing facility  Equipment Recommendations: Equipment Needed: No (May benefit from [facility] if pt does not DC to subacute/SNF)  Plan: Times per Week: 3-5x  Times per Day: Daily  Current Treatment Recommendations: Balance training, Strengthening, Functional mobility training, Endurance training, Self-Care / ADL, Safety education & training\"    Per PT note documented 7/28/2022: \"ASSESSMENT:  Assessment: Patient progressing toward established goals. Pt cont to demonstrate generalized weakness and decreased endurance. He did score a 15/28 which was an improvement from eval however still putting him at a high fall risk. Pt would benefit from cont skilled therapy   Activity Tolerance:  Patient tolerance of  treatment: fair.        Equipment Recommendations:Equipment Needed: No (pt has a RW)  Discharge Recommendations: Continue to assess pending progress and SNF vs home with 24 hour assist and home health PT   Plan: Current Treatment Recommendations: Strengthening, Balance training, Functional mobility training, Transfer training, Endurance training, Neuromuscular re-education, Stair training, Gait training, Cognitive reorientation, Home exercise program, Safety education & training, Patient/Caregiver education & training, Therapeutic activities, Equipment evaluation, education, & procurement  Plan:  (5x C)\"        Tele:   [] yes             [x] no    Active Hospital Problems    Diagnosis Date Noted    Generalized weakness [R53.1] 07/23/2022     Priority: Medium       Electronically signed by MAGDA Mullins CNP on 7/31/2022 at 2:39 PM

## 2022-08-01 LAB
SARS-COV-2, NAAT: DETECTED
ZINC: 76 UG/DL (ref 60–120)

## 2022-08-01 PROCEDURE — 6370000000 HC RX 637 (ALT 250 FOR IP)

## 2022-08-01 PROCEDURE — 6360000002 HC RX W HCPCS: Performed by: INTERNAL MEDICINE

## 2022-08-01 PROCEDURE — 97535 SELF CARE MNGMENT TRAINING: CPT

## 2022-08-01 PROCEDURE — 2580000003 HC RX 258: Performed by: INTERNAL MEDICINE

## 2022-08-01 PROCEDURE — 99231 SBSQ HOSP IP/OBS SF/LOW 25: CPT

## 2022-08-01 PROCEDURE — 6370000000 HC RX 637 (ALT 250 FOR IP): Performed by: INTERNAL MEDICINE

## 2022-08-01 PROCEDURE — 87635 SARS-COV-2 COVID-19 AMP PRB: CPT

## 2022-08-01 PROCEDURE — 1200000000 HC SEMI PRIVATE

## 2022-08-01 RX ORDER — GUAIFENESIN/DEXTROMETHORPHAN 100-10MG/5
5 SYRUP ORAL EVERY 6 HOURS PRN
Status: DISCONTINUED | OUTPATIENT
Start: 2022-08-01 | End: 2022-08-02 | Stop reason: HOSPADM

## 2022-08-01 RX ADMIN — OXYCODONE HYDROCHLORIDE AND ACETAMINOPHEN 500 MG: 500 TABLET ORAL at 09:14

## 2022-08-01 RX ADMIN — POTASSIUM CHLORIDE 8 MEQ: 600 TABLET, FILM COATED, EXTENDED RELEASE ORAL at 09:13

## 2022-08-01 RX ADMIN — ISOSORBIDE MONONITRATE 30 MG: 30 TABLET, EXTENDED RELEASE ORAL at 09:17

## 2022-08-01 RX ADMIN — Medication 1000 UNITS: at 09:13

## 2022-08-01 RX ADMIN — ENOXAPARIN SODIUM 30 MG: 100 INJECTION SUBCUTANEOUS at 09:13

## 2022-08-01 RX ADMIN — LOSARTAN POTASSIUM 50 MG: 50 TABLET, FILM COATED ORAL at 09:13

## 2022-08-01 RX ADMIN — HYDROCHLOROTHIAZIDE 25 MG: 12.5 CAPSULE, GELATIN COATED ORAL at 09:17

## 2022-08-01 RX ADMIN — SODIUM CHLORIDE, PRESERVATIVE FREE 10 ML: 5 INJECTION INTRAVENOUS at 09:13

## 2022-08-01 RX ADMIN — AMLODIPINE BESYLATE 10 MG: 10 TABLET ORAL at 09:14

## 2022-08-01 RX ADMIN — SODIUM CHLORIDE, PRESERVATIVE FREE 10 ML: 5 INJECTION INTRAVENOUS at 20:55

## 2022-08-01 RX ADMIN — ENOXAPARIN SODIUM 30 MG: 100 INJECTION SUBCUTANEOUS at 20:55

## 2022-08-01 RX ADMIN — ASPIRIN 81 MG: 81 TABLET, CHEWABLE ORAL at 09:13

## 2022-08-01 RX ADMIN — TAMSULOSIN HYDROCHLORIDE 0.4 MG: 0.4 CAPSULE ORAL at 20:55

## 2022-08-01 RX ADMIN — GUAIFENESIN AND DEXTROMETHORPHAN 5 ML: 100; 10 SYRUP ORAL at 09:13

## 2022-08-01 RX ADMIN — CLOPIDOGREL BISULFATE 75 MG: 75 TABLET, FILM COATED ORAL at 09:16

## 2022-08-01 ASSESSMENT — PAIN SCALES - GENERAL
PAINLEVEL_OUTOF10: 0
PAINLEVEL_OUTOF10: 0

## 2022-08-01 NOTE — PROGRESS NOTES
clothing management. Doff and don gown mod A seated EOB after toilet ing for cleanliness. Pt dmeo'd incontinent. Toilet Transfer: Minimal Assistance. With use of grab bar. CGA from elevated bed. Courtney Gustafson BALANCE:  Sitting Balance:  Supervision, Stand By Assistance. Standing Balance: Supervision, Stand By Assistance. 0 LOB 1-2 hand release. Tolerated static stand x2-3 min before RB     BED MOBILITY:  Rolling to Right: Moderate Assistance, X 1, with head of bed raised, with rail, with verbal cues , with increased time for completion    Supine to Sit: Moderate Assistance, X 1, with head of bed raised, with rail, with verbal cues , with increased time for completion    Scooting: Stand By Assistance, with head of bed flat, without rail, with verbal cues , with increased time for completion      TRANSFERS:  Sit to Stand:  [address] Stand to Sit: Contact Guard Assistance. FUNCTIONAL MOBILITY:  Assistive Device: Rolling Walker  Assist Level:  Contact Guard Assistance. Distance: To and from bathroom  0 LOB      ADDITIONAL ACTIVITIES:  Ambulated to chair and set up for     ASSESSMENT:     Activity Tolerance:  Patient tolerance of  treatment: good. Discharge Recommendations: Home with Outpatient OT  Equipment Recommendations: Equipment Needed: No (May benefit from [facility] if pt does not DC to subacute/SNF)  Plan: Times per Week: 3-5x  Times per Day: Daily  Current Treatment Recommendations: Balance training, Strengthening, Functional mobility training, Endurance training, Self-Care / ADL, Safety education & training    Patient Education  Patient Education: Role of OT, Plan of Care, ADL's, Hemibody Awareness/Attention, and Importance of Increasing Activity    Goals  Short Term Goals  Time Frame for Short term goals: until DC  Short Term Goal 1: Pt will safely naviagate to/from bathroom utilizing least restrictive device with Supervision to access ADLs.   Short Term Goal 2: Pt will tolerate x3-5 minute dynamic standing task to improve independence and safety with sinkside ADLs. Short Term Goal 3: Pt will improve independence with LB ADLs to CGA using AE as needed. Short Term Goal 4: Pt will demonstrate understanding of A/AROM of B UE in order to reduce effects of immobility to B UE shoulder joints. Following session, patient left in safe position with all fall risk precautions in place.

## 2022-08-01 NOTE — CARE COORDINATION
8/1/22, 7:52 AM EDT    DISCHARGE PLANNING EVALUATION     Patient is now 10 days post covid + test.  Contacting patient and family's preferences for ecf, including [facility], which may still require a negative covid test, and [facility] to determine if these facilities can accept. [address] [address] was unsure about admitting, if patient was going to move facilities within a short time period.

## 2022-08-01 NOTE — CARE COORDINATION
8/1/22, 2:28 PM EDT    DISCHARGE PLANNING EVALUATION   [facility] not [facility] bed, as pt requires an isolation bed at the facility due to covid + status. [facility] is [facility], call made to [facility], no bed available. Wife is in agreement with [facility], if facility can accept.

## 2022-08-01 NOTE — CARE COORDINATION
8/1/22, 1:30 PM EDT    DISCHARGE ON GOING EVALUATION    Brayan Tran day: 7  Location: -25/025-A Reason for admit: General weakness [R53.1]  Generalized weakness [R53.1]  COVID-19 [U07.1]   Procedure:   7/25: MRI brain:  1. No restricted diffusion to suggest acute infarct. 2. Old infarct in the right corona radiata extending to the right basal   ganglia. Old lacunar infarct in the left basal ganglia. 3. Mild atrophy. Nonspecific periventricular and frontoparietal white   matter signal abnormality may relate to chronic small vessel ischemic   changes. Barriers to Discharge: Await acceptance at [facility]. PCP: Mu Pedro DO  Readmission Risk Score: 16.2%  Patient Goals/Plan/Treatment Preferences: Removed from Covid isolation. [facility] unable to accept unless he test negative for covid. LAUR[facility] is also working with [facility].

## 2022-08-01 NOTE — CARE COORDINATION
8/1/22, 1:17 PM EDT    DISCHARGE PLANNING EVALUATION    Received call back from [facility]. Admissions has discussed with administration, and facility will not accept until testing negative for covid. Spoke with wife, who requested referral to [place]. Referral updated to [place], waiting return call.

## 2022-08-01 NOTE — PROGRESS NOTES
Type and Reason for Visit:    Initial, RD Nutrition Re-Screen/LOS (Day 9)     Nutrition Recommendations/Plan:   Continue current diet   Recommend obtaining updated weight as able     Nutrition Assessment:      Pt. At low nutrition risk per RD evaluation. Pt. nutritionally stable AEB good po intake during admit; currently awaiting SNF placement. At risk for further nutrition compromise r/t Admit w/ COVID-19 (out of isolation today 8/1) and weakness and underlying medical condition (Hx; CAD, Arthritis, Diarrhea, HTN, TIA, Stroke). GI Status: BM 7/31  Pertinent Labs: BUN 18, Cr 1.0, Glucose 103  Pertinent Meds: Vitamin C, Plavix, Vitamin D  Malnutrition Assessment:  No Malnutrition      Wounds:    None     Current Nutrition Therapies:    ADULT DIET; Regular; Low Fat/Low Chol/High Fiber/2 gm Na     Anthropometric Measures:  Height:    5' 11\" (180.3 cm)  Current Body Weight:   280 lb (127 kg)  Admission Body Weight:    280 lb (127 kg) (stated)  Usual Body Weight:      (Per EMR 10/13/20 290lbs, 6/14/21 275 lbs, 8/3/21 276 lb 6 oz, 5/18/22 285 lbs)  Ideal Body Weight:     172 lbs   BMI:   39.1  BMI Categories:    Obese Class 2 (BMI 35.0 -39.9)     Nutrition Diagnosis:   No nutrition diagnosis at this time       Nutrition Interventions:   Food and/or Nutrient Delivery:    Continue Current Diet  Nutrition Education/Counseling:    Education not indicated  Coordination of Nutrition Care:   Continue to monitor while inpatient     Nutrition Monitoring and Evaluation:   Will monitor nutritional needs during LOS.        Discharge Planning:    RD following    Lacie Hummel RD:    Contact Number: [phone]

## 2022-08-01 NOTE — PROGRESS NOTES
Pt removed from [facility] isolation per [facility] guidelines as approved by [facility] - [facility].

## 2022-08-01 NOTE — PROGRESS NOTES
Hospitalist Progress Note    Patient:  Samuel Barrett      Unit/Bed:725/025-A    YOB: 1950    MRN: [id number]       Acct: [de-identified]     PCP: Perla Casas DO    Date of Admission: 7/23/2022    Assessment/Plan:    COVID-19 infection   -Currently satting > 90% on room air, which is his baseline. Titrate oxygen as needed to maintain SPO2 greater than 90%. Does not meet requirements for steroid therapy or antiviral therapy at this time   -Chest x-ray negative for acute intrathoracic process   -Continue vitamin D, vitamin C, zinc   - Scheduled mucolytic's and antitussives   -As needed bronchodilators   -Pulmonary toilet: IS, Acapella, cough, deep breathe   - Re-tested after 10 days quarantine, patient still Covid positive 8/1    Generalized weakness secondary to acute COVID-19 infection and physical deconditioning   -PT/OT consulted, appreciate recs: Recommending SNF   -Social work consulted and following for placement    History of stroke in the past   -Reportedly has minimal residual left-sided weakness noted.    -CT head negative for acute intracranial pathology, acute on chronic sinus disease   -CTA head and neck negative for significant hemodynamic stenosis, antegrade flow in the right and left vertebral arteries, atherosclerotic disease noted in aortic arch, cavernous segments of both internal carotid arteries, no evidence of severe stenosis, narrowing in the distal branches of the right middle cerebral artery, antegrade flow in the left middle cerebral artery, antegrade flow in the anterior and posterior cerebral arteries, narrowing in the mid basilar artery   -MRI of brain: No restricted diffusion to suggest acute infarct, old infarct right corona radiata extending to right basal ganglia, old lacunar infarct in left basal ganglia   -NIHSS every shift, every 4 hours neuro checks   -PT/OT consulted and following   -SLP consulted and following   -Neurology consulted: No need for carotid ultrasound at this time. We will sign off. Follow-up outpatient 1 to 2 weeks. Coronary artery disease   - Follows Dr. Sarita Patel  - S/p [facility] 7/9/21 w/ successful PCI to LAD w/ three overlapping drug-eluting stents by Dr. Janiya Sumner   -ECG notable for normal sinus rhythm with artifact baseline, without ischemic changes.  -Continue DAPT, HTCZ, Norvasc, Imdur, ARB   -As needed nitroglycerin    Essential hypertension   -Fairly controlled, does have intermittent hypertensive pressures.   -Continue amlodipine, Imdur, hydrochlorothiazide, ARB    Obesity   -BMI 39.05 kg/m²   -Discussed and educated on lifestyle modifications          Expected discharge date: Pending pre-CERT to nursing home    Disposition:    [] Home       [] TCU       [] Rehab       [] Psych       [x] SNF       [] [place]       [] Other-    Chief Complaint: Difficulty walking    Hospital Course: Per HPI documented 7/23/22: \"Patient is a 42-year-old  male who presents to the ED via EMS from home for difficulty walking that started today  He was diagnosed with COVID yesterday at an outside facility and was started on PAXLOVID  He was unable to feed this morning has been having difficulty ambulating this morning  He denies any fever but reports nonproductive cough no chest pain or palpitation no shortness of breath  Denies any nausea vomiting or diarrhea no abdominal discomfort no dysuria fine no tingling but certainly reports generalized weakness  No evidence of syncope or presyncopal episodes\"    7/27/22: Resumed care patient today. Patient sitting up in bedside recliner, well-appearing, no apparent distress. Remains afebrile, satting 96% on room air, with hemodynamically stable vital signs. No acute events overnight. Patient states he is feeling well this morning. No complaints this AM.  States that he feels that his weakness is improving. Patient is awaiting precertification for nursing home.     7/28/22: Patient sitting up in bedside recliner, well-appearing, no apparent distress. Remains afebrile, satting 92% on room air, with hemodynamically stable vital signs. No acute events overnight per nursing staff. No acute events reported per patient. Patient denies pain. States that he feels like his weakness is still improving. Electrolytes within defined limits, H&H stable, no leukocytosis noted. Awaiting precertification to SNF.    7/29/22: Remains afebrile, satting 94% on RA, with hemodynamically stable vital signs. No acute events overnight per patient. States he still feels as if his weakness is the same, but denies pain and has no new complaints this morning. Per chart review, appears SW is making a referral to [facility]. Waiting placement to SNF.     7/30/22: Remains afebrile, with hemodynamically stable vital signs. No acute events overnight. States that he still feels subjectively weak. No new complaints by the patient. Reports his last bowel movement was this morning. States he is eating and drinking well. Still awaiting precertification to nursing home. Have not gotten response from [place] yet. 7/31/22: Remains afebrile, satting 92% on room air, with hemodynamically stable vital signs. Well-appearing on exam, nontoxic, no apparent distress. No acute events overnight. Patient denies pain. Patient states he still very weak. Patient informed me that his wife and him discussed going to [facility], patient stating he no longer wants to go to [facility] at this time. I informed the patient that I would let social work know first thing tomorrow on Monday to see if he will see home or his second choice of [facility] would accept him after an initial COVID diagnosis but informed the patient that if he did not except we need to find placement for him and that he cannot stay in the hospital for weeks on and waiting for these facilities to accept him.   Patient then stated he would go home if he could not get into facilities of his choosing. Informed patient that I would discuss this with social work to see what I can figure out for placement for him. 8/1/22: Patient resting in bed, nontoxic in appearance, afebrile, with hemodynamically stable vital signs. No acute events overnight. Patient retested to see if he still has COVID-19, his retest came back that he is still COVID-19 positive despite having 10-day quarantine from symptom onset. Patient refusing to go to [facility]. Social work trying to find placement that is agreeable with patient and his wife. [facility] does not have a bed for this patient at this time. [facility] is considering, call made to [facility], unsure of bed availability. Subjective (past 24 hours):     States he still feels weak. He denies pain in his extremities, chest, abdominal pain, nausea, vomiting, diarrhea, fever, chills. Reports he is having regular bowel movements. States he wants to go home. .    Medications:  Reviewed    Infusion Medications    sodium chloride      sodium chloride 75 mL/hr at 07/24/22 1656     Scheduled Medications    guaiFENesin-dextromethorphan  5 mL Oral Q6H    Vitamin D  1,000 Units Oral Daily    ascorbic acid  500 mg Oral Daily    losartan  50 mg Oral Daily    hydroCHLOROthiazide  25 mg Oral Daily    amLODIPine  10 mg Oral Daily    tamsulosin  0.4 mg Oral Nightly    clopidogrel  75 mg Oral Daily    aspirin  81 mg Oral Daily    isosorbide mononitrate  30 mg Oral Daily    sodium chloride flush  10 mL IntraVENous 2 times per day    enoxaparin  30 mg SubCUTAneous BID    potassium chloride  8 mEq Oral Daily with breakfast     PRN Meds: albuterol, nitroGLYCERIN, sodium chloride flush, sodium chloride, ondansetron **OR** ondansetron, polyethylene glycol, acetaminophen **OR** acetaminophen      Intake/Output Summary (Last 24 hours) at 8/1/2022 1427  Last data filed at 8/1/2022 1328  Gross per 24 hour Intake 560 ml   Output --   Net 560 ml         Diet:  ADULT DIET; Regular; Low Fat/Low Chol/High Fiber/2 gm Na    Exam:  BP (!) 141/66   Pulse 70   Temp 97.9 °F (36.6 °C) (Oral)   Resp 18   Ht 5' 11\" (1.803 m)   Wt 280 lb (127 kg)   SpO2 95%   BMI 39.05 kg/m²     General appearance: No apparent distress, appears stated age and cooperative. HEENT: Pupils equal, round, and reactive to light. Conjunctivae/corneas clear. Neck: Supple, with full range of motion. No jugular venous distention. Trachea midline. Respiratory:  Normal respiratory effort, able to speak full clear sentences. Clear to auscultation, bilaterally without Rales/Wheezes/Rhonchi. Cardiovascular: Regular rate and rhythm with normal S1/S2 without murmurs, rubs or gallops. Abdomen: Soft, non-tender, non-distended with normal bowel sounds. Musculoskeletal: passive and active ROM x 4 extremities. 5/5 strength bilateral upper extremities and bilateral lower extremities. Skin: Skin color, texture, turgor normal.  No rashes or lesions. Neurologic:  Neurovascularly intact without any focal sensory/motor deficits. Cranial nerves: II-XII intact, grossly non-focal.  Psychiatric: Alert and oriented, thought content appropriate, normal insight  Capillary Refill: Brisk,< 3 seconds   Peripheral Pulses: +2 palpable, equal bilaterally       Labs:   Recent Labs     07/31/22  0804   WBC 4.6*   HGB 14.7   HCT 44.1        Recent Labs     07/31/22  0804      K 3.6      CO2 25   BUN 18   CREATININE 1.0   CALCIUM 9.0     No results for input(s): AST, ALT, BILIDIR, BILITOT, ALKPHOS in the last 72 hours. No results for input(s): INR in the last 72 hours. No results for input(s): Adonica Hoose in the last 72 hours.     Microbiology:      Urinalysis:      Lab Results   Component Value Date/Time    NITRU Negative 06/29/2021 09:37 AM    WBCUA 10-15 05/16/2020 04:45 AM    BACTERIA NONE SEEN 05/16/2020 04:45 AM    RBCUA 5-10 05/16/2020 04:45 AM    BLOODU Negative 06/29/2021 09:37 AM    BLOODU NEGATIVE 05/16/2020 04:45 AM    SPECGRAV 1.020 09/09/2019 10:28 AM    GLUCOSEU Negative 06/29/2021 09:37 AM       Radiology:  XR CHEST PORTABLE    Result Date: 7/23/2022  PROCEDURE: XR CHEST PORTABLE CLINICAL INFORMATION: Shortness of breath TECHNIQUE: Mobile AP chest radiograph. COMPARISON: Multiple AP chest radiographs 7/23/2020 FINDINGS: Cardiomediastinal silhouette is within normal limits. There are no lung consolidations. Degenerative changes in the thoracic spine are poorly visualized. There is a partially visualized right shoulder prosthesis. Soft tissues are unremarkable. No acute intrathoracic process. **This report has been created using voice recognition software. It may contain minor errors which are inherent in voice recognition technology. ** Final report electronically signed by Dr. Traci Kramer on 7/23/2022 2:13 PM      DVT prophylaxis: [x] Lovenox                                 [] SCDs                                 [] SQ Heparin                                 [] Encourage ambulation           [] Already on Anticoagulation     Code Status: Full Code    PT/OT Eval Status: Per OT note documented 8/1/2022: \"ASSESSMENT:  Activity Tolerance:  Patient tolerance of  treatment: good. Discharge Recommendations: Home with Outpatient OT  Equipment Recommendations: Equipment Needed: No (May benefit from [facility] if pt does not DC to subacute/SNF)  Plan: Times per Week: 3-5x  Times per Day: Daily  Current Treatment Recommendations: Balance training, Strengthening, Functional mobility training, Endurance training, Self-Care / ADL, Safety education & training\"    Per PT note documented 7/28/2022: \"ASSESSMENT:  Assessment: Patient progressing toward established goals. Pt cont to demonstrate generalized weakness and decreased endurance. He did score a 15/28 which was an improvement from eval however still putting him at a high fall risk.  Pt would benefit from cont skilled therapy   Activity Tolerance:  Patient tolerance of  treatment: fair.        Equipment Recommendations:Equipment Needed: No (pt has a RW)  Discharge Recommendations: Continue to assess pending progress and SNF vs home with 24 hour assist and home health PT   Plan: Current Treatment Recommendations: Strengthening, Balance training, Functional mobility training, Transfer training, Endurance training, Neuromuscular re-education, Stair training, Gait training, Cognitive reorientation, Home exercise program, Safety education & training, Patient/Caregiver education & training, Therapeutic activities, Equipment evaluation, education, & procurement  Plan:  (5x C)\"        Tele:   [] yes             [x] no    Active Hospital Problems    Diagnosis Date Noted    Generalized weakness [R53.1] 07/23/2022     Priority: Medium       Electronically signed by MAGDA Suazo CNP on 8/1/2022 at 2:27 PM

## 2022-08-01 NOTE — CARE COORDINATION
8/1/22, 8:26 AM EDT    DISCHARGE PLANNING EVALUATION    Spoke with [facility]. Facility can accept IF patient has a negative covid test today. Requesting covid test today to determine which facilities might be able to accept.

## 2022-08-02 VITALS
WEIGHT: 280 LBS | SYSTOLIC BLOOD PRESSURE: 131 MMHG | OXYGEN SATURATION: 91 % | BODY MASS INDEX: 39.2 KG/M2 | TEMPERATURE: 97.9 F | RESPIRATION RATE: 18 BRPM | HEIGHT: 71 IN | DIASTOLIC BLOOD PRESSURE: 67 MMHG | HEART RATE: 65 BPM

## 2022-08-02 LAB
ANION GAP SERPL CALCULATED.3IONS-SCNC: 11 MEQ/L (ref 8–16)
BASOPHILS # BLD: 0.7 %
BASOPHILS ABSOLUTE: 0 THOU/MM3 (ref 0–0.1)
BUN BLDV-MCNC: 20 MG/DL (ref 7–22)
CALCIUM SERPL-MCNC: 9.1 MG/DL (ref 8.5–10.5)
CHLORIDE BLD-SCNC: 100 MEQ/L (ref 98–111)
CO2: 30 MEQ/L (ref 23–33)
CREAT SERPL-MCNC: 1.2 MG/DL (ref 0.4–1.2)
EOSINOPHIL # BLD: 5.9 %
EOSINOPHILS ABSOLUTE: 0.2 THOU/MM3 (ref 0–0.4)
ERYTHROCYTE [DISTWIDTH] IN BLOOD BY AUTOMATED COUNT: 13.7 % (ref 11.5–14.5)
ERYTHROCYTE [DISTWIDTH] IN BLOOD BY AUTOMATED COUNT: 42.5 FL (ref 35–45)
GFR SERPL CREATININE-BSD FRML MDRD: 59 ML/MIN/1.73M2
GLUCOSE BLD-MCNC: 106 MG/DL (ref 70–108)
HCT VFR BLD CALC: 42.6 % (ref 42–52)
HEMOGLOBIN: 14.1 GM/DL (ref 14–18)
IMMATURE GRANS (ABS): 0.01 THOU/MM3 (ref 0–0.07)
IMMATURE GRANULOCYTES: 0.2 %
LYMPHOCYTES # BLD: 32.4 %
LYMPHOCYTES ABSOLUTE: 1.3 THOU/MM3 (ref 1–4.8)
MCH RBC QN AUTO: 28.2 PG (ref 26–33)
MCHC RBC AUTO-ENTMCNC: 33.1 GM/DL (ref 32.2–35.5)
MCV RBC AUTO: 85.2 FL (ref 80–94)
MONOCYTES # BLD: 17 %
MONOCYTES ABSOLUTE: 0.7 THOU/MM3 (ref 0.4–1.3)
NUCLEATED RED BLOOD CELLS: 0 /100 WBC
PLATELET # BLD: 226 THOU/MM3 (ref 130–400)
PMV BLD AUTO: 10.7 FL (ref 9.4–12.4)
POTASSIUM REFLEX MAGNESIUM: 3.6 MEQ/L (ref 3.5–5.2)
RBC # BLD: 5 MILL/MM3 (ref 4.7–6.1)
SEG NEUTROPHILS: 43.8 %
SEGMENTED NEUTROPHILS ABSOLUTE COUNT: 1.8 THOU/MM3 (ref 1.8–7.7)
SODIUM BLD-SCNC: 141 MEQ/L (ref 135–145)
WBC # BLD: 4.1 THOU/MM3 (ref 4.8–10.8)

## 2022-08-02 PROCEDURE — 6360000002 HC RX W HCPCS: Performed by: INTERNAL MEDICINE

## 2022-08-02 PROCEDURE — 80048 BASIC METABOLIC PNL TOTAL CA: CPT

## 2022-08-02 PROCEDURE — 85025 COMPLETE CBC W/AUTO DIFF WBC: CPT

## 2022-08-02 PROCEDURE — 6370000000 HC RX 637 (ALT 250 FOR IP): Performed by: INTERNAL MEDICINE

## 2022-08-02 PROCEDURE — 99239 HOSP IP/OBS DSCHRG MGMT >30: CPT

## 2022-08-02 PROCEDURE — 2580000003 HC RX 258: Performed by: INTERNAL MEDICINE

## 2022-08-02 PROCEDURE — 36415 COLL VENOUS BLD VENIPUNCTURE: CPT

## 2022-08-02 PROCEDURE — 6370000000 HC RX 637 (ALT 250 FOR IP)

## 2022-08-02 RX ORDER — CLOPIDOGREL BISULFATE 75 MG/1
75 TABLET ORAL DAILY
DISCHARGE
Start: 2022-08-02

## 2022-08-02 RX ORDER — AMLODIPINE BESYLATE 10 MG/1
10 TABLET ORAL DAILY
Qty: 30 TABLET | Refills: 3 | DISCHARGE
Start: 2022-08-03

## 2022-08-02 RX ORDER — GUAIFENESIN/DEXTROMETHORPHAN 100-10MG/5
5 SYRUP ORAL EVERY 6 HOURS PRN
Qty: 120 ML | DISCHARGE
Start: 2022-08-02 | End: 2022-08-12

## 2022-08-02 RX ORDER — HYDROCHLOROTHIAZIDE 12.5 MG/1
25 CAPSULE, GELATIN COATED ORAL DAILY
Qty: 30 CAPSULE | Refills: 3 | DISCHARGE
Start: 2022-08-03

## 2022-08-02 RX ADMIN — ASPIRIN 81 MG: 81 TABLET, CHEWABLE ORAL at 08:44

## 2022-08-02 RX ADMIN — OXYCODONE HYDROCHLORIDE AND ACETAMINOPHEN 500 MG: 500 TABLET ORAL at 08:44

## 2022-08-02 RX ADMIN — POTASSIUM CHLORIDE 8 MEQ: 600 TABLET, FILM COATED, EXTENDED RELEASE ORAL at 08:44

## 2022-08-02 RX ADMIN — CLOPIDOGREL BISULFATE 75 MG: 75 TABLET, FILM COATED ORAL at 09:22

## 2022-08-02 RX ADMIN — HYDROCHLOROTHIAZIDE 25 MG: 12.5 CAPSULE, GELATIN COATED ORAL at 09:23

## 2022-08-02 RX ADMIN — ENOXAPARIN SODIUM 30 MG: 100 INJECTION SUBCUTANEOUS at 08:45

## 2022-08-02 RX ADMIN — SODIUM CHLORIDE, PRESERVATIVE FREE 10 ML: 5 INJECTION INTRAVENOUS at 08:45

## 2022-08-02 RX ADMIN — ISOSORBIDE MONONITRATE 30 MG: 30 TABLET, EXTENDED RELEASE ORAL at 09:24

## 2022-08-02 RX ADMIN — LOSARTAN POTASSIUM 50 MG: 50 TABLET, FILM COATED ORAL at 08:44

## 2022-08-02 RX ADMIN — Medication 1000 UNITS: at 08:44

## 2022-08-02 RX ADMIN — AMLODIPINE BESYLATE 10 MG: 10 TABLET ORAL at 08:45

## 2022-08-02 ASSESSMENT — PAIN SCALES - GENERAL
PAINLEVEL_OUTOF10: 0
PAINLEVEL_OUTOF10: 0

## 2022-08-02 NOTE — FLOWSHEET NOTE
08/02/22 1240   Encounter Summary   Encounter Overview/Reason  Spiritual/Emotional Needs   Service Provided For: Patient   Referral/Consult From: Pete   Last Encounter  08/02/22   Complexity of Encounter Moderate   Begin Time 1230   End Time  1240   Total Time Calculated 10 min   Encounter    Type Initial Screen/Assessment   Spiritual/Emotional needs   Type Spiritual Support   Assessment/Intervention/Outcome   Assessment Calm   Intervention Nurtured Hope   Outcome Comfort   Assessment: In my encounter with the 74 yr old patient, while rounding  the unit 7K, I provided spiritual care to patient through conversation, I also came to assess the patient's spiritual needs present. The pt was admitted due to generalized weakness. Interventions:  I provided prayer, emotional support and words of comfort.  provided a listening presence and encouraged pt to share their beliefs and how they support him during their hospitalization. Outcomes: The patient was encouraged and didn't share any further spiritual needs at this time. Plan:  Chaplains will follow-up at a later time for assessment of any spiritual care needs present.

## 2022-08-02 NOTE — DISCHARGE INSTR - COC
Continuity of Care Form    Patient Name: Samuel Barrett   :    MRN:  [id number]    Admit date:  2022  Discharge date:  2022    Code Status Order: Full Code   Advance Directives:     Admitting Physician:  Tamiko Ojeda MD  PCP: Perla Casas DO    Discharging Nurse: [facility] Unit/Room#: 7K-25/025-A  Discharging Unit Phone Number: [phone]    Emergency Contact:   Extended Emergency Contact Information  Primary Emergency Contact: [facility]  Address: [address] [place] [address] Phone: [phone]  Mobile Phone: [phone]  Relation: Spouse    Past Surgical History:  Past Surgical History:   Procedure Laterality Date    BACK SURGERY  2022    C5-6    CHOLECYSTECTOMY  2017    CORONARY ANGIOPLASTY WITH STENT PLACEMENT      CYST INCISION AND DRAINAGE      CYSTOSCOPY N/A 2019    CYSTOSCOPY, WITH  URETHRAL DILATION performed by Ursula Cranker, MD at [address] [place] [address] 2020    CYSTOSCOPY WITH BLADDER BOTOX performed by Ursula Cranker, MD at [facility]    ERCP  2017    ROTATOR CUFF REPAIR Left 2013    right 2019    SHOULDER SURGERY Right 2020    SHOULDER SURGERY  2020    frozen shoulder, repair nerve elbow and palm-dr eduardo     TURP N/A 2019    TRANSURETHRAL RESECTION PROSTATE performed by Jadyn Cutler MD at [facility]       Immunization History:   Immunization History   Administered Date(s) Administered    Zoster Recombinant (Shingrix) 2019, 2019       Active Problems:  Patient Active Problem List   Diagnosis Code    Right basal ganglia embolic stroke ([address] D91.4    BPH with urinary obstruction N40.1, N13.8    Propionibacterium infection A49.8    Infection of prosthetic shoulder joint ([address] T84.59XA, Z96.619    Cardiac pacemaker Z95.0    CAD in native artery I25.10    Angina of effort ([address] I20.8    Chest pain R07.9    Generalized weakness R53.1 Oral  Liquids: Thin Liquids  Daily Fluid Restriction: no  Last Modified Barium Swallow with Video (Video Swallowing Test): not done    Treatments at the Time of Hospital Discharge:   Respiratory Treatments: n/a  Oxygen Therapy:  is not on home oxygen therapy. Ventilator:    - No ventilator support    Rehab Therapies: Physical Therapy, Occupational Therapy, and Speech/Language Therapy  Weight Bearing Status/Restrictions: No weight bearing restrictions  Other Medical Equipment (for information only, NOT a DME order):  walker  Other Treatments: n/a    Patient's personal belongings (please select all that are sent with patient):  None    RN SIGNATURE:  Electronically signed by Jamar Duke LPN on 0/8/70 at 46:13 AM EDT    CASE MANAGEMENT/SOCIAL WORK SECTION    Inpatient Status Date: 07/25/2022    Readmission Risk Assessment Score:  Readmission Risk              Risk of Unplanned Readmission:  [id number]           Discharging to Facility/ Agency   Name: [facility]  Address: [place] [address]  Phone: [phone]  Fax: [phone]    Dialysis Facility (if applicable)   Name:  Address:  Dialysis Schedule:  Phone:  Fax:    / signature: Electronically signed by Evelina Peabody, LSW on 8/2/22 at 8:20 AM EDT    PHYSICIAN SECTION    Prognosis: Fair    Condition at Discharge: Stable    Rehab Potential (if transferring to Rehab): Fair    Recommended Labs or Other Treatments After Discharge: Please have patient continue to take amlodipine 10 mg daily for his high blood pressure. He will need to follow up with his PCP within 2 weeks for reevaluation of his hypertension. He will need to follow up with neurology in 1-2 weeks. Please have him continue his incentive spirometer and acapella device a minimum of 10 times every hour to help exercise his lungs.      Physician Certification: I certify the above information and transfer of Mario Adhikari  is necessary for the continuing treatment of the diagnosis listed and that he requires [place] for greater 30 days.      Update Admission H&P: No change in H&P    PHYSICIAN SIGNATURE:  Electronically signed by MAGDA Vaughan CNP on 8/2/22 at 1:22 PM EDT

## 2022-08-02 NOTE — PROGRESS NOTES
Patient discharged to [facility]. Patient left with all belongings, blue folder, and AVS. Report called to Ramila Dickson.

## 2022-08-02 NOTE — CARE COORDINATION
8/2/22, 8:18 AM EDT    DISCHARGE PLANNING EVALUATION    [facility]. Spoke with wife, who is in agreement. Transport scheduled for 3:00.      8/2/22, 1:38 PM EDT    Patient goals/plan/ treatment preferences discussed by  and . Patient goals/plan/ treatment preferences reviewed with patient/ family. Patient/ family verbalize understanding of discharge plan and are in agreement with goal/plan/treatment preferences. Understanding was demonstrated using the teach back method. AVS provided by RN at time of discharge, which includes all necessary medical information pertaining to the patients current course of illness, treatment, post-discharge goals of care, and treatment preferences.      Services At/After Discharge: [facility] (SNF) and In ambulance       IMM Letter  IMM Letter given to Patient/Family/Significant other/Guardian/POA/by[de-identified] Sunni DUNN, CM  IMM Letter date given[de-identified] 08/01/22  IMM Letter time given[de-identified] 1400  Observation Status Letter date given[de-identified] 07/23/22  Observation Status Letter time given[de-identified] 7615  Observation Status Letter given to Patient/Family/Significant other/Guardian/POA/by[de-identified] Staff

## 2022-08-02 NOTE — DISCHARGE SUMMARY
Hospital Medicine Discharge Summary      Patient Identification:   Magalys Galvin   :   MRN: [id number]   Account: [de-identified]      Patient's PCP: Darcy Simmons DO    Admit Date: 2022     Discharge Date:   22     Admitting Physician: Chip Gavin MD     Discharging Nurse Practitioner: MAGDA Nunez - CNP     Discharge Diagnoses with Assessment/Plan:    COVID-19 infection              -Currently satting > 90% on room air, which is his baseline. Titrate oxygen as needed to maintain SPO2 greater than 90%. Does not meet requirements for steroid therapy or antiviral therapy at this time              -Chest x-ray negative for acute intrathoracic process              -Continue vitamin D, vitamin C, zinc              - Scheduled mucolytic's and antitussives              -As needed bronchodilators              -Pulmonary toilet: IS, Acapella, cough, deep breathe              - Re-tested after 10 days quarantine, patient still Covid positive      Generalized weakness secondary to acute COVID-19 infection and physical deconditioning              -PT/OT consulted, appreciate recs: Going to [address] [place] [address]     History of stroke in the past              -Reportedly has minimal residual left-sided weakness noted.               -CT head negative for acute intracranial pathology, acute on chronic sinus disease              -CTA head and neck negative for significant hemodynamic stenosis, antegrade flow in the right and left vertebral arteries, atherosclerotic disease noted in aortic arch, cavernous segments of both internal carotid arteries, no evidence of severe stenosis, narrowing in the distal branches of the right middle cerebral artery, antegrade flow in the left middle cerebral artery, antegrade flow in the anterior and posterior cerebral arteries, narrowing in the mid basilar artery              -MRI of brain: No restricted diffusion to suggest acute infarct, old infarct right corona radiata extending to right basal ganglia, old lacunar infarct in left basal ganglia              -NIHSS every shift, every 4 hours neuro checks              -PT/OT consulted and following              -SLP consulted and following              -Neurology consulted: No need for carotid ultrasound at this time. We will sign off. Follow-up outpatient 1 to 2 weeks     Coronary artery disease              - Follows Dr. Yuli Hanna  - S/p [facility] 7/9/21 w/ successful PCI to LAD w/ three overlapping drug-eluting stents by Dr. Rico Wellington              -ECG notable for normal sinus rhythm with artifact baseline, without ischemic changes.  -Continue DAPT, HTCZ, Norvasc, Imdur, ARB              -As needed nitroglycerin     Essential hypertension              -Many hypertensive BP's throughout hospital stay. Was started on amlodipine which has helped achieve better control of his BP's              -Continue amlodipine, Imdur, hydrochlorothiazide, ARB   - F/u with PCP in 1-2 weeks for further management of HTN     Obesity              -BMI 39.05 kg/m²              -Discussed and educated on lifestyle modifications      The patient was seen and examined on day of discharge and this discharge summary is in conjunction with any daily progress note from day of discharge. Hospital Course:   Per HPI documented 7/23/22: \"Patient is a 73-year-old  male who presents to the ED via EMS from home for difficulty walking that started today  He was diagnosed with COVID yesterday at an outside facility and was started on PAXLOVID  He was unable to feed this morning has been having difficulty ambulating this morning  He denies any fever but reports nonproductive cough no chest pain or palpitation no shortness of breath  Denies any nausea vomiting or diarrhea no abdominal discomfort no dysuria fine no tingling but certainly reports generalized weakness  No evidence of syncope or presyncopal episodes\"     7/27/22: Resumed care patient today. Patient sitting up in bedside recliner, well-appearing, no apparent distress. Remains afebrile, satting 96% on room air, with hemodynamically stable vital signs. No acute events overnight. Patient states he is feeling well this morning. No complaints this AM.  States that he feels that his weakness is improving. Patient is awaiting precertification for nursing home. 7/28/22: Patient sitting up in bedside recliner, well-appearing, no apparent distress. Remains afebrile, satting 92% on room air, with hemodynamically stable vital signs. No acute events overnight per nursing staff. No acute events reported per patient. Patient denies pain. States that he feels like his weakness is still improving. Electrolytes within defined limits, H&H stable, no leukocytosis noted. Awaiting precertification to SNF.     7/29/22: Remains afebrile, satting 94% on RA, with hemodynamically stable vital signs. No acute events overnight per patient. States he still feels as if his weakness is the same, but denies pain and has no new complaints this morning. Per chart review, appears SW is making a referral to [facility]. Waiting placement to SNF.     7/30/22: Remains afebrile, with hemodynamically stable vital signs. No acute events overnight. States that he still feels subjectively weak. No new complaints by the patient. Reports his last bowel movement was this morning. States he is eating and drinking well. Still awaiting precertification to nursing home. Have not gotten response from [facility] yet. 7/31/22: Remains afebrile, satting 92% on room air, with hemodynamically stable vital signs. Well-appearing on exam, nontoxic, no apparent distress. No acute events overnight. Patient denies pain. Patient states he still very weak. Patient informed me that his wife and him discussed going to [facility], patient stating he no longer wants to go to [facility] at this time.   I informed the patient that I would let social work know first thing tomorrow on Monday to see if he will see home or his second choice of [facility] would accept him after an initial COVID diagnosis but informed the patient that if he did not except we need to find placement for him and that he cannot stay in the hospital for weeks on and waiting for these facilities to accept him. Patient then stated he would go home if he could not get into facilities of his choosing. Informed patient that I would discuss this with social work to see what I can figure out for placement for him. 8/1/22: Patient resting in bed, nontoxic in appearance, afebrile, with hemodynamically stable vital signs. No acute events overnight. Patient retested to see if he still has COVID-19, his retest came back that he is still COVID-19 positive despite having 10-day quarantine from symptom onset. Patient refusing to go to [facility]. Social work trying to find placement that is agreeable with patient and his wife. [facility] not [facility] bed for this patient at this time. [facility] is considering, call made to [facility], unsure of bed availability. 8/2/22: Patient sitting up in bed, well-appearing, no apparent distress. Remains afebrile, satting 96% on room air, with hemodynamically stable vital signs. No acute events overnight. He denies chest pains, shortness of breath at rest, BERGERON, abdominal pain, nausea, vomiting, diarrhea, fever, chills. Reports he is having regular bowel movements, eating all of his meals and drinking plenty of fluids. States he still feels subjective weakness. Precertification went through for the [address]. Patient instructed that he needs to follow-up with his PCP within 1 to 2 weeks upon discharge for further evaluation and management of his blood pressure as patient was noted to have multiple hypertensive blood pressures.   I did start him on amlodipine 5 mg and then titrated him up to 10 mg. Patient has adequate control with his blood pressures at this time. Patient also instructed that he should follow-up with neurology services in 1 to 2 weeks or as needed. At this time patient is stable for discharge. Exam:     Vitals:  Vitals:    08/01/22 2056 08/02/22 0444 08/02/22 0830 08/02/22 1055   BP: (!) 145/91 (!) 152/81 (!) 140/63 131/67   Pulse: 82 65 58 65   Resp: 18 19 18 18   Temp: 98.1 °F (36.7 °C) 97.7 °F (36.5 °C) 98 °F (36.7 °C) 97.9 °F (36.6 °C)   TempSrc: Oral Oral Oral Oral   SpO2: 93% 96% 92% 91%   Weight:       Height:         Weight: Weight: 280 lb (127 kg)     24 hour intake/output:  Intake/Output Summary (Last 24 hours) at 8/2/2022 1202  Last data filed at 8/2/2022 0825  Gross per 24 hour   Intake 1160 ml   Output --   Net 1160 ml         General appearance: No apparent distress, appears stated age and cooperative. HEENT: Pupils equal, round, and reactive to light. Conjunctivae/corneas clear. Neck: Supple, with full range of motion. No jugular venous distention. Trachea midline. Respiratory:  Normal respiratory effort, able to speak full clear sentences. Clear to auscultation, bilaterally without Rales/Wheezes/Rhonchi. Cardiovascular: Regular rate and rhythm with normal S1/S2 without murmurs, rubs or gallops. Abdomen: Soft, non-tender, non-distended with normal bowel sounds. Musculoskeletal: passive and active ROM x 4 extremities. 5/5 strength bilateral upper extremities and bilateral lower extremities. Skin: Skin color, texture, turgor normal.  No rashes or lesions. Neurologic:  Neurovascularly intact without any focal sensory/motor deficits. Cranial nerves: II-XII intact, grossly non-focal.  Psychiatric: Alert and oriented, thought content appropriate, normal insight  Capillary Refill: Brisk,< 3 seconds  Peripheral Pulses: +2 palpable, equal bilaterally      Labs:  For convenience and continuity at follow-up the following most recent labs are provided:      CBC:    Lab Results   Component Value Date/Time    WBC 4.1 08/02/2022 07:30 AM    HGB 14.1 08/02/2022 07:30 AM    HCT 42.6 08/02/2022 07:30 AM     08/02/2022 07:30 AM       Renal:    Lab Results   Component Value Date/Time     08/02/2022 07:30 AM    K 3.6 08/02/2022 07:30 AM     08/02/2022 07:30 AM    CO2 30 08/02/2022 07:30 AM    BUN 20 08/02/2022 07:30 AM    CREATININE 1.2 08/02/2022 07:30 AM    CALCIUM 9.1 08/02/2022 07:30 AM       Cardiac: No results for input(s): CKTOTAL, TROPONINT in the last 72 hours. Significant Diagnostic Studies    Radiology:   MRI BRAIN WO CONTRAST   Final Result   1. No restricted diffusion to suggest acute infarct. 2. Old infarct in the right corona radiata extending to the right basal    ganglia. Old lacunar infarct in the left basal ganglia. 3. Mild atrophy. Nonspecific periventricular and frontoparietal white    matter signal abnormality may relate to chronic small vessel ischemic    changes. This document has been electronically signed by: Elgin Ramos MD on    07/25/2022 07:25 PM      CTA HEAD W WO CONTRAST (CODE STROKE)   Final Result       1. No significant hemodynamic stenosis in the right and left common and internal carotid arteries. 2. Antegrade flow in the right and left vertebral arteries. 3. Atherosclerotic calcification aortic arch. 4. Atherosclerotic calcification in the cavernous segments of both internal carotid arteries. No evidence of severe stenosis. 5. Narrowing in the distal branches of the right middle cerebral artery. There is antegrade flow in the left middle cerebral artery. 6. There is antegrade flow in the anterior and posterior cerebral arteries. 7. There is narrowing in the mid basilar artery. 8. Areas of abnormal attenuation in the left and right basal ganglia and in the white matter    9. Diffusion MRI scan may be helpful for better evaluation    10.  Nurse Tal Hernandez on the floor notified  of these results at 1:45 PM.         **This report has been created using voice recognition software. It may contain minor errors which are inherent in voice recognition technology. **      Final report electronically signed by DR Edgard Georges on 7/25/2022 2:03 PM      CTA NECK W WO CONTRAST (CODE STROKE)   Final Result       1. No significant hemodynamic stenosis in the right and left common and internal carotid arteries. 2. Antegrade flow in the right and left vertebral arteries. 3. Atherosclerotic calcification aortic arch. 4. Atherosclerotic calcification in the cavernous segments of both internal carotid arteries. No evidence of severe stenosis. 5. Narrowing in the distal branches of the right middle cerebral artery. There is antegrade flow in the left middle cerebral artery. 6. There is antegrade flow in the anterior and posterior cerebral arteries. 7. There is narrowing in the mid basilar artery. 8. Areas of abnormal attenuation in the left and right basal ganglia and in the white matter    9. Diffusion MRI scan may be helpful for better evaluation    10. Nurse Sarah Galan on the floor notified  of these results at 1:45 PM.         **This report has been created using voice recognition software. It may contain minor errors which are inherent in voice recognition technology. **      Final report electronically signed by DR Edgard Georges on 7/25/2022 2:03 PM      CT HEAD WO CONTRAST (CODE STROKE)   Final Result   1. No acute intracranial pathology   2. Acute and chronic sinus disease. Information called to Sriniv[facility] CHU at 12:54 PM      **This report has been created using voice recognition software. It may contain minor errors which are inherent in voice recognition technology. **      Final report electronically signed by Dr. Jyoti Snyder on 7/25/2022 12:59 PM      XR CHEST PORTABLE   Final Result      No acute intrathoracic process. **This report has been created using voice recognition software.  It may contain minor errors which are inherent in voice recognition technology. **      Final report electronically signed by Dr. Shantal Thurman on 7/23/2022 2:13 PM             Consults:     IP CONSULT TO NEUROLOGY  IP CONSULT TO SOCIAL WORK    Disposition:    [] Home       [] TCU       [] Rehab       [] Psych       [x] SNF       [] [facility]       [] Other-    Condition at Discharge: Stable    Code Status:  Full Code     Pending tests at discharge:     Patient Instructions:    Discharge lab work: None  Activity: activity as tolerated  Diet: ADULT DIET; Regular; Low Fat/Low Chol/High Fiber/2 gm Na      Follow-up visits:   No follow-up provider specified. Discharge Medications:        Medication List        START taking these medications      amLODIPine 10 MG tablet  Commonly known as: NORVASC  Take 1 tablet by mouth in the morning. Start taking on: August 3, 2022     guaiFENesin-dextromethorphan 100-10 MG/5ML syrup  Commonly known as: ROBITUSSIN DM  Take 5 mLs by mouth every 6 hours as needed for Cough     hydroCHLOROthiazide 12.5 MG capsule  Commonly known as: MICROZIDE  Take 2 capsules by mouth in the morning. Start taking on: August 3, 2022  Replaces: hydroCHLOROthiazide 12.5 MG tablet            CONTINUE taking these medications      aspirin 81 MG chewable tablet  Take 1 tablet by mouth daily     clopidogrel 75 MG tablet  Commonly known as: Plavix  Take 1 tablet by mouth in the morning. isosorbide mononitrate 30 MG extended release tablet  Commonly known as: IMDUR  Take 1 tablet by mouth daily     losartan 100 MG tablet  Commonly known as: COZAAR     nitroGLYCERIN 0.4 MG SL tablet  Commonly known as: NITROSTAT  up to max of 3 total doses. If no relief after 1 dose, call 911.      Potassium 99 MG Tabs     PRESERVISION AREDS PO     tamsulosin 0.4 MG capsule  Commonly known as: FLOMAX     vitamin C 500 MG tablet  Commonly known as: ASCORBIC ACID            STOP taking these medications hydroCHLOROthiazide 12.5 MG tablet  Commonly known as: HYDRODIURIL  Replaced by: hydroCHLOROthiazide 12.5 MG capsule               Where to Get Your Medications        Information about where to get these medications is not yet available    Ask your nurse or doctor about these medications  amLODIPine 10 MG tablet  clopidogrel 75 MG tablet  guaiFENesin-dextromethorphan 100-10 MG/5ML syrup  hydroCHLOROthiazide 12.5 MG capsule         Time Spent on discharge is more than 1 hour in the examination, evaluation, counseling and review of medications and discharge plan. Signed: Thank you Lesley Mccloud DO for the opportunity to be involved in this patient's care.     Electronically signed by MAGDA Jeffery CNP on 8/2/2022 at 12:02 PM

## 2022-09-27 ENCOUNTER — INITIAL CONSULT (OUTPATIENT)
Dept: NEUROLOGY | Age: 72
End: 2022-09-27
Payer: MEDICARE

## 2022-09-27 VITALS
SYSTOLIC BLOOD PRESSURE: 132 MMHG | DIASTOLIC BLOOD PRESSURE: 62 MMHG | BODY MASS INDEX: 39.2 KG/M2 | OXYGEN SATURATION: 97 % | HEIGHT: 71 IN | HEART RATE: 72 BPM | WEIGHT: 280 LBS

## 2022-09-27 DIAGNOSIS — R53.1 WEAKNESS: ICD-10-CM

## 2022-09-27 DIAGNOSIS — R29.898 BILATERAL LEG WEAKNESS: Primary | ICD-10-CM

## 2022-09-27 PROCEDURE — 1036F TOBACCO NON-USER: CPT | Performed by: PSYCHIATRY & NEUROLOGY

## 2022-09-27 PROCEDURE — 99205 OFFICE O/P NEW HI 60 MIN: CPT | Performed by: PSYCHIATRY & NEUROLOGY

## 2022-09-27 PROCEDURE — G8417 CALC BMI ABV UP PARAM F/U: HCPCS | Performed by: PSYCHIATRY & NEUROLOGY

## 2022-09-27 PROCEDURE — 1123F ACP DISCUSS/DSCN MKR DOCD: CPT | Performed by: PSYCHIATRY & NEUROLOGY

## 2022-09-27 PROCEDURE — G8427 DOCREV CUR MEDS BY ELIG CLIN: HCPCS | Performed by: PSYCHIATRY & NEUROLOGY

## 2022-09-27 PROCEDURE — 3017F COLORECTAL CA SCREEN DOC REV: CPT | Performed by: PSYCHIATRY & NEUROLOGY

## 2022-09-27 NOTE — LETTER
[id number]  [facility] [address] [place] [id number]  Phone: [phone]  Fax: [phone]           Blayne Torres MD      September 28, 2022     Patient: Bill Banks   MR Number: [id number]   YOB: 1950   Date of Visit: 9/27/2022       Dear Dr. Edna Cuello: Thank you for referring Katheryn Castleman to me for evaluation/treatment. Below are the relevant portions of my assessment and plan of care. If you have questions, please do not hesitate to call me. I look forward to following Zacarias Crystal along with you.     Sincerely,        Blayne Torres MD    CC providers:  MAGDA Bonilla - CNP  [address] [address]  [place] [place]

## 2022-09-27 NOTE — PATIENT INSTRUCTIONS
MRI lumbar spine WO contrast  Vitamin B12, folate  Vitamin B6 level  CK   Aldolase  Sed rate  Follow through with physical therapy recommendations  Call with any new symptoms or concerns. Follow up in 1 month.

## 2022-09-29 NOTE — PROGRESS NOTES
Chief Complaint   Patient presents with    Consultation     NP generalized weakness         Lory Oh is a 74 y.o. male who presents today for evaluation of bilateral leg weakness for many years that has progressively gotten worse since having Covid in July 2022. He has had previous history of stroke that affected his left side and walking for the past 10 years. No recent falls. He is using a walker for the past 2 months. He is getting physical therapy. MRI brain done 7/25/22 showed No restricted diffusion to suggest acute infarct. Old infarct in the right corona radiata extending to the right basal ganglia. Old lacunar infarct in the left basal ganglia. Mild atrophy. Nonspecific periventricular and frontoparietal white matter signal abnormality may relate to chronic small vessel ischemic changes. He had neck surgery in May 2022. He denies chest pain. No shortness of breath, no neck pain. No vision changes. No dysphagia. No fever. No rash. No weight loss. History provided by patient and his wife. Past Medical History:   Diagnosis Date    Arthritis     Back pain     CAD in native artery 6/14/2021    Diarrhea     Hypertension     Infection of prosthetic shoulder joint ([facility] [address] 7/24/2020    PFO (patent foramen ovale) 8/2012    noted on ROSITA following stroke    Propionibacterium infection 7/24/2020    Rotator cuff injury     Stroke [facility])     August 19/2012    TIA (transient ischemic attack) 05/2018       Patient Active Problem List   Diagnosis    Right basal ganglia embolic stroke (HCC)    BPH with urinary obstruction    Propionibacterium infection    Infection of prosthetic shoulder joint (HCC)    Cardiac pacemaker    CAD in native artery    Angina of effort [facility])    Chest pain    Generalized weakness       Allergies   Allergen Reactions    Adhesive Tape Rash       Current Outpatient Medications   Medication Sig Dispense Refill    clopidogrel (PLAVIX) 75 MG tablet Take 1 tablet by mouth in the morning. Level of Alertness: awake  Orientation: person, place, time  Memory: normal  Fund of Knowledge: normal  Attention/Concentration: normal  Language: normal. Mood is normal.   Neck - supple, no significant adenopathy, carotids upstroke normal bilaterally. There is no axillary lymphadenopathy. There is no carotid bruit. No neck lymphadenopathy. No thyroid enlargement   Neurological -   Cranial Hjqkdx-AM-KHF:.   Cranial nerve II: Normal   Cranial nerve III: Pupils: equal, round, reactive to light  Cranial nerves III, IV, VI: Extraocular Movements: intact   Cranial nerve V: Facial sensation: intact   Cranial nerve VII:Facial strength: intact   Cranial nerve VIII: Hearing: intact   Cranial nerve IX: Palate Elevation intact bilaterally  Cranial nerve XI: Shoulder shrug intact bilaterally  Cranial nerve XII: Tongue midline   neck supple without rigidity, there is  limitation of range of motion of the neck bilaterally. DTR's are  decreased distal and symmetric  Babinski sign negative   Motor exam is 5/5 in the upper and lower extremities, Normal muscle tone . There is no muscle atrophy. Sensory is intact for light touch . Coordination: finger to nose, intact  Gait and station guarded, uses walker  Abnormal movement none, vibration reduced distally  Skin - warm, dry to touch, normal coloration, no rashes, no suspicious skin lesions  Superficial temporal artery pulses are normal.   There is no limitation of range of motion of the neck. There is no resting tremor, no pin rolling, no bradykinesia, no Hypohonia, normal blink rate. Musculoskeletal: Has no hand arthritis, no limitation of ROM in any of the four extremities. There is no leg edema. The Heart was regular in rate and rhythm. No heart murmur  Chest Clear, with  good effort. Abdomen soft, intact bowel sounds.          Results for orders placed during the hospital encounter of 04/13/22    MRI CERVICAL SPINE WO CONTRAST    Narrative  PROCEDURE: MRI CERVICAL SPINE WO CONTRAST    CLINICAL INFORMATION: Degeneration of intervertebral disc of mid-cervical region, unspecified spinal level, Brachial neuritis, Spinal stenosis in cervical region . COMPARISON: Plain radiographs dated 29 April 2020. Gladys Raddle TECHNIQUE: Sagittal T1, T2 and STIR sequences were obtained through the cervical spine. Axial fast and echo and gradient echo T2-weighted images were obtained. FINDINGS:        There is straightening of the normal cervical lordosis. . There is degenerative change in the vertebral body endplates adjacent to the C5-C6 disc space. No suspicious osseous lesions are present. The facet joints are normally aligned. The cervical spinal cord is of normal caliber and signal intensity. The visualized aspects of the posterior fossa are normal.    On the axial images, at C2-C3, there is no disc herniation, canal stenosis, cord compression or foraminal stenosis. At C3-C4, there is a 2.3 mm ventral extradural defect secondary bulging disc and uncinate process hypertrophy. There is facet hypertrophy. The combination of these findings result in mild canal stenosis, indentation upon the underlying spinal cord and  moderate to severe bilateral foraminal stenosis. At C4-C5, there is no disc herniation, canal stenosis or cord compression. There is mild-to-moderate bilateral foraminal stenosis. At C5-C6, there is a 1.9 mm ventral extradural defect secondary to disc protrusion and uncinate process hypertrophy. This causes mild canal stenosis, indentation upon underlying spinal cord, severe right and moderate left-sided foraminal stenosis. At C6-C7, there is no disc herniation, canal stenosis or cord compression. There is mild-to-moderate right and mild left-sided foraminal stenosis. At C7-T1, there is no disc herniation, canal stenosis or cord compression. There is mild right-sided foraminal stenosis.     There are no suspicious findings in the cervical soft tissues. Impression  1. Straightening of the normal cervical lordosis with superimposed degenerative changes resulting in mild canal stenosis, indentation upon the underlying spinal cord, severe right and moderate left-sided foraminal stenosis at C5-6.  2. There is mild canal stenosis, indentation upon the underlying spinal cord and moderate to severe bilateral foraminal stenosis at C3-4.  3. There is mild to moderate bilateral foraminal stenosis with no canal stenosis or cord compression at C4-5.  4. There is mild-to-moderate right and mild left-sided foraminal stenosis with no canal stenosis or cord compression at C6-7.  5. There is mild right-sided foramen stenosis with no canal stenosis or cord compression at C7-T1.  6. Otherwise negative MRI scan of the cervical spine. .          **This report has been created using voice recognition software. It may contain minor errors which are inherent in voice recognition technology. **    Final report electronically signed by DR Adolfo Kerr on 4/15/2022 2:46 PM    No results found for this or any previous visit. Results for orders placed during the hospital encounter of 07/23/22    CTA HEAD W WO CONTRAST (CODE STROKE)    Narrative  PROCEDURE: CTA HEAD W WO CONTRAST, CTA NECK W WO CONTRAST    CLINICAL INFORMATION: new right facial droop. COMPARISON: CT scan of the brain obtained on the same day. .    TECHNIQUE: 1 mm axial images were obtained through the head and neck after the fast bolus administration of contrast. A noncontrast localizer was obtained. 3-D reconstructions were performed on a dedicated 3-D workstation. These include multiplanar MPR  images and multiplanar MIP images. Centerline reconstructions were obtained of the carotid systems. Isovue intravenous contrast was given. All carotid artery measurements are performed utilizing Nascet criteria. This exam was analyzed using viz. AI  contact CT LVO.     All CT scans at this facility use dose modulation, iterative reconstruction, and/or weight-based dosing when appropriate to reduce radiation dose to as low as reasonably achievable. FINDINGS:      CTA NECK:      Aortic arch and branches: Atherosclerotic calcification in the aortic arch. Calcification versus stent placement in the left anterior attending coronary artery. Right common carotid artery/ICA: There is no significant hemodynamic stenosis in the right common and internal carotid arteries. Left common carotid artery/ICA: There is no significant hemodynamic stenosis in the left common and internal carotid arteries. Vertebral arteries: Antegrade flow in the right and left vertebral arteries. CTA HEAD:      Internal carotid arteries: Atherosclerotic calcification in the cavernous segments of both internal carotid arteries. No evidence of severe stenosis. .    Middle cerebral arteries: Narrowing in the distal branches of the right middle cerebral artery. There is antegrade flow in the left middle cerebral artery. Anterior cerebral arteries: Antegrade flow in the anterior cerebral arteries bilaterally. .    Vertebral arteries: Antegrade flow in the right and left vertebral arteries. Basilar artery: Narrowing in the mid basilar artery. Superior cerebellar arteries: Antegrade flow in the right and to lesser extent left superior cerebellar arteries. .    Posterior cerebral arteries: Antegrade flow in the posterior cerebral arteries bilaterally. No aneurysms, stenoses or occlusions are noted. The superior sagittal sinus, vein of Charli, internal cerebral veins, straight sinus, transverse sinuses and sigmoid sinuses are patent. Axial source data: Areas of abnormal attenuation in the left basal ganglia and right basal ganglia. Diminished attenuation in the white matter. Impression  1. No significant hemodynamic stenosis in the right and left common and internal carotid arteries.   2. Antegrade flow in the right and left vertebral arteries. 3. Atherosclerotic calcification aortic arch. 4. Atherosclerotic calcification in the cavernous segments of both internal carotid arteries. No evidence of severe stenosis. 5. Narrowing in the distal branches of the right middle cerebral artery. There is antegrade flow in the left middle cerebral artery. 6. There is antegrade flow in the anterior and posterior cerebral arteries. 7. There is narrowing in the mid basilar artery. 8. Areas of abnormal attenuation in the left and right basal ganglia and in the white matter  9. Diffusion MRI scan may be helpful for better evaluation  10. Nurse Gage Braxton on the floor notified  of these results at 1:45 PM.      **This report has been created using voice recognition software. It may contain minor errors which are inherent in voice recognition technology. **    Final report electronically signed by DR Elvie Roman on 7/25/2022 2:03 PM    Results for orders placed during the hospital encounter of 07/23/22    CTA NECK W [address] (CODE STROKE)    Narrative  PROCEDURE: CTA HEAD W WO CONTRAST, CTA NECK W WO CONTRAST    CLINICAL INFORMATION: new right facial droop. COMPARISON: CT scan of the brain obtained on the same day. .    TECHNIQUE: 1 mm axial images were obtained through the head and neck after the fast bolus administration of contrast. A noncontrast localizer was obtained. 3-D reconstructions were performed on a dedicated 3-D workstation. These include multiplanar MPR  images and multiplanar MIP images. Centerline reconstructions were obtained of the carotid systems. Isovue intravenous contrast was given. All carotid artery measurements are performed utilizing Nascet criteria. This exam was analyzed using viz. AI  contact CT LVO. All CT scans at this facility use dose modulation, iterative reconstruction, and/or weight-based dosing when appropriate to reduce radiation dose to as low as reasonably achievable.     FINDINGS:      CTA NECK:      Aortic arch and branches: Atherosclerotic calcification in the aortic arch. Calcification versus stent placement in the left anterior attending coronary artery. Right common carotid artery/ICA: There is no significant hemodynamic stenosis in the right common and internal carotid arteries. Left common carotid artery/ICA: There is no significant hemodynamic stenosis in the left common and internal carotid arteries. Vertebral arteries: Antegrade flow in the right and left vertebral arteries. CTA HEAD:      Internal carotid arteries: Atherosclerotic calcification in the cavernous segments of both internal carotid arteries. No evidence of severe stenosis. .    Middle cerebral arteries: Narrowing in the distal branches of the right middle cerebral artery. There is antegrade flow in the left middle cerebral artery. Anterior cerebral arteries: Antegrade flow in the anterior cerebral arteries bilaterally. .    Vertebral arteries: Antegrade flow in the right and left vertebral arteries. Basilar artery: Narrowing in the mid basilar artery. Superior cerebellar arteries: Antegrade flow in the right and to lesser extent left superior cerebellar arteries. .    Posterior cerebral arteries: Antegrade flow in the posterior cerebral arteries bilaterally. No aneurysms, stenoses or occlusions are noted. The superior sagittal sinus, vein of Charli, internal cerebral veins, straight sinus, transverse sinuses and sigmoid sinuses are patent. Axial source data: Areas of abnormal attenuation in the left basal ganglia and right basal ganglia. Diminished attenuation in the white matter. Impression  1. No significant hemodynamic stenosis in the right and left common and internal carotid arteries. 2. Antegrade flow in the right and left vertebral arteries. 3. Atherosclerotic calcification aortic arch. 4. Atherosclerotic calcification in the cavernous segments of both internal carotid arteries.  No evidence of severe stenosis. 5. Narrowing in the distal branches of the right middle cerebral artery. There is antegrade flow in the left middle cerebral artery. 6. There is antegrade flow in the anterior and posterior cerebral arteries. 7. There is narrowing in the mid basilar artery. 8. Areas of abnormal attenuation in the left and right basal ganglia and in the white matter  9. Diffusion MRI scan may be helpful for better evaluation  10. Nurse Sarah Galan on the floor notified  of these results at 1:45 PM.      **This report has been created using voice recognition software. It may contain minor errors which are inherent in voice recognition technology. **    Final report electronically signed by DR Edgard Georges on 7/25/2022 2:03 PM    Results for orders placed during the hospital encounter of 07/23/22    MRI BRAIN WO CONTRAST    Narrative  MR Brain without gadolinium. Comparison: CT,SR - CT HEAD WO CONTRAST - 07/25/2022 12:47 PM EDT    Findings:  No restricted diffusion. Old infarct in the right frontal corona radiata extending into the right  basal ganglia. Additional old lacunar infarct in the left basal ganglia. Small amount of hemosiderin deposition in the periphery of the old lacunar  infarcts. Nonspecific periventricular and scattered frontoparietal white matter  T2/FLAIR signal abnormality. Mild generalized atrophy. No intracranial mass or acute hemorrhage. No midline shift. No hydrocephalus. Vascular flow voids are intact. Mucosal thickening in the left frontal sinus, left ethmoid air cells,  bilateral maxillary sinuses, and left sphenoid sinus. Mastoid air cells are clear. Orbital contents are unremarkable. No focal bone lesion. Impression  1. No restricted diffusion to suggest acute infarct. 2. Old infarct in the right corona radiata extending to the right basal  ganglia. Old lacunar infarct in the left basal ganglia. 3. Mild atrophy.  Nonspecific periventricular and frontoparietal white  matter signal abnormality may relate to chronic small vessel ischemic  changes. This document has been electronically signed by: Una Bradshaw MD on  07/25/2022 07:25 PM    No results found for this or any previous visit. No results found for this or any previous visit. Results for orders placed during the hospital encounter of 07/23/22    CT HEAD WO CONTRAST (CODE STROKE)    Narrative  PROCEDURE: CT HEAD WO CONTRAST    CLINICAL INFORMATION: new right facial droop . TECHNIQUE: 2-D multiplanar noncontrast CT brain  All CT scans at this facility use dose modulation, iterative reconstruction, and/or weight-based dosing when appropriate to reduce radiation dose to as low as reasonably achievable. COMPARISON: No prior study. FINDINGS: No acute hemorrhage. No acute infarction identified. Generalized cerebral volume loss. Bilateral basal ganglia chronic infarcts. Diminished attenuation right parietal white matter. Which may be related to remote white matter ischemic change or  chronic small vessel ischemic changes. Subtle periventricular white matter diminished attenuation bilaterally. No intra-axial or extra-axial fluid collections. Ventricles are consistent with degree of cerebral volume loss. There is no midline shift. Mild ethmoid sinus disease left ethmoid sinus chambers. Acute sphenoid sinus disease. Remaining paranasal sinuses  and mastoid air cells are clear. No calvarial fracture. Impression  1. No acute intracranial pathology  2. Acute and chronic sinus disease. Information called to Shirlene CHU at 12:54 PM    **This report has been created using voice recognition software. It may contain minor errors which are inherent in voice recognition technology. **    Final report electronically signed by Dr. Sunita Laird on 7/25/2022 12:59 PM    We reviewed the patient records from referring provider and available information in the EHR       ASSESSMENT:      Diagnosis Orders   1.  Bilateral leg weakness 2. Weakness           This is a 78-year-old male who presents with bilateral leg weakness for years that is progressively gotten worse since having COVID infection in July 2022, with deconditioning. Of note, he also has previous history of stroke that affected his left side and his gait for the past 10 years. I reviewed the MRI Brain and agree with interpretation, I also reviewed the patient pertinent labs and records in the EHR and from other providers. He underwent MRI brain on 7/5/2022 that showed no acute infarct, with old infarcts in the right corona radiata extending into the right basal ganglia. Old lacunar infarct in left basal ganglia. He had a cervical spine surgery in May 2022, that did not help with his symptoms. The patient was counseled about his symptoms and work up recommended. Need to evaluated for spinal stenosis, peripheral cause. We will arrange for him to undergo an MRI of the lumbar spine without contrast to screen for lumbar spine stenosis as well as lab work checking muscle enzymes, inflammation markers, vitamin levels. After detailed discussion with patient we agreed on the following plan. Plan    MRI lumbar spine WO contrast  Vitamin B12, folate  Vitamin B6 level  CK   Aldolase  Sed rate  Follow through with physical therapy recommendations  Call with any new symptoms or concerns. Follow up in 1 month.      Total time 65 min    Sneha Aponte MD

## 2022-10-20 ENCOUNTER — HOSPITAL ENCOUNTER (OUTPATIENT)
Dept: MRI IMAGING | Age: 72
Discharge: HOME | End: 2022-10-20
Payer: MEDICARE

## 2022-10-20 DIAGNOSIS — R29.898 BILATERAL LEG WEAKNESS: ICD-10-CM

## 2022-10-20 DIAGNOSIS — R53.1 WEAKNESS: ICD-10-CM

## 2022-10-20 PROCEDURE — 72148 MRI LUMBAR SPINE W/O DYE: CPT

## 2022-10-21 ENCOUNTER — TELEPHONE (OUTPATIENT)
Dept: NEUROLOGY | Age: 72
End: 2022-10-21

## 2022-10-21 NOTE — TELEPHONE ENCOUNTER
Spoke with wife who stated patient is in [place] [facility]. Spoke with Marv Colon, with SNF who verbalized understanding.

## 2022-10-21 NOTE — TELEPHONE ENCOUNTER
----- Message from [place] MAGDA Carias CNP sent at 10/21/2022  8:37 AM EDT -----  Please let patient know his vitamin b12 level is very low=162.  Please ask him to start vitamin B12 sublingual supplements, at least 3000 mcg daily, over the counter  Please also ask him to follow up with his PCP re: vitamin B12 injections  Please have him call the office in 2-3 months to obtain repeat vitamin B12 level  Kiara Matta CNP

## 2022-11-09 ENCOUNTER — TELEPHONE (OUTPATIENT)
Dept: CARDIOLOGY CLINIC | Age: 72
End: 2022-11-09

## 2022-11-09 NOTE — TELEPHONE ENCOUNTER
Pre op Risk Assessment    Procedure full mouth extractions  Physician [employer] dentures  Date of surgery/procedure tbd    Last OV 5-9-22  Last Stress 11-19-20  Last Echo 11-19-20  Last Cath 7-9-21  Last Stent 7-9-21  Is patient on blood thinners plavix and asa 81  Hold Meds/how many days ? ?     Fax [phone]

## 2022-11-14 ENCOUNTER — OFFICE VISIT (OUTPATIENT)
Dept: NEUROLOGY | Age: 72
End: 2022-11-14
Payer: MEDICARE

## 2022-11-14 VITALS
HEIGHT: 71 IN | WEIGHT: 288 LBS | BODY MASS INDEX: 40.32 KG/M2 | HEART RATE: 67 BPM | OXYGEN SATURATION: 95 % | SYSTOLIC BLOOD PRESSURE: 122 MMHG | RESPIRATION RATE: 16 BRPM | DIASTOLIC BLOOD PRESSURE: 62 MMHG

## 2022-11-14 DIAGNOSIS — M48.061 SPINAL STENOSIS OF LUMBAR REGION, UNSPECIFIED WHETHER NEUROGENIC CLAUDICATION PRESENT: ICD-10-CM

## 2022-11-14 DIAGNOSIS — R29.898 BILATERAL LEG WEAKNESS: Primary | ICD-10-CM

## 2022-11-14 PROCEDURE — 1123F ACP DISCUSS/DSCN MKR DOCD: CPT | Performed by: NURSE PRACTITIONER

## 2022-11-14 PROCEDURE — G8427 DOCREV CUR MEDS BY ELIG CLIN: HCPCS | Performed by: NURSE PRACTITIONER

## 2022-11-14 PROCEDURE — G8484 FLU IMMUNIZE NO ADMIN: HCPCS | Performed by: NURSE PRACTITIONER

## 2022-11-14 PROCEDURE — 3017F COLORECTAL CA SCREEN DOC REV: CPT | Performed by: NURSE PRACTITIONER

## 2022-11-14 PROCEDURE — 99213 OFFICE O/P EST LOW 20 MIN: CPT | Performed by: NURSE PRACTITIONER

## 2022-11-14 PROCEDURE — G8417 CALC BMI ABV UP PARAM F/U: HCPCS | Performed by: NURSE PRACTITIONER

## 2022-11-14 PROCEDURE — 1036F TOBACCO NON-USER: CPT | Performed by: NURSE PRACTITIONER

## 2022-11-14 RX ORDER — ASCORBIC ACID
CRYSTALS ORAL
COMMUNITY

## 2022-11-14 NOTE — PROGRESS NOTES
NEUROLOGY OUT PATIENT FOLLOW UP NOTE:  11/14/20222:20 PM    Donnie Smith is here for follow up for bilateral leg weakness, weakness   Patient Active Problem List   Diagnosis    Right basal ganglia embolic stroke [facility])    BPH with urinary obstruction    Propionibacterium infection    Infection of prosthetic shoulder joint ([facility] [address]    Cardiac pacemaker    CAD in native artery    Angina of effort [facility])    Chest pain    Generalized weakness                 ROS:  Respiratory : no cough, no shortness of breath  Cardiac: no chest pain. No palpitations. Renal : no flank pain, no hematuria    Skin: no rash      Allergies   Allergen Reactions    Adhesive Tape Rash       Current Outpatient Medications:     Cyanocobalamin (VITAMIN B 12) 250 MCG LOZG, Take by mouth, Disp: , Rfl:     clopidogrel (PLAVIX) 75 MG tablet, Take 1 tablet by mouth in the morning., Disp: , Rfl:     hydroCHLOROthiazide (MICROZIDE) 12.5 MG capsule, Take 2 capsules by mouth in the morning., Disp: 30 capsule, Rfl: 3    isosorbide mononitrate (IMDUR) 30 MG extended release tablet, Take 1 tablet by mouth daily, Disp: 90 tablet, Rfl: 1    aspirin 81 MG chewable tablet, Take 1 tablet by mouth daily, Disp: 30 tablet, Rfl: 3    nitroGLYCERIN (NITROSTAT) 0.4 MG SL tablet, up to max of 3 total doses. If no relief after 1 dose, call 911., Disp: 25 tablet, Rfl: 1    tamsulosin (FLOMAX) 0.4 MG capsule, Take 0.4 mg by mouth nightly , Disp: , Rfl:     losartan (COZAAR) 100 MG tablet, Take 50 mg by mouth daily , Disp: , Rfl:     Potassium 99 MG TABS, Take 3 tablets by mouth daily, Disp: , Rfl:     Multiple Vitamins-Minerals (PRESERVISION AREDS PO), Take by mouth, Disp: , Rfl:     Ascorbic Acid (VITAMIN C) 500 MG tablet, Take 1,000 mg by mouth , Disp: , Rfl:     amLODIPine (NORVASC) 10 MG tablet, Take 1 tablet by mouth in the morning.  (Patient not taking: No sig reported), Disp: 30 tablet, Rfl: 3    I reviewed the past medical history, allergies, medications, social history and family history. PE:   Vitals:    11/14/22 1353   BP: 122/62   Pulse: 67   Resp: 16   SpO2: 95%   Weight: 288 lb (130.6 kg)   Height: 5' 11\" (1.803 m)     General Appearance:  awake, alert, oriented, in no acute distress  Gen: NAD, Language is Intact. Skin: no rash, lesion, dry  to touch. warm  Head: no rash, no icterus  Neck: There is no carotid bruits. The Neck is supple. There is no neck lymphadenopathy. Neuro: CN 2-12 grossly intact with no focal deficits. Power 5/5 Throughout symmetric, Reflexes are symmetric. Long tracts are reduced distally. Cerebellar exam is Intact. Sensory exam is intact to light touch. Gait is guarded, using cane  Musculoskeletal:  Has no hand arthritis, no limitation of ROM in any of the four extremities.   Lower extremities no edema          DATA:      Results for orders placed or performed during the hospital encounter of 07/23/22   COVID-19, Rapid    Specimen: Nasopharyngeal Swab   Result Value Ref Range    SARS-CoV-2, NAAT DETECTED (AA) NOT DETECTED   COVID-19, Rapid    Specimen: Nasopharyngeal Swab   Result Value Ref Range    SARS-CoV-2, NAAT DETECTED (AA) NOT DETECTED   CBC with Auto Differential   Result Value Ref Range    WBC 4.2 (L) 4.8 - 10.8 thou/mm3    RBC 4.96 4.70 - 6.10 mill/mm3    Hemoglobin 14.1 14.0 - 18.0 gm/dl    Hematocrit 43.0 42.0 - 52.0 %    MCV 86.7 80.0 - 94.0 fL    MCH 28.4 26.0 - 33.0 pg    MCHC 32.8 32.2 - 35.5 gm/dl    RDW-CV 14.6 (H) 11.5 - 14.5 %    RDW-SD 46.1 (H) 35.0 - 45.0 fL    Platelets 690 270 - 262 thou/mm3    MPV 11.1 9.4 - 12.4 fL    Seg Neutrophils 69.0 %    Lymphocytes 14.7 %    Monocytes 15.2 %    Eosinophils 0.2 %    Basophils 0.7 %    Immature Granulocytes 0.2 %    Platelet Estimate ADEQUATE Adequate    Segs Absolute 2.9 1.8 - 7.7 thou/mm3    Lymphocytes Absolute 0.6 (L) 1.0 - 4.8 thou/mm3    Monocytes Absolute 0.6 0.4 - 1.3 thou/mm3    Eosinophils Absolute 0.0 0.0 - 0.4 thou/mm3    Basophils Absolute 0.0 0.0 - 0.1 thou/mm3 Immature Grans (Abs) 0.01 0.00 - 0.07 thou/mm3    nRBC 0 /100 wbc   Basic Metabolic Panel w/ Reflex to MG   Result Value Ref Range    Sodium 139 135 - 145 meq/L    Potassium reflex Magnesium 3.6 3.5 - 5.2 meq/L    Chloride 103 98 - 111 meq/L    CO2 22 (L) 23 - 33 meq/L    Glucose 158 (H) 70 - 108 mg/dL    BUN 17 7 - 22 mg/dL    Creatinine 1.3 (H) 0.4 - 1.2 mg/dL    Calcium 8.6 8.5 - 10.5 mg/dL   Troponin   Result Value Ref Range    Troponin T < 0.010 ng/ml   Brain Natriuretic Peptide   Result Value Ref Range    Pro-.1 0.0 - 900.0 pg/mL   Anion Gap   Result Value Ref Range    Anion Gap 14.0 8.0 - 16.0 meq/L   Glomerular Filtration Rate, Estimated   Result Value Ref Range    Est, Glom Filt Rate 54 (A) ml/min/1.73m2   Scan of Blood Smear   Result Value Ref Range    SCAN OF BLOOD SMEAR see below    Lactic acid, plasma   Result Value Ref Range    Lactic Acid 0.7 0.5 - 2.0 mmol/L   CBC with Auto Differential   Result Value Ref Range    WBC 3.3 (L) 4.8 - 10.8 thou/mm3    RBC 4.72 4.70 - 6.10 mill/mm3    Hemoglobin 13.3 (L) 14.0 - 18.0 gm/dl    Hematocrit 41.3 (L) 42.0 - 52.0 %    MCV 87.5 80.0 - 94.0 fL    MCH 28.2 26.0 - 33.0 pg    MCHC 32.2 32.2 - 35.5 gm/dl    RDW-CV 14.6 (H) 11.5 - 14.5 %    RDW-SD 46.8 (H) 35.0 - 45.0 fL    Platelets 774 619 - 160 thou/mm3    MPV 10.9 9.4 - 12.4 fL    Seg Neutrophils 40.0 %    Lymphocytes 37.2 %    Monocytes 20.1 %    Eosinophils 1.2 %    Basophils 1.2 %    Immature Granulocytes 0.3 %    Segs Absolute 1.3 (L) 1.8 - 7.7 thou/mm3    Lymphocytes Absolute 1.2 1.0 - 4.8 thou/mm3    Monocytes Absolute 0.7 0.4 - 1.3 thou/mm3    Eosinophils Absolute 0.0 0.0 - 0.4 thou/mm3    Basophils Absolute 0.0 0.0 - 0.1 thou/mm3    Immature Grans (Abs) 0.01 0.00 - 0.07 thou/mm3    nRBC 0 /100 wbc   Comprehensive Metabolic Panel w/ Reflex to MG   Result Value Ref Range    Glucose 97 70 - 108 mg/dL    Creatinine 1.1 0.4 - 1.2 mg/dL    BUN 17 7 - 22 mg/dL    Sodium 140 135 - 145 meq/L    Potassium reflex Magnesium 3.4 (L) 3.5 - 5.2 meq/L    Chloride 105 98 - 111 meq/L    CO2 22 (L) 23 - 33 meq/L    Calcium 8.3 (L) 8.5 - 10.5 mg/dL    AST 30 5 - 40 U/L    Alkaline Phosphatase 78 38 - 126 U/L    Total Protein 6.1 6.1 - 8.0 g/dL    Albumin 3.4 (L) 3.5 - 5.1 g/dL    Total Bilirubin 0.3 0.3 - 1.2 mg/dL    ALT 26 11 - 66 U/L   Procalcitonin   Result Value Ref Range    Procalcitonin 0.13 (H) 0.01 - 0.09 ng/mL   D-Dimer, Quantitative   Result Value Ref Range    D-Dimer, Quant 833.00 (H) 0.00 - 500.00 ng/ml FEU   Anion Gap   Result Value Ref Range    Anion Gap 13.0 8.0 - 16.0 meq/L   Magnesium   Result Value Ref Range    Magnesium 2.3 1.6 - 2.4 mg/dL   Glomerular Filtration Rate, Estimated   Result Value Ref Range    Est, Glom Filt Rate 65 (A) ml/min/1.73m2   CBC with Auto Differential   Result Value Ref Range    WBC 3.4 (L) 4.8 - 10.8 thou/mm3    RBC 4.76 4.70 - 6.10 mill/mm3    Hemoglobin 13.5 (L) 14.0 - 18.0 gm/dl    Hematocrit 42.0 42.0 - 52.0 %    MCV 88.2 80.0 - 94.0 fL    MCH 28.4 26.0 - 33.0 pg    MCHC 32.1 (L) 32.2 - 35.5 gm/dl    RDW-CV 14.5 11.5 - 14.5 %    RDW-SD 46.5 (H) 35.0 - 45.0 fL    Platelets 886 209 - 294 thou/mm3    MPV 10.8 9.4 - 12.4 fL    Seg Neutrophils 37.5 %    Lymphocytes 40.8 %    Monocytes 13.8 %    Eosinophils 6.7 %    Basophils 0.9 %    Immature Granulocytes 0.3 %    Segs Absolute 1.3 (L) 1.8 - 7.7 thou/mm3    Lymphocytes Absolute 1.4 1.0 - 4.8 thou/mm3    Monocytes Absolute 0.5 0.4 - 1.3 thou/mm3    Eosinophils Absolute 0.2 0.0 - 0.4 thou/mm3    Basophils Absolute 0.0 0.0 - 0.1 thou/mm3    Immature Grans (Abs) 0.01 0.00 - 0.07 thou/mm3    nRBC 0 /100 wbc   Comprehensive Metabolic Panel w/ Reflex to MG   Result Value Ref Range    Glucose 98 70 - 108 mg/dL    Creatinine 0.9 0.4 - 1.2 mg/dL    BUN 15 7 - 22 mg/dL    Sodium 143 135 - 145 meq/L    Potassium reflex Magnesium 3.5 3.5 - 5.2 meq/L    Chloride 109 98 - 111 meq/L    CO2 24 23 - 33 meq/L    Calcium 8.7 8.5 - 10.5 mg/dL    AST 26 5 - 40 U/L    Alkaline Phosphatase 77 38 - 126 U/L    Total Protein 6.1 6.1 - 8.0 g/dL    Albumin 3.5 3.5 - 5.1 g/dL    Total Bilirubin 0.2 (L) 0.3 - 1.2 mg/dL    ALT 23 11 - 66 U/L   C-Reactive Protein   Result Value Ref Range    CRP 1.03 (H) 0.00 - 1.00 mg/dl   D-Dimer, Quantitative   Result Value Ref Range    D-Dimer, Quant 594.00 (H) 0.00 - 500.00 ng/ml FEU   Procalcitonin   Result Value Ref Range    Procalcitonin 0.10 (H) 0.01 - 0.09 ng/mL   Anion Gap   Result Value Ref Range    Anion Gap 10.0 8.0 - 16.0 meq/L   Glomerular Filtration Rate, Estimated   Result Value Ref Range    Est, Glom Filt Rate 82 (A) ml/min/1.73m2   Magnesium   Result Value Ref Range    Magnesium 2.2 1.6 - 2.4 mg/dL   CBC with Auto Differential   Result Value Ref Range    WBC 3.9 (L) 4.8 - 10.8 thou/mm3    RBC 4.94 4.70 - 6.10 mill/mm3    Hemoglobin 13.8 (L) 14.0 - 18.0 gm/dl    Hematocrit 42.5 42.0 - 52.0 %    MCV 86.0 80.0 - 94.0 fL    MCH 27.9 26.0 - 33.0 pg    MCHC 32.5 32.2 - 35.5 gm/dl    RDW-CV 14.1 11.5 - 14.5 %    RDW-SD 44.3 35.0 - 45.0 fL    Platelets 315 716 - 992 thou/mm3    MPV 10.6 9.4 - 12.4 fL    Seg Neutrophils 48.8 %    Lymphocytes 32.1 %    Monocytes 10.2 %    Eosinophils 8.1 %    Basophils 0.5 %    Immature Granulocytes 0.3 %    Segs Absolute 1.9 1.8 - 7.7 thou/mm3    Lymphocytes Absolute 1.3 1.0 - 4.8 thou/mm3    Monocytes Absolute 0.4 0.4 - 1.3 thou/mm3    Eosinophils Absolute 0.3 0.0 - 0.4 thou/mm3    Basophils Absolute 0.0 0.0 - 0.1 thou/mm3    Immature Grans (Abs) 0.01 0.00 - 0.07 thou/mm3    nRBC 0 /100 wbc   Comprehensive Metabolic Panel w/ Reflex to MG   Result Value Ref Range    Glucose 105 70 - 108 mg/dL    Creatinine 0.9 0.4 - 1.2 mg/dL    BUN 15 7 - 22 mg/dL    Sodium 143 135 - 145 meq/L    Potassium reflex Magnesium 3.7 3.5 - 5.2 meq/L    Chloride 105 98 - 111 meq/L    CO2 25 23 - 33 meq/L    Calcium 9.1 8.5 - 10.5 mg/dL    AST 26 5 - 40 U/L    Alkaline Phosphatase 82 38 - 126 U/L    Total Protein 6.6 6.1 - 8.0 g/dL    Albumin 3.9 3.5 - 5.1 g/dL    Total Bilirubin 0.4 0.3 - 1.2 mg/dL    ALT 23 11 - 66 U/L   Anion Gap   Result Value Ref Range    Anion Gap 13.0 8.0 - 16.0 meq/L   Glomerular Filtration Rate, Estimated   Result Value Ref Range    Est, Glom Filt Rate 82 (A) ml/min/1.73m2   Zinc   Result Value Ref Range    Zinc 76.0 60.0 - 120.0 ug/dL   Basic Metabolic Panel w/ Reflex to MG   Result Value Ref Range    Sodium 140 135 - 145 meq/L    Potassium reflex Magnesium 3.8 3.5 - 5.2 meq/L    Chloride 104 98 - 111 meq/L    CO2 22 (L) 23 - 33 meq/L    Glucose 177 (H) 70 - 108 mg/dL    BUN 15 7 - 22 mg/dL    Creatinine 0.9 0.4 - 1.2 mg/dL    Calcium 9.4 8.5 - 10.5 mg/dL   CBC with Auto Differential   Result Value Ref Range    WBC 5.1 4.8 - 10.8 thou/mm3    RBC 5.12 4.70 - 6.10 mill/mm3    Hemoglobin 14.6 14.0 - 18.0 gm/dl    Hematocrit 44.0 42.0 - 52.0 %    MCV 85.9 80.0 - 94.0 fL    MCH 28.5 26.0 - 33.0 pg    MCHC 33.2 32.2 - 35.5 gm/dl    RDW-CV 14.1 11.5 - 14.5 %    RDW-SD 43.9 35.0 - 45.0 fL    Platelets 475 650 - 586 thou/mm3    MPV 10.5 9.4 - 12.4 fL    Seg Neutrophils 64.2 %    Lymphocytes 22.4 %    Monocytes 5.9 %    Eosinophils 6.5 %    Basophils 0.8 %    Immature Granulocytes 0.2 %    Segs Absolute 3.3 1.8 - 7.7 thou/mm3    Lymphocytes Absolute 1.1 1.0 - 4.8 thou/mm3    Monocytes Absolute 0.3 (L) 0.4 - 1.3 thou/mm3    Eosinophils Absolute 0.3 0.0 - 0.4 thou/mm3    Basophils Absolute 0.0 0.0 - 0.1 thou/mm3    Immature Grans (Abs) 0.01 0.00 - 0.07 thou/mm3    nRBC 0 /100 wbc   Anion Gap   Result Value Ref Range    Anion Gap 14.0 8.0 - 16.0 meq/L   Glomerular Filtration Rate, Estimated   Result Value Ref Range    Est, Glom Filt Rate 82 (A) EQ/SPV/3.61C1   Basic Metabolic Panel w/ Reflex to MG   Result Value Ref Range    Sodium 138 135 - 145 meq/L    Potassium reflex Magnesium 3.8 3.5 - 5.2 meq/L    Chloride 103 98 - 111 meq/L    CO2 21 (L) 23 - 33 meq/L    Glucose 108 70 - 108 mg/dL    BUN 14 7 - 22 mg/dL Creatinine 1.0 0.4 - 1.2 mg/dL    Calcium 9.1 8.5 - 10.5 mg/dL   CBC with Auto Differential   Result Value Ref Range    WBC 4.5 (L) 4.8 - 10.8 thou/mm3    RBC 5.01 4.70 - 6.10 mill/mm3    Hemoglobin 14.4 14.0 - 18.0 gm/dl    Hematocrit 44.4 42.0 - 52.0 %    MCV 88.6 80.0 - 94.0 fL    MCH 28.7 26.0 - 33.0 pg    MCHC 32.4 32.2 - 35.5 gm/dl    RDW-CV 14.0 11.5 - 14.5 %    RDW-SD 45.0 35.0 - 45.0 fL    Platelets 147 066 - 020 thou/mm3    MPV 10.4 9.4 - 12.4 fL    Seg Neutrophils 64.0 %    Lymphocytes 18.5 %    Monocytes 9.1 %    Eosinophils 6.9 %    Basophils 1.1 %    Immature Granulocytes 0.4 %    Segs Absolute 2.9 1.8 - 7.7 thou/mm3    Lymphocytes Absolute 0.8 (L) 1.0 - 4.8 thou/mm3    Monocytes Absolute 0.4 0.4 - 1.3 thou/mm3    Eosinophils Absolute 0.3 0.0 - 0.4 thou/mm3    Basophils Absolute 0.0 0.0 - 0.1 thou/mm3    Immature Grans (Abs) 0.02 0.00 - 0.07 thou/mm3    nRBC 0 /100 wbc   Anion Gap   Result Value Ref Range    Anion Gap 14.0 8.0 - 16.0 meq/L   Glomerular Filtration Rate, Estimated   Result Value Ref Range    Est, Glom Filt Rate 73 (A) NR/CARRIE/8.13M5   Basic Metabolic Panel w/ Reflex to MG   Result Value Ref Range    Sodium 138 135 - 145 meq/L    Potassium reflex Magnesium 3.7 3.5 - 5.2 meq/L    Chloride 100 98 - 111 meq/L    CO2 24 23 - 33 meq/L    Glucose 117 (H) 70 - 108 mg/dL    BUN 16 7 - 22 mg/dL    Creatinine 1.0 0.4 - 1.2 mg/dL    Calcium 9.2 8.5 - 10.5 mg/dL   CBC with Auto Differential   Result Value Ref Range    WBC 4.3 (L) 4.8 - 10.8 thou/mm3    RBC 5.09 4.70 - 6.10 mill/mm3    Hemoglobin 14.3 14.0 - 18.0 gm/dl    Hematocrit 44.0 42.0 - 52.0 %    MCV 86.4 80.0 - 94.0 fL    MCH 28.1 26.0 - 33.0 pg    MCHC 32.5 32.2 - 35.5 gm/dl    RDW-CV 14.1 11.5 - 14.5 %    RDW-SD 44.7 35.0 - 45.0 fL    Platelets 349 901 - 647 thou/mm3    MPV 11.2 9.4 - 12.4 fL    Seg Neutrophils 55.1 %    Lymphocytes 24.9 %    Monocytes 14.1 %    Eosinophils 4.5 %    Basophils 0.7 %    Immature Granulocytes 0.7 %    Segs Absolute 2.4 1.8 - 7.7 thou/mm3    Lymphocytes Absolute 1.1 1.0 - 4.8 thou/mm3    Monocytes Absolute 0.6 0.4 - 1.3 thou/mm3    Eosinophils Absolute 0.2 0.0 - 0.4 thou/mm3    Basophils Absolute 0.0 0.0 - 0.1 thou/mm3    Immature Grans (Abs) 0.03 0.00 - 0.07 thou/mm3    nRBC 0 /100 wbc   Anion Gap   Result Value Ref Range    Anion Gap 14.0 8.0 - 16.0 meq/L   Glomerular Filtration Rate, Estimated   Result Value Ref Range    Est, Glom Filt Rate 73 (A) GE/ZDL/5.48J4   Basic Metabolic Panel w/ Reflex to MG   Result Value Ref Range    Sodium 138 135 - 145 meq/L    Potassium reflex Magnesium 3.6 3.5 - 5.2 meq/L    Chloride 100 98 - 111 meq/L    CO2 25 23 - 33 meq/L    Glucose 103 70 - 108 mg/dL    BUN 18 7 - 22 mg/dL    Creatinine 1.0 0.4 - 1.2 mg/dL    Calcium 9.0 8.5 - 10.5 mg/dL   CBC with Auto Differential   Result Value Ref Range    WBC 4.6 (L) 4.8 - 10.8 thou/mm3    RBC 5.17 4.70 - 6.10 mill/mm3    Hemoglobin 14.7 14.0 - 18.0 gm/dl    Hematocrit 44.1 42.0 - 52.0 %    MCV 85.3 80.0 - 94.0 fL    MCH 28.4 26.0 - 33.0 pg    MCHC 33.3 32.2 - 35.5 gm/dl    RDW-CV 14.1 11.5 - 14.5 %    RDW-SD 43.8 35.0 - 45.0 fL    Platelets 104 179 - 760 thou/mm3    MPV 11.0 9.4 - 12.4 fL    Seg Neutrophils 56.5 %    Lymphocytes 25.4 %    Monocytes 10.3 %    Eosinophils 6.3 %    Basophils 0.9 %    Immature Granulocytes 0.6 %    Segs Absolute 2.6 1.8 - 7.7 thou/mm3    Lymphocytes Absolute 1.2 1.0 - 4.8 thou/mm3    Monocytes Absolute 0.5 0.4 - 1.3 thou/mm3    Eosinophils Absolute 0.3 0.0 - 0.4 thou/mm3    Basophils Absolute 0.0 0.0 - 0.1 thou/mm3    Immature Grans (Abs) 0.03 0.00 - 0.07 thou/mm3    nRBC 0 /100 wbc   Anion Gap   Result Value Ref Range    Anion Gap 13.0 8.0 - 16.0 meq/L   Glomerular Filtration Rate, Estimated   Result Value Ref Range    Est, Glom Filt Rate 73 (A) UQ/QGW/6.84H9   Basic Metabolic Panel w/ Reflex to MG   Result Value Ref Range    Sodium 141 135 - 145 meq/L    Potassium reflex Magnesium 3.6 3.5 - 5.2 meq/L Chloride 100 98 - 111 meq/L    CO2 30 23 - 33 meq/L    Glucose 106 70 - 108 mg/dL    BUN 20 7 - 22 mg/dL    Creatinine 1.2 0.4 - 1.2 mg/dL    Calcium 9.1 8.5 - 10.5 mg/dL   CBC with Auto Differential   Result Value Ref Range    WBC 4.1 (L) 4.8 - 10.8 thou/mm3    RBC 5.00 4.70 - 6.10 mill/mm3    Hemoglobin 14.1 14.0 - 18.0 gm/dl    Hematocrit 42.6 42.0 - 52.0 %    MCV 85.2 80.0 - 94.0 fL    MCH 28.2 26.0 - 33.0 pg    MCHC 33.1 32.2 - 35.5 gm/dl    RDW-CV 13.7 11.5 - 14.5 %    RDW-SD 42.5 35.0 - 45.0 fL    Platelets 982 170 - 207 thou/mm3    MPV 10.7 9.4 - 12.4 fL    Seg Neutrophils 43.8 %    Lymphocytes 32.4 %    Monocytes 17.0 %    Eosinophils 5.9 %    Basophils 0.7 %    Immature Granulocytes 0.2 %    Segs Absolute 1.8 1.8 - 7.7 thou/mm3    Lymphocytes Absolute 1.3 1.0 - 4.8 thou/mm3    Monocytes Absolute 0.7 0.4 - 1.3 thou/mm3    Eosinophils Absolute 0.2 0.0 - 0.4 thou/mm3    Basophils Absolute 0.0 0.0 - 0.1 thou/mm3    Immature Grans (Abs) 0.01 0.00 - 0.07 thou/mm3    nRBC 0 /100 wbc   Anion Gap   Result Value Ref Range    Anion Gap 11.0 8.0 - 16.0 meq/L   Glomerular Filtration Rate, Estimated   Result Value Ref Range    Est, Glom Filt Rate 59 (A) ml/min/1.73m2   POCT Glucose   Result Value Ref Range    POC Glucose 150 (H) 70 - 108 mg/dl   EKG 12 Lead   Result Value Ref Range    Ventricular Rate 74 BPM    Atrial Rate 74 BPM    P-R Interval 210 ms    QRS Duration 98 ms    Q-T Interval 380 ms    QTc Calculation (Bazett) 421 ms    P Axis 36 degrees    R Axis 32 degrees    T Axis 52 degrees   EKG 12 Lead   Result Value Ref Range    Ventricular Rate 70 BPM    Atrial Rate 70 BPM    P-R Interval 194 ms    QRS Duration 102 ms    Q-T Interval 404 ms    QTc Calculation (Bazett) 436 ms    P Axis 54 degrees    R Axis 65 degrees    T Axis 53 degrees          Results for orders placed during the hospital encounter of 04/13/22    MRI CERVICAL SPINE WO CONTRAST    Narrative  PROCEDURE: MRI CERVICAL SPINE WO CONTRAST    CLINICAL INFORMATION: Degeneration of intervertebral disc of mid-cervical region, unspecified spinal level, Brachial neuritis, Spinal stenosis in cervical region . COMPARISON: Plain radiographs dated 29 April 2020. Susan Cole TECHNIQUE: Sagittal T1, T2 and STIR sequences were obtained through the cervical spine. Axial fast and echo and gradient echo T2-weighted images were obtained. FINDINGS:        There is straightening of the normal cervical lordosis. . There is degenerative change in the vertebral body endplates adjacent to the C5-C6 disc space. No suspicious osseous lesions are present. The facet joints are normally aligned. The cervical spinal cord is of normal caliber and signal intensity. The visualized aspects of the posterior fossa are normal.    On the axial images, at C2-C3, there is no disc herniation, canal stenosis, cord compression or foraminal stenosis. At C3-C4, there is a 2.3 mm ventral extradural defect secondary bulging disc and uncinate process hypertrophy. There is facet hypertrophy. The combination of these findings result in mild canal stenosis, indentation upon the underlying spinal cord and  moderate to severe bilateral foraminal stenosis. At C4-C5, there is no disc herniation, canal stenosis or cord compression. There is mild-to-moderate bilateral foraminal stenosis. At C5-C6, there is a 1.9 mm ventral extradural defect secondary to disc protrusion and uncinate process hypertrophy. This causes mild canal stenosis, indentation upon underlying spinal cord, severe right and moderate left-sided foraminal stenosis. At C6-C7, there is no disc herniation, canal stenosis or cord compression. There is mild-to-moderate right and mild left-sided foraminal stenosis. At C7-T1, there is no disc herniation, canal stenosis or cord compression. There is mild right-sided foraminal stenosis. There are no suspicious findings in the cervical soft tissues. Impression  1.  Straightening of the normal cervical lordosis with superimposed degenerative changes resulting in mild canal stenosis, indentation upon the underlying spinal cord, severe right and moderate left-sided foraminal stenosis at C5-6.  2. There is mild canal stenosis, indentation upon the underlying spinal cord and moderate to severe bilateral foraminal stenosis at C3-4.  3. There is mild to moderate bilateral foraminal stenosis with no canal stenosis or cord compression at C4-5.  4. There is mild-to-moderate right and mild left-sided foraminal stenosis with no canal stenosis or cord compression at C6-7.  5. There is mild right-sided foramen stenosis with no canal stenosis or cord compression at C7-T1.  6. Otherwise negative MRI scan of the cervical spine. .          **This report has been created using voice recognition software. It may contain minor errors which are inherent in voice recognition technology. **    Final report electronically signed by DR Andry Sanchez on 4/15/2022 2:46 PM    No results found for this or any previous visit. Results for orders placed during the hospital encounter of 07/23/22    CTA HEAD W WO CONTRAST (CODE STROKE)    Narrative  PROCEDURE: CTA HEAD W WO CONTRAST, CTA NECK W WO CONTRAST    CLINICAL INFORMATION: new right facial droop. COMPARISON: CT scan of the brain obtained on the same day. .    TECHNIQUE: 1 mm axial images were obtained through the head and neck after the fast bolus administration of contrast. A noncontrast localizer was obtained. 3-D reconstructions were performed on a dedicated 3-D workstation. These include multiplanar MPR  images and multiplanar MIP images. Centerline reconstructions were obtained of the carotid systems. Isovue intravenous contrast was given. All carotid artery measurements are performed utilizing Nascet criteria. This exam was analyzed using viz. AI  contact CT LVO.     All CT scans at this facility use dose modulation, iterative reconstruction, and/or weight-based dosing when appropriate to reduce radiation dose to as low as reasonably achievable. FINDINGS:      CTA NECK:      Aortic arch and branches: Atherosclerotic calcification in the aortic arch. Calcification versus stent placement in the left anterior attending coronary artery. Right common carotid artery/ICA: There is no significant hemodynamic stenosis in the right common and internal carotid arteries. Left common carotid artery/ICA: There is no significant hemodynamic stenosis in the left common and internal carotid arteries. Vertebral arteries: Antegrade flow in the right and left vertebral arteries. CTA HEAD:      Internal carotid arteries: Atherosclerotic calcification in the cavernous segments of both internal carotid arteries. No evidence of severe stenosis. .    Middle cerebral arteries: Narrowing in the distal branches of the right middle cerebral artery. There is antegrade flow in the left middle cerebral artery. Anterior cerebral arteries: Antegrade flow in the anterior cerebral arteries bilaterally. .    Vertebral arteries: Antegrade flow in the right and left vertebral arteries. Basilar artery: Narrowing in the mid basilar artery. Superior cerebellar arteries: Antegrade flow in the right and to lesser extent left superior cerebellar arteries. .    Posterior cerebral arteries: Antegrade flow in the posterior cerebral arteries bilaterally. No aneurysms, stenoses or occlusions are noted. The superior sagittal sinus, vein of Charli, internal cerebral veins, straight sinus, transverse sinuses and sigmoid sinuses are patent. Axial source data: Areas of abnormal attenuation in the left basal ganglia and right basal ganglia. Diminished attenuation in the white matter. Impression  1. No significant hemodynamic stenosis in the right and left common and internal carotid arteries. 2. Antegrade flow in the right and left vertebral arteries.   3. Atherosclerotic calcification aortic arch. 4. Atherosclerotic calcification in the cavernous segments of both internal carotid arteries. No evidence of severe stenosis. 5. Narrowing in the distal branches of the right middle cerebral artery. There is antegrade flow in the left middle cerebral artery. 6. There is antegrade flow in the anterior and posterior cerebral arteries. 7. There is narrowing in the mid basilar artery. 8. Areas of abnormal attenuation in the left and right basal ganglia and in the white matter  9. Diffusion MRI scan may be helpful for better evaluation  10. Nurse [facility] System - [place] on the floor notified  of these results at 1:45 PM.      **This report has been created using voice recognition software. It may contain minor errors which are inherent in voice recognition technology. **    Final report electronically signed by DR Nazario Bartlett on 7/25/2022 2:03 PM    Results for orders placed during the hospital encounter of 07/23/22    CTA NECK W [address] (CODE STROKE)    Narrative  PROCEDURE: CTA HEAD W WO CONTRAST, CTA NECK W WO CONTRAST    CLINICAL INFORMATION: new right facial droop. COMPARISON: CT scan of the brain obtained on the same day. .    TECHNIQUE: 1 mm axial images were obtained through the head and neck after the fast bolus administration of contrast. A noncontrast localizer was obtained. 3-D reconstructions were performed on a dedicated 3-D workstation. These include multiplanar MPR  images and multiplanar MIP images. Centerline reconstructions were obtained of the carotid systems. Isovue intravenous contrast was given. All carotid artery measurements are performed utilizing Nascet criteria. This exam was analyzed using viz.   contact CT LVO. All CT scans at this facility use dose modulation, iterative reconstruction, and/or weight-based dosing when appropriate to reduce radiation dose to as low as reasonably achievable.     FINDINGS:      CTA NECK:      Aortic arch and branches: Atherosclerotic calcification in the aortic arch. Calcification versus stent placement in the left anterior attending coronary artery. Right common carotid artery/ICA: There is no significant hemodynamic stenosis in the right common and internal carotid arteries. Left common carotid artery/ICA: There is no significant hemodynamic stenosis in the left common and internal carotid arteries. Vertebral arteries: Antegrade flow in the right and left vertebral arteries. CTA HEAD:      Internal carotid arteries: Atherosclerotic calcification in the cavernous segments of both internal carotid arteries. No evidence of severe stenosis. .    Middle cerebral arteries: Narrowing in the distal branches of the right middle cerebral artery. There is antegrade flow in the left middle cerebral artery. Anterior cerebral arteries: Antegrade flow in the anterior cerebral arteries bilaterally. .    Vertebral arteries: Antegrade flow in the right and left vertebral arteries. Basilar artery: Narrowing in the mid basilar artery. Superior cerebellar arteries: Antegrade flow in the right and to lesser extent left superior cerebellar arteries. .    Posterior cerebral arteries: Antegrade flow in the posterior cerebral arteries bilaterally. No aneurysms, stenoses or occlusions are noted. The superior sagittal sinus, vein of Charli, internal cerebral veins, straight sinus, transverse sinuses and sigmoid sinuses are patent. Axial source data: Areas of abnormal attenuation in the left basal ganglia and right basal ganglia. Diminished attenuation in the white matter. Impression  1. No significant hemodynamic stenosis in the right and left common and internal carotid arteries. 2. Antegrade flow in the right and left vertebral arteries. 3. Atherosclerotic calcification aortic arch. 4. Atherosclerotic calcification in the cavernous segments of both internal carotid arteries. No evidence of severe stenosis.   5. Narrowing in the distal branches of the right middle cerebral artery. There is antegrade flow in the left middle cerebral artery. 6. There is antegrade flow in the anterior and posterior cerebral arteries. 7. There is narrowing in the mid basilar artery. 8. Areas of abnormal attenuation in the left and right basal ganglia and in the white matter  9. Diffusion MRI scan may be helpful for better evaluation  10. Nurse Chester Oleary on the floor notified  of these results at 1:45 PM.      **This report has been created using voice recognition software. It may contain minor errors which are inherent in voice recognition technology. **    Final report electronically signed by DR Drake Payton on 7/25/2022 2:03 PM    Results for orders placed during the hospital encounter of 07/23/22    MRI BRAIN WO CONTRAST    Narrative  MR Brain without gadolinium. Comparison: CT,SR - CT HEAD WO CONTRAST - 07/25/2022 12:47 PM EDT    Findings:  No restricted diffusion. Old infarct in the right frontal corona radiata extending into the right  basal ganglia. Additional old lacunar infarct in the left basal ganglia. Small amount of hemosiderin deposition in the periphery of the old lacunar  infarcts. Nonspecific periventricular and scattered frontoparietal white matter  T2/FLAIR signal abnormality. Mild generalized atrophy. No intracranial mass or acute hemorrhage. No midline shift. No hydrocephalus. Vascular flow voids are intact. Mucosal thickening in the left frontal sinus, left ethmoid air cells,  bilateral maxillary sinuses, and left sphenoid sinus. Mastoid air cells are clear. Orbital contents are unremarkable. No focal bone lesion. Impression  1. No restricted diffusion to suggest acute infarct. 2. Old infarct in the right corona radiata extending to the right basal  ganglia. Old lacunar infarct in the left basal ganglia. 3. Mild atrophy.  Nonspecific periventricular and frontoparietal white  matter signal abnormality may relate to chronic small vessel ischemic  changes. This document has been electronically signed by: Casandra Tavares MD on  07/25/2022 07:25 PM    No results found for this or any previous visit. No results found for this or any previous visit. Results for orders placed during the hospital encounter of 07/23/22    CT HEAD WO CONTRAST (CODE STROKE)    Narrative  PROCEDURE: CT HEAD WO CONTRAST    CLINICAL INFORMATION: new right facial droop . TECHNIQUE: 2-D multiplanar noncontrast CT brain  All CT scans at this facility use dose modulation, iterative reconstruction, and/or weight-based dosing when appropriate to reduce radiation dose to as low as reasonably achievable. COMPARISON: No prior study. FINDINGS: No acute hemorrhage. No acute infarction identified. Generalized cerebral volume loss. Bilateral basal ganglia chronic infarcts. Diminished attenuation right parietal white matter. Which may be related to remote white matter ischemic change or  chronic small vessel ischemic changes. Subtle periventricular white matter diminished attenuation bilaterally. No intra-axial or extra-axial fluid collections. Ventricles are consistent with degree of cerebral volume loss. There is no midline shift. Mild ethmoid sinus disease left ethmoid sinus chambers. Acute sphenoid sinus disease. Remaining paranasal sinuses  and mastoid air cells are clear. No calvarial fracture. Impression  1. No acute intracranial pathology  2. Acute and chronic sinus disease. Information called to Nahid CHU at 12:54 PM    **This report has been created using voice recognition software. It may contain minor errors which are inherent in voice recognition technology. **    Final report electronically signed by Dr. Bright Martin on 7/25/2022 12:59 PM         Assessment:     Diagnosis Orders   1. Bilateral leg weakness        2.  Spinal stenosis of lumbar region, unspecified whether neurogenic claudication present             Follow up for bilateral leg weakness, weakness. He is doing better with his walking, he is now using a cane,  only uses walker for long distance. I shared with him that his MRI lumbar spine WO contrast showed severe left and mild right foraminal stenosis at the L5-S1 level. Mild to moderate spinal canal stenosis with mild bilateral foraminal stenosis at the L4-5 level. He also was found to have low vitamin B12 level=162. He is on vitamin B12 supplements, oral. [facility] to sublingual supplements. His aldolase was elevated =17.1, however his CK was within normal range=95. We will repeat these labs and arrange for him to undergo formal evaluation with spine specialist re: severe left and mild right foraminal stenosis at the L5-S1 level. Mild to moderate spinal canal stenosis with mild bilateral foraminal stenosis at the L4-5 level. After detailed discussion with patient and his wife we agreed on the following plan. Plan:  Referral to spine specialist re:  severe left and mild right foraminal stenosis at the L5-S1 level. Mild to moderate spinal canal stenosis with mild bilateral foraminal stenosis at the L4-5 level  CK  Aldolase  Vitamin B12 level  Continue with Vitamin B12 sublingual supplements, at least 3000 mcg daily, over the counter  Follow up in 3 months or sooner if needed. Call if any questions or concerns.     Total time 26 min    Anna Wallace, APRN - CNP

## 2022-11-14 NOTE — PATIENT INSTRUCTIONS
Referral to spine specialist re:  severe left and mild right foraminal stenosis at the L5-S1 level. Mild to moderate spinal canal stenosis with mild bilateral foraminal stenosis at the L4-5 level  CK  Aldolase  Vitamin B12 level  Continue with Vitamin B12 sublingual supplements, at least 3000 mcg daily, over the counter  Follow up in 3 months or sooner if needed. Call if any questions or concerns.

## 2022-11-15 ENCOUNTER — HOSPITAL ENCOUNTER (OUTPATIENT)
Age: 72
Discharge: HOME | End: 2022-11-15
Payer: MEDICARE

## 2022-11-15 DIAGNOSIS — M48.061 SPINAL STENOSIS OF LUMBAR REGION, UNSPECIFIED WHETHER NEUROGENIC CLAUDICATION PRESENT: ICD-10-CM

## 2022-11-15 DIAGNOSIS — R29.898 BILATERAL LEG WEAKNESS: ICD-10-CM

## 2022-11-15 PROCEDURE — 82746 ASSAY OF FOLIC ACID SERUM: CPT

## 2022-11-15 PROCEDURE — 82550 ASSAY OF CK (CPK): CPT

## 2022-11-15 PROCEDURE — 82607 VITAMIN B-12: CPT

## 2022-11-15 PROCEDURE — 82085 ASSAY OF ALDOLASE: CPT

## 2022-11-15 PROCEDURE — 36415 COLL VENOUS BLD VENIPUNCTURE: CPT

## 2022-11-16 LAB
FOLATE: 12.2 NG/ML (ref 4.8–24.2)
TOTAL CK: 194 U/L (ref 55–170)
VITAMIN B-12: 1123 PG/ML (ref 211–911)

## 2022-11-18 LAB — ALDOLASE: 4.5 U/L (ref 1.2–7.6)

## 2022-12-22 ENCOUNTER — APPOINTMENT (OUTPATIENT)
Dept: CT IMAGING | Age: 72
DRG: 389 | End: 2022-12-22
Payer: MEDICARE

## 2022-12-22 ENCOUNTER — HOSPITAL ENCOUNTER (INPATIENT)
Age: 72
LOS: 3 days | Discharge: HOME | DRG: 389 | End: 2022-12-25
Attending: EMERGENCY MEDICINE | Admitting: SURGERY
Payer: MEDICARE

## 2022-12-22 DIAGNOSIS — K56.609 SMALL BOWEL OBSTRUCTION (HCC): Primary | ICD-10-CM

## 2022-12-22 DIAGNOSIS — E87.6 HYPOKALEMIA: ICD-10-CM

## 2022-12-22 PROBLEM — K56.600 PARTIAL SMALL BOWEL OBSTRUCTION (HCC): Status: ACTIVE | Noted: 2022-12-22

## 2022-12-22 LAB
ALBUMIN SERPL-MCNC: 3.6 GM/DL (ref 3.4–5)
ALP BLD-CCNC: 81 U/L (ref 46–116)
ALT SERPL-CCNC: 73 U/L (ref 14–63)
AMORPHOUS: ABNORMAL
ANION GAP: 11 MEQ/L (ref 8–16)
APTT: 34.3 SECONDS (ref 22–38)
AST SERPL-CCNC: 50 U/L (ref 15–37)
BACTERIA: ABNORMAL
BASOPHILS # BLD: 0.8 % (ref 0–3)
BASOPHILS ABSOLUTE: 0.1 THOU/MM3 (ref 0–0.1)
BILIRUB SERPL-MCNC: 0.8 MG/DL (ref 0.2–1)
BILIRUBIN URINE: NEGATIVE
BLOOD, URINE: ABNORMAL
BUN BLDV-MCNC: 20 MG/DL (ref 7–18)
CASTS UA: ABNORMAL /LPF
CHARACTER, URINE: ABNORMAL
CHLORIDE BLD-SCNC: 103 MEQ/L (ref 98–107)
CO2: 29 MEQ/L (ref 21–32)
COLOR: YELLOW
CREAT SERPL-MCNC: 1.5 MG/DL (ref 0.6–1.3)
CRYSTALS, UA: ABNORMAL
EOSINOPHILS ABSOLUTE: 0.3 THOU/MM3 (ref 0–0.5)
EOSINOPHILS RELATIVE PERCENT: 4.9 % (ref 0–4)
EPITHELIAL CELLS, UA: ABNORMAL /HPF
GFR, ESTIMATED: 48 ML/MIN/1.73M2
GLUCOSE BLD-MCNC: 115 MG/DL (ref 74–106)
GLUCOSE, URINE: NEGATIVE MG/DL
HCT VFR BLD CALC: 41.3 % (ref 42–52)
HEMOGLOBIN: 14.1 GM/DL (ref 14–18)
IMMATURE GRANS (ABS): 0.01 THOU/MM3 (ref 0–0.07)
IMMATURE GRANULOCYTES: 0 %
INR BLD: 1.19 (ref 0.85–1.13)
KETONES, URINE: NEGATIVE
LEUKOCYTE ESTERASE, URINE: ABNORMAL
LIPASE: 64 U/L (ref 73–393)
LYMPHOCYTES # BLD: 14.6 % (ref 15–47)
LYMPHOCYTES ABSOLUTE: 1 THOU/MM3 (ref 1–4.8)
MAGNESIUM: 2 MG/DL (ref 1.8–2.4)
MCH RBC QN AUTO: 28.6 PG (ref 26–32)
MCHC RBC AUTO-ENTMCNC: 34.1 GM/DL (ref 31–35)
MCV RBC AUTO: 83.8 FL (ref 80–94)
MONOCYTES: 0.8 THOU/MM3 (ref 0.3–1.3)
MONOCYTES: 12.3 % (ref 0–12)
MUCUS: ABNORMAL
NITRITE, URINE: NEGATIVE
PDW BLD-RTO: 13.9 % (ref 11.5–14.9)
PH UA: 5.5 (ref 5–9)
PLATELET # BLD: 190 THOU/MM3 (ref 130–400)
PMV BLD AUTO: 10.3 FL (ref 9.4–12.4)
POC CALCIUM: 8.8 MG/DL (ref 8.5–10.1)
POTASSIUM SERPL-SCNC: 3.1 MEQ/L (ref 3.5–5.1)
PROTEIN UA: NEGATIVE MG/DL
RBC # BLD: 4.93 MILL/MM3 (ref 4.5–6.1)
RBC UA: ABNORMAL /HPF
REFLEX TO URINE C & S: ABNORMAL
SEG NEUTROPHILS: 67.2 % (ref 43–75)
SEGMENTED NEUTROPHILS ABSOLUTE COUNT: 4.4 THOU/MM3 (ref 1.8–7.7)
SODIUM BLD-SCNC: 143 MEQ/L (ref 136–145)
SPECIFIC GRAVITY UA: < 1.005 (ref 1–1.03)
TOTAL PROTEIN: 7.1 GM/DL (ref 6.4–8.2)
UROBILINOGEN, URINE: 0.2 EU/DL (ref 0–1)
WBC # BLD: 6.6 THOU/MM3 (ref 4.8–10.8)
WBC UA: ABNORMAL /HPF

## 2022-12-22 PROCEDURE — 6360000004 HC RX CONTRAST MEDICATION: Performed by: EMERGENCY MEDICINE

## 2022-12-22 PROCEDURE — APPNB30 APP NON BILLABLE TIME 0-30 MINS: Performed by: PHYSICIAN ASSISTANT

## 2022-12-22 PROCEDURE — 81001 URINALYSIS AUTO W/SCOPE: CPT

## 2022-12-22 PROCEDURE — 1200000000 HC SEMI PRIVATE

## 2022-12-22 PROCEDURE — 85730 THROMBOPLASTIN TIME PARTIAL: CPT

## 2022-12-22 PROCEDURE — 74174 CTA ABD&PLVS W/CONTRAST: CPT

## 2022-12-22 PROCEDURE — 96365 THER/PROPH/DIAG IV INF INIT: CPT

## 2022-12-22 PROCEDURE — 85610 PROTHROMBIN TIME: CPT

## 2022-12-22 PROCEDURE — 87086 URINE CULTURE/COLONY COUNT: CPT

## 2022-12-22 PROCEDURE — 2580000003 HC RX 258: Performed by: PHYSICIAN ASSISTANT

## 2022-12-22 PROCEDURE — 2580000003 HC RX 258: Performed by: EMERGENCY MEDICINE

## 2022-12-22 PROCEDURE — 80053 COMPREHEN METABOLIC PANEL: CPT

## 2022-12-22 PROCEDURE — 85025 COMPLETE CBC W/AUTO DIFF WBC: CPT

## 2022-12-22 PROCEDURE — 83735 ASSAY OF MAGNESIUM: CPT

## 2022-12-22 PROCEDURE — 99285 EMERGENCY DEPT VISIT HI MDM: CPT

## 2022-12-22 PROCEDURE — 83690 ASSAY OF LIPASE: CPT

## 2022-12-22 PROCEDURE — 6360000002 HC RX W HCPCS: Performed by: EMERGENCY MEDICINE

## 2022-12-22 RX ORDER — POTASSIUM CHLORIDE 7.45 MG/ML
10 INJECTION INTRAVENOUS ONCE
Status: COMPLETED | OUTPATIENT
Start: 2022-12-22 | End: 2022-12-22

## 2022-12-22 RX ORDER — POTASSIUM CHLORIDE 7.45 MG/ML
10 INJECTION INTRAVENOUS PRN
Status: DISCONTINUED | OUTPATIENT
Start: 2022-12-22 | End: 2022-12-25 | Stop reason: HOSPADM

## 2022-12-22 RX ORDER — HYDROCODONE BITARTRATE AND ACETAMINOPHEN 5; 325 MG/1; MG/1
1 TABLET ORAL EVERY 4 HOURS PRN
Status: DISCONTINUED | OUTPATIENT
Start: 2022-12-22 | End: 2022-12-25 | Stop reason: HOSPADM

## 2022-12-22 RX ORDER — POTASSIUM CHLORIDE 7.45 MG/ML
10 INJECTION INTRAVENOUS ONCE
Status: DISCONTINUED | OUTPATIENT
Start: 2022-12-22 | End: 2022-12-25 | Stop reason: HOSPADM

## 2022-12-22 RX ORDER — HYDROCODONE BITARTRATE AND ACETAMINOPHEN 5; 325 MG/1; MG/1
2 TABLET ORAL EVERY 4 HOURS PRN
Status: DISCONTINUED | OUTPATIENT
Start: 2022-12-22 | End: 2022-12-25 | Stop reason: HOSPADM

## 2022-12-22 RX ORDER — SODIUM CHLORIDE 9 MG/ML
INJECTION, SOLUTION INTRAVENOUS PRN
Status: DISCONTINUED | OUTPATIENT
Start: 2022-12-22 | End: 2022-12-25 | Stop reason: HOSPADM

## 2022-12-22 RX ORDER — 0.9 % SODIUM CHLORIDE 0.9 %
1000 INTRAVENOUS SOLUTION INTRAVENOUS ONCE
Status: COMPLETED | OUTPATIENT
Start: 2022-12-22 | End: 2022-12-22

## 2022-12-22 RX ORDER — SODIUM CHLORIDE 9 MG/ML
INJECTION, SOLUTION INTRAVENOUS CONTINUOUS
Status: DISCONTINUED | OUTPATIENT
Start: 2022-12-22 | End: 2022-12-24

## 2022-12-22 RX ORDER — POTASSIUM CHLORIDE 20 MEQ/1
40 TABLET, EXTENDED RELEASE ORAL PRN
Status: DISCONTINUED | OUTPATIENT
Start: 2022-12-22 | End: 2022-12-25 | Stop reason: HOSPADM

## 2022-12-22 RX ORDER — SODIUM CHLORIDE 0.9 % (FLUSH) 0.9 %
5-40 SYRINGE (ML) INJECTION EVERY 12 HOURS SCHEDULED
Status: DISCONTINUED | OUTPATIENT
Start: 2022-12-22 | End: 2022-12-25 | Stop reason: HOSPADM

## 2022-12-22 RX ORDER — ONDANSETRON 2 MG/ML
4 INJECTION INTRAMUSCULAR; INTRAVENOUS EVERY 6 HOURS PRN
Status: DISCONTINUED | OUTPATIENT
Start: 2022-12-22 | End: 2022-12-25 | Stop reason: HOSPADM

## 2022-12-22 RX ORDER — SODIUM CHLORIDE 0.9 % (FLUSH) 0.9 %
5-40 SYRINGE (ML) INJECTION PRN
Status: DISCONTINUED | OUTPATIENT
Start: 2022-12-22 | End: 2022-12-25 | Stop reason: HOSPADM

## 2022-12-22 RX ORDER — SULFAMETHOXAZOLE AND TRIMETHOPRIM 800; 160 MG/1; MG/1
1 TABLET ORAL EVERY 12 HOURS SCHEDULED
Status: DISCONTINUED | OUTPATIENT
Start: 2022-12-23 | End: 2022-12-25 | Stop reason: HOSPADM

## 2022-12-22 RX ORDER — FAMOTIDINE 20 MG/1
20 TABLET, FILM COATED ORAL DAILY
Status: DISCONTINUED | OUTPATIENT
Start: 2022-12-23 | End: 2022-12-25 | Stop reason: HOSPADM

## 2022-12-22 RX ORDER — ONDANSETRON 4 MG/1
4 TABLET, ORALLY DISINTEGRATING ORAL EVERY 8 HOURS PRN
Status: DISCONTINUED | OUTPATIENT
Start: 2022-12-22 | End: 2022-12-25 | Stop reason: HOSPADM

## 2022-12-22 RX ADMIN — SODIUM CHLORIDE, PRESERVATIVE FREE 10 ML: 5 INJECTION INTRAVENOUS at 22:44

## 2022-12-22 RX ADMIN — IOPAMIDOL 100 ML: 755 INJECTION, SOLUTION INTRAVENOUS at 16:34

## 2022-12-22 RX ADMIN — SODIUM CHLORIDE 1000 ML: 9 INJECTION, SOLUTION INTRAVENOUS at 16:17

## 2022-12-22 RX ADMIN — SODIUM CHLORIDE: 9 INJECTION, SOLUTION INTRAVENOUS at 22:43

## 2022-12-22 RX ADMIN — POTASSIUM CHLORIDE 10 MEQ: 7.46 INJECTION, SOLUTION INTRAVENOUS at 16:46

## 2022-12-22 ASSESSMENT — PAIN SCALES - GENERAL: PAINLEVEL_OUTOF10: 0

## 2022-12-22 ASSESSMENT — ENCOUNTER SYMPTOMS
SHORTNESS OF BREATH: 0
BLOOD IN STOOL: 0
VOMITING: 0
NAUSEA: 0
WHEEZING: 0
ABDOMINAL PAIN: 0

## 2022-12-22 ASSESSMENT — PAIN - FUNCTIONAL ASSESSMENT: PAIN_FUNCTIONAL_ASSESSMENT: NONE - DENIES PAIN

## 2022-12-22 NOTE — ED NOTES
Transported to [facility] per [facility] in stable condition. Report to EMS. Copy of chart sent with squad. Patient alert and cooperative. Skin warm and dry, color normal for ethnicity. Family enroute to [facility] per private vehicle. Attempted to call report called to floor. IV of 0.9 NS continues to infuse at [facility] rate in the left antecubital. No redness or swelling at site.      Michelle Herrera, RN  12/22/22 1800 N Anup Briceno RN  12/22/22 1901

## 2022-12-22 NOTE — ED PROVIDER NOTES
[facility]  eMERGENCY dEPARTMENT eNCOUnter             [address] COMPLAINT    Chief Complaint   Patient presents with    Abdominal Pain       Nurses Notes reviewed and I agree except as noted in the HPI. HPI    Ivory Ayoub is a 74 y.o. male who presents at about 3 days ago he had nausea, vomiting, and diarrhea. Since then, he has had no bowel movement. He has continued nausea, no further vomiting. He has abdominal pain, primarily in the epigastrium and left side of his abdomen, that worsens with eating. The pain was worse today. He states that sometimes there is no pain, and then awoke on really hard, 7/10. He has not had much to eat or drink today because of the pain. Previous abdominal surgery cholecystectomy, TURP. He has also had previous ERCP. He has not tried anything for his pain. REVIEW OF SYSTEMS      Review of Systems   Constitutional:  Positive for malaise/fatigue. Negative for fever. HENT:  Negative for congestion and sore throat. Respiratory:  Negative for cough, shortness of breath and wheezing. Cardiovascular:  Negative for chest pain and palpitations. Gastrointestinal:  Positive for abdominal pain and nausea. Genitourinary:  Negative for dysuria. Musculoskeletal:  Negative for myalgias. Neurological:  Positive for weakness. Negative for dizziness and headaches. All other systems reviewed and are negative. PAST MEDICAL HISTORY     has a past medical history of Arthritis, Back pain, CAD in native artery, Diarrhea, Hypertension, Infection of prosthetic shoulder joint ([address] PFO (patent foramen ovale), Propionibacterium infection, Rotator cuff injury, Stroke ([address] and TIA (transient ischemic attack). SURGICAL HISTORY     has a past surgical history that includes Rotator cuff repair (Left, 03/12/2013); Breast cyst incision and drainage;  Cholecystectomy (06/25/2017); ERCP (06/23/2017); TURP (N/A, 8/13/2019); Cystoscopy (N/A, 2019); Cystoscopy (N/A, 2020); shoulder surgery (Right, 2020); shoulder surgery (2020); Coronary angioplasty with stent; and back surgery (2022). CURRENT MEDICATIONS    Current Discharge Medication List        CONTINUE these medications which have NOT CHANGED    Details   Cyanocobalamin (VITAMIN B 12) 250 MCG LOZG Take by mouth      clopidogrel (PLAVIX) 75 MG tablet Take 1 tablet by mouth in the morning. hydroCHLOROthiazide (MICROZIDE) 12.5 MG capsule Take 2 capsules by mouth in the morning. Qty: 30 capsule, Refills: 3      isosorbide mononitrate (IMDUR) 30 MG extended release tablet Take 1 tablet by mouth daily  Qty: 90 tablet, Refills: 1      aspirin 81 MG chewable tablet Take 1 tablet by mouth daily  Qty: 30 tablet, Refills: 3      nitroGLYCERIN (NITROSTAT) 0.4 MG SL tablet up to max of 3 total doses. If no relief after 1 dose, call 911. Qty: 25 tablet, Refills: 1      tamsulosin (FLOMAX) 0.4 MG capsule Take 0.4 mg by mouth nightly       losartan (COZAAR) 100 MG tablet Take 50 mg by mouth daily       Potassium 99 MG TABS Take 3 tablets by mouth daily      Multiple Vitamins-Minerals (PRESERVISION AREDS PO) Take by mouth      Ascorbic Acid (VITAMIN C) 500 MG tablet Take 1,000 mg by mouth     Associated Diagnoses: Urgency of urination; Retention of urine             ALLERGIES    is allergic to adhesive tape. FAMILY HISTORY    He indicated that his mother is . He indicated that his father is . He indicated that two of his three sisters are alive. He indicated that his brother is . He indicated that the status of his maternal uncle is unknown. He indicated that the status of his neg hx is unknown.   family history includes Arthritis in his mother; Cancer in his brother; Stroke in his maternal uncle; Vision Loss in his father. SOCIAL HISTORY     reports that he has never smoked.  He has never used smokeless tobacco. He reports that he does not drink alcohol and does not use drugs. PHYSICAL EXAM       INITIAL VITALS: BP (!) 150/67   Pulse 67   Temp 97.8 °F (36.6 °C) (Oral)   Resp 20   SpO2 92%      Physical Exam  Vitals and nursing note reviewed. Exam conducted with a chaperone present. Constitutional:       General: He is in acute distress. HENT:      Mouth/Throat:      Comments: Decreased oral moisture  Eyes:      Pupils: Pupils are equal, round, and reactive to light. Cardiovascular:      Rate and Rhythm: Normal rate and regular rhythm. Heart sounds: No murmur heard. Pulmonary:      Effort: Pulmonary effort is normal. No respiratory distress. Breath sounds: Normal breath sounds. No wheezing. Abdominal:      Comments: Protuberant, bowel sounds present, tender and tympanitic in the left upper quadrant, tender epigastrium. No mass felt. No guarding. Skin:     General: Skin is warm and dry. Findings: No rash. Neurological:      General: No focal deficit present. Mental Status: He is alert and oriented to person, place, and time. Psychiatric:      Comments: Pain behavior        DIAGNOSTIC RESULTS      RADIOLOGY:    CTA ABDOMEN PELVIS W WO CONTRAST   Final Result       1. Segments of mildly dilated fluid-filled small bowel in the left upper quadrant with possible proximal transition point. Differential considerations include early or partial proximal small bowel obstruction or enteritis. 2. Widely patent mesenteric vessels. 3. Moderate stenosis at the proximal left renal artery. **This report has been created using voice recognition software. It may contain minor errors which are inherent in voice recognition technology. **      Final report electronically signed by Dr. Chayo Mac MD on 12/22/2022 4:52 PM              LABS:     Labs Reviewed   CBC WITH AUTO DIFFERENTIAL - Abnormal; Notable for the following components:       Result Value    Hematocrit 41.3 (*)     Lymphocytes 14.6 (*)     Monocytes 12.3 (*)     Eosinophils % 4.9 (*)     All other components within normal limits   COMPREHENSIVE METABOLIC PANEL - Abnormal; Notable for the following components:    Glucose 115 (*)     Creatinine 1.5 (*)     BUN 20 (*)     Potassium 3.1 (*)     AST 50 (*)     ALT 73 (*)     All other components within normal limits   LIPASE - Abnormal; Notable for the following components:    Lipase 64.0 (*)     All other components within normal limits   PROTIME-INR - Abnormal; Notable for the following components:    INR 1.19 (*)     All other components within normal limits   URINALYSIS WITH REFLEX TO CULTURE - Abnormal; Notable for the following components:    Blood, Urine TRACE (*)     Leukocyte Esterase, Urine MODERATE (*)     All other components within normal limits   GLOMERULAR FILTRATION RATE, ESTIMATED - Abnormal; Notable for the following components:    GFR, Estimated 48 (*)     All other components within normal limits   CULTURE, REFLEXED, URINE   MAGNESIUM   APTT   ANION GAP   BASIC METABOLIC PANEL W/ REFLEX TO MG FOR LOW K   CBC WITH AUTO DIFFERENTIAL       Vitals:    Vitals:    12/22/22 1517 12/22/22 1754 12/22/22 1943   BP: 131/63 134/70 (!) 150/67   Pulse: 77 69 67   Resp: 20 20 20   Temp: 97 °F (36.1 °C) 98.5 °F (36.9 °C) 97.8 °F (36.6 °C)   TempSrc: Temporal Oral Oral   SpO2: 93% 94% 92%       EMERGENCY DEPARTMENT COURSE:    Received IV fluid bolus, IV potassium. He declined medication for pain or nausea. I contacted the on-call surgeon at [facility], Dr. Jai Erazo, who agreed to admit the patient at [facility] for further care. Ambulance transport arranged. FINAL IMPRESSION      1. Small bowel obstruction ([address]    2. Hypokalemia        DISPOSITION/PLAN    DISPOSITION Decision To Admit 1728, 12/22/2022  To [facility], to be admitted to the care of Dr. Jai Erazo, ambulance transport.       (Please note that portions of this note were completed with a voice recognition program.  Efforts were made to edit the dictations but occasionally words are mis-transcribed.)       Elzbieta Anaya MD  12/23/22 5239

## 2022-12-22 NOTE — ED NOTES
Presents with 2 day history of abdominal pain after eating. Patient advised that the pain usually last about 1/2 hour. Denies pain at this time. Patient is alert and cooperative. Skin warm and dry. Color normal for ethnicity.      Edgard Pal RN  12/22/22 3616

## 2022-12-23 PROBLEM — K52.9 ENTERITIS: Status: ACTIVE | Noted: 2022-12-23

## 2022-12-23 LAB
ANION GAP SERPL CALCULATED.3IONS-SCNC: 10 MEQ/L (ref 8–16)
BASOPHILS # BLD: 1.1 %
BASOPHILS ABSOLUTE: 0.1 THOU/MM3 (ref 0–0.1)
BUN BLDV-MCNC: 17 MG/DL (ref 7–22)
CALCIUM SERPL-MCNC: 8.5 MG/DL (ref 8.5–10.5)
CHLORIDE BLD-SCNC: 101 MEQ/L (ref 98–111)
CO2: 28 MEQ/L (ref 23–33)
CREAT SERPL-MCNC: 1.2 MG/DL (ref 0.4–1.2)
EOSINOPHIL # BLD: 7.6 %
EOSINOPHILS ABSOLUTE: 0.5 THOU/MM3 (ref 0–0.4)
ERYTHROCYTE [DISTWIDTH] IN BLOOD BY AUTOMATED COUNT: 14.2 % (ref 11.5–14.5)
ERYTHROCYTE [DISTWIDTH] IN BLOOD BY AUTOMATED COUNT: 43.8 FL (ref 35–45)
GFR SERPL CREATININE-BSD FRML MDRD: > 60 ML/MIN/1.73M2
GLUCOSE BLD-MCNC: 95 MG/DL (ref 70–108)
HCT VFR BLD CALC: 39.8 % (ref 42–52)
HEMOGLOBIN: 13.4 GM/DL (ref 14–18)
IMMATURE GRANS (ABS): 0.02 THOU/MM3 (ref 0–0.07)
IMMATURE GRANULOCYTES: 0.3 %
LYMPHOCYTES # BLD: 22.8 %
LYMPHOCYTES ABSOLUTE: 1.4 THOU/MM3 (ref 1–4.8)
MAGNESIUM: 2 MG/DL (ref 1.6–2.4)
MCH RBC QN AUTO: 28.6 PG (ref 26–33)
MCHC RBC AUTO-ENTMCNC: 33.7 GM/DL (ref 32.2–35.5)
MCV RBC AUTO: 84.9 FL (ref 80–94)
MONOCYTES # BLD: 11.1 %
MONOCYTES ABSOLUTE: 0.7 THOU/MM3 (ref 0.4–1.3)
NUCLEATED RED BLOOD CELLS: 0 /100 WBC
PLATELET # BLD: 186 THOU/MM3 (ref 130–400)
PMV BLD AUTO: 10.4 FL (ref 9.4–12.4)
POTASSIUM REFLEX MAGNESIUM: 3.2 MEQ/L (ref 3.5–5.2)
RBC # BLD: 4.69 MILL/MM3 (ref 4.7–6.1)
SEG NEUTROPHILS: 57.1 %
SEGMENTED NEUTROPHILS ABSOLUTE COUNT: 3.5 THOU/MM3 (ref 1.8–7.7)
SODIUM BLD-SCNC: 139 MEQ/L (ref 135–145)
WBC # BLD: 6.2 THOU/MM3 (ref 4.8–10.8)

## 2022-12-23 PROCEDURE — 2580000003 HC RX 258: Performed by: PHYSICIAN ASSISTANT

## 2022-12-23 PROCEDURE — 85025 COMPLETE CBC W/AUTO DIFF WBC: CPT

## 2022-12-23 PROCEDURE — 80048 BASIC METABOLIC PNL TOTAL CA: CPT

## 2022-12-23 PROCEDURE — 6370000000 HC RX 637 (ALT 250 FOR IP): Performed by: PHYSICIAN ASSISTANT

## 2022-12-23 PROCEDURE — 83735 ASSAY OF MAGNESIUM: CPT

## 2022-12-23 PROCEDURE — 1200000000 HC SEMI PRIVATE

## 2022-12-23 PROCEDURE — 36415 COLL VENOUS BLD VENIPUNCTURE: CPT

## 2022-12-23 RX ADMIN — SULFAMETHOXAZOLE AND TRIMETHOPRIM 1 TABLET: 800; 160 TABLET ORAL at 19:55

## 2022-12-23 RX ADMIN — SODIUM CHLORIDE, PRESERVATIVE FREE 10 ML: 5 INJECTION INTRAVENOUS at 19:55

## 2022-12-23 RX ADMIN — FAMOTIDINE 20 MG: 20 TABLET ORAL at 09:15

## 2022-12-23 RX ADMIN — SODIUM CHLORIDE: 9 INJECTION, SOLUTION INTRAVENOUS at 09:27

## 2022-12-23 RX ADMIN — POTASSIUM CHLORIDE 40 MEQ: 1500 TABLET, EXTENDED RELEASE ORAL at 09:15

## 2022-12-23 RX ADMIN — SULFAMETHOXAZOLE AND TRIMETHOPRIM 1 TABLET: 800; 160 TABLET ORAL at 09:15

## 2022-12-23 RX ADMIN — SODIUM CHLORIDE, PRESERVATIVE FREE 10 ML: 5 INJECTION INTRAVENOUS at 09:14

## 2022-12-23 ASSESSMENT — ENCOUNTER SYMPTOMS
COUGH: 0
ABDOMINAL PAIN: 1
SHORTNESS OF BREATH: 0
SORE THROAT: 0
NAUSEA: 1
WHEEZING: 0

## 2022-12-23 NOTE — CARE COORDINATION
DISCHARGE PLANNING EVALUATION  12/23/22, 2:25 PM EST    Reason for Referral: from [address] [place] [address] assisted living   Mental Status: alert, oriented   Decision Making:  makes own decisions   Family/Social/Home Environment:  Erin Moran lives in assisted living at [address] [place] [address]. He has assistance with meals, housekeeping, medications. He plans to return there at discharge  Current Services including food security, transportation and housekeeping: [facility] [address] [facility] assisted living   Current Equipment:   Payment Source:medicare   Concerns or Barriers to Discharge: return to assisted living  Post-acute [facility]) provider list was provided to patient. Patient was informed of their freedom to choose [facility] provider. Discussed and offered to show the patient the relevant [facility] Providers quality and resource use measures on Medicare Compare web site via computer based on patient's goals of care and treatment preferences. Questions regarding selection process were answered. Teach Back Method used withpatient regarding care plan and discharge plan  Patient  verbalizes understanding of the plan of care and contributes to goal setting. Patient goals, treatment preferences and discharge plan:  spoke with Erin Moran. He plans to return to [address] [place] [address] assisted living at discharge.   Confirmed with assisted living at [facility] signed by MARS Fields on 12/23/2022 at 2:25 PM

## 2022-12-23 NOTE — CARE COORDINATION
Case Management Assessment  Initial Evaluation    Date/Time of Evaluation: 12/23/2022 3:14 PM  Assessment Completed by: Gordon Chen RN    If patient is discharged prior to next notation, then this note serves as note for discharge by case management. Patient Name: Fabby Rossi                   YOB: 1950  Diagnosis: Partial small bowel obstruction ([facility] [address] [W53.574]  SBO (small bowel obstruction) ([facility] [address] [Q54.330]                   Date / Time: 12/22/2022  3:06 PM  Location: Critical access hospital21/[facility]     Patient Admission Status: Inpatient   Readmission Risk (Low < 19, Mod (19-27), High > 27): Readmission Risk Score: 11.3    Current PCP: Ariadna Kee, DO  PCP verified by CM? Yes    Chart Reviewed: Yes      History Provided by: Medical Record  Patient Orientation: Alert and Oriented    Patient Cognition: Alert    Hospitalization in the last 30 days (Readmission):  No    If yes, Readmission Assessment in  Navigator will be completed. Advance Directives:      Code Status: Limited   Patient's Primary Decision Maker is: Named in [address] [place] [address]    Primary Decision Maker: Lula Thayer - Spouse - [phone]    Discharge Planning:    Patient lives with: Alone   Type of Home: Long-Term Acute Care  Primary Care Giver: Self  Patient Support Systems include: Spouse/Significant Other, Friends/Neighbors, Children   Current Financial resources: MedicareMedicare  Current community resources: Other (Comment)no none  Current services prior to admission: Extended Care Facility  Current DME:  at [facility]  Type of Home Care services:  None    ADLS  Prior functional level: Independent in ADLs/IADLs  Current functional level: Independent in ADLs/IADLs    Family can provide assistance at DC: Yes  Would you like Case Management to discuss the discharge plan with any other family   members/significant others, and if so, who?  No  Plans to Return to Present Housing: Yes  Other Identified Issues/Barriers to RETURNING to current housing:  none, from SNF  Potential Assistance needed at discharge: [address]  Potential DME:  na  Patient expects to discharge to: Long-term care  Plan for transportation at discharge:      Financial    Payor: MEDICARE / Plan: MEDICARE PART A AND B / Product Type: *No Product type* /     Does insurance require precert for SNF: No    Potential assistance Purchasing Medications: No  Meds-to-Beds request: No      [facility] [address] IBAN [address] [facility], [address] [facility] [address]  N [phone] - F [phone]  [address] [facility] [address]  Phone: [phone] Fax: 12 [facility], [address] [address]  Phone: [phone] Fax: [phone]      Notes:    Factors facilitating achievement of predicted outcomes: Family support, Cooperative, and Pleasant    Barriers to discharge: Limited safety awareness    Additional Case Management Notes: about 3 days ago he had nausea, vomiting, and diarrhea. Since then, he has had no bowel movement. On full liquids, IVF, Pepcid, K+ replacement. Bactrim. Procedure:   CT abdomen pelvis:  Segments of mildly dilated fluid-filled small bowel in the left upper quadrant with possible proximal transition point. Differential considerations include early or partial proximal small bowel obstruction or enteritis. Widely patent mesenteric vessels. Moderate stenosis at the proximal left renal artery. The Plan for Transition of Care is related to the following treatment goals of Partial small bowel obstruction ([address] [K56.600]  SBO (small bowel obstruction) ([address] [U69.202]    Patient Goals/Plan/Treatment Preferences: Kim Rivera is from [address] [place] [address];  consulted to see and follow. CM visit deferred. Transportation/Food Security/Housekeeping Addressed: No issues identified.      Shelby Williamson RN  Case Management Department

## 2022-12-23 NOTE — PLAN OF CARE
Problem: Discharge Planning  Goal: Discharge to home or other facility with appropriate resources  12/23/2022 1053 by Maranda Mcintosh RN  Outcome: Progressing  12/23/2022 1053 by Maranda Mcintosh RN  Outcome: Progressing     Problem: Pain  Goal: Verbalizes/displays adequate comfort level or baseline comfort level  12/23/2022 1053 by Maranda Mcintosh RN  Outcome: Progressing  Flowsheets (Taken 12/23/2022 1053)  Verbalizes/displays adequate comfort level or baseline comfort level:   Encourage patient to monitor pain and request assistance   Assess pain using appropriate pain scale   Administer analgesics based on type and severity of pain and evaluate response   Implement non-pharmacological measures as appropriate and evaluate response   Consider cultural and social influences on pain and pain management   Notify Licensed Independent Practitioner if interventions unsuccessful or patient reports new pain  12/23/2022 1053 by Maranda Mcintosh RN  Outcome: Progressing  Flowsheets (Taken 12/23/2022 1053)  Verbalizes/displays adequate comfort level or baseline comfort level:   Encourage patient to monitor pain and request assistance   Assess pain using appropriate pain scale   Administer analgesics based on type and severity of pain and evaluate response   Implement non-pharmacological measures as appropriate and evaluate response   Consider cultural and social influences on pain and pain management   Notify Licensed Independent Practitioner if interventions unsuccessful or patient reports new pain     Problem: Safety - Adult  Goal: Free from fall injury  12/23/2022 1053 by Maranda Mcintosh RN  Outcome: Progressing  Flowsheets (Taken 12/23/2022 1053)  Free From Fall Injury: Instruct family/caregiver on patient safety  12/23/2022 1053 by Maranda Mcintosh RN  Outcome: Progressing  Flowsheets (Taken 12/23/2022 1053)  Free From Fall Injury: Instruct family/caregiver on patient safety     Problem: Gastrointestinal - Adult  Goal: Minimal or absence of nausea and vomiting  Outcome: Progressing  Flowsheets (Taken 12/23/2022 1053)  Minimal or absence of nausea and vomiting:   Administer IV fluids as ordered to ensure adequate hydration   Advance diet as tolerated, if ordered   Administer ordered antiemetic medications as needed  Goal: Maintains or returns to baseline bowel function  Outcome: Progressing  Flowsheets (Taken 12/23/2022 1053)  Maintains or returns to baseline bowel function:   Assess bowel function   Administer IV fluids as ordered to ensure adequate hydration   Encourage mobilization and activity   Encourage oral fluids to ensure adequate hydration   Administer ordered medications as needed  Goal: Maintains adequate nutritional intake  Outcome: Progressing  Flowsheets (Taken 12/23/2022 1053)  Maintains adequate nutritional intake:   Monitor percentage of each meal consumed   Assist with meals as needed   Monitor intake and output, weight and lab values

## 2022-12-23 NOTE — H&P
[address] Surgery History & Physical - Isabell Simmons PA-C  On behalf of Dr. Olamide Gunter    Pt Name: Gerardo Edmondson  MRN: [id number]  [place]: 1950  Date of evaluation: 12/22/2022  Primary Care Physician: Megan Mcbride DO  Patient evaluated at the request of  Dr. Edward Cleveland  Reason for evaluation: SBO  IMPRESSIONS   SBO vs enteritis  UTI  BRE  Hypokalemia   has a past medical history of Arthritis, Back pain, CAD in native artery, Diarrhea, Hypertension, Infection of prosthetic shoulder joint ([facility] [address] PFO (patent foramen ovale), Propionibacterium infection, Rotator cuff injury, Stroke ([address] and TIA (transient ischemic attack). PLANS   Admit to general surgery  Trail of clear liquids  Gentle IVF for BRE  Pain control and antiemetics prn  Potassium replacement protocols  Urine culture pending, will start antibiotics for symptomatic bacteruria  Repeat labs in AM  Resume home medications on admission  DVT prophylaxis- SCDs  Final impression and plan per Dr. Joan Pollack   History of Chief Complaint:    Yrn Roth is a 74 y.o.male who presents with abdominal pain. Transferring from [facility] for small bowel obstruction versus enteritis. Patient stated he developed periumbilical abdominal pain that started 2 days ago on Tuesday and was persistent. Describes the pain as aching in nature. Patient also endorsed having diarrhea on Tuesday. Patient also endorsed having dysuria, increased frequency, and urinary incontinence. He denied any other acute pain or complaints. Patient denied any headaches, lightheadedness, dizziness, fever/chills, chest pain, shortness of breath, hematuria, hematochezia, pain in extremities, and new or worsening paresthesias. Upon assessment patient denied having any acute abdominal pain, bloating, or any recent nausea/vomiting. Patient said his last emesis was on Tuesday.   Patient also stated his last bowel movement/diarrhea was on Tuesday. Past abdominal surgical history reviewed. Patient status post cholecystectomy in 2017 following choledocholithiasis and ERCP. Patient on aspirin and Plavix. On exam patient's abdomen was soft with periumbilical tenderness palpation. No guarding or peritoneal signs. Bowel sounds active. CTA abdomen/pelvis from outside facility is reviewed. CTA reported segments of mildly dilated fluid-filled small bowel in the left upper quadrant with possible proximal transition point differential including early/partial small bowel obstruction versus enteritis. Labs and vital signs reviewed. Patient afebrile, vital signs stable. No leukocytosis. Hemoglobin 14.1. Creatinine elevated at 1.5 baseline appears to be 1.0. Potassium 3.1.  UA positive for leukocytes and few bacteria. Urine culture pending. Based off physical exam we will do trial of clear liquids tonight and see how patient tolerates. Monitor for abdominal pain, distention, or nausea/vomiting. Repeat labs tomorrow. Case discussed with general surgeon on-call, Dr. Alesha Reese. Reviewed Code status with patient and family at bedside. Patient A&Ox3. Patient denies wanting any resuscitative efforts including intubation, chest compressions, defibrillation/cardioversion, and resuscitative medications. Spiritual care consulted follow-up on advanced directives. Past Medical History   has a past medical history of Arthritis, Back pain, CAD in native artery, Diarrhea, Hypertension, Infection of prosthetic shoulder joint ([address] PFO (patent foramen ovale), Propionibacterium infection, Rotator cuff injury, Stroke ([address] and TIA (transient ischemic attack). Past Surgical History   has a past surgical history that includes Rotator cuff repair (Left, 03/12/2013); Breast cyst incision and drainage; Cholecystectomy (06/25/2017); ERCP (06/23/2017); TURP (N/A, 8/13/2019); Cystoscopy (N/A, 12/17/2019);  Cystoscopy (N/A, 2/18/2020); shoulder surgery (Right, 03/02/2020); shoulder surgery (06/2020); Coronary angioplasty with stent; and back surgery (05/16/2022). Medications  Prior to Admission medications    Medication Sig Start Date End Date Taking? Authorizing Provider   Cyanocobalamin (VITAMIN B 12) 250 MCG LOZG Take by mouth    Historical Provider, MD   clopidogrel (PLAVIX) 75 MG tablet Take 1 tablet by mouth in the morning. 8/2/22   [facility], APRN - CNP   hydroCHLOROthiazide (MICROZIDE) 12.5 MG capsule Take 2 capsules by mouth in the morning. 8/3/22   Elaine [place] Lee, APRN - CNP   isosorbide mononitrate (IMDUR) 30 MG extended release tablet Take 1 tablet by mouth daily 3/1/22   Benito Gutierrez MD   aspirin 81 MG chewable tablet Take 1 tablet by mouth daily 9/21/21   Benito Gutierrez MD   nitroGLYCERIN (NITROSTAT) 0.4 MG SL tablet up to max of 3 total doses. If no relief after 1 dose, call 911. 6/15/21   Benito Gutierrez MD   tamsulosin (FLOMAX) 0.4 MG capsule Take 0.4 mg by mouth nightly     Historical Provider, MD   losartan (COZAAR) 100 MG tablet Take 50 mg by mouth daily     Historical Provider, MD   Potassium 99 MG TABS Take 3 tablets by mouth daily    Historical Provider, MD   Multiple Vitamins-Minerals (PRESERVISION AREDS PO) Take by mouth    Historical Provider, MD   Ascorbic Acid (VITAMIN C) 500 MG tablet Take 1,000 mg by mouth     Historical Provider, MD    Scheduled Meds:   potassium chloride  10 mEq IntraVENous Once     Continuous Infusions:  PRN Meds:. Allergies  is allergic to adhesive tape. Family History  family history includes Arthritis in his mother; Cancer in his brother; Stroke in his maternal uncle; Vision Loss in his father. Social History   reports that he has never smoked. He has never used smokeless tobacco. He reports that he does not drink alcohol and does not use drugs. Review of Systems:  Review of Systems   Constitutional:  Negative for chills, diaphoresis and fever.    Respiratory:  Negative for shortness of breath Bowel sounds normal active  EXTREMITIES: No cyanosis. PMS intact in all four extremities. SKIN: Warm and dry      LABS     Recent Labs     12/22/22  1547 12/22/22  1744   WBC 6.6  --    HGB 14.1  --    HCT 41.3*  --      --      --    K 3.1*  --      --    CO2 29  --    BUN 20*  --    CREATININE 1.5*  --    MG 2.0  --    INR 1.19*  --    AST 50*  --    ALT 73*  --    BILITOT 0.8  --    LIPASE 64.0*  --    NITRU  --  NEGATIVE   COLORU  --  YELLOW   BACTERIA  --  FEW     RADIOLOGY     CTA ABDOMEN PELVIS W WO CONTRAST   Final Result       1. Segments of mildly dilated fluid-filled small bowel in the left upper quadrant with possible proximal transition point. Differential considerations include early or partial proximal small bowel obstruction or enteritis. 2. Widely patent mesenteric vessels. 3. Moderate stenosis at the proximal left renal artery. **This report has been created using voice recognition software. It may contain minor errors which are inherent in voice recognition technology. **      Final report electronically signed by Dr. Abby Haines MD on 12/22/2022 4:52 PM          Total time spent in care of patient:  30 minutes collectively between subjective/objective examination, chart review, documentation, clinical reasoning and discussion with attending regarding plan/interval changes. Electronically signed by Anish Dong PA-C on 12/22/2022 at 9:02 PM      Patient seen and examined independently by me 12/23/2022     I personally supervised the PA/NP in the evaluation, management and development of the treatment plan for Ivory Ayoub  on the same date of service as above. I personally interviewed Ivory Ayoub   and  discussed his review of symptoms as able due to the patient's condition, as well as performed an individual physical exam on the same   date of service as above.   In addition I discussed the patient's condition and treatment options with the patient, if able, and/or designated family if available. I have also reviewed and agree with the past medical,  family and social history updates as well as care plans unless otherwise noted below. All questions were answered. I examined independently and reviewed relevant data myself and may have done so in the context of team rounds. A full chart review was performed by me. I attest that this medical record entry accurately reflects signatures and notations that I made in my capacity as an M. D. when I treated and diagnosed Erasmo Suh on the date of service above     I was responsible for all medical decision making involving this encounter. I identified and/or confirmed all problems associated with this patient encounter by my own direct physical examination of this patient and review of all radiology studies and labwork  that were ordered and available. Active Hospital Problems    Diagnosis     Partial small bowel obstruction ([address] [K56.600]      Priority: Medium    SBO (small bowel obstruction) ([address] [K56.609]      Priority: Medium        I  discussed the management of all of the identified problems with the APN or PA. I formulated the treatment plan for all identified problems and discussed those with the APN or PA . This management plan was then carried out and the patient's orders for care by the APN or PA.       Total time personally spent on this patient encounter was 55 minutes which includes :  Preparing to see the patient( reviewing tests and chart)  Obtaining and reviewing separately obtained history  Performing a medically appropriate examination and evaluation  Ordering medications, tests, or procedures  Counseling and educating the patient/family/caregiver  Care coordination  Referring and communicating with other healthcare professionals  Documenting clinical information in the EHR  Independent interpretation of results and communicating the results to patient and care team  This includes a direct physical exam as well as all the other encounter activities described above. Time may be discontiguous. Time does not include procedures. Please see our orders that were directed and approved by me if there are any new ones for the updated patient care plan. Above discussed and I agree with documentation and orders placed by Shasta Dejesus PA    See any additional comments if needed below for any other updated orders and plans. -- No acute surgical intervention needed at this time. Abdomen fairly benign. Most likely enteritis. Continue serial abdominal examination. Full liquid diet today. IV fluid hydration. Replace electrolytes as needed per protocol. DVT prophylaxis. Await final cultures on urine. Pain and nausea control. Conservative management at this time. Hopeful discharge over the next 24-48 hours depending how he progresses.     Electronically signed by Rebel Hna MD on 12/23/22 at 6:40 AM EST

## 2022-12-23 NOTE — PROGRESS NOTES
Assessment: This was a spiritual care encounter in response to PA request for ACP conversation. Patient, ANNE, already has advance directives on file in Epic and did not feel the need to update them. Patient appeared calm. Interventions:   provided active listening and provided information regarding  services and availability. Outcomes:  Patient debriefed his health situation and shared his optimism about being able to go home within the next couple days. Plan:  Spiritual care team will remain available to support patient and family, PRN. [facility]. [address] [place] [address] [place] Urmila Sanders, [address]  [phone]

## 2022-12-24 LAB
ANION GAP SERPL CALCULATED.3IONS-SCNC: 10 MEQ/L (ref 8–16)
BUN BLDV-MCNC: 11 MG/DL (ref 7–22)
CALCIUM SERPL-MCNC: 9 MG/DL (ref 8.5–10.5)
CHLORIDE BLD-SCNC: 104 MEQ/L (ref 98–111)
CO2: 25 MEQ/L (ref 23–33)
CREAT SERPL-MCNC: 1.1 MG/DL (ref 0.4–1.2)
GFR SERPL CREATININE-BSD FRML MDRD: > 60 ML/MIN/1.73M2
GLUCOSE BLD-MCNC: 95 MG/DL (ref 70–108)
ORGANISM: ABNORMAL
POTASSIUM SERPL-SCNC: 3.4 MEQ/L (ref 3.5–5.2)
SODIUM BLD-SCNC: 139 MEQ/L (ref 135–145)
URINE CULTURE REFLEX: ABNORMAL

## 2022-12-24 PROCEDURE — 6370000000 HC RX 637 (ALT 250 FOR IP): Performed by: PHYSICIAN ASSISTANT

## 2022-12-24 PROCEDURE — 1200000000 HC SEMI PRIVATE

## 2022-12-24 PROCEDURE — 2580000003 HC RX 258: Performed by: PHYSICIAN ASSISTANT

## 2022-12-24 PROCEDURE — 36415 COLL VENOUS BLD VENIPUNCTURE: CPT

## 2022-12-24 PROCEDURE — 80048 BASIC METABOLIC PNL TOTAL CA: CPT

## 2022-12-24 RX ORDER — SULFAMETHOXAZOLE AND TRIMETHOPRIM 800; 160 MG/1; MG/1
1 TABLET ORAL EVERY 12 HOURS SCHEDULED
Qty: 12 TABLET | Refills: 0 | Status: SHIPPED | OUTPATIENT
Start: 2022-12-24 | End: 2022-12-30

## 2022-12-24 RX ADMIN — SODIUM CHLORIDE, PRESERVATIVE FREE 10 ML: 5 INJECTION INTRAVENOUS at 21:26

## 2022-12-24 RX ADMIN — POTASSIUM CHLORIDE 40 MEQ: 1500 TABLET, EXTENDED RELEASE ORAL at 09:45

## 2022-12-24 RX ADMIN — SULFAMETHOXAZOLE AND TRIMETHOPRIM 1 TABLET: 800; 160 TABLET ORAL at 11:21

## 2022-12-24 RX ADMIN — SULFAMETHOXAZOLE AND TRIMETHOPRIM 1 TABLET: 800; 160 TABLET ORAL at 21:25

## 2022-12-24 RX ADMIN — FAMOTIDINE 20 MG: 20 TABLET ORAL at 07:52

## 2022-12-24 NOTE — PROGRESS NOTES
Pt advanced to regular diet for lunch, pt able to eat 100 % with no c/o nausea or any emesis. Pt incont of  large loose BM prior to lunch. Pt washed and briefs on. Notified Isabel CHU. Spoke with pt's Wife Golden Ludwig and she reports if able she would like him to come home instead of going to [address] [place] [address] and would be able to transport him tomorrow.

## 2022-12-24 NOTE — PROGRESS NOTES
DO APARNA Wahl DR GENERAL SURGERY   General Surgery Daily Progress Note    Pt Name: [facility] Record Number: [id number]  Date of Birth 1950   Today's Date: 2022  Chief complaint: doing well  [address] day # 2   \"SBO\"  UTI   has a past medical history of Arthritis, Back pain, CAD in native artery, Diarrhea, Hypertension, Infection of prosthetic shoulder joint ([facility] [address] PFO (patent foramen ovale), Propionibacterium infection, Rotator cuff injury, Stroke ([address] and TIA (transient ischemic attack). PLAN   Advance diet  If tolerated then discharge home  Rx Bactrim  F/U in office with Dr. Joseph Orf 1-2 weeks  Brayan Aaron is doing well. He denies any nausea or vomiting, has passed flatus and had a bowel movement. He is tolerating a ADULT DIET; Regular. His pain is well controlled on current medications. He has been ambulating in the halls. No complaints of abdominal pain. CURRENT MEDICATIONS   Scheduled Meds:   potassium chloride  10 mEq IntraVENous Once    sodium chloride flush  5-40 mL IntraVENous 2 times per day    famotidine  20 mg Oral Daily    sulfamethoxazole-trimethoprim  1 tablet Oral 2 times per day     Continuous Infusions:   sodium chloride       PRN Meds:.sodium chloride flush, sodium chloride, ondansetron **OR** ondansetron, HYDROcodone 5 mg - acetaminophen **OR** HYDROcodone 5 mg - acetaminophen, potassium chloride **OR** potassium alternative oral replacement **OR** potassium chloride  OBJECTIVE   CURRENT VITALS:  height is 5' 11\" (1.803 m) and weight is 299 lb 6.2 oz (135.8 kg). His oral temperature is 98.4 °F (36.9 °C). His blood pressure is 148/90 (abnormal) and his pulse is 63. His respiration is 20 and oxygen saturation is 93%.    Temperature Range (24h):Temp: 98.4 °F (36.9 °C) Temp  Av.9 °F (36.6 °C)  Min: 97.3 °F (36.3 °C)  Max: 98.4 °F (36.9 °C)  BP Range (48Z): Systolic (91FLE), RRE:279 , Min:132 , XET:362     Diastolic (27JFZ), ALEX:60, Min:66, Max:90    Pulse Range (24h): Pulse  Av.3  Min: 61  Max: 78  Respiration Range (24h): Resp  Av.7  Min: 18  Max: 20  Current Pulse Ox (24h):  SpO2: 93 %  Pulse Ox Range (24h):  SpO2  Av.5 %  Min: 93 %  Max: 97 %  Oxygen Amount and Delivery:    Incentive Spirometry Tx:            GENERAL: alert, no distress  LUNGS: clear to ausculation, without wheezes, rales or rhonci  HEART: normal rate and regular rhythm  ABDOMEN: soft, non-tender, non-distended, bowel sounds present in all 4 quadrants, and no guarding or peritoneal signs  EXTREMITY: no cyanosis, clubbing or edema  In: 2520 [P.O.:2520]  Out: -     LABS     Recent Labs     22  1547 22  0548 22  0702   WBC 6.6 6.2  --    HGB 14.1 13.4*  --    HCT 41.3* 39.8*  --     186  --     139 139   K 3.1* 3.2* 3.4*    101 104   CO2 29 28 25   BUN 20* 17 11   CREATININE 1.5* 1.2 1.1   MG 2.0 2.0  --    CALCIUM  --  8.5 9.0      Recent Labs     22  1547   INR 1.19*     Recent Labs     22  1547   AST 50*   ALT 73*   BILITOT 0.8   LIPASE 64.0*     No results for input(s): TROPONINT in the last 72 hours. RADIOLOGY     CTA ABDOMEN PELVIS W WO CONTRAST   Final Result       1. Segments of mildly dilated fluid-filled small bowel in the left upper quadrant with possible proximal transition point. Differential considerations include early or partial proximal small bowel obstruction or enteritis. 2. Widely patent mesenteric vessels. 3. Moderate stenosis at the proximal left renal artery. **This report has been created using voice recognition software. It may contain minor errors which are inherent in voice recognition technology. **      Final report electronically signed by Dr. Letha Steel MD on 2022 4:52 PM            Electronically signed by Heladio Wagner DO on 2022 at 10:08 AM

## 2022-12-24 NOTE — PLAN OF CARE
Problem: Discharge Planning  Goal: Discharge to home or other facility with appropriate resources  12/24/2022 1229 by Darcy Palacios RN  Outcome: Progressing  Flowsheets (Taken 12/24/2022 0745)  Discharge to home or other facility with appropriate resources:   Identify barriers to discharge with patient and caregiver   Arrange for needed discharge resources and transportation as appropriate     Problem: Pain  Goal: Verbalizes/displays adequate comfort level or baseline comfort level  12/24/2022 1229 by Darcy Palacios RN  Outcome: Progressing  Flowsheets (Taken 12/24/2022 0745)  Verbalizes/displays adequate comfort level or baseline comfort level:   Encourage patient to monitor pain and request assistance   Assess pain using appropriate pain scale     Problem: Safety - Adult  Goal: Free from fall injury  12/24/2022 1229 by Darcy Palacios RN  Outcome: Progressing     Problem: Gastrointestinal - Adult  Goal: Minimal or absence of nausea and vomiting  12/24/2022 1229 by Darcy Palacios RN  Outcome: Progressing  Flowsheets (Taken 12/24/2022 0745)  Minimal or absence of nausea and vomiting: Administer IV fluids as ordered to ensure adequate hydration     Problem: Chronic Conditions and Co-morbidities  Goal: Patient's chronic conditions and co-morbidity symptoms are monitored and maintained or improved  12/24/2022 1229 by Darcy Palacios RN  Outcome: Progressing  Flowsheets (Taken 12/24/2022 0745)  Care Plan - Patient's Chronic Conditions and Co-Morbidity Symptoms are Monitored and Maintained or Improved:   Monitor and assess patient's chronic conditions and comorbid symptoms for stability, deterioration, or improvement   Collaborate with multidisciplinary team to address chronic and comorbid conditions and prevent exacerbation or deterioration   Care plan reviewed with patient. Patient verbalize understanding of the plan of care and contribute to goal setting. Isabella Simmons

## 2022-12-24 NOTE — PLAN OF CARE
Problem: Discharge Planning  Goal: Discharge to home or other facility with appropriate resources  Outcome: Progressing     Problem: Pain  Goal: Verbalizes/displays adequate comfort level or baseline comfort level  Outcome: Progressing     Problem: Safety - Adult  Goal: Free from fall injury  Outcome: Progressing     Problem: Gastrointestinal - Adult  Goal: Minimal or absence of nausea and vomiting  Outcome: Progressing  Goal: Maintains or returns to baseline bowel function  Outcome: Progressing  Goal: Maintains adequate nutritional intake  Outcome: Progressing     Problem: Chronic Conditions and Co-morbidities  Goal: Patient's chronic conditions and co-morbidity symptoms are monitored and maintained or improved  Outcome: Progressing [facility]

## 2022-12-24 NOTE — CARE COORDINATION
12/24/22, 10:33 AM EST    Patient goals/plan/ treatment preferences discussed by  and . Patient goals/plan/ treatment preferences reviewed with patient/ family. Patient/ family verbalize understanding of discharge plan and are in agreement with goal/plan/treatment preferences. Understanding was demonstrated using the teach back method. AVS provided by RN at time of discharge, which includes all necessary medical information pertaining to the patients current course of illness, treatment, post-discharge goals of care, and treatment preferences.      [facility]       IMM Letter  IMM Letter given to Patient/Family/Significant other/Guardian/POA/by[de-identified] pt access  IMM Letter date given[de-identified] 12/23/22  IMM Letter time given[de-identified] 2592

## 2022-12-24 NOTE — DISCHARGE INSTRUCTIONS
Diet and activity as tolerated  Rx Bactrim take as directed  F/U in office with Dr. Topher Morales in 1-2 weeks.

## 2022-12-25 VITALS
RESPIRATION RATE: 18 BRPM | BODY MASS INDEX: 41.91 KG/M2 | OXYGEN SATURATION: 91 % | WEIGHT: 299.38 LBS | DIASTOLIC BLOOD PRESSURE: 75 MMHG | HEIGHT: 71 IN | HEART RATE: 70 BPM | TEMPERATURE: 97.9 F | SYSTOLIC BLOOD PRESSURE: 181 MMHG

## 2022-12-25 PROCEDURE — 2580000003 HC RX 258: Performed by: PHYSICIAN ASSISTANT

## 2022-12-25 PROCEDURE — 6370000000 HC RX 637 (ALT 250 FOR IP): Performed by: PHYSICIAN ASSISTANT

## 2022-12-25 RX ADMIN — SULFAMETHOXAZOLE AND TRIMETHOPRIM 1 TABLET: 800; 160 TABLET ORAL at 08:28

## 2022-12-25 RX ADMIN — FAMOTIDINE 20 MG: 20 TABLET ORAL at 08:28

## 2022-12-25 RX ADMIN — SODIUM CHLORIDE, PRESERVATIVE FREE 10 ML: 5 INJECTION INTRAVENOUS at 08:27

## 2022-12-25 ASSESSMENT — PAIN SCALES - GENERAL: PAINLEVEL_OUTOF10: 0

## 2022-12-25 NOTE — PROGRESS NOTES
Discharged per wheelchair to car with wife. Wife stated she is taking him home for a while then to the [address] [place] [address] assisted living.

## 2022-12-25 NOTE — PLAN OF CARE
Problem: Pain  Goal: Verbalizes/displays adequate comfort level or baseline comfort level  Outcome: Progressing     Problem: Safety - Adult  Goal: Free from fall injury  Outcome: Progressing     Problem: Gastrointestinal - Adult  Goal: Minimal or absence of nausea and vomiting  Outcome: Progressing   Care plan reviewed with patient. Patient verbalize understanding of the plan of care and contribute to goal setting.

## 2022-12-25 NOTE — PROGRESS NOTES
Went over discharge instructions and new medications with wife and Maida Sibley. Went over that she needed to call and make an appointment for Maida Sibley to see Dr Sam Cormier in 1 to 2 weeks. Both Maida Sibley and his wife verbalized understanding.

## 2022-12-25 NOTE — DISCHARGE SUMMARY
Tonya Tee. [facility] 48 SUMMARY       Pt Name: Myriam Mckeon  MRN: [id number]  YOB: 1950  Primary Care Physician: Ed Salinas DO    Admit date:  12/22/2022  3:06 PM      Discharge date:  12/25/2022 11:45 AM    Admitting Diagnosis:   1. Small bowel obstruction ([address]    2. Hypokalemia        Discharge Diagnosis:   1. Small bowel obstruction ([address]    2. Hypokalemia        Admitting Service: General Surgery, Dearl DO [place]    Consultants:  none    History and Physical: Tomasz Garcia is a 74 y.o.male who presents with abdominal pain. Transferring from [facility] for small bowel obstruction versus enteritis. Patient stated he developed periumbilical abdominal pain that started 2 days ago on Tuesday and was persistent. Describes the pain as aching in nature. Patient also endorsed having diarrhea on Tuesday. Patient also endorsed having dysuria, increased frequency, and urinary incontinence. He denied any other acute pain or complaints. Patient denied any headaches, lightheadedness, dizziness, fever/chills, chest pain, shortness of breath, hematuria, hematochezia, pain in extremities, and new or worsening paresthesias. Upon assessment patient denied having any acute abdominal pain, bloating, or any recent nausea/vomiting. Patient said his last emesis was on Tuesday. Patient also stated his last bowel movement/diarrhea was on Tuesday. Past abdominal surgical history reviewed. Patient status post cholecystectomy in 2017 following choledocholithiasis and ERCP. Patient on aspirin and Plavix. On exam patient's abdomen was soft with periumbilical tenderness palpation. No guarding or peritoneal signs. Bowel sounds active. CTA abdomen/pelvis from outside facility is reviewed.   CTA reported segments of mildly dilated fluid-filled small bowel in the left upper quadrant with possible proximal transition point differential including early/partial small bowel obstruction versus enteritis. Labs and vital signs reviewed. Patient afebrile, vital signs stable. No leukocytosis. Hemoglobin 14.1. Creatinine elevated at 1.5 baseline appears to be 1.0. Potassium 3.1.  UA positive for leukocytes and few bacteria. Urine culture pending. Based off physical exam we will do trial of clear liquids tonight and see how patient tolerates. Monitor for abdominal pain, distention, or nausea/vomiting. Procedures/Diagnostic Tests:  [facility]    Hospital Course: Progressive resolution of obstructive symptoms until discharge on hospital day #3    Discharge Condition: stable    Disposition: home    Labs:    Discharge Instructions:  Discharge Medications:        Medication List        START taking these medications      sulfamethoxazole-trimethoprim 800-160 MG per tablet  Commonly known as: BACTRIM DS;SEPTRA DS  Take 1 tablet by mouth every 12 hours for 12 doses            CONTINUE taking these medications      aspirin 81 MG chewable tablet  Take 1 tablet by mouth daily     clopidogrel 75 MG tablet  Commonly known as: Plavix  Take 1 tablet by mouth in the morning. hydroCHLOROthiazide 12.5 MG capsule  Commonly known as: MICROZIDE  Take 2 capsules by mouth in the morning. isosorbide mononitrate 30 MG extended release tablet  Commonly known as: IMDUR  Take 1 tablet by mouth daily     losartan 100 MG tablet  Commonly known as: COZAAR     nitroGLYCERIN 0.4 MG SL tablet  Commonly known as: NITROSTAT  up to max of 3 total doses. If no relief after 1 dose, call 911.      Potassium 99 MG Tabs     PRESERVISION AREDS PO     tamsulosin 0.4 MG capsule  Commonly known as: FLOMAX     Vitamin B 12 250 MCG Lozg     vitamin C 500 MG tablet  Commonly known as: ASCORBIC ACID               Where to Get Your Medications        These medications were sent to [facility] [address] [phone] [id number]      Phone: [phone]   sulfamethoxazole-trimethoprim 800-160 MG per tablet         Diet: General/Regular diet as tolerated    Activity: activity as tolerated and no lifting more than 10-20 lbs for next two weeks    Wound Care: None     Follow-up:  in 2 weeks with DO Tez Brantley DO Electronincally signed 12/25/2022 at 2:29 PM    A total of 31 minutes was spent in preparing the patient for discharge with greater than 50% of the time involved with education, counseling and coordinating care

## 2023-01-11 ENCOUNTER — OFFICE VISIT (OUTPATIENT)
Dept: SURGERY | Age: 73
End: 2023-01-11

## 2023-01-11 VITALS
OXYGEN SATURATION: 96 % | SYSTOLIC BLOOD PRESSURE: 120 MMHG | HEIGHT: 71 IN | BODY MASS INDEX: 40.31 KG/M2 | TEMPERATURE: 97 F | RESPIRATION RATE: 18 BRPM | HEART RATE: 87 BPM | WEIGHT: 287.9 LBS | DIASTOLIC BLOOD PRESSURE: 62 MMHG

## 2023-01-11 DIAGNOSIS — K56.609 SMALL BOWEL OBSTRUCTION (HCC): Primary | ICD-10-CM

## 2023-01-11 DIAGNOSIS — K52.9 ENTERITIS: ICD-10-CM

## 2023-01-12 ASSESSMENT — ENCOUNTER SYMPTOMS
EYE DISCHARGE: 0
VOMITING: 0
APNEA: 0
VOICE CHANGE: 0
CHEST TIGHTNESS: 0
EYE ITCHING: 0
WHEEZING: 0
ANAL BLEEDING: 0
ABDOMINAL PAIN: 0
CHOKING: 0
RHINORRHEA: 0
COLOR CHANGE: 0
SORE THROAT: 0
BACK PAIN: 0
BLOOD IN STOOL: 0
CONSTIPATION: 0
SINUS PRESSURE: 0
FACIAL SWELLING: 0
COUGH: 0
EYE PAIN: 0
PHOTOPHOBIA: 0
STRIDOR: 0
NAUSEA: 0
ABDOMINAL DISTENTION: 0
DIARRHEA: 0
SHORTNESS OF BREATH: 0
ALLERGIC/IMMUNOLOGIC NEGATIVE: 1
RECTAL PAIN: 0
EYE REDNESS: 0
TROUBLE SWALLOWING: 0

## 2023-01-12 NOTE — PROGRESS NOTES
Shelli Bhatt (:  1950)     ASSESSMENT:  1. Partial small bowel obstruction  2. Enteritis  3. Recent UTI with acute kidney injury  4. History stroke    PLAN:  1. Abdomen benign. Bowel function has normalized. No nausea or vomiting. Tolerating diet. 2.  Dietary modification/restriction discussed with patient and wife. All questions answered. 3.  Continue conservative treatment plan. Patient and wife in agreement. 4.  Follow-up with GI service and PCP as directed  5. Follow-up as needed. 6.  Signs and symptoms reviewed with patient that would be concerning and need him to return to office for re-evaluation. Patient states He will call if He has questions or concerns. SUBJECTIVE/OBJECTIVE:    Chief Complaint   Patient presents with    Follow-Up from Hospital     [facility] on 2022-Small bowel obstruction     TERRY Thomas is a 68-year-old male who presents for follow-up after recent hospitalization due to partial small bowel obstruction and enteritis associated with UTI and acute kidney injury at the end of December. He was managed conservatively and able to be discharged to home. Currently in nursing home. He and wife state that since discharge he has been doing well from a GI standpoint. Denies any significant nausea or vomiting. Tolerating diet. Normal bowel function. No abdominal pain or bloating. Denies tenderness. No new chest pain or shortness of breath. Overall he feels he is back to his baseline. Review of Systems   Constitutional:  Negative for activity change, appetite change, chills, diaphoresis, fatigue, fever and unexpected weight change. HENT:  Negative for congestion, dental problem, drooling, ear discharge, ear pain, facial swelling, hearing loss, mouth sores, nosebleeds, postnasal drip, rhinorrhea, sinus pressure, sneezing, sore throat, tinnitus, trouble swallowing and voice change.     Eyes:  Negative for photophobia, pain, discharge, redness, itching and visual disturbance. Respiratory:  Negative for apnea, cough, choking, chest tightness, shortness of breath, wheezing and stridor. Cardiovascular:  Negative for chest pain, palpitations and leg swelling. Gastrointestinal:  Negative for abdominal distention, abdominal pain, anal bleeding, blood in stool, constipation, diarrhea, nausea, rectal pain and vomiting. Endocrine: Negative. Genitourinary:  Negative for decreased urine volume, difficulty urinating, dysuria, enuresis, flank pain, frequency, genital sores, hematuria, penile discharge, penile pain, penile swelling, scrotal swelling, testicular pain and urgency. Musculoskeletal:  Positive for gait problem. Negative for arthralgias, back pain, joint swelling, myalgias, neck pain and neck stiffness. Skin:  Negative for color change, pallor, rash and wound. Allergic/Immunologic: Negative. Neurological:  Positive for weakness. Negative for dizziness, tremors, seizures, syncope, facial asymmetry, speech difficulty, light-headedness, numbness and headaches. Chronic symptoms from stroke. Hematological:  Negative for adenopathy. Does not bruise/bleed easily. Psychiatric/Behavioral:  Negative for agitation, behavioral problems, confusion, decreased concentration, dysphoric mood, hallucinations, self-injury, sleep disturbance and suicidal ideas. The patient is not nervous/anxious and is not hyperactive.       Past Medical History:   Diagnosis Date    Arthritis     Back pain     CAD in native artery 6/14/2021    Diarrhea     Hypertension     Infection of prosthetic shoulder joint ([facility] [address] 7/24/2020    PFO (patent foramen ovale) 8/2012    noted on ROSITA following stroke    Propionibacterium infection 7/24/2020    Rotator cuff injury     Stroke [facility])     August 19/2012    TIA (transient ischemic attack) 05/2018       Past Surgical History:   Procedure Laterality Date    BACK SURGERY  05/16/2022    C5-6    CHOLECYSTECTOMY  06/25/2017    CORONARY ANGIOPLASTY WITH STENT PLACEMENT      CYST INCISION AND DRAINAGE      CYSTOSCOPY N/A 12/17/2019    CYSTOSCOPY, WITH  URETHRAL DILATION performed by Daron Gonzalez MD at [address] [place] [address] N/A 2/18/2020    CYSTOSCOPY WITH BLADDER BOTOX performed by Daron Gonzalez MD at [facility]    ERCP  06/23/2017    ROTATOR CUFF REPAIR Left 03/12/2013    right 4/2019    SHOULDER SURGERY Right 03/02/2020    SHOULDER SURGERY  06/2020    frozen shoulder, repair nerve elbow and palm-dr eduardo     TURP N/A 8/13/2019    TRANSURETHRAL RESECTION PROSTATE performed by Whit Edwards MD at [facility]       Current Outpatient Medications   Medication Sig Dispense Refill    Cyanocobalamin (VITAMIN B 12) 250 MCG LOZG Take by mouth      clopidogrel (PLAVIX) 75 MG tablet Take 1 tablet by mouth in the morning. hydroCHLOROthiazide (MICROZIDE) 12.5 MG capsule Take 2 capsules by mouth in the morning. 30 capsule 3    isosorbide mononitrate (IMDUR) 30 MG extended release tablet Take 1 tablet by mouth daily 90 tablet 1    aspirin 81 MG chewable tablet Take 1 tablet by mouth daily 30 tablet 3    nitroGLYCERIN (NITROSTAT) 0.4 MG SL tablet up to max of 3 total doses. If no relief after 1 dose, call 911. 25 tablet 1    tamsulosin (FLOMAX) 0.4 MG capsule Take 0.4 mg by mouth nightly       losartan (COZAAR) 100 MG tablet Take 50 mg by mouth daily       Potassium 99 MG TABS Take 3 tablets by mouth daily      Multiple Vitamins-Minerals (PRESERVISION AREDS PO) Take by mouth      Ascorbic Acid (VITAMIN C) 500 MG tablet Take 1,000 mg by mouth        No current facility-administered medications for this visit.        Allergies   Allergen Reactions    Adhesive Tape Rash       Family History   Problem Relation Age of Onset    Arthritis Mother     Vision Loss Father     Cancer Brother     Stroke Maternal Uncle     Prostate Cancer Neg Hx        Social History     Socioeconomic History    Marital status:      Spouse name: Myrna Gomez    Number of children: 4    Years of education: Not on file    Highest education level: Not on file   Occupational History    Not on file   Tobacco Use    Smoking status: Never    Smokeless tobacco: Never   Vaping Use    Vaping Use: Never used   Substance and Sexual Activity    Alcohol use: No    Drug use: No    Sexual activity: Not on file   Other Topics Concern    Not on file   Social History Narrative    Not on file     Social Determinants of Health     Financial Resource Strain: Not on file   Food Insecurity: Not on file   Transportation Needs: Not on file   Physical Activity: Not on file   Stress: Not on file   Social Connections: Not on file   Intimate Partner Violence: Not on file   Housing Stability: Not on file     Vitals:    01/11/23 1345   BP: 120/62   Site: Left Upper Arm   Position: Sitting   Cuff Size: Medium Adult   Pulse: 87   Resp: 18   Temp: 97 °F (36.1 °C)   TempSrc: Temporal   SpO2: 96%   Weight: 287 lb 14.4 oz (130.6 kg)   Height: 5' 11\" (1.803 m)     Body mass index is 40.15 kg/m². Wt Readings from Last 3 Encounters:   01/11/23 287 lb 14.4 oz (130.6 kg)   12/23/22 299 lb 6.2 oz (135.8 kg)   11/14/22 288 lb (130.6 kg)     Physical Exam  Vitals reviewed. Constitutional:       General: He is not in acute distress. Appearance: He is well-developed. He is not diaphoretic. HENT:      Head: Normocephalic and atraumatic. Right Ear: External ear normal.      Left Ear: External ear normal.      Nose: Nose normal.   Eyes:      General: No scleral icterus. Right eye: No discharge. Left eye: No discharge. Conjunctiva/sclera: Conjunctivae normal.   Cardiovascular:      Rate and Rhythm: Normal rate and regular rhythm. Heart sounds: Normal heart sounds. Pulmonary:      Effort: Pulmonary effort is normal. No respiratory distress. Breath sounds: Normal breath sounds. No wheezing or rales. Chest:      Chest wall: No tenderness.    Abdominal:      General: Bowel sounds are normal. There is no distension. Palpations: Abdomen is soft. There is no mass. Tenderness: There is no abdominal tenderness. There is no guarding or rebound. Musculoskeletal:         General: No tenderness. Normal range of motion. Cervical back: Normal range of motion and neck supple. Skin:     General: Skin is warm and dry. Coloration: Skin is not pale. Findings: No erythema or rash. Neurological:      Mental Status: He is alert and oriented to person, place, and time. Cranial Nerves: No cranial nerve deficit. Motor: Weakness present. Coordination: Coordination abnormal.      Comments: Chronic stroke findings. Psychiatric:         Behavior: Behavior normal.         Thought Content: Thought content normal.         Judgment: Judgment normal.     Lab Results   Component Value Date    WBC 6.2 12/23/2022    HGB 13.4 (L) 12/23/2022    HCT 39.8 (L) 12/23/2022    MCV 84.9 12/23/2022     12/23/2022     Lab Results   Component Value Date     12/24/2022    K 3.4 (L) 12/24/2022     12/24/2022    CO2 25 12/24/2022     Lab Results   Component Value Date    CREATININE 1.1 12/24/2022     Lab Results   Component Value Date    ALT 73 (H) 12/22/2022    AST 50 (H) 12/22/2022    ALKPHOS 81 12/22/2022    BILITOT 0.8 12/22/2022     Lab Results   Component Value Date    LIPASE 64.0 (L) 12/22/2022       Patient Active Problem List   Diagnosis    Right basal ganglia embolic stroke (HCC)    BPH with urinary obstruction    Propionibacterium infection    Infection of prosthetic shoulder joint (HCC)    Cardiac pacemaker    CAD in native artery    Angina of effort (HCC)    Chest pain    Generalized weakness    Partial small bowel obstruction (HCC)    Small bowel obstruction (HCC)    Enteritis     Narrative   PROCEDURE: CTA ABDOMEN PELVIS W WO CONTRAST       CLINICAL INFORMATION: post prandial abdominal pain, concern for vascular cause, mesenteric ischemia . Right-sided abdominal pain for 2 days. Diarrhea. COMPARISON: CT abdomen pelvis dated 5/16/2020. TECHNIQUE: Helical CT abdomen the abdomen and pelvis during intravenous administration of 100 cc Isovue-370 injected in the left [facility] with sagittal and coronal reconstructions to include maximum intensity projection sequences reconstructions. All CT scans    at this facility use dose modulation, iterative reconstruction, and/or weight-based dosing when appropriate to reduce radiation dose to as low as reasonably achievable. FINDINGS: There are mild posterior dependent changes at the lung bases and linear/bandlike opacity at the lingular region of the left lung as evidence for atelectasis. Visualized portions of the lungs are otherwise clear. The heart is mildly enlarged. The liver is unremarkable. The gallbladder appears to be surgically absent. Adrenal glands are unremarkable. Kidneys are unremarkable. There are calcified granulomas in the spleen which is otherwise normal in appearance. The pancreas is partially fatty    replaced. No retroperitoneal or mesenteric lymphadenopathy is identified. There is mild mural calcification in the abdominal aorta without focal stenosis or aneurysm. The celiac trunk and superior mesenteric artery are widely patent without focal stenosis. The bilateral renal arteries are patent. There is moderate stenosis in    the proximal left renal artery. The inferior mesenteric artery appears patent without focal stenosis. Large bowel appears within normal limits. Mildly fluid distended small bowel loops in the left upper quadrant with air-fluid levels. These measure up to 3.8 cm in diameter. Distal loops of small bowel also have fluid within them but are nondistended. The    proximal jejunum is decompressed. There may be a proximal transition point in the left upper quadrant. No free fluid is identified. The bladder is nondistended. The prostate is within normal limits. Impression       1. Segments of mildly dilated fluid-filled small bowel in the left upper quadrant with possible proximal transition point. Differential considerations include early or partial proximal small bowel obstruction or enteritis. 2. Widely patent mesenteric vessels. 3. Moderate stenosis at the proximal left renal artery. **This report has been created using voice recognition software. It may contain minor errors which are inherent in voice recognition technology. **       Final report electronically signed by Dr. Matt Garner MD on 12/22/2022 4:52 PM     An electronic signature was used to authenticate this note.     --Elvis Justice MD

## 2023-01-23 ENCOUNTER — TELEPHONE (OUTPATIENT)
Dept: CARDIOLOGY CLINIC | Age: 73
End: 2023-01-23

## 2023-01-23 ENCOUNTER — TELEPHONE (OUTPATIENT)
Dept: PHYSICAL MEDICINE AND REHAB | Age: 73
End: 2023-01-23

## 2023-01-23 ENCOUNTER — OFFICE VISIT (OUTPATIENT)
Dept: PHYSICAL MEDICINE AND REHAB | Age: 73
End: 2023-01-23

## 2023-01-23 VITALS
WEIGHT: 268 LBS | HEIGHT: 71 IN | BODY MASS INDEX: 37.52 KG/M2 | DIASTOLIC BLOOD PRESSURE: 58 MMHG | SYSTOLIC BLOOD PRESSURE: 102 MMHG

## 2023-01-23 DIAGNOSIS — M54.50 CHRONIC BILATERAL LOW BACK PAIN WITHOUT SCIATICA: ICD-10-CM

## 2023-01-23 DIAGNOSIS — G89.4 CHRONIC PAIN SYNDROME: ICD-10-CM

## 2023-01-23 DIAGNOSIS — M47.819 FACET ARTHROPATHY: ICD-10-CM

## 2023-01-23 DIAGNOSIS — M47.816 LUMBAR SPONDYLOSIS: Primary | ICD-10-CM

## 2023-01-23 DIAGNOSIS — G89.29 CHRONIC BILATERAL LOW BACK PAIN WITHOUT SCIATICA: ICD-10-CM

## 2023-01-23 NOTE — TELEPHONE ENCOUNTER
Pre op Risk Assessment    Procedure Bilateral lumbar medial branch block   Physician [facility]   Date of surgery/procedure TBD    Last OV 5/9/2022  Last Stress 11/23/2020  Last Echo 11/20/2023  Last Cath 07/09/2021  Last Stent 07/09/2021  Is patient on blood thinners Plavix and ASA  Hold Meds/how many days Plavix 7 days, ASA ? ?     Fax: [phone]

## 2023-01-23 NOTE — PROGRESS NOTES
Chronic Pain/PM&R Clinic Note     Encounter Date: 1/23/23    Subjective:   Chief Complaint:   Chief Complaint   Patient presents with    New Patient     Chronic back pain, lumbar  Pain sitting is a 5 walking or standing is an 8 or 9       History of Present Illness:   Haleigh Mclean is a 74 y.o. male seen in the clinic initially on 01/23/2022 upon request from [address] [place] [address], DO  for his history of low back pain. Patient states his pain started about 50 years ago without any inciting event. He states his pain has continued to get gradually worse with time. He did do injections in his low back with Dr. Dereck Talbot around 5 years ago. He is not sure how much they helped, while his wife does believe they were effective. He states his pain is aggravated when standing or walking for an extended period of time. Pain is improved when sitting. He states he sleeps in a recliner, while laying in bed aggravates his pain. Pain is midline low back, although he does state he noticed left hip pain walking into today's appointment. He does have weakness in both legs and has a hard time getting up and being active due to this. He states he had a stroke back in 2013 which caused left-sided weakness, his dominant side. He is currently in physical therapy where he resides at the [address]. They have him marching in place and riding a recumbent bike. He uses a cane or walker for ambulation. He denies any recent falls. He is sleeping relatively well overall, in his recliner. He does have a history of bowel and bladder incontinence since his stroke in 2013. He did get trigger point injections from Dr. Enma Aggarwal about 3 weeks ago and feels this has been helping significantly. He did not realize how much it was helping, and his wife pointed out how well he was able to go from sitting to standing. Of note, he is currently on Plavix due to her history with cardiac stents. This is prescribed by Dr. Grace Rendon.   He was also recently in the ER for a small bowel obstruction. History of Interventions:   Surgery: No previous lumbar surgeries  ACDF C5-6 (Dr. Roosevelt Fairchild)   Injections: Dr. Tia Schirmer  Cervical nerve root injection TF right C4-5  Cervical MBB/RFA C3-4, 4-5, 5-6  Caudal epidural  Lumbar RFA    Current Treatment Medications:   Lidocaine - ?effective    Historical Treatment Medications:   Tramadol - ineffective  Oxycodone - ineffective   Norco - ineffective   Tylenol - ineffective    Imaging:  Lumbar MRI (10/20/2022)  Narrative   PROCEDURE: MRI LUMBAR SPINE WO CONTRAST       CLINICAL INFORMATION: Weakness, Bilateral leg weakness. Chronic bilateral leg weakness and lower back pain. COMPARISON: Lumbar spine CT 5/16/2020. TECHNIQUE: Sagittal and axial T1 and T2-weighted images were obtained through the lumbar spine. FINDINGS:           There is a slight levoscoliosis. There are benign hemangiomas in L1, L4 and S1. There is no suspicious marrow signal abnormality. There are small anterior osteophytes. There is no bone marrow edema. There are no compression fractures. No pars defects    are noted. There is disc desiccation throughout. The distal spinal cord, conus medullaris and cauda equina are normal.        There are no gross abnormalities in the visualized aspects of the distal thoracic spine. On the axial images, at T12-L1, there are mild facet degenerative changes. There is no spinal canal or foraminal stenosis. At L1-L2, there are very mild facet degenerative changes. There is no focal disc abnormality. There is no spinal canal stenosis. There is no foraminal stenosis. At L2-L3, there is a diffuse disc bulge. There are mild facet degenerative changes. There is no spinal canal stenosis. There is no foraminal stenosis. At L3-L4, there are moderate severity facet degenerative changes. There is thickening of ligamentum flavum. There is a diffuse disc bulge.  There is mild spinal canal stenosis. There is mild bilateral foraminal stenosis. At L4-L5, there are moderate severity facet degenerative changes. There is a diffuse disc bulge. There is mild/moderate spinal canal stenosis. There is mild bilateral foraminal stenosis. This is stable. At L5-S1, there are severe left-sided facet degenerative changes. There are milder right-sided facet degenerative changes. There is no spinal canal stenosis. There is severe left and mild right foraminal stenosis. This is stable. There are no suspicious findings in the visualized aspects of the retroperitoneum and paraspinal soft tissues. Impression       1. Severe left and mild right foraminal stenosis at the L5-S1 level. 2. Mild to moderate spinal canal stenosis with mild bilateral foraminal stenosis at the L4-5 level. **This report has been created using voice recognition software. It may contain minor errors which are inherent in voice recognition technology. **       Final report electronically signed by Dr. Rajeev Gage on 10/20/2022 1:39 PM     Past Medical History:   Diagnosis Date    Arthritis     Back pain     CAD in native artery 6/14/2021    Diarrhea     Hypertension     Infection of prosthetic shoulder joint ([address] 7/24/2020    PFO (patent foramen ovale) 8/2012    noted on [facility] following stroke    Propionibacterium infection 7/24/2020    Rotator cuff injury     Stroke [facility])     August 19/2012    TIA (transient ischemic attack) 05/2018       Past Surgical History:   Procedure Laterality Date    BACK SURGERY  05/16/2022    C5-6    CHOLECYSTECTOMY  06/25/2017    CORONARY ANGIOPLASTY WITH STENT PLACEMENT      CYST INCISION AND DRAINAGE      CYSTOSCOPY N/A 12/17/2019    CYSTOSCOPY, WITH  URETHRAL DILATION performed by Andres Cavanaugh MD at [address] [place] [address] 2/18/2020    CYSTOSCOPY WITH BLADDER BOTOX performed by Andres Cavanaugh MD at [place] [facility]    ERCP  06/23/2017    ROTATOR CUFF REPAIR Left 03/12/2013 right 4/2019    SHOULDER SURGERY Right 03/02/2020    SHOULDER SURGERY  06/2020    frozen shoulder, repair nerve elbow and palm-dr alesha GRAY N/A 8/13/2019    TRANSURETHRAL RESECTION PROSTATE performed by Dick Connolly MD at [place] [address] History   Problem Relation Age of Onset    Arthritis Mother     Vision Loss Father     Cancer Brother     Stroke Maternal Uncle     Prostate Cancer Neg Hx          Medications & Allergies:   Current Outpatient Medications   Medication Instructions    aspirin 81 mg, Oral, DAILY    clopidogrel (PLAVIX) 75 mg, Oral, DAILY    hydroCHLOROthiazide (MICROZIDE) 25 mg, Oral, DAILY    isosorbide mononitrate (IMDUR) 30 mg, Oral, DAILY    losartan (COZAAR) 50 mg, Oral, DAILY    Multiple Vitamins-Minerals (PRESERVISION AREDS PO) Oral    nitroGLYCERIN (NITROSTAT) 0.4 MG SL tablet up to max of 3 total doses. If no relief after 1 dose, call 911. Potassium 99 MG TABS 3 tablets, Oral, DAILY    tamsulosin (FLOMAX) 0.4 mg, Oral, NIGHTLY    vitamin C (ASCORBIC ACID) 1,000 mg, Oral       Allergies   Allergen Reactions    Adhesive Tape Rash       Review of Systems:   Constitutional: negative for weight changes or fevers  Genitourinary: negative for bowel/bladder incontinence   Musculoskeletal: positive for low back pain  Neurological: positive for any leg weakness. Negative numbness/tingling  Behavioral/Psych: negative for anxiety/depression   All other systems reviewed and are negative    Objective:     Vitals:    01/23/23 1057   BP: (!) 102/58       Constitutional: Pleasant, no acute distress   Head: Normocephalic, atraumatic   Eyes: Conjunctivae normal   Neck: Supple, symmetrical   Lungs: Normal respiratory effort, non-labored breathing   Cardiovascular: Limbs warm and well perfused   Abdomen: Non-protruded   Musculoskeletal: Muscle bulk symmetric, no atrophy, no gross deformities   · Lower Extremities: ROM WNL. · Thorax: No paraspinal tenderness bilaterally.  No scoliosis or kyphosis. · Lumbar Spine: ROM WNL. Lumbar paraspinals mildly tender to palpation bilaterally. SLR neg bilaterally. ENMANUEL neg bilaterally. GAENSLEN neg bilaterally. Positive facet loading bilaterally. Bilateral SI joints non-tender to palpation. Bilateral greater trochanters non-tender to palpation. Neurological: Cranial nerves II-XII grossly intact. · Gait - Antalgic gait. Ambulates with assistive device. · Motor: 5/5 muscle strength in bilateral hip flexion, knee flexion, knee extension, ankle dorsiflexion, and ankle plantar flexion   · Sensory: LT sensation intact in lower limbs   · Reflexes: 2+ symmetrical in bilateral achilles, 2+ bilateral patellar, negative ankle clonus, downgoing babinski   Skin: No rashes or lesions present   Psychological: Cooperative, no exaggerated pain behaviors     Assessment:    Diagnosis Orders   1. Lumbar spondylosis  CHG FLUOR NEEDLE/CATH SPINE/PARASPINAL DX/THER ADDON    OR NJX DX/THER AGT PVRT FACET JT LMBR/SAC 1 LEVEL    OR NJX DX/THER AGT PVRT FACET JT LMBR/SAC 2ND LEVEL      2. Chronic pain syndrome        3. Facet arthropathy        4. Chronic bilateral low back pain without sciatica            Matt Guerra is a 74 y.o.male presenting to the pain clinic for evaluation of back pain. Patient's history and physical consistent with lumbar facet mediated pain. I have set him up for a lumbar facet medial branch block at bilateral L4-5 and L5-S1. We will need clearance from his cardiologist, Dr. Evi Palacios prior to proceeding with the injection. His wife states he does not do well with sedation, so he has opted to do the injection under a local. We discussed the potential of pursuing a lumbar RFA for more longstanding relief. We discussed paring trigger point injections and the lumbar RFA may be very beneficial for him. We also discussed potential starting a medication such as Lyrica to assist with his myofascial pain. Plan:    The following treatment recommendations and plan were discussed in detail with Samuel Barrett. Imaging:   I have reviewed patients imaging of lumbar MRI and results were discussed with patient today. Analgesics:   None    Adjuvants:   None    Interventions: In presence of lumbar axial back pain and with physical exam consistent for facetal pain, the option of medial branch blocks at bilateral L4/5 and L5/S1 was chosen. The risks and benefits were discussed in detail with the patient. Patient wants to proceed with the injection. Anticoagulation/NPO Recommendations:   Patient does need to hold Plavix x 7 days prior to the procedure. (Dr. Faisal Aburto)  Patient does not need to be NPO prior to the procedure. We will do a LOCAL injection    Multidisciplinary Pain Management:   In the presence of complex, chronic, and multi-factorial pain, the importance of a multidisciplinary approach to pain management in the patients management regimen was emphasized and discussed in great detail. PHYSICAL THERAPY: Patient is advised to see a physical therapist for gentle stretching exercises and conditioning exercises for management of pain. PSYCHOLOGY: Patient is advised to see a clinical pain psychologist, for the psychosocial aspect of pain care through coping skills, relaxation strategies, cognitive group therapy etc.   OBESITY: Patient is advised to seek nutrition consult to help in managing their weight to decrease its impact on pain.      Referrals:  None    Prescriptions Written This Visit:   None    Follow-up: lumbar MBB, in office 1 week after    MAGDA Argueta - ELIZABET

## 2023-01-25 NOTE — TELEPHONE ENCOUNTER
Pt's wife contacted. Procedure and follow up scheduled. Educated on pre-procedure instructions, also mailed. Verbalized understanding.  Instructions and med clearance faxed to the [facility]

## 2023-02-03 ENCOUNTER — PREP FOR PROCEDURE (OUTPATIENT)
Dept: PHYSICAL MEDICINE AND REHAB | Age: 73
End: 2023-02-03

## 2023-02-07 NOTE — DISCHARGE INSTRUCTIONS
Post procedure Instructions:    No driving or making significant decisions for the remainder of the day. Be cautions with walking and activity for 24 hours, do not over exert yourself. Ok to resume pre-procedure activity level today. Apply ice to site of injection site if you have pain or discomfort. Do not submerge sit of injection during bath or pool activities for 48 hours post-procedure. Resume blood thinning medications in 24 hours. Call office [phone] if you have:  Temperature greater than 100.4  Persistent nausea and vomiting  Severe uncontrolled pain  Redness, tenderness, or signs of infection (pain, swelling, redness, odor or green/yellow discharge around the site)  Difficulty breathing, headache or visual disturbances  Hives  Persistent dizziness or light-headedness  Extreme fatigue  Any other questions or concerns you may have after discharge    In an emergency, call 911 or go to an Emergency Department at a nearby hospital    Surgical Site Infections      How can we work together to prevent Surgical Site Infections? We would like to thank you for choosing [facility] for your Surgical Care. Below you will find helpful information on how we can work together to prevent Surgical Site Infections. What is a Surgical Site Infection (SSI)? A surgical site infection is an infection that occurs after surgery in the part of the body where the surgery took place. Most patients who have surgery do not develop an infection. However, infections develop in about 1 to 3 out of every 100 patients who have surgery. Some of the common symptoms of a surgical site infection are:  Redness and pain around the area where you had surgery  Drainage of cloudy fluid from your surgical wound  Fever    Can SSIs be treated? Yes. Most surgical site infections can be treated with antibiotics. The antibiotic given to you depends on the bacteria (germs) causing the infection.  Sometimes patients with SSIs also need another surgery to treat the infection. What are some of the things that hospitals are doing to prevent SSIs? To prevent SSIs, doctors, nurses, and other healthcare providers: May remove some of your hair immediately before your surgery using electric clippers if the hair is in the same area where the procedure will occur. They should not shave you with a razor. Give you antibiotics before your surgery starts. In most cases, you should get antibiotics within 60 minutes before the surgery starts and the antibiotics should be stopped within 24 hours after surgery. Clean the skin at the site of your surgery with a special soap that kills germs. Clean their hands and arms up to their elbows with an antiseptic agent just before the surgery. Wear special hair covers, masks, gowns, and gloves during surgery to  keep the surgery area clean. Clean their hands with soap and water or an alcohol-based hand rub before and after caring for each patient. If you do not see your providers clean their hands, please     ask  them to do so. What can I do to help prevent SSIs? Before your surgery:  Tell your doctor about other medical problems you may have. Health problems such as allergies, diabetes, and obesity could affect your surgery and your treatment. Quit smoking. Patients who smoke get more infections. Talk to your doctor about how you can quit before your surgery. Do not shave near where you will have surgery. Shaving with a razor can irritate your skin and make it easier to develop an infection. At the time of your surgery:  Speak up if someone tries to shave you with a razor before surgery. Ask why you need to be shaved and talk with your surgeon if you have any concerns. Ask if you will get antibiotics before surgery.   After your surgery:  Make sure that your healthcare providers clean their hands before examining you, either with soap and water or an alcohol-based hand rub.  Family and friends who visit you should not touch the surgical wound or dressings. Family and friends should clean their hands with soap and water or an alcohol-based hand rub before and after visiting you. If you do not see them clean their hands, ask them to clean their hands. What do I need to do when I go home from the hospital?  Before you go home, your doctor or nurse should explain everything you need to know about taking care of your wound. Make sure you understand how to care for your wound before you leave the hospital.  Always clean your hands before and after caring for your wound. Before you go home, make sure you know who to contact if you have questions or problems after you get home. If you have any symptoms of an infection, such as redness and pain at the surgery site, drainage, or fever, call your doctor immediately. If you have additional questions, please ask your doctor or nurse.

## 2023-02-09 ENCOUNTER — HOSPITAL ENCOUNTER (OUTPATIENT)
Age: 73
Setting detail: OUTPATIENT SURGERY
Discharge: HOME | End: 2023-02-09
Attending: ANESTHESIOLOGY | Admitting: ANESTHESIOLOGY
Payer: MEDICARE

## 2023-02-09 ENCOUNTER — APPOINTMENT (OUTPATIENT)
Dept: GENERAL RADIOLOGY | Age: 73
End: 2023-02-09
Attending: ANESTHESIOLOGY
Payer: MEDICARE

## 2023-02-09 VITALS
TEMPERATURE: 96.8 F | OXYGEN SATURATION: 93 % | RESPIRATION RATE: 12 BRPM | HEIGHT: 71 IN | BODY MASS INDEX: 39.9 KG/M2 | DIASTOLIC BLOOD PRESSURE: 71 MMHG | HEART RATE: 57 BPM | SYSTOLIC BLOOD PRESSURE: 155 MMHG | WEIGHT: 285 LBS

## 2023-02-09 PROCEDURE — 2500000003 HC RX 250 WO HCPCS: Performed by: ANESTHESIOLOGY

## 2023-02-09 PROCEDURE — 3209999900 FLUORO FOR SURGICAL PROCEDURES

## 2023-02-09 PROCEDURE — 2709999900 HC NON-CHARGEABLE SUPPLY: Performed by: ANESTHESIOLOGY

## 2023-02-09 PROCEDURE — 7100000010 HC PHASE II RECOVERY - FIRST 15 MIN: Performed by: ANESTHESIOLOGY

## 2023-02-09 PROCEDURE — 3600000056 HC PAIN LEVEL 4 BASE: Performed by: ANESTHESIOLOGY

## 2023-02-09 PROCEDURE — 3600000057 HC PAIN LEVEL 4 ADDL 15 MIN: Performed by: ANESTHESIOLOGY

## 2023-02-09 PROCEDURE — 7100000011 HC PHASE II RECOVERY - ADDTL 15 MIN: Performed by: ANESTHESIOLOGY

## 2023-02-09 RX ORDER — BUPIVACAINE HYDROCHLORIDE 2.5 MG/ML
INJECTION, SOLUTION EPIDURAL; INFILTRATION; INTRACAUDAL PRN
Status: DISCONTINUED | OUTPATIENT
Start: 2023-02-09 | End: 2023-02-09 | Stop reason: ALTCHOICE

## 2023-02-09 RX ORDER — LIDOCAINE HYDROCHLORIDE 10 MG/ML
INJECTION, SOLUTION EPIDURAL; INFILTRATION; INTRACAUDAL; PERINEURAL PRN
Status: DISCONTINUED | OUTPATIENT
Start: 2023-02-09 | End: 2023-02-09 | Stop reason: ALTCHOICE

## 2023-02-09 ASSESSMENT — PAIN DESCRIPTION - DESCRIPTORS: DESCRIPTORS: ACHING

## 2023-02-09 ASSESSMENT — PAIN SCALES - GENERAL: PAINLEVEL_OUTOF10: 0

## 2023-02-09 ASSESSMENT — PAIN - FUNCTIONAL ASSESSMENT
PAIN_FUNCTIONAL_ASSESSMENT: 0-10
PAIN_FUNCTIONAL_ASSESSMENT: PREVENTS OR INTERFERES SOME ACTIVE ACTIVITIES AND ADLS

## 2023-02-09 NOTE — PROGRESS NOTES
1104-  Patient arrived to phase II via cart. Spontaneous respiraitons even and unlabored. Placed on monitor--VSS. Report received from [facility].    9836-  Assessment completed. Patient is alert and oriented x4. Patient denies pain--will monitor. Injection sites clean and dry. 1108-  Snack and drink given to patient. Family in car. 1115-  All belongings in room. 1125-  Report called to nursing home. All questions answered. 1130-  Patient dressing with wife's assistance. 1135-  Patient discharged in stable condition with all belongings via wheelchair.

## 2023-02-09 NOTE — H&P
Today, patient presents for planned medial branch blocks at bilateral L4/5 and L5/S1    This note is reflective of the patient's previous visit for evaluation. We will proceed with today's planned procedure. Since patient's last visit for evaluation, there have been no interval changes in medical history. Patient has no new numbness, weakness, or focal neurological deficit since evaluation. Patient has no contraindications to injection (no anticoagulation or recent antibiotic intake for active infections), and has a  present or is able to drive themselves (as discussed and cleared by physician). Allergies to latex, contrast dye, and steroid medications have been confirmed with the patient prior to the procedure. NPO necessity has been assessed and accepted based on procedure complexity. The risks and benefits of the procedure have been explained including but are not limited to infection, bleeding, paralysis, immediate post procedure weakness, and dizziness; the patient acknowledges understanding and desires to proceed with the procedure. Patient has signed consent for same procedure as discussed in previous clinic encounter. All other questions and concerns were addressed at bedside. See procedure note for full details. Post procedure Instructions: The patient was advised not to drive during the day of the procedure and not to engage in any significant decision making (unless otherwise states by physician). The patient was also advised to be cautious with walking/activity for 24 hours following today's visit and asked not to engage in over-exertion (unless otherwise states by physician). After this time, it is ok to resume pre-procedure level of activity. Patient advised to apply ice to site of injection in situations of pain and discomfort. Patient advised to not submerge site of injection during bath or pool activities for approximately 24 hours post-procedure.  Patient attested to understanding post procedure directions / restrictions. All other questions and concerns addressed before patient discharge in ambulatory fashion. Chronic Pain/PM&R Clinic Note     Encounter Date: 1/23/23    Subjective:   Chief Complaint:   No chief complaint on file. History of Present Illness:   Shailesh Dixon is a 74 y.o. male seen in the clinic initially on 01/23/2022 upon request from Lilibeth Hughes DO  for his history of low back pain. Patient states his pain started about 50 years ago without any inciting event. He states his pain has continued to get gradually worse with time. He did do injections in his low back with Dr. Mena Melchor around 5 years ago. He is not sure how much they helped, while his wife does believe they were effective. He states his pain is aggravated when standing or walking for an extended period of time. Pain is improved when sitting. He states he sleeps in a recliner, while laying in bed aggravates his pain. Pain is midline low back, although he does state he noticed left hip pain walking into today's appointment. He does have weakness in both legs and has a hard time getting up and being active due to this. He states he had a stroke back in 2013 which caused left-sided weakness, his dominant side. He is currently in physical therapy where he resides at the [address]. They have him marching in place and riding a recumbent bike. He uses a cane or walker for ambulation. He denies any recent falls. He is sleeping relatively well overall, in his recliner. He does have a history of bowel and bladder incontinence since his stroke in 2013. He did get trigger point injections from Dr. Cherie Rice about 3 weeks ago and feels this has been helping significantly. He did not realize how much it was helping, and his wife pointed out how well he was able to go from sitting to standing. Of note, he is currently on Plavix due to her history with cardiac stents.   This is prescribed by  Stormy Jain.  He was also recently in the ER for a small bowel obstruction. History of Interventions:   Surgery: No previous lumbar surgeries  ACDF C5-6 (Dr. Edyta Tineo)   Injections: Dr. Elana Cross  Cervical nerve root injection TF right C4-5  Cervical MBB/RFA C3-4, 4-5, 5-6  Caudal epidural  Lumbar RFA    Current Treatment Medications:   Lidocaine - ?effective    Historical Treatment Medications:   Tramadol - ineffective  Oxycodone - ineffective   Norco - ineffective   Tylenol - ineffective    Imaging:  Lumbar MRI (10/20/2022)  Narrative   PROCEDURE: MRI LUMBAR SPINE WO CONTRAST       CLINICAL INFORMATION: Weakness, Bilateral leg weakness. Chronic bilateral leg weakness and lower back pain. COMPARISON: Lumbar spine CT 5/16/2020. TECHNIQUE: Sagittal and axial T1 and T2-weighted images were obtained through the lumbar spine. FINDINGS:           There is a slight levoscoliosis. There are benign hemangiomas in L1, L4 and S1. There is no suspicious marrow signal abnormality. There are small anterior osteophytes. There is no bone marrow edema. There are no compression fractures. No pars defects    are noted. There is disc desiccation throughout. The distal spinal cord, conus medullaris and cauda equina are normal.        There are no gross abnormalities in the visualized aspects of the distal thoracic spine. On the axial images, at T12-L1, there are mild facet degenerative changes. There is no spinal canal or foraminal stenosis. At L1-L2, there are very mild facet degenerative changes. There is no focal disc abnormality. There is no spinal canal stenosis. There is no foraminal stenosis. At L2-L3, there is a diffuse disc bulge. There are mild facet degenerative changes. There is no spinal canal stenosis. There is no foraminal stenosis. At L3-L4, there are moderate severity facet degenerative changes. There is thickening of ligamentum flavum. There is a diffuse disc bulge. There is mild spinal canal stenosis. There is mild bilateral foraminal stenosis. At L4-L5, there are moderate severity facet degenerative changes. There is a diffuse disc bulge. There is mild/moderate spinal canal stenosis. There is mild bilateral foraminal stenosis. This is stable. At L5-S1, there are severe left-sided facet degenerative changes. There are milder right-sided facet degenerative changes. There is no spinal canal stenosis. There is severe left and mild right foraminal stenosis. This is stable. There are no suspicious findings in the visualized aspects of the retroperitoneum and paraspinal soft tissues. Impression       1. Severe left and mild right foraminal stenosis at the L5-S1 level. 2. Mild to moderate spinal canal stenosis with mild bilateral foraminal stenosis at the L4-5 level. **This report has been created using voice recognition software. It may contain minor errors which are inherent in voice recognition technology. **       Final report electronically signed by Dr. Mortimer July on 10/20/2022 1:39 PM     Past Medical History:   Diagnosis Date    Arthritis     Back pain     CAD in native artery 6/14/2021    Diarrhea     Hypertension     Infection of prosthetic shoulder joint ([address] 7/24/2020    PFO (patent foramen ovale) 8/2012    noted on [facility]ITA following stroke    Propionibacterium infection 7/24/2020    Rotator cuff injury     Stroke [facility])     August 19/2012    TIA (transient ischemic attack) 05/2018       Past Surgical History:   Procedure Laterality Date    BACK SURGERY  05/16/2022    C5-6    CHOLECYSTECTOMY  06/25/2017    CORONARY ANGIOPLASTY WITH STENT PLACEMENT      CYST INCISION AND DRAINAGE      CYSTOSCOPY N/A 12/17/2019    CYSTOSCOPY, WITH  URETHRAL DILATION performed by Jesus Nicole MD at [address] [place] [address] 2/18/2020    CYSTOSCOPY WITH BLADDER BOTOX performed by [facility] at [facility]  06/23/2017    ROTATOR CUFF REPAIR Left 03/12/2013    right 4/2019    SHOULDER SURGERY Right 03/02/2020    SHOULDER SURGERY  06/2020    frozen shoulder, repair nerve elbow and palm-dr alesha GRAY N/A 8/13/2019    TRANSURETHRAL RESECTION PROSTATE performed by Sam Wellington MD at [place] [address] History   Problem Relation Age of Onset    Arthritis Mother     Vision Loss Father     Cancer Brother     Stroke Maternal Uncle     Prostate Cancer Neg Hx          Medications & Allergies:   Current Outpatient Medications   Medication Instructions    aspirin 81 mg, Oral, DAILY    clopidogrel (PLAVIX) 75 mg, Oral, DAILY    hydroCHLOROthiazide (MICROZIDE) 25 mg, Oral, DAILY    isosorbide mononitrate (IMDUR) 30 mg, Oral, DAILY    losartan (COZAAR) 50 mg, Oral, DAILY    Multiple Vitamins-Minerals (PRESERVISION AREDS PO) Oral    nitroGLYCERIN (NITROSTAT) 0.4 MG SL tablet up to max of 3 total doses. If no relief after 1 dose, call 911. Potassium 99 MG TABS 3 tablets, Oral, DAILY    tamsulosin (FLOMAX) 0.4 mg, Oral, NIGHTLY    vitamin C (ASCORBIC ACID) 1,000 mg, Oral       Allergies   Allergen Reactions    Adhesive Tape Rash       Review of Systems:   Constitutional: negative for weight changes or fevers  Genitourinary: negative for bowel/bladder incontinence   Musculoskeletal: positive for low back pain  Neurological: positive for any leg weakness. Negative numbness/tingling  Behavioral/Psych: negative for anxiety/depression   All other systems reviewed and are negative    Objective: There were no vitals filed for this visit. Constitutional: Pleasant, no acute distress   Head: Normocephalic, atraumatic   Eyes: Conjunctivae normal   Neck: Supple, symmetrical   Lungs: Normal respiratory effort, non-labored breathing   Cardiovascular: Limbs warm and well perfused   Abdomen: Non-protruded   Musculoskeletal: Muscle bulk symmetric, no atrophy, no gross deformities   · Lower Extremities: ROM WNL.    · Thorax: No paraspinal tenderness bilaterally. No scoliosis or kyphosis. · Lumbar Spine: ROM WNL. Lumbar paraspinals mildly tender to palpation bilaterally. SLR neg bilaterally. ENMANUEL neg bilaterally. GAENSLEN neg bilaterally. Positive facet loading bilaterally. Bilateral SI joints non-tender to palpation. Bilateral greater trochanters non-tender to palpation. Neurological: Cranial nerves II-XII grossly intact. · Gait - Antalgic gait. Ambulates with assistive device. · Motor: 5/5 muscle strength in bilateral hip flexion, knee flexion, knee extension, ankle dorsiflexion, and ankle plantar flexion   · Sensory: LT sensation intact in lower limbs   · Reflexes: 2+ symmetrical in bilateral achilles, 2+ bilateral patellar, negative ankle clonus, downgoing babinski   Skin: No rashes or lesions present   Psychological: Cooperative, no exaggerated pain behaviors     Assessment:    Diagnosis Orders   1. Lumbar spondylosis  CHG FLUOR NEEDLE/CATH SPINE/PARASPINAL DX/THER ADDON    AL NJX DX/THER AGT PVRT FACET JT LMBR/SAC 1 LEVEL    AL NJX DX/THER AGT PVRT FACET JT LMBR/SAC 2ND LEVEL      2. Chronic pain syndrome        3. Facet arthropathy        4. Chronic bilateral low back pain without sciatica            Kirill Rendon is a 74 y.o.male presenting to the pain clinic for evaluation of back pain. Patient's history and physical consistent with lumbar facet mediated pain. I have set him up for a lumbar facet medial branch block at bilateral L4-5 and L5-S1. We will need clearance from his cardiologist, Dr. Mary Tamayo prior to proceeding with the injection. His wife states he does not do well with sedation, so he has opted to do the injection under a local. We discussed the potential of pursuing a lumbar RFA for more longstanding relief. We discussed paring trigger point injections and the lumbar RFA may be very beneficial for him. We also discussed potential starting a medication such as Lyrica to assist with his myofascial pain. Plan:    The following treatment recommendations and plan were discussed in detail with Brooke Edwards. Imaging:   I have reviewed patients imaging of lumbar MRI and results were discussed with patient today. Analgesics:   None    Adjuvants:   None    Interventions: In presence of lumbar axial back pain and with physical exam consistent for facetal pain, the option of medial branch blocks at bilateral L4/5 and L5/S1 was chosen. The risks and benefits were discussed in detail with the patient. Patient wants to proceed with the injection. Anticoagulation/NPO Recommendations:   Patient does need to hold Plavix x 7 days prior to the procedure. (Dr. Roya Dick)  Patient does not need to be NPO prior to the procedure. We will do a LOCAL injection    Multidisciplinary Pain Management:   In the presence of complex, chronic, and multi-factorial pain, the importance of a multidisciplinary approach to pain management in the patients management regimen was emphasized and discussed in great detail. PHYSICAL THERAPY: Patient is advised to see a physical therapist for gentle stretching exercises and conditioning exercises for management of pain. PSYCHOLOGY: Patient is advised to see a clinical pain psychologist, for the psychosocial aspect of pain care through coping skills, relaxation strategies, cognitive group therapy etc.   OBESITY: Patient is advised to seek nutrition consult to help in managing their weight to decrease its impact on pain.      Referrals:  None    Prescriptions Written This Visit:   None    Follow-up: lumbar MBB, in office 1 week after    [facility], DO

## 2023-02-16 ENCOUNTER — OFFICE VISIT (OUTPATIENT)
Dept: PHYSICAL MEDICINE AND REHAB | Age: 73
End: 2023-02-16

## 2023-02-16 ENCOUNTER — TELEPHONE (OUTPATIENT)
Dept: PHYSICAL MEDICINE AND REHAB | Age: 73
End: 2023-02-16

## 2023-02-16 VITALS
WEIGHT: 285 LBS | HEIGHT: 71 IN | SYSTOLIC BLOOD PRESSURE: 144 MMHG | BODY MASS INDEX: 39.9 KG/M2 | DIASTOLIC BLOOD PRESSURE: 68 MMHG

## 2023-02-16 DIAGNOSIS — G89.4 CHRONIC PAIN SYNDROME: ICD-10-CM

## 2023-02-16 DIAGNOSIS — M47.816 LUMBAR SPONDYLOSIS: Primary | ICD-10-CM

## 2023-02-16 DIAGNOSIS — G89.29 CHRONIC BILATERAL LOW BACK PAIN WITHOUT SCIATICA: ICD-10-CM

## 2023-02-16 DIAGNOSIS — E66.01 SEVERE OBESITY (BMI 35.0-39.9) WITH COMORBIDITY (HCC): ICD-10-CM

## 2023-02-16 DIAGNOSIS — M54.50 CHRONIC BILATERAL LOW BACK PAIN WITHOUT SCIATICA: ICD-10-CM

## 2023-02-16 DIAGNOSIS — M47.819 FACET ARTHROPATHY: ICD-10-CM

## 2023-02-16 NOTE — PROGRESS NOTES
Chronic Pain/PM&R Clinic Note     Encounter Date: 2/16/23    Subjective:   Chief Complaint:   Chief Complaint   Patient presents with    Follow Up After Procedure     bilateral lumbar 4/5, 5/Sacral 1 medial branch blocks 2/9/23     History of Present Illness:   Susan Marquis is a 74 y.o. male seen in the clinic initially on 01/23/2022 upon request from Pedro Luis VIEIRA DO  for his history of low back pain. Patient states his pain started about 50 years ago without any inciting event. He states his pain has continued to get gradually worse with time. He did do injections in his low back with Dr. Sharon Carrillo around 5 years ago. He is not sure how much they helped, while his wife does believe they were effective. He states his pain is aggravated when standing or walking for an extended period of time. Pain is improved when sitting. He states he sleeps in a recliner, while laying in bed aggravates his pain. Pain is midline low back, although he does state he noticed left hip pain walking into today's appointment. He does have weakness in both legs and has a hard time getting up and being active due to this. He states he had a stroke back in 2013 which caused left-sided weakness, his dominant side. He is currently in physical therapy where he resides at the [address] They have him marching in place and riding a recumbent bike. He uses a cane or walker for ambulation. He denies any recent falls. He is sleeping relatively well overall, in his recliner. He does have a history of bowel and bladder incontinence since his stroke in 2013. He did get trigger point injections from Dr. Marla Cervantes about 3 weeks ago and feels this has been helping significantly. He did not realize how much it was helping, and his wife pointed out how well he was able to go from sitting to standing. Of note, he is currently on Plavix due to her history with cardiac stents. This is prescribed by Dr. Ashley Major.   He was also recently in the ER for a small bowel obstruction. Today, 02/16/2023, patient presents for planned follow up on chronic low back pain. He underwent the lumbar facet medial branch block at bilateral L4/5 and L5/S1 on 02/09/2023. He did not notice a lot of improvement with the injection. When talking to his wife, she was not around him for a couple days after the procedure since he does reside in the nursing home. She is unable to speak on his response to the injection. At his point he believes he is back to his baseline. He denies any new symptoms at this time. History of Interventions:   Surgery: No previous lumbar surgeries  ACDF C5-6 (Dr. Karime Padron)   Injections: Dr. Butterfield Bolus  Cervical nerve root injection TF right C4-5  Cervical MBB/RFA C3-4, 4-5, 5-6  Caudal epidural  Lumbar RFA  Lumbar facet MBB L4/5 and L5/S1 (02/09/20230) - questionable relief    Current Treatment Medications:   Lidocaine - ?effective    Historical Treatment Medications:   Tramadol - ineffective  Oxycodone - ineffective   Norco - ineffective   Tylenol - ineffective    Imaging:  Lumbar MRI (10/20/2022)  Narrative   PROCEDURE: MRI LUMBAR SPINE WO CONTRAST       CLINICAL INFORMATION: Weakness, Bilateral leg weakness. Chronic bilateral leg weakness and lower back pain. COMPARISON: Lumbar spine CT 5/16/2020. TECHNIQUE: Sagittal and axial T1 and T2-weighted images were obtained through the lumbar spine. FINDINGS:           There is a slight levoscoliosis. There are benign hemangiomas in L1, L4 and S1. There is no suspicious marrow signal abnormality. There are small anterior osteophytes. There is no bone marrow edema. There are no compression fractures. No pars defects    are noted. There is disc desiccation throughout. The distal spinal cord, conus medullaris and cauda equina are normal.        There are no gross abnormalities in the visualized aspects of the distal thoracic spine.        On the axial images, at T12-L1, there are mild facet degenerative changes. There is no spinal canal or foraminal stenosis. At L1-L2, there are very mild facet degenerative changes. There is no focal disc abnormality. There is no spinal canal stenosis. There is no foraminal stenosis. At L2-L3, there is a diffuse disc bulge. There are mild facet degenerative changes. There is no spinal canal stenosis. There is no foraminal stenosis. At L3-L4, there are moderate severity facet degenerative changes. There is thickening of ligamentum flavum. There is a diffuse disc bulge. There is mild spinal canal stenosis. There is mild bilateral foraminal stenosis. At L4-L5, there are moderate severity facet degenerative changes. There is a diffuse disc bulge. There is mild/moderate spinal canal stenosis. There is mild bilateral foraminal stenosis. This is stable. At L5-S1, there are severe left-sided facet degenerative changes. There are milder right-sided facet degenerative changes. There is no spinal canal stenosis. There is severe left and mild right foraminal stenosis. This is stable. There are no suspicious findings in the visualized aspects of the retroperitoneum and paraspinal soft tissues. Impression       1. Severe left and mild right foraminal stenosis at the L5-S1 level. 2. Mild to moderate spinal canal stenosis with mild bilateral foraminal stenosis at the L4-5 level. **This report has been created using voice recognition software. It may contain minor errors which are inherent in voice recognition technology. **       Final report electronically signed by Dr. Edith Monteiro on 10/20/2022 1:39 PM     Past Medical History:   Diagnosis Date    Arthritis     Back pain     CAD in native artery 6/14/2021    Diarrhea     Hypertension     Infection of prosthetic shoulder joint ([address] 7/24/2020    PFO (patent foramen ovale) 8/2012    noted on ROSITA following stroke    Propionibacterium infection 7/24/2020    Rotator cuff injury     Stroke [facility])     August 19/2012    TIA (transient ischemic attack) 05/2018       Past Surgical History:   Procedure Laterality Date    BACK SURGERY  05/16/2022    C5-6    CHOLECYSTECTOMY  06/25/2017    CORONARY ANGIOPLASTY WITH STENT PLACEMENT      CYST INCISION AND DRAINAGE      CYSTOSCOPY N/A 12/17/2019    CYSTOSCOPY, WITH  URETHRAL DILATION performed by Megan Richey MD at [address] [place] [address] Street 2/18/2020    Extension [place] performed by Megan Richey MD at [address]    ERCP  06/23/2017    PAIN MANAGEMENT PROCEDURE Bilateral 2/9/2023    bilateral lumbar 4/5, 5/Sacral 1 medial branch blocks performed by Jennifer Nicole DO at [address] [facility] [address] Left 03/12/2013    right 4/2019    SHOULDER SURGERY Right 03/02/2020    SHOULDER SURGERY  06/2020    frozen shoulder, repair nerve elbow and palm-dr eduardo     TURP N/A 8/13/2019    TRANSURETHRAL RESECTION PROSTATE performed by Dick Connolly MD at [place] [address] History   Problem Relation Age of Onset    Arthritis Mother     Vision Loss Father     Cancer Brother     Stroke Maternal Uncle     Prostate Cancer Neg Hx          Medications & Allergies:   Current Outpatient Medications   Medication Instructions    aspirin 81 mg, Oral, DAILY    clopidogrel (PLAVIX) 75 mg, Oral, DAILY    hydroCHLOROthiazide (MICROZIDE) 25 mg, Oral, DAILY    isosorbide mononitrate (IMDUR) 30 mg, Oral, DAILY    losartan (COZAAR) 50 mg, Oral, DAILY    Multiple Vitamins-Minerals (PRESERVISION AREDS PO) Oral    nitroGLYCERIN (NITROSTAT) 0.4 MG SL tablet up to max of 3 total doses. If no relief after 1 dose, call 911.     Potassium 99 MG TABS 3 tablets, Oral, DAILY    tamsulosin (FLOMAX) 0.4 mg, Oral, NIGHTLY    vitamin C (ASCORBIC ACID) 1,000 mg, Oral       Allergies   Allergen Reactions    Adhesive Tape Rash       Review of Systems:   Constitutional: negative for weight changes or fevers  Genitourinary: negative for bowel/bladder incontinence   Musculoskeletal: positive for low back pain  Neurological: positive for any leg weakness. Negative numbness/tingling  Behavioral/Psych: negative for anxiety/depression   All other systems reviewed and are negative    Objective:     Vitals:    02/16/23 1004   BP: (!) 144/68         Constitutional: Pleasant, no acute distress   Head: Normocephalic, atraumatic   Eyes: Conjunctivae normal   Neck: Supple, symmetrical   Lungs: Normal respiratory effort, non-labored breathing   Cardiovascular: Limbs warm and well perfused   Abdomen: Non-protruded   Musculoskeletal: Muscle bulk symmetric, no atrophy, no gross deformities   · Lower Extremities: ROM WNL. · Thorax: No paraspinal tenderness bilaterally. No scoliosis or kyphosis. · Lumbar Spine: ROM WNL. Lumbar paraspinals mildly tender to palpation bilaterally. SLR neg bilaterally. ENMANUEL neg bilaterally. GAENSLEN neg bilaterally. Positive facet loading bilaterally. Bilateral SI joints non-tender to palpation. Bilateral greater trochanters non-tender to palpation. Neurological: Cranial nerves II-XII grossly intact. · Gait - Antalgic gait. Ambulates with assistive device. · Motor: 5/5 muscle strength in bilateral hip flexion, knee flexion, knee extension, ankle dorsiflexion, and ankle plantar flexion   · Sensory: LT sensation intact in lower limbs   · Reflexes: 2+ symmetrical in bilateral achilles, 2+ bilateral patellar, negative ankle clonus, downgoing babinski   Skin: No rashes or lesions present   Psychological: Cooperative, no exaggerated pain behaviors     Assessment:    Diagnosis Orders   1. Lumbar spondylosis  CHG FLUOR NEEDLE/CATH SPINE/PARASPINAL DX/THER ADDON    ND NJX DX/THER AGT PVRT FACET JT LMBR/SAC 1 LEVEL    ND NJX DX/THER AGT PVRT FACET JT LMBR/SAC 2ND LEVEL      2. Severe obesity (BMI 35.0-39. 9) with comorbidity ([address]            Hilda Savage is a 74 y.o.male presenting to the pain clinic for evaluation of back pain. Patient's history and physical consistent with lumbar facet mediated pain. He does not believe he responded to the lumbar medial branch block. That being said his wife seems to notice a difference when he has responded to procedures in the past. I have set him up for the confirmatory lumbar facet medial branch block at bilateral L4-5 and L5-S1. I have advised the wife to evaluate his response or see if she notices a difference with his pain after the lumbar MBB. His wife states he does not do well with sedation, so he has opted to do the injection under a local. He does feel the last procedure went well without sedation. We discussed the potential of pursuing a lumbar RFA for more longstanding relief. We discussed paring trigger point injections and the lumbar RFA may be very beneficial for him. We also discussed potential starting a medication such as Lyrica to assist with his myofascial pain. Plan: The following treatment recommendations and plan were discussed in detail with Delores Fleming. Imaging:   I have reviewed patients imaging of lumbar MRI and results were discussed with patient today. Analgesics:   None    Adjuvants:   None    Interventions: In presence of lumbar axial back pain and with physical exam consistent for facetal pain, the option of confirmatory medial branch blocks at bilateral L4/5 and L5/S1 was chosen. The risks and benefits were discussed in detail with the patient. Patient wants to proceed with the injection. Anticoagulation/NPO Recommendations:   Patient does need to hold Plavix x 5 days prior to the procedure. (Dr. Susan Kline)  Patient does not need to be NPO prior to the procedure. We will do a LOCAL injection    Multidisciplinary Pain Management:   In the presence of complex, chronic, and multi-factorial pain, the importance of a multidisciplinary approach to pain management in the patients management regimen was emphasized and discussed in great detail.    PHYSICAL THERAPY: Patient is advised to see a physical therapist for gentle stretching exercises and conditioning exercises for management of pain. PSYCHOLOGY: Patient is advised to see a clinical pain psychologist, for the psychosocial aspect of pain care through coping skills, relaxation strategies, cognitive group therapy etc.   OBESITY: Patient is advised to seek nutrition consult to help in managing their weight to decrease its impact on pain.      Referrals:  None    Prescriptions Written This Visit:   None    Follow-up: lumbar MBB, in office 1 week after    MAGDA Elder - CNP

## 2023-02-16 NOTE — TELEPHONE ENCOUNTER
Pre-procedure instructions faxed to The [address] [place] [address]. [facility].  Clearance 1/24/2023-4/24/2023

## 2023-02-17 ENCOUNTER — OFFICE VISIT (OUTPATIENT)
Dept: NEUROLOGY | Age: 73
End: 2023-02-17
Payer: MEDICARE

## 2023-02-17 ENCOUNTER — PREP FOR PROCEDURE (OUTPATIENT)
Dept: PHYSICAL MEDICINE AND REHAB | Age: 73
End: 2023-02-17

## 2023-02-17 VITALS
WEIGHT: 295 LBS | SYSTOLIC BLOOD PRESSURE: 132 MMHG | HEIGHT: 71 IN | HEART RATE: 64 BPM | BODY MASS INDEX: 41.3 KG/M2 | OXYGEN SATURATION: 96 % | DIASTOLIC BLOOD PRESSURE: 58 MMHG

## 2023-02-17 DIAGNOSIS — R29.898 BILATERAL LEG WEAKNESS: Primary | ICD-10-CM

## 2023-02-17 DIAGNOSIS — R53.1 WEAKNESS: ICD-10-CM

## 2023-02-17 DIAGNOSIS — M48.061 SPINAL STENOSIS OF LUMBAR REGION, UNSPECIFIED WHETHER NEUROGENIC CLAUDICATION PRESENT: ICD-10-CM

## 2023-02-17 PROCEDURE — 1123F ACP DISCUSS/DSCN MKR DOCD: CPT | Performed by: PSYCHIATRY & NEUROLOGY

## 2023-02-17 PROCEDURE — G8427 DOCREV CUR MEDS BY ELIG CLIN: HCPCS | Performed by: PSYCHIATRY & NEUROLOGY

## 2023-02-17 PROCEDURE — 99213 OFFICE O/P EST LOW 20 MIN: CPT | Performed by: PSYCHIATRY & NEUROLOGY

## 2023-02-17 PROCEDURE — 3017F COLORECTAL CA SCREEN DOC REV: CPT | Performed by: PSYCHIATRY & NEUROLOGY

## 2023-02-17 PROCEDURE — 1036F TOBACCO NON-USER: CPT | Performed by: PSYCHIATRY & NEUROLOGY

## 2023-02-17 PROCEDURE — G8484 FLU IMMUNIZE NO ADMIN: HCPCS | Performed by: PSYCHIATRY & NEUROLOGY

## 2023-02-17 PROCEDURE — G8417 CALC BMI ABV UP PARAM F/U: HCPCS | Performed by: PSYCHIATRY & NEUROLOGY

## 2023-02-17 NOTE — PROGRESS NOTES
NEUROLOGY OUT PATIENT FOLLOW UP NOTE:  2/17/20231:54 PM    William Turpin is here for follow up for bilateral leg weakness, weakness   Patient Active Problem List   Diagnosis    Right basal ganglia embolic stroke [facility])    BPH with urinary obstruction    Propionibacterium infection    Infection of prosthetic shoulder joint ([facility] [address]    Cardiac pacemaker    CAD in native artery    Angina of effort [facility])    Chest pain    Generalized weakness    Partial small bowel obstruction (HCC)    Small bowel obstruction (HCC)    Enteritis       Allergies   Allergen Reactions    Adhesive Tape Rash       Current Outpatient Medications:     clopidogrel (PLAVIX) 75 MG tablet, Take 1 tablet by mouth in the morning., Disp: , Rfl:     hydroCHLOROthiazide (MICROZIDE) 12.5 MG capsule, Take 2 capsules by mouth in the morning., Disp: 30 capsule, Rfl: 3    isosorbide mononitrate (IMDUR) 30 MG extended release tablet, Take 1 tablet by mouth daily, Disp: 90 tablet, Rfl: 1    aspirin 81 MG chewable tablet, Take 1 tablet by mouth daily, Disp: 30 tablet, Rfl: 3    nitroGLYCERIN (NITROSTAT) 0.4 MG SL tablet, up to max of 3 total doses. If no relief after 1 dose, call 911., Disp: 25 tablet, Rfl: 1    tamsulosin (FLOMAX) 0.4 MG capsule, Take 0.4 mg by mouth nightly , Disp: , Rfl:     losartan (COZAAR) 100 MG tablet, Take 50 mg by mouth daily , Disp: , Rfl:     Potassium 99 MG TABS, Take 3 tablets by mouth daily, Disp: , Rfl:     Multiple Vitamins-Minerals (PRESERVISION AREDS PO), Take by mouth, Disp: , Rfl:     Ascorbic Acid (VITAMIN C) 500 MG tablet, Take 1,000 mg by mouth , Disp: , Rfl:     I reviewed the past medical history, allergies, medications, social history and family history.        PE:   Vitals:    02/17/23 1328   BP: (!) 132/58   Site: Left Upper Arm   Position: Sitting   Cuff Size: Large Adult   Pulse: 64   SpO2: 96%   Weight: 295 lb (133.8 kg)   Height: 5' 11\" (1.803 m)     General Appearance:  awake, alert, oriented, in no acute distress  Gen: NAD, Language is Intact. Skin: no rash, lesion, dry  to touch. warm  Head: no rash, no icterus  Neck: There is no carotid bruits. The Neck is supple. Neuro: CN 2-12 grossly intact with no focal deficits. Power 5/5 Throughout symmetric, Reflexes are symmetric. Long tracts are reduced distally. Cerebellar exam is Intact. Sensory exam is intact to light touch. Gait is guarded, using cane  Musculoskeletal:  Has no hand arthritis, no limitation of ROM in any of the four extremities. Lower extremities no edema          DATA:      Results for orders placed or performed during the hospital encounter of 12/22/22   Culture, Reflexed, Urine    Specimen: Urine   Result Value Ref Range    Urine Culture Reflex (A)      Mixed growth. The mixture of organisms present represents both organisms that may cause urinary tract infections and organisms that are not a common cause of urinary tract infections and are possibly skin nasim or distal urethral nasim.     Organism Mixed Growth (A)    CBC with Auto Differential   Result Value Ref Range    WBC 6.6 4.8 - 10.8 thou/mm3    RBC 4.93 4.50 - 6.10 mill/mm3    Hemoglobin 14.1 14.0 - 18.0 gm/dl    Hematocrit 41.3 (L) 42.0 - 52.0 %    MCV 83.8 80.0 - 94.0 fL    MCH 28.6 26.0 - 32.0 pg    MCHC 34.1 31.0 - 35.0 gm/dl    RDW 13.9 11.5 - 14.9 %    Platelets 360 974 - 323 thou/mm3    MPV 10.3 9.4 - 12.4 fL    Seg Neutrophils 67.2 43.0 - 75.0 %    Segs Absolute 4.4 1.8 - 7.7 thou/mm3    Lymphocytes 14.6 (L) 15.0 - 47.0 %    Lymphocytes Absolute 1.0 1.0 - 4.8 thou/mm3    Monocytes 12.3 (H) 0.0 - 12.0 %    Monocytes 0.8 0.3 - 1.3 thou/mm3    Eosinophils % 4.9 (H) 0.0 - 4.0 %    Eosinophils Absolute 0.3 0.0 - 0.5 thou/mm3    Basophils 0.8 0.0 - 3.0 %    Basophils Absolute 0.1 0.0 - 0.1 thou/mm3    Immature Granulocytes 0 %    Immature Grans (Abs) 0.01 0.00 - 0.07 thou/mm3   Comprehensive Metabolic Panel   Result Value Ref Range    Glucose 115 (H) 74 - 106 mg/dl    Creatinine 1.5 (H) 0.6 - 1.3 mg/dl    BUN 20 (H) 7 - 18 mg/dl    Sodium 143 136 - 145 meq/l    Potassium 3.1 (L) 3.5 - 5.1 meq/l    Chloride 103 98 - 107 meq/l    CO2 29 21 - 32 meq/l    POC CALCIUM 8.8 8.5 - 10.1 mg/dl    AST 50 (H) 15 - 37 U/L    Alkaline Phosphatase 81 46 - 116 U/L    Total Protein 7.1 6.4 - 8.2 gm/dl    Albumin 3.6 3.4 - 5.0 gm/dl    Total Bilirubin 0.8 0.2 - 1.0 mg/dl    ALT 73 (H) 14 - 63 U/L   Lipase   Result Value Ref Range    Lipase 64.0 (L) 73.0 - 393.0 U/L   Magnesium   Result Value Ref Range    Magnesium 2.0 1.8 - 2.4 mg/dl   Protime-INR   Result Value Ref Range    INR 1.19 (H) 0.85 - 1.13   APTT   Result Value Ref Range    aPTT 34.3 22.0 - 38.0 seconds   Urinalysis with Reflex to Culture    Specimen: Urine   Result Value Ref Range    Glucose, Urine NEGATIVE NEGATIVE mg/dl    Bilirubin Urine NEGATIVE     Ketones, Urine NEGATIVE NEGATIVE    Specific Gravity, UA <1.005 1.002 - 1.030    Blood, Urine TRACE (A) NEGATIVE    pH, UA 5.5 5.0 - 9.0    Protein, UA NEGATIVE NEGATIVE mg/dl    Urobilinogen, Urine 0.2 0.0 - 1.0 eu/dl    Nitrite, Urine NEGATIVE NEGATIVE    Leukocyte Esterase, Urine MODERATE (A) NEGATIVE    Color, UA YELLOW STRAW-YELLOW    Character, Urine SL CLOUDY CLEAR-SL CLOUD    RBC, UA 0-2 0-2/hpf /hpf    WBC, UA 10-15W/CLUMPS 0-4/hpf /hpf    Epithelial Cells, UA 3-5 3-5/hpf /hpf    Amorphous, UA NONE SEEN none seen    Mucus, UA THREADS none seen    Bacteria, UA FEW few/none seen    Casts UA 0-4 HYALINE none seen /lpf    Crystals, UA NONE SEEN none seen    REFLEX TO URINE C & S INDICATED    Glomerular Filtration Rate, Estimated   Result Value Ref Range    GFR, Estimated 48 (A) >60 ml/min/1.73m2   ANION GAP   Result Value Ref Range    Anion Gap 11.0 8.0 - 16.0 meq/l   Basic Metabolic Panel w/ Reflex to MG   Result Value Ref Range    Sodium 139 135 - 145 meq/L    Potassium reflex Magnesium 3.2 (L) 3.5 - 5.2 meq/L    Chloride 101 98 - 111 meq/L    CO2 28 23 - 33 meq/L    Glucose 95 70 - 108 mg/dL BUN 17 7 - 22 mg/dL    Creatinine 1.2 0.4 - 1.2 mg/dL    Calcium 8.5 8.5 - 10.5 mg/dL   CBC with Auto Differential   Result Value Ref Range    WBC 6.2 4.8 - 10.8 thou/mm3    RBC 4.69 (L) 4.70 - 6.10 mill/mm3    Hemoglobin 13.4 (L) 14.0 - 18.0 gm/dl    Hematocrit 39.8 (L) 42.0 - 52.0 %    MCV 84.9 80.0 - 94.0 fL    MCH 28.6 26.0 - 33.0 pg    MCHC 33.7 32.2 - 35.5 gm/dl    RDW-CV 14.2 11.5 - 14.5 %    RDW-SD 43.8 35.0 - 45.0 fL    Platelets 712 985 - 098 thou/mm3    MPV 10.4 9.4 - 12.4 fL    Seg Neutrophils 57.1 %    Lymphocytes 22.8 %    Monocytes 11.1 %    Eosinophils 7.6 %    Basophils 1.1 %    Immature Granulocytes 0.3 %    Segs Absolute 3.5 1.8 - 7.7 thou/mm3    Lymphocytes Absolute 1.4 1.0 - 4.8 thou/mm3    Monocytes Absolute 0.7 0.4 - 1.3 thou/mm3    Eosinophils Absolute 0.5 (H) 0.0 - 0.4 thou/mm3    Basophils Absolute 0.1 0.0 - 0.1 thou/mm3    Immature Grans (Abs) 0.02 0.00 - 0.07 thou/mm3    nRBC 0 /100 wbc   Anion Gap   Result Value Ref Range    Anion Gap 10.0 8.0 - 16.0 meq/L   Glomerular Filtration Rate, Estimated   Result Value Ref Range    Est, Glom Filt Rate >60 >60 ml/min/1.73m2   Magnesium   Result Value Ref Range    Magnesium 2.0 1.6 - 2.4 mg/dL   Basic Metabolic Panel   Result Value Ref Range    Sodium 139 135 - 145 meq/L    Potassium 3.4 (L) 3.5 - 5.2 meq/L    Chloride 104 98 - 111 meq/L    CO2 25 23 - 33 meq/L    Glucose 95 70 - 108 mg/dL    BUN 11 7 - 22 mg/dL    Creatinine 1.1 0.4 - 1.2 mg/dL    Calcium 9.0 8.5 - 10.5 mg/dL   Anion Gap   Result Value Ref Range    Anion Gap 10.0 8.0 - 16.0 meq/L   Glomerular Filtration Rate, Estimated   Result Value Ref Range    Est, Glom Filt Rate >60 >60 ml/min/1.73m2          Results for orders placed during the hospital encounter of 04/13/22    MRI CERVICAL SPINE WO CONTRAST    Narrative  PROCEDURE: MRI CERVICAL SPINE WO CONTRAST    CLINICAL INFORMATION: Degeneration of intervertebral disc of mid-cervical region, unspecified spinal level, Brachial neuritis, Spinal stenosis in cervical region . COMPARISON: Plain radiographs dated 29 April 2020. Rand Gomez TECHNIQUE: Sagittal T1, T2 and STIR sequences were obtained through the cervical spine. Axial fast and echo and gradient echo T2-weighted images were obtained. FINDINGS:        There is straightening of the normal cervical lordosis. . There is degenerative change in the vertebral body endplates adjacent to the C5-C6 disc space. No suspicious osseous lesions are present. The facet joints are normally aligned. The cervical spinal cord is of normal caliber and signal intensity. The visualized aspects of the posterior fossa are normal.    On the axial images, at C2-C3, there is no disc herniation, canal stenosis, cord compression or foraminal stenosis. At C3-C4, there is a 2.3 mm ventral extradural defect secondary bulging disc and uncinate process hypertrophy. There is facet hypertrophy. The combination of these findings result in mild canal stenosis, indentation upon the underlying spinal cord and  moderate to severe bilateral foraminal stenosis. At C4-C5, there is no disc herniation, canal stenosis or cord compression. There is mild-to-moderate bilateral foraminal stenosis. At C5-C6, there is a 1.9 mm ventral extradural defect secondary to disc protrusion and uncinate process hypertrophy. This causes mild canal stenosis, indentation upon underlying spinal cord, severe right and moderate left-sided foraminal stenosis. At C6-C7, there is no disc herniation, canal stenosis or cord compression. There is mild-to-moderate right and mild left-sided foraminal stenosis. At C7-T1, there is no disc herniation, canal stenosis or cord compression. There is mild right-sided foraminal stenosis. There are no suspicious findings in the cervical soft tissues. Impression  1.  Straightening of the normal cervical lordosis with superimposed degenerative changes resulting in mild canal stenosis, indentation upon the underlying spinal cord, severe right and moderate left-sided foraminal stenosis at C5-6.  2. There is mild canal stenosis, indentation upon the underlying spinal cord and moderate to severe bilateral foraminal stenosis at C3-4.  3. There is mild to moderate bilateral foraminal stenosis with no canal stenosis or cord compression at C4-5.  4. There is mild-to-moderate right and mild left-sided foraminal stenosis with no canal stenosis or cord compression at C6-7.  5. There is mild right-sided foramen stenosis with no canal stenosis or cord compression at C7-T1.  6. Otherwise negative MRI scan of the cervical spine. .  **This report has been created using voice recognition software. It may contain minor errors which are inherent in voice recognition technology. **  Final report electronically signed by DR Lalo Veloz on 4/15/2022 2:46 PM        CTA HEAD W WO CONTRAST (CODE STROKE)    Narrative  PROCEDURE: CTA HEAD W WO CONTRAST, CTA NECK W WO CONTRAST  CLINICAL INFORMATION: new right facial droop. COMPARISON: CT scan of the brain obtained on the same day. .  TECHNIQUE: 1 mm axial images were obtained through the head and neck after the fast bolus administration of contrast. A noncontrast localizer was obtained. 3-D reconstructions were performed on a dedicated 3-D workstation. These include multiplanar MPR  images and multiplanar MIP images. Centerline reconstructions were obtained of the carotid systems. Isovue intravenous contrast was given. All carotid artery measurements are performed utilizing Nascet criteria. This exam was analyzed using viz.   contact CT LVO. All CT scans at this facility use dose modulation, iterative reconstruction, and/or weight-based dosing when appropriate to reduce radiation dose to as low as reasonably achievable. FINDINGS:  CTA NECK:  Aortic arch and branches: Atherosclerotic calcification in the aortic arch.  Calcification versus stent placement in the left anterior attending coronary artery.  Right common carotid artery/ICA: There is no significant hemodynamic stenosis in the right common and internal carotid arteries.  Left common carotid artery/ICA: There is no significant hemodynamic stenosis in the left common and internal carotid arteries.  Vertebral arteries: Antegrade flow in the right and left vertebral arteries.      CTA HEAD:  Internal carotid arteries: Atherosclerotic calcification in the cavernous segments of both internal carotid arteries. No evidence of severe stenosis..  Middle cerebral arteries: Narrowing in the distal branches of the right middle cerebral artery. There is antegrade flow in the left middle cerebral artery.  Anterior cerebral arteries: Antegrade flow in the anterior cerebral arteries bilaterally..  Vertebral arteries: Antegrade flow in the right and left vertebral arteries.  Basilar artery: Narrowing in the mid basilar artery.  Superior cerebellar arteries: Antegrade flow in the right and to lesser extent left superior cerebellar arteries..  Posterior cerebral arteries: Antegrade flow in the posterior cerebral arteries bilaterally.  No aneurysms, stenoses or occlusions are noted.  The superior sagittal sinus, vein of Charli, internal cerebral veins, straight sinus, transverse sinuses and sigmoid sinuses are patent.  Axial source data: Areas of abnormal attenuation in the left basal ganglia and right basal ganglia. Diminished attenuation in the white matter.  Impression  1. No significant hemodynamic stenosis in the right and left common and internal carotid arteries.  2. Antegrade flow in the right and left vertebral arteries.  3. Atherosclerotic calcification aortic arch.  4. Atherosclerotic calcification in the cavernous segments of both internal carotid arteries. No evidence of severe stenosis.  5. Narrowing in the distal branches of the right middle cerebral artery. There is antegrade flow in the left middle cerebral artery.  6. There is antegrade flow  in the anterior and posterior cerebral arteries. 7. There is narrowing in the mid basilar artery. 8. Areas of abnormal attenuation in the left and right basal ganglia and in the white matter  9. Diffusion MRI scan may be helpful for better evaluation  10. Nurse Melissa Ch on the floor notified  of these results at 1:45 PM.  **This report has been created using voice recognition software. It may contain minor errors which are inherent in voice recognition technology. **  Final report electronically signed by DR Patience Simms on 7/25/2022 2:03 PM    Results for orders placed during the hospital encounter of 07/23/22    CTA NECK W [address] [employer] (CODE STROKE)    Narrative  PROCEDURE: CTA HEAD W WO CONTRAST, CTA NECK W WO CONTRAST  CLINICAL INFORMATION: new right facial droop. COMPARISON: CT scan of the brain obtained on the same day. .  TECHNIQUE: 1 mm axial images were obtained through the head and neck after the fast bolus administration of contrast. A noncontrast localizer was obtained. 3-D reconstructions were performed on a dedicated 3-D workstation. These include multiplanar MPR  images and multiplanar MIP images. Centerline reconstructions were obtained of the carotid systems. Isovue intravenous contrast was given. All carotid artery measurements are performed utilizing Nascet criteria. This exam was analyzed using viz. Mobile Pulse  contact CT LVO. All CT scans at this facility use dose modulation, iterative reconstruction, and/or weight-based dosing when appropriate to reduce radiation dose to as low as reasonably achievable. FINDINGS:  CTA NECK:  Aortic arch and branches: Atherosclerotic calcification in the aortic arch. Calcification versus stent placement in the left anterior attending coronary artery. Right common carotid artery/ICA: There is no significant hemodynamic stenosis in the right common and internal carotid arteries.   Left common carotid artery/ICA: There is no significant hemodynamic stenosis in the left common and internal carotid arteries. Vertebral arteries: Antegrade flow in the right and left vertebral arteries. CTA HEAD:  Internal carotid arteries: Atherosclerotic calcification in the cavernous segments of both internal carotid arteries. No evidence of severe stenosis. .  Middle cerebral arteries: Narrowing in the distal branches of the right middle cerebral artery. There is antegrade flow in the left middle cerebral artery. Anterior cerebral arteries: Antegrade flow in the anterior cerebral arteries bilaterally. .  Vertebral arteries: Antegrade flow in the right and left vertebral arteries. Basilar artery: Narrowing in the mid basilar artery. Superior cerebellar arteries: Antegrade flow in the right and to lesser extent left superior cerebellar arteries. .  Posterior cerebral arteries: Antegrade flow in the posterior cerebral arteries bilaterally. No aneurysms, stenoses or occlusions are noted. The superior sagittal sinus, vein of Charli, internal cerebral veins, straight sinus, transverse sinuses and sigmoid sinuses are patent. Axial source data: Areas of abnormal attenuation in the left basal ganglia and right basal ganglia. Diminished attenuation in the white matter. Impression  1. No significant hemodynamic stenosis in the right and left common and internal carotid arteries. 2. Antegrade flow in the right and left vertebral arteries. 3. Atherosclerotic calcification aortic arch. 4. Atherosclerotic calcification in the cavernous segments of both internal carotid arteries. No evidence of severe stenosis. 5. Narrowing in the distal branches of the right middle cerebral artery. There is antegrade flow in the left middle cerebral artery. 6. There is antegrade flow in the anterior and posterior cerebral arteries. 7. There is narrowing in the mid basilar artery. 8. Areas of abnormal attenuation in the left and right basal ganglia and in the white matter  9.  Diffusion MRI scan may be helpful for better evaluation  10. Nurse Shahrzad Stephenson on the floor notified  of these results at 1:45 PM.  **This report has been created using voice recognition software. It may contain minor errors which are inherent in voice recognition technology. **    Final report electronically signed by DR Henri Rene on 7/25/2022 2:03 PM      MRI BRAIN WO CONTRAST    Narrative  MR Brain without gadolinium. Comparison: CT,SR - CT HEAD WO CONTRAST - 07/25/2022 12:47 PM EDT  Findings:  No restricted diffusion. Old infarct in the right frontal corona radiata extending into the right  basal ganglia. Additional old lacunar infarct in the left basal ganglia. Small amount of hemosiderin deposition in the periphery of the old lacunar  infarcts. Nonspecific periventricular and scattered frontoparietal white matter  T2/FLAIR signal abnormality. Mild generalized atrophy. No intracranial mass or acute hemorrhage. No midline shift. No hydrocephalus. Vascular flow voids are intact. Mucosal thickening in the left frontal sinus, left ethmoid air cells,  bilateral maxillary sinuses, and left sphenoid sinus. Mastoid air cells are clear. Orbital contents are unremarkable. No focal bone lesion. Impression  1. No restricted diffusion to suggest acute infarct. 2. Old infarct in the right corona radiata extending to the right basal  ganglia. Old lacunar infarct in the left basal ganglia. 3. Mild atrophy. Nonspecific periventricular and frontoparietal white  matter signal abnormality may relate to chronic small vessel ischemic  changes. This document has been electronically signed by: Shara Tavares MD on  07/25/2022 07:25 PM      CT HEAD WO CONTRAST (CODE STROKE)  Narrative  PROCEDURE: CT HEAD WO CONTRAST  CLINICAL INFORMATION: new right facial droop .   TECHNIQUE: 2-D multiplanar noncontrast CT brain  All CT scans at this facility use dose modulation, iterative reconstruction, and/or weight-based dosing when appropriate to reduce radiation dose to as low as reasonably achievable. COMPARISON: No prior study. FINDINGS: No acute hemorrhage. No acute infarction identified. Generalized cerebral volume loss. Bilateral basal ganglia chronic infarcts. Diminished attenuation right parietal white matter. Which may be related to remote white matter ischemic change or  chronic small vessel ischemic changes. Subtle periventricular white matter diminished attenuation bilaterally. No intra-axial or extra-axial fluid collections. Ventricles are consistent with degree of cerebral volume loss. There is no midline shift. Mild ethmoid sinus disease left ethmoid sinus chambers. Acute sphenoid sinus disease. Remaining paranasal sinuses  and mastoid air cells are clear. No calvarial fracture. Impression  1. No acute intracranial pathology  2. Acute and chronic sinus disease. Information called to Leon[facility] at 12:54 PM  **This report has been created using voice recognition software. It may contain minor errors which are inherent in voice recognition technology. **    Final report electronically signed by Dr. Corinne Pop on 7/25/2022 12:59 PM         Assessment:     Diagnosis Orders   1. Bilateral leg weakness        2. Spinal stenosis of lumbar region, unspecified whether neurogenic claudication present        3. Weakness             Follow up for bilateral leg weakness, weakness. He is doing better with his walking, he is now using a cane,  only uses walker for long distance. He was evaluated by spine specialist re MRI lumbar spine WO contrast showed severe left and mild right foraminal stenosis at the L5-S1 level. Mild to moderate spinal canal stenosis with mild bilateral foraminal stenosis at the L4-5 level. He was referred to a pain specialist. He had epidural injection. He is still on the B12 supplements. [id number], folate =12, aldolase =4.3, SP=084. No recent falls, no dysphagia. After detailed discussion with patient and his wife we agreed on the following plan. Plan:  Follow thru with referral to spine specialist re:  severe left and mild right foraminal stenosis at the L5-S1 level. Mild to moderate spinal canal stenosis with mild bilateral foraminal stenosis at the L4-5 level  Continue with Vitamin B12 sublingual supplements, at least 3000 mcg daily, over the counter  Follow up in 3 months or sooner if needed. Call if any questions or concerns.     Total time 26 min    Meenu Kapoor MD

## 2023-02-17 NOTE — PATIENT INSTRUCTIONS
Follow thru with referral to spine specialist re:  severe left and mild right foraminal stenosis at the L5-S1 level. Mild to moderate spinal canal stenosis with mild bilateral foraminal stenosis at the L4-5 level  Continue with Vitamin B12 sublingual supplements, at least 3000 mcg daily, over the counter  Follow up in 3 months or sooner if needed. Call if any questions or concerns.

## 2023-02-20 NOTE — DISCHARGE INSTRUCTIONS
Post procedure Instructions:    No driving or making significant decisions for the remainder of the day. Be cautions with walking and activity for 24 hours, do not over exert yourself. Ok to resume pre-procedure activity level today. Apply ice to site of injection site if you have pain or discomfort. Do not submerge sit of injection during bath or pool activities for 48 hours post-procedure. Resume blood thinning medications in 24 hours. Call office [phone] if you have:  Temperature greater than 100.4  Persistent nausea and vomiting  Severe uncontrolled pain  Redness, tenderness, or signs of infection (pain, swelling, redness, odor or green/yellow discharge around the site)  Difficulty breathing, headache or visual disturbances  Hives  Persistent dizziness or light-headedness  Extreme fatigue  Any other questions or concerns you may have after discharge    In an emergency, call 911 or go to an Emergency Department at a nearby hospital    Surgical Site Infections      How can we work together to prevent Surgical Site Infections? We would like to thank you for choosing [facility] for your Surgical Care. Below you will find helpful information on how we can work together to prevent Surgical Site Infections. What is a Surgical Site Infection (SSI)? A surgical site infection is an infection that occurs after surgery in the part of the body where the surgery took place. Most patients who have surgery do not develop an infection. However, infections develop in about 1 to 3 out of every 100 patients who have surgery. Some of the common symptoms of a surgical site infection are:  Redness and pain around the area where you had surgery  Drainage of cloudy fluid from your surgical wound  Fever    Can SSIs be treated? Yes. Most surgical site infections can be treated with antibiotics. The antibiotic given to you depends on the bacteria (germs) causing the infection.  Sometimes patients with SSIs also need another surgery to treat the infection. What are some of the things that hospitals are doing to prevent SSIs? To prevent SSIs, doctors, nurses, and other healthcare providers: May remove some of your hair immediately before your surgery using electric clippers if the hair is in the same area where the procedure will occur. They should not shave you with a razor. Give you antibiotics before your surgery starts. In most cases, you should get antibiotics within 60 minutes before the surgery starts and the antibiotics should be stopped within 24 hours after surgery. Clean the skin at the site of your surgery with a special soap that kills germs. Clean their hands and arms up to their elbows with an antiseptic agent just before the surgery. Wear special hair covers, masks, gowns, and gloves during surgery to  keep the surgery area clean. Clean their hands with soap and water or an alcohol-based hand rub before and after caring for each patient. If you do not see your providers clean their hands, please     ask  them to do so. What can I do to help prevent SSIs? Before your surgery:  Tell your doctor about other medical problems you may have. Health problems such as allergies, diabetes, and obesity could affect your surgery and your treatment. Quit smoking. Patients who smoke get more infections. Talk to your doctor about how you can quit before your surgery. Do not shave near where you will have surgery. Shaving with a razor can irritate your skin and make it easier to develop an infection. At the time of your surgery:  Speak up if someone tries to shave you with a razor before surgery. Ask why you need to be shaved and talk with your surgeon if you have any concerns. Ask if you will get antibiotics before surgery.   After your surgery:  Make sure that your healthcare providers clean their hands before examining you, either with soap and water or an alcohol-based hand rub.  Family and friends who visit you should not touch the surgical wound or dressings. Family and friends should clean their hands with soap and water or an alcohol-based hand rub before and after visiting you. If you do not see them clean their hands, ask them to clean their hands. What do I need to do when I go home from the hospital?  Before you go home, your doctor or nurse should explain everything you need to know about taking care of your wound. Make sure you understand how to care for your wound before you leave the hospital.  Always clean your hands before and after caring for your wound. Before you go home, make sure you know who to contact if you have questions or problems after you get home. If you have any symptoms of an infection, such as redness and pain at the surgery site, drainage, or fever, call your doctor immediately. If you have additional questions, please ask your doctor or nurse.

## 2023-02-23 ENCOUNTER — HOSPITAL ENCOUNTER (OUTPATIENT)
Age: 73
Setting detail: OUTPATIENT SURGERY
Discharge: HOME | End: 2023-02-23
Attending: ANESTHESIOLOGY | Admitting: ANESTHESIOLOGY
Payer: MEDICARE

## 2023-02-23 ENCOUNTER — APPOINTMENT (OUTPATIENT)
Dept: GENERAL RADIOLOGY | Age: 73
End: 2023-02-23
Attending: ANESTHESIOLOGY
Payer: MEDICARE

## 2023-02-23 VITALS
HEIGHT: 71 IN | OXYGEN SATURATION: 95 % | DIASTOLIC BLOOD PRESSURE: 56 MMHG | BODY MASS INDEX: 41.22 KG/M2 | WEIGHT: 294.4 LBS | TEMPERATURE: 98.1 F | HEART RATE: 69 BPM | SYSTOLIC BLOOD PRESSURE: 119 MMHG | RESPIRATION RATE: 20 BRPM

## 2023-02-23 PROCEDURE — 2500000003 HC RX 250 WO HCPCS: Performed by: ANESTHESIOLOGY

## 2023-02-23 PROCEDURE — 3209999900 FLUORO FOR SURGICAL PROCEDURES

## 2023-02-23 PROCEDURE — 7100000011 HC PHASE II RECOVERY - ADDTL 15 MIN: Performed by: ANESTHESIOLOGY

## 2023-02-23 PROCEDURE — 3600000056 HC PAIN LEVEL 4 BASE: Performed by: ANESTHESIOLOGY

## 2023-02-23 PROCEDURE — 7100000010 HC PHASE II RECOVERY - FIRST 15 MIN: Performed by: ANESTHESIOLOGY

## 2023-02-23 PROCEDURE — 2709999900 HC NON-CHARGEABLE SUPPLY: Performed by: ANESTHESIOLOGY

## 2023-02-23 RX ORDER — BUPIVACAINE HYDROCHLORIDE 2.5 MG/ML
INJECTION, SOLUTION EPIDURAL; INFILTRATION; INTRACAUDAL PRN
Status: DISCONTINUED | OUTPATIENT
Start: 2023-02-23 | End: 2023-02-23 | Stop reason: ALTCHOICE

## 2023-02-23 RX ORDER — LIDOCAINE HYDROCHLORIDE 10 MG/ML
INJECTION, SOLUTION EPIDURAL; INFILTRATION; INTRACAUDAL; PERINEURAL PRN
Status: DISCONTINUED | OUTPATIENT
Start: 2023-02-23 | End: 2023-02-23 | Stop reason: ALTCHOICE

## 2023-02-23 ASSESSMENT — PAIN - FUNCTIONAL ASSESSMENT
PAIN_FUNCTIONAL_ASSESSMENT: PREVENTS OR INTERFERES SOME ACTIVE ACTIVITIES AND ADLS
PAIN_FUNCTIONAL_ASSESSMENT: 0-10

## 2023-02-23 ASSESSMENT — PAIN SCALES - GENERAL: PAINLEVEL_OUTOF10: 0

## 2023-02-23 ASSESSMENT — PAIN DESCRIPTION - DESCRIPTORS: DESCRIPTORS: ACHING

## 2023-02-23 NOTE — PROCEDURES
Pre-operative Diagnosis: Lumbar facet pain     Post-operative Diagnosis: Lumbar facet pain     Procedure: Bilateral lumbar medial branch blocks targeting facet joints of L4/L5 and L5/S1     Procedure Description:  After consent was obtained, the patient was placed in the prone position with a pillow under the abdomen to reduce the lordotic curve of the lumbar spine. The lower back was prepped with chloraprep and draped in a sterile fashion. Then, 0.5 cc of 1 % lidocaine was used for local anesthesia of the skin and superficial subcutaneous tissues. Three 22-gauge 3.5-inch spinal needles were advanced under fluoroscopy in an AP view: one at the sacral ala and two at the junction of the right superior articular process and the transverse process of the L4 and L5 vertebrae. There was no paresthesias, heme, or CSF obtained. Needle placement was confirmed using AP and oblique views. Then, 0.5 cc of 0.5% bupivacaine was injected at each site without complications. The procedure was repeated on the contralateral side. The patient tolerated the procedure well, transported to the recovery room, and discharged in ambulatory fashion.      Procedural Complications: None  Estimated Blood Loss: 0 mL        Dustin Hurtado DO  Interventional Pain Management/PM&R   . [facility] and [facility]

## 2023-02-23 NOTE — H&P
Today, patient presents for planned confirmatory medial branch blocks at bilateral L4/5 and L5/S1    This note is reflective of the patient's previous visit for evaluation. We will proceed with today's planned procedure. Since patient's last visit for evaluation, there have been no interval changes in medical history. Patient has no new numbness, weakness, or focal neurological deficit since evaluation. Patient has no contraindications to injection (no anticoagulation or recent antibiotic intake for active infections), and has a  present or is able to drive themselves (as discussed and cleared by physician). Allergies to latex, contrast dye, and steroid medications have been confirmed with the patient prior to the procedure. NPO necessity has been assessed and accepted based on procedure complexity. The risks and benefits of the procedure have been explained including but are not limited to infection, bleeding, paralysis, immediate post procedure weakness, and dizziness; the patient acknowledges understanding and desires to proceed with the procedure. Patient has signed consent for same procedure as discussed in previous clinic encounter. All other questions and concerns were addressed at bedside. See procedure note for full details. Post procedure Instructions: The patient was advised not to drive during the day of the procedure and not to engage in any significant decision making (unless otherwise states by physician). The patient was also advised to be cautious with walking/activity for 24 hours following today's visit and asked not to engage in over-exertion (unless otherwise states by physician). After this time, it is ok to resume pre-procedure level of activity. Patient advised to apply ice to site of injection in situations of pain and discomfort. Patient advised to not submerge site of injection during bath or pool activities for approximately 24 hours post-procedure.  Patient attested to understanding post procedure directions / restrictions. All other questions and concerns addressed before patient discharge in ambulatory fashion. Chronic Pain/PM&R Clinic Note     Encounter Date: 2/16/23    Subjective:   Chief Complaint:   No chief complaint on file. History of Present Illness:   Anjel Li is a 74 y.o. male seen in the clinic initially on 01/23/2022 upon request from Malinda Jacques DO  for his history of low back pain. Patient states his pain started about 50 years ago without any inciting event. He states his pain has continued to get gradually worse with time. He did do injections in his low back with Dr. Pavel Nair around 5 years ago. He is not sure how much they helped, while his wife does believe they were effective. He states his pain is aggravated when standing or walking for an extended period of time. Pain is improved when sitting. He states he sleeps in a recliner, while laying in bed aggravates his pain. Pain is midline low back, although he does state he noticed left hip pain walking into today's appointment. He does have weakness in both legs and has a hard time getting up and being active due to this. He states he had a stroke back in 2013 which caused left-sided weakness, his dominant side. He is currently in physical therapy where he resides at the [address] They have him marching in place and riding a recumbent bike. He uses a cane or walker for ambulation. He denies any recent falls. He is sleeping relatively well overall, in his recliner. He does have a history of bowel and bladder incontinence since his stroke in 2013. He did get trigger point injections from Dr. Georgia Daniels about 3 weeks ago and feels this has been helping significantly. He did not realize how much it was helping, and his wife pointed out how well he was able to go from sitting to standing. Of note, he is currently on Plavix due to her history with cardiac stents.   This is prescribed by Dr. Savanna Tamez. He was also recently in the ER for a small bowel obstruction. Today, 02/16/2023, patient presents for planned follow up on chronic low back pain. He underwent the lumbar facet medial branch block at bilateral L4/5 and L5/S1 on 02/09/2023. He did not notice a lot of improvement with the injection. When talking to his wife, she was not around him for a couple days after the procedure since he does reside in the nursing home. She is unable to speak on his response to the injection. At his point he believes he is back to his baseline. He denies any new symptoms at this time. History of Interventions:   Surgery: No previous lumbar surgeries  ACDF C5-6 (Dr. Gricelda Rosales)   Injections: Dr. Tracy Jean  Cervical nerve root injection TF right C4-5  Cervical MBB/RFA C3-4, 4-5, 5-6  Caudal epidural  Lumbar RFA  Lumbar facet MBB L4/5 and L5/S1 (02/09/20230) - questionable relief    Current Treatment Medications:   Lidocaine - ?effective    Historical Treatment Medications:   Tramadol - ineffective  Oxycodone - ineffective   Norco - ineffective   Tylenol - ineffective    Imaging:  Lumbar MRI (10/20/2022)  Narrative   PROCEDURE: MRI LUMBAR SPINE WO CONTRAST       CLINICAL INFORMATION: Weakness, Bilateral leg weakness. Chronic bilateral leg weakness and lower back pain. COMPARISON: Lumbar spine CT 5/16/2020. TECHNIQUE: Sagittal and axial T1 and T2-weighted images were obtained through the lumbar spine. FINDINGS:           There is a slight levoscoliosis. There are benign hemangiomas in L1, L4 and S1. There is no suspicious marrow signal abnormality. There are small anterior osteophytes. There is no bone marrow edema. There are no compression fractures. No pars defects    are noted. There is disc desiccation throughout.        The distal spinal cord, conus medullaris and cauda equina are normal.        There are no gross abnormalities in the visualized aspects of the distal thoracic spine.       On the axial images, at T12-L1, there are mild facet degenerative changes. There is no spinal canal or foraminal stenosis. At L1-L2, there are very mild facet degenerative changes. There is no focal disc abnormality. There is no spinal canal stenosis. There is no foraminal stenosis. At L2-L3, there is a diffuse disc bulge. There are mild facet degenerative changes. There is no spinal canal stenosis. There is no foraminal stenosis. At L3-L4, there are moderate severity facet degenerative changes. There is thickening of ligamentum flavum. There is a diffuse disc bulge. There is mild spinal canal stenosis. There is mild bilateral foraminal stenosis. At L4-L5, there are moderate severity facet degenerative changes. There is a diffuse disc bulge. There is mild/moderate spinal canal stenosis. There is mild bilateral foraminal stenosis. This is stable. At L5-S1, there are severe left-sided facet degenerative changes. There are milder right-sided facet degenerative changes. There is no spinal canal stenosis. There is severe left and mild right foraminal stenosis. This is stable. There are no suspicious findings in the visualized aspects of the retroperitoneum and paraspinal soft tissues. Impression       1. Severe left and mild right foraminal stenosis at the L5-S1 level. 2. Mild to moderate spinal canal stenosis with mild bilateral foraminal stenosis at the L4-5 level. **This report has been created using voice recognition software. It may contain minor errors which are inherent in voice recognition technology. **       Final report electronically signed by Dr. Amerioc Diggs on 10/20/2022 1:39 PM     Past Medical History:   Diagnosis Date    Arthritis     Back pain     CAD in native artery 6/14/2021    Diarrhea     Hypertension     Infection of prosthetic shoulder joint ([facility] [address] 7/24/2020    PFO (patent foramen ovale) 8/2012    noted on ROSITA following stroke    Propionibacterium infection 7/24/2020    Rotator cuff injury     Stroke [facility])     August 19/2012    TIA (transient ischemic attack) 05/2018       Past Surgical History:   Procedure Laterality Date    BACK SURGERY  05/16/2022    C5-6    CHOLECYSTECTOMY  06/25/2017    CORONARY ANGIOPLASTY WITH STENT PLACEMENT      CYST INCISION AND DRAINAGE      CYSTOSCOPY N/A 12/17/2019    CYSTOSCOPY, WITH  URETHRAL DILATION performed by Amy Morocho MD at [address] N/A 2/18/2020    Extension [place] performed by Amy Morocho MD at [address]    ERCP  06/23/2017    PAIN MANAGEMENT PROCEDURE Bilateral 2/9/2023    bilateral lumbar 4/5, 5/Sacral 1 medial branch blocks performed by Ernie Vazquez DO at [address] [place] [address] Left 03/12/2013    right 4/2019    SHOULDER SURGERY Right 03/02/2020    SHOULDER SURGERY  06/2020    frozen shoulder, repair nerve elbow and palm-dr eduardo     TURP N/A 8/13/2019    TRANSURETHRAL RESECTION PROSTATE performed by Germán Lei MD at [place] [id number] History   Problem Relation Age of Onset    Arthritis Mother     Vision Loss Father     Cancer Brother     Stroke Maternal Uncle     Prostate Cancer Neg Hx          Medications & Allergies:   Current Outpatient Medications   Medication Instructions    aspirin 81 mg, Oral, DAILY    clopidogrel (PLAVIX) 75 mg, Oral, DAILY    hydroCHLOROthiazide (MICROZIDE) 25 mg, Oral, DAILY    isosorbide mononitrate (IMDUR) 30 mg, Oral, DAILY    losartan (COZAAR) 50 mg, Oral, DAILY    Multiple Vitamins-Minerals (PRESERVISION AREDS PO) Oral    nitroGLYCERIN (NITROSTAT) 0.4 MG SL tablet up to max of 3 total doses. If no relief after 1 dose, call 911.     Potassium 99 MG TABS 3 tablets, Oral, DAILY    tamsulosin (FLOMAX) 0.4 mg, Oral, NIGHTLY    vitamin C (ASCORBIC ACID) 1,000 mg, Oral       Allergies   Allergen Reactions    Adhesive Tape Rash       Review of Systems:   Constitutional: negative for weight changes or fevers  Genitourinary: negative for bowel/bladder incontinence   Musculoskeletal: positive for low back pain  Neurological: positive for any leg weakness. Negative numbness/tingling  Behavioral/Psych: negative for anxiety/depression   All other systems reviewed and are negative    Objective: There were no vitals filed for this visit. Constitutional: Pleasant, no acute distress   Head: Normocephalic, atraumatic   Eyes: Conjunctivae normal   Neck: Supple, symmetrical   Lungs: Normal respiratory effort, non-labored breathing   Cardiovascular: Limbs warm and well perfused   Abdomen: Non-protruded   Musculoskeletal: Muscle bulk symmetric, no atrophy, no gross deformities   · Lower Extremities: ROM WNL. · Thorax: No paraspinal tenderness bilaterally. No scoliosis or kyphosis. · Lumbar Spine: ROM WNL. Lumbar paraspinals mildly tender to palpation bilaterally. SLR neg bilaterally. ENMANUEL neg bilaterally. GAENSLEN neg bilaterally. Positive facet loading bilaterally. Bilateral SI joints non-tender to palpation. Bilateral greater trochanters non-tender to palpation. Neurological: Cranial nerves II-XII grossly intact. · Gait - Antalgic gait. Ambulates with assistive device. · Motor: 5/5 muscle strength in bilateral hip flexion, knee flexion, knee extension, ankle dorsiflexion, and ankle plantar flexion   · Sensory: LT sensation intact in lower limbs   · Reflexes: 2+ symmetrical in bilateral achilles, 2+ bilateral patellar, negative ankle clonus, downgoing babinski   Skin: No rashes or lesions present   Psychological: Cooperative, no exaggerated pain behaviors     Assessment:    Diagnosis Orders   1. Lumbar spondylosis  CHG FLUOR NEEDLE/CATH SPINE/PARASPINAL DX/THER ADDON    DC NJX DX/THER AGT PVRT FACET JT LMBR/SAC 1 LEVEL    DC NJX DX/THER AGT PVRT FACET JT LMBR/SAC 2ND LEVEL      2. Severe obesity (BMI 35.0-39. 9) with comorbidity ([address]            Jessica Gomes is a 74 y.o.male presenting to the pain clinic for evaluation of back pain. Patient's history and physical consistent with lumbar facet mediated pain. He does not believe he responded to the lumbar medial branch block. That being said his wife seems to notice a difference when he has responded to procedures in the past. I have set him up for the confirmatory lumbar facet medial branch block at bilateral L4-5 and L5-S1. I have advised the wife to evaluate his response or see if she notices a difference with his pain after the lumbar MBB. His wife states he does not do well with sedation, so he has opted to do the injection under a local. He does feel the last procedure went well without sedation. We discussed the potential of pursuing a lumbar RFA for more longstanding relief. We discussed paring trigger point injections and the lumbar RFA may be very beneficial for him. We also discussed potential starting a medication such as Lyrica to assist with his myofascial pain. Plan: The following treatment recommendations and plan were discussed in detail with Benedict Dominguez. Imaging:   I have reviewed patients imaging of lumbar MRI and results were discussed with patient today. Analgesics:   None    Adjuvants:   None    Interventions: In presence of lumbar axial back pain and with physical exam consistent for facetal pain, the option of confirmatory medial branch blocks at bilateral L4/5 and L5/S1 was chosen. The risks and benefits were discussed in detail with the patient. Patient wants to proceed with the injection. Anticoagulation/NPO Recommendations:   Patient does need to hold Plavix x 5 days prior to the procedure. (Dr. Sushila Jesus)  Patient does not need to be NPO prior to the procedure.   We will do a LOCAL injection    Multidisciplinary Pain Management:   In the presence of complex, chronic, and multi-factorial pain, the importance of a multidisciplinary approach to pain management in the patients management regimen was emphasized and discussed in great detail. PHYSICAL THERAPY: Patient is advised to see a physical therapist for gentle stretching exercises and conditioning exercises for management of pain. PSYCHOLOGY: Patient is advised to see a clinical pain psychologist, for the psychosocial aspect of pain care through coping skills, relaxation strategies, cognitive group therapy etc.   OBESITY: Patient is advised to seek nutrition consult to help in managing their weight to decrease its impact on pain.      Referrals:  None    Prescriptions Written This Visit:   None    Follow-up: lumbar MBB, in office 1 week after    [address] [place] [address], DO 36.5

## 2023-02-23 NOTE — PROGRESS NOTES
1500-Patient to Phase II. Report received from surgical RN. Patient awake and alert. Vital signs obtained and stable. Respirations unlabored on room air. Patient denies pain and nausea. Denies numbness and tingling in extremities. Injection site open to air and no drainage noted. Patient instructed to stay in bed. Call light in reach. 1505-tolerating snack and drink    1520-patient discharged home at this time, stable condition. Taken to car via wheelchair.

## 2023-02-24 NOTE — PROGRESS NOTES
Per Leanna at [facility] [phone], she has the orders for iv antibiotics from Dr. Sy and states ertapenem iv 1 gram daily for 25 days.   [facility] has accepted and will see patient 2/25/2023. Their number [phone]. Information in AVS. RN notified.     Met with patient and daughter with patient permission. Reviewed above. [facility]. DIDI care orders forwarded to [facility] and [facility].   Pt here for outpatient nursing infusion. Respirations regular and easy. Gait steady w/ cane. Pt alert and oriented X's 3. Taken to exam 3 for rocephin infusion.

## 2023-03-03 ENCOUNTER — OFFICE VISIT (OUTPATIENT)
Dept: PHYSICAL MEDICINE AND REHAB | Age: 73
End: 2023-03-03

## 2023-03-03 VITALS
BODY MASS INDEX: 41.16 KG/M2 | WEIGHT: 294 LBS | DIASTOLIC BLOOD PRESSURE: 60 MMHG | SYSTOLIC BLOOD PRESSURE: 134 MMHG | HEIGHT: 71 IN

## 2023-03-03 DIAGNOSIS — E66.01 SEVERE OBESITY (BMI 35.0-39.9) WITH COMORBIDITY (HCC): ICD-10-CM

## 2023-03-03 DIAGNOSIS — M47.816 LUMBAR SPONDYLOSIS: Primary | ICD-10-CM

## 2023-03-03 DIAGNOSIS — G89.29 CHRONIC BILATERAL LOW BACK PAIN WITHOUT SCIATICA: ICD-10-CM

## 2023-03-03 DIAGNOSIS — M54.50 CHRONIC BILATERAL LOW BACK PAIN WITHOUT SCIATICA: ICD-10-CM

## 2023-03-03 DIAGNOSIS — M47.819 FACET ARTHROPATHY: ICD-10-CM

## 2023-03-03 DIAGNOSIS — G89.4 CHRONIC PAIN SYNDROME: ICD-10-CM

## 2023-03-03 NOTE — PROGRESS NOTES
Chronic Pain/PM&R Clinic Note     Encounter Date: 3/3/23    Subjective:   Chief Complaint:   Chief Complaint   Patient presents with    Follow Up After Procedure     medial branch blocks at bilateral Lumbar 4/5 and Lumbar 5/Sacral 1        History of Present Illness:   Jose L Macias is a 74 y.o. male seen in the clinic initially on 01/23/2022 upon request from Dleano VIEIRA DO  for his history of low back pain. Patient states his pain started about 50 years ago without any inciting event. He states his pain has continued to get gradually worse with time. He did do injections in his low back with Dr. Benjamin Bonds around 5 years ago. He is not sure how much they helped, while his wife does believe they were effective. He states his pain is aggravated when standing or walking for an extended period of time. Pain is improved when sitting. He states he sleeps in a recliner, while laying in bed aggravates his pain. Pain is midline low back, although he does state he noticed left hip pain walking into today's appointment. He does have weakness in both legs and has a hard time getting up and being active due to this. He states he had a stroke back in 2013 which caused left-sided weakness, his dominant side. He is currently in physical therapy where he resides at the [address] They have him marching in place and riding a recumbent bike. He uses a cane or walker for ambulation. He denies any recent falls. He is sleeping relatively well overall, in his recliner. He does have a history of bowel and bladder incontinence since his stroke in 2013. He did get trigger point injections from Dr. Bree Burciaga about 3 weeks ago and feels this has been helping significantly. He did not realize how much it was helping, and his wife pointed out how well he was able to go from sitting to standing. Of note, he is currently on Plavix due to her history with cardiac stents. This is prescribed by Dr. Calvin Smalls.   He was also recently in the ER for a small bowel obstruction.    Today, 03/03/2023, patient presents for planned follow up on chronic low back pain. He underwent the confirmatory facet medial branch block  at bilateral L4/5 and L5/S1  on 02/23/2023. He reports significant relief after the procedure.  He states he was able to get out of his chair and walk around with less pain.  He was very happy with his response and would like to proceed with the lumbar radiofrequency ablation for more longstanding relief.  He denies any new symptoms at this time.    History of Interventions:   Surgery: No previous lumbar surgeries  ACDF C5-6 (Dr. Crews, [place])   Injections: Dr. Mojica  Cervical nerve root injection TF right C4-5  Cervical MBB/RFA C3-4, 4-5, 5-6  Caudal epidural  Lumbar RFA  Lumbar facet MBB L4/5 and L5/S1 (02/09/20230) - questionable relief  Lumbar MBB L4/5 and L5/S1 (02/23/2023) - >80% relief    Current Treatment Medications:   Lidocaine - ?effective    Historical Treatment Medications:   Tramadol - ineffective  Oxycodone - ineffective   Norco - ineffective   Tylenol - ineffective    Imaging:  Lumbar MRI (10/20/2022)  Narrative   PROCEDURE: MRI LUMBAR SPINE WO CONTRAST       CLINICAL INFORMATION: Weakness, Bilateral leg weakness. Chronic bilateral leg weakness and lower back pain.       COMPARISON: Lumbar spine CT 5/16/2020.       TECHNIQUE: Sagittal and axial T1 and T2-weighted images were obtained through the lumbar spine.       FINDINGS:           There is a slight levoscoliosis. There are benign hemangiomas in L1, L4 and S1. There is no suspicious marrow signal abnormality. There are small anterior osteophytes. There is no bone marrow edema.  There are no compression fractures.  No pars defects    are noted.  There is disc desiccation throughout.       The distal spinal cord, conus medullaris and cauda equina are normal.        There are no gross abnormalities in the visualized aspects of the distal thoracic spine.     On the axial images, at T12-L1, there are mild facet degenerative changes. There is no spinal canal or foraminal stenosis. At L1-L2, there are very mild facet degenerative changes. There is no focal disc abnormality. There is no spinal canal stenosis. There is no foraminal stenosis. At L2-L3, there is a diffuse disc bulge. There are mild facet degenerative changes. There is no spinal canal stenosis. There is no foraminal stenosis. At L3-L4, there are moderate severity facet degenerative changes. There is thickening of ligamentum flavum. There is a diffuse disc bulge. There is mild spinal canal stenosis. There is mild bilateral foraminal stenosis. At L4-L5, there are moderate severity facet degenerative changes. There is a diffuse disc bulge. There is mild/moderate spinal canal stenosis. There is mild bilateral foraminal stenosis. This is stable. At L5-S1, there are severe left-sided facet degenerative changes. There are milder right-sided facet degenerative changes. There is no spinal canal stenosis. There is severe left and mild right foraminal stenosis. This is stable. There are no suspicious findings in the visualized aspects of the retroperitoneum and paraspinal soft tissues. Impression       1. Severe left and mild right foraminal stenosis at the L5-S1 level. 2. Mild to moderate spinal canal stenosis with mild bilateral foraminal stenosis at the L4-5 level. **This report has been created using voice recognition software. It may contain minor errors which are inherent in voice recognition technology. **       Final report electronically signed by Dr. Shawna Del Real on 10/20/2022 1:39 PM     Past Medical History:   Diagnosis Date    Arthritis     Back pain     CAD in native artery 6/14/2021    Diarrhea     Hypertension     Infection of prosthetic shoulder joint ([facility] [address] 7/24/2020    PFO (patent foramen ovale) 8/2012    noted on ROSITA following stroke    Propionibacterium infection 7/24/2020    Rotator cuff injury     Stroke [facility])     August 19/2012    TIA (transient ischemic attack) 05/2018       Past Surgical History:   Procedure Laterality Date    BACK SURGERY  05/16/2022    C5-6    CHOLECYSTECTOMY  06/25/2017    CORONARY ANGIOPLASTY WITH STENT PLACEMENT      CYST INCISION AND DRAINAGE      CYSTOSCOPY N/A 12/17/2019    CYSTOSCOPY, WITH  URETHRAL DILATION performed by Asia Padron MD at [address] [place] N/A 2/18/2020    Extension Herman[place] Poole performed by Asia Padron MD at [address]    ERCP  06/23/2017    PAIN MANAGEMENT PROCEDURE Bilateral 2/9/2023    bilateral lumbar 4/5, 5/Sacral 1 medial branch blocks performed by Joaquina Ford DO at [facility] Bilateral 2/23/2023    medial branch blocks at bilateral Lumbar 4/5 and Lumbar 5/Sacral 1 performed by Joaquina Ford DO at [address] [place] [address] Left 03/12/2013    right 4/2019    SHOULDER SURGERY Right 03/02/2020    SHOULDER SURGERY  06/2020    frozen shoulder, repair nerve elbow and palm-dr alesha GRAY N/A 8/13/2019    TRANSURETHRAL RESECTION PROSTATE performed by Kirill David MD at [place] [address] History   Problem Relation Age of Onset    Arthritis Mother     Vision Loss Father     Cancer Brother     Stroke Maternal Uncle     Prostate Cancer Neg Hx        Medications & Allergies:   Current Outpatient Medications   Medication Instructions    aspirin 81 mg, Oral, DAILY    clopidogrel (PLAVIX) 75 mg, Oral, DAILY    hydroCHLOROthiazide (MICROZIDE) 25 mg, Oral, DAILY    isosorbide mononitrate (IMDUR) 30 mg, Oral, DAILY    losartan (COZAAR) 50 mg, Oral, DAILY    Multiple Vitamins-Minerals (PRESERVISION AREDS PO) Oral    nitroGLYCERIN (NITROSTAT) 0.4 MG SL tablet up to max of 3 total doses. If no relief after 1 dose, call 911.     Potassium 99 MG TABS 3 tablets, Oral, DAILY    tamsulosin (FLOMAX) 0.4 mg, Oral, NIGHTLY vitamin C (ASCORBIC ACID) 1,000 mg, Oral       Allergies   Allergen Reactions    Adhesive Tape Rash       Review of Systems:   Constitutional: negative for weight changes or fevers  Genitourinary: negative for bowel/bladder incontinence   Musculoskeletal: positive for low back pain  Neurological: positive for any leg weakness. Negative numbness/tingling  Behavioral/Psych: negative for anxiety/depression   All other systems reviewed and are negative    Objective:     Vitals:    03/03/23 1023   BP: 134/60       Constitutional: Pleasant, no acute distress   Head: Normocephalic, atraumatic   Eyes: Conjunctivae normal   Neck: Supple, symmetrical   Lungs: Normal respiratory effort, non-labored breathing   Cardiovascular: Limbs warm and well perfused   Abdomen: Non-protruded   Musculoskeletal: Muscle bulk symmetric, no atrophy, no gross deformities   · Lower Extremities: ROM WNL. · Thorax: No paraspinal tenderness bilaterally. No scoliosis or kyphosis. · Lumbar Spine: ROM WNL. Lumbar paraspinals mildly tender to palpation bilaterally. SLR neg bilaterally. ENMANUEL neg bilaterally. GAENSLEN neg bilaterally. Positive facet loading bilaterally. Bilateral SI joints non-tender to palpation. Bilateral greater trochanters non-tender to palpation. Neurological: Cranial nerves II-XII grossly intact. · Gait - Antalgic gait. Ambulates with assistive device. · Motor: 5/5 muscle strength in bilateral hip flexion, knee flexion, knee extension, ankle dorsiflexion, and ankle plantar flexion   · Sensory: LT sensation intact in lower limbs   · Reflexes: 2+ symmetrical in bilateral achilles, 2+ bilateral patellar, negative ankle clonus, downgoing babinski   Skin: No rashes or lesions present   Psychological: Cooperative, no exaggerated pain behaviors     Assessment:    Diagnosis Orders   1.  Lumbar spondylosis  CHG FLUOR NEEDLE/CATH SPINE/PARASPINAL DX/THER ADDON    OH DSTR NROLYTC AGNT PARVERTEB FCT SNGL LMBR/SACRAL    OH DSTR NROLYTC WM SHEPPARD FCT ADDL LMBR/SACRAL      2. Facet arthropathy        3. Chronic bilateral low back pain without sciatica        4. Chronic pain syndrome        5. Severe obesity (BMI 35.0-39. 9) with comorbidity ([address]            Saman Mueller is a 74 y.o.male presenting to the pain clinic for evaluation of back pain. Patient's history and physical consistent with lumbar facet mediated pain. He does not believe he responded to the diagnostic lumbar medial branch block  Response to the confirmatory lumbar facet medial branch block. I have set him up for the bilateral lumbar facet radiofrequency ablation at bilateral L4-5 and L5-S1. His wife states he does not do well with sedation, so he has opted to do the RFA under a local. He does feel previous procedures went well without sedation. We discussed paring trigger point injections and the lumbar RFA may be very beneficial for him. We also discussed potential starting a medication such as Lyrica to assist with his myofascial pain. Plan: The following treatment recommendations and plan were discussed in detail with Saman Mueller. Imaging:   I have reviewed patients imaging of lumbar MRI and results were discussed with patient today. Analgesics:   None    Adjuvants:   None    Interventions: In presence of lumbar axial back pain and with physical exam consistent for facetal pain, the option of lumbar radiofrequency ablation at bilateral L4/5 and L5/S1 was chosen. The risks and benefits were discussed in detail with the patient. Patient wants to proceed with the injection. Anticoagulation/NPO Recommendations:   Patient does need to hold Plavix x 5 days prior to the procedure. (Dr. Rachel Jackson)  Patient does not need to be NPO prior to the procedure.   We will do a LOCAL injection    Multidisciplinary Pain Management:   In the presence of complex, chronic, and multi-factorial pain, the importance of a multidisciplinary approach to pain management in the patients management regimen was emphasized and discussed in great detail. PHYSICAL THERAPY: Patient is advised to see a physical therapist for gentle stretching exercises and conditioning exercises for management of pain. PSYCHOLOGY: Patient is advised to see a clinical pain psychologist, for the psychosocial aspect of pain care through coping skills, relaxation strategies, cognitive group therapy etc.   OBESITY: Patient is advised to seek nutrition consult to help in managing their weight to decrease its impact on pain.      Referrals:  None    Prescriptions Written This Visit:   None    Follow-up: Lumbar RFA, in office 4 weeks after    Yolanda Litten, APRN - CNP

## 2023-03-07 ENCOUNTER — TELEPHONE (OUTPATIENT)
Dept: PHYSICAL MEDICINE AND REHAB | Age: 73
End: 2023-03-07

## 2023-03-07 NOTE — TELEPHONE ENCOUNTER
Procedure and follow up scheduled. Educated on pre-procedure instructions, also mailed. Verbalized understanding. Med.  Clearance 1/24/2023-4/24/2023

## 2023-03-10 NOTE — DISCHARGE INSTRUCTIONS
Post procedure Instructions:    No driving or making significant decisions for the remainder of the day. Be cautions with walking and activity for 24 hours, do not over exert yourself. Ok to resume pre-procedure activity level today. Apply ice to site of injection site if you have pain or discomfort. Do not submerge sit of injection during bath or pool activities for 48 hours post-procedure. Resume blood thinning medications in 24 hours. Call office [phone] if you have:  Temperature greater than 100.4  Persistent nausea and vomiting  Severe uncontrolled pain  Redness, tenderness, or signs of infection (pain, swelling, redness, odor or green/yellow discharge around the site)  Difficulty breathing, headache or visual disturbances  Hives  Persistent dizziness or light-headedness  Extreme fatigue  Any other questions or concerns you may have after discharge    In an emergency, call 911 or go to an Emergency Department at a nearby hospital    Surgical Site Infections      How can we work together to prevent Surgical Site Infections? We would like to thank you for choosing [facility] for your Surgical Care. Below you will find helpful information on how we can work together to prevent Surgical Site Infections. What is a Surgical Site Infection (SSI)? A surgical site infection is an infection that occurs after surgery in the part of the body where the surgery took place. Most patients who have surgery do not develop an infection. However, infections develop in about 1 to 3 out of every 100 patients who have surgery. Some of the common symptoms of a surgical site infection are:  Redness and pain around the area where you had surgery  Drainage of cloudy fluid from your surgical wound  Fever    Can SSIs be treated? Yes. Most surgical site infections can be treated with antibiotics. The antibiotic given to you depends on the bacteria (germs) causing the infection.  Sometimes patients with SSIs also need another surgery to treat the infection. What are some of the things that hospitals are doing to prevent SSIs? To prevent SSIs, doctors, nurses, and other healthcare providers: May remove some of your hair immediately before your surgery using electric clippers if the hair is in the same area where the procedure will occur. They should not shave you with a razor. Give you antibiotics before your surgery starts. In most cases, you should get antibiotics within 60 minutes before the surgery starts and the antibiotics should be stopped within 24 hours after surgery. Clean the skin at the site of your surgery with a special soap that kills germs. Clean their hands and arms up to their elbows with an antiseptic agent just before the surgery. Wear special hair covers, masks, gowns, and gloves during surgery to  keep the surgery area clean. Clean their hands with soap and water or an alcohol-based hand rub before and after caring for each patient. If you do not see your providers clean their hands, please     ask  them to do so. What can I do to help prevent SSIs? Before your surgery:  Tell your doctor about other medical problems you may have. Health problems such as allergies, diabetes, and obesity could affect your surgery and your treatment. Quit smoking. Patients who smoke get more infections. Talk to your doctor about how you can quit before your surgery. Do not shave near where you will have surgery. Shaving with a razor can irritate your skin and make it easier to develop an infection. At the time of your surgery:  Speak up if someone tries to shave you with a razor before surgery. Ask why you need to be shaved and talk with your surgeon if you have any concerns. Ask if you will get antibiotics before surgery.   After your surgery:  Make sure that your healthcare providers clean their hands before examining you, either with soap and water or an alcohol-based hand rub.  Family and friends who visit you should not touch the surgical wound or dressings. Family and friends should clean their hands with soap and water or an alcohol-based hand rub before and after visiting you. If you do not see them clean their hands, ask them to clean their hands. What do I need to do when I go home from the hospital?  Before you go home, your doctor or nurse should explain everything you need to know about taking care of your wound. Make sure you understand how to care for your wound before you leave the hospital.  Always clean your hands before and after caring for your wound. Before you go home, make sure you know who to contact if you have questions or problems after you get home. If you have any symptoms of an infection, such as redness and pain at the surgery site, drainage, or fever, call your doctor immediately. If you have additional questions, please ask your doctor or nurse.

## 2023-03-13 ENCOUNTER — PREP FOR PROCEDURE (OUTPATIENT)
Dept: PHYSICAL MEDICINE AND REHAB | Age: 73
End: 2023-03-13

## 2023-03-14 NOTE — H&P
Today, patient presents for planned lumbar radiofrequency ablation at bilateral L4/5 and L5/S1    This note is reflective of the patient's previous visit for evaluation. We will proceed with today's planned procedure. Since patient's last visit for evaluation, there have been no interval changes in medical history. Patient has no new numbness, weakness, or focal neurological deficit since evaluation. Patient has no contraindications to injection (no anticoagulation or recent antibiotic intake for active infections), and has a  present or is able to drive themselves (as discussed and cleared by physician). Allergies to latex, contrast dye, and steroid medications have been confirmed with the patient prior to the procedure. NPO necessity has been assessed and accepted based on procedure complexity. The risks and benefits of the procedure have been explained including but are not limited to infection, bleeding, paralysis, immediate post procedure weakness, and dizziness; the patient acknowledges understanding and desires to proceed with the procedure. Patient has signed consent for same procedure as discussed in previous clinic encounter. All other questions and concerns were addressed at bedside. See procedure note for full details. Post procedure Instructions: The patient was advised not to drive during the day of the procedure and not to engage in any significant decision making (unless otherwise states by physician). The patient was also advised to be cautious with walking/activity for 24 hours following today's visit and asked not to engage in over-exertion (unless otherwise states by physician). After this time, it is ok to resume pre-procedure level of activity. Patient advised to apply ice to site of injection in situations of pain and discomfort. Patient advised to not submerge site of injection during bath or pool activities for approximately 24 hours post-procedure.  Patient attested to understanding post procedure directions / restrictions. All other questions and concerns addressed before patient discharge in ambulatory fashion. Chronic Pain/PM&R Clinic Note     Encounter Date: 3/3/23    Subjective:   Chief Complaint:   No chief complaint on file. History of Present Illness:   Ronny Zapata is a 74 y.o. male seen in the clinic initially on 01/23/2022 upon request from Milagro Bryant DO  for his history of low back pain. Patient states his pain started about 50 years ago without any inciting event. He states his pain has continued to get gradually worse with time. He did do injections in his low back with Dr. Patience Clay around 5 years ago. He is not sure how much they helped, while his wife does believe they were effective. He states his pain is aggravated when standing or walking for an extended period of time. Pain is improved when sitting. He states he sleeps in a recliner, while laying in bed aggravates his pain. Pain is midline low back, although he does state he noticed left hip pain walking into today's appointment. He does have weakness in both legs and has a hard time getting up and being active due to this. He states he had a stroke back in 2013 which caused left-sided weakness, his dominant side. He is currently in physical therapy where he resides at the [address] They have him marching in place and riding a recumbent bike. He uses a cane or walker for ambulation. He denies any recent falls. He is sleeping relatively well overall, in his recliner. He does have a history of bowel and bladder incontinence since his stroke in 2013. He did get trigger point injections from Dr. Lissy Quijano about 3 weeks ago and feels this has been helping significantly. He did not realize how much it was helping, and his wife pointed out how well he was able to go from sitting to standing. Of note, he is currently on Plavix due to her history with cardiac stents.   This is prescribed by Dr. Zaragoza.  He was also recently in the ER for a small bowel obstruction.    Today, 03/03/2023, patient presents for planned follow up on chronic low back pain. He underwent the confirmatory facet medial branch block  at bilateral L4/5 and L5/S1  on 02/23/2023. He reports significant relief after the procedure.  He states he was able to get out of his chair and walk around with less pain.  He was very happy with his response and would like to proceed with the lumbar radiofrequency ablation for more longstanding relief.  He denies any new symptoms at this time.    History of Interventions:   Surgery: No previous lumbar surgeries  ACDF C5-6 (Dr. Crews, [place])   Injections: Dr. Mojica  Cervical nerve root injection TF right C4-5  Cervical MBB/RFA C3-4, 4-5, 5-6  Caudal epidural  Lumbar RFA  Lumbar facet MBB L4/5 and L5/S1 (02/09/20230) - questionable relief  Lumbar MBB L4/5 and L5/S1 (02/23/2023) - >80% relief    Current Treatment Medications:   Lidocaine - ?effective    Historical Treatment Medications:   Tramadol - ineffective  Oxycodone - ineffective   Norco - ineffective   Tylenol - ineffective    Imaging:  Lumbar MRI (10/20/2022)  Narrative   PROCEDURE: MRI LUMBAR SPINE WO CONTRAST       CLINICAL INFORMATION: Weakness, Bilateral leg weakness. Chronic bilateral leg weakness and lower back pain.       COMPARISON: Lumbar spine CT 5/16/2020.       TECHNIQUE: Sagittal and axial T1 and T2-weighted images were obtained through the lumbar spine.       FINDINGS:           There is a slight levoscoliosis. There are benign hemangiomas in L1, L4 and S1. There is no suspicious marrow signal abnormality. There are small anterior osteophytes. There is no bone marrow edema.  There are no compression fractures.  No pars defects    are noted.  There is disc desiccation throughout.       The distal spinal cord, conus medullaris and cauda equina are normal.        There are no gross abnormalities in the visualized  aspects of the distal thoracic spine. On the axial images, at T12-L1, there are mild facet degenerative changes. There is no spinal canal or foraminal stenosis. At L1-L2, there are very mild facet degenerative changes. There is no focal disc abnormality. There is no spinal canal stenosis. There is no foraminal stenosis. At L2-L3, there is a diffuse disc bulge. There are mild facet degenerative changes. There is no spinal canal stenosis. There is no foraminal stenosis. At L3-L4, there are moderate severity facet degenerative changes. There is thickening of ligamentum flavum. There is a diffuse disc bulge. There is mild spinal canal stenosis. There is mild bilateral foraminal stenosis. At L4-L5, there are moderate severity facet degenerative changes. There is a diffuse disc bulge. There is mild/moderate spinal canal stenosis. There is mild bilateral foraminal stenosis. This is stable. At L5-S1, there are severe left-sided facet degenerative changes. There are milder right-sided facet degenerative changes. There is no spinal canal stenosis. There is severe left and mild right foraminal stenosis. This is stable. There are no suspicious findings in the visualized aspects of the retroperitoneum and paraspinal soft tissues. Impression       1. Severe left and mild right foraminal stenosis at the L5-S1 level. 2. Mild to moderate spinal canal stenosis with mild bilateral foraminal stenosis at the L4-5 level. **This report has been created using voice recognition software. It may contain minor errors which are inherent in voice recognition technology. **       Final report electronically signed by Dr. Estefany Quevedo on 10/20/2022 1:39 PM     Past Medical History:   Diagnosis Date    Arthritis     Back pain     CAD in native artery 6/14/2021    Diarrhea     Hypertension     Infection of prosthetic shoulder joint ([facility] 7/24/2020    PFO (patent foramen ovale) 8/2012    noted on ROSITA following stroke    Propionibacterium infection 7/24/2020    Rotator cuff injury     Stroke [facility])     August 19/2012    TIA (transient ischemic attack) 05/2018       Past Surgical History:   Procedure Laterality Date    BACK SURGERY  05/16/2022    C5-6    CHOLECYSTECTOMY  06/25/2017    CORONARY ANGIOPLASTY WITH STENT PLACEMENT      CYST INCISION AND DRAINAGE      CYSTOSCOPY N/A 12/17/2019    CYSTOSCOPY, WITH  URETHRAL DILATION performed by Pastora Norton MD at [id number] [facility] N/A 2/18/2020    Extension [place] Poole performed by Pastora Norton MD at [address]    ERCP  06/23/2017    PAIN MANAGEMENT PROCEDURE Bilateral 2/9/2023    bilateral lumbar 4/5, 5/Sacral 1 medial branch blocks performed by Ankit Quiroz DO at [facility] Bilateral 2/23/2023    medial branch blocks at bilateral Lumbar 4/5 and Lumbar 5/Sacral 1 performed by Ankit Quiroz DO at [address] [place] [address] Left 03/12/2013    right 4/2019    SHOULDER SURGERY Right 03/02/2020    SHOULDER SURGERY  06/2020    frozen shoulder, repair nerve elbow and palm-dr eduardo     TURP N/A 8/13/2019    TRANSURETHRAL RESECTION PROSTATE performed by Jaren Green MD at [address] History   Problem Relation Age of Onset    Arthritis Mother     Vision Loss Father     Cancer Brother     Stroke Maternal Uncle     Prostate Cancer Neg Hx        Medications & Allergies:   Current Outpatient Medications   Medication Instructions    aspirin 81 mg, Oral, DAILY    clopidogrel (PLAVIX) 75 mg, Oral, DAILY    hydroCHLOROthiazide (MICROZIDE) 25 mg, Oral, DAILY    isosorbide mononitrate (IMDUR) 30 mg, Oral, DAILY    losartan (COZAAR) 50 mg, Oral, DAILY    Multiple Vitamins-Minerals (PRESERVISION AREDS PO) Oral    nitroGLYCERIN (NITROSTAT) 0.4 MG SL tablet up to max of 3 total doses. If no relief after 1 dose, call 911.     Potassium 99 MG TABS 3 tablets, Oral, DAILY tamsulosin (FLOMAX) 0.4 mg, Oral, NIGHTLY    vitamin C (ASCORBIC ACID) 1,000 mg, Oral       Allergies   Allergen Reactions    Adhesive Tape Rash       Review of Systems:   Constitutional: negative for weight changes or fevers  Genitourinary: negative for bowel/bladder incontinence   Musculoskeletal: positive for low back pain  Neurological: positive for any leg weakness. Negative numbness/tingling  Behavioral/Psych: negative for anxiety/depression   All other systems reviewed and are negative    Objective: There were no vitals filed for this visit. Constitutional: Pleasant, no acute distress   Head: Normocephalic, atraumatic   Eyes: Conjunctivae normal   Neck: Supple, symmetrical   Lungs: Normal respiratory effort, non-labored breathing   Cardiovascular: Limbs warm and well perfused   Abdomen: Non-protruded   Musculoskeletal: Muscle bulk symmetric, no atrophy, no gross deformities   · Lower Extremities: ROM WNL. · Thorax: No paraspinal tenderness bilaterally. No scoliosis or kyphosis. · Lumbar Spine: ROM WNL. Lumbar paraspinals mildly tender to palpation bilaterally. SLR neg bilaterally. ENMANUEL neg bilaterally. GAENSLEN neg bilaterally. Positive facet loading bilaterally. Bilateral SI joints non-tender to palpation. Bilateral greater trochanters non-tender to palpation. Neurological: Cranial nerves II-XII grossly intact. · Gait - Antalgic gait. Ambulates with assistive device. · Motor: 5/5 muscle strength in bilateral hip flexion, knee flexion, knee extension, ankle dorsiflexion, and ankle plantar flexion   · Sensory: LT sensation intact in lower limbs   · Reflexes: 2+ symmetrical in bilateral achilles, 2+ bilateral patellar, negative ankle clonus, downgoing babinski   Skin: No rashes or lesions present   Psychological: Cooperative, no exaggerated pain behaviors     Assessment:    Diagnosis Orders   1.  Lumbar spondylosis  CHG FLUOR NEEDLE/CATH SPINE/PARASPINAL DX/THER ADDON    VA DSTR NROLYTC AGNT PARVERTEB FCT SNGL LMBR/SACRAL    NH DSTR NROLYTC AGNT PARVERTEB FCT ADDL LMBR/SACRAL      2. Facet arthropathy        3. Chronic bilateral low back pain without sciatica        4. Chronic pain syndrome        5. Severe obesity (BMI 35.0-39. 9) with comorbidity ([address]            Liz Fernandez is a 74 y.o.male presenting to the pain clinic for evaluation of back pain. Patient's history and physical consistent with lumbar facet mediated pain. He does not believe he responded to the diagnostic lumbar medial branch block  Response to the confirmatory lumbar facet medial branch block. I have set him up for the bilateral lumbar facet radiofrequency ablation at bilateral L4-5 and L5-S1. His wife states he does not do well with sedation, so he has opted to do the RFA under a local. He does feel previous procedures went well without sedation. We discussed paring trigger point injections and the lumbar RFA may be very beneficial for him. We also discussed potential starting a medication such as Lyrica to assist with his myofascial pain. Plan: The following treatment recommendations and plan were discussed in detail with Liz Fernandez. Imaging:   I have reviewed patients imaging of lumbar MRI and results were discussed with patient today. Analgesics:   None    Adjuvants:   None    Interventions: In presence of lumbar axial back pain and with physical exam consistent for facetal pain, the option of lumbar radiofrequency ablation at bilateral L4/5 and L5/S1 was chosen. The risks and benefits were discussed in detail with the patient. Patient wants to proceed with the injection. Anticoagulation/NPO Recommendations:   Patient does need to hold Plavix x 5 days prior to the procedure. (Dr. Tonia Whitehead)  Patient does not need to be NPO prior to the procedure.   We will do a LOCAL injection    Multidisciplinary Pain Management:   In the presence of complex, chronic, and multi-factorial pain, the importance of a multidisciplinary approach to pain management in the patients management regimen was emphasized and discussed in great detail. PHYSICAL THERAPY: Patient is advised to see a physical therapist for gentle stretching exercises and conditioning exercises for management of pain. PSYCHOLOGY: Patient is advised to see a clinical pain psychologist, for the psychosocial aspect of pain care through coping skills, relaxation strategies, cognitive group therapy etc.   OBESITY: Patient is advised to seek nutrition consult to help in managing their weight to decrease its impact on pain.      Referrals:  None    Prescriptions Written This Visit:   None    Follow-up: Lumbar RFA, in office 4 weeks after    Ernie Vazquez DO

## 2023-03-15 ENCOUNTER — HOSPITAL ENCOUNTER (OUTPATIENT)
Age: 73
Setting detail: OUTPATIENT SURGERY
Discharge: HOME | End: 2023-03-15
Attending: ANESTHESIOLOGY | Admitting: ANESTHESIOLOGY
Payer: MEDICARE

## 2023-03-15 ENCOUNTER — APPOINTMENT (OUTPATIENT)
Dept: GENERAL RADIOLOGY | Age: 73
End: 2023-03-15
Attending: ANESTHESIOLOGY
Payer: MEDICARE

## 2023-03-15 VITALS
DIASTOLIC BLOOD PRESSURE: 74 MMHG | SYSTOLIC BLOOD PRESSURE: 161 MMHG | RESPIRATION RATE: 14 BRPM | HEART RATE: 80 BPM | HEIGHT: 71 IN | BODY MASS INDEX: 41.13 KG/M2 | OXYGEN SATURATION: 94 % | TEMPERATURE: 97.9 F | WEIGHT: 293.8 LBS

## 2023-03-15 PROCEDURE — 7100000010 HC PHASE II RECOVERY - FIRST 15 MIN: Performed by: ANESTHESIOLOGY

## 2023-03-15 PROCEDURE — 2500000003 HC RX 250 WO HCPCS: Performed by: ANESTHESIOLOGY

## 2023-03-15 PROCEDURE — 3209999900 FLUORO FOR SURGICAL PROCEDURES

## 2023-03-15 PROCEDURE — 2709999900 HC NON-CHARGEABLE SUPPLY: Performed by: ANESTHESIOLOGY

## 2023-03-15 PROCEDURE — 7100000011 HC PHASE II RECOVERY - ADDTL 15 MIN: Performed by: ANESTHESIOLOGY

## 2023-03-15 PROCEDURE — 3600000057 HC PAIN LEVEL 4 ADDL 15 MIN: Performed by: ANESTHESIOLOGY

## 2023-03-15 PROCEDURE — 3600000056 HC PAIN LEVEL 4 BASE: Performed by: ANESTHESIOLOGY

## 2023-03-15 RX ORDER — LIDOCAINE HYDROCHLORIDE 20 MG/ML
INJECTION, SOLUTION EPIDURAL; INFILTRATION; INTRACAUDAL; PERINEURAL PRN
Status: DISCONTINUED | OUTPATIENT
Start: 2023-03-15 | End: 2023-03-15 | Stop reason: ALTCHOICE

## 2023-03-15 RX ORDER — LIDOCAINE HYDROCHLORIDE 10 MG/ML
INJECTION, SOLUTION EPIDURAL; INFILTRATION; INTRACAUDAL; PERINEURAL PRN
Status: DISCONTINUED | OUTPATIENT
Start: 2023-03-15 | End: 2023-03-15 | Stop reason: ALTCHOICE

## 2023-03-15 ASSESSMENT — PAIN SCALES - GENERAL
PAINLEVEL_OUTOF10: 0
PAINLEVEL_OUTOF10: 0

## 2023-03-15 NOTE — PROCEDURES
Pre-operative Diagnosis: Lumbar facet pain     Post-operative Diagnosis: Lumbar facet pain     Procedure: BILATERAL lumbar thermal radiofrequency ablation targeting facet joints L4/L5 and L5/S1    Procedure Description:  After consent was obtained, the patient was placed in the prone position with a pillow under the abdomen to reduce the lordotic curve of the lumbar spine. The lower back was prepped with chloraprep and draped in a sterile fashion. Then, 0.5 cc of 1 % lidocaine was used for local anesthesia of the skin and superficial subcutaneous tissues. Three 20-gauge 100mm SMK cannulas with 10-mm active tips were advanced under fluoroscopic guidance in an AP view to the junction of the superior articular process and the transverse process of the L4 and L5 vertebra and at the sacral ala. There were no paresthesias, heme or CSF obtained. Needle placement was confirmed using AP and oblique views. This procedure was repeated on the contralateral side. Sensory and motor stimulation at 50Hz and 2Hz and impedance measurements were carried out having reached threshold at:     RIGHT  L4: 0.2V/3V/150-300 Ohms  L5: 0.2V/3V/150-300 Ohms  SA: 0.2V/3V/150-300 Ohms     LEFT  L4: 0.2V/3V/150-300 Ohms  L5: 0.2V/3V/150-300 Ohms  SA: 0.2V/3V/150-300 Ohms        Then, 1cc of 2% Lidocaine was injected at the site. Temperature was then raised to 80 degrees centigrade for 90 seconds with a 15 second temperature ramp. No pain was reported during the lesioning. The needles were then withdrawn without complications. The patient tolerated the procedure well. The patient was transported to the recovery room and discharged in ambulatory fashion.     Procedural Complications: None  Estimated Blood Loss: 0 mL        Dustin Hurtado DO  Interventional Pain Management/PM&R   [facility] and [facility]

## 2023-03-15 NOTE — PROGRESS NOTES
1200 Up to dress with wife assist  [phone] Discharge to home in stable ambulatory condtiion with wife

## 2023-03-15 NOTE — PROGRESS NOTES
1146 Pt to phase 2 recovery per cart. Report from [facility] [place] [facility]. Pt awake and alert ( local used only). No bleeding noted at injection sites. Denies pain. 1150 Pt taking offered snack. Wife at bedside.

## 2023-03-16 ENCOUNTER — TELEPHONE (OUTPATIENT)
Dept: CARDIOLOGY CLINIC | Age: 73
End: 2023-03-16

## 2023-03-16 NOTE — TELEPHONE ENCOUNTER
Pt wife called stating pt has been having more frequent bruising on arms from Plavix and wanted to know if pt still needs to continue this or what he should do for bruising; no other complaints at this time were voiced.

## 2023-03-24 ENCOUNTER — HOSPITAL ENCOUNTER (OUTPATIENT)
Age: 73
Discharge: HOME | End: 2023-03-24
Payer: MEDICARE

## 2023-03-24 ENCOUNTER — HOSPITAL ENCOUNTER (OUTPATIENT)
Dept: GENERAL RADIOLOGY | Age: 73
Discharge: HOME | End: 2023-03-24
Payer: MEDICARE

## 2023-03-24 DIAGNOSIS — M25.572 LEFT ANKLE PAIN, UNSPECIFIED CHRONICITY: ICD-10-CM

## 2023-03-24 PROCEDURE — 73610 X-RAY EXAM OF ANKLE: CPT

## 2023-04-14 ENCOUNTER — TELEPHONE (OUTPATIENT)
Dept: PHYSICAL MEDICINE AND REHAB | Age: 73
End: 2023-04-14

## 2023-05-08 ENCOUNTER — OFFICE VISIT (OUTPATIENT)
Dept: CARDIOLOGY CLINIC | Age: 73
End: 2023-05-08
Payer: MEDICARE

## 2023-05-08 VITALS
SYSTOLIC BLOOD PRESSURE: 138 MMHG | BODY MASS INDEX: 40.6 KG/M2 | WEIGHT: 290 LBS | HEART RATE: 69 BPM | DIASTOLIC BLOOD PRESSURE: 69 MMHG | HEIGHT: 71 IN

## 2023-05-08 DIAGNOSIS — I10 PRIMARY HYPERTENSION: Primary | ICD-10-CM

## 2023-05-08 DIAGNOSIS — I25.118 ATHEROSCLEROTIC HEART DISEASE OF NATIVE CORONARY ARTERY WITH OTHER FORMS OF ANGINA PECTORIS (HCC): ICD-10-CM

## 2023-05-08 DIAGNOSIS — E78.5 BORDERLINE HYPERLIPIDEMIA: ICD-10-CM

## 2023-05-08 PROCEDURE — G8417 CALC BMI ABV UP PARAM F/U: HCPCS | Performed by: NUCLEAR MEDICINE

## 2023-05-08 PROCEDURE — 3017F COLORECTAL CA SCREEN DOC REV: CPT | Performed by: NUCLEAR MEDICINE

## 2023-05-08 PROCEDURE — 1123F ACP DISCUSS/DSCN MKR DOCD: CPT | Performed by: NUCLEAR MEDICINE

## 2023-05-08 PROCEDURE — 99213 OFFICE O/P EST LOW 20 MIN: CPT | Performed by: NUCLEAR MEDICINE

## 2023-05-08 PROCEDURE — G8427 DOCREV CUR MEDS BY ELIG CLIN: HCPCS | Performed by: NUCLEAR MEDICINE

## 2023-05-08 PROCEDURE — 3078F DIAST BP <80 MM HG: CPT | Performed by: NUCLEAR MEDICINE

## 2023-05-08 PROCEDURE — 1036F TOBACCO NON-USER: CPT | Performed by: NUCLEAR MEDICINE

## 2023-05-08 PROCEDURE — 3075F SYST BP GE 130 - 139MM HG: CPT | Performed by: NUCLEAR MEDICINE

## 2023-05-08 NOTE — PROGRESS NOTES
vaccine  Completed    Hepatitis A vaccine  Aged Out    Hib vaccine  Aged Out    Meningococcal (ACWY) vaccine  Aged Out       Subjective:  General:   No fever, no chills, some fatigue or weight loss  Pulmonary:    some dyspnea, no wheezing  Cardiac:    Denies recent chest pain,   GI:     No nausea or vomiting, no abdominal pain  Neuro:     No dizziness or light headedness,   Musculoskeletal:  No recent active issues  Extremities:   No edema, no obvious claudication       Objective:  General:   Well developed, well nourished  Lungs:    Clear to auscultation  Heart:    Normal S1 S2, Slight murmur. no rubs, no gallops  Abdomen:   Soft, non tender, no organomegalies, positive bowel sounds  Extremities:   No edema, no cyanosis, good peripheral pulses  Neurological:   Awake, alert, oriented. obvious focal deficits  Musculoskelatal:  No obvious deformities   /69   Pulse 69   Ht 5' 11\" (1.803 m)   Wt 290 lb (131.5 kg)   BMI 40.45 kg/m²     Assessment:      Diagnosis Orders   1. Primary hypertension        2. Atherosclerotic heart disease of native coronary artery with other forms of angina pectoris ([facility]        3. Borderline hyperlipidemia        As above  Previous stroke limitation issues   Cardiac fair for now     Plan:  No follow-ups on file. As above  Continue risk factor modification and medical management  Thank you for allowing me to participate in the care of your patient. Please don't hesitate to contact me regarding any further issues related to the patient care    Orders Placed:  No orders of the defined types were placed in this encounter. Prescribed:  No orders of the defined types were placed in this encounter. Discussed use, benefit, and side effects of prescribed medications. All patient questions answered. Pt voicedunderstanding. Instructed to continue current medications, diet and exercise. Continue risk factor modification and medical management. Patient agreed with treatment plan.

## 2023-05-09 ENCOUNTER — OFFICE VISIT (OUTPATIENT)
Dept: PHYSICAL MEDICINE AND REHAB | Age: 73
End: 2023-05-09
Payer: MEDICARE

## 2023-05-09 VITALS
HEIGHT: 71 IN | BODY MASS INDEX: 40.6 KG/M2 | DIASTOLIC BLOOD PRESSURE: 68 MMHG | SYSTOLIC BLOOD PRESSURE: 130 MMHG | WEIGHT: 290 LBS

## 2023-05-09 DIAGNOSIS — G89.29 CHRONIC BILATERAL LOW BACK PAIN WITHOUT SCIATICA: ICD-10-CM

## 2023-05-09 DIAGNOSIS — E66.01 SEVERE OBESITY (BMI 35.0-39.9) WITH COMORBIDITY (HCC): ICD-10-CM

## 2023-05-09 DIAGNOSIS — M54.50 CHRONIC BILATERAL LOW BACK PAIN WITHOUT SCIATICA: ICD-10-CM

## 2023-05-09 DIAGNOSIS — M47.816 LUMBAR SPONDYLOSIS: Primary | ICD-10-CM

## 2023-05-09 DIAGNOSIS — G89.4 CHRONIC PAIN SYNDROME: ICD-10-CM

## 2023-05-09 PROCEDURE — G8417 CALC BMI ABV UP PARAM F/U: HCPCS | Performed by: NURSE PRACTITIONER

## 2023-05-09 PROCEDURE — 3017F COLORECTAL CA SCREEN DOC REV: CPT | Performed by: NURSE PRACTITIONER

## 2023-05-09 PROCEDURE — 1123F ACP DISCUSS/DSCN MKR DOCD: CPT | Performed by: NURSE PRACTITIONER

## 2023-05-09 PROCEDURE — G8427 DOCREV CUR MEDS BY ELIG CLIN: HCPCS | Performed by: NURSE PRACTITIONER

## 2023-05-09 PROCEDURE — 1036F TOBACCO NON-USER: CPT | Performed by: NURSE PRACTITIONER

## 2023-05-09 PROCEDURE — 99214 OFFICE O/P EST MOD 30 MIN: CPT | Performed by: NURSE PRACTITIONER

## 2023-05-09 NOTE — PROGRESS NOTES
Chronic Pain/PM&R Clinic Note     Encounter Date: 5/9/23    Subjective:   Chief Complaint:   Chief Complaint   Patient presents with    Follow-up       History of Present Illness:   Agnieszka Rodriguez is a 74 y.o. male seen in the clinic initially on 01/23/2022 upon request from Bree VIEIRA DO  for his history of low back pain. Patient states his pain started about 50 years ago without any inciting event. He states his pain has continued to get gradually worse with time. He did do injections in his low back with Dr. Roxanne Fortune around 5 years ago. He is not sure how much they helped, while his wife does believe they were effective. He states his pain is aggravated when standing or walking for an extended period of time. Pain is improved when sitting. He states he sleeps in a recliner, while laying in bed aggravates his pain. Pain is midline low back, although he does state he noticed left hip pain walking into today's appointment. He does have weakness in both legs and has a hard time getting up and being active due to this. He states he had a stroke back in 2013 which caused left-sided weakness, his dominant side. He is currently in physical therapy where he resides at the [address] They have him marching in place and riding a recumbent bike. He uses a cane or walker for ambulation. He denies any recent falls. He is sleeping relatively well overall, in his recliner. He does have a history of bowel and bladder incontinence since his stroke in 2013. He did get trigger point injections from Dr. Kathi Hilario about 3 weeks ago and feels this has been helping significantly. He did not realize how much it was helping, and his wife pointed out how well he was able to go from sitting to standing. Today, 04/12/2023, patient presents for planned follow up on chronic pain. He underwent the lumbar radiofrequency ablation on 03/15/2023 and did obtain some relief.  He feels his pain as moved up slightly from

## 2023-05-10 ENCOUNTER — PREP FOR PROCEDURE (OUTPATIENT)
Dept: PHYSICAL MEDICINE AND REHAB | Age: 73
End: 2023-05-10

## 2023-05-15 ENCOUNTER — OFFICE VISIT (OUTPATIENT)
Dept: NEUROLOGY | Age: 73
End: 2023-05-15
Payer: MEDICARE

## 2023-05-15 VITALS
HEART RATE: 67 BPM | BODY MASS INDEX: 42 KG/M2 | DIASTOLIC BLOOD PRESSURE: 60 MMHG | SYSTOLIC BLOOD PRESSURE: 115 MMHG | WEIGHT: 300 LBS | HEIGHT: 71 IN | OXYGEN SATURATION: 96 %

## 2023-05-15 DIAGNOSIS — R29.898 BILATERAL LEG WEAKNESS: Primary | ICD-10-CM

## 2023-05-15 DIAGNOSIS — M48.061 SPINAL STENOSIS OF LUMBAR REGION, UNSPECIFIED WHETHER NEUROGENIC CLAUDICATION PRESENT: ICD-10-CM

## 2023-05-15 DIAGNOSIS — R53.1 WEAKNESS: ICD-10-CM

## 2023-05-15 PROCEDURE — G8427 DOCREV CUR MEDS BY ELIG CLIN: HCPCS | Performed by: PSYCHIATRY & NEUROLOGY

## 2023-05-15 PROCEDURE — 3017F COLORECTAL CA SCREEN DOC REV: CPT | Performed by: PSYCHIATRY & NEUROLOGY

## 2023-05-15 PROCEDURE — 99213 OFFICE O/P EST LOW 20 MIN: CPT | Performed by: PSYCHIATRY & NEUROLOGY

## 2023-05-15 PROCEDURE — 1123F ACP DISCUSS/DSCN MKR DOCD: CPT | Performed by: PSYCHIATRY & NEUROLOGY

## 2023-05-15 PROCEDURE — 1036F TOBACCO NON-USER: CPT | Performed by: PSYCHIATRY & NEUROLOGY

## 2023-05-15 PROCEDURE — G8417 CALC BMI ABV UP PARAM F/U: HCPCS | Performed by: PSYCHIATRY & NEUROLOGY

## 2023-05-15 RX ORDER — COLESEVELAM 180 1/1
1250 TABLET ORAL NIGHTLY
COMMUNITY

## 2023-05-15 NOTE — DISCHARGE INSTRUCTIONS
Post procedure Instructions:    No driving or making significant decisions for the remainder of the day. Be cautions with walking and activity for 24 hours, do not over exert yourself. Ok to resume pre-procedure activity level today. Apply ice to site of injection site if you have pain or discomfort. Do not submerge sit of injection during bath or pool activities for 48 hours post-procedure. Resume blood thinning medications in 24 hours. Call office [phone] if you have:  Temperature greater than 100.4  Persistent nausea and vomiting  Severe uncontrolled pain  Redness, tenderness, or signs of infection (pain, swelling, redness, odor or green/yellow discharge around the site)  Difficulty breathing, headache or visual disturbances  Hives  Persistent dizziness or light-headedness  Extreme fatigue  Any other questions or concerns you may have after discharge    In an emergency, call 911 or go to an Emergency Department at a nearby hospital    Surgical Site Infections      How can we work together to prevent Surgical Site Infections? We would like to thank you for choosing [facility] for your Surgical Care. Below you will find helpful information on how we can work together to prevent Surgical Site Infections. What is a Surgical Site Infection (SSI)? A surgical site infection is an infection that occurs after surgery in the part of the body where the surgery took place. Most patients who have surgery do not develop an infection. However, infections develop in about 1 to 3 out of every 100 patients who have surgery. Some of the common symptoms of a surgical site infection are:  Redness and pain around the area where you had surgery  Drainage of cloudy fluid from your surgical wound  Fever    Can SSIs be treated? Yes. Most surgical site infections can be treated with antibiotics. The antibiotic given to you depends on the bacteria (germs) causing the infection.  Sometimes patients with

## 2023-05-16 ENCOUNTER — APPOINTMENT (OUTPATIENT)
Dept: GENERAL RADIOLOGY | Age: 73
End: 2023-05-16
Attending: ANESTHESIOLOGY
Payer: MEDICARE

## 2023-05-16 ENCOUNTER — HOSPITAL ENCOUNTER (OUTPATIENT)
Age: 73
Setting detail: OUTPATIENT SURGERY
Discharge: HOME | End: 2023-05-16
Attending: ANESTHESIOLOGY | Admitting: ANESTHESIOLOGY
Payer: MEDICARE

## 2023-05-16 VITALS
HEIGHT: 71 IN | OXYGEN SATURATION: 95 % | BODY MASS INDEX: 41.92 KG/M2 | SYSTOLIC BLOOD PRESSURE: 143 MMHG | TEMPERATURE: 96 F | DIASTOLIC BLOOD PRESSURE: 69 MMHG | HEART RATE: 60 BPM | WEIGHT: 299.4 LBS | RESPIRATION RATE: 16 BRPM

## 2023-05-16 PROCEDURE — 64493 INJ PARAVERT F JNT L/S 1 LEV: CPT | Performed by: ANESTHESIOLOGY

## 2023-05-16 PROCEDURE — 2709999900 HC NON-CHARGEABLE SUPPLY: Performed by: ANESTHESIOLOGY

## 2023-05-16 PROCEDURE — 64494 INJ PARAVERT F JNT L/S 2 LEV: CPT | Performed by: ANESTHESIOLOGY

## 2023-05-16 PROCEDURE — 7100000010 HC PHASE II RECOVERY - FIRST 15 MIN: Performed by: ANESTHESIOLOGY

## 2023-05-16 PROCEDURE — 3209999900 FLUORO FOR SURGICAL PROCEDURES

## 2023-05-16 PROCEDURE — 3600000056 HC PAIN LEVEL 4 BASE: Performed by: ANESTHESIOLOGY

## 2023-05-16 PROCEDURE — 7100000011 HC PHASE II RECOVERY - ADDTL 15 MIN: Performed by: ANESTHESIOLOGY

## 2023-05-16 PROCEDURE — 2500000003 HC RX 250 WO HCPCS: Performed by: ANESTHESIOLOGY

## 2023-05-16 RX ORDER — BUPIVACAINE HYDROCHLORIDE 2.5 MG/ML
INJECTION, SOLUTION EPIDURAL; INFILTRATION; INTRACAUDAL PRN
Status: DISCONTINUED | OUTPATIENT
Start: 2023-05-16 | End: 2023-05-16 | Stop reason: ALTCHOICE

## 2023-05-16 RX ORDER — LIDOCAINE HYDROCHLORIDE 10 MG/ML
INJECTION, SOLUTION EPIDURAL; INFILTRATION; INTRACAUDAL; PERINEURAL PRN
Status: DISCONTINUED | OUTPATIENT
Start: 2023-05-16 | End: 2023-05-16 | Stop reason: ALTCHOICE

## 2023-05-16 ASSESSMENT — PAIN - FUNCTIONAL ASSESSMENT: PAIN_FUNCTIONAL_ASSESSMENT: 0-10

## 2023-05-16 NOTE — PROCEDURES
Pre-operative Diagnosis: Lumbar facet pain     Post-operative Diagnosis: Lumbar facet pain     Procedure: Bilateral lumbar medial branch blocks targeting facet joints of L2/L3 and L3/L4     Procedure Description:  After consent was obtained, the patient was placed in the prone position with a pillow under the abdomen to reduce the lordotic curve of the lumbar spine. The lower back was prepped with chloraprep and draped in a sterile fashion. Then, 0.5 cc of 1 % lidocaine was used for local anesthesia of the skin and superficial subcutaneous tissues. Three 22-gauge 3.5-inch spinal needles were advanced under fluoroscopy in an AP view:  at the junction of the right superior articular process and the transverse process of the L2, L3, and L4 vertebrae. There was no paresthesias, heme, or CSF obtained. Needle placement was confirmed using AP and oblique views. Then, 0.5 cc of 0.5% bupivacaine was injected at each site without complications. The procedure was repeated on the contralateral side. The patient tolerated the procedure well, transported to the recovery room, and discharged in ambulatory fashion.      Procedural Complications: None  Estimated Blood Loss: 0 mL      Dustin Hurtado DO  Interventional Pain Management/PM&R   [facility] and [facility]

## 2023-05-16 NOTE — PROGRESS NOTES
1141: Patient arrived to Phase II recovery via cart. Awake and alert. Report received from Pam Gee RN. VSS, patient denies pain. HOB elevated and snack and drink provided. Call light in reach. Wife at bedside. 1200: Patient sat edge of bed and dressed independently in room with wife present. 1208: Patient ambulatory to vehicle and discharged home in stable condition with wife.

## 2023-05-23 ENCOUNTER — OFFICE VISIT (OUTPATIENT)
Dept: PHYSICAL MEDICINE AND REHAB | Age: 73
End: 2023-05-23

## 2023-05-23 VITALS
BODY MASS INDEX: 41.86 KG/M2 | HEIGHT: 71 IN | DIASTOLIC BLOOD PRESSURE: 76 MMHG | WEIGHT: 299 LBS | SYSTOLIC BLOOD PRESSURE: 134 MMHG

## 2023-05-23 DIAGNOSIS — M47.819 FACET ARTHROPATHY: ICD-10-CM

## 2023-05-23 DIAGNOSIS — G89.29 CHRONIC BILATERAL LOW BACK PAIN WITHOUT SCIATICA: ICD-10-CM

## 2023-05-23 DIAGNOSIS — E66.01 SEVERE OBESITY (BMI 35.0-39.9) WITH COMORBIDITY (HCC): ICD-10-CM

## 2023-05-23 DIAGNOSIS — G89.4 CHRONIC PAIN SYNDROME: ICD-10-CM

## 2023-05-23 DIAGNOSIS — M47.816 LUMBAR SPONDYLOSIS: Primary | ICD-10-CM

## 2023-05-23 DIAGNOSIS — M54.50 CHRONIC BILATERAL LOW BACK PAIN WITHOUT SCIATICA: ICD-10-CM

## 2023-05-23 NOTE — PROGRESS NOTES
to see a physical therapist for gentle stretching exercises and conditioning exercises for management of pain. PSYCHOLOGY: Patient is advised to see a clinical pain psychologist, for the psychosocial aspect of pain care through coping skills, relaxation strategies, cognitive group therapy etc.   OBESITY: Patient is advised to seek nutrition consult to help in managing their weight to decrease its impact on pain. SMOKING: Impact of smoking in patient's pain was discussed and patient is counseled against smoking. The patient was also advised to continue physical therapy and stretching exercises at home and cognitive behavioral and/or group therapy. Referrals:  nicolas    Prescriptions Written This Visit:   none    Follow-up:   1 week after injections with Seth Reed     It was my pleasure to evaluate Evelin Owens today. I spent over 35 minutes evaluating this patient (new to me), reviewing previous notes and images and completing documentation.        MAGDA Sandoval - CNP   Interventional Pain Management/PM&R   [place]

## 2023-06-01 ENCOUNTER — PREP FOR PROCEDURE (OUTPATIENT)
Dept: PHYSICAL MEDICINE AND REHAB | Age: 73
End: 2023-06-01

## 2023-06-05 NOTE — H&P
Today, patient presents for planned confirmatory medial branch blocks at bilateral L2/3 and L3/4    This note is reflective of the patient's previous visit for evaluation. We will proceed with today's planned procedure. Since patient's last visit for evaluation, there have been no interval changes in medical history. Patient has no new numbness, weakness, or focal neurological deficit since evaluation. Patient has no contraindications to injection (no anticoagulation or recent antibiotic intake for active infections), and has a  present or is able to drive themselves (as discussed and cleared by physician). Allergies to latex, contrast dye, and steroid medications have been confirmed with the patient prior to the procedure. NPO necessity has been assessed and accepted based on procedure complexity. The risks and benefits of the procedure have been explained including but are not limited to infection, bleeding, paralysis, immediate post procedure weakness, and dizziness; the patient acknowledges understanding and desires to proceed with the procedure. Patient has signed consent for same procedure as discussed in previous clinic encounter. All other questions and concerns were addressed at bedside. See procedure note for full details. Post procedure Instructions: The patient was advised not to drive during the day of the procedure and not to engage in any significant decision making (unless otherwise states by physician). The patient was also advised to be cautious with walking/activity for 24 hours following today's visit and asked not to engage in over-exertion (unless otherwise states by physician). After this time, it is ok to resume pre-procedure level of activity. Patient advised to apply ice to site of injection in situations of pain and discomfort. Patient advised to not submerge site of injection during bath or pool activities for approximately 24 hours post-procedure.  Patient attested to

## 2023-06-06 ENCOUNTER — HOSPITAL ENCOUNTER (OUTPATIENT)
Age: 73
Setting detail: OUTPATIENT SURGERY
Discharge: HOME | End: 2023-06-06
Attending: ANESTHESIOLOGY | Admitting: ANESTHESIOLOGY
Payer: MEDICARE

## 2023-06-06 ENCOUNTER — APPOINTMENT (OUTPATIENT)
Dept: GENERAL RADIOLOGY | Age: 73
End: 2023-06-06
Attending: ANESTHESIOLOGY
Payer: MEDICARE

## 2023-06-06 VITALS
HEART RATE: 61 BPM | OXYGEN SATURATION: 93 % | RESPIRATION RATE: 16 BRPM | WEIGHT: 298.4 LBS | DIASTOLIC BLOOD PRESSURE: 69 MMHG | TEMPERATURE: 96.9 F | SYSTOLIC BLOOD PRESSURE: 145 MMHG | HEIGHT: 71 IN | BODY MASS INDEX: 41.77 KG/M2

## 2023-06-06 PROCEDURE — 3209999900 FLUORO FOR SURGICAL PROCEDURES

## 2023-06-06 PROCEDURE — 64494 INJ PARAVERT F JNT L/S 2 LEV: CPT | Performed by: ANESTHESIOLOGY

## 2023-06-06 PROCEDURE — 7100000010 HC PHASE II RECOVERY - FIRST 15 MIN: Performed by: ANESTHESIOLOGY

## 2023-06-06 PROCEDURE — 7100000011 HC PHASE II RECOVERY - ADDTL 15 MIN: Performed by: ANESTHESIOLOGY

## 2023-06-06 PROCEDURE — 2500000003 HC RX 250 WO HCPCS: Performed by: ANESTHESIOLOGY

## 2023-06-06 PROCEDURE — 3600000056 HC PAIN LEVEL 4 BASE: Performed by: ANESTHESIOLOGY

## 2023-06-06 PROCEDURE — 64493 INJ PARAVERT F JNT L/S 1 LEV: CPT | Performed by: ANESTHESIOLOGY

## 2023-06-06 PROCEDURE — 2709999900 HC NON-CHARGEABLE SUPPLY: Performed by: ANESTHESIOLOGY

## 2023-06-06 RX ORDER — LIDOCAINE HYDROCHLORIDE 10 MG/ML
INJECTION, SOLUTION EPIDURAL; INFILTRATION; INTRACAUDAL; PERINEURAL PRN
Status: DISCONTINUED | OUTPATIENT
Start: 2023-06-06 | End: 2023-06-06 | Stop reason: ALTCHOICE

## 2023-06-06 RX ORDER — BUPIVACAINE HYDROCHLORIDE 2.5 MG/ML
INJECTION, SOLUTION EPIDURAL; INFILTRATION; INTRACAUDAL PRN
Status: DISCONTINUED | OUTPATIENT
Start: 2023-06-06 | End: 2023-06-06 | Stop reason: ALTCHOICE

## 2023-06-06 ASSESSMENT — PAIN DESCRIPTION - DESCRIPTORS: DESCRIPTORS: ACHING

## 2023-06-06 ASSESSMENT — PAIN SCALES - GENERAL: PAINLEVEL_OUTOF10: 7

## 2023-06-06 ASSESSMENT — PAIN - FUNCTIONAL ASSESSMENT: PAIN_FUNCTIONAL_ASSESSMENT: 0-10

## 2023-06-06 NOTE — PROCEDURES
Pre-operative Diagnosis: Lumbar facet pain     Post-operative Diagnosis: Lumbar facet pain     Procedure: Bilateral lumbar medial branch blocks targeting facet joints of L2/L3 and L3/L4     Procedure Description:  After consent was obtained, the patient was placed in the prone position with a pillow under the abdomen to reduce the lordotic curve of the lumbar spine. The lower back was prepped with chloraprep and draped in a sterile fashion. Then, 0.5 cc of 1 % lidocaine was used for local anesthesia of the skin and superficial subcutaneous tissues. Three 22-gauge 3.5-inch spinal needles were advanced under fluoroscopy in an AP view:  at the junction of the right superior articular process and the transverse process of the L2, L3, and L4 vertebrae. There was no paresthesias, heme, or CSF obtained. Needle placement was confirmed using AP and oblique views. Then, 0.5 cc of 0.5% bupivacaine was injected at each site without complications. The procedure was repeated on the contralateral side. The patient tolerated the procedure well, transported to the recovery room, and discharged in ambulatory fashion.      Procedural Complications: None  Estimated Blood Loss: 0 mL        Dustin Hurtado DO  Interventional Pain Management/PM&R   [facility] and [address] [facility]

## 2023-06-06 NOTE — PROGRESS NOTES
1334: Patient to Phase II Recovery via bed in stable condition on room air. 1336: Patient requesting snack. 1345: Patient getting dressed. 1357: Patient discharged.

## 2023-06-20 NOTE — H&P
Today, patient presents for planned lumbar radiofrequency ablation at bilateral lumbar 2/3 and lumbar 3/4     This note is reflective of the patient's previous visit for evaluation. We will proceed with today's planned procedure. Since patient's last visit for evaluation, there have been no interval changes in medical history. Patient has no new numbness, weakness, or focal neurological deficit since evaluation. Patient has no contraindications to injection (no anticoagulation or recent antibiotic intake for active infections), and has a  present or is able to drive themselves (as discussed and cleared by physician). Allergies to latex, contrast dye, and steroid medications have been confirmed with the patient prior to the procedure. NPO necessity has been assessed and accepted based on procedure complexity. The risks and benefits of the procedure have been explained including but are not limited to infection, bleeding, paralysis, immediate post procedure weakness, and dizziness; the patient acknowledges understanding and desires to proceed with the procedure. Patient has signed consent for same procedure as discussed in previous clinic encounter. All other questions and concerns were addressed at bedside. See procedure note for full details. Post procedure Instructions: The patient was advised not to drive during the day of the procedure and not to engage in any significant decision making (unless otherwise states by physician). The patient was also advised to be cautious with walking/activity for 24 hours following today's visit and asked not to engage in over-exertion (unless otherwise states by physician). After this time, it is ok to resume pre-procedure level of activity. Patient advised to apply ice to site of injection in situations of pain and discomfort. Patient advised to not submerge site of injection during bath or pool activities for approximately 24 hours post-procedure.  Patient attested

## 2023-06-21 ENCOUNTER — HOSPITAL ENCOUNTER (OUTPATIENT)
Age: 73
Setting detail: OUTPATIENT SURGERY
Discharge: HOME | End: 2023-06-21
Attending: ANESTHESIOLOGY | Admitting: ANESTHESIOLOGY
Payer: MEDICARE

## 2023-06-21 ENCOUNTER — APPOINTMENT (OUTPATIENT)
Dept: GENERAL RADIOLOGY | Age: 73
End: 2023-06-21
Attending: ANESTHESIOLOGY
Payer: MEDICARE

## 2023-06-21 VITALS
RESPIRATION RATE: 16 BRPM | HEART RATE: 67 BPM | SYSTOLIC BLOOD PRESSURE: 122 MMHG | WEIGHT: 297.2 LBS | BODY MASS INDEX: 41.61 KG/M2 | TEMPERATURE: 97 F | OXYGEN SATURATION: 94 % | HEIGHT: 71 IN | DIASTOLIC BLOOD PRESSURE: 57 MMHG

## 2023-06-21 PROCEDURE — 7100000011 HC PHASE II RECOVERY - ADDTL 15 MIN: Performed by: ANESTHESIOLOGY

## 2023-06-21 PROCEDURE — 2500000003 HC RX 250 WO HCPCS: Performed by: ANESTHESIOLOGY

## 2023-06-21 PROCEDURE — 64636 DESTROY L/S FACET JNT ADDL: CPT | Performed by: ANESTHESIOLOGY

## 2023-06-21 PROCEDURE — 3600000056 HC PAIN LEVEL 4 BASE: Performed by: ANESTHESIOLOGY

## 2023-06-21 PROCEDURE — 3600000057 HC PAIN LEVEL 4 ADDL 15 MIN: Performed by: ANESTHESIOLOGY

## 2023-06-21 PROCEDURE — 64635 DESTROY LUMB/SAC FACET JNT: CPT | Performed by: ANESTHESIOLOGY

## 2023-06-21 PROCEDURE — 3209999900 FLUORO FOR SURGICAL PROCEDURES

## 2023-06-21 PROCEDURE — 7100000010 HC PHASE II RECOVERY - FIRST 15 MIN: Performed by: ANESTHESIOLOGY

## 2023-06-21 PROCEDURE — 2709999900 HC NON-CHARGEABLE SUPPLY: Performed by: ANESTHESIOLOGY

## 2023-06-21 RX ORDER — LIDOCAINE HYDROCHLORIDE 20 MG/ML
INJECTION, SOLUTION EPIDURAL; INFILTRATION; INTRACAUDAL; PERINEURAL PRN
Status: DISCONTINUED | OUTPATIENT
Start: 2023-06-21 | End: 2023-06-21 | Stop reason: ALTCHOICE

## 2023-06-21 RX ORDER — LIDOCAINE HYDROCHLORIDE 10 MG/ML
INJECTION, SOLUTION EPIDURAL; INFILTRATION; INTRACAUDAL; PERINEURAL PRN
Status: DISCONTINUED | OUTPATIENT
Start: 2023-06-21 | End: 2023-06-21 | Stop reason: ALTCHOICE

## 2023-06-21 ASSESSMENT — PAIN - FUNCTIONAL ASSESSMENT: PAIN_FUNCTIONAL_ASSESSMENT: 0-10

## 2023-06-21 NOTE — PROGRESS NOTES
[phone] Patient arrives to recovery room via cart. Spontaneous respirations, vss, report received from surgical RN. Patient denies pain, numbness, tingling , nausea. Injection site clean, dry, intact. HOB elevated. Drink given. Call light within reach.   S/O in room  1145 Up to dress with S/O assist  1150 Discharge to home in stable ambulatory condition with S/O

## 2023-06-21 NOTE — PROGRESS NOTES
Resting quietly in room with wife at bedside. Reviewed discharge instructions and voiced understanding.

## 2023-06-22 NOTE — PROCEDURES
Pre-operative Diagnosis: Lumbar facet pain     Post-operative Diagnosis: Lumbar facet pain     Procedure: Bilateral lumbar thermal radiofrequency ablation targeting facet joints L2/L3 and L3/L4    Procedure Description:  After consent was obtained, the patient was placed in the prone position with a pillow under the abdomen to reduce the lordotic curve of the lumbar spine. The lower back was prepped with chloraprep and draped in a sterile fashion. Then, 0.5 cc of 1 % lidocaine was used for local anesthesia of the skin and superficial subcutaneous tissues. Three 20-gauge 100mm SMK cannulas with 10-mm active tips were advanced under fluoroscopic guidance in an AP view to the junction of the superior articular process and the transverse process of the L2, L3, and L4 vertebra. There were no paresthesias, heme or CSF obtained. Needle placement was confirmed using AP and oblique views. This procedure was repeated on the contralateral side. Sensory and motor stimulation at 50Hz and 2Hz and impedance measurements were carried out having reached threshold at:     RIGHT  L2: 0.2V/3V/150-300 Ohms  L3: 0.2V/3V/150-300 Ohms  L4: 0.2V/3V/150-300 Ohms     LEFT  L2: 0.2V/3V/150-300 Ohms  L3: 0.2V/3V/150-300 Ohms  L4: 0.2V/3V/150-300 Ohms        Then, 1cc of 2% Lidocaine was injected at the site. Temperature was then raised to 80 degrees centigrade for 90 seconds with a 15 second temperature ramp. No pain was reported during the lesioning. The needles were then withdrawn without complications. The patient tolerated the procedure well. The patient was transported to the recovery room and discharged in ambulatory fashion.     Procedural Complications: None  Estimated Blood Loss: 0 mL      Dustin Hurtado DO  Interventional Pain Management/PM&R   [facility] and [facility]

## 2023-07-12 ENCOUNTER — OFFICE VISIT (OUTPATIENT)
Dept: PHYSICAL MEDICINE AND REHAB | Age: 73
End: 2023-07-12
Payer: MEDICARE

## 2023-07-12 VITALS
DIASTOLIC BLOOD PRESSURE: 60 MMHG | BODY MASS INDEX: 41.58 KG/M2 | SYSTOLIC BLOOD PRESSURE: 110 MMHG | WEIGHT: 297 LBS | HEIGHT: 71 IN

## 2023-07-12 DIAGNOSIS — G89.29 CHRONIC BILATERAL LOW BACK PAIN WITHOUT SCIATICA: ICD-10-CM

## 2023-07-12 DIAGNOSIS — M47.816 LUMBAR SPONDYLOSIS: Primary | ICD-10-CM

## 2023-07-12 DIAGNOSIS — M54.50 CHRONIC BILATERAL LOW BACK PAIN WITHOUT SCIATICA: ICD-10-CM

## 2023-07-12 DIAGNOSIS — M53.3 SACROILIAC JOINT DYSFUNCTION OF RIGHT SIDE: ICD-10-CM

## 2023-07-12 DIAGNOSIS — E66.01 SEVERE OBESITY (BMI 35.0-39.9) WITH COMORBIDITY (HCC): ICD-10-CM

## 2023-07-12 PROCEDURE — 3017F COLORECTAL CA SCREEN DOC REV: CPT | Performed by: NURSE PRACTITIONER

## 2023-07-12 PROCEDURE — G8417 CALC BMI ABV UP PARAM F/U: HCPCS | Performed by: NURSE PRACTITIONER

## 2023-07-12 PROCEDURE — 99214 OFFICE O/P EST MOD 30 MIN: CPT | Performed by: NURSE PRACTITIONER

## 2023-07-12 PROCEDURE — G8427 DOCREV CUR MEDS BY ELIG CLIN: HCPCS | Performed by: NURSE PRACTITIONER

## 2023-07-12 PROCEDURE — 1123F ACP DISCUSS/DSCN MKR DOCD: CPT | Performed by: NURSE PRACTITIONER

## 2023-07-12 PROCEDURE — 1036F TOBACCO NON-USER: CPT | Performed by: NURSE PRACTITIONER

## 2023-07-12 NOTE — PROGRESS NOTES
[address] [facility]  Dept: [phone]  Dept Fax: [phone]: [phone]    Visit Date: 7/12/2023    Functionality Assessment/Goals Worksheet     On a scale of 0 (Does not Interfere) to 10 (Completely Interferes)     1. Which number describes how during the past week pain has interfered with       the following:  A. General Activity:  0  B. Mood: 0  C. Walking Ability:  3  D. Normal Work (Includes both work outside the home and housework):  0  E. Relations with Other People:   0  F. Sleep:   0  G. Enjoyment of Life:   0    2. Patient Prefers to Take their Pain Medications:     []  On a regular basis   [x]  Only when necessary    []  Does not take pain medications    3. What are the Patient's Goals/Expectations for Visiting Pain Management?      []  Learn about my pain    []  Receive Medication   []  Physical Therapy     []  Treat Depression   []  Receive Injections    []  Treat Sleep   []  Deal with Anxiety and Stress   []  Treat Opoid Dependence/Addiction   []  Other:

## 2023-07-12 NOTE — PROGRESS NOTES
Chronic Pain/PM&R Clinic Note     Encounter Date: 7/12/23    Subjective:   Chief Complaint:   Chief Complaint   Patient presents with    Follow-up     Procedure        History of Present Illness:   Sara Mora is a 74 y.o. male seen in the clinic initially on 01/23/2022 upon request from Liv VIEIRA DO  for his history of low back pain. Patient states his pain started about 50 years ago without any inciting event. He states his pain has continued to get gradually worse with time. He did do injections in his low back with Dr. Sebas Lizarraga around 5 years ago. He is not sure how much they helped, while his wife does believe they were effective. He states his pain is aggravated when standing or walking for an extended period of time. Pain is improved when sitting. He states he sleeps in a recliner, while laying in bed aggravates his pain. Pain is midline low back, although he does state he noticed left hip pain walking into today's appointment. He does have weakness in both legs and has a hard time getting up and being active due to this. He states he had a stroke back in 2013 which caused left-sided weakness, his dominant side. He is currently in physical therapy where he resides at the [address] They have him marching in place and riding a recumbent bike. He uses a cane or walker for ambulation. He denies any recent falls. He is sleeping relatively well overall, in his recliner. He does have a history of bowel and bladder incontinence since his stroke in 2013. He did get trigger point injections from Dr. Holly Ortiz about 3 weeks ago and feels this has been helping significantly. He did not realize how much it was helping, and his wife pointed out how well he was able to go from sitting to standing. Today, 04/12/2023, patient presents for planned follow up on chronic pain. He underwent the lumbar radiofrequency ablation on 03/15/2023 and did obtain some relief.  He feels his pain as moved up

## 2024-03-05 ENCOUNTER — APPOINTMENT (OUTPATIENT)
Dept: GENERAL RADIOLOGY | Age: 74
End: 2024-03-05
Payer: MEDICARE

## 2024-03-05 ENCOUNTER — HOSPITAL ENCOUNTER (INPATIENT)
Age: 74
LOS: 18 days | Discharge: NURSING, REHAB OR LONG-TERM CARE | End: 2024-03-23
Admitting: STUDENT IN AN ORGANIZED HEALTH CARE EDUCATION/TRAINING PROGRAM
Payer: MEDICARE

## 2024-03-05 DIAGNOSIS — R06.02 SHORTNESS OF BREATH: ICD-10-CM

## 2024-03-05 DIAGNOSIS — J10.1 INFLUENZA A: ICD-10-CM

## 2024-03-05 DIAGNOSIS — R79.89 ELEVATED TROPONIN: ICD-10-CM

## 2024-03-05 DIAGNOSIS — R79.89 ELEVATED BRAIN NATRIURETIC PEPTIDE (BNP) LEVEL: ICD-10-CM

## 2024-03-05 DIAGNOSIS — J18.9 PNEUMONIA OF LEFT LOWER LOBE DUE TO INFECTIOUS ORGANISM: Primary | ICD-10-CM

## 2024-03-05 DIAGNOSIS — R60.0 LOWER EXTREMITY EDEMA: ICD-10-CM

## 2024-03-05 DIAGNOSIS — E87.20 LACTIC ACIDOSIS: ICD-10-CM

## 2024-03-05 LAB
ANION GAP SERPL CALC-SCNC: 15 MEQ/L (ref 8–16)
BASOPHILS ABSOLUTE: 0 THOU/MM3 (ref 0–0.1)
BASOPHILS NFR BLD AUTO: 0.6 %
BUN SERPL-MCNC: 15 MG/DL (ref 7–22)
CALCIUM SERPL-MCNC: 9.4 MG/DL (ref 8.5–10.5)
CHLORIDE SERPL-SCNC: 102 MEQ/L (ref 98–111)
CO2 SERPL-SCNC: 24 MEQ/L (ref 23–33)
CREAT SERPL-MCNC: 1.5 MG/DL (ref 0.4–1.2)
DEPRECATED RDW RBC AUTO: 48 FL (ref 35–45)
EOSINOPHIL NFR BLD AUTO: 1.2 %
EOSINOPHILS ABSOLUTE: 0.1 THOU/MM3 (ref 0–0.4)
ERYTHROCYTE [DISTWIDTH] IN BLOOD BY AUTOMATED COUNT: 14.7 % (ref 11.5–14.5)
FLUAV RNA RESP QL NAA+PROBE: DETECTED
FLUBV RNA RESP QL NAA+PROBE: NOT DETECTED
GFR SERPL CREATININE-BSD FRML MDRD: 48 ML/MIN/1.73M2
GLUCOSE SERPL-MCNC: 145 MG/DL (ref 70–108)
HCT VFR BLD AUTO: 43.3 % (ref 42–52)
HGB BLD-MCNC: 14.2 GM/DL (ref 14–18)
IMM GRANULOCYTES # BLD AUTO: 0.04 THOU/MM3 (ref 0–0.07)
IMM GRANULOCYTES NFR BLD AUTO: 0.6 %
LACTATE SERPL-SCNC: 2.4 MMOL/L (ref 0.5–2)
LACTIC ACID, SEPSIS: 3.1 MMOL/L (ref 0.5–1.9)
LYMPHOCYTES ABSOLUTE: 0.8 THOU/MM3 (ref 1–4.8)
LYMPHOCYTES NFR BLD AUTO: 11.8 %
MAGNESIUM SERPL-MCNC: 1.8 MG/DL (ref 1.6–2.4)
MCH RBC QN AUTO: 29 PG (ref 26–33)
MCHC RBC AUTO-ENTMCNC: 32.8 GM/DL (ref 32.2–35.5)
MCV RBC AUTO: 88.4 FL (ref 80–94)
MONOCYTES ABSOLUTE: 0.6 THOU/MM3 (ref 0.4–1.3)
MONOCYTES NFR BLD AUTO: 8.1 %
NEUTROPHILS NFR BLD AUTO: 77.7 %
NRBC BLD AUTO-RTO: 0 /100 WBC
NT-PROBNP SERPL IA-MCNC: 603.7 PG/ML (ref 0–449)
OSMOLALITY SERPL CALC.SUM OF ELEC: 284.7 MOSMOL/KG (ref 275–300)
PLATELET # BLD AUTO: 177 THOU/MM3 (ref 130–400)
PMV BLD AUTO: 11.4 FL (ref 9.4–12.4)
POTASSIUM SERPL-SCNC: 3.5 MEQ/L (ref 3.5–5.2)
RBC # BLD AUTO: 4.9 MILL/MM3 (ref 4.7–6.1)
SARS-COV-2 RNA RESP QL NAA+PROBE: NOT DETECTED
SEGMENTED NEUTROPHILS ABSOLUTE COUNT: 5.3 THOU/MM3 (ref 1.8–7.7)
SODIUM SERPL-SCNC: 141 MEQ/L (ref 135–145)
TROPONIN, HIGH SENSITIVITY: 16 NG/L (ref 0–12)
TROPONIN, HIGH SENSITIVITY: 16 NG/L (ref 0–12)
WBC # BLD AUTO: 6.8 THOU/MM3 (ref 4.8–10.8)

## 2024-03-05 PROCEDURE — 2580000003 HC RX 258

## 2024-03-05 PROCEDURE — 87636 SARSCOV2 & INF A&B AMP PRB: CPT

## 2024-03-05 PROCEDURE — 71045 X-RAY EXAM CHEST 1 VIEW: CPT

## 2024-03-05 PROCEDURE — 85025 COMPLETE CBC W/AUTO DIFF WBC: CPT

## 2024-03-05 PROCEDURE — 80048 BASIC METABOLIC PNL TOTAL CA: CPT

## 2024-03-05 PROCEDURE — 36415 COLL VENOUS BLD VENIPUNCTURE: CPT

## 2024-03-05 PROCEDURE — 6360000002 HC RX W HCPCS: Performed by: PHYSICIAN ASSISTANT

## 2024-03-05 PROCEDURE — 99223 1ST HOSP IP/OBS HIGH 75: CPT | Performed by: PHYSICIAN ASSISTANT

## 2024-03-05 PROCEDURE — 83735 ASSAY OF MAGNESIUM: CPT

## 2024-03-05 PROCEDURE — 1200000000 HC SEMI PRIVATE

## 2024-03-05 PROCEDURE — 6370000000 HC RX 637 (ALT 250 FOR IP): Performed by: PHYSICIAN ASSISTANT

## 2024-03-05 PROCEDURE — 94761 N-INVAS EAR/PLS OXIMETRY MLT: CPT

## 2024-03-05 PROCEDURE — 6370000000 HC RX 637 (ALT 250 FOR IP)

## 2024-03-05 PROCEDURE — 2580000003 HC RX 258: Performed by: PHYSICIAN ASSISTANT

## 2024-03-05 PROCEDURE — 87040 BLOOD CULTURE FOR BACTERIA: CPT

## 2024-03-05 PROCEDURE — 83605 ASSAY OF LACTIC ACID: CPT

## 2024-03-05 PROCEDURE — 93005 ELECTROCARDIOGRAM TRACING: CPT

## 2024-03-05 PROCEDURE — 1200000003 HC TELEMETRY R&B

## 2024-03-05 PROCEDURE — 94640 AIRWAY INHALATION TREATMENT: CPT

## 2024-03-05 PROCEDURE — 99285 EMERGENCY DEPT VISIT HI MDM: CPT

## 2024-03-05 PROCEDURE — 83880 ASSAY OF NATRIURETIC PEPTIDE: CPT

## 2024-03-05 PROCEDURE — 96374 THER/PROPH/DIAG INJ IV PUSH: CPT

## 2024-03-05 PROCEDURE — 6360000002 HC RX W HCPCS

## 2024-03-05 PROCEDURE — 93010 ELECTROCARDIOGRAM REPORT: CPT | Performed by: NUCLEAR MEDICINE

## 2024-03-05 PROCEDURE — 84484 ASSAY OF TROPONIN QUANT: CPT

## 2024-03-05 RX ORDER — SODIUM CHLORIDE 0.9 % (FLUSH) 0.9 %
5-40 SYRINGE (ML) INJECTION PRN
Status: DISCONTINUED | OUTPATIENT
Start: 2024-03-05 | End: 2024-03-23 | Stop reason: HOSPADM

## 2024-03-05 RX ORDER — ONDANSETRON 4 MG/1
4 TABLET, ORALLY DISINTEGRATING ORAL EVERY 8 HOURS PRN
Status: CANCELLED | OUTPATIENT
Start: 2024-03-05

## 2024-03-05 RX ORDER — MAGNESIUM SULFATE IN WATER 40 MG/ML
2000 INJECTION, SOLUTION INTRAVENOUS PRN
Status: DISCONTINUED | OUTPATIENT
Start: 2024-03-05 | End: 2024-03-23 | Stop reason: HOSPADM

## 2024-03-05 RX ORDER — HYDROCHLOROTHIAZIDE 12.5 MG/1
25 CAPSULE, GELATIN COATED ORAL DAILY
Status: DISCONTINUED | OUTPATIENT
Start: 2024-03-06 | End: 2024-03-17

## 2024-03-05 RX ORDER — IPRATROPIUM BROMIDE AND ALBUTEROL SULFATE 2.5; .5 MG/3ML; MG/3ML
1 SOLUTION RESPIRATORY (INHALATION) ONCE
Status: COMPLETED | OUTPATIENT
Start: 2024-03-05 | End: 2024-03-05

## 2024-03-05 RX ORDER — POTASSIUM CHLORIDE 20 MEQ/1
40 TABLET, EXTENDED RELEASE ORAL PRN
Status: DISCONTINUED | OUTPATIENT
Start: 2024-03-05 | End: 2024-03-23 | Stop reason: HOSPADM

## 2024-03-05 RX ORDER — MAGNESIUM SULFATE IN WATER 40 MG/ML
2000 INJECTION, SOLUTION INTRAVENOUS ONCE
Status: COMPLETED | OUTPATIENT
Start: 2024-03-05 | End: 2024-03-06

## 2024-03-05 RX ORDER — CHOLESTYRAMINE LIGHT 4 G/5.7G
4 POWDER, FOR SUSPENSION ORAL DAILY
Status: DISCONTINUED | OUTPATIENT
Start: 2024-03-06 | End: 2024-03-23 | Stop reason: HOSPADM

## 2024-03-05 RX ORDER — ACETAMINOPHEN 325 MG/1
650 TABLET ORAL EVERY 6 HOURS PRN
Status: DISCONTINUED | OUTPATIENT
Start: 2024-03-05 | End: 2024-03-23 | Stop reason: HOSPADM

## 2024-03-05 RX ORDER — SODIUM CHLORIDE 0.9 % (FLUSH) 0.9 %
5-40 SYRINGE (ML) INJECTION EVERY 12 HOURS SCHEDULED
Status: DISCONTINUED | OUTPATIENT
Start: 2024-03-05 | End: 2024-03-23 | Stop reason: HOSPADM

## 2024-03-05 RX ORDER — TAMSULOSIN HYDROCHLORIDE 0.4 MG/1
0.4 CAPSULE ORAL NIGHTLY
Status: DISCONTINUED | OUTPATIENT
Start: 2024-03-05 | End: 2024-03-23 | Stop reason: HOSPADM

## 2024-03-05 RX ORDER — ENOXAPARIN SODIUM 100 MG/ML
30 INJECTION SUBCUTANEOUS 2 TIMES DAILY
Status: DISCONTINUED | OUTPATIENT
Start: 2024-03-06 | End: 2024-03-23 | Stop reason: HOSPADM

## 2024-03-05 RX ORDER — POLYETHYLENE GLYCOL 3350 17 G/17G
17 POWDER, FOR SOLUTION ORAL DAILY PRN
Status: DISCONTINUED | OUTPATIENT
Start: 2024-03-05 | End: 2024-03-23 | Stop reason: HOSPADM

## 2024-03-05 RX ORDER — AZITHROMYCIN 250 MG/1
500 TABLET, FILM COATED ORAL ONCE
Status: COMPLETED | OUTPATIENT
Start: 2024-03-05 | End: 2024-03-05

## 2024-03-05 RX ORDER — SODIUM CHLORIDE 9 MG/ML
INJECTION, SOLUTION INTRAVENOUS PRN
Status: DISCONTINUED | OUTPATIENT
Start: 2024-03-05 | End: 2024-03-23 | Stop reason: HOSPADM

## 2024-03-05 RX ORDER — ONDANSETRON 2 MG/ML
4 INJECTION INTRAMUSCULAR; INTRAVENOUS EVERY 6 HOURS PRN
Status: CANCELLED | OUTPATIENT
Start: 2024-03-05

## 2024-03-05 RX ORDER — POTASSIUM CHLORIDE 7.45 MG/ML
10 INJECTION INTRAVENOUS PRN
Status: DISCONTINUED | OUTPATIENT
Start: 2024-03-05 | End: 2024-03-23 | Stop reason: HOSPADM

## 2024-03-05 RX ORDER — ISOSORBIDE MONONITRATE 30 MG/1
30 TABLET, EXTENDED RELEASE ORAL DAILY
Status: DISCONTINUED | OUTPATIENT
Start: 2024-03-06 | End: 2024-03-23 | Stop reason: HOSPADM

## 2024-03-05 RX ORDER — ACETAMINOPHEN 650 MG/1
650 SUPPOSITORY RECTAL EVERY 6 HOURS PRN
Status: DISCONTINUED | OUTPATIENT
Start: 2024-03-05 | End: 2024-03-23 | Stop reason: HOSPADM

## 2024-03-05 RX ORDER — LOSARTAN POTASSIUM 50 MG/1
50 TABLET ORAL DAILY
Status: DISCONTINUED | OUTPATIENT
Start: 2024-03-06 | End: 2024-03-11

## 2024-03-05 RX ORDER — ASPIRIN 81 MG/1
81 TABLET, CHEWABLE ORAL DAILY
Status: DISCONTINUED | OUTPATIENT
Start: 2024-03-06 | End: 2024-03-23 | Stop reason: HOSPADM

## 2024-03-05 RX ADMIN — SODIUM CHLORIDE, PRESERVATIVE FREE 10 ML: 5 INJECTION INTRAVENOUS at 22:28

## 2024-03-05 RX ADMIN — CEFTRIAXONE SODIUM 1000 MG: 1 INJECTION, POWDER, FOR SOLUTION INTRAMUSCULAR; INTRAVENOUS at 19:05

## 2024-03-05 RX ADMIN — TAMSULOSIN HYDROCHLORIDE 0.4 MG: 0.4 CAPSULE ORAL at 23:52

## 2024-03-05 RX ADMIN — IPRATROPIUM BROMIDE AND ALBUTEROL SULFATE 1 DOSE: .5; 3 SOLUTION RESPIRATORY (INHALATION) at 17:33

## 2024-03-05 RX ADMIN — AZITHROMYCIN DIHYDRATE 500 MG: 250 TABLET ORAL at 19:51

## 2024-03-05 RX ADMIN — MAGNESIUM SULFATE HEPTAHYDRATE 2000 MG: 40 INJECTION, SOLUTION INTRAVENOUS at 23:02

## 2024-03-05 ASSESSMENT — PAIN - FUNCTIONAL ASSESSMENT
PAIN_FUNCTIONAL_ASSESSMENT: NONE - DENIES PAIN

## 2024-03-05 NOTE — ED TRIAGE NOTES
Pt presents to the ed with [facility] ems for c/o sob. Pt states that he has not been feeling well since Sunday and went to pcp today and was dx with influenza. Pt had duoneb and solumedrol en route to hospital per ems. Pt maintaining oxygen saturation on room air upon arrival. EKG complete. Tele placed. INT established per ems.

## 2024-03-06 LAB
ANION GAP SERPL CALC-SCNC: 13 MEQ/L (ref 8–16)
BASOPHILS ABSOLUTE: 0 THOU/MM3 (ref 0–0.1)
BASOPHILS NFR BLD AUTO: 0.3 %
BILIRUB UR QL STRIP: NEGATIVE
BUN SERPL-MCNC: 17 MG/DL (ref 7–22)
CALCIUM SERPL-MCNC: 9.1 MG/DL (ref 8.5–10.5)
CHARACTER UR: CLEAR
CHLORIDE SERPL-SCNC: 100 MEQ/L (ref 98–111)
CO2 SERPL-SCNC: 23 MEQ/L (ref 23–33)
COLOR UR: YELLOW
CREAT SERPL-MCNC: 1.1 MG/DL (ref 0.4–1.2)
DEPRECATED RDW RBC AUTO: 47.3 FL (ref 35–45)
EKG ATRIAL RATE: 94 BPM
EKG P AXIS: 21 DEGREES
EKG P-R INTERVAL: 196 MS
EKG Q-T INTERVAL: 542 MS
EKG QRS DURATION: 102 MS
EKG QTC CALCULATION (BAZETT): 677 MS
EKG R AXIS: 32 DEGREES
EKG T AXIS: 37 DEGREES
EKG VENTRICULAR RATE: 94 BPM
EOSINOPHIL NFR BLD AUTO: 0 %
EOSINOPHILS ABSOLUTE: 0 THOU/MM3 (ref 0–0.4)
ERYTHROCYTE [DISTWIDTH] IN BLOOD BY AUTOMATED COUNT: 14.7 % (ref 11.5–14.5)
GFR SERPL CREATININE-BSD FRML MDRD: > 60 ML/MIN/1.73M2
GLUCOSE SERPL-MCNC: 169 MG/DL (ref 70–108)
GLUCOSE UR QL STRIP.AUTO: NEGATIVE MG/DL
HCT VFR BLD AUTO: 41.6 % (ref 42–52)
HGB BLD-MCNC: 13.7 GM/DL (ref 14–18)
HGB UR QL STRIP.AUTO: NEGATIVE
IMM GRANULOCYTES # BLD AUTO: 0.03 THOU/MM3 (ref 0–0.07)
IMM GRANULOCYTES NFR BLD AUTO: 0.5 %
KETONES UR QL STRIP.AUTO: NEGATIVE
LACTATE SERPL-SCNC: 2.2 MMOL/L (ref 0.5–2)
LACTATE SERPL-SCNC: 2.3 MMOL/L (ref 0.5–2)
LACTATE SERPL-SCNC: 2.4 MMOL/L (ref 0.5–2)
LEUKOCYTE ESTERASE UR QL STRIP.AUTO: NEGATIVE
LYMPHOCYTES ABSOLUTE: 0.6 THOU/MM3 (ref 1–4.8)
LYMPHOCYTES NFR BLD AUTO: 10.4 %
MCH RBC QN AUTO: 29 PG (ref 26–33)
MCHC RBC AUTO-ENTMCNC: 32.9 GM/DL (ref 32.2–35.5)
MCV RBC AUTO: 88.1 FL (ref 80–94)
MONOCYTES ABSOLUTE: 0.6 THOU/MM3 (ref 0.4–1.3)
MONOCYTES NFR BLD AUTO: 9.7 %
NEUTROPHILS NFR BLD AUTO: 79.1 %
NITRITE UR QL STRIP.AUTO: NEGATIVE
NRBC BLD AUTO-RTO: 0 /100 WBC
PH UR STRIP.AUTO: 5.5 [PH] (ref 5–9)
PLATELET # BLD AUTO: 184 THOU/MM3 (ref 130–400)
PMV BLD AUTO: 11.5 FL (ref 9.4–12.4)
POTASSIUM SERPL-SCNC: 3.6 MEQ/L (ref 3.5–5.2)
PROT UR STRIP.AUTO-MCNC: NEGATIVE MG/DL
RBC # BLD AUTO: 4.72 MILL/MM3 (ref 4.7–6.1)
SEGMENTED NEUTROPHILS ABSOLUTE COUNT: 4.9 THOU/MM3 (ref 1.8–7.7)
SODIUM SERPL-SCNC: 136 MEQ/L (ref 135–145)
SP GR UR REFRACT.AUTO: 1.02 (ref 1–1.03)
UROBILINOGEN UR QL STRIP.AUTO: 0.2 EU/DL (ref 0–1)
WBC # BLD AUTO: 6.2 THOU/MM3 (ref 4.8–10.8)

## 2024-03-06 PROCEDURE — 6370000000 HC RX 637 (ALT 250 FOR IP): Performed by: PHYSICIAN ASSISTANT

## 2024-03-06 PROCEDURE — 93005 ELECTROCARDIOGRAM TRACING: CPT | Performed by: PHYSICIAN ASSISTANT

## 2024-03-06 PROCEDURE — 99232 SBSQ HOSP IP/OBS MODERATE 35: CPT | Performed by: PHYSICIAN ASSISTANT

## 2024-03-06 PROCEDURE — 6360000002 HC RX W HCPCS: Performed by: PHYSICIAN ASSISTANT

## 2024-03-06 PROCEDURE — 97110 THERAPEUTIC EXERCISES: CPT

## 2024-03-06 PROCEDURE — 93010 ELECTROCARDIOGRAM REPORT: CPT | Performed by: NUCLEAR MEDICINE

## 2024-03-06 PROCEDURE — 85025 COMPLETE CBC W/AUTO DIFF WBC: CPT

## 2024-03-06 PROCEDURE — 83605 ASSAY OF LACTIC ACID: CPT

## 2024-03-06 PROCEDURE — 97162 PT EVAL MOD COMPLEX 30 MIN: CPT

## 2024-03-06 PROCEDURE — 36415 COLL VENOUS BLD VENIPUNCTURE: CPT

## 2024-03-06 PROCEDURE — 81003 URINALYSIS AUTO W/O SCOPE: CPT

## 2024-03-06 PROCEDURE — 97530 THERAPEUTIC ACTIVITIES: CPT

## 2024-03-06 PROCEDURE — 1200000003 HC TELEMETRY R&B

## 2024-03-06 PROCEDURE — 1200000000 HC SEMI PRIVATE

## 2024-03-06 PROCEDURE — 2580000003 HC RX 258: Performed by: PHYSICIAN ASSISTANT

## 2024-03-06 PROCEDURE — 80048 BASIC METABOLIC PNL TOTAL CA: CPT

## 2024-03-06 RX ORDER — OSELTAMIVIR PHOSPHATE 75 MG/1
75 CAPSULE ORAL 2 TIMES DAILY
Status: DISCONTINUED | OUTPATIENT
Start: 2024-03-06 | End: 2024-03-11

## 2024-03-06 RX ORDER — MECLIZINE HCL 12.5 MG/1
12.5 TABLET ORAL 3 TIMES DAILY
Status: DISCONTINUED | OUTPATIENT
Start: 2024-03-06 | End: 2024-03-08

## 2024-03-06 RX ADMIN — CHOLESTYRAMINE 4 G: 4 POWDER, FOR SUSPENSION ORAL at 07:47

## 2024-03-06 RX ADMIN — ENOXAPARIN SODIUM 30 MG: 100 INJECTION SUBCUTANEOUS at 20:08

## 2024-03-06 RX ADMIN — OSELTAMIVIR PHOSPHATE 75 MG: 75 CAPSULE ORAL at 12:04

## 2024-03-06 RX ADMIN — TAMSULOSIN HYDROCHLORIDE 0.4 MG: 0.4 CAPSULE ORAL at 22:27

## 2024-03-06 RX ADMIN — LOSARTAN POTASSIUM 50 MG: 50 TABLET, FILM COATED ORAL at 07:47

## 2024-03-06 RX ADMIN — ISOSORBIDE MONONITRATE 30 MG: 30 TABLET, EXTENDED RELEASE ORAL at 07:46

## 2024-03-06 RX ADMIN — HYDROCHLOROTHIAZIDE 25 MG: 12.5 CAPSULE ORAL at 07:47

## 2024-03-06 RX ADMIN — ENOXAPARIN SODIUM 30 MG: 100 INJECTION SUBCUTANEOUS at 07:47

## 2024-03-06 RX ADMIN — SODIUM CHLORIDE, PRESERVATIVE FREE 10 ML: 5 INJECTION INTRAVENOUS at 20:08

## 2024-03-06 RX ADMIN — PIPERACILLIN AND TAZOBACTAM 3375 MG: 3; .375 INJECTION, POWDER, LYOPHILIZED, FOR SOLUTION INTRAVENOUS at 20:12

## 2024-03-06 RX ADMIN — MECLIZINE HYDROCHLORIDE 12.5 MG: 12.5 TABLET ORAL at 16:21

## 2024-03-06 RX ADMIN — SODIUM CHLORIDE, PRESERVATIVE FREE 10 ML: 5 INJECTION INTRAVENOUS at 07:47

## 2024-03-06 RX ADMIN — PIPERACILLIN AND TAZOBACTAM 3375 MG: 3; .375 INJECTION, POWDER, LYOPHILIZED, FOR SOLUTION INTRAVENOUS at 04:34

## 2024-03-06 RX ADMIN — ASPIRIN 81 MG CHEWABLE TABLET 81 MG: 81 TABLET CHEWABLE at 07:47

## 2024-03-06 RX ADMIN — OSELTAMIVIR PHOSPHATE 75 MG: 75 CAPSULE ORAL at 20:08

## 2024-03-06 RX ADMIN — PIPERACILLIN AND TAZOBACTAM 3375 MG: 3; .375 INJECTION, POWDER, LYOPHILIZED, FOR SOLUTION INTRAVENOUS at 12:05

## 2024-03-06 RX ADMIN — MECLIZINE HYDROCHLORIDE 12.5 MG: 12.5 TABLET ORAL at 20:08

## 2024-03-06 RX ADMIN — PIPERACILLIN AND TAZOBACTAM 4500 MG: 4; .5 INJECTION, POWDER, FOR SOLUTION INTRAVENOUS at 00:10

## 2024-03-06 NOTE — PROGRESS NOTES
Hospitalist Progress Note    Patient:  Del Yoon      Unit/Bed:5K-24/024-A    YOB: 1950    MRN: [id number]       Acct: [id number]     PCP: Faby Suh APRN - CNP    Date of Admission: 3/5/2024    Assessment/Plan:    Pneumonia of left lower lobe, POA  Chest x-ray with infiltrate.  No supplemental O2 requirements  Continue with Zosyn.  Blood cultures pending  Pneumonia and respiratory culture ordered  Influenza A  Symptoms began 3/3.  Start Tamiflu 3/6  Supportive care  QT prolongation  Avoid QT prolonging agents.  Repeat EKG ordered  Lactic acidosis   3.1 on arrival, currently down trended to 2.3  Continue with IV fluids.  Continue to trend lactic acid until below 2  SIRS criteria not met.  No indication for sepsis bolus  Dizziness  Patient describes as offkilter and worse upon positional change or turning of head  Given viral illness suspect vestibular neuritis versus BPPV-trial of meclizine  Check orthostats  CAD with history of stents with concomitant hypertension and hyperlipidemia  Continue Imdur  History of CVA with left residual weakness      Expected discharge date:  1-2 days     Disposition:    [] Home       [] TCU       [] Rehab       [] Psych       [x] SNF       [] Long Term Care Facility       [] Other-    Chief Complaint: Shortness of breath    Hospital Course:   Initial HPI Per chart review  \"Del Yoon is a 74 yo male that presents to the ED with SOB and weakness since Sunday. He states he felt short of breath and felt significant weakness that prevented him being able to ambulate. His legs \"would not move\" He recently moved into assisted living and gets around with a walker or wheelchair for longer distances. He did not take any medication for this. He had a fever this morning and made an appointment with his PCP that sent him to the hospital. He has a cough productive cough and denies chest pain, chills, abdominal pain, sore throat, vomiting. He endorses

## 2024-03-06 NOTE — ED NOTES
ED to inpatient nurses report      Chief Complaint:  Chief Complaint   Patient presents with    Shortness of Breath    Influenza            Present to ED from: [facility]    MOA:     LOC: alert and orientated to name, place, date  Mobility: Requires assistance * 2  Oxygen Baseline: 94    Current needs required: room air      Code Status:   Prior    What abnormal results were found and what did you give/do to treat them? influenza  Any procedures or intervention occur? Duonebs, solumedrol     Mental Status:  Level of Consciousness: Alert (0)    Psych Assessment:        Vitals:  Patient Vitals for the past 24 hrs:   BP Temp Temp src Pulse Resp SpO2 Height Weight   03/05/24 1818 (!) 181/82 -- -- -- 18 -- -- --   03/05/24 1817 -- -- -- 96 30 -- -- --   03/05/24 1733 -- -- -- 88 22 94 % -- --   03/05/24 1707 (!) 154/90 98 °F (36.7 °C) Oral 96 26 95 % 1.803 m (5' 11\") 129.3 kg (285 lb)        LDAs:   Peripheral IV 03/05/24 Distal;Left Forearm (Active)   Site Assessment Clean, dry & intact 03/05/24 1818   Line Status Normal saline locked;Capped 03/05/24 1818       Ambulatory Status:  No data recorded    Diagnosis:  DISPOSITION Decision To Admit 03/05/2024 07:02:27 PM   Final diagnoses:   Pneumonia of left lower lobe due to infectious organism   Influenza A   Elevated troponin   Elevated brain natriuretic peptide (BNP) level   Lower extremity edema   Shortness of breath   Lactic acidosis        Consults:  None     Pain Score:  Pain Assessment  Pain Assessment: None - Denies Pain    C-SSRS:   Risk of Suicide: No Risk    Sepsis Screening:  Sepsis Risk Score: 5.58    [place] Fall Risk:       Swallow Screening        Preferred Language:   English      ALLERGIES     Adhesive tape    SURGICAL HISTORY       Past Surgical History:   Procedure Laterality Date    BACK SURGERY  05/16/2022    C5-6    CHOLECYSTECTOMY  06/25/2017    CORONARY ANGIOPLASTY WITH STENT PLACEMENT      CYST INCISION AND DRAINAGE      CYSTOSCOPY N/A  12/17/2019    CYSTOSCOPY, WITH  URETHRAL DILATION performed by Enrrique Monroe MD at [facility] OR    CYSTOSCOPY N/A 2/18/2020    CYSTOSCOPY WITH BLADDER BOTOX performed by Enrrique Monroe MD at [facility] OR    ERCP  06/23/2017    PAIN MANAGEMENT PROCEDURE Bilateral 2/9/2023    bilateral lumbar 4/5, 5/Sacral 1 medial branch blocks performed by Dustin Hurtado DO at [facility] OR    PAIN MANAGEMENT PROCEDURE Bilateral 2/23/2023    medial branch blocks at bilateral Lumbar 4/5 and Lumbar 5/Sacral 1 performed by Dustin Hurtado DO at [facility] OR    PAIN MANAGEMENT PROCEDURE Bilateral 3/15/2023    lumbar radiofrequency ablation at bilateral Lumbar 4/5, 5/Sacral 1 performed by Dustin Hurtado DO at [facility] OR    PAIN MANAGEMENT PROCEDURE Bilateral 5/16/2023    bilateral lumbar 2/3, 3/4 medial branch blocks performed by Dustin Hurtado DO at [facility] OR    PAIN MANAGEMENT PROCEDURE Bilateral 6/6/2023    confirmatory medial branch blocks bilateral Lumbar 2/3, 3/4 performed by Dustin Hurtado DO at [facility] OR    PAIN MANAGEMENT PROCEDURE Bilateral 6/21/2023    lumbar 2-3, 3-4 radiofrequency ablation bilateral performed by Dustin Hurtado DO at [facility] OR    ROTATOR CUFF REPAIR Left 03/12/2013    right 4/2019    SHOULDER SURGERY Right 03/02/2020    SHOULDER SURGERY  06/2020    frozen shoulder, repair nerve elbow and palm-dr eduardo     TURP N/A 8/13/2019    TRANSURETHRAL RESECTION PROSTATE performed by Kenney Rosales MD at [facility] OR       PAST MEDICAL HISTORY       Past Medical History:   Diagnosis Date    Arthritis     Back pain     CAD in native artery 06/14/2021    Diarrhea     Hypertension     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

## 2024-03-06 NOTE — ED NOTES
Spoke to [facility] to approve pt transport to [facility] in stable condition.

## 2024-03-06 NOTE — CARE COORDINATION
3/6/24, 8:36 AM EST      DISCHARGE PLANNING EVALUATION    Del Yoon  Admitted: 3/5/2024  Hospital Day: 1    Location: [facility]24/024-A Reason for admit: Shortness of breath [R06.02]  Lactic acidosis [E87.20]  Lower extremity edema [R60.0]  Influenza A [J10.1]  Elevated troponin [R79.89]  Elevated brain natriuretic peptide (BNP) level [R79.89]  Pneumonia of left lower lobe due to infectious organism [J18.9]  Community acquired pneumonia of left lower lobe of lung [J18.9]    Past Medical History:   Diagnosis Date    Arthritis     Back pain     CAD in native artery 06/14/2021    Diarrhea     Hypertension     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    Barriers to Discharge: Patient presents from shelter due to shortness of breath and weakness. On room air. Positive for influenza A. Oral Zithromax and Rocephin x 1 dose, Zosyn, Lovenox, prn Tylenol, Magnesium and Potassium replacement protocol, UA, regular diet, droplet isolation, PT/OT, SS, telemetry, up with assistance.      PCP: Faby Suh, APRN - CNP    Readmission Risk Low 0-14, Mod 15-19), High > 20: Readmission Risk Score: 15.1      Advance Directives:      Code Status: Limited   Patient's Primary Decision Maker is:      Primary Decision Maker: Bailey Yoon - Spouse - [phone]    Patient Goals/Plan/Treatment Preferences: [facility] He is here for SNF placement.     Transportation/Food Security/Housekeeping Addressed: No issues identified.     If patient is discharged prior to next notation, then this note serves as note for discharge by case management.

## 2024-03-06 NOTE — H&P
Hospitalist - History & Physical      Patient: Del Yoon    Unit/Bed:38/038A  YOB: 1950  MRN: [id number]   Acct: [id number]   PCP: Faby Suh APRN - CNP    Assessment and Plan:        Community acquired pneumonia of left lower lobe:   SOB and weakness since Sunday. CXR 3/5/2024 shows infiltrate of left lower lobe. Pt doing well on RA.   CURB-65 = 2  Culture pending   Empiric piperacillin-sulbactam.  Influenza A:  Positive on 3/5/2024. Symptomatic since Sunday with fatigue, fever, SOB.  Out of window for tamiflu - will continue to monitor.   QTC prolongation:  QTc 677 on 3/5/2024.   Hold all QT prolonging agents, reviewing home medications.   Discontinue Azithromycin used for treatment in ED and substitute with piperacillin-sulbactam.   Lactic acidosis:   Lactic acid 3.1 on 3/5/2024. Pt SOB.   Does not currently meet criteria for sepsis, will continue to monitor.   Mildly elevated troponin:  No peak, troponin stable at 16. Pt denies chest pain.   Continue to monitor.     CC:  Shortness of breath    HPI: Del Yoon is a 72 yo male that presents to the ED with SOB and weakness since Sunday. He states he felt short of breath and felt significant weakness that prevented him being able to ambulate. His legs \"would not move\" He recently moved into assisted living and gets around with a walker or wheelchair for longer distances. He did not take any medication for this. He had a fever this morning and made an appointment with his PCP that sent him to the hospital. He has a cough productive cough and denies chest pain, chills, abdominal pain, sore throat, vomiting. He endorses chronic diarrhea but denies bowel movement today. He denies smoking. Pt states he had stents placed in the past.     ROS: Review of Systems   Constitutional:  Positive for fatigue and fever. Negative for chills and diaphoresis.   HENT:  Negative for facial swelling, sinus pain, sneezing, sore throat and trouble    Transportation Needs: Not on file   Physical Activity: Not on file   Stress: Not on file   Social Connections: Not on file   Intimate Partner Violence: Not on file   Housing Stability: Not on file     FHX:   Family History   Problem Relation Age of Onset    Arthritis Mother     Vision Loss Father     Cancer Brother     Stroke Maternal Uncle     Prostate Cancer Neg Hx      Allergies:   Allergies   Allergen Reactions    Adhesive Tape Rash     Medications:       cefTRIAXone (ROCEPHIN) IV  1,000 mg IntraVENous Once    azithromycin  500 mg Oral Once          Labs:   Recent Results (from the past 24 hour(s))   EKG SOB    Collection Time: 03/05/24  5:06 PM   Result Value Ref Range    Ventricular Rate 94 BPM    Atrial Rate 94 BPM    P-R Interval 196 ms    QRS Duration 102 ms    Q-T Interval 542 ms    QTc Calculation (Bazett) 677 ms    P Axis 21 degrees    R Axis 32 degrees    T Axis 37 degrees   COVID-19 & Influenza Combo    Collection Time: 03/05/24  5:40 PM    Specimen: Nasopharyngeal Swab   Result Value Ref Range    SARS-CoV-2 RNA, RT PCR NOT DETECTED NOT DETECTED    INFLUENZA A DETECTED (AA) NOT DETECTED    INFLUENZA B NOT DETECTED NOT DETECTED   BMP    Collection Time: 03/05/24  5:46 PM   Result Value Ref Range    Sodium 141 135 - 145 meq/L    Potassium 3.5 3.5 - 5.2 meq/L    Chloride 102 98 - 111 meq/L    CO2 24 23 - 33 meq/L    Glucose 145 (H) 70 - 108 mg/dL    BUN 15 7 - 22 mg/dL    Creatinine 1.5 (H) 0.4 - 1.2 mg/dL    Calcium 9.4 8.5 - 10.5 mg/dL   CBC with Auto Differential    Collection Time: 03/05/24  5:46 PM   Result Value Ref Range    WBC 6.8 4.8 - 10.8 thou/mm3    RBC 4.90 4.70 - 6.10 mill/mm3    Hemoglobin 14.2 14.0 - 18.0 gm/dl    Hematocrit 43.3 42.0 - 52.0 %    MCV 88.4 80.0 - 94.0 fL    MCH 29.0 26.0 - 33.0 pg    MCHC 32.8 32.2 - 35.5 gm/dl    RDW-CV 14.7 (H) 11.5 - 14.5 %    RDW-SD 48.0 (H) 35.0 - 45.0 fL    Platelets 177 130 - 400 thou/mm3    MPV 11.4 9.4 - 12.4 fL    Seg Neutrophils 77.7 %

## 2024-03-06 NOTE — H&P
Hospitalist - History & Physical      Patient: Del Yoon    Unit/Bed:WHIT /WHIT  YOB: 1950  MRN: [id number]   Acct: [id number]   PCP: Faby Suh APRN - CNP    Assessment and Plan:        Community acquired pneumonia of left lower lobe:   SOB and weakness since Sunday. CXR 3/5/2024 shows infiltrate of left lower lobe. Pt doing well on RA.   CURB-65 = 2  Blood cultures pending   Empiric piperacillin-tazobactam   Influenza A:  Positive on 3/5/2024. Symptomatic since Sunday with fatigue, fever, SOB.  Outside of the window for tamiflu - will continue to monitor.   QTC prolongation:  QTc 677 on 3/5/2024.   Hold all QT prolonging agents, reviewing home medications.   Discontinue Azithromycin used for treatment in ED and substitute with piperacillin-tazobactam  Lactic acidosis:   Lactic acid 3.1 on 3/5/2024.  Trend until less than 2.0  Does not currently meet criteria for sepsis, will continue to monitor.   Mildly elevated troponin:  Flat peak, troponin stable at 16. Pt denies chest pain.   Continue to monitor.       CC:  Shortness of breath    HPI: Del Yoon is a 74 yo male that presents to the ED with SOB and weakness since Sunday. He states he felt short of breath and felt significant weakness that prevented him being able to ambulate. His legs \"would not move\" He recently moved into assisted living and gets around with a walker or wheelchair for longer distances. He did not take any medication for this. He had a fever this morning and made an appointment with his PCP that sent him to the hospital. He has a cough productive cough and denies chest pain, chills, abdominal pain, sore throat, vomiting. He endorses chronic diarrhea but denies bowel movement today. He denies smoking. Pt states he had stents placed in the past.     ROS: Review of Systems   Constitutional:  Positive for fatigue and fever. Negative for chills and diaphoresis.   HENT:  Negative for facial swelling, sinus pain,  Insecurity: Not on file   Transportation Needs: Not on file   Physical Activity: Not on file   Stress: Not on file   Social Connections: Not on file   Intimate Partner Violence: Not on file   Housing Stability: Not on file     FHX:   Family History   Problem Relation Age of Onset    Arthritis Mother     Vision Loss Father     Cancer Brother     Stroke Maternal Uncle     Prostate Cancer Neg Hx      Allergies:   Allergies   Allergen Reactions    Adhesive Tape Rash     Medications:       piperacillin-tazobactam  4,500 mg IntraVENous Once    Followed by    [START ON 3/6/2024] piperacillin-tazobactam  3,375 mg IntraVENous Q8H          Labs:   Recent Results (from the past 24 hour(s))   EKG SOB    Collection Time: 03/05/24  5:06 PM   Result Value Ref Range    Ventricular Rate 94 BPM    Atrial Rate 94 BPM    P-R Interval 196 ms    QRS Duration 102 ms    Q-T Interval 542 ms    QTc Calculation (Bazett) 677 ms    P Axis 21 degrees    R Axis 32 degrees    T Axis 37 degrees   COVID-19 & Influenza Combo    Collection Time: 03/05/24  5:40 PM    Specimen: Nasopharyngeal Swab   Result Value Ref Range    SARS-CoV-2 RNA, RT PCR NOT DETECTED NOT DETECTED    INFLUENZA A DETECTED (AA) NOT DETECTED    INFLUENZA B NOT DETECTED NOT DETECTED   BMP    Collection Time: 03/05/24  5:46 PM   Result Value Ref Range    Sodium 141 135 - 145 meq/L    Potassium 3.5 3.5 - 5.2 meq/L    Chloride 102 98 - 111 meq/L    CO2 24 23 - 33 meq/L    Glucose 145 (H) 70 - 108 mg/dL    BUN 15 7 - 22 mg/dL    Creatinine 1.5 (H) 0.4 - 1.2 mg/dL    Calcium 9.4 8.5 - 10.5 mg/dL   CBC with Auto Differential    Collection Time: 03/05/24  5:46 PM   Result Value Ref Range    WBC 6.8 4.8 - 10.8 thou/mm3    RBC 4.90 4.70 - 6.10 mill/mm3    Hemoglobin 14.2 14.0 - 18.0 gm/dl    Hematocrit 43.3 42.0 - 52.0 %    MCV 88.4 80.0 - 94.0 fL    MCH 29.0 26.0 - 33.0 pg    MCHC 32.8 32.2 - 35.5 gm/dl    RDW-CV 14.7 (H) 11.5 - 14.5 %    RDW-SD 48.0 (H) 35.0 - 45.0 fL    Platelets 177 130

## 2024-03-06 NOTE — ED NOTES
Pt unable to ambulate d/t ambualtory issues. Pt states that he was moved to skilled nursing home for his ambulatory status.

## 2024-03-06 NOTE — CARE COORDINATION
3/6/24, 2:15 PM EST  Discharge Planning Evaluation  Social work consult received, patient from [facility].   Patient and wife preference is to return to [facility]-open to SNF side if needed.    Would patient be willing to go to a skilled facility if needed: yes.  Is there skilled care available at current facility: yes.  Barriers to return to current living situation:  strength and mobility  Spoke with Juani at the facility.  Patient bed hold: yes  Anticipated transport plan: family vs ambulette  Patient's Healthcare Decision Maker: Legal Next of Kin      SW met with pt and wife at bedside. Pt reports he walked with a walker for around 50' and uses a wheelchair for longer distances. Pt has assistance with bathing, medications. Wife reports they had just move pt to skilled side hours before coming to the hospital. Wife concerned about cost of SNF if Medicare does not cover, wife aware of SNF benefit if needing to be in the hospital 3 inpatient midnights.        Readmission Risk Low 0-14, Mod 15-19), High > 20: Readmission Risk Score: 15.1    Current PCP: Faby Suh, MAGDA - CNP  PCP verified by CM? Yes    Patient Orientation: Alert and Oriented    Patient Cognition: Alert  History Provided by: Patient, Significant Other    Advance Directives:      Code Status: Limited   Patient's Primary Decision Maker is: Legal Next of Kin    Primary Decision Maker: Bailey Yoon - Spouse - [phone]     Discharge Planning:    Patient lives with: Alone Type of Home: Assisted living  Primary Care Giver: Other (Comment) ([facility] assisted living)  Patient Support Systems include: Spouse/Significant Other, Other (Comment) ([facility] assisted living)   Current Financial resources:    Current community resources:    Current services prior to admission: None            Current DME:              Type of Home Care services:  None    ADLS  Prior functional level:

## 2024-03-06 NOTE — ED NOTES
Upon first contact with patient this RN receives bedside shift report from Marilyn DUNN. Patient VS stable. IV antibiotics running. Family at bedside.

## 2024-03-06 NOTE — PLAN OF CARE
Problem: Skin/Tissue Integrity  Goal: Absence of new skin breakdown  Description: 1.  Monitor for areas of redness and/or skin breakdown  2.  Assess vascular access sites hourly  3.  Every 4-6 hours minimum:  Change oxygen saturation probe site  4.  Every 4-6 hours:  If on nasal continuous positive airway pressure, respiratory therapy assess nares and determine need for appliance change or resting period.  Outcome: Progressing     Problem: Safety - Adult  Goal: Free from fall injury  Outcome: Progressing     Problem: Chronic Conditions and Co-morbidities  Goal: Patient's chronic conditions and co-morbidity symptoms are monitored and maintained or improved  Outcome: Progressing     Problem: Discharge Planning  Goal: Discharge to home or other facility with appropriate resources  Outcome: Progressing     Problem: Pain  Goal: Verbalizes/displays adequate comfort level or baseline comfort level  Outcome: Progressing     Problem: Respiratory - Adult  Goal: Achieves optimal ventilation and oxygenation  Outcome: Progressing     Problem: Skin/Tissue Integrity - Adult  Goal: Skin integrity remains intact  Outcome: Progressing  Flowsheets (Taken 3/5/2024 2215 by Kayy Birmingham RN)  Skin Integrity Remains Intact:   Monitor for areas of redness and/or skin breakdown   Assess vascular access sites hourly   Every 4-6 hours minimum: Change oxygen saturation probe site   Every 4-6 hours: If on nasal continuous positive airway pressure, respiratory therapy assesses nares and determine need for appliance change or resting period  Goal: Incisions, wounds, or drain sites healing without S/S of infection  Outcome: Progressing     Problem: Infection - Adult  Goal: Absence of infection at discharge  Outcome: Progressing   Care plan reviewed with patient.  Patient verbalizes understanding of the plan of care and contributes to goal setting.

## 2024-03-06 NOTE — ED NOTES
Patient resting on cot. VS stable. Family at bedside. Telemetry in place. Call light in reach. Patient denies needs at this time.

## 2024-03-06 NOTE — PROGRESS NOTES
[facility]  INPATIENT PHYSICAL THERAPY  EVALUATION  [facility] ONC MED 5K - 5K-24/024-A    Time In: 1000  Time Out: 1036  Timed Code Treatment Minutes: 25 Minutes  Minutes: 36          Date: 3/6/2024  Patient Name: Del Yoon,  Gender:  male        MRN: [id number]  : 1950  (73 y.o.)      Referring Practitioner: Klaudia Garcia PA-C  Diagnosis: Shortness of breath  Additional Pertinent Hx: HPI: Del Yoon is a 72 yo male that presents to the ED with SOB and weakness since . He states he felt short of breath and felt significant weakness that prevented him being able to ambulate. His legs \"would not move\" He recently moved into assisted living and gets around with a walker or wheelchair for longer distances. He did not take any medication for this. He had a fever this morning and made an appointment with his PCP that sent him to the hospital. He has a cough productive cough and denies chest pain, chills, abdominal pain, sore throat, vomiting. He endorses chronic diarrhea but denies bowel movement today. He denies smoking. Pt states he had stents placed in the past.     Restrictions/Precautions:  Restrictions/Precautions: Fall Risk, General Precautions, Isolation  Position Activity Restriction  Other position/activity restrictions: Droplet for Influenza A    Subjective:  Chart Reviewed: Yes  Patient assessed for rehabilitation services?: Yes  Subjective: Pt resting in bed and agrees to therapy. RN approved session.    General:     Vision: Impaired  Vision Exceptions: Wears glasses for reading  Hearing: Exceptions to WFL  Hearing Exceptions: Bilateral hearing aid, Hard of hearing/hearing concerns       Pain: denied      Vitals: Vitals not assessed per clinical judgement, see nursing flowsheet    Social/Functional History:    Lives With: Alone  Type of Home: Assisted living  Home Layout: One level  Home Access: Level entry  Home Equipment: Wheelchair-manual, Walker, 4 wheeled, Cane, Lift

## 2024-03-06 NOTE — PLAN OF CARE
Problem: Discharge Planning  Goal: Discharge to home or other facility with appropriate resources  3/6/2024 1504 by Xochitl Conde LSW  Outcome: Progressing  SW consult received. See SW note 3/6/24.

## 2024-03-07 ENCOUNTER — APPOINTMENT (OUTPATIENT)
Dept: GENERAL RADIOLOGY | Age: 74
End: 2024-03-07
Payer: MEDICARE

## 2024-03-07 LAB
ANION GAP SERPL CALC-SCNC: 10 MEQ/L (ref 8–16)
BUN SERPL-MCNC: 22 MG/DL (ref 7–22)
CALCIUM SERPL-MCNC: 8.7 MG/DL (ref 8.5–10.5)
CHLORIDE SERPL-SCNC: 101 MEQ/L (ref 98–111)
CO2 SERPL-SCNC: 26 MEQ/L (ref 23–33)
CREAT SERPL-MCNC: 1.2 MG/DL (ref 0.4–1.2)
EKG ATRIAL RATE: 73 BPM
EKG P AXIS: -15 DEGREES
EKG P-R INTERVAL: 164 MS
EKG Q-T INTERVAL: 402 MS
EKG QRS DURATION: 112 MS
EKG QTC CALCULATION (BAZETT): 442 MS
EKG R AXIS: 32 DEGREES
EKG T AXIS: 16 DEGREES
EKG VENTRICULAR RATE: 73 BPM
GFR SERPL CREATININE-BSD FRML MDRD: > 60 ML/MIN/1.73M2
GLUCOSE SERPL-MCNC: 102 MG/DL (ref 70–108)
LACTATE SERPL-SCNC: 0.9 MMOL/L (ref 0.5–2)
MAGNESIUM SERPL-MCNC: 2.3 MG/DL (ref 1.6–2.4)
POTASSIUM SERPL-SCNC: 3.5 MEQ/L (ref 3.5–5.2)
PROCALCITONIN SERPL IA-MCNC: 0.13 NG/ML (ref 0.01–0.09)
SODIUM SERPL-SCNC: 137 MEQ/L (ref 135–145)

## 2024-03-07 PROCEDURE — 6370000000 HC RX 637 (ALT 250 FOR IP): Performed by: PHYSICIAN ASSISTANT

## 2024-03-07 PROCEDURE — 36415 COLL VENOUS BLD VENIPUNCTURE: CPT

## 2024-03-07 PROCEDURE — 97110 THERAPEUTIC EXERCISES: CPT

## 2024-03-07 PROCEDURE — 6360000002 HC RX W HCPCS: Performed by: PHYSICIAN ASSISTANT

## 2024-03-07 PROCEDURE — 97166 OT EVAL MOD COMPLEX 45 MIN: CPT

## 2024-03-07 PROCEDURE — 84145 PROCALCITONIN (PCT): CPT

## 2024-03-07 PROCEDURE — 2580000003 HC RX 258: Performed by: PHYSICIAN ASSISTANT

## 2024-03-07 PROCEDURE — 94640 AIRWAY INHALATION TREATMENT: CPT

## 2024-03-07 PROCEDURE — 94761 N-INVAS EAR/PLS OXIMETRY MLT: CPT

## 2024-03-07 PROCEDURE — 99232 SBSQ HOSP IP/OBS MODERATE 35: CPT | Performed by: PHYSICIAN ASSISTANT

## 2024-03-07 PROCEDURE — 1200000000 HC SEMI PRIVATE

## 2024-03-07 PROCEDURE — 83605 ASSAY OF LACTIC ACID: CPT

## 2024-03-07 PROCEDURE — 94669 MECHANICAL CHEST WALL OSCILL: CPT

## 2024-03-07 PROCEDURE — 83735 ASSAY OF MAGNESIUM: CPT

## 2024-03-07 PROCEDURE — 80048 BASIC METABOLIC PNL TOTAL CA: CPT

## 2024-03-07 PROCEDURE — 2700000000 HC OXYGEN THERAPY PER DAY

## 2024-03-07 PROCEDURE — 71045 X-RAY EXAM CHEST 1 VIEW: CPT

## 2024-03-07 PROCEDURE — 1200000003 HC TELEMETRY R&B

## 2024-03-07 RX ORDER — IPRATROPIUM BROMIDE AND ALBUTEROL SULFATE 2.5; .5 MG/3ML; MG/3ML
1 SOLUTION RESPIRATORY (INHALATION)
Status: DISCONTINUED | OUTPATIENT
Start: 2024-03-07 | End: 2024-03-07

## 2024-03-07 RX ORDER — IPRATROPIUM BROMIDE AND ALBUTEROL SULFATE 2.5; .5 MG/3ML; MG/3ML
1 SOLUTION RESPIRATORY (INHALATION)
Status: DISCONTINUED | OUTPATIENT
Start: 2024-03-07 | End: 2024-03-11

## 2024-03-07 RX ORDER — IPRATROPIUM BROMIDE AND ALBUTEROL SULFATE 2.5; .5 MG/3ML; MG/3ML
1 SOLUTION RESPIRATORY (INHALATION) EVERY 6 HOURS PRN
Status: DISCONTINUED | OUTPATIENT
Start: 2024-03-07 | End: 2024-03-16

## 2024-03-07 RX ADMIN — HYDROCHLOROTHIAZIDE 25 MG: 12.5 CAPSULE ORAL at 08:34

## 2024-03-07 RX ADMIN — IPRATROPIUM BROMIDE AND ALBUTEROL SULFATE 1 DOSE: .5; 3 SOLUTION RESPIRATORY (INHALATION) at 09:34

## 2024-03-07 RX ADMIN — IPRATROPIUM BROMIDE AND ALBUTEROL SULFATE 1 DOSE: .5; 3 SOLUTION RESPIRATORY (INHALATION) at 04:37

## 2024-03-07 RX ADMIN — MECLIZINE HYDROCHLORIDE 12.5 MG: 12.5 TABLET ORAL at 20:07

## 2024-03-07 RX ADMIN — IPRATROPIUM BROMIDE AND ALBUTEROL SULFATE 1 DOSE: .5; 3 SOLUTION RESPIRATORY (INHALATION) at 20:59

## 2024-03-07 RX ADMIN — OSELTAMIVIR PHOSPHATE 75 MG: 75 CAPSULE ORAL at 20:07

## 2024-03-07 RX ADMIN — CHOLESTYRAMINE 4 G: 4 POWDER, FOR SUSPENSION ORAL at 08:34

## 2024-03-07 RX ADMIN — PIPERACILLIN AND TAZOBACTAM 3375 MG: 3; .375 INJECTION, POWDER, LYOPHILIZED, FOR SOLUTION INTRAVENOUS at 04:19

## 2024-03-07 RX ADMIN — PIPERACILLIN AND TAZOBACTAM 3375 MG: 3; .375 INJECTION, POWDER, LYOPHILIZED, FOR SOLUTION INTRAVENOUS at 20:21

## 2024-03-07 RX ADMIN — OSELTAMIVIR PHOSPHATE 75 MG: 75 CAPSULE ORAL at 08:34

## 2024-03-07 RX ADMIN — IPRATROPIUM BROMIDE AND ALBUTEROL SULFATE 1 DOSE: .5; 3 SOLUTION RESPIRATORY (INHALATION) at 13:33

## 2024-03-07 RX ADMIN — ASPIRIN 81 MG CHEWABLE TABLET 81 MG: 81 TABLET CHEWABLE at 08:34

## 2024-03-07 RX ADMIN — MECLIZINE HYDROCHLORIDE 12.5 MG: 12.5 TABLET ORAL at 14:39

## 2024-03-07 RX ADMIN — ENOXAPARIN SODIUM 30 MG: 100 INJECTION SUBCUTANEOUS at 20:07

## 2024-03-07 RX ADMIN — SODIUM CHLORIDE, PRESERVATIVE FREE 10 ML: 5 INJECTION INTRAVENOUS at 08:34

## 2024-03-07 RX ADMIN — LOSARTAN POTASSIUM 50 MG: 50 TABLET, FILM COATED ORAL at 08:34

## 2024-03-07 RX ADMIN — PIPERACILLIN AND TAZOBACTAM 3375 MG: 3; .375 INJECTION, POWDER, LYOPHILIZED, FOR SOLUTION INTRAVENOUS at 11:59

## 2024-03-07 RX ADMIN — POTASSIUM CHLORIDE 40 MEQ: 1500 TABLET, EXTENDED RELEASE ORAL at 08:42

## 2024-03-07 RX ADMIN — SODIUM CHLORIDE, PRESERVATIVE FREE 10 ML: 5 INJECTION INTRAVENOUS at 20:07

## 2024-03-07 RX ADMIN — ENOXAPARIN SODIUM 30 MG: 100 INJECTION SUBCUTANEOUS at 08:34

## 2024-03-07 RX ADMIN — TAMSULOSIN HYDROCHLORIDE 0.4 MG: 0.4 CAPSULE ORAL at 20:07

## 2024-03-07 RX ADMIN — MECLIZINE HYDROCHLORIDE 12.5 MG: 12.5 TABLET ORAL at 08:34

## 2024-03-07 RX ADMIN — ISOSORBIDE MONONITRATE 30 MG: 30 TABLET, EXTENDED RELEASE ORAL at 08:34

## 2024-03-07 ASSESSMENT — PAIN SCALES - GENERAL
PAINLEVEL_OUTOF10: 0
PAINLEVEL_OUTOF10: 0

## 2024-03-07 NOTE — PLAN OF CARE
Problem: Respiratory - Adult  Goal: Clear lung sounds  Outcome: Progressing  Note: Txs to help improve lung aeration. Patient mutually agreed on goals.

## 2024-03-07 NOTE — PROGRESS NOTES
[facility]  INPATIENT OCCUPATIONAL THERAPY  [facility] ONC MED 5K  EVALUATION    Time:    Time In: 0950  Time Out: 1013  Timed Code Treatment Minutes: 8 Minutes  Minutes: 23          Date: 3/7/2024  Patient Name: Del Yoon,   Gender: male      MRN: [id number]  : 1950  (73 y.o.)  Referring Practitioner: Klaudia Garcia PA-  Diagnosis: SOB  Additional Pertinent Hx: Per H & P on 3/5/2024:74 yo male that presents to the ED with SOB and weakness since . He states he felt short of breath and felt significant weakness that prevented him being able to ambulate. His legs \"would not move\" He recently moved into assisted living and gets around with a walker or wheelchair for longer distances. He did not take any medication for this. He had a fever this morning and made an appointment with his PCP that sent him to the hospital. He has a cough productive cough and denies chest pain, chills, abdominal pain, sore throat, vomiting.    Restrictions/Precautions:  Restrictions/Precautions: Fall Risk, General Precautions, Isolation  Position Activity Restriction  Other position/activity restrictions: Droplet for Influenza A    Subjective  Chart Reviewed: Yes, Orders, Progress Notes, History and Physical  Patient assessed for rehabilitation services?: Yes  Family / Caregiver Present: Yes (wife)    Subjective: cooperative, up in recliner upon arrival of therapist.    Pain: 0/10: no c/o pain during session    Vitals: Oxygen: 92% during session  Pt on 4L O2 during session, was not on home O2    Social/Functional History:  Lives With: Alone  Type of Home: Assisted living  Home Layout: One level  Home Access: Level entry  Home Equipment: Wheelchair-manual, Walker, 4 wheeled, Cane, Lift chair, Reacher   Bathroom Shower/Tub: Walk-in shower  Bathroom Toilet: Standard  Bathroom Equipment: Grab bars in shower, Grab bars around toilet, Shower chair    Receives Help From: Personal care attendant  ADL Assistance: Needs

## 2024-03-07 NOTE — RT PROTOCOL NOTE
RT Inhaler-Nebulizer Bronchodilator Protocol Note    There is a bronchodilator order in the chart from a provider indicating to follow the RT Bronchodilator Protocol and there is an “Initiate RT Inhaler-Nebulizer Bronchodilator Protocol” order as well (see protocol at bottom of note).    CXR Findings:  XR CHEST PORTABLE    Result Date: 3/5/2024  Possible focus of atelectasis or infiltrate at the base of the left lung. This document has been electronically signed by: Dariela Iglesias MD on 03/05/2024 06:53 PM      The findings from the last RT Protocol Assessment were as follows:   History Pulmonary Disease: None or smoker <15 pack years  Respiratory Pattern: Dyspnea on exertion or RR 21-25 bpm  Breath Sounds: Inspiratory and expiratory or bilateral wheezing and/or rhonchi  Cough: Strong, productive  Indication for Bronchodilator Therapy: None  Bronchodilator Assessment Score: 9    Aerosolized bronchodilator medication orders have been revised according to the RT Inhaler-Nebulizer Bronchodilator Protocol below.    Respiratory Therapist to perform RT Therapy Protocol Assessment initially then follow the protocol.  Repeat RT Therapy Protocol Assessment PRN for score 0-3 or on second treatment, BID, and PRN for scores above 3.    No Indications - adjust the frequency to every 6 hours PRN wheezing or bronchospasm, if no treatments needed after 48 hours then discontinue using Per Protocol order mode.     If indication present, adjust the RT bronchodilator orders based on the Bronchodilator Assessment Score as indicated below.  Use Inhaler orders unless patient has one or more of the following: on home nebulizer, not able to hold breath for 10 seconds, is not alert and oriented, cannot activate and use MDI correctly, or respiratory rate 25 breaths per minute or more, then use the equivalent nebulizer order(s) with same Frequency and PRN reasons based on the score.  If a patient is on this medication at home then do not  decrease Frequency below that used at home.    0-3 - enter or revise RT bronchodilator order(s) to equivalent RT Bronchodilator order with Frequency of every 4 hours PRN for wheezing or increased work of breathing using Per Protocol order mode.        4-6 - enter or revise RT Bronchodilator order(s) to two equivalent RT bronchodilator orders with one order with BID Frequency and one order with Frequency of every 4 hours PRN wheezing or increased work of breathing using Per Protocol order mode.        7-10 - enter or revise RT Bronchodilator order(s) to two equivalent RT bronchodilator orders with one order with TID Frequency and one order with Frequency of every 4 hours PRN wheezing or increased work of breathing using Per Protocol order mode.       11-13 - enter or revise RT Bronchodilator order(s) to one equivalent RT bronchodilator order with QID Frequency and an Albuterol order with Frequency of every 4 hours PRN wheezing or increased work of breathing using Per Protocol order mode.      Greater than 13 - enter or revise RT Bronchodilator order(s) to one equivalent RT bronchodilator order with every 4 hours Frequency and an Albuterol order with Frequency of every 2 hours PRN wheezing or increased work of breathing using Per Protocol order mode.     RT to enter RT Home Evaluation for COPD & MDI Assessment order using Per Protocol order mode.    Electronically signed by Shakira Simeon RCP on 3/7/2024 at 9:39 AM

## 2024-03-07 NOTE — RT PROTOCOL NOTE
RT Inhaler-Nebulizer Bronchodilator Protocol Note    There is a bronchodilator order in the chart from a provider indicating to follow the RT Bronchodilator Protocol and there is an “Initiate RT Inhaler-Nebulizer Bronchodilator Protocol” order as well (see protocol at bottom of note).    CXR Findings:  XR CHEST PORTABLE    Result Date: 3/5/2024  Possible focus of atelectasis or infiltrate at the base of the left lung. This document has been electronically signed by: Dariela Iglesias MD on 03/05/2024 06:53 PM      The findings from the last RT Protocol Assessment were as follows:   History Pulmonary Disease: None or smoker <15 pack years  Respiratory Pattern: Dyspnea on exertion or RR 21-25 bpm  Breath Sounds: Inspiratory and expiratory or bilateral wheezing and/or rhonchi  Cough: Strong, spontaneous, non-productive  Indication for Bronchodilator Therapy: None  Bronchodilator Assessment Score: 8    Aerosolized bronchodilator medication orders have been revised according to the RT Inhaler-Nebulizer Bronchodilator Protocol below.    Respiratory Therapist to perform RT Therapy Protocol Assessment initially then follow the protocol.  Repeat RT Therapy Protocol Assessment PRN for score 0-3 or on second treatment, BID, and PRN for scores above 3.    No Indications - adjust the frequency to every 6 hours PRN wheezing or bronchospasm, if no treatments needed after 48 hours then discontinue using Per Protocol order mode.     If indication present, adjust the RT bronchodilator orders based on the Bronchodilator Assessment Score as indicated below.  Use Inhaler orders unless patient has one or more of the following: on home nebulizer, not able to hold breath for 10 seconds, is not alert and oriented, cannot activate and use MDI correctly, or respiratory rate 25 breaths per minute or more, then use the equivalent nebulizer order(s) with same Frequency and PRN reasons based on the score.  If a patient is on this medication at home

## 2024-03-07 NOTE — PLAN OF CARE
Problem: Skin/Tissue Integrity  Goal: Absence of new skin breakdown  Description: 1.  Monitor for areas of redness and/or skin breakdown  2.  Assess vascular access sites hourly  3.  Every 4-6 hours minimum:  Change oxygen saturation probe site  4.  Every 4-6 hours:  If on nasal continuous positive airway pressure, respiratory therapy assess nares and determine need for appliance change or resting period.  3/7/2024 0018 by Radha See RN  Outcome: Progressing     Problem: Safety - Adult  Goal: Free from fall injury  3/7/2024 0018 by Radha See RN  Outcome: Progressing   All fall precautions in place. Bed in low position, alarm activated and appropriate use of call light.     Problem: Chronic Conditions and Co-morbidities  Goal: Patient's chronic conditions and co-morbidity symptoms are monitored and maintained or improved  3/7/2024 0018 by Radha See RN  Outcome: Progressing     Problem: Discharge Planning  Goal: Discharge to home or other facility with appropriate resources  3/7/2024 0018 by Radha See RN  Outcome: Progressing     Problem: Pain  Goal: Verbalizes/displays adequate comfort level or baseline comfort level  3/7/2024 0018 by Radha See RN  Outcome: Progressing   Pain Assessment: None - Denies Pain          Is pain goal met at this time?  Yes          Problem: Respiratory - Adult  Goal: Achieves optimal ventilation and oxygenation  3/7/2024 0018 by Radha See RN  Outcome: Progressing     Problem: Skin/Tissue Integrity - Adult  Goal: Skin integrity remains intact  3/7/2024 0018 by Radha See RN  Outcome: Progressing   Skin assessment completed.  Patient turned every 2 hours and as needed.  No skin breakdown this shift.      Problem: Skin/Tissue Integrity - Adult  Goal: Incisions, wounds, or drain sites healing without S/S of infection  3/7/2024 0018 by Radha See RN  Outcome: Progressing     Problem: Infection - Adult  Goal: Absence of infection at

## 2024-03-07 NOTE — PLAN OF CARE
Problem: Skin/Tissue Integrity  Goal: Absence of new skin breakdown  Outcome: Progressing  Note: Patient turns and repositions himself frequently in bed. Skin kept clean and dry. Will continue to monitor.      Problem: Safety - Adult  Goal: Free from fall injury  Outcome: Progressing  Note: Fall sign posted. Arm band in place. Non-skid slippers on. Bed alarm on. Patient agreeable to use of call light. Call light within reach. Bed in lowest position.        Problem: Discharge Planning  Goal: Discharge to home or other facility with appropriate resources  Outcome: Progressing  Note: Patient plans to be discharged to a skilled nursing facility when medically stable.      Problem: Pain  Goal: Verbalizes/displays adequate comfort level or baseline comfort level  Outcome: Progressing  Note: Pain Assessment: 0-10  Pain Level: 0   Patient's Stated Pain Goal: 1   Is pain goal met at this time?  Yes    Care plan reviewed with patient.  Patient verbalized understanding of the plan of care and contributes to goal setting.

## 2024-03-07 NOTE — CARE COORDINATION
3/7/24, 11:46 AM EST    DISCHARGE ON GOING EVALUATION    Del Yoon       [facility] day: 2  Location: [facility]24/024-A Reason for admit: Shortness of breath [R06.02]  Lactic acidosis [E87.20]  Lower extremity edema [R60.0]  Influenza A [J10.1]  Elevated troponin [R79.89]  Elevated brain natriuretic peptide (BNP) level [R79.89]  Pneumonia of left lower lobe due to infectious organism [J18.9]  Community acquired pneumonia of left lower lobe of lung [J18.9]   Barriers to Discharge: Lovenox, Duoneb inhaler-scheduled and prn, Tamiflu, Zosyn, prn Tylenol, Magnesium and Potassium replacement protocol, daily BMP, chest x-ray, carb choice diet, continuous pulse -ox when ambulating, droplet isolation, telemetry, up with assistance.   PCP: Faby Suh APRN - CNP  Readmission Risk Score: 13.9%  Patient Goals/Plan/Treatment Preferences: [facility] He plans to return skilled.

## 2024-03-07 NOTE — DISCHARGE INSTR - COC
Continuity of Care Form    Patient Name: Del Yoon   :  1950  MRN:  [id number]    Admit date:  3/5/2024  Discharge date:  3/16/24    Code Status Order: Limited   Advance Directives:     Admitting Physician:  Ashita Behl, MD  PCP: Faby Suh APRN - CNP    Discharging [facility] 7K-16/016-A  Discharging Unit Phone Number: [phone]    Emergency Contact:   Extended Emergency Contact Information  Primary Emergency Contact: Bailey Yoon  Address: [address] [place] [id number] [place]  Home Phone: [phone]  Mobile Phone: [phone]  Relation: Spouse    Past Surgical History:  Past Surgical History:   Procedure Laterality Date    BACK SURGERY  2022    C5-6    CHOLECYSTECTOMY  2017    CORONARY ANGIOPLASTY WITH STENT PLACEMENT      CYST INCISION AND DRAINAGE      CYSTOSCOPY N/A 2019    CYSTOSCOPY, WITH  URETHRAL DILATION performed by Enrrique Monroe MD at [facility] OR    CYSTOSCOPY N/A 2020    CYSTOSCOPY WITH BLADDER BOTOX performed by Enrrique Monroe MD at [facility] OR    ERCP  2017    PAIN MANAGEMENT PROCEDURE Bilateral 2023    bilateral lumbar 4/5, 5/Sacral 1 medial branch blocks performed by Dustin Hurtado DO at [facility] OR    PAIN MANAGEMENT PROCEDURE Bilateral 2023    medial branch blocks at bilateral Lumbar 4/5 and Lumbar 5/Sacral 1 performed by Dustin Hurtado DO at [facility] OR    PAIN MANAGEMENT PROCEDURE Bilateral 3/15/2023    lumbar radiofrequency ablation at bilateral Lumbar 4/5, 5/Sacral 1 performed by Dustin Hurtado DO at [facility] OR    PAIN MANAGEMENT PROCEDURE Bilateral 2023    bilateral lumbar 2/3, 3/4 medial branch blocks performed by Dustin Hurtado DO at [facility] OR    PAIN MANAGEMENT PROCEDURE Bilateral 2023    confirmatory medial branch blocks bilateral Lumbar 2/3, 3/4 performed by Dustin Hurtado DO at [facility]  therapy.  Ventilator:    - No ventilator support    Rehab Therapies: Physical Therapy and Occupational Therapy  Weight Bearing Status/Restrictions: No weight bearing restrictions  Other Medical Equipment (for information only, NOT a DME order):  wheelchair and walker  Other Treatments: none    Patient's personal belongings (please select all that are sent with patient):  None    RN SIGNATURE:  Electronically signed by Erlin Garrett RN on 3/14/24 at 11:51 AM EDT    CASE MANAGEMENT/SOCIAL WORK SECTION    Inpatient Status Date: 03/05/2024    Readmission Risk Assessment Score:  Readmission Risk              Risk of Unplanned Readmission:  16           Discharging to Facility/ Agency   Name:[facility]   Address:[address] [place] [address]  Phone:[phone]  Fax:[phone]    Dialysis Facility (if applicable)   Name:  Address:  Dialysis Schedule:  Phone:  Fax:    / signature: Electronically signed by MARS Owusu on 3/7/24 at 7:53 AM EST    PHYSICIAN SECTION    Prognosis: {Prognosis:1536575676}    Condition at Discharge: { Patient Condition:296456886}    Rehab Potential (if transferring to Rehab): {Prognosis:0870148155}    Recommended Labs or Other Treatments After Discharge: ***    Physician Certification: I certify the above information and transfer of Del Yoon  is necessary for the continuing treatment of the diagnosis listed and that he requires {Admit to Appropriate Level of Care:54717} for {GREATER/LESS:227955207} 30 days.     Update Admission H&P: {CHP DME Changes in HandP:494886493}    PHYSICIAN SIGNATURE:  {Esignature:500869305}

## 2024-03-07 NOTE — PROGRESS NOTES
Hospitalist Progress Note    Patient:  Del Yoon      Unit/Bed:5K-24/024-A    YOB: 1950    MRN: [id number]       Acct: [id number]     PCP: Faby Suh APRN - CNP    Date of Admission: 3/5/2024    Assessment/Plan:    Pneumonia of left lower lobe, POA  Chest x-ray with infiltrate.  No supplemental O2 requirements  Continue with Zosyn.  Blood cultures negative to date   Pneumonia and respiratory culture ordered  Influenza A  Symptoms began 3/3.  Start Tamiflu 3/6  Supportive care  QT prolongation- resolved   Avoid QT prolonging agents.  Lactic acidosis - resolved  3.1 on arrival- resolved to 0.9  SIRS criteria not met.  No indication for sepsis bolus  Dizziness- improved  Patient describes as offkilter and worse upon positional change or turning of head  Given viral illness suspect vestibular neuritis versus BPPV-trial of meclizine  Orthostats positive from lying to sitting only- repeat today   CAD with history of stents with concomitant hypertension and hyperlipidemia  Continue Imdur  History of CVA with left residual weakness      Expected discharge date:  1-2 days     Disposition:    [] Home       [] TCU       [] Rehab       [] Psych       [x] SNF       [] Long Term Care Facility       [] Other-    Chief Complaint: Shortness of breath    Hospital Course:   Initial HPI Per chart review  \"Del Yoon is a 74 yo male that presents to the ED with SOB and weakness since Sunday. He states he felt short of breath and felt significant weakness that prevented him being able to ambulate. His legs \"would not move\" He recently moved into assisted living and gets around with a walker or wheelchair for longer distances. He did not take any medication for this. He had a fever this morning and made an appointment with his PCP that sent him to the hospital. He has a cough productive cough and denies chest pain, chills, abdominal pain, sore throat, vomiting. He endorses chronic diarrhea but  ill-appearing  HEENT: Pupils equal, round, and reactive to light. Conjunctivae/corneas clear.  Neck: Supple, with full range of motion. No jugular venous distention. Trachea midline.  Respiratory:  Normal respiratory effort.  Expiratory wheezing.  No rales or rhonchi  Cardiovascular: Regular rate and rhythm with normal S1/S2 without murmurs, rubs or gallops.  Abdomen: Soft, non-tender, non-distended with normal bowel sounds.  Musculoskeletal: passive and active ROM x 4 extremities.  Skin: Skin color, texture, turgor normal.    Neurologic: Cranial nerves grossly intact  Psychiatric: Alert and oriented, thought content appropriate, normal insight  Capillary Refill: Brisk,< 3 seconds   Peripheral Pulses: +2 palpable, equal bilaterally       Labs:   Recent Labs     03/05/24 1746 03/06/24  0540   WBC 6.8 6.2   HGB 14.2 13.7*   HCT 43.3 41.6*    184       Recent Labs     03/05/24 1746 03/06/24  0540 03/07/24  0241    136 137   K 3.5 3.6 3.5    100 101   CO2 24 23 26   BUN 15 17 22   CREATININE 1.5* 1.1 1.2   CALCIUM 9.4 9.1 8.7       No results for input(s): \"AST\", \"ALT\", \"BILIDIR\", \"BILITOT\", \"ALKPHOS\" in the last 72 hours.  No results for input(s): \"INR\" in the last 72 hours.  No results for input(s): \"CKTOTAL\", \"TROPONINI\" in the last 72 hours.    Microbiology:      Urinalysis:      Lab Results   Component Value Date/Time    NITRU NEGATIVE 03/06/2024 10:26 PM    WBCUA 10-15W/CLUMPS 12/22/2022 05:44 PM    BACTERIA FEW 12/22/2022 05:44 PM    RBCUA 0-2 12/22/2022 05:44 PM    RBCUA 5-10 05/16/2020 04:45 AM    BLOODU NEGATIVE 03/06/2024 10:26 PM    SPECGRAV 1.016 03/06/2024 10:26 PM    GLUCOSEU Negative 06/29/2021 09:37 AM       Radiology:  XR CHEST PORTABLE    Result Date: 3/5/2024  1 view chest x-ray Comparison: CR/SR - XR CHEST PORTABLE - 07/23/2022 01:51 PM EDT Findings: Possible focus of airspace opacity within the left lower lung. The right lung is clear. Heart size is normal. Status post right  shoulder replacement. No acute displaced fracture.     Possible focus of atelectasis or infiltrate at the base of the left lung. This document has been electronically signed by: Dariela Iglesias MD on 03/05/2024 06:53 PM      DVT prophylaxis: [x] Lovenox                                 [] SCDs                                 [] SQ Heparin                                 [] Encourage ambulation           [] Already on Anticoagulation     Code Status: Limited    PT/OT Eval Status: Ordered    Tele:   [x] yes             [] no    Active Hospital Problems    Diagnosis Date Noted    Community acquired pneumonia of left lower lobe of lung [J18.9] 03/05/2024       Electronically signed by Klaudia Garcia PA-C on 3/7/2024 at 8:26 AM

## 2024-03-08 ENCOUNTER — APPOINTMENT (OUTPATIENT)
Age: 74
End: 2024-03-08
Attending: PHYSICIAN ASSISTANT
Payer: MEDICARE

## 2024-03-08 LAB
ANION GAP SERPL CALC-SCNC: 12 MEQ/L (ref 8–16)
BASOPHILS ABSOLUTE: 0 THOU/MM3 (ref 0–0.1)
BASOPHILS NFR BLD AUTO: 0.8 %
BUN SERPL-MCNC: 20 MG/DL (ref 7–22)
CALCIUM SERPL-MCNC: 8.7 MG/DL (ref 8.5–10.5)
CHLORIDE SERPL-SCNC: 101 MEQ/L (ref 98–111)
CO2 SERPL-SCNC: 25 MEQ/L (ref 23–33)
CREAT SERPL-MCNC: 1.1 MG/DL (ref 0.4–1.2)
DEPRECATED RDW RBC AUTO: 50.8 FL (ref 35–45)
ECHO AO ASC DIAM: 3.3 CM
ECHO AO ASCENDING AORTA INDEX: 1.35 CM/M2
ECHO AV CUSP MM: 2.5 CM
ECHO AV PEAK GRADIENT: 8 MMHG
ECHO AV PEAK VELOCITY: 1.5 M/S
ECHO AV VELOCITY RATIO: 0.6
ECHO BSA: 2.54 M2
ECHO LA AREA 2C: 20.7 CM2
ECHO LA AREA 4C: 19.5 CM2
ECHO LA DIAMETER INDEX: 1.67 CM/M2
ECHO LA DIAMETER: 4.1 CM
ECHO LA MAJOR AXIS: 6.1 CM
ECHO LA MINOR AXIS: 5.6 CM
ECHO LA VOL BP: 58 ML (ref 18–58)
ECHO LA VOL MOD A2C: 63 ML (ref 18–58)
ECHO LA VOL MOD A4C: 50 ML (ref 18–58)
ECHO LA VOL/BSA BIPLANE: 24 ML/M2 (ref 16–34)
ECHO LA VOLUME INDEX MOD A2C: 26 ML/M2 (ref 16–34)
ECHO LA VOLUME INDEX MOD A4C: 20 ML/M2 (ref 16–34)
ECHO LV E' LATERAL VELOCITY: 8 CM/S
ECHO LV E' SEPTAL VELOCITY: 9 CM/S
ECHO LV FRACTIONAL SHORTENING: 30 % (ref 28–44)
ECHO LV INTERNAL DIMENSION DIASTOLE INDEX: 2.04 CM/M2
ECHO LV INTERNAL DIMENSION DIASTOLIC: 5 CM (ref 4.2–5.9)
ECHO LV INTERNAL DIMENSION SYSTOLIC INDEX: 1.43 CM/M2
ECHO LV INTERNAL DIMENSION SYSTOLIC: 3.5 CM
ECHO LV ISOVOLUMETRIC RELAXATION TIME (IVRT): 77 MS
ECHO LV IVSD: 1.4 CM (ref 0.6–1)
ECHO LV MASS 2D: 261.8 G (ref 88–224)
ECHO LV MASS INDEX 2D: 106.9 G/M2 (ref 49–115)
ECHO LV POSTERIOR WALL DIASTOLIC: 1.2 CM (ref 0.6–1)
ECHO LV RELATIVE WALL THICKNESS RATIO: 0.48
ECHO LVOT PEAK GRADIENT: 3 MMHG
ECHO LVOT PEAK VELOCITY: 0.9 M/S
ECHO MV A VELOCITY: 0.97 M/S
ECHO MV E DECELERATION TIME (DT): 222 MS
ECHO MV E VELOCITY: 0.78 M/S
ECHO MV E/A RATIO: 0.8
ECHO MV E/E' LATERAL: 9.75
ECHO MV E/E' RATIO (AVERAGED): 9.21
ECHO PV MAX VELOCITY: 0.8 M/S
ECHO PV PEAK GRADIENT: 2 MMHG
ECHO RV INTERNAL DIMENSION: 3.7 CM
ECHO TV E WAVE: 0.7 M/S
ECHO TV REGURGITANT MAX VELOCITY: 2.09 M/S
ECHO TV REGURGITANT PEAK GRADIENT: 17 MMHG
EOSINOPHIL NFR BLD AUTO: 6.1 %
EOSINOPHILS ABSOLUTE: 0.3 THOU/MM3 (ref 0–0.4)
ERYTHROCYTE [DISTWIDTH] IN BLOOD BY AUTOMATED COUNT: 15.3 % (ref 11.5–14.5)
GFR SERPL CREATININE-BSD FRML MDRD: > 60 ML/MIN/1.73M2
GLUCOSE SERPL-MCNC: 101 MG/DL (ref 70–108)
HCT VFR BLD AUTO: 41.7 % (ref 42–52)
HGB BLD-MCNC: 13.4 GM/DL (ref 14–18)
IMM GRANULOCYTES # BLD AUTO: 0.01 THOU/MM3 (ref 0–0.07)
IMM GRANULOCYTES NFR BLD AUTO: 0.2 %
LYMPHOCYTES ABSOLUTE: 1.4 THOU/MM3 (ref 1–4.8)
LYMPHOCYTES NFR BLD AUTO: 29.9 %
MCH RBC QN AUTO: 29.1 PG (ref 26–33)
MCHC RBC AUTO-ENTMCNC: 32.1 GM/DL (ref 32.2–35.5)
MCV RBC AUTO: 90.7 FL (ref 80–94)
MONOCYTES ABSOLUTE: 0.6 THOU/MM3 (ref 0.4–1.3)
MONOCYTES NFR BLD AUTO: 13.1 %
NEUTROPHILS NFR BLD AUTO: 49.9 %
NRBC BLD AUTO-RTO: 0 /100 WBC
NT-PROBNP SERPL IA-MCNC: 72.8 PG/ML (ref 0–449)
PLATELET # BLD AUTO: 194 THOU/MM3 (ref 130–400)
PMV BLD AUTO: 11.4 FL (ref 9.4–12.4)
POTASSIUM SERPL-SCNC: 3.7 MEQ/L (ref 3.5–5.2)
RBC # BLD AUTO: 4.6 MILL/MM3 (ref 4.7–6.1)
SEGMENTED NEUTROPHILS ABSOLUTE COUNT: 2.3 THOU/MM3 (ref 1.8–7.7)
SODIUM SERPL-SCNC: 138 MEQ/L (ref 135–145)
WBC # BLD AUTO: 4.7 THOU/MM3 (ref 4.8–10.8)

## 2024-03-08 PROCEDURE — 94669 MECHANICAL CHEST WALL OSCILL: CPT

## 2024-03-08 PROCEDURE — 97530 THERAPEUTIC ACTIVITIES: CPT

## 2024-03-08 PROCEDURE — 36415 COLL VENOUS BLD VENIPUNCTURE: CPT

## 2024-03-08 PROCEDURE — 2700000000 HC OXYGEN THERAPY PER DAY

## 2024-03-08 PROCEDURE — 97110 THERAPEUTIC EXERCISES: CPT

## 2024-03-08 PROCEDURE — 6370000000 HC RX 637 (ALT 250 FOR IP): Performed by: PHYSICIAN ASSISTANT

## 2024-03-08 PROCEDURE — 83880 ASSAY OF NATRIURETIC PEPTIDE: CPT

## 2024-03-08 PROCEDURE — 97535 SELF CARE MNGMENT TRAINING: CPT

## 2024-03-08 PROCEDURE — 1200000003 HC TELEMETRY R&B

## 2024-03-08 PROCEDURE — 6360000002 HC RX W HCPCS: Performed by: PHYSICIAN ASSISTANT

## 2024-03-08 PROCEDURE — 94640 AIRWAY INHALATION TREATMENT: CPT

## 2024-03-08 PROCEDURE — 93306 TTE W/DOPPLER COMPLETE: CPT

## 2024-03-08 PROCEDURE — 94761 N-INVAS EAR/PLS OXIMETRY MLT: CPT

## 2024-03-08 PROCEDURE — 85025 COMPLETE CBC W/AUTO DIFF WBC: CPT

## 2024-03-08 PROCEDURE — 93306 TTE W/DOPPLER COMPLETE: CPT | Performed by: NUCLEAR MEDICINE

## 2024-03-08 PROCEDURE — 80048 BASIC METABOLIC PNL TOTAL CA: CPT

## 2024-03-08 PROCEDURE — 2580000003 HC RX 258: Performed by: PHYSICIAN ASSISTANT

## 2024-03-08 PROCEDURE — 99233 SBSQ HOSP IP/OBS HIGH 50: CPT | Performed by: PHYSICIAN ASSISTANT

## 2024-03-08 PROCEDURE — 1200000000 HC SEMI PRIVATE

## 2024-03-08 RX ORDER — MECLIZINE HCL 12.5 MG/1
12.5 TABLET ORAL 2 TIMES DAILY PRN
Status: DISCONTINUED | OUTPATIENT
Start: 2024-03-08 | End: 2024-03-23 | Stop reason: HOSPADM

## 2024-03-08 RX ORDER — PREDNISONE 20 MG/1
40 TABLET ORAL DAILY
Status: COMPLETED | OUTPATIENT
Start: 2024-03-08 | End: 2024-03-12

## 2024-03-08 RX ORDER — FUROSEMIDE 10 MG/ML
20 INJECTION INTRAMUSCULAR; INTRAVENOUS ONCE
Status: COMPLETED | OUTPATIENT
Start: 2024-03-08 | End: 2024-03-08

## 2024-03-08 RX ADMIN — HYDROCHLOROTHIAZIDE 25 MG: 12.5 CAPSULE ORAL at 08:46

## 2024-03-08 RX ADMIN — LOSARTAN POTASSIUM 50 MG: 50 TABLET, FILM COATED ORAL at 08:47

## 2024-03-08 RX ADMIN — PIPERACILLIN AND TAZOBACTAM 3375 MG: 3; .375 INJECTION, POWDER, LYOPHILIZED, FOR SOLUTION INTRAVENOUS at 20:17

## 2024-03-08 RX ADMIN — SODIUM CHLORIDE, PRESERVATIVE FREE 10 ML: 5 INJECTION INTRAVENOUS at 20:23

## 2024-03-08 RX ADMIN — ENOXAPARIN SODIUM 30 MG: 100 INJECTION SUBCUTANEOUS at 08:45

## 2024-03-08 RX ADMIN — IPRATROPIUM BROMIDE AND ALBUTEROL SULFATE 1 DOSE: .5; 3 SOLUTION RESPIRATORY (INHALATION) at 22:57

## 2024-03-08 RX ADMIN — PREDNISONE 40 MG: 20 TABLET ORAL at 17:17

## 2024-03-08 RX ADMIN — SODIUM CHLORIDE, PRESERVATIVE FREE 10 ML: 5 INJECTION INTRAVENOUS at 08:47

## 2024-03-08 RX ADMIN — ASPIRIN 81 MG CHEWABLE TABLET 81 MG: 81 TABLET CHEWABLE at 08:46

## 2024-03-08 RX ADMIN — OSELTAMIVIR PHOSPHATE 75 MG: 75 CAPSULE ORAL at 08:46

## 2024-03-08 RX ADMIN — CHOLESTYRAMINE 4 G: 4 POWDER, FOR SUSPENSION ORAL at 08:46

## 2024-03-08 RX ADMIN — MECLIZINE HYDROCHLORIDE 12.5 MG: 12.5 TABLET ORAL at 08:46

## 2024-03-08 RX ADMIN — PIPERACILLIN AND TAZOBACTAM 3375 MG: 3; .375 INJECTION, POWDER, LYOPHILIZED, FOR SOLUTION INTRAVENOUS at 04:17

## 2024-03-08 RX ADMIN — OSELTAMIVIR PHOSPHATE 75 MG: 75 CAPSULE ORAL at 20:23

## 2024-03-08 RX ADMIN — ENOXAPARIN SODIUM 30 MG: 100 INJECTION SUBCUTANEOUS at 20:34

## 2024-03-08 RX ADMIN — TAMSULOSIN HYDROCHLORIDE 0.4 MG: 0.4 CAPSULE ORAL at 20:23

## 2024-03-08 RX ADMIN — FUROSEMIDE 20 MG: 10 INJECTION, SOLUTION INTRAMUSCULAR; INTRAVENOUS at 17:17

## 2024-03-08 RX ADMIN — ISOSORBIDE MONONITRATE 30 MG: 30 TABLET, EXTENDED RELEASE ORAL at 08:47

## 2024-03-08 RX ADMIN — IPRATROPIUM BROMIDE AND ALBUTEROL SULFATE 1 DOSE: .5; 3 SOLUTION RESPIRATORY (INHALATION) at 08:24

## 2024-03-08 RX ADMIN — PIPERACILLIN AND TAZOBACTAM 3375 MG: 3; .375 INJECTION, POWDER, LYOPHILIZED, FOR SOLUTION INTRAVENOUS at 12:38

## 2024-03-08 RX ADMIN — IPRATROPIUM BROMIDE AND ALBUTEROL SULFATE 1 DOSE: .5; 3 SOLUTION RESPIRATORY (INHALATION) at 14:57

## 2024-03-08 NOTE — PROGRESS NOTES
Hospitalist Progress Note    Patient:  Del Yoon      Unit/Bed:5K-24/024-A    YOB: 1950    MRN: [id number]       Acct: [id number]     PCP: Faby Suh APRN - CNP    Date of Admission: 3/5/2024    Assessment/Plan:    Pneumonia of left lower lobe, POA  Chest x-ray with infiltrate.    Continue with Zosyn.  Blood cultures negative to date   Pneumonia and respiratory culture ordered  Influenza A  Symptoms began 3/3.  Start Tamiflu 3/6  Supportive care  Acute respiratory failure secondary to pneumonia  Reportedly had pulse ox of 88% overnight- placed on 4L.   Aggressive weaning.   Continue with treatment above  Repeat CXR 3/7 without signs of fluid overload or worsening   Add on prednisone 3/8  Peripheral edema  Concerns for fluid overload  BNP mildly elevated on admission- repeat ordered  Echo ordered  One time dose lasix 20mg ordered  QT prolongation- resolved   Avoid QT prolonging agents.  Lactic acidosis - resolved  3.1 on arrival- resolved to 0.9  SIRS criteria not met.  No indication for sepsis bolus  Dizziness- improved  Patient describes as offkilter and worse upon positional change or turning of head  Given viral illness suspect vestibular neuritis versus BPPV-trial of meclizine  Orthostats initially positive but are now negative  CAD with history of stents with concomitant hypertension and hyperlipidemia  Continue Imdur  History of CVA with left residual weakness      Expected discharge date:  1-2 days     Disposition:    [] Home       [] TCU       [] Rehab       [] Psych       [x] SNF       [] Long Term Care Facility       [] Other-    Chief Complaint: Shortness of breath    Hospital Course:   Initial HPI Per chart review  \"Del Yoon is a 72 yo male that presents to the ED with SOB and weakness since Sunday. He states he felt short of breath and felt significant weakness that prevented him being able to ambulate. His legs \"would not move\" He recently moved into

## 2024-03-08 NOTE — CARE COORDINATION
3/8/24, 2:29 PM EST    Anticipating weekend discharge    Patient goals/plan/ treatment preferences discussed by  and .  Patient goals/plan/ treatment preferences reviewed with patient/ family.  Patient/ family verbalize understanding of discharge plan and are in agreement with goal/plan/treatment preferences.  Understanding was demonstrated using the teach back method.  AVS provided by RN at time of discharge, which includes all necessary medical information pertaining to the patients current course of illness, treatment, post-discharge goals of care, and treatment preferences.     Services At/After Discharge: Skilled Nursing Facility (SNF) and In ambulance [facility]       IMM Letter  IMM Letter given to Patient/Family/Significant other/Guardian/POA/by:: Kaylee Chappell RN Case Mgr.  IMM Letter date given:: 03/08/24  IMM Letter time given:: 1340     SW spoke with Juani with [facility], updated on anticipating weekend discharge to them.

## 2024-03-08 NOTE — RT PROTOCOL NOTE
RT Nebulizer Bronchodilator Protocol Note    There is a bronchodilator order in the chart from a provider indicating to follow the RT Bronchodilator Protocol and there is an “Initiate RT Bronchodilator Protocol” order as well (see protocol at bottom of note).    CXR Findings:  XR CHEST PORTABLE    Result Date: 3/7/2024  1. Borderline heart size. Right shoulder prosthesis. Prior surgery left shoulder. 2. Mild pleural thickening along lateral aspect left lung base. Minimal atelectatic/fibrotic stranding retrocardiac region left lung base. **This report has been created using voice recognition software.  It may contain minor errors which are inherent in voice recognition technology.** Final report electronically signed by Dr. Moreno Sanchez on 3/7/2024 11:59 AM      The findings from the last RT Protocol Assessment were as follows:  Smoking: None or smoker <15 pack years  Respiratory Pattern: Dyspnea on exertion or RR 21-25 bpm  Breath Sounds: Inspiratory and expiratory or bilateral wheezing and/or rhonchi  Cough: Strong, productive  Indication for Bronchodilator Therapy: Decreased or absent breath sounds, Wheezing associated with pulm disorder  Bronchodilator Assessment Score: 9    Aerosolized bronchodilator medication orders have been revised according to the RT Nebulizer Bronchodilator Protocol below.    Respiratory Therapist to perform RT Therapy Protocol Assessment initially then follow the protocol.  Repeat RT Therapy Protocol Assessment PRN for score 0-3 or on second treatment, BID, and PRN for scores above 3.    No Indications - adjust the frequency to every 6 hours PRN wheezing or bronchospasm, if no treatments needed after 48 hours then discontinue using Per Protocol order mode.     If indication present, adjust the RT bronchodilator orders based on the Bronchodilator Assessment Score as indicated below.  If a patient is on this medication at home then do not decrease Frequency below that used at home.    0-3  - enter or revise RT bronchodilator order(s) to equivalent RT Bronchodilator order with Frequency of every 4 hours PRN for wheezing or increased work of breathing using Per Protocol order mode.       4-6 - enter or revise RT Bronchodilator order(s) to two equivalent RT bronchodilator orders with one order with BID Frequency and one order with Frequency of every 4 hours PRN wheezing or increased work of breathing using Per Protocol order mode.         7-10 - enter or revise RT Bronchodilator order(s) to two equivalent RT bronchodilator orders with one order with TID Frequency and one order with Frequency of every 4 hours PRN wheezing or increased work of breathing using Per Protocol order mode.       11-13 - enter or revise RT Bronchodilator order(s) to one equivalent RT bronchodilator order with QID Frequency and an Albuterol order with Frequency of every 4 hours PRN wheezing or increased work of breathing using Per Protocol order mode.      Greater than 13 - enter or revise RT Bronchodilator order(s) to one equivalent RT bronchodilator order with every 4 hours Frequency and an Albuterol order with Frequency of every 2 hours PRN wheezing or increased work of breathing using Per Protocol order mode.     RT to enter RT Home Evaluation for COPD & MDI Assessment order using Per Protocol order mode.    Electronically signed by Kendy Reyes RCP on 3/8/2024 at 8:27 AM

## 2024-03-08 NOTE — PROGRESS NOTES
[facility]  STRZ ONC MED 5K  Occupational Therapy  Daily Note  Time:   Time In: 1332  Time Out: 1411  Timed Code Treatment Minutes: 39 Minutes  Minutes: 39          Date: 3/8/2024  Patient Name: Del Yoon,   Gender: male      Room: [facility]24/024-A  MRN: [id number]  : 1950  (73 y.o.)  Referring Practitioner: Klaudia Garcia PA-  Diagnosis: SOB  Additional Pertinent Hx: Per H & P on 3/5/2024:74 yo male that presents to the ED with SOB and weakness since . He states he felt short of breath and felt significant weakness that prevented him being able to ambulate. His legs \"would not move\" He recently moved into assisted living and gets around with a walker or wheelchair for longer distances. He did not take any medication for this. He had a fever this morning and made an appointment with his PCP that sent him to the hospital. He has a cough productive cough and denies chest pain, chills, abdominal pain, sore throat, vomiting.    Restrictions/Precautions:  Restrictions/Precautions: Fall Risk, General Precautions, Isolation  Position Activity Restriction  Other position/activity restrictions: Droplet for Influenza A     Social/Functional History:  Lives With: Alone  Type of Home: Assisted living  Home Layout: One level  Home Access: Level entry  Home Equipment: Wheelchair-manual, Walker, 4 wheeled, Cane, Lift chair, Reacher   Bathroom Shower/Tub: Walk-in shower  Bathroom Toilet: Standard  Bathroom Equipment: Grab bars in shower, Grab bars around toilet, Shower chair    Receives Help From: Personal care attendant  ADL Assistance: Needs assistance (A for  BADL & LE dressing)  Ambulation Assistance: Needs assistance  Transfer Assistance: Needs assistance          Additional Comments: Per PT eval: Pt reports that he was able to use furniture for mobility around his apartment, walker for short distances, and wheelchair for longer distances. Wife reports Pt has been needing A out of chairs, \"that'  why he is in the nursing home\".    SUBJECTIVE: Pt seated upright in bed upon arrival, agreeable to OT session.    PAIN: None    Vitals: Oxygen: Pt on 1L O2 93% prior to activity. With activity pt increased to 95-96%  Heart Rate: 80s    Lines/Tubes: NA     COGNITION: Decreased Safety Awareness    ADL:   Footwear Management: Maximum Assistance.  Donning socks onto B feet.  Pt declined further ADLs .    IADL:   Not Tested    BALANCE:  Sitting Balance:  Stand By Assistance. EOB  Standing Balance: Contact Guard Assistance.   X1 minute in prep for mobility- no SOB and denies dizziness    BED MOBILITY:  Supine to Sit: Moderate Assistance, X 1 Raising trunk to seated position.   Scooting: Moderate Assistance, X 1 EOB    TRANSFERS:  Sit to Stand:  Minimal Assistance, X 1. From EOB  Stand to Sit: Contact Guard Assistance, X 1. To chair  Comment: Good hand placement.    FUNCTIONAL MOBILITY:  Assistive Device: Rolling Walker  Assist Level:  Contact Guard Assistance.   Distance: Completed functional mobility within pt room around bed at slow pace, no LOB noted. Pt requires verbal cues for safety with line management- increased time for seated rest break after trial of mobility, mod fatigue noted.    ADDITIONAL ACTIVITIES:  Patient identified a personal goal to increase UB strength and improve overall endurance so they can complete their toilet & shower transfers; skilled edu on UE strengthening. Completed BUE AROM exercises x10 reps x1 set in all tolerated joints/planes to increase strength and endurance required for ADLs. Pt demos limited ROM at RUE and limited shoulder flexion in LUE- required mod rest break between each exercise and min v/c for proper technique.    Reviewed energy conservation education for ways to prioritize activities and allowing increased time for typical activities through effective pacing. Pt able to verbally problem solve various scenarios and appropriate strategies for ADL and IADL tasks with min v/c

## 2024-03-08 NOTE — PLAN OF CARE
Problem: Skin/Tissue Integrity  Goal: Absence of new skin breakdown  Description: 1.  Monitor for areas of redness and/or skin breakdown  2.  Assess vascular access sites hourly  3.  Every 4-6 hours minimum:  Change oxygen saturation probe site  4.  Every 4-6 hours:  If on nasal continuous positive airway pressure, respiratory therapy assess nares and determine need for appliance change or resting period.  3/7/2024 2153 by Radha See RN  Outcome: Progressing     Problem: Safety - Adult  Goal: Free from fall injury  3/7/2024 2153 by Radha See RN  Outcome: Progressing   All fall precautions in place. Bed in low position, alarm activated and appropriate use of call light.     Problem: Chronic Conditions and Co-morbidities  Goal: Patient's chronic conditions and co-morbidity symptoms are monitored and maintained or improved  Outcome: Progressing     Problem: Discharge Planning  Goal: Discharge to home or other facility with appropriate resources  3/7/2024 2153 by Radha See RN  Outcome: Progressing     Problem: Pain  Goal: Verbalizes/displays adequate comfort level or baseline comfort level  3/7/2024 2153 by Radha See RN  Outcome: Progressing   Pain Assessment: None - Denies Pain  Pain Level: 0   Patient's Stated Pain Goal: 1   Is pain goal met at this time?  Yes          Problem: Respiratory - Adult  Goal: Achieves optimal ventilation and oxygenation  Outcome: Progressing     Problem: Respiratory - Adult  Goal: Clear lung sounds  3/7/2024 2153 by Radha See RN  Outcome: Progressing     Problem: Skin/Tissue Integrity - Adult  Goal: Skin integrity remains intact  Outcome: Progressing   Skin assessment completed.  Patient turned every 2 hours and as needed.  No skin breakdown this shift.      Problem: Skin/Tissue Integrity - Adult  Goal: Incisions, wounds, or drain sites healing without S/S of infection  Outcome: Progressing     Problem: Infection - Adult  Goal: Absence of infection at

## 2024-03-08 NOTE — PLAN OF CARE
Problem: Respiratory - Adult  Goal: Clear lung sounds  3/8/2024 1005 by Kendy Reyes RCP  Outcome: Progressing  Note: Patient's breath sounds were diminished with expiratory wheezing throughout all lung fields. Continue taking breathing treatments as ordered to improve aeration and acapella to remove secretions.

## 2024-03-09 LAB
ANION GAP SERPL CALC-SCNC: 13 MEQ/L (ref 8–16)
BASOPHILS ABSOLUTE: 0 THOU/MM3 (ref 0–0.1)
BASOPHILS NFR BLD AUTO: 0.4 %
BUN SERPL-MCNC: 17 MG/DL (ref 7–22)
C DIFF GDH STL QL: NEGATIVE
CALCIUM SERPL-MCNC: 9.1 MG/DL (ref 8.5–10.5)
CHLORIDE SERPL-SCNC: 101 MEQ/L (ref 98–111)
CO2 SERPL-SCNC: 23 MEQ/L (ref 23–33)
CREAT SERPL-MCNC: 1 MG/DL (ref 0.4–1.2)
DEPRECATED RDW RBC AUTO: 46.4 FL (ref 35–45)
EOSINOPHIL NFR BLD AUTO: 0.2 %
EOSINOPHILS ABSOLUTE: 0 THOU/MM3 (ref 0–0.4)
ERYTHROCYTE [DISTWIDTH] IN BLOOD BY AUTOMATED COUNT: 14.5 % (ref 11.5–14.5)
GFR SERPL CREATININE-BSD FRML MDRD: > 60 ML/MIN/1.73M2
GLUCOSE SERPL-MCNC: 161 MG/DL (ref 70–108)
HCT VFR BLD AUTO: 44.2 % (ref 42–52)
HGB BLD-MCNC: 14.3 GM/DL (ref 14–18)
IMM GRANULOCYTES # BLD AUTO: 0.02 THOU/MM3 (ref 0–0.07)
IMM GRANULOCYTES NFR BLD AUTO: 0.4 %
LYMPHOCYTES ABSOLUTE: 0.6 THOU/MM3 (ref 1–4.8)
LYMPHOCYTES NFR BLD AUTO: 12.3 %
MCH RBC QN AUTO: 28.5 PG (ref 26–33)
MCHC RBC AUTO-ENTMCNC: 32.4 GM/DL (ref 32.2–35.5)
MCV RBC AUTO: 88.2 FL (ref 80–94)
MONOCYTES ABSOLUTE: 0.3 THOU/MM3 (ref 0.4–1.3)
MONOCYTES NFR BLD AUTO: 6.2 %
NEUTROPHILS NFR BLD AUTO: 80.5 %
NRBC BLD AUTO-RTO: 0 /100 WBC
PLATELET # BLD AUTO: 211 THOU/MM3 (ref 130–400)
PMV BLD AUTO: 11 FL (ref 9.4–12.4)
POTASSIUM SERPL-SCNC: 4.3 MEQ/L (ref 3.5–5.2)
RBC # BLD AUTO: 5.01 MILL/MM3 (ref 4.7–6.1)
SEGMENTED NEUTROPHILS ABSOLUTE COUNT: 3.8 THOU/MM3 (ref 1.8–7.7)
SODIUM SERPL-SCNC: 137 MEQ/L (ref 135–145)
WBC # BLD AUTO: 4.7 THOU/MM3 (ref 4.8–10.8)

## 2024-03-09 PROCEDURE — 85025 COMPLETE CBC W/AUTO DIFF WBC: CPT

## 2024-03-09 PROCEDURE — 6370000000 HC RX 637 (ALT 250 FOR IP): Performed by: PHYSICIAN ASSISTANT

## 2024-03-09 PROCEDURE — 94669 MECHANICAL CHEST WALL OSCILL: CPT

## 2024-03-09 PROCEDURE — 99232 SBSQ HOSP IP/OBS MODERATE 35: CPT | Performed by: PHYSICIAN ASSISTANT

## 2024-03-09 PROCEDURE — 94761 N-INVAS EAR/PLS OXIMETRY MLT: CPT

## 2024-03-09 PROCEDURE — 6360000002 HC RX W HCPCS: Performed by: PHYSICIAN ASSISTANT

## 2024-03-09 PROCEDURE — 2580000003 HC RX 258: Performed by: PHYSICIAN ASSISTANT

## 2024-03-09 PROCEDURE — 80048 BASIC METABOLIC PNL TOTAL CA: CPT

## 2024-03-09 PROCEDURE — 2700000000 HC OXYGEN THERAPY PER DAY

## 2024-03-09 PROCEDURE — 1200000000 HC SEMI PRIVATE

## 2024-03-09 PROCEDURE — 36415 COLL VENOUS BLD VENIPUNCTURE: CPT

## 2024-03-09 PROCEDURE — 87449 NOS EACH ORGANISM AG IA: CPT

## 2024-03-09 PROCEDURE — 94640 AIRWAY INHALATION TREATMENT: CPT

## 2024-03-09 RX ORDER — HYDRALAZINE HYDROCHLORIDE 20 MG/ML
10 INJECTION INTRAMUSCULAR; INTRAVENOUS ONCE
Status: COMPLETED | OUTPATIENT
Start: 2024-03-09 | End: 2024-03-09

## 2024-03-09 RX ORDER — LOPERAMIDE HYDROCHLORIDE 2 MG/1
2 CAPSULE ORAL 4 TIMES DAILY PRN
Status: DISCONTINUED | OUTPATIENT
Start: 2024-03-09 | End: 2024-03-23 | Stop reason: HOSPADM

## 2024-03-09 RX ORDER — HYDRALAZINE HYDROCHLORIDE 20 MG/ML
10 INJECTION INTRAMUSCULAR; INTRAVENOUS EVERY 6 HOURS PRN
Status: DISCONTINUED | OUTPATIENT
Start: 2024-03-09 | End: 2024-03-12

## 2024-03-09 RX ADMIN — HYDROCHLOROTHIAZIDE 25 MG: 12.5 CAPSULE ORAL at 09:59

## 2024-03-09 RX ADMIN — PREDNISONE 40 MG: 20 TABLET ORAL at 10:00

## 2024-03-09 RX ADMIN — IPRATROPIUM BROMIDE AND ALBUTEROL SULFATE 1 DOSE: .5; 3 SOLUTION RESPIRATORY (INHALATION) at 08:20

## 2024-03-09 RX ADMIN — OSELTAMIVIR PHOSPHATE 75 MG: 75 CAPSULE ORAL at 20:03

## 2024-03-09 RX ADMIN — ENOXAPARIN SODIUM 30 MG: 100 INJECTION SUBCUTANEOUS at 20:03

## 2024-03-09 RX ADMIN — ASPIRIN 81 MG CHEWABLE TABLET 81 MG: 81 TABLET CHEWABLE at 09:59

## 2024-03-09 RX ADMIN — LOSARTAN POTASSIUM 50 MG: 50 TABLET, FILM COATED ORAL at 07:05

## 2024-03-09 RX ADMIN — OSELTAMIVIR PHOSPHATE 75 MG: 75 CAPSULE ORAL at 10:00

## 2024-03-09 RX ADMIN — HYDRALAZINE HYDROCHLORIDE 10 MG: 20 INJECTION, SOLUTION INTRAMUSCULAR; INTRAVENOUS at 05:44

## 2024-03-09 RX ADMIN — SODIUM CHLORIDE, PRESERVATIVE FREE 10 ML: 5 INJECTION INTRAVENOUS at 10:01

## 2024-03-09 RX ADMIN — CHOLESTYRAMINE 4 G: 4 POWDER, FOR SUSPENSION ORAL at 10:01

## 2024-03-09 RX ADMIN — ENOXAPARIN SODIUM 30 MG: 100 INJECTION SUBCUTANEOUS at 10:00

## 2024-03-09 RX ADMIN — PIPERACILLIN AND TAZOBACTAM 3375 MG: 3; .375 INJECTION, POWDER, LYOPHILIZED, FOR SOLUTION INTRAVENOUS at 11:23

## 2024-03-09 RX ADMIN — IPRATROPIUM BROMIDE AND ALBUTEROL SULFATE 1 DOSE: .5; 3 SOLUTION RESPIRATORY (INHALATION) at 11:48

## 2024-03-09 RX ADMIN — PIPERACILLIN AND TAZOBACTAM 3375 MG: 3; .375 INJECTION, POWDER, LYOPHILIZED, FOR SOLUTION INTRAVENOUS at 03:57

## 2024-03-09 RX ADMIN — ISOSORBIDE MONONITRATE 30 MG: 30 TABLET, EXTENDED RELEASE ORAL at 09:59

## 2024-03-09 RX ADMIN — TAMSULOSIN HYDROCHLORIDE 0.4 MG: 0.4 CAPSULE ORAL at 20:03

## 2024-03-09 RX ADMIN — PIPERACILLIN AND TAZOBACTAM 3375 MG: 3; .375 INJECTION, POWDER, LYOPHILIZED, FOR SOLUTION INTRAVENOUS at 20:01

## 2024-03-09 RX ADMIN — IPRATROPIUM BROMIDE AND ALBUTEROL SULFATE 1 DOSE: .5; 3 SOLUTION RESPIRATORY (INHALATION) at 20:58

## 2024-03-09 ASSESSMENT — PAIN SCALES - GENERAL
PAINLEVEL_OUTOF10: 0
PAINLEVEL_OUTOF10: 2
PAINLEVEL_OUTOF10: 3
PAINLEVEL_OUTOF10: 6

## 2024-03-09 ASSESSMENT — PAIN DESCRIPTION - LOCATION
LOCATION: ABDOMEN
LOCATION: BACK

## 2024-03-09 ASSESSMENT — PAIN DESCRIPTION - ORIENTATION
ORIENTATION: MID
ORIENTATION: MID

## 2024-03-09 ASSESSMENT — PAIN DESCRIPTION - DESCRIPTORS: DESCRIPTORS: ACHING

## 2024-03-09 NOTE — PLAN OF CARE
Problem: Skin/Tissue Integrity  Goal: Absence of new skin breakdown  Description: 1.  Monitor for areas of redness and/or skin breakdown  2.  Assess vascular access sites hourly  3.  Every 4-6 hours minimum:  Change oxygen saturation probe site  4.  Every 4-6 hours:  If on nasal continuous positive airway pressure, respiratory therapy assess nares and determine need for appliance change or resting period.  Outcome: Progressing     Problem: Safety - Adult  Goal: Free from fall injury  Outcome: Progressing   Fall assessment completed. Patient using call light appropriately to call for assistance.   Personal items within reach. Patient is also compliant with use of non-skid slippers.  Bed alarm on/ bed wheels locked.     Problem: Chronic Conditions and Co-morbidities  Goal: Patient's chronic conditions and co-morbidity symptoms are monitored and maintained or improved  Outcome: Progressing  Flowsheets (Taken 3/9/2024 0011)  Care Plan - Patient's Chronic Conditions and Co-Morbidity Symptoms are Monitored and Maintained or Improved:   Monitor and assess patient's chronic conditions and comorbid symptoms for stability, deterioration, or improvement   Collaborate with multidisciplinary team to address chronic and comorbid conditions and prevent exacerbation or deterioration   Update acute care plan with appropriate goals if chronic or comorbid symptoms are exacerbated and prevent overall improvement and discharge     Problem: Discharge Planning  Goal: Discharge to home or other facility with appropriate resources  Outcome: Progressing  Flowsheets (Taken 3/9/2024 0011)  Discharge to home or other facility with appropriate resources:   Identify barriers to discharge with patient and caregiver   Arrange for needed discharge resources and transportation as appropriate   Identify discharge learning needs (meds, wound care, etc)   Refer to discharge planning if patient needs post-hospital services based on physician order or  urinary retention  Outcome: Progressing  Flowsheets (Taken 3/9/2024 0011)  Absence of urinary retention:   Assess patient’s ability to void and empty bladder   Monitor intake/output and perform bladder scan as needed     Problem: Neurosensory - Adult  Goal: Achieves stable or improved neurological status  Outcome: Progressing  Flowsheets (Taken 3/9/2024 0011)  Achieves stable or improved neurological status:   Assess for and report changes in neurological status   Initiate measures to prevent increased intracranial pressure   Maintain blood pressure and fluid volume within ordered parameters to optimize cerebral perfusion and minimize risk of hemorrhage   Monitor temperature, glucose, and sodium. Initiate appropriate interventions as ordered     Problem: Metabolic/Fluid and Electrolytes - Adult  Goal: Electrolytes maintained within normal limits  Outcome: Progressing  Flowsheets (Taken 3/9/2024 0011)  Electrolytes maintained within normal limits:   Monitor labs and assess patient for signs and symptoms of electrolyte imbalances   Administer electrolyte replacement as ordered   Monitor response to electrolyte replacements, including repeat lab results as appropriate   Fluid restriction as ordered     Problem: Gastrointestinal - Adult  Goal: Maintains or returns to baseline bowel function  Outcome: Progressing  Flowsheets (Taken 3/9/2024 0011)  Maintains or returns to baseline bowel function:   Assess bowel function   Encourage oral fluids to ensure adequate hydration   Administer IV fluids as ordered to ensure adequate hydration   Care plan reviewed with patient and family.  Patient and family verbalize understanding of the plan of care and contribute to goal setting.

## 2024-03-09 NOTE — PLAN OF CARE
Problem: Skin/Tissue Integrity  Goal: Absence of new skin breakdown  Description: 1.  Monitor for areas of redness and/or skin breakdown  2.  Assess vascular access sites hourly  3.  Every 4-6 hours minimum:  Change oxygen saturation probe site  4.  Every 4-6 hours:  If on nasal continuous positive airway pressure, respiratory therapy assess nares and determine need for appliance change or resting period.  3/9/2024 1431 by Tawnya Hamilton RN  Outcome: Progressing  3/9/2024 0040 by Nhi Nguyen RN  Outcome: Progressing     Problem: Safety - Adult  Goal: Free from fall injury  3/9/2024 1431 by Tawnya Hamilton RN  Outcome: Progressing  Flowsheets (Taken 3/9/2024 1430)  Free From Fall Injury: Instruct family/caregiver on patient safety  3/9/2024 0040 by Nih Nguyen RN  Outcome: Progressing     Problem: Chronic Conditions and Co-morbidities  Goal: Patient's chronic conditions and co-morbidity symptoms are monitored and maintained or improved  3/9/2024 1431 by Tawnya Hamilton RN  Outcome: Progressing  3/9/2024 0040 by Nhi Nguyen RN  Outcome: Progressing  Flowsheets (Taken 3/9/2024 0011)  Care Plan - Patient's Chronic Conditions and Co-Morbidity Symptoms are Monitored and Maintained or Improved:   Monitor and assess patient's chronic conditions and comorbid symptoms for stability, deterioration, or improvement   Collaborate with multidisciplinary team to address chronic and comorbid conditions and prevent exacerbation or deterioration   Update acute care plan with appropriate goals if chronic or comorbid symptoms are exacerbated and prevent overall improvement and discharge     Problem: Discharge Planning  Goal: Discharge to home or other facility with appropriate resources  3/9/2024 1431 by Tawnya Hamilton RN  Outcome: Progressing  3/9/2024 0040 by Nhi Nguyen RN  Outcome: Progressing  Flowsheets (Taken 3/9/2024 0011)  Discharge to home or other facility with appropriate resources:   Identify  results   Monitor all insertion sites i.e., indwelling lines, tubes and drains   [place] appropriate cooling/warming therapies per order   Administer medications as ordered   Instruct and encourage patient and family to use good hand hygiene technique   Identify and instruct in appropriate isolation precautions for identified infection/condition     Problem: Cardiovascular - Adult  Goal: Maintains optimal cardiac output and hemodynamic stability  3/9/2024 1431 by Tawnya Hamilton RN  Outcome: Progressing  3/9/2024 0040 by Nhi Nguyen RN  Outcome: Progressing  Flowsheets (Taken 3/9/2024 0011)  Maintains optimal cardiac output and hemodynamic stability:   Monitor blood pressure and heart rate   Monitor urine output and notify Licensed Independent Practitioner for values outside of normal range   Assess for signs of decreased cardiac output     Problem: Musculoskeletal - Adult  Goal: Return mobility to safest level of function  3/9/2024 1431 by Tawnya Hamilton RN  Outcome: Progressing  3/9/2024 0040 by Nhi Nguyen RN  Outcome: Progressing  Flowsheets (Taken 3/9/2024 0011)  Return Mobility to Safest Level of Function:   Assess patient stability and activity tolerance for standing, transferring and ambulating with or without assistive devices   Assist with transfers and ambulation using safe patient handling equipment as needed   Ensure adequate protection for wounds/incisions during mobilization   Instruct patient/family in ordered activity level     Problem: Genitourinary - Adult  Goal: Absence of urinary retention  3/9/2024 1431 by Tawnya Hamilton RN  Outcome: Progressing  3/9/2024 0040 by Nhi Nguyen RN  Outcome: Progressing  Flowsheets (Taken 3/9/2024 0011)  Absence of urinary retention:   Assess patient’s ability to void and empty bladder   Monitor intake/output and perform bladder scan as needed     Problem: Neurosensory - Adult  Goal: Achieves stable or improved neurological status  3/9/2024

## 2024-03-09 NOTE — PROGRESS NOTES
Hospitalist Progress Note    Patient:  Del Yoon      Unit/Bed:5K-24/024-A    YOB: 1950    MRN: [id number]       Acct: [id number]     PCP: Faby Suh APRN - CNP    Date of Admission: 3/5/2024    Assessment/Plan:    Pneumonia of left lower lobe, POA  Chest x-ray with infiltrate.    Continue with Zosyn- treat for 5 total days.  Blood cultures negative to date   Pneumonia and respiratory culture ordered  Add on prednisone 3/8- improved 3/9  Influenza A  Symptoms began 3/3.  Start Tamiflu 3/6  Supportive care  Acute respiratory failure secondary to pneumonia- improving  Was placed on 4L without documented hypoxia-  Aggressive weaning. - currently on 1L   Continue with treatment above  Peripheral edema- improved   Concerns for fluid overload  BNP mildly elevated on admission- repeat ordered and was normal   Echo unremarkable  One time dose lasix 20mg 3/8- increased urine output . No further doses at this time  QT prolongation- resolved   Avoid QT prolonging agents.  Lactic acidosis - resolved  3.1 on arrival- resolved to 0.9  SIRS criteria not met.  No indication for sepsis bolus  Dizziness- improved  Patient describes as offkilter and worse upon positional change or turning of head  Given viral illness suspect vestibular neuritis versus BPPV-trial of meclizine PRN   Orthostats initially positive but are now negative  CAD with history of stents with concomitant hypertension and hyperlipidemia  Continue Imdur  History of CVA with left residual weakness      Expected discharge date:  1-2 days     Disposition:    [] Home       [] TCU       [] Rehab       [] Psych       [x] SNF       [] Long Term Care Facility       [] Other-    Chief Complaint: Shortness of breath    Hospital Course:   Initial HPI Per chart review  \"Del Yoon is a 74 yo male that presents to the ED with SOB and weakness since Sunday. He states he felt short of breath and felt significant weakness that prevented

## 2024-03-10 LAB
BACTERIA BLD AEROBE CULT: NORMAL
BACTERIA BLD AEROBE CULT: NORMAL
BASOPHILS ABSOLUTE: 0 THOU/MM3 (ref 0–0.1)
BASOPHILS NFR BLD AUTO: 0.4 %
D DIMER PPP IA.FEU-MCNC: 665 NG/ML FEU (ref 0–500)
DEPRECATED RDW RBC AUTO: 46.5 FL (ref 35–45)
EKG ATRIAL RATE: 76 BPM
EKG P AXIS: 38 DEGREES
EKG P-R INTERVAL: 202 MS
EKG Q-T INTERVAL: 408 MS
EKG QRS DURATION: 118 MS
EKG QTC CALCULATION (BAZETT): 459 MS
EKG R AXIS: 56 DEGREES
EKG T AXIS: -29 DEGREES
EKG VENTRICULAR RATE: 76 BPM
EOSINOPHIL NFR BLD AUTO: 0.8 %
EOSINOPHILS ABSOLUTE: 0.1 THOU/MM3 (ref 0–0.4)
ERYTHROCYTE [DISTWIDTH] IN BLOOD BY AUTOMATED COUNT: 14.5 % (ref 11.5–14.5)
HCT VFR BLD AUTO: 47.2 % (ref 42–52)
HGB BLD-MCNC: 15.6 GM/DL (ref 14–18)
IMM GRANULOCYTES # BLD AUTO: 0.06 THOU/MM3 (ref 0–0.07)
IMM GRANULOCYTES NFR BLD AUTO: 0.6 %
LYMPHOCYTES ABSOLUTE: 1.4 THOU/MM3 (ref 1–4.8)
LYMPHOCYTES NFR BLD AUTO: 14.4 %
MCH RBC QN AUTO: 29.1 PG (ref 26–33)
MCHC RBC AUTO-ENTMCNC: 33.1 GM/DL (ref 32.2–35.5)
MCV RBC AUTO: 88.1 FL (ref 80–94)
MONOCYTES ABSOLUTE: 0.8 THOU/MM3 (ref 0.4–1.3)
MONOCYTES NFR BLD AUTO: 7.9 %
NEUTROPHILS NFR BLD AUTO: 75.9 %
NRBC BLD AUTO-RTO: 0 /100 WBC
PLATELET # BLD AUTO: 244 THOU/MM3 (ref 130–400)
PMV BLD AUTO: 10.6 FL (ref 9.4–12.4)
RBC # BLD AUTO: 5.36 MILL/MM3 (ref 4.7–6.1)
SEGMENTED NEUTROPHILS ABSOLUTE COUNT: 7.5 THOU/MM3 (ref 1.8–7.7)
TROPONIN, HIGH SENSITIVITY: 16 NG/L (ref 0–12)
WBC # BLD AUTO: 9.9 THOU/MM3 (ref 4.8–10.8)

## 2024-03-10 PROCEDURE — 93010 ELECTROCARDIOGRAM REPORT: CPT | Performed by: NUCLEAR MEDICINE

## 2024-03-10 PROCEDURE — 6370000000 HC RX 637 (ALT 250 FOR IP): Performed by: PHYSICIAN ASSISTANT

## 2024-03-10 PROCEDURE — 6360000002 HC RX W HCPCS

## 2024-03-10 PROCEDURE — 94640 AIRWAY INHALATION TREATMENT: CPT

## 2024-03-10 PROCEDURE — 94669 MECHANICAL CHEST WALL OSCILL: CPT

## 2024-03-10 PROCEDURE — 93005 ELECTROCARDIOGRAM TRACING: CPT

## 2024-03-10 PROCEDURE — 6360000002 HC RX W HCPCS: Performed by: PHYSICIAN ASSISTANT

## 2024-03-10 PROCEDURE — 2580000003 HC RX 258: Performed by: PHYSICIAN ASSISTANT

## 2024-03-10 PROCEDURE — 2060000000 HC ICU INTERMEDIATE R&B

## 2024-03-10 PROCEDURE — 6370000000 HC RX 637 (ALT 250 FOR IP)

## 2024-03-10 PROCEDURE — 85379 FIBRIN DEGRADATION QUANT: CPT

## 2024-03-10 PROCEDURE — 2500000003 HC RX 250 WO HCPCS

## 2024-03-10 PROCEDURE — 84484 ASSAY OF TROPONIN QUANT: CPT

## 2024-03-10 PROCEDURE — 36415 COLL VENOUS BLD VENIPUNCTURE: CPT

## 2024-03-10 PROCEDURE — 94761 N-INVAS EAR/PLS OXIMETRY MLT: CPT

## 2024-03-10 PROCEDURE — 2700000000 HC OXYGEN THERAPY PER DAY

## 2024-03-10 PROCEDURE — 85025 COMPLETE CBC W/AUTO DIFF WBC: CPT

## 2024-03-10 PROCEDURE — 99232 SBSQ HOSP IP/OBS MODERATE 35: CPT | Performed by: PHYSICIAN ASSISTANT

## 2024-03-10 RX ORDER — HYDRALAZINE HYDROCHLORIDE 20 MG/ML
5 INJECTION INTRAMUSCULAR; INTRAVENOUS ONCE
Status: COMPLETED | OUTPATIENT
Start: 2024-03-10 | End: 2024-03-10

## 2024-03-10 RX ORDER — NITROGLYCERIN 0.4 MG/1
0.4 TABLET SUBLINGUAL EVERY 5 MIN PRN
Status: DISCONTINUED | OUTPATIENT
Start: 2024-03-10 | End: 2024-03-23 | Stop reason: HOSPADM

## 2024-03-10 RX ORDER — PROMETHAZINE HYDROCHLORIDE 25 MG/ML
12.5 INJECTION, SOLUTION INTRAMUSCULAR; INTRAVENOUS ONCE
Status: COMPLETED | OUTPATIENT
Start: 2024-03-10 | End: 2024-03-10

## 2024-03-10 RX ORDER — METOPROLOL TARTRATE 1 MG/ML
5 INJECTION, SOLUTION INTRAVENOUS ONCE
Status: COMPLETED | OUTPATIENT
Start: 2024-03-10 | End: 2024-03-10

## 2024-03-10 RX ORDER — ONDANSETRON 4 MG/1
4 TABLET, ORALLY DISINTEGRATING ORAL EVERY 8 HOURS PRN
Status: DISCONTINUED | OUTPATIENT
Start: 2024-03-10 | End: 2024-03-23 | Stop reason: HOSPADM

## 2024-03-10 RX ORDER — LABETALOL HYDROCHLORIDE 5 MG/ML
10 INJECTION, SOLUTION INTRAVENOUS ONCE
Status: COMPLETED | OUTPATIENT
Start: 2024-03-10 | End: 2024-03-10

## 2024-03-10 RX ORDER — PANTOPRAZOLE SODIUM 40 MG/1
40 TABLET, DELAYED RELEASE ORAL
Status: DISCONTINUED | OUTPATIENT
Start: 2024-03-10 | End: 2024-03-19

## 2024-03-10 RX ORDER — CALCIUM CARBONATE 500 MG/1
500 TABLET, CHEWABLE ORAL 3 TIMES DAILY PRN
Status: DISCONTINUED | OUTPATIENT
Start: 2024-03-10 | End: 2024-03-23 | Stop reason: HOSPADM

## 2024-03-10 RX ADMIN — IPRATROPIUM BROMIDE AND ALBUTEROL SULFATE 1 DOSE: .5; 3 SOLUTION RESPIRATORY (INHALATION) at 07:35

## 2024-03-10 RX ADMIN — PROMETHAZINE HYDROCHLORIDE 12.5 MG: 25 INJECTION INTRAMUSCULAR; INTRAVENOUS at 23:08

## 2024-03-10 RX ADMIN — PIPERACILLIN AND TAZOBACTAM 3375 MG: 3; .375 INJECTION, POWDER, LYOPHILIZED, FOR SOLUTION INTRAVENOUS at 20:04

## 2024-03-10 RX ADMIN — ISOSORBIDE MONONITRATE 30 MG: 30 TABLET, EXTENDED RELEASE ORAL at 10:00

## 2024-03-10 RX ADMIN — HYDRALAZINE HYDROCHLORIDE 10 MG: 20 INJECTION, SOLUTION INTRAMUSCULAR; INTRAVENOUS at 03:17

## 2024-03-10 RX ADMIN — HYDROCHLOROTHIAZIDE 25 MG: 12.5 CAPSULE ORAL at 09:59

## 2024-03-10 RX ADMIN — OSELTAMIVIR PHOSPHATE 75 MG: 75 CAPSULE ORAL at 09:59

## 2024-03-10 RX ADMIN — LABETALOL HYDROCHLORIDE 10 MG: 5 INJECTION, SOLUTION INTRAVENOUS at 23:40

## 2024-03-10 RX ADMIN — NITROGLYCERIN 0.4 MG: 0.4 TABLET, ORALLY DISINTEGRATING SUBLINGUAL at 06:56

## 2024-03-10 RX ADMIN — IPRATROPIUM BROMIDE AND ALBUTEROL SULFATE 1 DOSE: .5; 3 SOLUTION RESPIRATORY (INHALATION) at 13:04

## 2024-03-10 RX ADMIN — ASPIRIN 81 MG CHEWABLE TABLET 81 MG: 81 TABLET CHEWABLE at 09:58

## 2024-03-10 RX ADMIN — LOSARTAN POTASSIUM 50 MG: 50 TABLET, FILM COATED ORAL at 10:00

## 2024-03-10 RX ADMIN — HYDRALAZINE HYDROCHLORIDE 10 MG: 20 INJECTION, SOLUTION INTRAMUSCULAR; INTRAVENOUS at 21:41

## 2024-03-10 RX ADMIN — PANTOPRAZOLE SODIUM 40 MG: 40 TABLET, DELAYED RELEASE ORAL at 05:05

## 2024-03-10 RX ADMIN — CHOLESTYRAMINE 4 G: 4 POWDER, FOR SUSPENSION ORAL at 09:59

## 2024-03-10 RX ADMIN — PIPERACILLIN AND TAZOBACTAM 3375 MG: 3; .375 INJECTION, POWDER, LYOPHILIZED, FOR SOLUTION INTRAVENOUS at 12:33

## 2024-03-10 RX ADMIN — ENOXAPARIN SODIUM 30 MG: 100 INJECTION SUBCUTANEOUS at 09:57

## 2024-03-10 RX ADMIN — PREDNISONE 40 MG: 20 TABLET ORAL at 09:58

## 2024-03-10 RX ADMIN — ONDANSETRON 4 MG: 4 TABLET, ORALLY DISINTEGRATING ORAL at 08:16

## 2024-03-10 RX ADMIN — METOPROLOL TARTRATE 5 MG: 5 INJECTION INTRAVENOUS at 04:08

## 2024-03-10 RX ADMIN — ONDANSETRON 4 MG: 4 TABLET, ORALLY DISINTEGRATING ORAL at 16:04

## 2024-03-10 RX ADMIN — PIPERACILLIN AND TAZOBACTAM 3375 MG: 3; .375 INJECTION, POWDER, LYOPHILIZED, FOR SOLUTION INTRAVENOUS at 05:05

## 2024-03-10 RX ADMIN — HYDRALAZINE HYDROCHLORIDE 5 MG: 20 INJECTION, SOLUTION INTRAMUSCULAR; INTRAVENOUS at 23:38

## 2024-03-10 RX ADMIN — SODIUM CHLORIDE, PRESERVATIVE FREE 10 ML: 5 INJECTION INTRAVENOUS at 09:58

## 2024-03-10 RX ADMIN — ANTACID TABLETS 500 MG: 500 TABLET, CHEWABLE ORAL at 00:15

## 2024-03-10 ASSESSMENT — PAIN DESCRIPTION - ORIENTATION: ORIENTATION: MID

## 2024-03-10 ASSESSMENT — PAIN DESCRIPTION - LOCATION: LOCATION: ABDOMEN

## 2024-03-10 ASSESSMENT — PAIN DESCRIPTION - DESCRIPTORS: DESCRIPTORS: ACHING

## 2024-03-10 ASSESSMENT — PAIN SCALES - GENERAL: PAINLEVEL_OUTOF10: 2

## 2024-03-10 NOTE — PLAN OF CARE
Problem: Skin/Tissue Integrity  Goal: Absence of new skin breakdown  Description: 1.  Monitor for areas of redness and/or skin breakdown  2.  Assess vascular access sites hourly  3.  Every 4-6 hours minimum:  Change oxygen saturation probe site  4.  Every 4-6 hours:  If on nasal continuous positive airway pressure, respiratory therapy assess nares and determine need for appliance change or resting period.  3/10/2024 1351 by Chasity Bruner RN  Outcome: Progressing  No skin breakdown this shift. Patient being assisted with turning. Patients states understanding of repositioning every two hours.     Problem: Safety - Adult  Goal: Free from fall injury  3/10/2024 1351 by Chasity Bruner RN  Outcome: Progressing  Fall assessment completed. Patient using call light appropriately to call for assistance with ambulation to bathroom.  Personal items within reach. Patient is also compliant with use of non-skid slippers.      Problem: Chronic Conditions and Co-morbidities  Goal: Patient's chronic conditions and co-morbidity symptoms are monitored and maintained or improved  3/10/2024 1351 by Chasity Bruner RN  Outcome: Progressing     Patient's chronic conditions and co-morbidity symptoms are monitored and maintained .    Problem: Discharge Planning  Goal: Discharge to home or other facility with appropriate resources  3/10/2024 1351 by Chasity Bruner RN  Outcome: Progressing  Discharge plan is in process. Plan discharge to F.     Problem: Pain  Goal: Verbalizes/displays adequate comfort level or baseline comfort level  3/10/2024 1351 by Chasity Bruner RN  Outcome: Progressing  Patient states pain relief from PRN pain medications. Pain reassessed one hour post PRN pain medication given.  Patient rates pain 0 on ROSIO 0-10 scale.     Problem: Respiratory - Adult  Goal: Achieves optimal ventilation and oxygenation  3/10/2024 1351 by Chasity Bruner RN  Outcome: Progressing  Patients

## 2024-03-10 NOTE — PROGRESS NOTES
Hospitalist Progress Note    Patient:  Del Yoon      Unit/Bed:5K-24/024-A    YOB: 1950    MRN: [id number]       Acct: [id number]     PCP: Faby Suh APRN - CNP    Date of Admission: 3/5/2024    Assessment/Plan:    Pneumonia of left lower lobe, POA  Chest x-ray with infiltrate.    Continue with Zosyn- treat for 5 total days.  Blood cultures negative to date   Pneumonia and respiratory culture ordered but not completed-   Add on prednisone 3/8- improved 3/9  Influenza A  Symptoms began 3/3.  Complete 5 day course of tamiflu   Supportive care  Acute respiratory failure secondary to pneumonia - worsened  Patient had been weaned to 1L but had increased oxygen requirements overnight without documented hypoxia < 90%   D Dimer ordered. Consider CT vs CTA  vs CXR pending results   Aggressively wean oxygen   Accelerated HTN  Home meds of losartan and HCTZ  Overnight BP up to 215/98- improved with  hydralazine and one time dose metoprolol  Continue with hydralazine PRN- consider adding third agent.   Peripheral edema- resolved  Concerns for fluid overload  BNP mildly elevated on admission- repeat ordered and was normal   Echo unremarkable  One time dose lasix 20mg 3/8- increased urine output . No further doses at this time  QT prolongation- resolved   Avoid QT prolonging agents.  Lactic acidosis - resolved  3.1 on arrival- resolved to 0.9  SIRS criteria not met.  No indication for sepsis bolus  Dizziness- improved  Patient describes as offkilter and worse upon positional change or turning of head  Given viral illness suspect vestibular neuritis versus BPPV-trial of meclizine PRN   Orthostats initially positive but are now negative  CAD with history of stents with concomitant hypertension and hyperlipidemia  Continue Imdur  History of CVA with left residual weakness      Expected discharge date:  unknown     Disposition:    [] Home       [] TCU       [] Rehab       [] Psych       [x]  thought content appropriate, normal insight  Capillary Refill: Brisk,< 3 seconds   Peripheral Pulses: +2 palpable, equal bilaterally       Labs:   Recent Labs     03/08/24  0523 03/09/24  0510 03/10/24  0644   WBC 4.7* 4.7* 9.9   HGB 13.4* 14.3 15.6   HCT 41.7* 44.2 47.2    211 244       Recent Labs     03/08/24  0523 03/09/24  0510    137   K 3.7 4.3    101   CO2 25 23   BUN 20 17   CREATININE 1.1 1.0   CALCIUM 8.7 9.1       No results for input(s): \"AST\", \"ALT\", \"BILIDIR\", \"BILITOT\", \"ALKPHOS\" in the last 72 hours.  No results for input(s): \"INR\" in the last 72 hours.  No results for input(s): \"CKTOTAL\", \"TROPONINI\" in the last 72 hours.    Microbiology:      Urinalysis:      Lab Results   Component Value Date/Time    NITRU NEGATIVE 03/06/2024 10:26 PM    WBCUA 10-15W/CLUMPS 12/22/2022 05:44 PM    BACTERIA FEW 12/22/2022 05:44 PM    RBCUA 0-2 12/22/2022 05:44 PM    RBCUA 5-10 05/16/2020 04:45 AM    BLOODU NEGATIVE 03/06/2024 10:26 PM    SPECGRAV 1.016 03/06/2024 10:26 PM    GLUCOSEU Negative 06/29/2021 09:37 AM       Radiology:  XR CHEST PORTABLE    Result Date: 3/5/2024  1 view chest x-ray Comparison: CR/SR - XR CHEST PORTABLE - 07/23/2022 01:51 PM EDT Findings: Possible focus of airspace opacity within the left lower lung. The right lung is clear. Heart size is normal. Status post right shoulder replacement. No acute displaced fracture.     Possible focus of atelectasis or infiltrate at the base of the left lung. This document has been electronically signed by: Dariela Iglesias MD on 03/05/2024 06:53 PM      DVT prophylaxis: [x] Lovenox                                 [] SCDs                                 [] SQ Heparin                                 [] Encourage ambulation           [] Already on Anticoagulation     Code Status: Limited    PT/OT Eval Status: Ordered    Tele:   [x] yes             [] no    Active Hospital Problems    Diagnosis Date Noted    Community acquired pneumonia of

## 2024-03-10 NOTE — PLAN OF CARE
Problem: Respiratory - Adult  Goal: Achieves optimal ventilation and oxygenation  Outcome: Progressing     Problem: Respiratory - Adult  Goal: Clear lung sounds  Outcome: Progressing   Continue aerosols to help improve breath sounds.    Patient mutually agreed on goals.

## 2024-03-10 NOTE — RT PROTOCOL NOTE
RT Inhaler-Nebulizer Bronchodilator Protocol Note    There is a bronchodilator order in the chart from a provider indicating to follow the RT Bronchodilator Protocol and there is an “Initiate RT Inhaler-Nebulizer Bronchodilator Protocol” order as well (see protocol at bottom of note).    CXR Findings:  No results found.    The findings from the last RT Protocol Assessment were as follows:   History Pulmonary Disease: None or smoker <15 pack years  Respiratory Pattern: Dyspnea on exertion or RR 21-25 bpm  Breath Sounds: Intermittent or unilateral wheezes  Cough: Strong, productive  Indication for Bronchodilator Therapy: Decreased or absent breath sounds  Bronchodilator Assessment Score: 7    Aerosolized bronchodilator medication orders have been revised according to the RT Inhaler-Nebulizer Bronchodilator Protocol below.    Respiratory Therapist to perform RT Therapy Protocol Assessment initially then follow the protocol.  Repeat RT Therapy Protocol Assessment PRN for score 0-3 or on second treatment, BID, and PRN for scores above 3.    No Indications - adjust the frequency to every 6 hours PRN wheezing or bronchospasm, if no treatments needed after 48 hours then discontinue using Per Protocol order mode.     If indication present, adjust the RT bronchodilator orders based on the Bronchodilator Assessment Score as indicated below.  Use Inhaler orders unless patient has one or more of the following: on home nebulizer, not able to hold breath for 10 seconds, is not alert and oriented, cannot activate and use MDI correctly, or respiratory rate 25 breaths per minute or more, then use the equivalent nebulizer order(s) with same Frequency and PRN reasons based on the score.  If a patient is on this medication at home then do not decrease Frequency below that used at home.    0-3 - enter or revise RT bronchodilator order(s) to equivalent RT Bronchodilator order with Frequency of every 4 hours PRN for wheezing or increased

## 2024-03-11 ENCOUNTER — APPOINTMENT (OUTPATIENT)
Dept: GENERAL RADIOLOGY | Age: 74
End: 2024-03-11
Payer: MEDICARE

## 2024-03-11 ENCOUNTER — APPOINTMENT (OUTPATIENT)
Dept: CT IMAGING | Age: 74
End: 2024-03-11
Payer: MEDICARE

## 2024-03-11 LAB
ALBUMIN SERPL BCG-MCNC: 4.3 G/DL (ref 3.5–5.1)
ALP SERPL-CCNC: 83 U/L (ref 38–126)
ALT SERPL W/O P-5'-P-CCNC: 124 U/L (ref 11–66)
ANION GAP SERPL CALC-SCNC: 17 MEQ/L (ref 8–16)
AST SERPL-CCNC: 88 U/L (ref 5–40)
BASE EXCESS BLDA CALC-SCNC: 5.3 MMOL/L (ref -2.5–2.5)
BDY SITE: ABNORMAL
BILIRUB CONJ SERPL-MCNC: 0.3 MG/DL (ref 0–0.3)
BILIRUB SERPL-MCNC: 0.6 MG/DL (ref 0.3–1.2)
BUN SERPL-MCNC: 37 MG/DL (ref 7–22)
CALCIUM SERPL-MCNC: 9.5 MG/DL (ref 8.5–10.5)
CHLORIDE SERPL-SCNC: 99 MEQ/L (ref 98–111)
CO2 SERPL-SCNC: 28 MEQ/L (ref 23–33)
COLLECTED BY:: ABNORMAL
CREAT SERPL-MCNC: 1.4 MG/DL (ref 0.4–1.2)
DEPRECATED RDW RBC AUTO: 47.6 FL (ref 35–45)
DEVICE: ABNORMAL
EKG ATRIAL RATE: 84 BPM
EKG P AXIS: 39 DEGREES
EKG P-R INTERVAL: 182 MS
EKG Q-T INTERVAL: 412 MS
EKG QRS DURATION: 118 MS
EKG QTC CALCULATION (BAZETT): 486 MS
EKG R AXIS: 72 DEGREES
EKG T AXIS: 40 DEGREES
EKG VENTRICULAR RATE: 84 BPM
ERYTHROCYTE [DISTWIDTH] IN BLOOD BY AUTOMATED COUNT: 14.8 % (ref 11.5–14.5)
FIO2 ON VENT O2 ANALYZER: 60 %
GFR SERPL CREATININE-BSD FRML MDRD: 52 ML/MIN/1.73M2
GLUCOSE SERPL-MCNC: 183 MG/DL (ref 70–108)
HCO3 BLDA-SCNC: 29 MMOL/L (ref 23–28)
HCT VFR BLD AUTO: 47.5 % (ref 42–52)
HGB BLD-MCNC: 15.7 GM/DL (ref 14–18)
L PNEUMO1 AG UR QL IA.RAPID: NEGATIVE
LIPASE SERPL-CCNC: 41.2 U/L (ref 5.6–51.3)
MAGNESIUM SERPL-MCNC: 2.8 MG/DL (ref 1.6–2.4)
MCH RBC QN AUTO: 29.1 PG (ref 26–33)
MCHC RBC AUTO-ENTMCNC: 33.1 GM/DL (ref 32.2–35.5)
MCV RBC AUTO: 88.1 FL (ref 80–94)
MRSA DNA SPEC QL NAA+PROBE: NEGATIVE
PCO2 BLDA: 40 MMHG (ref 35–45)
PH BLDA: 7.47 [PH] (ref 7.35–7.45)
PLATELET # BLD AUTO: 331 THOU/MM3 (ref 130–400)
PMV BLD AUTO: 10.7 FL (ref 9.4–12.4)
PO2 BLDA: 75 MMHG (ref 71–104)
POTASSIUM SERPL-SCNC: 3.4 MEQ/L (ref 3.5–5.2)
PROCALCITONIN SERPL IA-MCNC: 0.14 NG/ML (ref 0.01–0.09)
PROT SERPL-MCNC: 8 G/DL (ref 6.1–8)
RBC # BLD AUTO: 5.39 MILL/MM3 (ref 4.7–6.1)
SAO2 % BLDA: 96 %
SODIUM SERPL-SCNC: 144 MEQ/L (ref 135–145)
STREP PNEUMO AG, UR: NEGATIVE
WBC # BLD AUTO: 14.2 THOU/MM3 (ref 4.8–10.8)

## 2024-03-11 PROCEDURE — 87581 M.PNEUMON DNA AMP PROBE: CPT

## 2024-03-11 PROCEDURE — 93005 ELECTROCARDIOGRAM TRACING: CPT | Performed by: PHYSICIAN ASSISTANT

## 2024-03-11 PROCEDURE — 71275 CT ANGIOGRAPHY CHEST: CPT

## 2024-03-11 PROCEDURE — 94761 N-INVAS EAR/PLS OXIMETRY MLT: CPT

## 2024-03-11 PROCEDURE — 94640 AIRWAY INHALATION TREATMENT: CPT

## 2024-03-11 PROCEDURE — 2580000003 HC RX 258: Performed by: PHYSICIAN ASSISTANT

## 2024-03-11 PROCEDURE — 83690 ASSAY OF LIPASE: CPT

## 2024-03-11 PROCEDURE — 36415 COLL VENOUS BLD VENIPUNCTURE: CPT

## 2024-03-11 PROCEDURE — 6360000002 HC RX W HCPCS: Performed by: PHYSICIAN ASSISTANT

## 2024-03-11 PROCEDURE — 84145 PROCALCITONIN (PCT): CPT

## 2024-03-11 PROCEDURE — 6360000002 HC RX W HCPCS

## 2024-03-11 PROCEDURE — 37799 UNLISTED PX VASCULAR SURGERY: CPT

## 2024-03-11 PROCEDURE — 6370000000 HC RX 637 (ALT 250 FOR IP)

## 2024-03-11 PROCEDURE — 74018 RADEX ABDOMEN 1 VIEW: CPT

## 2024-03-11 PROCEDURE — 93010 ELECTROCARDIOGRAM REPORT: CPT | Performed by: INTERNAL MEDICINE

## 2024-03-11 PROCEDURE — 87449 NOS EACH ORGANISM AG IA: CPT

## 2024-03-11 PROCEDURE — 87798 DETECT AGENT NOS DNA AMP: CPT

## 2024-03-11 PROCEDURE — 87541 LEGION PNEUMO DNA AMP PROB: CPT

## 2024-03-11 PROCEDURE — 80053 COMPREHEN METABOLIC PANEL: CPT

## 2024-03-11 PROCEDURE — 6370000000 HC RX 637 (ALT 250 FOR IP): Performed by: PHYSICIAN ASSISTANT

## 2024-03-11 PROCEDURE — 87899 AGENT NOS ASSAY W/OPTIC: CPT

## 2024-03-11 PROCEDURE — 87641 MR-STAPH DNA AMP PROBE: CPT

## 2024-03-11 PROCEDURE — 85027 COMPLETE CBC AUTOMATED: CPT

## 2024-03-11 PROCEDURE — 82248 BILIRUBIN DIRECT: CPT

## 2024-03-11 PROCEDURE — 87205 SMEAR GRAM STAIN: CPT

## 2024-03-11 PROCEDURE — 87150 DNA/RNA AMPLIFIED PROBE: CPT

## 2024-03-11 PROCEDURE — 2700000000 HC OXYGEN THERAPY PER DAY

## 2024-03-11 PROCEDURE — 99233 SBSQ HOSP IP/OBS HIGH 50: CPT | Performed by: PHYSICIAN ASSISTANT

## 2024-03-11 PROCEDURE — 87070 CULTURE OTHR SPECIMN AEROBIC: CPT

## 2024-03-11 PROCEDURE — 2060000000 HC ICU INTERMEDIATE R&B

## 2024-03-11 PROCEDURE — 51798 US URINE CAPACITY MEASURE: CPT

## 2024-03-11 PROCEDURE — 87106 FUNGI IDENTIFICATION YEAST: CPT

## 2024-03-11 PROCEDURE — 87486 CHLMYD PNEUM DNA AMP PROBE: CPT

## 2024-03-11 PROCEDURE — 82803 BLOOD GASES ANY COMBINATION: CPT

## 2024-03-11 PROCEDURE — 83735 ASSAY OF MAGNESIUM: CPT

## 2024-03-11 PROCEDURE — 6360000004 HC RX CONTRAST MEDICATION: Performed by: PHYSICIAN ASSISTANT

## 2024-03-11 PROCEDURE — 87631 RESP VIRUS 3-5 TARGETS: CPT

## 2024-03-11 PROCEDURE — 97110 THERAPEUTIC EXERCISES: CPT

## 2024-03-11 RX ORDER — LEVOFLOXACIN 5 MG/ML
750 INJECTION, SOLUTION INTRAVENOUS EVERY 24 HOURS
Status: DISCONTINUED | OUTPATIENT
Start: 2024-03-11 | End: 2024-03-16

## 2024-03-11 RX ORDER — SODIUM CHLORIDE 9 MG/ML
INJECTION, SOLUTION INTRAVENOUS CONTINUOUS
Status: DISCONTINUED | OUTPATIENT
Start: 2024-03-11 | End: 2024-03-13

## 2024-03-11 RX ORDER — IPRATROPIUM BROMIDE AND ALBUTEROL SULFATE 2.5; .5 MG/3ML; MG/3ML
1 SOLUTION RESPIRATORY (INHALATION)
Status: DISCONTINUED | OUTPATIENT
Start: 2024-03-12 | End: 2024-03-15

## 2024-03-11 RX ORDER — LOSARTAN POTASSIUM 50 MG/1
50 TABLET ORAL 2 TIMES DAILY
Status: DISCONTINUED | OUTPATIENT
Start: 2024-03-11 | End: 2024-03-23 | Stop reason: HOSPADM

## 2024-03-11 RX ORDER — PROMETHAZINE HYDROCHLORIDE 25 MG/ML
6.25 INJECTION, SOLUTION INTRAMUSCULAR; INTRAVENOUS ONCE
Status: COMPLETED | OUTPATIENT
Start: 2024-03-11 | End: 2024-03-11

## 2024-03-11 RX ADMIN — LOSARTAN POTASSIUM 50 MG: 50 TABLET, FILM COATED ORAL at 08:48

## 2024-03-11 RX ADMIN — IPRATROPIUM BROMIDE AND ALBUTEROL SULFATE 1 DOSE: .5; 3 SOLUTION RESPIRATORY (INHALATION) at 21:51

## 2024-03-11 RX ADMIN — IPRATROPIUM BROMIDE AND ALBUTEROL SULFATE 1 DOSE: .5; 3 SOLUTION RESPIRATORY (INHALATION) at 07:50

## 2024-03-11 RX ADMIN — PROMETHAZINE HYDROCHLORIDE 6.25 MG: 25 INJECTION INTRAMUSCULAR; INTRAVENOUS at 06:42

## 2024-03-11 RX ADMIN — PREDNISONE 40 MG: 20 TABLET ORAL at 08:48

## 2024-03-11 RX ADMIN — ASPIRIN 81 MG CHEWABLE TABLET 81 MG: 81 TABLET CHEWABLE at 08:45

## 2024-03-11 RX ADMIN — ONDANSETRON 4 MG: 4 TABLET, ORALLY DISINTEGRATING ORAL at 08:45

## 2024-03-11 RX ADMIN — SODIUM CHLORIDE, PRESERVATIVE FREE 10 ML: 5 INJECTION INTRAVENOUS at 08:45

## 2024-03-11 RX ADMIN — SODIUM CHLORIDE, PRESERVATIVE FREE 10 ML: 5 INJECTION INTRAVENOUS at 20:19

## 2024-03-11 RX ADMIN — ENOXAPARIN SODIUM 30 MG: 100 INJECTION SUBCUTANEOUS at 20:18

## 2024-03-11 RX ADMIN — HYDROCHLOROTHIAZIDE 25 MG: 12.5 CAPSULE ORAL at 08:48

## 2024-03-11 RX ADMIN — LOSARTAN POTASSIUM 50 MG: 50 TABLET, FILM COATED ORAL at 20:18

## 2024-03-11 RX ADMIN — IOPAMIDOL 80 ML: 755 INJECTION, SOLUTION INTRAVENOUS at 15:41

## 2024-03-11 RX ADMIN — IPRATROPIUM BROMIDE AND ALBUTEROL SULFATE 1 DOSE: .5; 3 SOLUTION RESPIRATORY (INHALATION) at 11:28

## 2024-03-11 RX ADMIN — VANCOMYCIN HYDROCHLORIDE 2500 MG: 1 INJECTION, POWDER, LYOPHILIZED, FOR SOLUTION INTRAVENOUS at 13:14

## 2024-03-11 RX ADMIN — SODIUM CHLORIDE: 9 INJECTION, SOLUTION INTRAVENOUS at 17:55

## 2024-03-11 RX ADMIN — ISOSORBIDE MONONITRATE 30 MG: 30 TABLET, EXTENDED RELEASE ORAL at 08:48

## 2024-03-11 RX ADMIN — ENOXAPARIN SODIUM 30 MG: 100 INJECTION SUBCUTANEOUS at 08:48

## 2024-03-11 RX ADMIN — PIPERACILLIN AND TAZOBACTAM 3375 MG: 3; .375 INJECTION, POWDER, LYOPHILIZED, FOR SOLUTION INTRAVENOUS at 13:01

## 2024-03-11 RX ADMIN — LEVOFLOXACIN 750 MG: 5 INJECTION, SOLUTION INTRAVENOUS at 13:10

## 2024-03-11 RX ADMIN — HYDRALAZINE HYDROCHLORIDE 10 MG: 20 INJECTION, SOLUTION INTRAMUSCULAR; INTRAVENOUS at 04:43

## 2024-03-11 RX ADMIN — PANTOPRAZOLE SODIUM 40 MG: 40 TABLET, DELAYED RELEASE ORAL at 06:04

## 2024-03-11 RX ADMIN — SODIUM CHLORIDE, PRESERVATIVE FREE 10 ML: 5 INJECTION INTRAVENOUS at 00:20

## 2024-03-11 RX ADMIN — ENOXAPARIN SODIUM 30 MG: 100 INJECTION SUBCUTANEOUS at 00:20

## 2024-03-11 RX ADMIN — CHOLESTYRAMINE 4 G: 4 POWDER, FOR SUSPENSION ORAL at 08:47

## 2024-03-11 RX ADMIN — PIPERACILLIN AND TAZOBACTAM 3375 MG: 3; .375 INJECTION, POWDER, LYOPHILIZED, FOR SOLUTION INTRAVENOUS at 03:37

## 2024-03-11 RX ADMIN — TAMSULOSIN HYDROCHLORIDE 0.4 MG: 0.4 CAPSULE ORAL at 20:18

## 2024-03-11 RX ADMIN — PIPERACILLIN AND TAZOBACTAM 3375 MG: 3; .375 INJECTION, POWDER, LYOPHILIZED, FOR SOLUTION INTRAVENOUS at 20:44

## 2024-03-11 ASSESSMENT — PAIN SCALES - GENERAL: PAINLEVEL_OUTOF10: 0

## 2024-03-11 NOTE — CARE COORDINATION
3/11/24, 11:38 AM EDT    DISCHARGE ON GOING EVALUATION    Del Yoon       [facility] day: 6  Location: -23/023-A Reason for admit: Shortness of breath [R06.02]  Lactic acidosis [E87.20]  Lower extremity edema [R60.0]  Influenza A [J10.1]  Elevated troponin [R79.89]  Elevated brain natriuretic peptide (BNP) level [R79.89]  Pneumonia of left lower lobe due to infectious organism [J18.9]  Community acquired pneumonia of left lower lobe of lung [J18.9]   Procedure:   ECHO 55-60%  Barriers to Discharge: Transferred from . Placed on HHF O2 at 60% FiO2, 50 liters oxygen with saturations at 92%, PT/OT, Asa, Lovenox, Nebs, IV Levaquin, IV Zosyn, electrolyte replacement protocols.   PCP: Faby Suh APRN - CNP  Readmission Risk Score: 15.2%  Patient Goals/Plan/Treatment Preferences:  [facility] He plans to return skilled.  SW following.

## 2024-03-11 NOTE — PROGRESS NOTES
Pt admitted to  4K23 from 5K    Complaints: Shortness of breath and N/V.     IV site free of s/s of infection or infiltration.     Vital signs obtained. Assessment and data collection initiated. Two nurse skin assessment performed by bonita DUNN and anna DUNN.    Policies and procedures for 4K explained. All questions answered with no further questions at this time. Fall prevention and safety brochure discussed with patient.  Bed alarm on. Call light in reach. Oriented to room.

## 2024-03-11 NOTE — FLOWSHEET NOTE
Notified Klaudia TORRES of patient having only 150ml of urine out at this time of shift. Bladder scan completed with 252ml in bladder. Expiratory wheezes noted. Also notified of patient off high flow oxygen and on 4L/NC with POX 91-93%. Patient denies SOB at this time.

## 2024-03-11 NOTE — PROGRESS NOTES
[facility]  STRZ ICU STEPDOWN TELEMETRY 4K  Occupational Therapy  Daily Note  Time:   Time In: 1338  Time Out: 1404  Timed Code Treatment Minutes: 26 Minutes  Minutes: 26          Date: 3/11/2024  Patient Name: Del Yoon,   Gender: male      Room: [facility]23/023-A  MRN: [id number]  : 1950  (73 y.o.)  Referring Practitioner: Klaudia Gacria PA-  Diagnosis: SOB  Additional Pertinent Hx: Per H & P on 3/5/2024:74 yo male that presents to the ED with SOB and weakness since . He states he felt short of breath and felt significant weakness that prevented him being able to ambulate. His legs \"would not move\" He recently moved into assisted living and gets around with a walker or wheelchair for longer distances. He did not take any medication for this. He had a fever this morning and made an appointment with his PCP that sent him to the hospital. He has a cough productive cough and denies chest pain, chills, abdominal pain, sore throat, vomiting.    Restrictions/Precautions:  Restrictions/Precautions: Fall Risk, General Precautions, Isolation  Position Activity Restriction  Other position/activity restrictions: Droplet for Influenza A     Social/Functional History:  Lives With: Alone  Type of Home: Assisted living  Home Layout: One level  Home Access: Level entry  Home Equipment: Wheelchair-manual, Walker, 4 wheeled, Cane, Lift chair, Reacher   Bathroom Shower/Tub: Walk-in shower  Bathroom Toilet: Standard  Bathroom Equipment: Grab bars in shower, Grab bars around toilet, Shower chair    Receives Help From: Personal care attendant  ADL Assistance: Needs assistance (A for  BADL & LE dressing)  Ambulation Assistance: Needs assistance  Transfer Assistance: Needs assistance          Additional Comments: Per PT eval: Pt reports that he was able to use furniture for mobility around his apartment, walker for short distances, and wheelchair for longer distances. Wife reports Pt has been needing A out of

## 2024-03-11 NOTE — PROGRESS NOTES
Hospitalist Progress Note    Patient:  Del Yoon      Unit/Bed:4K-23/023-A    YOB: 1950    MRN: [id number]       Acct: [id number]     PCP: Faby Suh APRN - CNP    Date of Admission: 3/5/2024    Assessment/Plan:    Pneumonia of left lower lobe, POA  Chest x-ray with infiltrate.    Continue with Zosyn- treat for 7 total days.  Blood cultures negative to date   Add on levaquin for possible psuedomonas coverage.   Pneumonia and respiratory culture ordered but not completed - new order placed. MRSA nares and legionella and strep pneumo urine studies   Add on prednisone 3/8- - continue for 5 day course. Consider hydrocortisone 200mg daily infusion   Influenza A  Symptoms began 3/3.  Completed course of tamiflu  Supportive care  Acute respiratory failure secondary to pneumonia - worsened  Increased to FNC overnight and transferred to step down  Order ABG    D Dimer elevated- order CTA of chest   Accelerated HTN with history of HTN   Home meds of losartan and HCTZ  Again had night time accelerated HTN- change losartan to BID   Continue with hydralazine PRN-   Peripheral edema- resolved  Concerns for fluid overload  BNP mildly elevated on admission- repeat ordered and was normal   Echo unremarkable  One time dose lasix 20mg 3/8- increased urine output . No further doses at this time  QT prolongation- resolved   Avoid QT prolonging agents.  Lactic acidosis - resolved  3.1 on arrival- resolved to 0.9  SIRS criteria not met.  No indication for sepsis bolus  Dizziness- improved  Patient describes as offkilter and worse upon positional change or turning of head  Given viral illness suspect vestibular neuritis versus BPPV-trial of meclizine PRN   Orthostats initially positive but are now negative  CAD with history of stents with concomitant hypertension and hyperlipidemia  Continue Imdur  History of CVA with left residual weakness      Expected discharge date:  unknown  given nausea and vomiting.      Medications:  Reviewed    Infusion Medications    sodium chloride       Scheduled Medications    pantoprazole  40 mg Oral QAM AC    aluminum & magnesium hydroxide-simethicone (MAALOX) 30 mL, lidocaine viscous hcl (XYLOCAINE) 5 mL (GI COCKTAIL)   Oral Once    piperacillin-tazobactam  3,375 mg IntraVENous Q8H    predniSONE  40 mg Oral Daily    ipratropium 0.5 mg-albuterol 2.5 mg  1 Dose Inhalation TID RT    oseltamivir  75 mg Oral BID    aspirin  81 mg Oral Daily    cholestyramine light  4 g Oral Daily    hydroCHLOROthiazide  25 mg Oral Daily    isosorbide mononitrate  30 mg Oral Daily    losartan  50 mg Oral Daily    tamsulosin  0.4 mg Oral Nightly    sodium chloride flush  5-40 mL IntraVENous 2 times per day    enoxaparin  30 mg SubCUTAneous BID     PRN Meds: calcium carbonate, nitroGLYCERIN, ondansetron, loperamide, hydrALAZINE, meclizine, ipratropium 0.5 mg-albuterol 2.5 mg, sodium chloride flush, sodium chloride, potassium chloride **OR** potassium alternative oral replacement **OR** potassium chloride, magnesium sulfate, polyethylene glycol, acetaminophen **OR** acetaminophen      Intake/Output Summary (Last 24 hours) at 3/11/2024 0846  Last data filed at 3/11/2024 0649  Gross per 24 hour   Intake 714.48 ml   Output 375 ml   Net 339.48 ml         Diet:  ADULT DIET; Regular; 4 carb choices (60 gm/meal)    Exam:  BP (!) 154/76   Pulse 93   Temp 98.4 °F (36.9 °C) (Oral)   Resp 20   Ht 1.803 m (5' 11\")   Wt 129.3 kg (285 lb)   SpO2 93%   BMI 39.75 kg/m²     General appearance: No apparent distress, appears stated age and cooperative. Acute on Chronically ill-appearing  HEENT: Pupils equal, round, and reactive to light. Conjunctivae/corneas clear.  Neck: Supple, with full range of motion. No jugular venous distention. Trachea midline.  Respiratory:  Normal respiratory effort. Diffuse rhonchi with wheezing at upper lobes  Cardiovascular: Regular rate and rhythm with normal S1/S2

## 2024-03-12 ENCOUNTER — APPOINTMENT (OUTPATIENT)
Dept: ULTRASOUND IMAGING | Age: 74
End: 2024-03-12
Payer: MEDICARE

## 2024-03-12 LAB
ACINETOBACTER CALCOACETICUS-BAUMANNII BY PCR: NOT DETECTED
ANION GAP SERPL CALC-SCNC: 13 MEQ/L (ref 8–16)
BACTERIA: ABNORMAL
BILIRUB UR QL STRIP: NEGATIVE
BLACTX-M ISLT/SPM QL: NOT DETECTED
BLAIMP ISLT/SPM QL: NOT DETECTED
BLAKPC ISLT/SPM QL: NOT DETECTED
BLAOXA-48-LIKE ISLT/SPM QL: NOT DETECTED
BLAVIM ISLT/SPM QL: NOT DETECTED
BUN SERPL-MCNC: 39 MG/DL (ref 7–22)
C PNEUM DNA LOWER RESP QL NAA+NON-PROBE: NOT DETECTED
CALCIUM SERPL-MCNC: 8.6 MG/DL (ref 8.5–10.5)
CASTS #/AREA URNS LPF: ABNORMAL /LPF
CASTS #/AREA URNS LPF: ABNORMAL /LPF
CHARACTER UR: ABNORMAL
CHARCOAL URNS QL MICRO: ABNORMAL
CHLORIDE SERPL-SCNC: 97 MEQ/L (ref 98–111)
CO2 SERPL-SCNC: 27 MEQ/L (ref 23–33)
COLOR UR: YELLOW
CREAT SERPL-MCNC: 1.5 MG/DL (ref 0.4–1.2)
CREAT UR-MCNC: 162.6 MG/DL
CREAT UR-MCNC: 162.6 MG/DL
CRYSTALS URNS QL MICRO: ABNORMAL
DEPRECATED RDW RBC AUTO: 48 FL (ref 35–45)
ENTEROBACTER CLOACAE COMPLEX BY PCR: NOT DETECTED
EPITHELIAL CELLS, UA: ABNORMAL /HPF
ERYTHROCYTE [DISTWIDTH] IN BLOOD BY AUTOMATED COUNT: 14.6 % (ref 11.5–14.5)
ESCHERICHIA COLI BY PCR: NOT DETECTED
FLUAV RNA LOWER RESP QL NAA+NON-PROBE: DETECTED
FLUBV RNA LOWER RESP QL NAA+NON-PROBE: NOT DETECTED
GFR SERPL CREATININE-BSD FRML MDRD: 48 ML/MIN/1.73M2
GLUCOSE SERPL-MCNC: 131 MG/DL (ref 70–108)
GLUCOSE UR QL STRIP.AUTO: NEGATIVE MG/DL
HADV DNA LOWER RESP QL NAA+NON-PROBE: NOT DETECTED
HAEMOPHILUS INFLUENZAE BY PCR: DETECTED
HCOV RNA LOWER RESP QL NAA+NON-PROBE: NOT DETECTED
HCT VFR BLD AUTO: 42.5 % (ref 42–52)
HGB BLD-MCNC: 13.5 GM/DL (ref 14–18)
HGB UR QL STRIP.AUTO: NEGATIVE
HMPV RNA LOWER RESP QL NAA+NON-PROBE: NOT DETECTED
HPIV RNA LOWER RESP QL NAA+NON-PROBE: NOT DETECTED
KETONES UR QL STRIP.AUTO: ABNORMAL
KLEBSIELLA AEROGENES BY PCR: NOT DETECTED
KLEBSIELLA OXYTOCA BY PCR: NOT DETECTED
KLEBSIELLA PNEUMONIAE GROUP BY PCR: NOT DETECTED
L PNEUMO DNA LOWER RESP QL NAA+NON-PROBE: NOT DETECTED
LEUKOCYTE ESTERASE UR QL STRIP.AUTO: NEGATIVE
M PNEUMO DNA LOWER RESP QL NAA+NON-PROBE: NOT DETECTED
MAGNESIUM SERPL-MCNC: 2.9 MG/DL (ref 1.6–2.4)
MCH RBC QN AUTO: 28.5 PG (ref 26–33)
MCHC RBC AUTO-ENTMCNC: 31.8 GM/DL (ref 32.2–35.5)
MCV RBC AUTO: 89.7 FL (ref 80–94)
MICROALBUMIN UR-MCNC: < 1.2 MG/DL
MICROALBUMIN/CREAT RATIO PNL UR: 7 MG/G (ref 0–30)
MORAXELLA CATARRHALIS BY PCR: NOT DETECTED
NITRITE UR QL STRIP.AUTO: NEGATIVE
PH UR STRIP.AUTO: 5.5 [PH] (ref 5–9)
PLATELET # BLD AUTO: 291 THOU/MM3 (ref 130–400)
PMV BLD AUTO: 10.9 FL (ref 9.4–12.4)
POTASSIUM SERPL-SCNC: 3.4 MEQ/L (ref 3.5–5.2)
PROT UR STRIP.AUTO-MCNC: NEGATIVE MG/DL
PROTEUS SPECIES BY PCR: NOT DETECTED
PSEUDOMONAS AERUGINOSA BY PCR: NOT DETECTED
RBC # BLD AUTO: 4.74 MILL/MM3 (ref 4.7–6.1)
RBC #/AREA URNS HPF: ABNORMAL /HPF
RENAL EPI CELLS #/AREA URNS HPF: ABNORMAL /[HPF]
RESISTANT GENE MECA/C & MREJ BY PCR: ABNORMAL
RESISTANT GENE NDM BY PCR: NOT DETECTED
RSV RNA LOWER RESP QL NAA+NON-PROBE: NOT DETECTED
RV+EV RNA LOWER RESP QL NAA+NON-PROBE: NOT DETECTED
SERRATIA MARCESCENS BY PCR: DETECTED
SODIUM SERPL-SCNC: 137 MEQ/L (ref 135–145)
SODIUM UR-SCNC: 45 MEQ/L
SOURCE: ABNORMAL
SP GR UR REFRACT.AUTO: > 1.03 (ref 1–1.03)
SPECIMEN ACCEPTABILITY: ABNORMAL
STAPH AUREUS BY PCR: NOT DETECTED
STREP AGALACTIAE BY PCR: NOT DETECTED
STREP PNEUMONIAE BY PCR: NOT DETECTED
STREP PYOGENES BY PCR: NOT DETECTED
UROBILINOGEN UR QL STRIP.AUTO: 0.2 EU/DL (ref 0–1)
WBC # BLD AUTO: 13.2 THOU/MM3 (ref 4.8–10.8)
WBC #/AREA URNS HPF: ABNORMAL /HPF
YEAST LIKE FUNGI URNS QL MICRO: ABNORMAL

## 2024-03-12 PROCEDURE — 82570 ASSAY OF URINE CREATININE: CPT

## 2024-03-12 PROCEDURE — 94669 MECHANICAL CHEST WALL OSCILL: CPT

## 2024-03-12 PROCEDURE — 99233 SBSQ HOSP IP/OBS HIGH 50: CPT | Performed by: PHYSICIAN ASSISTANT

## 2024-03-12 PROCEDURE — 83735 ASSAY OF MAGNESIUM: CPT

## 2024-03-12 PROCEDURE — 6370000000 HC RX 637 (ALT 250 FOR IP): Performed by: PHYSICIAN ASSISTANT

## 2024-03-12 PROCEDURE — 94761 N-INVAS EAR/PLS OXIMETRY MLT: CPT

## 2024-03-12 PROCEDURE — 94640 AIRWAY INHALATION TREATMENT: CPT

## 2024-03-12 PROCEDURE — 6370000000 HC RX 637 (ALT 250 FOR IP)

## 2024-03-12 PROCEDURE — 97110 THERAPEUTIC EXERCISES: CPT

## 2024-03-12 PROCEDURE — 2060000000 HC ICU INTERMEDIATE R&B

## 2024-03-12 PROCEDURE — 6360000002 HC RX W HCPCS: Performed by: PHYSICIAN ASSISTANT

## 2024-03-12 PROCEDURE — 97530 THERAPEUTIC ACTIVITIES: CPT

## 2024-03-12 PROCEDURE — 81001 URINALYSIS AUTO W/SCOPE: CPT

## 2024-03-12 PROCEDURE — 85027 COMPLETE CBC AUTOMATED: CPT

## 2024-03-12 PROCEDURE — 76770 US EXAM ABDO BACK WALL COMP: CPT

## 2024-03-12 PROCEDURE — 80048 BASIC METABOLIC PNL TOTAL CA: CPT

## 2024-03-12 PROCEDURE — 2580000003 HC RX 258: Performed by: PHYSICIAN ASSISTANT

## 2024-03-12 PROCEDURE — 82043 UR ALBUMIN QUANTITATIVE: CPT

## 2024-03-12 PROCEDURE — 36415 COLL VENOUS BLD VENIPUNCTURE: CPT

## 2024-03-12 PROCEDURE — 84300 ASSAY OF URINE SODIUM: CPT

## 2024-03-12 RX ORDER — CLONIDINE HYDROCHLORIDE 0.1 MG/1
0.1 TABLET ORAL 2 TIMES DAILY
Status: DISCONTINUED | OUTPATIENT
Start: 2024-03-12 | End: 2024-03-23 | Stop reason: HOSPADM

## 2024-03-12 RX ADMIN — PANTOPRAZOLE SODIUM 40 MG: 40 TABLET, DELAYED RELEASE ORAL at 06:33

## 2024-03-12 RX ADMIN — CHOLESTYRAMINE 4 G: 4 POWDER, FOR SUSPENSION ORAL at 10:19

## 2024-03-12 RX ADMIN — POTASSIUM CHLORIDE 40 MEQ: 1500 TABLET, EXTENDED RELEASE ORAL at 06:33

## 2024-03-12 RX ADMIN — IPRATROPIUM BROMIDE AND ALBUTEROL SULFATE 1 DOSE: .5; 3 SOLUTION RESPIRATORY (INHALATION) at 17:37

## 2024-03-12 RX ADMIN — IPRATROPIUM BROMIDE AND ALBUTEROL SULFATE 1 DOSE: .5; 3 SOLUTION RESPIRATORY (INHALATION) at 08:19

## 2024-03-12 RX ADMIN — PREDNISONE 40 MG: 20 TABLET ORAL at 10:19

## 2024-03-12 RX ADMIN — ENOXAPARIN SODIUM 30 MG: 100 INJECTION SUBCUTANEOUS at 10:19

## 2024-03-12 RX ADMIN — SODIUM CHLORIDE: 9 INJECTION, SOLUTION INTRAVENOUS at 06:30

## 2024-03-12 RX ADMIN — LEVOFLOXACIN 750 MG: 5 INJECTION, SOLUTION INTRAVENOUS at 16:43

## 2024-03-12 RX ADMIN — PIPERACILLIN AND TAZOBACTAM 3375 MG: 3; .375 INJECTION, POWDER, LYOPHILIZED, FOR SOLUTION INTRAVENOUS at 12:37

## 2024-03-12 RX ADMIN — ENOXAPARIN SODIUM 30 MG: 100 INJECTION SUBCUTANEOUS at 22:24

## 2024-03-12 RX ADMIN — CLONIDINE HYDROCHLORIDE 0.1 MG: 0.1 TABLET ORAL at 22:24

## 2024-03-12 RX ADMIN — SODIUM CHLORIDE, PRESERVATIVE FREE 10 ML: 5 INJECTION INTRAVENOUS at 22:25

## 2024-03-12 RX ADMIN — CLONIDINE HYDROCHLORIDE 0.1 MG: 0.1 TABLET ORAL at 10:19

## 2024-03-12 RX ADMIN — ISOSORBIDE MONONITRATE 30 MG: 30 TABLET, EXTENDED RELEASE ORAL at 10:19

## 2024-03-12 RX ADMIN — ASPIRIN 81 MG CHEWABLE TABLET 81 MG: 81 TABLET CHEWABLE at 10:19

## 2024-03-12 RX ADMIN — PIPERACILLIN AND TAZOBACTAM 3375 MG: 3; .375 INJECTION, POWDER, LYOPHILIZED, FOR SOLUTION INTRAVENOUS at 22:37

## 2024-03-12 RX ADMIN — TAMSULOSIN HYDROCHLORIDE 0.4 MG: 0.4 CAPSULE ORAL at 22:24

## 2024-03-12 RX ADMIN — PIPERACILLIN AND TAZOBACTAM 3375 MG: 3; .375 INJECTION, POWDER, LYOPHILIZED, FOR SOLUTION INTRAVENOUS at 04:23

## 2024-03-12 NOTE — PROGRESS NOTES
[facility]  STRZ ICU STEPDOWN TELEMETRY 4K  Occupational Therapy  Daily Note  Time:   Time In: 1343  Time Out: 1410  Timed Code Treatment Minutes: 27 Minutes  Minutes: 27          Date: 3/12/2024  Patient Name: Del Yoon,   Gender: male      Room: [facility]23/023-A  MRN: [id number]  : 1950  (73 y.o.)  Referring Practitioner: Klaudia Garcia PA-  Diagnosis: SOB  Additional Pertinent Hx: Per H & P on 3/5/2024:74 yo male that presents to the ED with SOB and weakness since . He states he felt short of breath and felt significant weakness that prevented him being able to ambulate. His legs \"would not move\" He recently moved into assisted living and gets around with a walker or wheelchair for longer distances. He did not take any medication for this. He had a fever this morning and made an appointment with his PCP that sent him to the hospital. He has a cough productive cough and denies chest pain, chills, abdominal pain, sore throat, vomiting.    Restrictions/Precautions:  Restrictions/Precautions: Fall Risk, General Precautions, Isolation  Position Activity Restriction  Other position/activity restrictions: Droplet for Influenza A     Social/Functional History:  Lives With: Alone  Type of Home: Assisted living  Home Layout: One level  Home Access: Level entry  Home Equipment: Wheelchair-manual, Walker, 4 wheeled, Cane, Lift chair, Reacher   Bathroom Shower/Tub: Walk-in shower  Bathroom Toilet: Standard  Bathroom Equipment: Grab bars in shower, Grab bars around toilet, Shower chair    Receives Help From: Personal care attendant  ADL Assistance: Needs assistance (A for  BADL & LE dressing)  Ambulation Assistance: Needs assistance  Transfer Assistance: Needs assistance          Additional Comments: Per PT eval: Pt reports that he was able to use furniture for mobility around his apartment, walker for short distances, and wheelchair for longer distances. Wife reports Pt has been needing A out of  chairs, \"that' why he is in the nursing home\".    SUBJECTIVE: Pt seated in bedside chair upon arrival, agreeable to OT session stating he feels \"tired\" after being in chair for lunch.    PAIN: None- denies.    Vitals:   Blood Pressure: 131/94 following mobility.  Oxygen: Patient on 4 L O2 via Nasal Canula  upon arrival to room. Patient O2 sats at 97%. Upon activity patient dropping O2 at low 80's. Pt requiring min rest break(s) to recover >95% following mobility.   Heart Rate: 82 bpm    Lines/Tubes: NA     COGNITION: Slow Processing, Decreased Problem Solving, and Decreased Safety Awareness    ADL:   No ADL's completed this session..    IADL:   Not Tested    BALANCE:  Sitting Balance:  Stand By Assistance. EOB  Standing Balance: Contact Guard Assistance.   X1 minute in prep for mobility.    BED MOBILITY:  Sit to Supine: Moderate Assistance Raising BLE onto bed  Scooting: Supervision EOB.    TRANSFERS:  Sit to Stand:  Minimal Assistance, X 1.   Stand to Sit: Contact Guard Assistance, X 1.   Comment: Chair, EOB    FUNCTIONAL MOBILITY:  Assistive Device: Rolling Walker   Assist Level:  Contact Guard Assistance.   Distance: Completed functional mobility within pt room around bed at slow pace, no LOB noted. Pt requires min cues for walker safety to keep proximal to FLOR reaching destination- seated rest break after trial of mobility, min fatigue noted.    ADDITIONAL ACTIVITIES:  Completed distal BUE AROM exercises x10 reps x1 set in all available joints/planes to increase strength and endurance required for ADLs. Pt required min rest break between each exercise and min v/c for proper technique.       Modified Camden Scale:  Not Applicable    ASSESSMENT:     Activity Tolerance:  Patient tolerance of  treatment: Good treatment tolerance      Discharge Recommendations: Continue to assess pending progress, Patient would benefit from continued OT at discharge, and Inpatient Therapy Stay  Equipment Recommendations: Other:

## 2024-03-12 NOTE — PROGRESS NOTES
Hospitalist Progress Note    Patient:  Del Yoon      Unit/Bed:4K-23/023-A    YOB: 1950    MRN: [id number]       Acct: [id number]     PCP: Faby Suh APRN - CNP    Date of Admission: 3/5/2024    Assessment/Plan:    Bilateral Pneumonia, POA  Chest x-ray with infiltrate.  CTA 3/11 without PE but demonstrated bibasilar consolidations   Pneumonia panel with H Flu, Serratia, and Flu A   Completed 7 day course of zosyn. Continue levaquin at this time as patient showing clinical improvement   MRSA nares and legionella and strep pneumo urine studies negative   Add on prednisone 3/8- - continue for 5 day course. Consider hydrocortisone 200mg daily infusion   Influenza A  Symptoms began 3/3.  Completed course of tamiflu  Supportive care  Acute respiratory failure secondary to pneumonia - improved  Escalated to HHFNC 3/10 overnight.  Currently weaned to 4L NC   ABG with metabolic acidosis    Continue to treat as above  Accelerated HTN with history of HTN - improved  Home meds of losartan and HCTZ- placed on hold due to BRE    night time accelerated HTN- change losartan to BID after resolution of BRE   Start clonidine 0.1mg BID - monitor BP closely  Continue with hydralazine PRN for SBP > 170  BRE  Baseline 1.0-1.1. Currently 1.5 with increasing BUN   Increase fluids. I and Os- urine output improved  Check Renal US and urine lytes   Hold nephrotoxic agents. Contrast from CTA also contributing   Electrolyte derangements  Hypokalemia- 3.4- replacement protocol  Hypermagnesemia- 2.9   Peripheral edema- resolved  Concerns for fluid overload  BNP mildly elevated on admission- repeat ordered and was normal   Echo unremarkable  One time dose lasix 20mg 3/8- increased urine output . No further doses at this time  QT prolongation- resolved   Avoid QT prolonging agents.  Lactic acidosis - resolved  3.1 on arrival- resolved to 0.9  SIRS criteria not met.  No indication for sepsis  bolus  Dizziness- improved  Patient describes as offkilter and worse upon positional change or turning of head  Given viral illness suspect vestibular neuritis versus BPPV-trial of meclizine PRN   Orthostats initially positive but are now negative  CAD with history of stents with concomitant hypertension and hyperlipidemia  Continue Imdur  History of CVA with left residual weakness      Expected discharge date:  unknown     Disposition:    [] Home       [] TCU       [] Rehab       [] Psych       [x] SNF       [] Long Term Care Facility       [] Other-    Chief Complaint: Shortness of breath    Hospital Course:   Initial HPI Per chart review  \"Del Yoon is a 72 yo male that presents to the ED with SOB and weakness since Sunday. He states he felt short of breath and felt significant weakness that prevented him being able to ambulate. His legs \"would not move\" He recently moved into assisted living and gets around with a walker or wheelchair for longer distances. He did not take any medication for this. He had a fever this morning and made an appointment with his PCP that sent him to the hospital. He has a cough productive cough and denies chest pain, chills, abdominal pain, sore throat, vomiting. He endorses chronic diarrhea but denies bowel movement today. He denies smoking. Pt states he had stents placed in the past. \"    3/6/2024  I assumed care of patient today.  He states overall he is feeling better but does mention that he is feeling lightheaded.  Went and reviewed further on this he describes as being offkilter and experiences it when getting up from sitting to standing and also if he turns his head too quickly.  He reports he still has a cough.  Denies hemoptysis.  States that shortness of breath is about 50% improved    3/7  Patient states he feels about 50% better. No dizziness today     3/8  Patient clinically appears worse.  Rales present. Swelling of bilateral lower legs. Concerns for fluid  Comparison: CR/SR - XR CHEST PORTABLE - 07/23/2022 01:51 PM EDT Findings: Possible focus of airspace opacity within the left lower lung. The right lung is clear. Heart size is normal. Status post right shoulder replacement. No acute displaced fracture.     Possible focus of atelectasis or infiltrate at the base of the left lung. This document has been electronically signed by: Dariela Iglesias MD on 03/05/2024 06:53 PM      DVT prophylaxis: [x] Lovenox                                 [] SCDs                                 [] SQ Heparin                                 [] Encourage ambulation           [] Already on Anticoagulation     Code Status: Limited    PT/OT Eval Status: Ordered    Tele:   [x] yes             [] no    Active Hospital Problems    Diagnosis Date Noted    Community acquired pneumonia of left lower lobe of lung [J18.9] 03/05/2024       Electronically signed by Klaudia Garcia PA-C on 3/12/2024 at 8:33 AM

## 2024-03-12 NOTE — PROGRESS NOTES
[facility]  INPATIENT PHYSICAL THERAPY  DAILY NOTE  STRZ ICU STEPDOWN TELEMETRY 4K - 4K-23/023-A    Time In: 1119  Time Out: 1145  Timed Code Treatment Minutes: 26 Minutes  Minutes: 26        Date: 3/12/2024  Patient Name: Del Yoon,  Gender:  male        MRN: [id number]  : 1950  (73 y.o.)     Referring Practitioner: Klaudia Garcia PA-C  Diagnosis: Shortness of breath  Additional Pertinent Hx: HPI: Del Yoon is a 72 yo male that presents to the ED with SOB and weakness since . He states he felt short of breath and felt significant weakness that prevented him being able to ambulate. His legs \"would not move\" He recently moved into assisted living and gets around with a walker or wheelchair for longer distances. Pt admitted for pneumonia, Flu A.  Transferred to stepdown 3/11 for worsening respiratory status, CTA negative for PE.     Prior Level of Function:  Lives With: Alone  Type of Home: Assisted living  Home Layout: One level  Home Access: Level entry  Home Equipment: Wheelchair-manual, Walker, 4 wheeled, Cane, Lift chair, Reacher   Bathroom Shower/Tub: Walk-in shower  Bathroom Toilet: Standard  Bathroom Equipment: Grab bars in shower, Grab bars around toilet, Shower chair    Receives Help From: Personal care attendant  ADL Assistance: Needs assistance (A for  BADL & LE dressing)  Ambulation Assistance: Needs assistance  Transfer Assistance: Needs assistance  Additional Comments: Per PT eval: Pt reports that he was able to use furniture for mobility around his apartment, walker for short distances, and wheelchair for longer distances. Wife reports Pt has been needing A out of chairs, \"that' why he is in the nursing home\".    Restrictions/Precautions:  Restrictions/Precautions: Fall Risk, General Precautions, Isolation  Position Activity Restriction  Other position/activity restrictions: Droplet for Influenza A     SUBJECTIVE: RN approved session, pt is supine in bed, agreeable

## 2024-03-12 NOTE — CARE COORDINATION
Collaborative Discharge Planning    Del Yoon  :  1950  MRN:  [id number]    ADMIT DATE:  3/5/2024      Discharge Planning Discharge Planning  Type of Residence: Assisted living  Living Arrangements: Alone  Support Systems: Spouse/Significant Other, Other (Comment) ([facility] assisted living)  Current Services Prior To Admission: None  Potential Assistance Needed: N/A  Type of Home Care Services: None  Patient expects to be discharged to:: Assisted living  White Board Notes /Social Work Whiteboard Notes  /Social Work Whiteboard: 3/12;  - [facility]. No precert. DC packet on chart. From [facility]    Discharge Plan SNF  From [facility]; plans new [facility] SNF, has cane, walker, WC, Lift Chair    Discharge Milestones and Delays: Clinical status    From  (HFO needs)  Influenza A/PNA/BRE  History: CVA, PCI    Elevated WBC    Creatinine 1.5 (baseline 1.0-1.1); monitor    Pneumonia Panel  Haemophilus Influenzae  Serratia Marscens    IVF, IV Levaquin, IV Zosyn    Oxygen 4L  SIGNED:  Moreno Pearson RN   3/12/2024, 2:11 PM

## 2024-03-12 NOTE — PLAN OF CARE
Problem: Respiratory - Adult  Goal: Achieves optimal ventilation and oxygenation  Outcome: Progressing     Problem: Respiratory - Adult  Goal: Clear lung sounds  Outcome: Progressing   Continue therapy to help improve breath sounds.    Patient mutually agreed on goals.

## 2024-03-13 PROBLEM — R60.0 PERIPHERAL EDEMA: Status: ACTIVE | Noted: 2024-03-13

## 2024-03-13 PROBLEM — J10.1 INFLUENZA A: Status: ACTIVE | Noted: 2024-03-13

## 2024-03-13 PROBLEM — E78.5 HYPERLIPIDEMIA: Status: ACTIVE | Noted: 2024-03-13

## 2024-03-13 PROBLEM — J96.01 ACUTE RESPIRATORY FAILURE WITH HYPOXIA (HCC): Status: ACTIVE | Noted: 2024-03-13

## 2024-03-13 PROBLEM — Z86.73 HISTORY OF CVA (CEREBROVASCULAR ACCIDENT): Status: ACTIVE | Noted: 2024-03-13

## 2024-03-13 PROBLEM — R42 DIZZINESS: Status: ACTIVE | Noted: 2024-03-13

## 2024-03-13 PROBLEM — I10 PRIMARY HYPERTENSION: Status: ACTIVE | Noted: 2024-03-13

## 2024-03-13 PROBLEM — R60.9 PERIPHERAL EDEMA: Status: ACTIVE | Noted: 2024-03-13

## 2024-03-13 PROBLEM — N17.9 AKI (ACUTE KIDNEY INJURY) (HCC): Status: ACTIVE | Noted: 2024-03-13

## 2024-03-13 LAB
ANION GAP SERPL CALC-SCNC: 8 MEQ/L (ref 8–16)
BUN SERPL-MCNC: 29 MG/DL (ref 7–22)
CALCIUM SERPL-MCNC: 8.3 MG/DL (ref 8.5–10.5)
CHLORIDE SERPL-SCNC: 102 MEQ/L (ref 98–111)
CO2 SERPL-SCNC: 25 MEQ/L (ref 23–33)
CREAT SERPL-MCNC: 0.9 MG/DL (ref 0.4–1.2)
DEPRECATED RDW RBC AUTO: 45.9 FL (ref 35–45)
ERYTHROCYTE [DISTWIDTH] IN BLOOD BY AUTOMATED COUNT: 14 % (ref 11.5–14.5)
GFR SERPL CREATININE-BSD FRML MDRD: > 60 ML/MIN/1.73M2
GLUCOSE SERPL-MCNC: 170 MG/DL (ref 70–108)
HCT VFR BLD AUTO: 35.9 % (ref 42–52)
HGB BLD-MCNC: 11.5 GM/DL (ref 14–18)
MCH RBC QN AUTO: 28.9 PG (ref 26–33)
MCHC RBC AUTO-ENTMCNC: 32 GM/DL (ref 32.2–35.5)
MCV RBC AUTO: 90.2 FL (ref 80–94)
PLATELET # BLD AUTO: 248 THOU/MM3 (ref 130–400)
PMV BLD AUTO: 11 FL (ref 9.4–12.4)
POTASSIUM SERPL-SCNC: 4.5 MEQ/L (ref 3.5–5.2)
RBC # BLD AUTO: 3.98 MILL/MM3 (ref 4.7–6.1)
SODIUM SERPL-SCNC: 135 MEQ/L (ref 135–145)
WBC # BLD AUTO: 9.2 THOU/MM3 (ref 4.8–10.8)

## 2024-03-13 PROCEDURE — 6370000000 HC RX 637 (ALT 250 FOR IP): Performed by: PHYSICIAN ASSISTANT

## 2024-03-13 PROCEDURE — 85027 COMPLETE CBC AUTOMATED: CPT

## 2024-03-13 PROCEDURE — 6370000000 HC RX 637 (ALT 250 FOR IP)

## 2024-03-13 PROCEDURE — 94640 AIRWAY INHALATION TREATMENT: CPT

## 2024-03-13 PROCEDURE — 97110 THERAPEUTIC EXERCISES: CPT

## 2024-03-13 PROCEDURE — 2700000000 HC OXYGEN THERAPY PER DAY

## 2024-03-13 PROCEDURE — 6370000000 HC RX 637 (ALT 250 FOR IP): Performed by: STUDENT IN AN ORGANIZED HEALTH CARE EDUCATION/TRAINING PROGRAM

## 2024-03-13 PROCEDURE — 2060000000 HC ICU INTERMEDIATE R&B

## 2024-03-13 PROCEDURE — 6360000002 HC RX W HCPCS: Performed by: PHYSICIAN ASSISTANT

## 2024-03-13 PROCEDURE — 2580000003 HC RX 258: Performed by: PHYSICIAN ASSISTANT

## 2024-03-13 PROCEDURE — 80048 BASIC METABOLIC PNL TOTAL CA: CPT

## 2024-03-13 PROCEDURE — 97535 SELF CARE MNGMENT TRAINING: CPT

## 2024-03-13 PROCEDURE — 93005 ELECTROCARDIOGRAM TRACING: CPT | Performed by: STUDENT IN AN ORGANIZED HEALTH CARE EDUCATION/TRAINING PROGRAM

## 2024-03-13 PROCEDURE — 97530 THERAPEUTIC ACTIVITIES: CPT

## 2024-03-13 PROCEDURE — 36415 COLL VENOUS BLD VENIPUNCTURE: CPT

## 2024-03-13 PROCEDURE — 99233 SBSQ HOSP IP/OBS HIGH 50: CPT | Performed by: STUDENT IN AN ORGANIZED HEALTH CARE EDUCATION/TRAINING PROGRAM

## 2024-03-13 PROCEDURE — 94761 N-INVAS EAR/PLS OXIMETRY MLT: CPT

## 2024-03-13 RX ORDER — SODIUM CHLORIDE 9 MG/ML
INJECTION, SOLUTION INTRAVENOUS CONTINUOUS
Status: DISCONTINUED | OUTPATIENT
Start: 2024-03-13 | End: 2024-03-13

## 2024-03-13 RX ORDER — LIDOCAINE 4 G/G
1 PATCH TOPICAL DAILY
Status: DISCONTINUED | OUTPATIENT
Start: 2024-03-13 | End: 2024-03-23 | Stop reason: HOSPADM

## 2024-03-13 RX ORDER — CALCIUM GLUCONATE 20 MG/ML
2000 INJECTION, SOLUTION INTRAVENOUS PRN
Status: DISCONTINUED | OUTPATIENT
Start: 2024-03-13 | End: 2024-03-23 | Stop reason: HOSPADM

## 2024-03-13 RX ADMIN — ENOXAPARIN SODIUM 30 MG: 100 INJECTION SUBCUTANEOUS at 08:40

## 2024-03-13 RX ADMIN — ACETAMINOPHEN 650 MG: 325 TABLET ORAL at 08:48

## 2024-03-13 RX ADMIN — LOSARTAN POTASSIUM 50 MG: 50 TABLET, FILM COATED ORAL at 20:36

## 2024-03-13 RX ADMIN — SODIUM CHLORIDE: 9 INJECTION, SOLUTION INTRAVENOUS at 01:41

## 2024-03-13 RX ADMIN — LEVOFLOXACIN 750 MG: 5 INJECTION, SOLUTION INTRAVENOUS at 12:28

## 2024-03-13 RX ADMIN — PANTOPRAZOLE SODIUM 40 MG: 40 TABLET, DELAYED RELEASE ORAL at 05:01

## 2024-03-13 RX ADMIN — CHOLESTYRAMINE 4 G: 4 POWDER, FOR SUSPENSION ORAL at 08:40

## 2024-03-13 RX ADMIN — ASPIRIN 81 MG CHEWABLE TABLET 81 MG: 81 TABLET CHEWABLE at 08:40

## 2024-03-13 RX ADMIN — IPRATROPIUM BROMIDE AND ALBUTEROL SULFATE 1 DOSE: .5; 3 SOLUTION RESPIRATORY (INHALATION) at 00:27

## 2024-03-13 RX ADMIN — ONDANSETRON 4 MG: 4 TABLET, ORALLY DISINTEGRATING ORAL at 21:28

## 2024-03-13 RX ADMIN — IPRATROPIUM BROMIDE AND ALBUTEROL SULFATE 1 DOSE: .5; 3 SOLUTION RESPIRATORY (INHALATION) at 18:46

## 2024-03-13 RX ADMIN — TAMSULOSIN HYDROCHLORIDE 0.4 MG: 0.4 CAPSULE ORAL at 20:36

## 2024-03-13 RX ADMIN — CLONIDINE HYDROCHLORIDE 0.1 MG: 0.1 TABLET ORAL at 20:36

## 2024-03-13 RX ADMIN — ENOXAPARIN SODIUM 30 MG: 100 INJECTION SUBCUTANEOUS at 20:37

## 2024-03-13 RX ADMIN — SODIUM CHLORIDE, PRESERVATIVE FREE 10 ML: 5 INJECTION INTRAVENOUS at 20:36

## 2024-03-13 RX ADMIN — ANTACID TABLETS 500 MG: 500 TABLET, CHEWABLE ORAL at 08:39

## 2024-03-13 RX ADMIN — PHENOL 1 SPRAY: 1.5 LIQUID ORAL at 12:23

## 2024-03-13 RX ADMIN — ISOSORBIDE MONONITRATE 30 MG: 30 TABLET, EXTENDED RELEASE ORAL at 08:40

## 2024-03-13 RX ADMIN — ANTACID TABLETS 500 MG: 500 TABLET, CHEWABLE ORAL at 23:56

## 2024-03-13 RX ADMIN — IPRATROPIUM BROMIDE AND ALBUTEROL SULFATE 1 DOSE: .5; 3 SOLUTION RESPIRATORY (INHALATION) at 23:00

## 2024-03-13 RX ADMIN — CLONIDINE HYDROCHLORIDE 0.1 MG: 0.1 TABLET ORAL at 08:40

## 2024-03-13 ASSESSMENT — PAIN SCALES - GENERAL
PAINLEVEL_OUTOF10: 2
PAINLEVEL_OUTOF10: 2
PAINLEVEL_OUTOF10: 6

## 2024-03-13 ASSESSMENT — PAIN DESCRIPTION - LOCATION
LOCATION: ABDOMEN
LOCATION: THROAT
LOCATION: THROAT

## 2024-03-13 NOTE — PROGRESS NOTES
[facility]  STRZ ICU STEPDOWN TELEMETRY 4K  Occupational Therapy  Daily Note  Time:   Time In: 1100  Time Out: 1139  Timed Code Treatment Minutes: 39 Minutes  Minutes: 39          Date: 3/13/2024  Patient Name: Del Yoon,   Gender: male      Room: [facility]23/023-A  MRN: [id number]  : 1950  (73 y.o.)  Referring Practitioner: Klaudia Garcia PA-  Diagnosis: SOB  Additional Pertinent Hx: Per H & P on 3/5/2024:74 yo male that presents to the ED with SOB and weakness since . He states he felt short of breath and felt significant weakness that prevented him being able to ambulate. His legs \"would not move\" He recently moved into assisted living and gets around with a walker or wheelchair for longer distances. He did not take any medication for this. He had a fever this morning and made an appointment with his PCP that sent him to the hospital. He has a cough productive cough and denies chest pain, chills, abdominal pain, sore throat, vomiting.    Restrictions/Precautions:  Restrictions/Precautions: Fall Risk, General Precautions, Isolation     Social/Functional History:  Lives With: Alone  Type of Home: Assisted living  Home Layout: One level  Home Access: Level entry  Home Equipment: Wheelchair-manual, Walker, 4 wheeled, Cane, Lift chair, Reacher   Bathroom Shower/Tub: Walk-in shower  Bathroom Toilet: Standard  Bathroom Equipment: Grab bars in shower, Grab bars around toilet, Shower chair    Receives Help From: Personal care attendant  ADL Assistance: Needs assistance (A for  BADL & LE dressing)  Ambulation Assistance: Needs assistance  Transfer Assistance: Needs assistance          Additional Comments: Per PT eval: Pt reports that he was able to use furniture for mobility around his apartment, walker for short distances, and wheelchair for longer distances. Wife reports Pt has been needing A out of chairs, \"that' why he is in the nursing home\".    SUBJECTIVE: Patient supine in bed upon

## 2024-03-13 NOTE — PROGRESS NOTES
[facility] Contin[facility] Nurse  Progress Note       Del Yoon  AGE: 73 y.o.   GENDER: male  : 1950  UNIT: 4K-23/023-A  TODAY'S DATE:  3/13/2024  ADMISSION DATE: 3/5/2024  5:03 PM    Subjective   Reason for WCC Evaluation and Assessment: coccyx skin breakdown       Del Yoon is a 73 y.o. male referred by:   [] Physician/PA/APRN  [x] Nursing  [] Other:     Wound Identification:  Wound Type: MASD  Wound Location:gluteal cleft  Modifying factors:decreased mobility    Objective     Isac Risk Score: Isac Scale Score: 17      Assessment     Encounter: Present to pt room for consult of coccyx skin breakdown. Pt resting in chair with family at side. Pt leans forward in chair. Wound area assessed. Wound photo and assessment below. Pt has MASD that is blanching. Cleansed wound with normal saline and gauze. Pat dry with clean gauze. Applied zinc paste to open area. Pt on moisture wicking chux pad. Pt has hercules dream air support surface with alternating pressure and microclimate control. Continue treatment to area twice daily. Call wound ostomy as needed. Pt in chair, call light in reach.      MASD to gluteal cleft, pink, blanches    Plan     Treatment Recommendations:   MASD to gluteal cleft: Cleanse wound with normal saline or wound cleanser and gauze. Pat dry with clean gauze. Apply zinc to open area twice daily and as needed.     Specialty Bed Required :   [x] Low Air Loss   [x] Pressure Redistribution  [] Fluid Immersion- Dolphin  [] Bariatric  [] RotoProne   [] Other:     Discharge Plan:  Placement for patient upon discharge: unknown  [] Home  [] Inpatient Rehab  [] SNF  [] ECF  [] [facility]/ LTAC  Patient appropriate for Outpatient Wound Care Center: no

## 2024-03-13 NOTE — PROGRESS NOTES
Hospitalist Progress Note    Patient:  Del Yoon      Unit/Bed:4K-23/023-A    YOB: 1950    MRN: [id number]       Acct: [id number]     PCP: Faby Suh APRN - CNP    Date of Admission: 3/5/2024    Assessment/Plan:    Bilateral Pneumonia, POA  Chest x-ray with infiltrate.  CTA 3/11 without PE but demonstrated bibasilar consolidations   Pneumonia panel with H Flu, Serratia, and Flu A   Completed 7 day course of zosyn. Continue levaquin at this time as patient showing clinical improvement   MRSA nares and legionella and strep pneumo urine studies negative   Add on prednisone 3/8- - continue for 5 day course. Consider hydrocortisone 200mg daily infusion   Influenza A  Symptoms began 3/3.  Completed course of tamiflu  Supportive care  Acute respiratory failure secondary to pneumonia - improved  Escalated to HHFNC 3/10 overnight.  Currently weaned to 1L NC   ABG with metabolic acidosis    Continue to treat as above  Accelerated HTN with history of HTN - improved  Home meds of losartan and HCTZ- placed on hold due to BRE    night time accelerated HTN- change losartan to BID after resolution of BRE   Start clonidine 0.1mg BID - monitor BP closely  Continue with hydralazine PRN for SBP > 170  BRE  Baseline 1.0-1.1. Initially was up to 1.5, now down to 0.9.   Renal US suggestive of retention due to increased PVR, but creatinine is improving, thus will monitor for now.   Hold nephrotoxic agents. Contrast from CTA also contributing   Electrolyte derangements  Hypokalemia- 3.4- replacement protocol  Hypermagnesemia- 2.9   Peripheral edema- resolved  Concerns for fluid overload  BNP mildly elevated on admission- repeat ordered and was normal   Echo unremarkable  One time dose lasix 20mg 3/8- increased urine output . No further doses at this time  QT prolongation- resolved   Avoid QT prolonging agents.  Lactic acidosis - resolved  3.1 on arrival- resolved to 0.9  SIRS criteria not met.  No

## 2024-03-13 NOTE — PROGRESS NOTES
Comprehensive Nutrition Assessment    Type and Reason for Visit:  Initial, RD Nutrition Re-Screen/LOS, Positive Nutrition Screen, Wound    Nutrition Recommendations/Plan:   Continue current diet  Send Glucerna BID.  Send Julien BID.  Consider MVI as appropriate.     Malnutrition Assessment:  Malnutrition Status:  At risk for malnutrition (Comment) (03/13/24 1767)    Context:  Acute Illness     Findings of the 6 clinical characteristics of malnutrition:  Energy Intake:  Mild decrease in energy intake (Comment) (0-50% day 4, was eating % prior to that)  Weight Loss:  No significant weight loss     Body Fat Loss:  No significant body fat loss     Muscle Mass Loss:  No significant muscle mass loss    Fluid Accumulation:  Unable to assess     Strength:  Not Performed    Nutrition Assessment:     Pt. nutritionally compromised AEB wounds.  At risk for further nutrition compromise r/t increased nutrient needs for wound healing, admit with bilateral pneumonia, SOB, influenza A, HTN, BRE underlying medical condition (Hx CVA with Left residual weakness, CAD, HTN, TIA, , diarrhea).       Nutrition Related Findings:    Pt. Report/Treatments/Miscellaneous: Pt seen, mentions he lives at [facility] & mentions their staff does the cooking & tends to eat at their cafe three times/day. He mentions appetite was good there & ate good. He mentions throat has been sore & mentions appetite has been poor ~ 5 days. Pt declines diet texture adjustment. Amenable to trying Julien BID & Glucerna BID to aid with wound healing. Mentions has had diabetic diet education in the past & declines diet review.   GI Status: BM x 2 (3/13)  Pertinent Labs: (3/13) BUN 29, Glucose 170, , Ca Serum 8.3 (3/11) Hemoglobin 11.5, (3/12) trace urine ketones  Pertinent Meds: Cholestyramine Light       Wound Type: Stage II (coccyx)       Current Nutrition Intake & Therapies:    Average Meal Intake: 0%, 26-50% (0-50% day 4, was eating %

## 2024-03-14 LAB
ANION GAP SERPL CALC-SCNC: 14 MEQ/L (ref 8–16)
BASOPHILS ABSOLUTE: 0.1 THOU/MM3 (ref 0–0.1)
BASOPHILS NFR BLD AUTO: 0.7 %
BUN SERPL-MCNC: 25 MG/DL (ref 7–22)
CA-I BLD ISE-SCNC: 1.14 MMOL/L (ref 1.12–1.32)
CALCIUM SERPL-MCNC: 8.9 MG/DL (ref 8.5–10.5)
CHLORIDE SERPL-SCNC: 102 MEQ/L (ref 98–111)
CO2 SERPL-SCNC: 22 MEQ/L (ref 23–33)
CREAT SERPL-MCNC: 0.8 MG/DL (ref 0.4–1.2)
DEPRECATED RDW RBC AUTO: 45.6 FL (ref 35–45)
EKG ATRIAL RATE: 73 BPM
EKG P AXIS: 45 DEGREES
EKG P-R INTERVAL: 176 MS
EKG Q-T INTERVAL: 392 MS
EKG QRS DURATION: 118 MS
EKG QTC CALCULATION (BAZETT): 431 MS
EKG R AXIS: 51 DEGREES
EKG T AXIS: -3 DEGREES
EKG VENTRICULAR RATE: 73 BPM
EOSINOPHIL NFR BLD AUTO: 0.4 %
EOSINOPHILS ABSOLUTE: 0 THOU/MM3 (ref 0–0.4)
ERYTHROCYTE [DISTWIDTH] IN BLOOD BY AUTOMATED COUNT: 13.8 % (ref 11.5–14.5)
GFR SERPL CREATININE-BSD FRML MDRD: > 60 ML/MIN/1.73M2
GLUCOSE SERPL-MCNC: 134 MG/DL (ref 70–108)
HCT VFR BLD AUTO: 40 % (ref 42–52)
HGB BLD-MCNC: 12.8 GM/DL (ref 14–18)
IMM GRANULOCYTES # BLD AUTO: 0.3 THOU/MM3 (ref 0–0.07)
IMM GRANULOCYTES NFR BLD AUTO: 3.2 %
LYMPHOCYTES ABSOLUTE: 1.8 THOU/MM3 (ref 1–4.8)
LYMPHOCYTES NFR BLD AUTO: 18.5 %
MAGNESIUM SERPL-MCNC: 2.5 MG/DL (ref 1.6–2.4)
MCH RBC QN AUTO: 28.9 PG (ref 26–33)
MCHC RBC AUTO-ENTMCNC: 32 GM/DL (ref 32.2–35.5)
MCV RBC AUTO: 90.3 FL (ref 80–94)
MONOCYTES ABSOLUTE: 0.9 THOU/MM3 (ref 0.4–1.3)
MONOCYTES NFR BLD AUTO: 9.4 %
NEUTROPHILS NFR BLD AUTO: 67.8 %
NRBC BLD AUTO-RTO: 0 /100 WBC
PLATELET # BLD AUTO: 285 THOU/MM3 (ref 130–400)
PMV BLD AUTO: 11.3 FL (ref 9.4–12.4)
POTASSIUM SERPL-SCNC: 3.9 MEQ/L (ref 3.5–5.2)
RBC # BLD AUTO: 4.43 MILL/MM3 (ref 4.7–6.1)
SEGMENTED NEUTROPHILS ABSOLUTE COUNT: 6.4 THOU/MM3 (ref 1.8–7.7)
SODIUM SERPL-SCNC: 138 MEQ/L (ref 135–145)
WBC # BLD AUTO: 9.5 THOU/MM3 (ref 4.8–10.8)

## 2024-03-14 PROCEDURE — 36415 COLL VENOUS BLD VENIPUNCTURE: CPT

## 2024-03-14 PROCEDURE — 6360000002 HC RX W HCPCS: Performed by: PHYSICIAN ASSISTANT

## 2024-03-14 PROCEDURE — 6370000000 HC RX 637 (ALT 250 FOR IP): Performed by: PHYSICIAN ASSISTANT

## 2024-03-14 PROCEDURE — 82330 ASSAY OF CALCIUM: CPT

## 2024-03-14 PROCEDURE — 85025 COMPLETE CBC W/AUTO DIFF WBC: CPT

## 2024-03-14 PROCEDURE — 94669 MECHANICAL CHEST WALL OSCILL: CPT

## 2024-03-14 PROCEDURE — 2580000003 HC RX 258: Performed by: PHYSICIAN ASSISTANT

## 2024-03-14 PROCEDURE — 94640 AIRWAY INHALATION TREATMENT: CPT

## 2024-03-14 PROCEDURE — 6370000000 HC RX 637 (ALT 250 FOR IP)

## 2024-03-14 PROCEDURE — 93010 ELECTROCARDIOGRAM REPORT: CPT | Performed by: INTERNAL MEDICINE

## 2024-03-14 PROCEDURE — 2060000000 HC ICU INTERMEDIATE R&B

## 2024-03-14 PROCEDURE — 99233 SBSQ HOSP IP/OBS HIGH 50: CPT | Performed by: STUDENT IN AN ORGANIZED HEALTH CARE EDUCATION/TRAINING PROGRAM

## 2024-03-14 PROCEDURE — 97530 THERAPEUTIC ACTIVITIES: CPT

## 2024-03-14 PROCEDURE — 83735 ASSAY OF MAGNESIUM: CPT

## 2024-03-14 PROCEDURE — 6360000002 HC RX W HCPCS

## 2024-03-14 PROCEDURE — 97116 GAIT TRAINING THERAPY: CPT

## 2024-03-14 PROCEDURE — 6370000000 HC RX 637 (ALT 250 FOR IP): Performed by: STUDENT IN AN ORGANIZED HEALTH CARE EDUCATION/TRAINING PROGRAM

## 2024-03-14 PROCEDURE — 94761 N-INVAS EAR/PLS OXIMETRY MLT: CPT

## 2024-03-14 PROCEDURE — 2700000000 HC OXYGEN THERAPY PER DAY

## 2024-03-14 PROCEDURE — 80048 BASIC METABOLIC PNL TOTAL CA: CPT

## 2024-03-14 RX ORDER — HYDRALAZINE HYDROCHLORIDE 20 MG/ML
10 INJECTION INTRAMUSCULAR; INTRAVENOUS EVERY 6 HOURS PRN
Status: DISCONTINUED | OUTPATIENT
Start: 2024-03-14 | End: 2024-03-23 | Stop reason: HOSPADM

## 2024-03-14 RX ADMIN — ENOXAPARIN SODIUM 30 MG: 100 INJECTION SUBCUTANEOUS at 09:22

## 2024-03-14 RX ADMIN — CHOLESTYRAMINE 4 G: 4 POWDER, FOR SUSPENSION ORAL at 09:22

## 2024-03-14 RX ADMIN — HYDROCHLOROTHIAZIDE 25 MG: 12.5 CAPSULE ORAL at 15:00

## 2024-03-14 RX ADMIN — ISOSORBIDE MONONITRATE 30 MG: 30 TABLET, EXTENDED RELEASE ORAL at 09:22

## 2024-03-14 RX ADMIN — TAMSULOSIN HYDROCHLORIDE 0.4 MG: 0.4 CAPSULE ORAL at 20:21

## 2024-03-14 RX ADMIN — PANTOPRAZOLE SODIUM 40 MG: 40 TABLET, DELAYED RELEASE ORAL at 04:33

## 2024-03-14 RX ADMIN — SODIUM CHLORIDE, PRESERVATIVE FREE 10 ML: 5 INJECTION INTRAVENOUS at 03:12

## 2024-03-14 RX ADMIN — IPRATROPIUM BROMIDE AND ALBUTEROL SULFATE 1 DOSE: .5; 3 SOLUTION RESPIRATORY (INHALATION) at 16:37

## 2024-03-14 RX ADMIN — HYDRALAZINE HYDROCHLORIDE 10 MG: 20 INJECTION, SOLUTION INTRAMUSCULAR; INTRAVENOUS at 03:11

## 2024-03-14 RX ADMIN — SODIUM CHLORIDE, PRESERVATIVE FREE 10 ML: 5 INJECTION INTRAVENOUS at 09:23

## 2024-03-14 RX ADMIN — SODIUM CHLORIDE, PRESERVATIVE FREE 10 ML: 5 INJECTION INTRAVENOUS at 20:23

## 2024-03-14 RX ADMIN — CLONIDINE HYDROCHLORIDE 0.1 MG: 0.1 TABLET ORAL at 20:21

## 2024-03-14 RX ADMIN — LEVOFLOXACIN 750 MG: 5 INJECTION, SOLUTION INTRAVENOUS at 12:17

## 2024-03-14 RX ADMIN — ENOXAPARIN SODIUM 30 MG: 100 INJECTION SUBCUTANEOUS at 20:20

## 2024-03-14 RX ADMIN — LOSARTAN POTASSIUM 50 MG: 50 TABLET, FILM COATED ORAL at 09:22

## 2024-03-14 RX ADMIN — IPRATROPIUM BROMIDE AND ALBUTEROL SULFATE 1 DOSE: .5; 3 SOLUTION RESPIRATORY (INHALATION) at 08:28

## 2024-03-14 RX ADMIN — LOSARTAN POTASSIUM 50 MG: 50 TABLET, FILM COATED ORAL at 20:21

## 2024-03-14 RX ADMIN — ASPIRIN 81 MG CHEWABLE TABLET 81 MG: 81 TABLET CHEWABLE at 09:22

## 2024-03-14 RX ADMIN — CLONIDINE HYDROCHLORIDE 0.1 MG: 0.1 TABLET ORAL at 09:22

## 2024-03-14 NOTE — PROGRESS NOTES
Hospitalist Progress Note    Patient:  Del Yoon      Unit/Bed:4K-23/023-A    YOB: 1950    MRN: [id number]       Acct: [id number]     PCP: Faby Suh APRN - CNP    Date of Admission: 3/5/2024    Assessment/Plan:    Bilateral Pneumonia, POA  Chest x-ray with infiltrate.  CTA 3/11 without PE but demonstrated bibasilar consolidations   Pneumonia panel with H Flu, Serratia, and Flu A .  Respiratory culture: Growing yeast, awaiting further speciation.  Completed 7 day course of zosyn. Continue levaquin at this time as patient showing clinical improvement   MRSA nares and legionella and strep pneumo urine studies negative   Add on prednisone 3/8- - continue for 5 day course. Consider hydrocortisone 200mg daily infusion   Influenza A  Symptoms began 3/3.  Completed course of tamiflu  Supportive care  Acute respiratory failure secondary to pneumonia -resolved.  Escalated to HHFNC 3/10 overnight.  Currently weaned to room air.  ABG with metabolic acidosis    Continue to treat as above  Accelerated HTN with history of HTN - improved  Continue home losartan and HCTZ  Continue clonidine 0.1mg BID - monitor BP closely  Continue with hydralazine PRN for SBP > 170 (required overnight on 3/14)  BRE  Baseline 1.0-1.1. Initially was up to 1.5, now down to 0.8.   Renal US suggestive of retention due to increased PVR, but creatinine is improving, thus will monitor for now.   Hold nephrotoxic agents. Contrast from CTA also contributing   Electrolyte derangements  Hypokalemia-on electrolyte replacement protocol  Hypermagnesemia- 2.5   Peripheral edema- resolved  Concerns for fluid overload  BNP mildly elevated on admission- repeat ordered and was normal   Echo unremarkable  One time dose lasix 20mg 3/8- increased urine output . No further doses at this time  QT prolongation- resolved   Avoid QT prolonging agents.  Lactic acidosis - resolved  3.1 on arrival- resolved to 0.9  SIRS criteria not                    [] Encourage ambulation           [] Already on Anticoagulation     Code Status: Limited    PT/OT Eval Status: Ordered    Tele:   [x] yes             [] no    Active Hospital Problems    Diagnosis Date Noted    Influenza A [J10.1] 03/13/2024    Acute respiratory failure with hypoxia (HCC) [J96.01] 03/13/2024    Primary hypertension [I10] 03/13/2024    BRE (acute kidney injury) (HCC) [N17.9] 03/13/2024    Peripheral edema [R60.9] 03/13/2024    Dizziness [R42] 03/13/2024    Hyperlipidemia [E78.5] 03/13/2024    History of CVA (cerebrovascular accident) [Z86.73] 03/13/2024    Community acquired pneumonia of left lower lobe of lung [J18.9] 03/05/2024    Coronary artery disease involving native coronary artery of native heart without angina pectoris [I25.10] 06/14/2021       Electronically signed by Ashita Behl, MD on 3/14/2024 at 6:55 PM

## 2024-03-14 NOTE — RT PROTOCOL NOTE
RT Nebulizer Bronchodilator Protocol Note    There is a bronchodilator order in the chart from a provider indicating to follow the RT Bronchodilator Protocol and there is an “Initiate RT Bronchodilator Protocol” order as well (see protocol at bottom of note).    CXR Findings:  No results found.    The findings from the last RT Protocol Assessment were as follows:  Smoking: None or smoker <15 pack years  Respiratory Pattern: Dyspnea on exertion or RR 21-25 bpm  Breath Sounds: Slightly diminished and/or crackles  Cough: Strong, spontaneous, non-productive  Indication for Bronchodilator Therapy: Decreased or absent breath sounds  Bronchodilator Assessment Score: 4    Aerosolized bronchodilator medication orders have been revised according to the RT Nebulizer Bronchodilator Protocol below.    Respiratory Therapist to perform RT Therapy Protocol Assessment initially then follow the protocol.  Repeat RT Therapy Protocol Assessment PRN for score 0-3 or on second treatment, BID, and PRN for scores above 3.    No Indications - adjust the frequency to every 6 hours PRN wheezing or bronchospasm, if no treatments needed after 48 hours then discontinue using Per Protocol order mode.     If indication present, adjust the RT bronchodilator orders based on the Bronchodilator Assessment Score as indicated below.  If a patient is on this medication at home then do not decrease Frequency below that used at home.    0-3 - enter or revise RT bronchodilator order(s) to equivalent RT Bronchodilator order with Frequency of every 4 hours PRN for wheezing or increased work of breathing using Per Protocol order mode.       4-6 - enter or revise RT Bronchodilator order(s) to two equivalent RT bronchodilator orders with one order with BID Frequency and one order with Frequency of every 4 hours PRN wheezing or increased work of breathing using Per Protocol order mode.         7-10 - enter or revise RT Bronchodilator order(s) to two equivalent RT  bronchodilator orders with one order with TID Frequency and one order with Frequency of every 4 hours PRN wheezing or increased work of breathing using Per Protocol order mode.       11-13 - enter or revise RT Bronchodilator order(s) to one equivalent RT bronchodilator order with QID Frequency and an Albuterol order with Frequency of every 4 hours PRN wheezing or increased work of breathing using Per Protocol order mode.      Greater than 13 - enter or revise RT Bronchodilator order(s) to one equivalent RT bronchodilator order with every 4 hours Frequency and an Albuterol order with Frequency of every 2 hours PRN wheezing or increased work of breathing using Per Protocol order mode.     RT to enter RT Home Evaluation for COPD & MDI Assessment order using Per Protocol order mode.    Electronically signed by Kendy Reyes on 3/14/2024 at 8:34 AM

## 2024-03-14 NOTE — PROGRESS NOTES
[facility]  INPATIENT PHYSICAL THERAPY  DAILY NOTE  [facility] ICU STEPDOWN TELEMETRY 4K - 4K-23/023-A    Time In: 0811  Time Out: 0837  Timed Code Treatment Minutes: 26 Minutes  Minutes: 26          Date: 3/14/2024  Patient Name: Del Yoon,  Gender:  male        MRN: [id number]  : 1950  (73 y.o.)     Referring Practitioner: Klaudia Garcia PA-C  Diagnosis: Shortness of breath  Additional Pertinent Hx: HPI: Del Yoon is a 72 yo male that presents to the ED with SOB and weakness since . He states he felt short of breath and felt significant weakness that prevented him being able to ambulate. His legs \"would not move\" He recently moved into assisted living and gets around with a walker or wheelchair for longer distances. Pt admitted for pneumonia, Flu A.  Transferred to stepdown 3/11 for worsening respiratory status, CTA negative for PE.     Prior Level of Function:  Lives With: Alone  Type of Home: Assisted living  Home Layout: One level  Home Access: Level entry  Home Equipment: Wheelchair-manual, Walker, 4 wheeled, Cane, Lift chair, Reacher   Bathroom Shower/Tub: Walk-in shower  Bathroom Toilet: Standard  Bathroom Equipment: Grab bars in shower, Grab bars around toilet, Shower chair    Receives Help From: Personal care attendant  ADL Assistance: Needs assistance (A for  BADL & LE dressing)  Ambulation Assistance: Needs assistance  Transfer Assistance: Needs assistance  Additional Comments: Per PT eval: Pt reports that he was able to use furniture for mobility around his apartment, walker for short distances, and wheelchair for longer distances. Wife reports Pt has been needing A out of chairs, \"that' why he is in the nursing home\".    Restrictions/Precautions:  Restrictions/Precautions: Fall Risk, General Precautions, Isolation     SUBJECTIVE: Pt in bed upon arrival, and agrees to therapy, RN approved session, Pt pleasant and cooperative, Pt reporting feeling nauseated prior to  : not set due to short ELOS    Following session, patient left in safe position with all fall risk precautions in place.

## 2024-03-15 ENCOUNTER — APPOINTMENT (OUTPATIENT)
Dept: CT IMAGING | Age: 74
End: 2024-03-15
Payer: MEDICARE

## 2024-03-15 LAB
ANION GAP SERPL CALC-SCNC: 12 MEQ/L (ref 8–16)
BASOPHILS ABSOLUTE: 0 THOU/MM3 (ref 0–0.1)
BASOPHILS NFR BLD AUTO: 0.4 %
BUN SERPL-MCNC: 24 MG/DL (ref 7–22)
CALCIUM SERPL-MCNC: 8.6 MG/DL (ref 8.5–10.5)
CHLORIDE SERPL-SCNC: 102 MEQ/L (ref 98–111)
CO2 SERPL-SCNC: 23 MEQ/L (ref 23–33)
CREAT SERPL-MCNC: 0.8 MG/DL (ref 0.4–1.2)
DEPRECATED RDW RBC AUTO: 45.1 FL (ref 35–45)
EOSINOPHIL NFR BLD AUTO: 1.5 %
EOSINOPHILS ABSOLUTE: 0.2 THOU/MM3 (ref 0–0.4)
ERYTHROCYTE [DISTWIDTH] IN BLOOD BY AUTOMATED COUNT: 14 % (ref 11.5–14.5)
GFR SERPL CREATININE-BSD FRML MDRD: > 60 ML/MIN/1.73M2
GLUCOSE SERPL-MCNC: 101 MG/DL (ref 70–108)
HCT VFR BLD AUTO: 36.2 % (ref 42–52)
HGB BLD-MCNC: 11.8 GM/DL (ref 14–18)
IMM GRANULOCYTES # BLD AUTO: 0.22 THOU/MM3 (ref 0–0.07)
IMM GRANULOCYTES NFR BLD AUTO: 2.2 %
LYMPHOCYTES ABSOLUTE: 2 THOU/MM3 (ref 1–4.8)
LYMPHOCYTES NFR BLD AUTO: 19.6 %
MAGNESIUM SERPL-MCNC: 2.3 MG/DL (ref 1.6–2.4)
MCH RBC QN AUTO: 28.9 PG (ref 26–33)
MCHC RBC AUTO-ENTMCNC: 32.6 GM/DL (ref 32.2–35.5)
MCV RBC AUTO: 88.7 FL (ref 80–94)
MONOCYTES ABSOLUTE: 0.9 THOU/MM3 (ref 0.4–1.3)
MONOCYTES NFR BLD AUTO: 9.2 %
NEUTROPHILS NFR BLD AUTO: 67.1 %
NRBC BLD AUTO-RTO: 0 /100 WBC
PLATELET # BLD AUTO: 281 THOU/MM3 (ref 130–400)
PMV BLD AUTO: 11.1 FL (ref 9.4–12.4)
POTASSIUM SERPL-SCNC: 3.8 MEQ/L (ref 3.5–5.2)
RBC # BLD AUTO: 4.08 MILL/MM3 (ref 4.7–6.1)
SEGMENTED NEUTROPHILS ABSOLUTE COUNT: 6.7 THOU/MM3 (ref 1.8–7.7)
SODIUM SERPL-SCNC: 137 MEQ/L (ref 135–145)
WBC # BLD AUTO: 10 THOU/MM3 (ref 4.8–10.8)

## 2024-03-15 PROCEDURE — 97530 THERAPEUTIC ACTIVITIES: CPT

## 2024-03-15 PROCEDURE — 6360000002 HC RX W HCPCS: Performed by: PHYSICIAN ASSISTANT

## 2024-03-15 PROCEDURE — 6370000000 HC RX 637 (ALT 250 FOR IP): Performed by: PHYSICIAN ASSISTANT

## 2024-03-15 PROCEDURE — 99233 SBSQ HOSP IP/OBS HIGH 50: CPT | Performed by: STUDENT IN AN ORGANIZED HEALTH CARE EDUCATION/TRAINING PROGRAM

## 2024-03-15 PROCEDURE — 6370000000 HC RX 637 (ALT 250 FOR IP): Performed by: STUDENT IN AN ORGANIZED HEALTH CARE EDUCATION/TRAINING PROGRAM

## 2024-03-15 PROCEDURE — 74177 CT ABD & PELVIS W/CONTRAST: CPT

## 2024-03-15 PROCEDURE — 80048 BASIC METABOLIC PNL TOTAL CA: CPT

## 2024-03-15 PROCEDURE — 83735 ASSAY OF MAGNESIUM: CPT

## 2024-03-15 PROCEDURE — 6360000004 HC RX CONTRAST MEDICATION: Performed by: STUDENT IN AN ORGANIZED HEALTH CARE EDUCATION/TRAINING PROGRAM

## 2024-03-15 PROCEDURE — 94640 AIRWAY INHALATION TREATMENT: CPT

## 2024-03-15 PROCEDURE — 85025 COMPLETE CBC W/AUTO DIFF WBC: CPT

## 2024-03-15 PROCEDURE — 97110 THERAPEUTIC EXERCISES: CPT

## 2024-03-15 PROCEDURE — 6370000000 HC RX 637 (ALT 250 FOR IP)

## 2024-03-15 PROCEDURE — 2580000003 HC RX 258: Performed by: PHYSICIAN ASSISTANT

## 2024-03-15 PROCEDURE — 2060000000 HC ICU INTERMEDIATE R&B

## 2024-03-15 PROCEDURE — 36415 COLL VENOUS BLD VENIPUNCTURE: CPT

## 2024-03-15 PROCEDURE — 87507 IADNA-DNA/RNA PROBE TQ 12-25: CPT

## 2024-03-15 RX ADMIN — HYDROCHLOROTHIAZIDE 25 MG: 12.5 CAPSULE ORAL at 09:46

## 2024-03-15 RX ADMIN — PANTOPRAZOLE SODIUM 40 MG: 40 TABLET, DELAYED RELEASE ORAL at 06:07

## 2024-03-15 RX ADMIN — LOPERAMIDE HYDROCHLORIDE 2 MG: 2 CAPSULE ORAL at 22:07

## 2024-03-15 RX ADMIN — TAMSULOSIN HYDROCHLORIDE 0.4 MG: 0.4 CAPSULE ORAL at 22:07

## 2024-03-15 RX ADMIN — CLONIDINE HYDROCHLORIDE 0.1 MG: 0.1 TABLET ORAL at 22:07

## 2024-03-15 RX ADMIN — LOSARTAN POTASSIUM 50 MG: 50 TABLET, FILM COATED ORAL at 09:46

## 2024-03-15 RX ADMIN — SODIUM CHLORIDE, PRESERVATIVE FREE 10 ML: 5 INJECTION INTRAVENOUS at 22:07

## 2024-03-15 RX ADMIN — LEVOFLOXACIN 750 MG: 5 INJECTION, SOLUTION INTRAVENOUS at 13:16

## 2024-03-15 RX ADMIN — CHOLESTYRAMINE 4 G: 4 POWDER, FOR SUSPENSION ORAL at 09:46

## 2024-03-15 RX ADMIN — ENOXAPARIN SODIUM 30 MG: 100 INJECTION SUBCUTANEOUS at 09:46

## 2024-03-15 RX ADMIN — ASPIRIN 81 MG CHEWABLE TABLET 81 MG: 81 TABLET CHEWABLE at 09:46

## 2024-03-15 RX ADMIN — LOSARTAN POTASSIUM 50 MG: 50 TABLET, FILM COATED ORAL at 22:07

## 2024-03-15 RX ADMIN — SODIUM CHLORIDE, PRESERVATIVE FREE 10 ML: 5 INJECTION INTRAVENOUS at 09:47

## 2024-03-15 RX ADMIN — IOPAMIDOL 80 ML: 755 INJECTION, SOLUTION INTRAVENOUS at 12:35

## 2024-03-15 RX ADMIN — CLONIDINE HYDROCHLORIDE 0.1 MG: 0.1 TABLET ORAL at 09:46

## 2024-03-15 RX ADMIN — ISOSORBIDE MONONITRATE 30 MG: 30 TABLET, EXTENDED RELEASE ORAL at 09:46

## 2024-03-15 RX ADMIN — IPRATROPIUM BROMIDE AND ALBUTEROL SULFATE 1 DOSE: .5; 3 SOLUTION RESPIRATORY (INHALATION) at 09:45

## 2024-03-15 ASSESSMENT — PAIN SCALES - GENERAL: PAINLEVEL_OUTOF10: 0

## 2024-03-15 NOTE — PLAN OF CARE
Problem: Skin/Tissue Integrity  Goal: Absence of new skin breakdown  Description: 1.  Monitor for areas of redness and/or skin breakdown  2.  Assess vascular access sites hourly  3.  Every 4-6 hours minimum:  Change oxygen saturation probe site  4.  Every 4-6 hours:  If on nasal continuous positive airway pressure, respiratory therapy assess nares and determine need for appliance change or resting period.  Outcome: Progressing     Problem: Safety - Adult  Goal: Free from fall injury  Outcome: Progressing  Flowsheets (Taken 3/15/2024 0420)  Free From Fall Injury: Instruct family/caregiver on patient safety     Problem: Chronic Conditions and Co-morbidities  Goal: Patient's chronic conditions and co-morbidity symptoms are monitored and maintained or improved  Outcome: Progressing  Flowsheets (Taken 3/15/2024 0420)  Care Plan - Patient's Chronic Conditions and Co-Morbidity Symptoms are Monitored and Maintained or Improved:   Monitor and assess patient's chronic conditions and comorbid symptoms for stability, deterioration, or improvement   Collaborate with multidisciplinary team to address chronic and comorbid conditions and prevent exacerbation or deterioration     Problem: Discharge Planning  Goal: Discharge to home or other facility with appropriate resources  Outcome: Progressing  Flowsheets (Taken 3/15/2024 0420)  Discharge to home or other facility with appropriate resources:   Identify barriers to discharge with patient and caregiver   Arrange for needed discharge resources and transportation as appropriate   Identify discharge learning needs (meds, wound care, etc)     Problem: Pain  Goal: Verbalizes/displays adequate comfort level or baseline comfort level  Outcome: Progressing  Flowsheets (Taken 3/15/2024 0420)  Verbalizes/displays adequate comfort level or baseline comfort level:   Encourage patient to monitor pain and request assistance   Assess pain using appropriate pain scale   Implement  non-pharmacological measures as appropriate and evaluate response     Problem: Respiratory - Adult  Goal: Achieves optimal ventilation and oxygenation  Outcome: Progressing  Flowsheets (Taken 3/15/2024 0420)  Achieves optimal ventilation and oxygenation:   Assess for changes in respiratory status   Assess for changes in mentation and behavior   Position to facilitate oxygenation and minimize respiratory effort   Encourage broncho-pulmonary hygiene including cough, deep breathe, incentive spirometry     Problem: Respiratory - Adult  Goal: Clear lung sounds  Outcome: Progressing     Problem: Skin/Tissue Integrity - Adult  Goal: Skin integrity remains intact  Outcome: Progressing  Flowsheets (Taken 3/15/2024 0420)  Skin Integrity Remains Intact:   Monitor for areas of redness and/or skin breakdown   Assess vascular access sites hourly     Problem: Infection - Adult  Goal: Absence of infection at discharge  Outcome: Progressing  Flowsheets (Taken 3/15/2024 0420)  Absence of infection at discharge:   Assess and monitor for signs and symptoms of infection   Monitor lab/diagnostic results   Monitor all insertion sites i.e., indwelling lines, tubes and drains   Monitor endotracheal (as able) and nasal secretions for changes in amount and color   Administer medications as ordered     Problem: Musculoskeletal - Adult  Goal: Return mobility to safest level of function  Outcome: Progressing  Flowsheets (Taken 3/15/2024 0420)  Return Mobility to Safest Level of Function:   Assess patient stability and activity tolerance for standing, transferring and ambulating with or without assistive devices   Assist with transfers and ambulation using safe patient handling equipment as needed   Ensure adequate protection for wounds/incisions during mobilization   Instruct patient/family in ordered activity level     Problem: Gastrointestinal - Adult  Goal: Maintains or returns to baseline bowel function  Outcome: Progressing  Flowsheets
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
## 2024-03-15 NOTE — PROGRESS NOTES
Hospitalist Progress Note    Patient:  Del Yoon      Unit/Bed:4K-23/023-A    YOB: 1950    MRN: [id number]       Acct: [id number]     PCP: Faby Suh APRN - CNP    Date of Admission: 3/5/2024    Assessment/Plan:    Bilateral Pneumonia, POA  Chest x-ray with infiltrate.  CTA 3/11 without PE but demonstrated bibasilar consolidations   Pneumonia panel with H Flu, Serratia, and Flu A .  Respiratory culture: Growing yeast, awaiting further speciation.  Completed 7 day course of zosyn. Continue levaquin at this time as patient showing clinical improvement (3/11-3/17)   MRSA nares and legionella and strep pneumo urine studies negative   Add on prednisone 3/8- - continue for 5 day course. Consider hydrocortisone 200mg daily infusion   Right lower quadrant abdominal pain secondary to rectus sheath hematoma: Patient reports 1 week history of right lower quadrant abdominal pain.  CT abdomen/pelvis on 3/15 showed right rectus sheath hematoma measures up to 4.4 x 12.3 cm in short axis and extends approximately 10 cm in length to just above the level of the umbilicus. Scattered air-fluid levels in the small and large bowel loops is nonspecific and can indicate enteritis.   Held home aspirin and Lovenox considering the findings above.  Hemoglobin has remained 11-12 range (baseline of 14-15), if stable tomorrow, then aspirin can be likely resumed.   Will check C. difficile and GI panel to make sure he does not have active GI infection given findings above as he reports diarrhea.  Nursing to monitor stools consistency and color  Will need outpatient repeat CT imaging to ensure resolution.  Curbside surgery, recommended no need for consultation as usually they resolve on their own with supportive treatment unless hemoglobin declines significantly.  Influenza A  Symptoms began 3/3.  Completed course of tamiflu  Supportive care  Acute respiratory failure secondary to pneumonia  hours.  No results for input(s): \"CKTOTAL\", \"TROPONINI\" in the last 72 hours.    Microbiology:      Urinalysis:      Lab Results   Component Value Date/Time    NITRU NEGATIVE 03/12/2024 10:20 AM    WBCUA 0-2 03/12/2024 10:20 AM    BACTERIA NONE SEEN 03/12/2024 10:20 AM    RBCUA 0-2 03/12/2024 10:20 AM    BLOODU NEGATIVE 03/12/2024 10:20 AM    SPECGRAV >1.030 03/12/2024 10:20 AM    GLUCOSEU Negative 06/29/2021 09:37 AM       Radiology:  XR CHEST PORTABLE    Result Date: 3/5/2024  1 view chest x-ray Comparison: CR/SR - XR CHEST PORTABLE - 07/23/2022 01:51 PM EDT Findings: Possible focus of airspace opacity within the left lower lung. The right lung is clear. Heart size is normal. Status post right shoulder replacement. No acute displaced fracture.     Possible focus of atelectasis or infiltrate at the base of the left lung. This document has been electronically signed by: Dariela Iglesias MD on 03/05/2024 06:53 PM      DVT prophylaxis: [] Lovenox                                 [x] SCDs                                 [] SQ Heparin                                 [] Encourage ambulation           [] Already on Anticoagulation     Code Status: Limited    PT/OT Eval Status: Ordered    Tele:   [x] yes             [] no    Active Hospital Problems    Diagnosis Date Noted    Influenza A [J10.1] 03/13/2024    Acute respiratory failure with hypoxia (HCC) [J96.01] 03/13/2024    Primary hypertension [I10] 03/13/2024    BRE (acute kidney injury) (HCC) [N17.9] 03/13/2024    Peripheral edema [R60.9] 03/13/2024    Dizziness [R42] 03/13/2024    Hyperlipidemia [E78.5] 03/13/2024    History of CVA (cerebrovascular accident) [Z86.73] 03/13/2024    Community acquired pneumonia of left lower lobe of lung [J18.9] 03/05/2024    Coronary artery disease involving native coronary artery of native heart without angina pectoris [I25.10] 06/14/2021       Electronically signed by Ashita Behl, MD on 3/15/2024 at 6:03 PM

## 2024-03-15 NOTE — RT PROTOCOL NOTE
revise RT bronchodilator order(s) to equivalent RT Bronchodilator order with Frequency of every 4 hours PRN for wheezing or increased work of breathing using Per Protocol order mode.        4-6 - enter or revise RT Bronchodilator order(s) to two equivalent RT bronchodilator orders with one order with BID Frequency and one order with Frequency of every 4 hours PRN wheezing or increased work of breathing using Per Protocol order mode.        7-10 - enter or revise RT Bronchodilator order(s) to two equivalent RT bronchodilator orders with one order with TID Frequency and one order with Frequency of every 4 hours PRN wheezing or increased work of breathing using Per Protocol order mode.       11-13 - enter or revise RT Bronchodilator order(s) to one equivalent RT bronchodilator order with QID Frequency and an Albuterol order with Frequency of every 4 hours PRN wheezing or increased work of breathing using Per Protocol order mode.      Greater than 13 - enter or revise RT Bronchodilator order(s) to one equivalent RT bronchodilator order with every 4 hours Frequency and an Albuterol order with Frequency of every 2 hours PRN wheezing or increased work of breathing using Per Protocol order mode.     RT to enter RT Home Evaluation for COPD & MDI Assessment order using Per Protocol order mode.    Electronically signed by June Vilchis RCP on 3/15/2024 at 9:54 AM

## 2024-03-15 NOTE — PROGRESS NOTES
[facility]  INPATIENT PHYSICAL THERAPY  DAILY NOTE  [facility] ICU STEPDOWN TELEMETRY 4K - 4K-23/023-A    Time In: 0155  Time Out: 0219  Timed Code Treatment Minutes: 24 Minutes  Minutes: 24          Date: 3/15/2024  Patient Name: Del Yoon,  Gender:  male        MRN: [id number]  : 1950  (73 y.o.)     Referring Practitioner: Klaudia Garcia PA-C  Diagnosis: Shortness of breath  Additional Pertinent Hx: HPI: Del Yoon is a 74 yo male that presents to the ED with SOB and weakness since . He states he felt short of breath and felt significant weakness that prevented him being able to ambulate. His legs \"would not move\" He recently moved into assisted living and gets around with a walker or wheelchair for longer distances. Pt admitted for pneumonia, Flu A.  Transferred to stepdown 3/11 for worsening respiratory status, CTA negative for PE.     Prior Level of Function:  Lives With: Alone  Type of Home: Assisted living  Home Layout: One level  Home Access: Level entry  Home Equipment: Wheelchair-manual, Walker, 4 wheeled, Cane, Lift chair, Reacher   Bathroom Shower/Tub: Walk-in shower  Bathroom Toilet: Standard  Bathroom Equipment: Grab bars in shower, Grab bars around toilet, Shower chair    Receives Help From: Personal care attendant  ADL Assistance: Needs assistance (A for  BADL & LE dressing)  Ambulation Assistance: Needs assistance  Transfer Assistance: Needs assistance  Additional Comments: Per PT eval: Pt reports that he was able to use furniture for mobility around his apartment, walker for short distances, and wheelchair for longer distances. Wife reports Pt has been needing A out of chairs, \"that' why he is in the nursing home\".    Restrictions/Precautions:  Restrictions/Precautions: Fall Risk, General Precautions, Isolation     SUBJECTIVE: Pt in supine and sister @ bed side. Pt agreeable w/ skilled therapy. Pt NC was out of nose    PAIN: -/10:     Vitals: Vitals not assessed per  clinical judgement, see nursing flowsheet    OBJECTIVE:  Bed Mobility:  Supine to Sit: Moderate Assistance, with head of bed raised, with verbal cues , with increased time for completion  Sit to Supine: Not tested   Scooting: Maximum Assistance, with verbal cues , with increased time for completion  -pt required increased time to complete w/ modA for trunk d/t fatigue.    Transfers:  Sit to Stand: Moderate Assistance, with increased time for completion, cues for hand placement  Stand to Sit:Moderate Assistance, with increased time for completion, cues for hand placement  -Pt demonstrating improve initiation into standing and required modA from bed level. Pt demonstrating improved standing posture    Ambulation:  Contact Guard Assistance, with cues for safety, with increased time for completion, Mihir for RW mgmt  Distance: 4'  Surface: Level Tile  Device:Rolling Walker  Gait Deviations:  Forward Flexed Posture, Slow Sofi, Decreased Weight Shift Bilaterally, and Decreased Heel Strike Bilaterally  -Pt demonstrating improved step length during ambulation. Pt balance required CGA and cues on ensure safety ands forward gaze towards direction. Pt attempting to prematurely sit. Pt required cues for safety awareness.    Balance:  Static Sitting Balance:  Contact Guard Assistance, with cues for safety, with increased time for completion  Static Standing Balance: Contact Guard Assistance, with cues for safety, with verbal cues   Dynamic Standing Balance: Contact Guard Assistance, Minimal Assistance, with cues for safety, with verbal cues , with increased time for completion  -Pt demonstrating improved balance. Pt required extending time sitting on EOB d/t increased fatigue after transition from supine>sit.    Exercise:  Patient was guided in 1 set(s) 10 reps of exercise to both lower extremities.  Ankle pumps, Hip abduction/adduction, and Long arc quads.  Exercises were completed for increased independence with functional  mobility.    Functional Outcome Measures: Completed  -PAC Inpatient Mobility without Stair Climbing Raw Score : 11  AM-PAC Inpatient without Stair Climbing T-Scale Score : 35.66  Modified Santi Scale:  Not Applicable    ASSESSMENT:  Assessment: Patient progressing toward established goals.  Activity Tolerance:  Patient tolerance of  treatment: fair. Pt demonstrating improved fluidity during mobilizing. Pt continues to have difficulty w/ using UE to assist w/ transition out of bed and standing. Pt also presenting w/ decreased use of RW d/t difficulty w/ moving it. Pt required increased time for resting d/t fatigue. Pt would benefit from continued skilled therapy o improve mobility and functional activity tolerance.     Equipment Recommendations:Equipment Needed: No  Discharge Recommendations: Subacte/Skilled Nursing Facility  Plan: Current Treatment Recommendations: Strengthening, Balance training, Gait training, Functional mobility training, Home exercise program, Safety education & training, Patient/Caregiver education & training, Transfer training, Endurance training, Therapeutic activities  General Plan:  (3-5x GM)    Education  Education:  Learners: Patient  Patient Education: Plan of Care, Bed Mobility, Transfers, Gait, Verbal Exercise Instruction, Activity Pacing, Energy Conservation    Goals:  Patient Goals : go to SNF  Short Term Goals  Time Frame for Short Term Goals: at discharge  Short Term Goal 1: Pt to be Mod I for supine <> sit to get in/out of bed  Short Term Goal 2: Pt to be Mod I for sit <> stand to get up to ambulate  Short Term Goal 3: Pt to ambulate >10 ft with RW with SBA to get to bathroom  Long Term Goals  Time Frame for Long Term Goals : not set due to short ELOS    Following session, patient left in safe position with all fall risk precautions in place.

## 2024-03-16 ENCOUNTER — APPOINTMENT (OUTPATIENT)
Dept: GENERAL RADIOLOGY | Age: 74
End: 2024-03-16
Payer: MEDICARE

## 2024-03-16 PROBLEM — K92.1 MELENA: Status: ACTIVE | Noted: 2024-03-16

## 2024-03-16 PROBLEM — S30.1XXA HEMATOMA OF RECTUS SHEATH: Status: ACTIVE | Noted: 2024-03-16

## 2024-03-16 LAB
ANION GAP SERPL CALC-SCNC: 13 MEQ/L (ref 8–16)
BACTERIA SPEC RESP CULT: ABNORMAL
BACTERIA SPEC RESP CULT: ABNORMAL
BASOPHILS ABSOLUTE: 0.1 THOU/MM3 (ref 0–0.1)
BASOPHILS NFR BLD AUTO: 0.5 %
BUN SERPL-MCNC: 24 MG/DL (ref 7–22)
CALCIUM SERPL-MCNC: 8.8 MG/DL (ref 8.5–10.5)
CHLORIDE SERPL-SCNC: 101 MEQ/L (ref 98–111)
CO2 SERPL-SCNC: 22 MEQ/L (ref 23–33)
CREAT SERPL-MCNC: 0.9 MG/DL (ref 0.4–1.2)
DEPRECATED RDW RBC AUTO: 45.3 FL (ref 35–45)
EKG ATRIAL RATE: 77 BPM
EKG P AXIS: 42 DEGREES
EKG P-R INTERVAL: 174 MS
EKG Q-T INTERVAL: 380 MS
EKG QRS DURATION: 110 MS
EKG QTC CALCULATION (BAZETT): 430 MS
EKG R AXIS: 64 DEGREES
EKG T AXIS: -4 DEGREES
EKG VENTRICULAR RATE: 77 BPM
EOSINOPHIL NFR BLD AUTO: 1.7 %
EOSINOPHILS ABSOLUTE: 0.2 THOU/MM3 (ref 0–0.4)
ERYTHROCYTE [DISTWIDTH] IN BLOOD BY AUTOMATED COUNT: 14.3 % (ref 11.5–14.5)
GFR SERPL CREATININE-BSD FRML MDRD: > 60 ML/MIN/1.73M2
GLUCOSE SERPL-MCNC: 114 MG/DL (ref 70–108)
GRAM STN SPEC: ABNORMAL
HCT VFR BLD AUTO: 36.8 % (ref 42–52)
HCT VFR BLD AUTO: 37.2 % (ref 42–52)
HGB BLD-MCNC: 12.3 GM/DL (ref 14–18)
HGB BLD-MCNC: 12.3 GM/DL (ref 14–18)
IMM GRANULOCYTES # BLD AUTO: 0.28 THOU/MM3 (ref 0–0.07)
IMM GRANULOCYTES NFR BLD AUTO: 2.3 %
INR PPP: 1.1 (ref 0.85–1.13)
LYMPHOCYTES ABSOLUTE: 1.8 THOU/MM3 (ref 1–4.8)
LYMPHOCYTES NFR BLD AUTO: 14.6 %
MAGNESIUM SERPL-MCNC: 2.3 MG/DL (ref 1.6–2.4)
MCH RBC QN AUTO: 28.9 PG (ref 26–33)
MCHC RBC AUTO-ENTMCNC: 33.1 GM/DL (ref 32.2–35.5)
MCV RBC AUTO: 87.3 FL (ref 80–94)
MONOCYTES ABSOLUTE: 1 THOU/MM3 (ref 0.4–1.3)
MONOCYTES NFR BLD AUTO: 8.6 %
NEUTROPHILS NFR BLD AUTO: 72.3 %
NRBC BLD AUTO-RTO: 0 /100 WBC
ORGANISM: ABNORMAL
PLATELET # BLD AUTO: 318 THOU/MM3 (ref 130–400)
PMV BLD AUTO: 11 FL (ref 9.4–12.4)
POTASSIUM SERPL-SCNC: 3.2 MEQ/L (ref 3.5–5.2)
POTASSIUM SERPL-SCNC: 3.3 MEQ/L (ref 3.5–5.2)
RBC # BLD AUTO: 4.26 MILL/MM3 (ref 4.7–6.1)
SEGMENTED NEUTROPHILS ABSOLUTE COUNT: 8.7 THOU/MM3 (ref 1.8–7.7)
SODIUM SERPL-SCNC: 136 MEQ/L (ref 135–145)
WBC # BLD AUTO: 12 THOU/MM3 (ref 4.8–10.8)

## 2024-03-16 PROCEDURE — 94640 AIRWAY INHALATION TREATMENT: CPT

## 2024-03-16 PROCEDURE — 84132 ASSAY OF SERUM POTASSIUM: CPT

## 2024-03-16 PROCEDURE — 6370000000 HC RX 637 (ALT 250 FOR IP): Performed by: STUDENT IN AN ORGANIZED HEALTH CARE EDUCATION/TRAINING PROGRAM

## 2024-03-16 PROCEDURE — 93010 ELECTROCARDIOGRAM REPORT: CPT | Performed by: INTERNAL MEDICINE

## 2024-03-16 PROCEDURE — 83735 ASSAY OF MAGNESIUM: CPT

## 2024-03-16 PROCEDURE — 36415 COLL VENOUS BLD VENIPUNCTURE: CPT

## 2024-03-16 PROCEDURE — 2060000000 HC ICU INTERMEDIATE R&B

## 2024-03-16 PROCEDURE — C9113 INJ PANTOPRAZOLE SODIUM, VIA: HCPCS | Performed by: STUDENT IN AN ORGANIZED HEALTH CARE EDUCATION/TRAINING PROGRAM

## 2024-03-16 PROCEDURE — 85014 HEMATOCRIT: CPT

## 2024-03-16 PROCEDURE — 99233 SBSQ HOSP IP/OBS HIGH 50: CPT | Performed by: STUDENT IN AN ORGANIZED HEALTH CARE EDUCATION/TRAINING PROGRAM

## 2024-03-16 PROCEDURE — 99222 1ST HOSP IP/OBS MODERATE 55: CPT | Performed by: INTERNAL MEDICINE

## 2024-03-16 PROCEDURE — 80048 BASIC METABOLIC PNL TOTAL CA: CPT

## 2024-03-16 PROCEDURE — 6360000002 HC RX W HCPCS: Performed by: INTERNAL MEDICINE

## 2024-03-16 PROCEDURE — 85610 PROTHROMBIN TIME: CPT

## 2024-03-16 PROCEDURE — 6360000002 HC RX W HCPCS: Performed by: PHYSICIAN ASSISTANT

## 2024-03-16 PROCEDURE — 85018 HEMOGLOBIN: CPT

## 2024-03-16 PROCEDURE — 85025 COMPLETE CBC W/AUTO DIFF WBC: CPT

## 2024-03-16 PROCEDURE — 2580000003 HC RX 258: Performed by: STUDENT IN AN ORGANIZED HEALTH CARE EDUCATION/TRAINING PROGRAM

## 2024-03-16 PROCEDURE — 6370000000 HC RX 637 (ALT 250 FOR IP): Performed by: PHYSICIAN ASSISTANT

## 2024-03-16 PROCEDURE — 6360000002 HC RX W HCPCS: Performed by: STUDENT IN AN ORGANIZED HEALTH CARE EDUCATION/TRAINING PROGRAM

## 2024-03-16 PROCEDURE — 93005 ELECTROCARDIOGRAM TRACING: CPT | Performed by: STUDENT IN AN ORGANIZED HEALTH CARE EDUCATION/TRAINING PROGRAM

## 2024-03-16 PROCEDURE — 71045 X-RAY EXAM CHEST 1 VIEW: CPT

## 2024-03-16 PROCEDURE — 87040 BLOOD CULTURE FOR BACTERIA: CPT

## 2024-03-16 PROCEDURE — 2580000003 HC RX 258: Performed by: PHYSICIAN ASSISTANT

## 2024-03-16 RX ORDER — FUROSEMIDE 10 MG/ML
40 INJECTION INTRAMUSCULAR; INTRAVENOUS ONCE
Status: COMPLETED | OUTPATIENT
Start: 2024-03-16 | End: 2024-03-16

## 2024-03-16 RX ORDER — IPRATROPIUM BROMIDE AND ALBUTEROL SULFATE 2.5; .5 MG/3ML; MG/3ML
1 SOLUTION RESPIRATORY (INHALATION) EVERY 4 HOURS PRN
Status: DISCONTINUED | OUTPATIENT
Start: 2024-03-16 | End: 2024-03-23 | Stop reason: HOSPADM

## 2024-03-16 RX ORDER — ONDANSETRON 2 MG/ML
4 INJECTION INTRAMUSCULAR; INTRAVENOUS EVERY 6 HOURS PRN
Status: DISCONTINUED | OUTPATIENT
Start: 2024-03-16 | End: 2024-03-23 | Stop reason: HOSPADM

## 2024-03-16 RX ORDER — IPRATROPIUM BROMIDE AND ALBUTEROL SULFATE 2.5; .5 MG/3ML; MG/3ML
1 SOLUTION RESPIRATORY (INHALATION)
Status: DISCONTINUED | OUTPATIENT
Start: 2024-03-16 | End: 2024-03-16

## 2024-03-16 RX ADMIN — IPRATROPIUM BROMIDE AND ALBUTEROL SULFATE 1 DOSE: .5; 3 SOLUTION RESPIRATORY (INHALATION) at 16:55

## 2024-03-16 RX ADMIN — HYDROCHLOROTHIAZIDE 25 MG: 12.5 CAPSULE ORAL at 10:01

## 2024-03-16 RX ADMIN — CHOLESTYRAMINE 4 G: 4 POWDER, FOR SUSPENSION ORAL at 10:01

## 2024-03-16 RX ADMIN — CLONIDINE HYDROCHLORIDE 0.1 MG: 0.1 TABLET ORAL at 10:01

## 2024-03-16 RX ADMIN — POTASSIUM CHLORIDE 40 MEQ: 1500 TABLET, EXTENDED RELEASE ORAL at 15:28

## 2024-03-16 RX ADMIN — ONDANSETRON 4 MG: 2 INJECTION INTRAMUSCULAR; INTRAVENOUS at 13:49

## 2024-03-16 RX ADMIN — ISOSORBIDE MONONITRATE 30 MG: 30 TABLET, EXTENDED RELEASE ORAL at 10:01

## 2024-03-16 RX ADMIN — LOPERAMIDE HYDROCHLORIDE 2 MG: 2 CAPSULE ORAL at 05:24

## 2024-03-16 RX ADMIN — SODIUM CHLORIDE, PRESERVATIVE FREE 10 ML: 5 INJECTION INTRAVENOUS at 10:04

## 2024-03-16 RX ADMIN — FUROSEMIDE 40 MG: 10 INJECTION, SOLUTION INTRAMUSCULAR; INTRAVENOUS at 16:18

## 2024-03-16 RX ADMIN — LEVOFLOXACIN 750 MG: 5 INJECTION, SOLUTION INTRAVENOUS at 12:51

## 2024-03-16 RX ADMIN — LOPERAMIDE HYDROCHLORIDE 2 MG: 2 CAPSULE ORAL at 21:44

## 2024-03-16 RX ADMIN — TAMSULOSIN HYDROCHLORIDE 0.4 MG: 0.4 CAPSULE ORAL at 21:44

## 2024-03-16 RX ADMIN — LOPERAMIDE HYDROCHLORIDE 2 MG: 2 CAPSULE ORAL at 15:28

## 2024-03-16 RX ADMIN — SODIUM CHLORIDE, PRESERVATIVE FREE 80 MG: 5 INJECTION INTRAVENOUS at 10:01

## 2024-03-16 RX ADMIN — CLONIDINE HYDROCHLORIDE 0.1 MG: 0.1 TABLET ORAL at 21:44

## 2024-03-16 RX ADMIN — IPRATROPIUM BROMIDE AND ALBUTEROL SULFATE 1 DOSE: .5; 3 SOLUTION RESPIRATORY (INHALATION) at 12:42

## 2024-03-16 RX ADMIN — LOSARTAN POTASSIUM 50 MG: 50 TABLET, FILM COATED ORAL at 10:01

## 2024-03-16 RX ADMIN — LOSARTAN POTASSIUM 50 MG: 50 TABLET, FILM COATED ORAL at 21:44

## 2024-03-16 RX ADMIN — SODIUM CHLORIDE, PRESERVATIVE FREE 10 ML: 5 INJECTION INTRAVENOUS at 21:45

## 2024-03-16 ASSESSMENT — PAIN SCALES - GENERAL
PAINLEVEL_OUTOF10: 0

## 2024-03-16 NOTE — PROGRESS NOTES
Hospitalist Progress Note    Patient:  Del Yoon      Unit/Bed:4K-23/023-A    YOB: 1950    MRN: [id number]       Acct: [id number]     PCP: Faby Suh APRN - CNP    Date of Admission: 3/5/2024    Assessment/Plan:    Bilateral Pneumonia, POA  Chest x-ray with infiltrate.  CTA 3/11 without PE but demonstrated bibasilar consolidations   Pneumonia panel with H Flu, Serratia, and Flu A .  Respiratory culture: Growing yeast, awaiting further speciation.  Completed 7 day course of zosyn and completed 6-day course of Levaquin (3/11-3/16).    MRSA nares and legionella and strep pneumo urine studies negative   Completed 5-day course of prednisone (3/8-3/12).  Pulmonology consulted given worsening respiratory status on 3/16 along with worsening chest x-ray, they recommended discontinuing antibiotics, give Lasix 40 mg IV X1, might need redosing tomorrow.  Encouraged ambulation. PT/OT  Ordered SLP to r/o component of aspiration contributing to worsening respiratory status given N/V.  Started on DuoNeb  Melena: On 3/16 patient had multiple episodes of melena overnight.  On IV PPI.  On CLD.  GI consulted, recommending EGD (held off for now given worsening respiratory status, will reassess tomorrow).  Aspirin on hold.  Will monitor H&H closely and transfuse if hemoglobin less than 7.  Right lower quadrant abdominal pain secondary to rectus sheath hematoma: Patient reports 1 week history of right lower quadrant abdominal pain.  CT abdomen/pelvis on 3/15 showed right rectus sheath hematoma measures up to 4.4 x 12.3 cm in short axis and extends approximately 10 cm in length to just above the level of the umbilicus. Scattered air-fluid levels in the small and large bowel loops is nonspecific and can indicate enteritis.   Held home aspirin and Lovenox considering the findings above.  Will check C. difficile and GI panel to make sure he does not have active GI infection given findings above as  03/13/2024    BRE (acute kidney injury) (HCC) [N17.9] 03/13/2024    Peripheral edema [R60.9] 03/13/2024    Dizziness [R42] 03/13/2024    Hyperlipidemia [E78.5] 03/13/2024    History of CVA (cerebrovascular accident) [Z86.73] 03/13/2024    Community acquired pneumonia of left lower lobe of lung [J18.9] 03/05/2024    Coronary artery disease involving native coronary artery of native heart without angina pectoris [I25.10] 06/14/2021       Electronically signed by Ashita Behl, MD on 3/16/2024 at 4:56 PM

## 2024-03-16 NOTE — PROCEDURES
----- Message from Usman Kaba MD sent at 6/9/2022 11:48 PM CDT -----  Her labs were all ok.    12 lead EKG completed. Results handed to Ladonna Wetzel CET

## 2024-03-16 NOTE — CONSULTS
[facility]    Patient - Del Yoon   MRN -  [id number]   [facility] # - [id number]   - 1950      Date of Admission -  3/5/2024  5:03 PM  Date of evaluation -  3/16/2024  Room - -023-A   Hospital Day - 11  Consulting - Behl, Ashita, MD Primary Care Physician - Faby Suh APRN - CNP   Chief Complaint   Acute respiratory failure with hypoxia  Bibasilar pneumonia   SAM/OHS  Active Hospital Problem List      Active Hospital Problems    Diagnosis Date Noted    Influenza A [J10.1] 2024    Acute respiratory failure with hypoxia (HCC) [J96.01] 2024    Primary hypertension [I10] 2024    BRE (acute kidney injury) (HCC) [N17.9] 2024    Peripheral edema [R60.9] 2024    Dizziness [R42] 2024    Hyperlipidemia [E78.5] 2024    History of CVA (cerebrovascular accident) [Z86.73] 2024    Community acquired pneumonia of left lower lobe of lung [J18.9] 2024    Coronary artery disease involving native coronary artery of native heart without angina pectoris [I25.10] 2021     HPI   Del Yoon is a 74 yo male with past medical history of CVA/TIA and DJD that presents to the ED 2024 with SOB and weakness since . He states he felt short of breath and felt significant weakness that prevented him being able to ambulate. His legs \"would not move\" He recently moved into assisted living and gets around with a walker or wheelchair for longer distances. He did not take any medication for this. He had a fever this morning and made an appointment with his PCP that sent him to the hospital. He has a cough productive cough and denies chest pain, chills, abdominal pain, sore throat, vomiting. He endorses chronic diarrhea but denies bowel movement today. He denies smoking. Pt states he had stents placed in the past.   Pneumonia Panel was positive for influenza A, H. Influenza, and Serratia   He received Zosyn and    CALCIUM 8.9 8.6 8.8     LFT  No results for input(s): \"AST\", \"ALT\", \"ALB\", \"BILITOT\", \"ALKPHOS\", \"AMYLASE\", \"LIPASE\" in the last 72 hours.  TROP  Lab Results   Component Value Date/Time    TROPONINT < 0.010 07/23/2022 01:47 PM    TROPONINT < 0.010 05/16/2020 03:40 AM     BNP  Lab Results   Component Value Date/Time    PROBNP 72.8 03/08/2024 02:49 PM    PROBNP 603.7 03/05/2024 05:46 PM    PROBNP 556.1 07/23/2022 01:47 PM     D-Dimer  Lab Results   Component Value Date/Time    DDIMER 665.00 03/10/2024 02:08 PM     Lactic Acid  No results for input(s): \"LACTA\" in the last 72 hours.  INR  No results for input(s): \"INR\", \"PROTIME\" in the last 72 hours.  PTT  No results for input(s): \"APTT\" in the last 72 hours.  Glucose  No results for input(s): \"POCGLU\" in the last 72 hours.  UA No results for input(s): \"SPECGRAV\", \"PHUR\", \"COLORU\", \"CLARITYU\", \"MUCUS\", \"PROTEINU\", \"BLOODU\", \"RBCUA\", \"WBCUA\", \"BACTERIA\", \"NITRU\", \"GLUCOSEU\", \"BILIRUBINUR\", \"UROBILINOGEN\", \"KETUA\", \"LABCAST\", \"LABCASTTY\", \"AMORPHOS\" in the last 72 hours.    Invalid input(s): \"CRYSTALS\".  PFTs   No records  Echo    Summary  Ejection fraction is visually estimated at 60%.  Overall left ventricular function is normal.    Cultures    Procalcitonin  Lab Results   Component Value Date/Time    PROCAL 0.14 03/11/2024 07:14 AM    PROCAL 0.13 03/07/2024 02:41 AM    PROCAL 0.10 07/25/2022 05:48 AM        Radiology    CXR        CT Scans    03/11          Assessment   Acute respiratory failure with hypoxia  CAP H. Influenza and Serratia s/p 14 days of antibiotics   Influenza A  Morbid Obesity  Deconditioning   Recommendations   CXR showed basal atelectasis and mild effusion, likely pneumonia is adequately treated can stop antibiotics, will give one dose of Lasix   Encourage IS, ambulation and PT/OT - consulted  Home O2 evaluation prior to discharge - patient is staying at a NH   Continue breathing treatments, can be discharged on long acting inhalers     Thank you

## 2024-03-16 NOTE — CONSULTS
Pt Name: Del Yoon  MRN: [id number]  [id number]  YOB: 1950  Admit Date: 3/5/2024  5:03 PM  Date of evaluation: 3/16/2024  Primary Care Physician: Faby Suh APRN - CNP   4K-23/023-A     Requesting Provider:  Behl, Ashita, MD    ANTHONY Consult    Indication:  melena, anemia    History:  The patient is a 73 y.o. male admitted 3/5/2024 for dyspnea due to pneumonia with recent influenza A.  During his stay he developed a right rectus sheath hematoma.  He had been on aspirin and Lovenox prior to admission.  He has a history of CVA with left-sided residual weakness.    Overnight he developed melena.  He is mildly anemic, normocytic.  This is unchanged.  Daily p.o. PPI was changed to IV twice daily and he was placed on a clear liquid diet.    He is new to our practice.    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    PFO (patent foramen ovale) 08/2012    noted on ROSITA following stroke    Propionibacterium infection 07/24/2020    Rotator cuff injury     Stroke ([facility])     August 19/2012    TIA (transient ischemic attack) 05/2018     Past Surgical History:        Procedure Laterality Date    BACK SURGERY  05/16/2022    C5-6    CHOLECYSTECTOMY  06/25/2017    CORONARY ANGIOPLASTY WITH STENT PLACEMENT      CYST INCISION AND DRAINAGE      CYSTOSCOPY N/A 12/17/2019    CYSTOSCOPY, WITH  URETHRAL DILATION performed by Enrrique Monroe MD at [facility] OR    CYSTOSCOPY N/A 2/18/2020    CYSTOSCOPY WITH BLADDER BOTOX performed by Enrrique Monroe MD at [facility] OR    ERCP  06/23/2017    PAIN MANAGEMENT PROCEDURE Bilateral 2/9/2023    bilateral lumbar 4/5, 5/Sacral 1 medial branch blocks performed by Dustin HASSAN  ppi  Continue clears  Follow hgb and transfuse prn    Miquel Myrick MD  11:21 AM 3/16/2024

## 2024-03-16 NOTE — PLAN OF CARE
Problem: Skin/Tissue Integrity  Goal: Absence of new skin breakdown  Description: 1.  Monitor for areas of redness and/or skin breakdown  2.  Assess vascular access sites hourly  3.  Every 4-6 hours minimum:  Change oxygen saturation probe site  4.  Every 4-6 hours:  If on nasal continuous positive airway pressure, respiratory therapy assess nares and determine need for appliance change or resting period.  3/15/2024 1103 by Erlin Garrett RN  Outcome: Adequate for Discharge     Problem: Safety - Adult  Goal: Free from fall injury  3/15/2024 1103 by Erlin Garrett RN  Outcome: Adequate for Discharge     Problem: Safety - Adult  Goal: Free from fall injury  Outcome: Progressing  Flowsheets (Taken 3/15/2024 0420 by Marilyn Vilchis, RN)  Free From Fall Injury: Instruct family/caregiver on patient safety  Note: Bed in low position  Call light in reach  Bed wheel lock  Teaching to use call light for assistance  Hourly Rounding  Non Skid Socks  Bed Alarm in use     Problem: Chronic Conditions and Co-morbidities  Goal: Patient's chronic conditions and co-morbidity symptoms are monitored and maintained or improved  3/15/2024 1103 by Erlin Garrett RN  Outcome: Adequate for Discharge     Problem: Discharge Planning  Goal: Discharge to home or other facility with appropriate resources  3/15/2024 1103 by Erlin Garrett RN  Outcome: Adequate for Discharge     Problem: Pain  Goal: Verbalizes/displays adequate comfort level or baseline comfort level  3/15/2024 1103 by Erlin Garrett RN  Outcome: Adequate for Discharge     Problem: Respiratory - Adult  Goal: Achieves optimal ventilation and oxygenation  3/15/2024 1103 by Erlin Garrett RN  Outcome: Adequate for Discharge  Goal: Clear lung sounds  3/15/2024 1103 by Erlin Garrett RN  Outcome: Adequate for Discharge     Problem: Skin/Tissue Integrity - Adult  Goal: Skin integrity remains intact  3/15/2024 1103 by Erlin Garrett

## 2024-03-16 NOTE — RT PROTOCOL NOTE
RT Inhaler-Nebulizer Bronchodilator Protocol Note    There is a bronchodilator order in the chart from a provider indicating to follow the RT Bronchodilator Protocol and there is an “Initiate RT Inhaler-Nebulizer Bronchodilator Protocol” order as well (see protocol at bottom of note).    CXR Findings:  XR CHEST PORTABLE    Result Date: 3/16/2024  1. Borderline heart size.. Right shoulder prosthesis. 2. Mild left basilar atelectasis/pneumonia. Tiny effusion left side. 3. Overall appearance of chest has worsened since prior study. **This report has been created using voice recognition software.  It may contain minor errors which are inherent in voice recognition technology.** Final report electronically signed by Dr. Moreno Sanchez on 3/16/2024 2:12 PM      The findings from the last RT Protocol Assessment were as follows:   History Pulmonary Disease: None or smoker <15 pack years  Respiratory Pattern: Regular pattern and RR 12-20 bpm  Breath Sounds: Slightly diminished and/or crackles  Cough: Strong, spontaneous, non-productive  Indication for Bronchodilator Therapy: Decreased or absent breath sounds  Bronchodilator Assessment Score: 2    Pt w/ no hx of COPD or asthma. Does not use respiratory medications at home. Does not feel relief from treatments.    Aerosolized bronchodilator medication orders have been revised according to the RT Inhaler-Nebulizer Bronchodilator Protocol below.    Respiratory Therapist to perform RT Therapy Protocol Assessment initially then follow the protocol.  Repeat RT Therapy Protocol Assessment PRN for score 0-3 or on second treatment, BID, and PRN for scores above 3.    No Indications - adjust the frequency to every 6 hours PRN wheezing or bronchospasm, if no treatments needed after 48 hours then discontinue using Per Protocol order mode.     If indication present, adjust the RT bronchodilator orders based on the Bronchodilator Assessment Score as indicated below.  Use Inhaler orders  unless patient has one or more of the following: on home nebulizer, not able to hold breath for 10 seconds, is not alert and oriented, cannot activate and use MDI correctly, or respiratory rate 25 breaths per minute or more, then use the equivalent nebulizer order(s) with same Frequency and PRN reasons based on the score.  If a patient is on this medication at home then do not decrease Frequency below that used at home.    0-3 - enter or revise RT bronchodilator order(s) to equivalent RT Bronchodilator order with Frequency of every 4 hours PRN for wheezing or increased work of breathing using Per Protocol order mode.        4-6 - enter or revise RT Bronchodilator order(s) to two equivalent RT bronchodilator orders with one order with BID Frequency and one order with Frequency of every 4 hours PRN wheezing or increased work of breathing using Per Protocol order mode.        7-10 - enter or revise RT Bronchodilator order(s) to two equivalent RT bronchodilator orders with one order with TID Frequency and one order with Frequency of every 4 hours PRN wheezing or increased work of breathing using Per Protocol order mode.       11-13 - enter or revise RT Bronchodilator order(s) to one equivalent RT bronchodilator order with QID Frequency and an Albuterol order with Frequency of every 4 hours PRN wheezing or increased work of breathing using Per Protocol order mode.      Greater than 13 - enter or revise RT Bronchodilator order(s) to one equivalent RT bronchodilator order with every 4 hours Frequency and an Albuterol order with Frequency of every 2 hours PRN wheezing or increased work of breathing using Per Protocol order mode.     RT to enter RT Home Evaluation for COPD & MDI Assessment order using Per Protocol order mode.    Electronically signed by June Vilchis RCP on 3/16/2024 at 6:09 PM

## 2024-03-17 LAB
ANION GAP SERPL CALC-SCNC: 11 MEQ/L (ref 8–16)
ANION GAP SERPL CALC-SCNC: 13 MEQ/L (ref 8–16)
BASOPHILS ABSOLUTE: 0 THOU/MM3 (ref 0–0.1)
BASOPHILS NFR BLD AUTO: 0.3 %
BUN SERPL-MCNC: 24 MG/DL (ref 7–22)
BUN SERPL-MCNC: 26 MG/DL (ref 7–22)
CALCIUM SERPL-MCNC: 8.5 MG/DL (ref 8.5–10.5)
CALCIUM SERPL-MCNC: 8.6 MG/DL (ref 8.5–10.5)
CHLORIDE SERPL-SCNC: 98 MEQ/L (ref 98–111)
CHLORIDE SERPL-SCNC: 98 MEQ/L (ref 98–111)
CO2 SERPL-SCNC: 23 MEQ/L (ref 23–33)
CO2 SERPL-SCNC: 26 MEQ/L (ref 23–33)
CREAT SERPL-MCNC: 0.9 MG/DL (ref 0.4–1.2)
CREAT SERPL-MCNC: 1.2 MG/DL (ref 0.4–1.2)
DEPRECATED RDW RBC AUTO: 46.2 FL (ref 35–45)
EOSINOPHIL NFR BLD AUTO: 1.2 %
EOSINOPHILS ABSOLUTE: 0.2 THOU/MM3 (ref 0–0.4)
ERYTHROCYTE [DISTWIDTH] IN BLOOD BY AUTOMATED COUNT: 14.5 % (ref 11.5–14.5)
GFR SERPL CREATININE-BSD FRML MDRD: > 60 ML/MIN/1.73M2
GFR SERPL CREATININE-BSD FRML MDRD: > 60 ML/MIN/1.73M2
GLUCOSE SERPL-MCNC: 117 MG/DL (ref 70–108)
GLUCOSE SERPL-MCNC: 124 MG/DL (ref 70–108)
HCT VFR BLD AUTO: 36.2 % (ref 42–52)
HCT VFR BLD AUTO: 37.1 % (ref 42–52)
HCT VFR BLD AUTO: 38.1 % (ref 42–52)
HGB BLD-MCNC: 12 GM/DL (ref 14–18)
HGB BLD-MCNC: 12.1 GM/DL (ref 14–18)
HGB BLD-MCNC: 12.1 GM/DL (ref 14–18)
IMM GRANULOCYTES # BLD AUTO: 0.23 THOU/MM3 (ref 0–0.07)
IMM GRANULOCYTES NFR BLD AUTO: 1.6 %
LYMPHOCYTES ABSOLUTE: 1.9 THOU/MM3 (ref 1–4.8)
LYMPHOCYTES NFR BLD AUTO: 13.2 %
MCH RBC QN AUTO: 28.7 PG (ref 26–33)
MCHC RBC AUTO-ENTMCNC: 32.6 GM/DL (ref 32.2–35.5)
MCV RBC AUTO: 88.1 FL (ref 80–94)
MONOCYTES ABSOLUTE: 1.5 THOU/MM3 (ref 0.4–1.3)
MONOCYTES NFR BLD AUTO: 10.5 %
NEUTROPHILS NFR BLD AUTO: 73.2 %
NRBC BLD AUTO-RTO: 0 /100 WBC
PLATELET # BLD AUTO: 313 THOU/MM3 (ref 130–400)
PMV BLD AUTO: 10.9 FL (ref 9.4–12.4)
POTASSIUM SERPL-SCNC: 3.4 MEQ/L (ref 3.5–5.2)
POTASSIUM SERPL-SCNC: 3.4 MEQ/L (ref 3.5–5.2)
POTASSIUM SERPL-SCNC: 3.6 MEQ/L (ref 3.5–5.2)
PROCALCITONIN SERPL IA-MCNC: 0.13 NG/ML (ref 0.01–0.09)
RBC # BLD AUTO: 4.21 MILL/MM3 (ref 4.7–6.1)
SEGMENTED NEUTROPHILS ABSOLUTE COUNT: 10.8 THOU/MM3 (ref 1.8–7.7)
SODIUM SERPL-SCNC: 134 MEQ/L (ref 135–145)
SODIUM SERPL-SCNC: 135 MEQ/L (ref 135–145)
WBC # BLD AUTO: 14.7 THOU/MM3 (ref 4.8–10.8)

## 2024-03-17 PROCEDURE — 6370000000 HC RX 637 (ALT 250 FOR IP): Performed by: PHYSICIAN ASSISTANT

## 2024-03-17 PROCEDURE — C9113 INJ PANTOPRAZOLE SODIUM, VIA: HCPCS | Performed by: STUDENT IN AN ORGANIZED HEALTH CARE EDUCATION/TRAINING PROGRAM

## 2024-03-17 PROCEDURE — 99232 SBSQ HOSP IP/OBS MODERATE 35: CPT | Performed by: INTERNAL MEDICINE

## 2024-03-17 PROCEDURE — 2580000003 HC RX 258: Performed by: STUDENT IN AN ORGANIZED HEALTH CARE EDUCATION/TRAINING PROGRAM

## 2024-03-17 PROCEDURE — 93005 ELECTROCARDIOGRAM TRACING: CPT | Performed by: STUDENT IN AN ORGANIZED HEALTH CARE EDUCATION/TRAINING PROGRAM

## 2024-03-17 PROCEDURE — 6360000002 HC RX W HCPCS: Performed by: INTERNAL MEDICINE

## 2024-03-17 PROCEDURE — 92610 EVALUATE SWALLOWING FUNCTION: CPT

## 2024-03-17 PROCEDURE — 6360000002 HC RX W HCPCS: Performed by: STUDENT IN AN ORGANIZED HEALTH CARE EDUCATION/TRAINING PROGRAM

## 2024-03-17 PROCEDURE — A4216 STERILE WATER/SALINE, 10 ML: HCPCS | Performed by: STUDENT IN AN ORGANIZED HEALTH CARE EDUCATION/TRAINING PROGRAM

## 2024-03-17 PROCEDURE — 6370000000 HC RX 637 (ALT 250 FOR IP): Performed by: STUDENT IN AN ORGANIZED HEALTH CARE EDUCATION/TRAINING PROGRAM

## 2024-03-17 PROCEDURE — 92526 ORAL FUNCTION THERAPY: CPT

## 2024-03-17 PROCEDURE — 80048 BASIC METABOLIC PNL TOTAL CA: CPT

## 2024-03-17 PROCEDURE — 85018 HEMOGLOBIN: CPT

## 2024-03-17 PROCEDURE — 2580000003 HC RX 258: Performed by: PHYSICIAN ASSISTANT

## 2024-03-17 PROCEDURE — 84145 PROCALCITONIN (PCT): CPT

## 2024-03-17 PROCEDURE — 85025 COMPLETE CBC W/AUTO DIFF WBC: CPT

## 2024-03-17 PROCEDURE — 36415 COLL VENOUS BLD VENIPUNCTURE: CPT

## 2024-03-17 PROCEDURE — 99233 SBSQ HOSP IP/OBS HIGH 50: CPT | Performed by: STUDENT IN AN ORGANIZED HEALTH CARE EDUCATION/TRAINING PROGRAM

## 2024-03-17 PROCEDURE — 2060000000 HC ICU INTERMEDIATE R&B

## 2024-03-17 PROCEDURE — 84132 ASSAY OF SERUM POTASSIUM: CPT

## 2024-03-17 PROCEDURE — 85014 HEMATOCRIT: CPT

## 2024-03-17 RX ORDER — FUROSEMIDE 10 MG/ML
40 INJECTION INTRAMUSCULAR; INTRAVENOUS DAILY
Status: DISCONTINUED | OUTPATIENT
Start: 2024-03-17 | End: 2024-03-18

## 2024-03-17 RX ORDER — FUROSEMIDE 10 MG/ML
40 INJECTION INTRAMUSCULAR; INTRAVENOUS ONCE
Status: DISCONTINUED | OUTPATIENT
Start: 2024-03-17 | End: 2024-03-17

## 2024-03-17 RX ADMIN — LOSARTAN POTASSIUM 50 MG: 50 TABLET, FILM COATED ORAL at 20:23

## 2024-03-17 RX ADMIN — CLONIDINE HYDROCHLORIDE 0.1 MG: 0.1 TABLET ORAL at 09:27

## 2024-03-17 RX ADMIN — TAMSULOSIN HYDROCHLORIDE 0.4 MG: 0.4 CAPSULE ORAL at 20:23

## 2024-03-17 RX ADMIN — ISOSORBIDE MONONITRATE 30 MG: 30 TABLET, EXTENDED RELEASE ORAL at 09:27

## 2024-03-17 RX ADMIN — LOSARTAN POTASSIUM 50 MG: 50 TABLET, FILM COATED ORAL at 09:27

## 2024-03-17 RX ADMIN — PANTOPRAZOLE SODIUM 40 MG: 40 INJECTION, POWDER, FOR SOLUTION INTRAVENOUS at 05:22

## 2024-03-17 RX ADMIN — CHOLESTYRAMINE 4 G: 4 POWDER, FOR SUSPENSION ORAL at 09:27

## 2024-03-17 RX ADMIN — LOPERAMIDE HYDROCHLORIDE 2 MG: 2 CAPSULE ORAL at 20:23

## 2024-03-17 RX ADMIN — PANTOPRAZOLE SODIUM 40 MG: 40 INJECTION, POWDER, FOR SOLUTION INTRAVENOUS at 20:23

## 2024-03-17 RX ADMIN — SODIUM CHLORIDE, PRESERVATIVE FREE 10 ML: 5 INJECTION INTRAVENOUS at 09:27

## 2024-03-17 RX ADMIN — CLONIDINE HYDROCHLORIDE 0.1 MG: 0.1 TABLET ORAL at 20:23

## 2024-03-17 RX ADMIN — LOPERAMIDE HYDROCHLORIDE 2 MG: 2 CAPSULE ORAL at 05:22

## 2024-03-17 RX ADMIN — FUROSEMIDE 40 MG: 10 INJECTION, SOLUTION INTRAMUSCULAR; INTRAVENOUS at 17:19

## 2024-03-17 ASSESSMENT — PAIN SCALES - GENERAL: PAINLEVEL_OUTOF10: 0

## 2024-03-17 NOTE — PROGRESS NOTES
Gastroenterology Progress Note:     Patient Name:  Del Yoon   MRN: [id number]  [id number]  YOB: 1950  Admit Date: 3/5/2024  5:03 PM  Primary Care Physician: Faby Suh APRN - CNP   4K-23/023-A     Patient seen and examined.  24 hours events and chart reviewed.    Subjective: Del is a 73 y.o. male admitted 3/5/2024 for dyspnea due to pneumonia with recent influenza A.  During his stay he developed a right rectus sheath hematoma.  He had been on aspirin and Lovenox prior to admission.  He has a history of CVA with left-sided residual weakness.On 3/15/24 he developed melena.  He is mildly anemic, normocytic. This is unchanged. Daily p.o. PPI was changed to IV twice daily and he was placed on a clear liquid diet. He is new to our practice.     Objective:  /78   Pulse 78   Temp 97.9 °F (36.6 °C) (Oral)   Resp 20   Ht 1.803 m (5' 11\")   Wt (!) 141 kg (310 lb 13.6 oz)   SpO2 95%   BMI 43.35 kg/m²     Physical Exam:    General:  Nourished in no distress  HEENT: Atraumatic, normocephalic. Moist oral mucous membranes.  Neck: Supple without adenopathy, JVD, thyromegaly or masses. Trachea midline.  CV: Heart RRR, no murmurs, rubs, gallops.  Resp: Even, easy without cough or accessory use. Lungs diminished in bilateral bases  Abd: Round, soft, non-tender. No hepatosplenomegaly or mass present. Active bowel sounds heard. No distention noted.   Ext:  Without cyanosis, clubbing, slight edema to bilateral upper and lower extremities  Skin: Pink, warm, dry. Dry rash with scratch marks on upper chest  Neuro:  Alert, oriented x 3 with no obvious deficits.       Rectal: deferred    Labs:   CBC:   Lab Results   Component Value Date/Time    WBC 14.7 03/17/2024 04:03 AM    HGB 12.1 03/17/2024 04:03 AM    HCT 37.1 03/17/2024 04:03 AM    MCV 88.1 03/17/2024 04:03 AM     03/17/2024 04:03 AM     BMP:   Lab Results   Component Value Date/Time     03/17/2024 04:03 AM    K 3.6 03/17/2024 04:03  25 mg Oral Daily    isosorbide mononitrate  30 mg Oral Daily    tamsulosin  0.4 mg Oral Nightly    sodium chloride flush  5-40 mL IntraVENous 2 times per day    [Held by provider] enoxaparin  30 mg SubCUTAneous BID     Continuous Infusions:   sodium chloride         Assessment:  Melena  Anemia, normocytic-chronic  Aspirin and Lovenox use prior to admission  History of CVA with left-sided residual weakness  Rectus sheath hematoma  Bilateral pneumonia  Recent influenza A  Dyspnea  Morbid obesity    Plan:    EGD recommended to evaluate melena but pt is too dyspneic currently. He remains SOB and is on 3L n/c.  Even when dyspnea improves, he will be high risk given morbid obesity and recent pneumonia but benefits outweigh risks  Continue bid ppi  Continue clears  Follow hgb and transfuse prn    Case reviewed and impression/plan reviewed in collaboration with Dr. Myrick     Electronically signed by MAGDA Pugh - CNP on 3/17/2024 at 8:49 AM    [facility]

## 2024-03-17 NOTE — PROGRESS NOTES
[facility]  SPEECH THERAPY  STRZ ICU STEPDOWN TELEMETRY 4K  Clinical Swallow Evaluation + Dysphagia Therapy      SLP Individual Minutes  Time In: 0752  Time Out: 0808  Minutes: 16  Timed Code Treatment Minutes: 0 Minutes   Dysphagia eval: 8 minutes   Dysphagia tx: 8 minutes     Date: 3/17/2024  Patient Name: Del Yoon      CSN: [id number]   : 1950  (73 y.o.)  Gender: male   Referring Physician:  Behl, Ashita, MD     Diagnosis: Community acquired pneumonia of left lower lobe of lung    History of Present Illness/Injury: Patient admitted with above medical diagnosis. See physician H&P for full history. Per medical chart review, \"Del Yoon is a 72 yo male that presents to the ED with SOB and weakness since . He states he felt short of breath and felt significant weakness that prevented him being able to ambulate. His legs \"would not move\" He recently moved into assisted living and gets around with a walker or wheelchair for longer distances. He did not take any medication for this. He had a fever this morning and made an appointment with his PCP that sent him to the hospital. He has a cough productive cough and denies chest pain, chills, abdominal pain, sore throat, vomiting. He endorses chronic diarrhea but denies bowel movement today. He denies smoking. Pt states he had stents placed in the past. \"     Subjective:  Patient reports worsening of shortness of breath since yesterday (was on room air yesterday, now on 3 L).  He had multiple episodes of melena overnight as well.  No chest pain.  No fever or chills.  No chest pain or shortness of breath.  Also he was noted to have nausea and vomiting today.  No issues with urination.\"    ST consulted for completion of swallowing evaluation r/t potential concerns for aspiration PNA and development of plan of care as indicated.      Past Medical History:   Diagnosis Date    Arthritis     Back pain     CAD in native artery 2021     stay.        Meera Chavez M.S., CCC-SLP [id number]       Color consistent with ethnicity/race, warm, dry intact, resilient.

## 2024-03-17 NOTE — RT PROTOCOL NOTE
RT Inhaler-Nebulizer Bronchodilator Protocol Note    There is a bronchodilator order in the chart from a provider indicating to follow the RT Bronchodilator Protocol and there is an “Initiate RT Inhaler-Nebulizer Bronchodilator Protocol” order as well (see protocol at bottom of note).    CXR Findings:  XR CHEST PORTABLE    Result Date: 3/16/2024  1. Borderline heart size.. Right shoulder prosthesis. 2. Mild left basilar atelectasis/pneumonia. Tiny effusion left side. 3. Overall appearance of chest has worsened since prior study. **This report has been created using voice recognition software.  It may contain minor errors which are inherent in voice recognition technology.** Final report electronically signed by Dr. Moreno Sanchez on 3/16/2024 2:12 PM      The findings from the last RT Protocol Assessment were as follows:   History Pulmonary Disease: None or smoker <15 pack years  Respiratory Pattern: Regular pattern and RR 12-20 bpm  Breath Sounds: Slightly diminished and/or crackles  Cough: Strong, spontaneous, non-productive  Indication for Bronchodilator Therapy: Decreased or absent breath sounds  Bronchodilator Assessment Score: 2    Aerosolized bronchodilator medication orders have been revised according to the RT Inhaler-Nebulizer Bronchodilator Protocol below.    Respiratory Therapist to perform RT Therapy Protocol Assessment initially then follow the protocol.  Repeat RT Therapy Protocol Assessment PRN for score 0-3 or on second treatment, BID, and PRN for scores above 3.    No Indications - adjust the frequency to every 6 hours PRN wheezing or bronchospasm, if no treatments needed after 48 hours then discontinue using Per Protocol order mode.     If indication present, adjust the RT bronchodilator orders based on the Bronchodilator Assessment Score as indicated below.  Use Inhaler orders unless patient has one or more of the following: on home nebulizer, not able to hold breath for 10 seconds, is not alert

## 2024-03-17 NOTE — RT PROTOCOL NOTE
RT Nebulizer Bronchodilator Protocol Note    There is a bronchodilator order in the chart from a provider indicating to follow the RT Bronchodilator Protocol and there is an “Initiate RT Bronchodilator Protocol” order as well (see protocol at bottom of note).    CXR Findings:  XR CHEST PORTABLE    Result Date: 3/16/2024  1. Borderline heart size.. Right shoulder prosthesis. 2. Mild left basilar atelectasis/pneumonia. Tiny effusion left side. 3. Overall appearance of chest has worsened since prior study. **This report has been created using voice recognition software.  It may contain minor errors which are inherent in voice recognition technology.** Final report electronically signed by Dr. Moreno Sanchez on 3/16/2024 2:12 PM      The findings from the last RT Protocol Assessment were as follows:  Smoking: None or smoker <15 pack years  Respiratory Pattern: Regular pattern and RR 12-20 bpm  Breath Sounds: Slightly diminished and/or crackles  Cough: Strong, spontaneous, non-productive  Indication for Bronchodilator Therapy: Decreased or absent breath sounds  Bronchodilator Assessment Score: 2    Aerosolized bronchodilator medication orders have been revised according to the RT Nebulizer Bronchodilator Protocol below.    Respiratory Therapist to perform RT Therapy Protocol Assessment initially then follow the protocol.  Repeat RT Therapy Protocol Assessment PRN for score 0-3 or on second treatment, BID, and PRN for scores above 3.    No Indications - adjust the frequency to every 6 hours PRN wheezing or bronchospasm, if no treatments needed after 48 hours then discontinue using Per Protocol order mode.     If indication present, adjust the RT bronchodilator orders based on the Bronchodilator Assessment Score as indicated below.  If a patient is on this medication at home then do not decrease Frequency below that used at home.    0-3 - enter or revise RT bronchodilator order(s) to equivalent RT Bronchodilator order with

## 2024-03-17 NOTE — PROGRESS NOTES
[place] [facility]    Patient - Del Yoon   MRN -  [id number]   [facility] # - [id number]   - 1950      Date of Admission -  3/5/2024  5:03 PM  Date of evaluation -  3/17/2024  Room - -23/023-A   Hospital Day - 12  Consulting - Behl, Ashita, MD Primary Care Physician - Faby Suh APRN - CNP   Chief Complaint   Acute respiratory failure with hypoxia  Bibasilar pneumonia   SAM/OHS  Active Hospital Problem List      Active Hospital Problems    Diagnosis Date Noted    Melena [K92.1] 2024    Hematoma of rectus sheath [S30.1XXA] 2024    Influenza A [J10.1] 2024    Acute respiratory failure with hypoxia (HCC) [J96.01] 2024    Primary hypertension [I10] 2024    BRE (acute kidney injury) (HCC) [N17.9] 2024    Peripheral edema [R60.9] 2024    Dizziness [R42] 2024    Hyperlipidemia [E78.5] 2024    History of CVA (cerebrovascular accident) [Z86.73] 2024    Community acquired pneumonia of left lower lobe of lung [J18.9] 2024    Coronary artery disease involving native coronary artery of native heart without angina pectoris [I25.10] 2021     HPI   Del Yoon is a 74 yo male with past medical history of CVA/TIA and DJD that presents to the ED 2024 with SOB and weakness since . He states he felt short of breath and felt significant weakness that prevented him being able to ambulate. His legs \"would not move\" He recently moved into assisted living and gets around with a walker or wheelchair for longer distances. He did not take any medication for this. He had a fever this morning and made an appointment with his PCP that sent him to the hospital. He has a cough productive cough and denies chest pain, chills, abdominal pain, sore throat, vomiting. He endorses chronic diarrhea but denies bowel movement today. He denies smoking. Pt states he had stents placed in the past.   Pneumonia Panel was

## 2024-03-17 NOTE — PROGRESS NOTES
Hospitalist Progress Note    Patient:  Del Yoon      Unit/Bed:4K-23/023-A    YOB: 1950    MRN: [id number]       Acct: [id number]     PCP: Faby Suh APRN - CNP    Date of Admission: 3/5/2024    Assessment/Plan:    Bilateral Pneumonia, POA  Chest x-ray with infiltrate.  CTA 3/11 without PE but demonstrated bibasilar consolidations   Pneumonia panel with H Flu, Serratia, and Flu A .  Respiratory culture: Candida glabrata.   Completed 7 day course of zosyn and completed 6-day course of Levaquin (3/11-3/16).    MRSA nares and legionella and strep pneumo urine studies negative   Completed 5-day course of prednisone (3/8-3/12).  Pulmonology consulted given worsening respiratory status on 3/16 along with worsening chest x-ray, they recommended discontinuing antibiotics, recommended giving lasix 40 mg IV (given on 3/16 and 3/17).  Encouraged ambulation. PT/OT  Ordered SLP to r/o component of aspiration contributing to worsening respiratory status given N/V, recommending MBS.   On DuoNeb  If leukocytosis continues to worsen, and respiratory status does not improve by tomorrow, consider repeating CT chest.   Melena: On 3/16 patient had multiple episodes of melena overnight.  Baseline hemoglobin of 14-15 (been in 11-12 range currently). On IV PPI.  On CLD.  GI consulted, recommending EGD (held off for now given worsening respiratory status, will reassess tomorrow).  Aspirin on hold.  Will monitor H&H closely and transfuse if hemoglobin less than 7.  Leukocytosis: Worsening.  Unclear if stress response versus infection.  But patient has already undergone antibiotic course as mentioned above.  Blood cultures repeated, will follow-up.  Repeat procalcitonin on 3/17 of 0.13.  If continue to worsen tomorrow, then consider repeating CT chest.  Will monitor CBC  Right lower quadrant abdominal pain secondary to rectus sheath hematoma: Patient reports 1 week history of right lower quadrant

## 2024-03-18 ENCOUNTER — APPOINTMENT (OUTPATIENT)
Dept: GENERAL RADIOLOGY | Age: 74
End: 2024-03-18
Payer: MEDICARE

## 2024-03-18 PROBLEM — R06.02 SHORTNESS OF BREATH: Status: ACTIVE | Noted: 2024-03-18

## 2024-03-18 LAB
ANION GAP SERPL CALC-SCNC: 13 MEQ/L (ref 8–16)
BASOPHILS ABSOLUTE: 0.1 THOU/MM3 (ref 0–0.1)
BASOPHILS NFR BLD AUTO: 0.4 %
BUN SERPL-MCNC: 27 MG/DL (ref 7–22)
CALCIUM SERPL-MCNC: 8.4 MG/DL (ref 8.5–10.5)
CHLORIDE SERPL-SCNC: 99 MEQ/L (ref 98–111)
CO2 SERPL-SCNC: 22 MEQ/L (ref 23–33)
CREAT SERPL-MCNC: 1 MG/DL (ref 0.4–1.2)
DEPRECATED RDW RBC AUTO: 45.7 FL (ref 35–45)
EKG ATRIAL RATE: 66 BPM
EKG ATRIAL RATE: 93 BPM
EKG P AXIS: 15 DEGREES
EKG P AXIS: 27 DEGREES
EKG P-R INTERVAL: 130 MS
EKG P-R INTERVAL: 200 MS
EKG Q-T INTERVAL: 386 MS
EKG Q-T INTERVAL: 420 MS
EKG QRS DURATION: 116 MS
EKG QRS DURATION: 118 MS
EKG QTC CALCULATION (BAZETT): 440 MS
EKG QTC CALCULATION (BAZETT): 479 MS
EKG R AXIS: 43 DEGREES
EKG R AXIS: 71 DEGREES
EKG T AXIS: -41 DEGREES
EKG T AXIS: 21 DEGREES
EKG VENTRICULAR RATE: 66 BPM
EKG VENTRICULAR RATE: 93 BPM
EOSINOPHIL NFR BLD AUTO: 2.1 %
EOSINOPHILS ABSOLUTE: 0.3 THOU/MM3 (ref 0–0.4)
ERYTHROCYTE [DISTWIDTH] IN BLOOD BY AUTOMATED COUNT: 14.5 % (ref 11.5–14.5)
GFR SERPL CREATININE-BSD FRML MDRD: > 60 ML/MIN/1.73M2
GLUCOSE SERPL-MCNC: 106 MG/DL (ref 70–108)
HCT VFR BLD AUTO: 33.6 % (ref 42–52)
HCT VFR BLD AUTO: 34.7 % (ref 42–52)
HGB BLD-MCNC: 11 GM/DL (ref 14–18)
HGB BLD-MCNC: 11.5 GM/DL (ref 14–18)
IMM GRANULOCYTES # BLD AUTO: 0.23 THOU/MM3 (ref 0–0.07)
IMM GRANULOCYTES NFR BLD AUTO: 1.7 %
LYMPHOCYTES ABSOLUTE: 1.7 THOU/MM3 (ref 1–4.8)
LYMPHOCYTES NFR BLD AUTO: 13.2 %
MCH RBC QN AUTO: 29.4 PG (ref 26–33)
MCHC RBC AUTO-ENTMCNC: 33.1 GM/DL (ref 32.2–35.5)
MCV RBC AUTO: 88.7 FL (ref 80–94)
MONOCYTES ABSOLUTE: 1.4 THOU/MM3 (ref 0.4–1.3)
MONOCYTES NFR BLD AUTO: 10.4 %
NEUTROPHILS NFR BLD AUTO: 72.2 %
NRBC BLD AUTO-RTO: 0 /100 WBC
PLATELET # BLD AUTO: 285 THOU/MM3 (ref 130–400)
PMV BLD AUTO: 10.9 FL (ref 9.4–12.4)
POTASSIUM SERPL-SCNC: 3.1 MEQ/L (ref 3.5–5.2)
RBC # BLD AUTO: 3.91 MILL/MM3 (ref 4.7–6.1)
SEGMENTED NEUTROPHILS ABSOLUTE COUNT: 9.5 THOU/MM3 (ref 1.8–7.7)
SODIUM SERPL-SCNC: 134 MEQ/L (ref 135–145)
WBC # BLD AUTO: 13.2 THOU/MM3 (ref 4.8–10.8)

## 2024-03-18 PROCEDURE — C9113 INJ PANTOPRAZOLE SODIUM, VIA: HCPCS | Performed by: STUDENT IN AN ORGANIZED HEALTH CARE EDUCATION/TRAINING PROGRAM

## 2024-03-18 PROCEDURE — 6370000000 HC RX 637 (ALT 250 FOR IP): Performed by: PHYSICIAN ASSISTANT

## 2024-03-18 PROCEDURE — 6360000002 HC RX W HCPCS: Performed by: INTERNAL MEDICINE

## 2024-03-18 PROCEDURE — 6370000000 HC RX 637 (ALT 250 FOR IP): Performed by: STUDENT IN AN ORGANIZED HEALTH CARE EDUCATION/TRAINING PROGRAM

## 2024-03-18 PROCEDURE — 80048 BASIC METABOLIC PNL TOTAL CA: CPT

## 2024-03-18 PROCEDURE — 93010 ELECTROCARDIOGRAM REPORT: CPT | Performed by: INTERNAL MEDICINE

## 2024-03-18 PROCEDURE — 2580000003 HC RX 258: Performed by: STUDENT IN AN ORGANIZED HEALTH CARE EDUCATION/TRAINING PROGRAM

## 2024-03-18 PROCEDURE — 36415 COLL VENOUS BLD VENIPUNCTURE: CPT

## 2024-03-18 PROCEDURE — 92611 MOTION FLUOROSCOPY/SWALLOW: CPT

## 2024-03-18 PROCEDURE — 6360000002 HC RX W HCPCS: Performed by: STUDENT IN AN ORGANIZED HEALTH CARE EDUCATION/TRAINING PROGRAM

## 2024-03-18 PROCEDURE — 2580000003 HC RX 258: Performed by: PHYSICIAN ASSISTANT

## 2024-03-18 PROCEDURE — 92526 ORAL FUNCTION THERAPY: CPT

## 2024-03-18 PROCEDURE — 99233 SBSQ HOSP IP/OBS HIGH 50: CPT | Performed by: PHYSICIAN ASSISTANT

## 2024-03-18 PROCEDURE — 85018 HEMOGLOBIN: CPT

## 2024-03-18 PROCEDURE — 99233 SBSQ HOSP IP/OBS HIGH 50: CPT | Performed by: INTERNAL MEDICINE

## 2024-03-18 PROCEDURE — 85014 HEMATOCRIT: CPT

## 2024-03-18 PROCEDURE — A4216 STERILE WATER/SALINE, 10 ML: HCPCS | Performed by: STUDENT IN AN ORGANIZED HEALTH CARE EDUCATION/TRAINING PROGRAM

## 2024-03-18 PROCEDURE — 74230 X-RAY XM SWLNG FUNCJ C+: CPT

## 2024-03-18 PROCEDURE — 2500000003 HC RX 250 WO HCPCS: Performed by: STUDENT IN AN ORGANIZED HEALTH CARE EDUCATION/TRAINING PROGRAM

## 2024-03-18 PROCEDURE — 1200000000 HC SEMI PRIVATE

## 2024-03-18 PROCEDURE — 85025 COMPLETE CBC W/AUTO DIFF WBC: CPT

## 2024-03-18 RX ORDER — POTASSIUM CHLORIDE 20 MEQ/1
40 TABLET, EXTENDED RELEASE ORAL
Status: DISCONTINUED | OUTPATIENT
Start: 2024-03-19 | End: 2024-03-23 | Stop reason: HOSPADM

## 2024-03-18 RX ORDER — FUROSEMIDE 40 MG/1
80 TABLET ORAL DAILY
Status: DISCONTINUED | OUTPATIENT
Start: 2024-03-19 | End: 2024-03-23 | Stop reason: HOSPADM

## 2024-03-18 RX ADMIN — PANTOPRAZOLE SODIUM 40 MG: 40 INJECTION, POWDER, FOR SOLUTION INTRAVENOUS at 21:17

## 2024-03-18 RX ADMIN — POTASSIUM CHLORIDE 40 MEQ: 1500 TABLET, EXTENDED RELEASE ORAL at 10:39

## 2024-03-18 RX ADMIN — ISOSORBIDE MONONITRATE 30 MG: 30 TABLET, EXTENDED RELEASE ORAL at 10:30

## 2024-03-18 RX ADMIN — SODIUM CHLORIDE, PRESERVATIVE FREE 10 ML: 5 INJECTION INTRAVENOUS at 21:17

## 2024-03-18 RX ADMIN — LOSARTAN POTASSIUM 50 MG: 50 TABLET, FILM COATED ORAL at 22:50

## 2024-03-18 RX ADMIN — CLONIDINE HYDROCHLORIDE 0.1 MG: 0.1 TABLET ORAL at 10:30

## 2024-03-18 RX ADMIN — BARIUM SULFATE 100 ML: 0.81 POWDER, FOR SUSPENSION ORAL at 09:46

## 2024-03-18 RX ADMIN — PANTOPRAZOLE SODIUM 40 MG: 40 INJECTION, POWDER, FOR SOLUTION INTRAVENOUS at 10:32

## 2024-03-18 RX ADMIN — FUROSEMIDE 40 MG: 10 INJECTION, SOLUTION INTRAMUSCULAR; INTRAVENOUS at 10:30

## 2024-03-18 RX ADMIN — LOSARTAN POTASSIUM 50 MG: 50 TABLET, FILM COATED ORAL at 10:30

## 2024-03-18 RX ADMIN — BARIUM SULFATE 30 ML: 400 PASTE ORAL at 09:46

## 2024-03-18 RX ADMIN — TAMSULOSIN HYDROCHLORIDE 0.4 MG: 0.4 CAPSULE ORAL at 22:50

## 2024-03-18 RX ADMIN — CHOLESTYRAMINE 4 G: 4 POWDER, FOR SUSPENSION ORAL at 10:30

## 2024-03-18 RX ADMIN — CLONIDINE HYDROCHLORIDE 0.1 MG: 0.1 TABLET ORAL at 22:49

## 2024-03-18 RX ADMIN — BARIUM SULFATE 50 ML: 400 SUSPENSION ORAL at 09:46

## 2024-03-18 RX ADMIN — SODIUM CHLORIDE, PRESERVATIVE FREE 10 ML: 5 INJECTION INTRAVENOUS at 10:30

## 2024-03-18 NOTE — PROGRESS NOTES
Pt admitted to  7K16 via stretcher/ bed.    Complaints: Shortness of breath.      IV none infusing into the hand right, condition patent and no redness and left, condition patent and no redness and forearm right, condition patent and no redness .IV site free of s/s of infection or infiltration. Vital signs obtained. Assessment and data collection initiated.     Two nurse skin assessment performed by Georgia RN and Jen RN. Oriented to room.       Policies and procedures for  explained. All questions answered with no further questions at this time. Fall prevention and safety brochure discussed with patient.  Bed alarm on. Call light in reach.

## 2024-03-18 NOTE — CARE COORDINATION
3/18/24, 2:33 PM EDT      DISCHARGE PLANNING EVALUATION    Del Yoon  Admitted: 3/5/2024  Hospital Day: 13    Location: -16/016-A Reason for admit: Shortness of breath [R06.02]  Lactic acidosis [E87.20]  Lower extremity edema [R60.0]  Influenza A [J10.1]  Elevated troponin [R79.89]  Elevated brain natriuretic peptide (BNP) level [R79.89]  Pneumonia of left lower lobe due to infectious organism [J18.9]  Community acquired pneumonia of left lower lobe of lung [J18.9]    Past Medical History:   Diagnosis Date    Arthritis     Back pain     CAD in native artery 06/14/2021    Diarrhea     Hypertension     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    PFO (patent foramen ovale) 08/2012    noted on ROSITA following stroke    Propionibacterium infection 07/24/2020    Rotator cuff injury     Stroke ([facility])     August 19/2012    TIA (transient ischemic attack) 05/2018     Barriers to Discharge:     From 5K (HFO needs)    Influenza A/Candida GlabrataPNA/BRE/Melena    History: CVA, PCI    3/19 possible EGD     Elevated WBC     Creatinine 1.5 (baseline 1.0-1.1); monitor     Pneumonia Panel  Haemophilus Influenzae  Serratia Marscens     IV Lasix, IV PPI     Oxygen 2L    NPO; Await Diet Advancement    Client w 7K Transfer: Hand-Off given to JUSTIN Wetzel CM    PCP: Faby Suh APRN - CNP    Readmission Risk Low 0-14, Mod 15-19), High > 20: Readmission Risk Score: 17.1      Advance Directives:      Code Status: Limited   Patient's Primary Decision Maker is: Legal Next of Kin    Primary Decision Maker: Bailey Yoon - Spouse - [phone]    Patient Goals/Plan/Treatment Preferences:     From [address] w spouse Bailey; plans new [facility], has cane, walker, WC, Lift Chair ; therapy following    Transportation/Food Security/Housekeeping Addressed: No issues identified.     If patient is discharged prior to next notation, then this note serves as note for discharge by case management.

## 2024-03-18 NOTE — PLAN OF CARE
Problem: Safety - Adult  Goal: Free from fall injury  Outcome: Progressing  Flowsheets (Taken 3/15/2024 0420 by Marilyn Vilchis RN)  Free From Fall Injury: Instruct family/caregiver on patient safety  Note: Bed in low position  Call light in reach  Bed wheel lock  Teaching to use call light for assistance  Hourly Rounding  Bed Alarm on     Bed Alarm on   Care plan reviewed with patient.  Patient  verbalizes understanding of the plan of care and contribute to goal setting.

## 2024-03-18 NOTE — PROGRESS NOTES
Gastroenterology Progress Note:     Patient Name:  Del Yoon   MRN: [id number]  [id number]  YOB: 1950  Admit Date: 3/5/2024  5:03 PM  Primary Care Physician: Faby Suh APRN - CNP   4K-23/023-A     Patient seen and examined.  24 hours events and chart reviewed.    Subjective: Patient resting in bed. Denies abd pain, n/v. SOB noted with activity, none at rest. 2 black BMs documented yesterday, none today. Hgb 11.0    Objective:  /62   Pulse 74   Temp 98.9 °F (37.2 °C) (Oral)   Resp 20   Ht 1.803 m (5' 11\")   Wt (!) 141 kg (310 lb 13.6 oz)   SpO2 96%   BMI 43.35 kg/m²     Physical Exam:    General:  Acute on chronically ill appearing male  HEENT: Atraumatic, normocephalic. Moist oral mucous membranes.  Neck: Supple without adenopathy, JVD, thyromegaly or masses. Trachea midline.  CV: Heart RRR, no murmurs, rubs, gallops.  Resp: Even, easy without cough or accessory use. Lungs clear, diminished to ascultation bilaterally.   Abd: Round, soft, obese, non-tender. No hepatosplenomegaly or mass present. Active bowel sounds heard. No distention noted.   Ext:  Without cyanosis, clubbing, edema.   Skin: Pink, warm, dry  Neuro:  Alert, oriented x 3 with no obvious deficits.       Rectal: deferred    Labs:   CBC:   Lab Results   Component Value Date/Time    WBC 13.2 03/18/2024 05:24 AM    HGB 11.0 03/18/2024 08:26 AM    HCT 33.6 03/18/2024 08:26 AM    MCV 88.7 03/18/2024 05:24 AM     03/18/2024 05:24 AM     BMP:   Lab Results   Component Value Date/Time     03/18/2024 05:24 AM    K 3.1 03/18/2024 05:24 AM    K 4.5 03/13/2024 05:02 AM    CL 99 03/18/2024 05:24 AM    CO2 22 03/18/2024 05:24 AM    BUN 27 03/18/2024 05:24 AM    CREATININE 1.0 03/18/2024 05:24 AM    CALCIUM 8.4 03/18/2024 05:24 AM     PT/INR:   Lab Results   Component Value Date/Time    INR 1.10 03/16/2024 04:03 PM     Lipids:   Lab Results   Component Value Date/Time    ALKPHOS 83 03/11/2024 11:46 AM

## 2024-03-18 NOTE — PROGRESS NOTES
[facility]  SPEECH THERAPY  STRZ ICU STEPDOWN TELEMETRY 4K  Modified Barium Swallow + dysphagia therapy             MBS: 8 minutes  Dysphagia therapy: 8 minutes    Date: 3/20/2024  Patient Name: Del Yoon      CSN: [id number]   : 1950  (73 y.o.)  Gender: male   Referring Physician:  Behl, Ashita, MD  Diagnosis: Community acquired pneumonia of left lower lobe of lung   Precautions: Fall risk, aspiration risk  History of Present Illness/Injury: Patient presents to [facility] with above diagnoses. Per physician note, \"Del Yoon is a 72 yo male that presents to the ED with SOB and weakness since . He states he felt short of breath and felt significant weakness that prevented him being able to ambulate. His legs \"would not move\" He recently moved into assisted living and gets around with a walker or wheelchair for longer distances. He did not take any medication for this. He had a fever this morning and made an appointment with his PCP that sent him to the hospital. He has a cough productive cough and denies chest pain, chills, abdominal pain, sore throat, vomiting. He endorses chronic diarrhea but denies bowel movement today. He denies smoking. Pt states he had stents placed in the past.\"    ST consulted to assess pharyngeal phase of swallow to assist in POC development.    Please see medical history questionnaire for information related to prior medical history, allergies and medications  Current Diet: Clear liquid (per GI)    Pain: No pain reported.    SUBJECTIVE:  Patient seen in fluoroscopy in bed, alert, and able to participate for entire session.     OBJECTIVE:    Respiratory Status:  Nasal Canula: 3LPM    Behavioral Observation:  Alert    PATIENT WAS EVALUATED USING:  Barium:Thin Liquids, Mildly Thick Liquids, Puree, Soft Solids, Coarse Solids, and Mixed Consistency    ORAL PHASE HAILEY SCORE: (Dysphagia outcome and severity scale)  5 = Mild Dysphagia - May have one diet consistency  incomplete epiglottic inversion. Laryngeal penetration of thin liquids of tsp, cup, and straw, and mildy thick liquids via cup and straw. SILENT aspiration with thin liquids. Given that all aspiration events were SILENT, bedside performance will not serve as reliable indicator aspiration events. Recommend patient consume a soft and bite sized diet + mildly thick liquids, with skilled dysphagia therapy to follow.       Diet Recommendations:  Soft and bite sized diet + mildly thick liquids  Strategies:  Full Upright Position, Small Bite/Sip, and Alternate Solids and Liquids     Aspiration Pneumonia Predictors:  Chronic Medical Issues/Pertinent Co-Morbidities and Compromised Pulmonary Status  Functional Oral Intake Scale: Total Oral Intake: 5.  Total oral intake of multiple consistencies requiring special preparation   Rehabilitation Potential: good  Discharge Recommendations: Continue to Assess Pending Progress    EDUCATION:  Learner: Patient  Education:  Reviewed results and recommendations of this evaluation, Reviewed diet and strategies, Reviewed signs, symptoms and risks of aspiration, and Reviewed recommendations for follow-up  Evaluation of Education: Verbalizes understanding and Family not present    PLAN:  Skilled SLP intervention on acute care 3-5 x per week or until goals met and/or pt plateaus in function.  Specific interventions for next session may include: Dysphagia therapy.    PATIENT GOAL:    Return to least restrictive diet.    SHORT TERM GOALS:  Short Term Goals  Time Frame for Short Term Goals: 2 weeks  Goal 1: Patient will consume a soft and bite sized diet with mildly thick liquids (advanced trials with ST only) without overt s/s of aspiration or pulmonary decline to reach hydration/nutritional goals.  Goal 2: Patient will complete x10+ pharyngeal strengthening exercises to improve hyolaryngeal exursion and epiglottic inversion.    INTERVENTIONS: Skilled education provided regarding basic anatomy

## 2024-03-18 NOTE — PROGRESS NOTES
[facility]    Patient - Del Yoon   MRN -  [id number]   [facility] # - [id number]   - 1950      Date of Admission -  3/5/2024  5:03 PM  Date of evaluation -  3/18/2024  Room - -23/023-A   Hospital Day - 13  Consulting - Klaudia Garcia PA-C Primary Care Physician - Faby Suh APRN - CNP   Reason for consult   Acute hypoxic respiratory failure  Active Hospital Problem List      Active Hospital Problems    Diagnosis Date Noted    Melena [K92.1] 2024    Hematoma of rectus sheath [S30.1XXA] 2024    Influenza A [J10.1] 2024    Acute respiratory failure with hypoxia (HCC) [J96.01] 2024    Primary hypertension [I10] 2024    BRE (acute kidney injury) (HCC) [N17.9] 2024    Peripheral edema [R60.9] 2024    Dizziness [R42] 2024    Hyperlipidemia [E78.5] 2024    History of CVA (cerebrovascular accident) [Z86.73] 2024    Community acquired pneumonia of left lower lobe of lung [J18.9] 2024    Coronary artery disease involving native coronary artery of native heart without angina pectoris [I25.10] 2021     HPI   Del Yoon is a 72 yo male with past medical history of CVA/TIA and DJD that presents to the ED 2024 with SOB and weakness since . He states he felt short of breath and felt significant weakness that prevented him being able to ambulate. His legs \"would not move\" He recently moved into assisted living and gets around with a walker or wheelchair for longer distances. He did not take any medication for this. He had a fever this morning and made an appointment with his PCP that sent him to the hospital. He has a cough productive cough and denies chest pain, chills, abdominal pain, sore throat, vomiting. He endorses chronic diarrhea but denies bowel movement today. He denies smoking. Pt states he had stents placed in the past.   Pneumonia Panel was positive for influenza A, H.  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    PFO (patent foramen ovale) 08/2012    noted on ROSITA following stroke    Propionibacterium infection 07/24/2020    Rotator cuff injury     Stroke ([facility])     August 19/2012    TIA (transient ischemic attack) 05/2018      Past Surgical History           Procedure Laterality Date    BACK SURGERY  05/16/2022    C5-6    CHOLECYSTECTOMY  06/25/2017    CORONARY ANGIOPLASTY WITH STENT PLACEMENT      CYST INCISION AND DRAINAGE      CYSTOSCOPY N/A 12/17/2019    CYSTOSCOPY, WITH  URETHRAL DILATION performed by Enrrique Monroe MD at [facility] OR    CYSTOSCOPY N/A 2/18/2020    CYSTOSCOPY WITH BLADDER BOTOX performed by Enrrique Monroe MD at [facility] OR    ERCP  06/23/2017    PAIN MANAGEMENT PROCEDURE Bilateral 2/9/2023    bilateral lumbar 4/5, 5/Sacral 1 medial branch blocks performed by Dustin Hurtado DO at [facility] OR    PAIN MANAGEMENT PROCEDURE Bilateral 2/23/2023    medial branch blocks at bilateral Lumbar 4/5 and Lumbar 5/Sacral 1 performed by Dustin Hurtado DO at [facility] OR    PAIN MANAGEMENT PROCEDURE Bilateral 3/15/2023    lumbar radiofrequency ablation at bilateral Lumbar 4/5, 5/Sacral 1 performed by Dustin Hurtado DO at [facility] OR    PAIN MANAGEMENT PROCEDURE Bilateral 5/16/2023    bilateral lumbar 2/3, 3/4 medial branch blocks performed by Dustin Hurtado DO at [facility] OR    PAIN MANAGEMENT PROCEDURE Bilateral 6/6/2023    confirmatory medial branch blocks bilateral Lumbar 2/3, 3/4 performed by Dustin Hurtado DO at [facility] OR    PAIN MANAGEMENT  Maternal Uncle     Prostate Cancer Neg Hx      Sleep History    No history   Meds    Current Medications    furosemide  40 mg IntraVENous Daily    pantoprazole (PROTONIX) 40 mg in sodium chloride (PF) 0.9 % 10 mL injection  40 mg IntraVENous Q12H    lidocaine  1 patch TransDERmal Daily    cloNIDine  0.1 mg Oral BID    losartan  50 mg Oral BID    [Held by provider] pantoprazole  40 mg Oral QAM AC    aluminum & magnesium hydroxide-simethicone (MAALOX) 30 mL, lidocaine viscous hcl (XYLOCAINE) 5 mL (GI COCKTAIL)   Oral Once    [Held by provider] aspirin  81 mg Oral Daily    cholestyramine light  4 g Oral Daily    isosorbide mononitrate  30 mg Oral Daily    tamsulosin  0.4 mg Oral Nightly    sodium chloride flush  5-40 mL IntraVENous 2 times per day    [Held by provider] enoxaparin  30 mg SubCUTAneous BID     ondansetron, ipratropium 0.5 mg-albuterol 2.5 mg, hydrALAZINE, aluminum & magnesium hydroxide-simethicone (MAALOX) 30 mL, lidocaine viscous hcl (XYLOCAINE) 5 mL (GI COCKTAIL), phenol, calcium gluconate, calcium carbonate, nitroGLYCERIN, ondansetron, loperamide, meclizine, sodium chloride flush, sodium chloride, potassium chloride **OR** potassium alternative oral replacement **OR** potassium chloride, magnesium sulfate, polyethylene glycol, acetaminophen **OR** acetaminophen  IV Drips/Infusions   sodium chloride       Vitals    Vitals    height is 1.803 m (5' 11\") and weight is 141 kg (310 lb 13.6 oz) (abnormal). His oral temperature is 97.7 °F (36.5 °C). His blood pressure is 135/66 and his pulse is 76. His respiration is 18 and oxygen saturation is 94%.     O2 Flow Rate (L/min): 2 L/min  I/O    Intake/Output Summary (Last 24 hours) at 3/18/2024 1304  Last data filed at 3/18/2024 1000  Gross per 24 hour   Intake 200 ml   Output 1850 ml   Net -1650 ml       No data found.    Exam     Physical Exam   Constitutional: No distress on O2 per NC. Patient appears moderately built and  moderately nourished.   Head:

## 2024-03-18 NOTE — PROGRESS NOTES
Hospitalist Progress Note    Patient:  Del Yoon      Unit/Bed:4K-23/023-A    YOB: 1950    MRN: [id number]       Acct: [id number]     PCP: Faby Suh APRN - CNP    Date of Admission: 3/5/2024    Assessment/Plan:    Bilateral Pneumonia, POA  Chest x-ray with infiltrate.  CTA 3/11 without PE but demonstrated bibasilar consolidations   Pneumonia panel with H Flu, Serratia, and Flu A   Completed 14 days of Abx- no further at this time  MRSA nares and legionella and strep pneumo urine studies negative   Completed 5-day course of prednisone (3/8-3/12).   Pulmonology consulted given worsening respiratory status on 3/16 along with worsening chest x-ray, they recommended discontinuing antibiotics, recommended giving lasix 40 mg IV (given on 3/16 and 3/17).  Encouraged ambulation. PT/OT  Ordered SLP to r/o component of aspiration contributing to worsening respiratory status given N/V, recommending MBS.   Pulmonology following   Melena   GI following and planning for EGD when stable   Aspirin on hold   Continue IV PPI BID  Clear liquid diet  Acute respiratory failure secondary to pneumonia - improved  Escalated to HHFNC 3/10 overnight.  Currently weaned to 4L NC   ABG with metabolic acidosis    Continue to treat as above  Rectus sheath hematoma:     CT abdomen/pelvis on 3/15 showed right rectus sheath hematoma measures up to 4.4 x 12.3 cm in short axis and extends approximately 10 cm in length to just above the level of the umbilicus. Scattered air-fluid levels in the small and large bowel loops is nonspecific and can indicate enteritis.   Will need outpatient repeat CT imaging to ensure resolution.  Continue with supportive care  Influenza A   Completed course of tamiflu  Accelerated HTN with history of HTN - resolved  Continue with current antihypertensives  Continue with hydralazine PRN for SBP > 170  BER- resolved  Baseline 1.0-1.1. Currently 1.0  Hold nephrotoxic agents.   40 mg IntraVENous Q12H    lidocaine  1 patch TransDERmal Daily    cloNIDine  0.1 mg Oral BID    losartan  50 mg Oral BID    [Held by provider] pantoprazole  40 mg Oral QAM AC    aluminum & magnesium hydroxide-simethicone (MAALOX) 30 mL, lidocaine viscous hcl (XYLOCAINE) 5 mL (GI COCKTAIL)   Oral Once    [Held by provider] aspirin  81 mg Oral Daily    cholestyramine light  4 g Oral Daily    isosorbide mononitrate  30 mg Oral Daily    tamsulosin  0.4 mg Oral Nightly    sodium chloride flush  5-40 mL IntraVENous 2 times per day    [Held by provider] enoxaparin  30 mg SubCUTAneous BID     PRN Meds: ondansetron, ipratropium 0.5 mg-albuterol 2.5 mg, hydrALAZINE, aluminum & magnesium hydroxide-simethicone (MAALOX) 30 mL, lidocaine viscous hcl (XYLOCAINE) 5 mL (GI COCKTAIL), phenol, calcium gluconate, calcium carbonate, nitroGLYCERIN, ondansetron, loperamide, meclizine, sodium chloride flush, sodium chloride, potassium chloride **OR** potassium alternative oral replacement **OR** potassium chloride, magnesium sulfate, polyethylene glycol, acetaminophen **OR** acetaminophen      Intake/Output Summary (Last 24 hours) at 3/18/2024 1327  Last data filed at 3/18/2024 1000  Gross per 24 hour   Intake 200 ml   Output 1850 ml   Net -1650 ml         Diet:  ADULT DIET; Clear Liquid; No red dye    Exam:  /66   Pulse 76   Temp 97.7 °F (36.5 °C) (Oral)   Resp 18   Ht 1.803 m (5' 11\")   Wt (!) 141 kg (310 lb 13.6 oz)   SpO2 94%   BMI 43.35 kg/m²     General appearance: No apparent distress, appears stated age and cooperative. Acute on Chronically ill-appearing  HEENT: Pupils equal, round, and reactive to light. Conjunctivae/corneas clear.  Neck: Supple, with full range of motion. No jugular venous distention. Trachea midline.  Respiratory:  Normal respiratory effort. Decreased breath sounds throughout  Cardiovascular: Regular rate and rhythm with normal S1/S2 without murmurs, rubs or gallops. No edema   Abdomen: Soft,  by: Dariela Iglesias MD on 03/05/2024 06:53 PM      DVT prophylaxis: [] Lovenox                                 [x] SCDs                                 [] SQ Heparin                                 [] Encourage ambulation           [] Already on Anticoagulation     Code Status: Limited    PT/OT Eval Status: Ordered    Tele:   [x] yes             [] no    Active Hospital Problems    Diagnosis Date Noted    Melena [K92.1] 03/16/2024    Hematoma of rectus sheath [S30.1XXA] 03/16/2024    Influenza A [J10.1] 03/13/2024    Acute respiratory failure with hypoxia (HCC) [J96.01] 03/13/2024    Primary hypertension [I10] 03/13/2024    BRE (acute kidney injury) (HCC) [N17.9] 03/13/2024    Peripheral edema [R60.9] 03/13/2024    Dizziness [R42] 03/13/2024    Hyperlipidemia [E78.5] 03/13/2024    History of CVA (cerebrovascular accident) [Z86.73] 03/13/2024    Community acquired pneumonia of left lower lobe of lung [J18.9] 03/05/2024    Coronary artery disease involving native coronary artery of native heart without angina pectoris [I25.10] 06/14/2021       Electronically signed by Klaudia Garcia PA-C on 3/18/2024 at 1:27 PM

## 2024-03-19 ENCOUNTER — ANESTHESIA (OUTPATIENT)
Dept: ENDOSCOPY | Age: 74
End: 2024-03-19
Payer: MEDICARE

## 2024-03-19 ENCOUNTER — ANESTHESIA EVENT (OUTPATIENT)
Dept: ENDOSCOPY | Age: 74
End: 2024-03-19
Payer: MEDICARE

## 2024-03-19 PROBLEM — E44.1 MILD MALNUTRITION (HCC): Status: ACTIVE | Noted: 2024-03-19

## 2024-03-19 LAB
ANION GAP SERPL CALC-SCNC: 17 MEQ/L (ref 8–16)
BASOPHILS ABSOLUTE: 0.1 THOU/MM3 (ref 0–0.1)
BASOPHILS NFR BLD AUTO: 0.5 %
BUN SERPL-MCNC: 27 MG/DL (ref 7–22)
CALCIUM SERPL-MCNC: 8.1 MG/DL (ref 8.5–10.5)
CHLORIDE SERPL-SCNC: 97 MEQ/L (ref 98–111)
CO2 SERPL-SCNC: 21 MEQ/L (ref 23–33)
CREAT SERPL-MCNC: 1.1 MG/DL (ref 0.4–1.2)
DEPRECATED RDW RBC AUTO: 44.9 FL (ref 35–45)
EOSINOPHIL NFR BLD AUTO: 2.4 %
EOSINOPHILS ABSOLUTE: 0.3 THOU/MM3 (ref 0–0.4)
ERYTHROCYTE [DISTWIDTH] IN BLOOD BY AUTOMATED COUNT: 14.3 % (ref 11.5–14.5)
GFR SERPL CREATININE-BSD FRML MDRD: > 60 ML/MIN/1.73M2
GLUCOSE SERPL-MCNC: 114 MG/DL (ref 70–108)
HCT VFR BLD AUTO: 33.1 % (ref 42–52)
HGB BLD-MCNC: 11.1 GM/DL (ref 14–18)
IMM GRANULOCYTES # BLD AUTO: 0.15 THOU/MM3 (ref 0–0.07)
IMM GRANULOCYTES NFR BLD AUTO: 1.4 %
LYMPHOCYTES ABSOLUTE: 1.6 THOU/MM3 (ref 1–4.8)
LYMPHOCYTES NFR BLD AUTO: 15.2 %
MCH RBC QN AUTO: 29.1 PG (ref 26–33)
MCHC RBC AUTO-ENTMCNC: 33.5 GM/DL (ref 32.2–35.5)
MCV RBC AUTO: 86.9 FL (ref 80–94)
MONOCYTES ABSOLUTE: 1.2 THOU/MM3 (ref 0.4–1.3)
MONOCYTES NFR BLD AUTO: 11.6 %
NEUTROPHILS NFR BLD AUTO: 68.9 %
NRBC BLD AUTO-RTO: 0 /100 WBC
PLATELET # BLD AUTO: 322 THOU/MM3 (ref 130–400)
PMV BLD AUTO: 11 FL (ref 9.4–12.4)
POTASSIUM SERPL-SCNC: 3.1 MEQ/L (ref 3.5–5.2)
RBC # BLD AUTO: 3.81 MILL/MM3 (ref 4.7–6.1)
SEGMENTED NEUTROPHILS ABSOLUTE COUNT: 7.3 THOU/MM3 (ref 1.8–7.7)
SODIUM SERPL-SCNC: 135 MEQ/L (ref 135–145)
WBC # BLD AUTO: 10.6 THOU/MM3 (ref 4.8–10.8)

## 2024-03-19 PROCEDURE — 94761 N-INVAS EAR/PLS OXIMETRY MLT: CPT

## 2024-03-19 PROCEDURE — 6370000000 HC RX 637 (ALT 250 FOR IP): Performed by: PHYSICIAN ASSISTANT

## 2024-03-19 PROCEDURE — 2700000000 HC OXYGEN THERAPY PER DAY

## 2024-03-19 PROCEDURE — 6360000002 HC RX W HCPCS: Performed by: NURSE ANESTHETIST, CERTIFIED REGISTERED

## 2024-03-19 PROCEDURE — 2580000003 HC RX 258: Performed by: PHYSICIAN ASSISTANT

## 2024-03-19 PROCEDURE — 2580000003 HC RX 258: Performed by: STUDENT IN AN ORGANIZED HEALTH CARE EDUCATION/TRAINING PROGRAM

## 2024-03-19 PROCEDURE — 6370000000 HC RX 637 (ALT 250 FOR IP): Performed by: STUDENT IN AN ORGANIZED HEALTH CARE EDUCATION/TRAINING PROGRAM

## 2024-03-19 PROCEDURE — 85025 COMPLETE CBC W/AUTO DIFF WBC: CPT

## 2024-03-19 PROCEDURE — 99232 SBSQ HOSP IP/OBS MODERATE 35: CPT | Performed by: INTERNAL MEDICINE

## 2024-03-19 PROCEDURE — 36415 COLL VENOUS BLD VENIPUNCTURE: CPT

## 2024-03-19 PROCEDURE — 83036 HEMOGLOBIN GLYCOSYLATED A1C: CPT

## 2024-03-19 PROCEDURE — 6370000000 HC RX 637 (ALT 250 FOR IP): Performed by: INTERNAL MEDICINE

## 2024-03-19 PROCEDURE — 3700000000 HC ANESTHESIA ATTENDED CARE: Performed by: INTERNAL MEDICINE

## 2024-03-19 PROCEDURE — 3700000001 HC ADD 15 MINUTES (ANESTHESIA): Performed by: INTERNAL MEDICINE

## 2024-03-19 PROCEDURE — A4216 STERILE WATER/SALINE, 10 ML: HCPCS | Performed by: STUDENT IN AN ORGANIZED HEALTH CARE EDUCATION/TRAINING PROGRAM

## 2024-03-19 PROCEDURE — 99222 1ST HOSP IP/OBS MODERATE 55: CPT | Performed by: INTERNAL MEDICINE

## 2024-03-19 PROCEDURE — 7100000011 HC PHASE II RECOVERY - ADDTL 15 MIN: Performed by: INTERNAL MEDICINE

## 2024-03-19 PROCEDURE — 3609017100 HC EGD: Performed by: INTERNAL MEDICINE

## 2024-03-19 PROCEDURE — 2580000003 HC RX 258: Performed by: NURSE ANESTHETIST, CERTIFIED REGISTERED

## 2024-03-19 PROCEDURE — 80048 BASIC METABOLIC PNL TOTAL CA: CPT

## 2024-03-19 PROCEDURE — 6360000002 HC RX W HCPCS: Performed by: STUDENT IN AN ORGANIZED HEALTH CARE EDUCATION/TRAINING PROGRAM

## 2024-03-19 PROCEDURE — 7100000010 HC PHASE II RECOVERY - FIRST 15 MIN: Performed by: INTERNAL MEDICINE

## 2024-03-19 PROCEDURE — 2500000003 HC RX 250 WO HCPCS: Performed by: NURSE ANESTHETIST, CERTIFIED REGISTERED

## 2024-03-19 PROCEDURE — 0DD68ZX EXTRACTION OF STOMACH, VIA NATURAL OR ARTIFICIAL OPENING ENDOSCOPIC, DIAGNOSTIC: ICD-10-PCS | Performed by: INTERNAL MEDICINE

## 2024-03-19 PROCEDURE — 1200000000 HC SEMI PRIVATE

## 2024-03-19 PROCEDURE — C9113 INJ PANTOPRAZOLE SODIUM, VIA: HCPCS | Performed by: STUDENT IN AN ORGANIZED HEALTH CARE EDUCATION/TRAINING PROGRAM

## 2024-03-19 RX ORDER — SUCRALFATE 1 G/1
1 TABLET ORAL EVERY 12 HOURS SCHEDULED
Qty: 120 TABLET | Refills: 11 | Status: SHIPPED | OUTPATIENT
Start: 2024-03-19

## 2024-03-19 RX ORDER — PANTOPRAZOLE SODIUM 40 MG/1
40 TABLET, DELAYED RELEASE ORAL
Qty: 60 TABLET | Refills: 11 | Status: SHIPPED | OUTPATIENT
Start: 2024-03-19

## 2024-03-19 RX ORDER — PANTOPRAZOLE SODIUM 40 MG/1
40 TABLET, DELAYED RELEASE ORAL
Status: DISCONTINUED | OUTPATIENT
Start: 2024-03-19 | End: 2024-03-23 | Stop reason: HOSPADM

## 2024-03-19 RX ORDER — SODIUM CHLORIDE 9 MG/ML
INJECTION, SOLUTION INTRAVENOUS CONTINUOUS PRN
Status: DISCONTINUED | OUTPATIENT
Start: 2024-03-19 | End: 2024-03-19 | Stop reason: SDUPTHER

## 2024-03-19 RX ORDER — PROPOFOL 10 MG/ML
INJECTION, EMULSION INTRAVENOUS PRN
Status: DISCONTINUED | OUTPATIENT
Start: 2024-03-19 | End: 2024-03-19 | Stop reason: SDUPTHER

## 2024-03-19 RX ORDER — LIDOCAINE HYDROCHLORIDE 20 MG/ML
INJECTION, SOLUTION EPIDURAL; INFILTRATION; INTRACAUDAL; PERINEURAL PRN
Status: DISCONTINUED | OUTPATIENT
Start: 2024-03-19 | End: 2024-03-19 | Stop reason: SDUPTHER

## 2024-03-19 RX ORDER — SUCRALFATE 1 G/1
1 TABLET ORAL EVERY 12 HOURS SCHEDULED
Status: DISCONTINUED | OUTPATIENT
Start: 2024-03-19 | End: 2024-03-23 | Stop reason: HOSPADM

## 2024-03-19 RX ADMIN — TAMSULOSIN HYDROCHLORIDE 0.4 MG: 0.4 CAPSULE ORAL at 19:39

## 2024-03-19 RX ADMIN — LIDOCAINE HYDROCHLORIDE 60 MG: 20 INJECTION, SOLUTION EPIDURAL; INFILTRATION; INTRACAUDAL; PERINEURAL at 13:24

## 2024-03-19 RX ADMIN — SODIUM CHLORIDE, PRESERVATIVE FREE 10 ML: 5 INJECTION INTRAVENOUS at 08:13

## 2024-03-19 RX ADMIN — LOSARTAN POTASSIUM 50 MG: 50 TABLET, FILM COATED ORAL at 19:39

## 2024-03-19 RX ADMIN — CHOLESTYRAMINE 4 G: 4 POWDER, FOR SUSPENSION ORAL at 08:10

## 2024-03-19 RX ADMIN — SUCRALFATE 1 G: 1 TABLET ORAL at 19:39

## 2024-03-19 RX ADMIN — ISOSORBIDE MONONITRATE 30 MG: 30 TABLET, EXTENDED RELEASE ORAL at 14:34

## 2024-03-19 RX ADMIN — CLONIDINE HYDROCHLORIDE 0.1 MG: 0.1 TABLET ORAL at 19:38

## 2024-03-19 RX ADMIN — SODIUM CHLORIDE, PRESERVATIVE FREE 10 ML: 5 INJECTION INTRAVENOUS at 19:39

## 2024-03-19 RX ADMIN — SODIUM CHLORIDE: 9 INJECTION, SOLUTION INTRAVENOUS at 13:20

## 2024-03-19 RX ADMIN — POTASSIUM CHLORIDE 40 MEQ: 1500 TABLET, EXTENDED RELEASE ORAL at 08:12

## 2024-03-19 RX ADMIN — LOSARTAN POTASSIUM 50 MG: 50 TABLET, FILM COATED ORAL at 08:12

## 2024-03-19 RX ADMIN — PANTOPRAZOLE SODIUM 40 MG: 40 TABLET, DELAYED RELEASE ORAL at 15:58

## 2024-03-19 RX ADMIN — CLONIDINE HYDROCHLORIDE 0.1 MG: 0.1 TABLET ORAL at 08:11

## 2024-03-19 RX ADMIN — PROPOFOL 150 MG: 10 INJECTION, EMULSION INTRAVENOUS at 13:24

## 2024-03-19 RX ADMIN — FUROSEMIDE 80 MG: 40 TABLET ORAL at 14:33

## 2024-03-19 RX ADMIN — PANTOPRAZOLE SODIUM 40 MG: 40 INJECTION, POWDER, FOR SOLUTION INTRAVENOUS at 08:12

## 2024-03-19 ASSESSMENT — PAIN - FUNCTIONAL ASSESSMENT
PAIN_FUNCTIONAL_ASSESSMENT: NONE - DENIES PAIN
PAIN_FUNCTIONAL_ASSESSMENT: NONE - DENIES PAIN

## 2024-03-19 ASSESSMENT — ENCOUNTER SYMPTOMS: SHORTNESS OF BREATH: 1

## 2024-03-19 NOTE — PROGRESS NOTES
Comprehensive Nutrition Assessment    Type and Reason for Visit:  Reassess (Wounds, PO monitor, ONS)    Nutrition Recommendations/Plan:   Modified diet order to match last SLP recommendations (3/18): Soft and Bite Sized; Mildly Thickened Liquids  Start ONS: Magic Cups (BID)   Recommend MVI to assist with wound healing efforts  Will monitor need for additional nutrition interventions as appropriate and able.      Malnutrition Assessment:  Malnutrition Status:  Mild malnutrition (03/19/24 1548)    Context:  Acute Illness     Findings of the 6 clinical characteristics of malnutrition:  Energy Intake:  75% or less of estimated energy requirements for 7 or more days (per PO documentations this admission throughout LOS of 14 days)  Weight Loss:  No significant weight loss     Body Fat Loss:  No significant body fat loss     Muscle Mass Loss:  No significant muscle mass loss    Fluid Accumulation:  Unable to assess     Strength:  Not Performed    Nutrition Assessment:     Pt. nutritionally compromised AEB wounds.  At risk for further nutrition compromise r/t increased nutrient needs for wound healing, admit with bilateral pneumonia, SOB, influenza A, HTN, BRE - resolved, EGD for melena today, dysphagia, underlying medical condition (Hx CVA with Left residual weakness, CAD, HTN, TIA, , diarrhea).      Nutrition Related Findings:  Pt. Report/Treatments/Miscellaneous: Pt seen, eating upon visit. Pt endorses that he feels like his appetite is improving. Discussed ONS use - pt has not received for a few days, NPO order 3/16 kicked off supplements and were not reordered, pt agreeable to continuing ONS - will modify to magic cups per SLP thickened liquids recommendations. SLP recommending soft and bite sized and mildly thick texture modifications (3/18) - messaged GI, okay to modify to SLP recommendations.   GI Status: BM - 3/19  Pertinent Labs: K 3.1, BUN 27, Glucose 114  Pertinent Meds: GI cocktail, cholestyramine,  Nutrient Intake, Supplement Intake  Physical Signs/Symptoms Outcomes: Biochemical Data, Chewing or Swallowing, GI Status, Fluid Status or Edema, Nutrition Focused Physical Findings, Skin, Weight    Discharge Planning:    Too soon to determine     Eitan Tolbert RD, LD  Contact: [phone]

## 2024-03-19 NOTE — PROGRESS NOTES
Hospitalist Progress Note    Patient:  Del Yoon      Unit/Bed:[facility] END[facility] POOL RM/NONE    YOB: 1950    MRN: [id number]       Acct: [id number]     PCP: Faby Suh APRN - CNP    Date of Admission: 3/5/2024    Assessment/Plan:    #Bilateral Pneumonia, POA  -Chest x-ray with infiltrate.  CTA 3/11 without PE but demonstrated bibasilar consolidations   -Pneumonia panel with H Flu, Serratia, and Flu A   -Completed 14 days of Abx- no further at this time  -MRSA nares and legionella and strep pneumo urine studies negative   -Completed 5-day course of prednisone (3/8-3/12).   -Pulmonology consulted given worsening respiratory status on 3/16 along with worsening chest x-ray, they recommended discontinuing antibiotics, recommended giving lasix 40 mg IV (given on 3/16 and 3/17).  Encouraged ambulation. PT/OT  -SLP consulted to r/o component of aspiration contributing to worsening respiratory status given N/V, recommending MBS.   -Pulmonology following .    #Melena   -GI following and planning for EGD Today.  -Aspirin on hold   -Continue IV PPI BID    #Acute respiratory failure secondary to pneumonia - improved  -Escalated to HHFNC 3/10 overnight.  Currently weaned to 4L NC   -ABG with metabolic acidosis.Resolving.       #Rectus sheath hematoma:     -CT abdomen/pelvis on 3/15 showed right rectus sheath hematoma measures up to 4.4 x 12.3 cm in short axis and extends approximately 10 cm in length to just above the level of the umbilicus. Scattered air-fluid levels in the small and large bowel loops is nonspecific and can indicate enteritis.   -Will need outpatient repeat CT imaging to ensure resolution.  -Continue with supportive care    #Influenza A  -Completed course of tamiflu    #Accelerated HTN with history of HTN - resolved  -Continue with current antihypertensives  -Continue with hydralazine PRN for SBP > 170    #BRE- resolved  -Baseline 1.0-1.1. Currently 1.0  -Hold nephrotoxic

## 2024-03-19 NOTE — PLAN OF CARE
Problem: Safety - Adult  Goal: Free from fall injury  Recent Flowsheet Documentation  Taken 3/19/2024 1807 by Ayesha Quiros, RN  Free From Fall Injury:   Instruct family/caregiver on patient safety   Based on caregiver fall risk screen, instruct family/caregiver to ask for assistance with transferring infant if caregiver noted to have fall risk factors  Care plan reviewed with patient.  Patient  verbalize understanding of the plan of care and contribute to goal setting.

## 2024-03-19 NOTE — PROGRESS NOTES
03/19/24 1355   Encounter Summary   Encounter Overview/Reason  Spiritual/Emotional Needs   Service Provided For: Patient and family together   Referral/Consult From: [facility] [place]   Support System Spouse   Last Encounter  03/19/24   Complexity of Encounter Moderate   Begin Time 1345   End Time  1355   Total Time Calculated 10 min   Spiritual/Emotional needs   Type Spiritual Support   Assessment/Intervention/Outcome   Assessment Unable to assess   Intervention Prayer (assurance of)/[place]     Assessment:  In my encounter with the 73 yr old patient in (they were non-responsive/ resting) so I had conversation with the patient's family. I also came to assess the present spiritual needs of the patient and the family. The pt was admitted due to pneumonia.     Interventions:  I provided, prayer, emotional support and words of comfort.  provided a listening presence and encouraged pt to share their beliefs and how they support them during their hospitalization.       Outcomes:  The patient's family was grateful and didn't share any further spiritual needs at this time. The pt's family shared that they were appreciative for the support.     Plan:  Chaplains will follow-up at a later time for assessment of any spiritual care needs present.  The Chaplains will be available to provide further emotional support per request.

## 2024-03-19 NOTE — DISCHARGE INSTRUCTIONS
You were admitted to the hospital due to shortness of breath and were found to have pneumonia, which has since improved.  You will need to follow-up with a repeat CT scan of your chest in 6/20/2024 before follow-up appointment with pulmonology on 6/24/2024.   Also noted to have some issues with fluid overload in the lungs, continue taking Lasix 80 mg daily (replace hydrochlorothiazide)    You had some bowel movements concerning for blood in your stool.  GI evaluated you and you underwent an EGD.  Found to have gastroparesis and gastritis  You will need a gastric emptying study for the gastroparesis  Recommend 6 liquid meals daily initiated on Reglan to help with gastroparesis  Pantoprazole (twice a day 30-60\" before breakfast and dinner) and sucralfate (twice a day 5-60\" before lunch and at bedtime) were added for gastritis  Will need to follow-up with GI in 6 weeks at addressed below.  Office will call patient to schedule  [email or web address]   [facility] [address]. [place], [place]    [phone]     You also experience some issues with elevated blood pressure during your stay, new medication was initiated to help with this.  Clonidine

## 2024-03-19 NOTE — CARE COORDINATION
3/19/24, 11:39 AM EDT    DISCHARGE PLANNING EVALUATION    Planning [facility], from assisted living at the facility.

## 2024-03-19 NOTE — H&P
[facility] Endoscopy    Patient: Del Yoon  : 1950  [id number]  Date of evaluation: 3/19/2024  Primary Care Physician: Faby Suh APRN - CNP     Procedure:    [x]EGD    []Enteroscopy    []Biopsy   []Dilation      []PEG    []PEG change    []PEG removal  []Variceal banding    []Control of bleeding  []Destruction of lesion by APC    []Stent Placement  []Foreign Body Removal    []Snare Polypectomy  []Other:     []Aborted:        Indication:   melena, anemia    History:  The patient is a 73 y.o. male admitted 3/5/2024 for dyspnea due to pneumonia with recent influenza A.  During his stay he developed a right rectus sheath hematoma.  He had been on aspirin and Lovenox prior to admission.  He has a history of CVA with left-sided residual weakness.     He developed 3/15/24 melena with anemia, normocytic.      Physical Exam:  VITALS: BP (!) 116/59   Pulse 72   Temp 98.1 °F (36.7 °C) (Oral)   Resp 18   Ht 1.803 m (5' 11\")   Wt (!) 141 kg (310 lb 13.6 oz)   SpO2 93%   BMI 43.35 kg/m²   The patient is a 73 y.o. male in no acute distress.  HEAD: Normal cephalic/atraumatic. Extra-occular motions intact bilaterally.  NECK: No lymphadenopathy or bruits.  CHEST: Rises equally on inspiration. Clear to auscultation bilaterally.   CARDIOVASCULAR: Regular rate and rhythm without murmurs, rubs or gallops.   ABDOMEN: Soft, nontender, and nondistended with normal bowel sounds. No Hepatosplemomegaly.  UPPER EXTREMITIES: no cyanosis, clubbing, or edema.  DERM: no rash or jaundice.  LOWER EXTREMITIES: no cyanosis, clubbing, or edema.  NEURO: Alert and oriented times four. Patient moves all extremities and has gross sensation in all extremities.    ASA: ASA 3 - Patient with moderate systemic disease with functional limitations    MEDICATION:    MAC/Propofol/Anesthesia:  Yes     Photo:  Yes  Biopsy:  No      Description of Procedure:  The risks and benefits of the

## 2024-03-19 NOTE — RT PROTOCOL NOTE
RT Inhaler-Nebulizer Bronchodilator Protocol Note    There is a bronchodilator order in the chart from a provider indicating to follow the RT Bronchodilator Protocol and there is an “Initiate RT Inhaler-Nebulizer Bronchodilator Protocol” order as well (see protocol at bottom of note).    CXR Findings:  No results found.    The findings from the last RT Protocol Assessment were as follows:   History Pulmonary Disease: None or smoker <15 pack years  Respiratory Pattern: Regular pattern and RR 12-20 bpm  Breath Sounds: Slightly diminished and/or crackles  Cough: Strong, spontaneous, non-productive  Indication for Bronchodilator Therapy: Decreased or absent breath sounds  Bronchodilator Assessment Score: 2    Aerosolized bronchodilator medication orders have been revised according to the RT Inhaler-Nebulizer Bronchodilator Protocol below.    Respiratory Therapist to perform RT Therapy Protocol Assessment initially then follow the protocol.  Repeat RT Therapy Protocol Assessment PRN for score 0-3 or on second treatment, BID, and PRN for scores above 3.    No Indications - adjust the frequency to every 6 hours PRN wheezing or bronchospasm, if no treatments needed after 48 hours then discontinue using Per Protocol order mode.     If indication present, adjust the RT bronchodilator orders based on the Bronchodilator Assessment Score as indicated below.  Use Inhaler orders unless patient has one or more of the following: on home nebulizer, not able to hold breath for 10 seconds, is not alert and oriented, cannot activate and use MDI correctly, or respiratory rate 25 breaths per minute or more, then use the equivalent nebulizer order(s) with same Frequency and PRN reasons based on the score.  If a patient is on this medication at home then do not decrease Frequency below that used at home.    0-3 - enter or revise RT bronchodilator order(s) to equivalent RT Bronchodilator order with Frequency of every 4 hours PRN for wheezing  or increased work of breathing using Per Protocol order mode.        4-6 - enter or revise RT Bronchodilator order(s) to two equivalent RT bronchodilator orders with one order with BID Frequency and one order with Frequency of every 4 hours PRN wheezing or increased work of breathing using Per Protocol order mode.        7-10 - enter or revise RT Bronchodilator order(s) to two equivalent RT bronchodilator orders with one order with TID Frequency and one order with Frequency of every 4 hours PRN wheezing or increased work of breathing using Per Protocol order mode.       11-13 - enter or revise RT Bronchodilator order(s) to one equivalent RT bronchodilator order with QID Frequency and an Albuterol order with Frequency of every 4 hours PRN wheezing or increased work of breathing using Per Protocol order mode.      Greater than 13 - enter or revise RT Bronchodilator order(s) to one equivalent RT bronchodilator order with every 4 hours Frequency and an Albuterol order with Frequency of every 2 hours PRN wheezing or increased work of breathing using Per Protocol order mode.     RT to enter RT Home Evaluation for COPD & MDI Assessment order using Per Protocol order mode.    Electronically signed by aMrc Erazo RCP on 3/18/2024 at 9:14 PM

## 2024-03-19 NOTE — ANESTHESIA POSTPROCEDURE EVALUATION
Department of Anesthesiology  Postprocedure Note    Patient: Del Yoon  MRN: [id number]  YOB: 1950  Date of evaluation: 3/19/2024    Procedure Summary       Date: 03/19/24 Room / Location: [place] [address] / [facility]    Anesthesia Start: 1320 Anesthesia Stop: 1339    Procedure: EGD Diagnosis:       Melena      Anemia, unspecified type      (Melena [K92.1])      (Anemia, unspecified type [D64.9])    Surgeons: Miquel Myrick MD Responsible Provider: Jose Oliver DO    Anesthesia Type: MAC ASA Status: 3            Anesthesia Type: No value filed.    Marguerite Phase I:      Marguerite Phase II:      Anesthesia Post Evaluation    Patient location during evaluation: bedside  Patient participation: complete - patient participated  Level of consciousness: awake and alert  Airway patency: patent  Nausea & Vomiting: no nausea and no vomiting  Cardiovascular status: hemodynamically stable  Respiratory status: spontaneous ventilation, acceptable and nasal cannula (6 l nc)  Hydration status: euvolemic  Pain management: adequate        No notable events documented.

## 2024-03-19 NOTE — H&P
[facility]    Pre-Endoscopy H&PE      Patient: Del Yoon    : 1950    Acct#: [id number]  Primary Care Physician: Faby Suh, MAGDA - CNP   Date of evaluation: 3/19/2024    Planned Procedure:    [x]EGD    []Enteroscopy    []PEG    []PEG change    []PEG removal  []Variceal banding    []Biopsy   []Dilation      [x]Control of bleeding  []Destruction of lesion by APC    []Stent Placement  []Foreign Body Removal    []Snare Polypectomy  []Other:         Planned Sedation  []Conscious Sedation [x]MAC/Propofol []Anesthesia    []None    Airway:  Adequate for planned sedation    Indication:   melena, anemia    History:  The patient is a 73 y.o. male admitted 3/5/2024 for dyspnea due to pneumonia with recent influenza A.  During his stay he developed a right rectus sheath hematoma.  He had been on aspirin and Lovenox prior to admission.  He has a history of CVA with left-sided residual weakness.     He developed 3/15/24 melena with anemia, normocytic.      Physical Exam:  VITALS: BP (!) 116/59   Pulse 72   Temp 98.1 °F (36.7 °C) (Oral)   Resp 18   Ht 1.803 m (5' 11\")   Wt (!) 141 kg (310 lb 13.6 oz)   SpO2 93%   BMI 43.35 kg/m²   The patient is a 73 y.o. male in no acute distress.  HEAD: Normal cephalic/atraumatic. Extra-occular motions intact bilaterally.  NECK: No lymphadenopathy or bruits.  CHEST: Rises equally on inspiration. Clear to auscultation bilaterally.   CARDIOVASCULAR: Regular rate and rhythm without murmurs, rubs or gallops.   ABDOMEN: Soft, nontender, and nondistended with normal bowel sounds. No Hepatosplemomegaly.  UPPER EXTREMITIES: no cyanosis, clubbing, or edema.  DERM: no rash or jaundice.  LOWER EXTREMITIES: no cyanosis, clubbing, or edema.  NEURO: Alert and oriented times four. Patient moves all extremities and has gross sensation in all extremities.    ASA: ASA 3 - Patient with moderate systemic disease with functional limitations    See GI consult

## 2024-03-19 NOTE — PROGRESS NOTES
[facility] for Pulmonary, Critical Care and Sleep Medicine    Patient - Del Yoon   MRN -  [id number]   [id number]   - 1950      Date of Admission -  3/5/2024  5:03 PM  Date of evaluation -  3/19/2024  Room - STR ENDO POOL RM/NONE   Hospital Day - 14  Consulting - Leo Armas MD Primary Care Physician - Faby Suh APRN - CNP   Reason for consult   Acute hypoxic respiratory failure   Active Hospital Problem List      Active Hospital Problems    Diagnosis Date Noted    Shortness of breath [R06.02] 2024    Melena [K92.1] 2024    Hematoma of rectus sheath [S30.1XXA] 2024    Influenza A [J10.1] 2024    Acute respiratory failure with hypoxia (HCC) [J96.01] 2024    Primary hypertension [I10] 2024    BRE (acute kidney injury) (HCC) [N17.9] 2024    Peripheral edema [R60.9] 2024    Dizziness [R42] 2024    Hyperlipidemia [E78.5] 2024    History of CVA (cerebrovascular accident) [Z86.73] 2024    Pneumonia of left lower lobe due to infectious organism [J18.9] 2024    Coronary artery disease involving native coronary artery of native heart without angina pectoris [I25.10] 2021     HPI   Del Yoon is a 72 yo male with past medical history of CVA/TIA and DJD that presents to the ED 2024 with SOB and weakness since . He states he felt short of breath and felt significant weakness that prevented him being able to ambulate. His legs \"would not move\" He recently moved into assisted living and gets around with a walker or wheelchair for longer distances. He did not take any medication for this. He had a fever this morning and made an appointment with his PCP that sent him to the hospital. He has a cough productive cough and denies chest pain, chills, abdominal pain, sore throat, vomiting. He endorses chronic diarrhea but denies bowel movement today. He denies smoking. Pt states he had stents placed in the past.        No data found.    Exam   Physical Exam   Constitutional: No distress on O2 per NC. Patient appears moderately built and  moderately nourished.   Head: Normocephalic and atraumatic.   Mouth/Throat: Oropharynx is clear and moist.  No oral thrush.  Eyes: Conjunctivae are normal. Pupils are equal, round. No scleral icterus.   Neck: Neck supple. No tracheal deviation present.   Cardiovascular: S1 and S2 with no murmur.  BLE  Pulmonary/Chest: Normal effort with bilateral air entry, faint rhonchi with bibasilar rales. No stridor. No respiratory distress. Patient exhibits no tenderness.   Abdominal: Soft. Bowel sounds audible. No distension or tenderness to palp.   Musculoskeletal: Moves all extremities  Neurological: Patient is alert and oriented to person, place, and time.     Labs   ABG  Lab Results   Component Value Date/Time    PH 7.47 03/11/2024 09:26 AM    PO2 75 03/11/2024 09:26 AM    PCO2 40 03/11/2024 09:26 AM    HCO3 29 03/11/2024 09:26 AM    O2SAT 96 03/11/2024 09:26 AM     Lab Results   Component Value Date/Time    IFIO2 60 03/11/2024 09:26 AM     CBC  Recent Labs     03/17/24  0403 03/17/24  0834 03/18/24  0524 03/18/24  0826 03/19/24  0552   WBC 14.7*  --  13.2*  --  10.6   RBC 4.21*  --  3.91*  --  3.81*   HGB 12.1*   < > 11.5* 11.0* 11.1*   HCT 37.1*   < > 34.7* 33.6* 33.1*   MCV 88.1  --  88.7  --  86.9   MCH 28.7  --  29.4  --  29.1   MCHC 32.6  --  33.1  --  33.5     --  285  --  322   MPV 10.9  --  10.9  --  11.0    < > = values in this interval not displayed.        BMP  Recent Labs     03/17/24  1140 03/17/24  1716 03/18/24  0524 03/19/24  0552   *  --  134* 135   K 3.4* 3.4* 3.1* 3.1*   CL 98  --  99 97*   CO2 23  --  22* 21*   BUN 26*  --  27* 27*   CREATININE 0.9  --  1.0 1.1   GLUCOSE 117*  --  106 114*   CALCIUM 8.5  --  8.4* 8.1*       PFTs   No records  Echo      TTE 3/8/2024    Summary  Ejection fraction is visually estimated at 60%.  Overall left ventricular function is

## 2024-03-19 NOTE — PROGRESS NOTES
Recovery mode. Patient denies discomfort. Passing gas, taking fluids. Dr. Myrick discussed findings with patient and wife. Report called to Ayesha DUNN on 7K. Discharge instructions provided and understanding verbalized.

## 2024-03-19 NOTE — PLAN OF CARE
Problem: Safety - Adult  Goal: Free from fall injury  3/18/2024 2320 by Mary Rico RN  Outcome: Progressing  Flowsheets (Taken 3/15/2024 0420 by Marilyn Vilchis RN)  Free From Fall Injury: Instruct family/caregiver on patient safety  3/18/2024 1257 by Truong Mcbride RN  Outcome: Progressing  3/18/2024 1256 by Truong Mcbride RN  Outcome: Progressing     Problem: Chronic Conditions and Co-morbidities  Goal: Patient's chronic conditions and co-morbidity symptoms are monitored and maintained or improved  Recent Flowsheet Documentation  Taken 3/18/2024 1945 by Mary Rico RN  Care Plan - Patient's Chronic Conditions and Co-Morbidity Symptoms are Monitored and Maintained or Improved: Monitor and assess patient's chronic conditions and comorbid symptoms for stability, deterioration, or improvement     Problem: Discharge Planning  Goal: Discharge to home or other facility with appropriate resources  Recent Flowsheet Documentation  Taken 3/18/2024 1945 by Mary Rico RN  Discharge to home or other facility with appropriate resources: Identify barriers to discharge with patient and caregiver     Problem: Respiratory - Adult  Goal: Achieves optimal ventilation and oxygenation  Recent Flowsheet Documentation  Taken 3/18/2024 1945 by Mary Rico RN  Achieves optimal ventilation and oxygenation: Assess for changes in respiratory status     Problem: Skin/Tissue Integrity - Adult  Goal: Skin integrity remains intact  Recent Flowsheet Documentation  Taken 3/18/2024 1945 by Mary Rico RN  Skin Integrity Remains Intact: Monitor for areas of redness and/or skin breakdown  Goal: Incisions, wounds, or drain sites healing without S/S of infection  Recent Flowsheet Documentation  Taken 3/18/2024 1945 by Mary Rico RN  Incisions, Wounds, or Drain Sites Healing Without Sign and Symptoms of Infection: TWICE DAILY: Assess and document skin integrity     Problem: Infection - Adult  Goal: Absence of infection at

## 2024-03-19 NOTE — ANESTHESIA PRE PROCEDURE
Date/Time    COVID19 NOT DETECTED 03/05/2024 05:40 PM           Anesthesia Evaluation  Patient summary reviewed   no history of anesthetic complications:   Airway: Mallampati: III  TM distance: >3 FB   Neck ROM: full  Mouth opening: > = 3 FB   Dental: normal exam         Pulmonary:   (+)   shortness of breath:     decreased breath sounds                              ROS comment: Patient on 2 liters NC from hospitalization   Cardiovascular:    (+) hypertension:, angina:, CAD:      ECG reviewed  Rhythm: regular    Echocardiogram reviewed               ROS comment: Cardiac PFO - not fixed at this time     Neuro/Psych:   (+) CVA:, TIA            GI/Hepatic/Renal:   (+) GERD:, morbid obesity          Endo/Other:                     Abdominal:   (+) obese          Vascular: negative vascular ROS.         Other Findings: Missing teet intermittently            Anesthesia Plan      MAC     ASA 3       Induction: intravenous.      Anesthetic plan and risks discussed with patient and spouse.      Plan discussed with CRNA.    Attending anesthesiologist reviewed and agrees with Preprocedure content                MAGDA Frank - CRNA   3/19/2024

## 2024-03-20 LAB
DEPRECATED MEAN GLUCOSE BLD GHB EST-ACNC: 120 MG/DL (ref 70–126)
HBA1C MFR BLD HPLC: 6 % (ref 4.4–6.4)

## 2024-03-20 PROCEDURE — 6370000000 HC RX 637 (ALT 250 FOR IP): Performed by: INTERNAL MEDICINE

## 2024-03-20 PROCEDURE — 2580000003 HC RX 258: Performed by: PHYSICIAN ASSISTANT

## 2024-03-20 PROCEDURE — 6370000000 HC RX 637 (ALT 250 FOR IP): Performed by: PHYSICIAN ASSISTANT

## 2024-03-20 PROCEDURE — 1200000000 HC SEMI PRIVATE

## 2024-03-20 PROCEDURE — 6370000000 HC RX 637 (ALT 250 FOR IP): Performed by: STUDENT IN AN ORGANIZED HEALTH CARE EDUCATION/TRAINING PROGRAM

## 2024-03-20 PROCEDURE — 99232 SBSQ HOSP IP/OBS MODERATE 35: CPT

## 2024-03-20 PROCEDURE — 99232 SBSQ HOSP IP/OBS MODERATE 35: CPT | Performed by: INTERNAL MEDICINE

## 2024-03-20 RX ADMIN — PANTOPRAZOLE SODIUM 40 MG: 40 TABLET, DELAYED RELEASE ORAL at 03:12

## 2024-03-20 RX ADMIN — SODIUM CHLORIDE, PRESERVATIVE FREE 10 ML: 5 INJECTION INTRAVENOUS at 20:17

## 2024-03-20 RX ADMIN — CLONIDINE HYDROCHLORIDE 0.1 MG: 0.1 TABLET ORAL at 09:15

## 2024-03-20 RX ADMIN — CHOLESTYRAMINE 4 G: 4 POWDER, FOR SUSPENSION ORAL at 09:14

## 2024-03-20 RX ADMIN — SUCRALFATE 1 G: 1 TABLET ORAL at 20:17

## 2024-03-20 RX ADMIN — CLONIDINE HYDROCHLORIDE 0.1 MG: 0.1 TABLET ORAL at 20:18

## 2024-03-20 RX ADMIN — FUROSEMIDE 80 MG: 40 TABLET ORAL at 09:15

## 2024-03-20 RX ADMIN — ISOSORBIDE MONONITRATE 30 MG: 30 TABLET, EXTENDED RELEASE ORAL at 09:16

## 2024-03-20 RX ADMIN — PANTOPRAZOLE SODIUM 40 MG: 40 TABLET, DELAYED RELEASE ORAL at 16:25

## 2024-03-20 RX ADMIN — SUCRALFATE 1 G: 1 TABLET ORAL at 09:17

## 2024-03-20 RX ADMIN — POTASSIUM CHLORIDE 40 MEQ: 1500 TABLET, EXTENDED RELEASE ORAL at 09:16

## 2024-03-20 RX ADMIN — TAMSULOSIN HYDROCHLORIDE 0.4 MG: 0.4 CAPSULE ORAL at 20:17

## 2024-03-20 RX ADMIN — SODIUM CHLORIDE, PRESERVATIVE FREE 10 ML: 5 INJECTION INTRAVENOUS at 09:17

## 2024-03-20 RX ADMIN — LOSARTAN POTASSIUM 50 MG: 50 TABLET, FILM COATED ORAL at 09:16

## 2024-03-20 RX ADMIN — LOSARTAN POTASSIUM 50 MG: 50 TABLET, FILM COATED ORAL at 20:17

## 2024-03-20 NOTE — PROGRESS NOTES
[facility] for Pulmonary, Critical Care and Sleep Medicine    Patient - Del Yoon   MRN -  [id number]   [facility] # - [id number]   - 1950      Date of Admission -  3/5/2024  5:03 PM  Date of evaluation -  3/20/2024  Room - -16/016-A   Hospital Day - 15  Consulting - Charity Wong APRN* Primary Care Physician - Faby Suh APRN - CNP   Reason for consult   Acute hypoxic respiratory failure   Active Hospital Problem List      Active Hospital Problems    Diagnosis Date Noted    Mild malnutrition (HCC) [E44.1] 2024    Shortness of breath [R06.02] 2024    Melena [K92.1] 2024    Hematoma of rectus sheath [S30.1XXA] 2024    Influenza A [J10.1] 2024    Acute respiratory failure with hypoxia (HCC) [J96.01] 2024    Primary hypertension [I10] 2024    BRE (acute kidney injury) (HCC) [N17.9] 2024    Peripheral edema [R60.9] 2024    Dizziness [R42] 2024    Hyperlipidemia [E78.5] 2024    History of CVA (cerebrovascular accident) [Z86.73] 2024    Pneumonia of left lower lobe due to infectious organism [J18.9] 2024    Coronary artery disease involving native coronary artery of native heart without angina pectoris [I25.10] 2021     HPI   Del Yoon is a 74 yo male with past medical history of CVA/TIA and DJD that presents to the ED 2024 with SOB and weakness since . He states he felt short of breath and felt significant weakness that prevented him being able to ambulate. His legs \"would not move\" He recently moved into assisted living and gets around with a walker or wheelchair for longer distances. He did not take any medication for this. He had a fever this morning and made an appointment with his PCP that sent him to the hospital. He has a cough productive cough and denies chest pain, chills, abdominal pain, sore throat, vomiting. He endorses chronic diarrhea but denies bowel movement today. He denies smoking. Pt  injury     Stroke (HCC)     August 19/2012    TIA (transient ischemic attack) 05/2018      Past Surgical History           Procedure Laterality Date    BACK SURGERY  05/16/2022    C5-6    CHOLECYSTECTOMY  06/25/2017    CORONARY ANGIOPLASTY WITH STENT PLACEMENT      CYST INCISION AND DRAINAGE      CYSTOSCOPY N/A 12/17/2019    CYSTOSCOPY, WITH  URETHRAL DILATION performed by Enrrique Monroe MD at [facility] OR    CYSTOSCOPY N/A 2/18/2020    CYSTOSCOPY WITH BLADDER BOTOX performed by Enrrique Monroe MD at [facility] OR    ERCP  06/23/2017    PAIN MANAGEMENT PROCEDURE Bilateral 2/9/2023    bilateral lumbar 4/5, 5/Sacral 1 medial branch blocks performed by Dustin Hurtado DO at [facility] OR    PAIN MANAGEMENT PROCEDURE Bilateral 2/23/2023    medial branch blocks at bilateral Lumbar 4/5 and Lumbar 5/Sacral 1 performed by Dustin Hurtado DO at [facility] OR    PAIN MANAGEMENT PROCEDURE Bilateral 3/15/2023    lumbar radiofrequency ablation at bilateral Lumbar 4/5, 5/Sacral 1 performed by Dustin Hurtado DO at [facility] OR    PAIN MANAGEMENT PROCEDURE Bilateral 5/16/2023    bilateral lumbar 2/3, 3/4 medial branch blocks performed by Dustin Hurtado DO at [facility] OR    PAIN MANAGEMENT PROCEDURE Bilateral 6/6/2023    confirmatory medial branch blocks bilateral Lumbar 2/3, 3/4 performed by Dustin Hurtado DO at [facility] OR    PAIN MANAGEMENT PROCEDURE Bilateral 6/21/2023    lumbar 2-3, 3-4 radiofrequency ablation bilateral performed by Dustin Hurtado DO at [facility] OR    ROTATOR CUFF REPAIR Left 03/12/2013    right 4/2019    SHOULDER SURGERY Right 03/02/2020    SHOULDER SURGERY  06/2020    frozen shoulder, repair nerve elbow and palm-dr alesha GRAY N/A 8/13/2019    TRANSURETHRAL RESECTION PROSTATE performed by Kenney Rosales MD at [facility] OR    UPPER GASTROINTESTINAL ENDOSCOPY N/A 3/19/2024    EGD performed by Miquel Myrick MD at [facility] ENDOSCOPY     Diet    ADULT

## 2024-03-20 NOTE — PROGRESS NOTES
Merlene Guerrero Report taken from nurse Susan.    Head to Toe Assessment    [facility] Student Nurse  Head to Toe Assessment Performed at 1930  Skin  General skin appearance / Description: warm, dry, tan  Incisions / Wounds:     Neuro/Head  LOC: A+O x 4  Speech: Concerns Present  Eyes PERRLA 4 mm  Symmetry of face / tongue: Equal  JVD of neck:     Chest  Heart sounds / Apical Pulse: Regular   Dyspnea: Present   Lung sounds: Clear besides left crackle   Cough: N/A    Abdomen/ Pelvis  Bowel sounds: Active in all four Quadrants   Passing flatus:   Palpation / Inspection: No tenderness or masses present   Last BM:   Stool assessment:   Any GI issues:   Urinary issues: Very Little urine out put today  Continence:  Urine color / turbidity:     Extremities  Edema: present in lower extremities   Altered Sensation: N/A  Upper extremity push / pulls: Strong, Equal  Left Arm Radial Pulse: 79   Left Upper extremity Capillary Refill: less then 3 sec  Right Arm Radial Pulse:    Right Upper extremity Capillary Refill: less then 3 sec  Lower extremity pedal push / pulls: Strong, Equal  Left pedal pulse: strong   Left posterior tibialis pulse: strong bilaterily   Left lower extremity capillary refill: less then 3 sec  Right pedal pulse: strong, equal   Right posterior tibialis pulse: strong bilaterily   Right lower extremity capillary refill: less then 3 sec  Drift of extremities: not present  Pain: no pain present    Other issues: Moves in bed with difficulty. Bony prominence red.      Reassessment done  Merlene Tyler Reported off to primary RNSusan.    Merlene Tyler  Signature  RSC / SN

## 2024-03-20 NOTE — PROGRESS NOTES
short axis (series 2, image 49) and extends approximately 10 cm in length to just above the level of the umbilicus. Follow-up to ensure resolution is advised.      2. Scattered air-fluid levels in the small and large bowel loops is nonspecific and can indicate enteritis. No bowel obstruction, free fluid, or free air is observed. No secondary signs of acute appendicitis are visualized.      3. Bilateral lower lobe consolidation and small left pleural effusion noted at the limited images through the lung bases could indicate pneumonia in the appropriate clinical setting. Follow-up to ensure resolution is advised.      4. Chronic findings are discussed.            **This report has been created using voice recognition software.  It may contain minor errors which are inherent in voice recognition technology.**      Final report electronically signed by Dr Guerline Jensen on 3/15/2024 1:18 PM      US RENAL COMPLETE   Final Result   1. Normal bilateral renal ultrasound.   2. Large amount of postvoid residual urine but otherwise unremarkable urinary bladder.      Final report electronically signed by Dr. Angel Luis Mcintosh on 3/12/2024 4:11 PM      CTA CHEST W WO CONTRAST   Final Result   1. No filling defects are noted within the pulmonary arterial vasculature to suggest the presence of pulmonary embolus.      2. Trace bilateral pleural effusions and moderate bilateral lower lobe airspace opacities/consolidation, left greater than right, suggests pneumonia in the appropriate clinical setting. Follow-up to ensure resolution is advised.      3. Chronic findings are discussed.            **This report has been created using voice recognition software.  It may contain minor errors which are inherent in voice recognition technology.**      Final report electronically signed by Dr Guerline Jensen on 3/11/2024 4:17 PM      XR ABDOMEN (KUB) (SINGLE AP VIEW)   Final Result   Marked gaseous distention of the stomach.            **This  report has been created using voice recognition software.  It may contain minor errors which are inherent in voice recognition technology.**         Final report electronically signed by Dr. Moreno Sanchez on 3/11/2024 2:53 PM      XR CHEST PORTABLE   Final Result   1. Borderline heart size. Right shoulder prosthesis. Prior surgery left shoulder.   2. Mild pleural thickening along lateral aspect left lung base. Minimal atelectatic/fibrotic stranding retrocardiac region left lung base.            **This report has been created using voice recognition software.  It may contain minor errors which are inherent in voice recognition technology.**      Final report electronically signed by Dr. Moreno Sanchez on 3/7/2024 11:59 AM      XR CHEST PORTABLE   Final Result   Possible focus of atelectasis or infiltrate at the base of the left lung.      This document has been electronically signed by: Dariela Iglesias MD on    03/05/2024 06:53 PM        XR CHEST PORTABLE    Result Date: 3/5/2024  1 view chest x-ray Comparison: CR/SR - XR CHEST PORTABLE - 07/23/2022 01:51 PM EDT Findings: Possible focus of airspace opacity within the left lower lung. The right lung is clear. Heart size is normal. Status post right shoulder replacement. No acute displaced fracture.     Possible focus of atelectasis or infiltrate at the base of the left lung. This document has been electronically signed by: Dariela Iglesias MD on 03/05/2024 06:53 PM      Electronically signed by MAGDA Dorado CNP on 3/20/2024 at 11:27 AM

## 2024-03-20 NOTE — CARE COORDINATION
3/20/24, 9:07 AM EDT    DISCHARGE PLANNING EVALUATION    Planning [facility], from assisted living at the facility.

## 2024-03-20 NOTE — PLAN OF CARE
Problem: Safety - Adult  Goal: Free from fall injury  Outcome: Progressing  Flowsheets (Taken 3/20/2024 1008)  Free From Fall Injury: Instruct family/caregiver on patient safety     Problem: Neurosensory - Adult  Goal: Achieves stable or improved neurological status  Outcome: Progressing  Flowsheets (Taken 3/20/2024 1008)  Achieves stable or improved neurological status: Assess for and report changes in neurological status     Problem: Respiratory - Adult  Goal: Achieves optimal ventilation and oxygenation  Outcome: Progressing  Flowsheets (Taken 3/20/2024 1008)  Achieves optimal ventilation and oxygenation: Assess for changes in respiratory status     Problem: Cardiovascular - Adult  Goal: Maintains optimal cardiac output and hemodynamic stability  Outcome: Progressing  Flowsheets (Taken 3/20/2024 1008)  Maintains optimal cardiac output and hemodynamic stability: Monitor blood pressure and heart rate     Problem: Skin/Tissue Integrity - Adult  Goal: Skin integrity remains intact  Outcome: Progressing  Flowsheets (Taken 3/20/2024 1008)  Skin Integrity Remains Intact: Monitor for areas of redness and/or skin breakdown     Problem: Musculoskeletal - Adult  Goal: Return mobility to safest level of function  Outcome: Progressing  Flowsheets (Taken 3/20/2024 1008)  Return Mobility to Safest Level of Function: Assess patient stability and activity tolerance for standing, transferring and ambulating with or without assistive devices     Problem: Infection - Adult  Goal: Absence of infection at discharge  Outcome: Progressing  Flowsheets (Taken 3/20/2024 1008)  Absence of infection at discharge: Assess and monitor for signs and symptoms of infection     Problem: Metabolic/Fluid and Electrolytes - Adult  Goal: Electrolytes maintained within normal limits  Outcome: Progressing  Flowsheets (Taken 3/20/2024 1008)  Electrolytes maintained within normal limits: Monitor labs and assess patient for signs and symptoms of  electrolyte imbalances     Problem: Safety - Adult  Goal: Free from fall injury  Recent Flowsheet Documentation  Taken 3/20/2024 1008 by Susan Salcedo RN  Free From Fall Injury: Instruct family/caregiver on patient safety     Problem: Respiratory - Adult  Goal: Achieves optimal ventilation and oxygenation  Recent Flowsheet Documentation  Taken 3/20/2024 1008 by Susan Salcedo RN  Achieves optimal ventilation and oxygenation: Assess for changes in respiratory status     Problem: Skin/Tissue Integrity - Adult  Goal: Skin integrity remains intact  Recent Flowsheet Documentation  Taken 3/20/2024 1008 by Susan Salcedo RN  Skin Integrity Remains Intact: Monitor for areas of redness and/or skin breakdown     Problem: Infection - Adult  Goal: Absence of infection at discharge  Recent Flowsheet Documentation  Taken 3/20/2024 1008 by Susan Salcedo RN  Absence of infection at discharge: Assess and monitor for signs and symptoms of infection     Problem: Musculoskeletal - Adult  Goal: Return mobility to safest level of function  Recent Flowsheet Documentation  Taken 3/20/2024 1008 by Susan Salcedo RN  Return Mobility to Safest Level of Function: Assess patient stability and activity tolerance for standing, transferring and ambulating with or without assistive devices     Problem: Neurosensory - Adult  Goal: Achieves stable or improved neurological status  Recent Flowsheet Documentation  Taken 3/20/2024 1008 by Susan Salcedo RN  Achieves stable or improved neurological status: Assess for and report changes in neurological status     Problem: Metabolic/Fluid and Electrolytes - Adult  Goal: Electrolytes maintained within normal limits  Recent Flowsheet Documentation  Taken 3/20/2024 1008 by Susan Salcedo RN  Electrolytes maintained within normal limits: Monitor labs and assess patient for signs and symptoms of electrolyte imbalances   Care plan reviewed with patient.  Patient verbalize understanding of the plan of care and  contribute to goal setting.

## 2024-03-21 LAB
ANION GAP SERPL CALC-SCNC: 12 MEQ/L (ref 8–16)
BACTERIA BLD AEROBE CULT: NORMAL
BACTERIA BLD AEROBE CULT: NORMAL
BUN SERPL-MCNC: 21 MG/DL (ref 7–22)
CALCIUM SERPL-MCNC: 8.8 MG/DL (ref 8.5–10.5)
CHLORIDE SERPL-SCNC: 101 MEQ/L (ref 98–111)
CO2 SERPL-SCNC: 23 MEQ/L (ref 23–33)
CREAT SERPL-MCNC: 0.9 MG/DL (ref 0.4–1.2)
GFR SERPL CREATININE-BSD FRML MDRD: > 60 ML/MIN/1.73M2
GLUCOSE SERPL-MCNC: 130 MG/DL (ref 70–108)
POTASSIUM SERPL-SCNC: 3.4 MEQ/L (ref 3.5–5.2)
SODIUM SERPL-SCNC: 136 MEQ/L (ref 135–145)

## 2024-03-21 PROCEDURE — 1200000000 HC SEMI PRIVATE

## 2024-03-21 PROCEDURE — 36415 COLL VENOUS BLD VENIPUNCTURE: CPT

## 2024-03-21 PROCEDURE — 2580000003 HC RX 258: Performed by: PHYSICIAN ASSISTANT

## 2024-03-21 PROCEDURE — 6370000000 HC RX 637 (ALT 250 FOR IP): Performed by: STUDENT IN AN ORGANIZED HEALTH CARE EDUCATION/TRAINING PROGRAM

## 2024-03-21 PROCEDURE — 6370000000 HC RX 637 (ALT 250 FOR IP): Performed by: INTERNAL MEDICINE

## 2024-03-21 PROCEDURE — 92526 ORAL FUNCTION THERAPY: CPT

## 2024-03-21 PROCEDURE — 6370000000 HC RX 637 (ALT 250 FOR IP): Performed by: PHYSICIAN ASSISTANT

## 2024-03-21 PROCEDURE — 80048 BASIC METABOLIC PNL TOTAL CA: CPT

## 2024-03-21 PROCEDURE — 6370000000 HC RX 637 (ALT 250 FOR IP)

## 2024-03-21 PROCEDURE — 97116 GAIT TRAINING THERAPY: CPT

## 2024-03-21 PROCEDURE — 94640 AIRWAY INHALATION TREATMENT: CPT

## 2024-03-21 PROCEDURE — 97535 SELF CARE MNGMENT TRAINING: CPT

## 2024-03-21 PROCEDURE — 97110 THERAPEUTIC EXERCISES: CPT

## 2024-03-21 PROCEDURE — 97530 THERAPEUTIC ACTIVITIES: CPT

## 2024-03-21 PROCEDURE — 99232 SBSQ HOSP IP/OBS MODERATE 35: CPT

## 2024-03-21 RX ADMIN — LOSARTAN POTASSIUM 50 MG: 50 TABLET, FILM COATED ORAL at 09:57

## 2024-03-21 RX ADMIN — IPRATROPIUM BROMIDE AND ALBUTEROL SULFATE 1 DOSE: .5; 3 SOLUTION RESPIRATORY (INHALATION) at 08:14

## 2024-03-21 RX ADMIN — SODIUM CHLORIDE, PRESERVATIVE FREE 10 ML: 5 INJECTION INTRAVENOUS at 10:09

## 2024-03-21 RX ADMIN — ANTACID TABLETS 500 MG: 500 TABLET, CHEWABLE ORAL at 20:38

## 2024-03-21 RX ADMIN — PANTOPRAZOLE SODIUM 40 MG: 40 TABLET, DELAYED RELEASE ORAL at 16:16

## 2024-03-21 RX ADMIN — CHOLESTYRAMINE 4 G: 4 POWDER, FOR SUSPENSION ORAL at 09:56

## 2024-03-21 RX ADMIN — POTASSIUM CHLORIDE 40 MEQ: 1500 TABLET, EXTENDED RELEASE ORAL at 12:49

## 2024-03-21 RX ADMIN — PANTOPRAZOLE SODIUM 40 MG: 40 TABLET, DELAYED RELEASE ORAL at 05:53

## 2024-03-21 RX ADMIN — SODIUM CHLORIDE, PRESERVATIVE FREE 10 ML: 5 INJECTION INTRAVENOUS at 20:36

## 2024-03-21 RX ADMIN — LOSARTAN POTASSIUM 50 MG: 50 TABLET, FILM COATED ORAL at 20:35

## 2024-03-21 RX ADMIN — TAMSULOSIN HYDROCHLORIDE 0.4 MG: 0.4 CAPSULE ORAL at 20:35

## 2024-03-21 RX ADMIN — ISOSORBIDE MONONITRATE 30 MG: 30 TABLET, EXTENDED RELEASE ORAL at 09:56

## 2024-03-21 RX ADMIN — CLONIDINE HYDROCHLORIDE 0.1 MG: 0.1 TABLET ORAL at 09:58

## 2024-03-21 RX ADMIN — SUCRALFATE 1 G: 1 TABLET ORAL at 09:58

## 2024-03-21 RX ADMIN — CLONIDINE HYDROCHLORIDE 0.1 MG: 0.1 TABLET ORAL at 20:35

## 2024-03-21 RX ADMIN — POTASSIUM CHLORIDE 40 MEQ: 1500 TABLET, EXTENDED RELEASE ORAL at 09:57

## 2024-03-21 RX ADMIN — SUCRALFATE 1 G: 1 TABLET ORAL at 20:35

## 2024-03-21 RX ADMIN — FUROSEMIDE 80 MG: 40 TABLET ORAL at 09:58

## 2024-03-21 ASSESSMENT — PAIN SCALES - GENERAL
PAINLEVEL_OUTOF10: 0
PAINLEVEL_OUTOF10: 0

## 2024-03-21 NOTE — OP NOTE
Miquel Myrick MD  Physician  Gastroenterology     H&P     Signed     Date of Service: 3/19/2024  1:48 PM     Signed         Show:Clear all  [x]Written[x]Templated[x]Copied    Added by:  [x]Miquel Myrick MD    []Caity for details                                  [facility] Endoscopy     Patient: Del Yoon             : 1950                      Acct#: [id number]  Date of evaluation: 3/19/2024  Primary Care Physician: Faby Suh, APRN - CNP      Procedure:    [x]EGD                                     []Enteroscopy                          []Biopsy                                 []Dilation                                  []PEG                                     []PEG change                          []PEG removal                       []Variceal banding                          []Control of bleeding              []Destruction of lesion by APC                          []Stent Placement                  []Foreign Body Removal                          []Snare Polypectomy             []Other:                           []Aborted:                                    Indication:   melena, anemia     History:  The patient is a 73 y.o. male admitted 3/5/2024 for dyspnea due to pneumonia with recent influenza A.  During his stay he developed a right rectus sheath hematoma.  He had been on aspirin and Lovenox prior to admission.  He has a history of CVA with left-sided residual weakness.     He developed 3/15/24 melena with anemia, normocytic.       Physical Exam:  VITALS: BP (!) 116/59   Pulse 72   Temp 98.1 °F (36.7 °C) (Oral)   Resp 18   Ht 1.803 m (5' 11\")   Wt (!) 141 kg (310 lb 13.6 oz)   SpO2 93%   BMI 43.35 kg/m²   The patient is a 73 y.o. male in no acute distress.  HEAD: Normal cephalic/atraumatic. Extra-occular motions intact bilaterally.  NECK: No lymphadenopathy or bruits.  CHEST: Rises equally on inspiration. Clear to auscultation bilaterally.

## 2024-03-21 NOTE — PLAN OF CARE
Problem: Safety - Adult  Goal: Free from fall injury  Recent Flowsheet Documentation  Taken 3/20/2024 2142 by Cari Lei RN  Free From Fall Injury: Instruct family/caregiver on patient safety  Taken 3/20/2024 1027 by Susan Salcedo RN  Free From Fall Injury: Instruct family/caregiver on patient safety  Taken 3/20/2024 1010 by Susan Salcedo RN  Free From Fall Injury: Instruct family/caregiver on patient safety  Taken 3/20/2024 1008 by Susan Salcedo RN  Free From Fall Injury: Instruct family/caregiver on patient safety     Problem: Safety - Adult  Goal: Free from fall injury  3/20/2024 2142 by Cari Lei RN  Outcome: Progressing  Flowsheets  Taken 3/20/2024 2142 by Cari Lei RN  Free From Fall Injury: Instruct family/caregiver on patient safety  Taken 3/20/2024 1027 by Susan Salcedo RN  Free From Fall Injury: Instruct family/caregiver on patient safety  Taken 3/20/2024 1010 by Susan Salcedo RN  Free From Fall Injury: Instruct family/caregiver on patient safety     Problem: Respiratory - Adult  Goal: Achieves optimal ventilation and oxygenation  Recent Flowsheet Documentation  Taken 3/20/2024 2142 by Cari Lei RN  Achieves optimal ventilation and oxygenation:   Assess for changes in respiratory status   Assess for changes in mentation and behavior   Oxygen supplementation based on oxygen saturation or arterial blood gases  Taken 3/20/2024 1008 by Susan Salcedo RN  Achieves optimal ventilation and oxygenation: Assess for changes in respiratory status     Problem: Skin/Tissue Integrity - Adult  Goal: Skin integrity remains intact  Recent Flowsheet Documentation  Taken 3/20/2024 2142 by Cari Lei RN  Skin Integrity Remains Intact:   Monitor for areas of redness and/or skin breakdown   Assess vascular access sites hourly  Taken 3/20/2024 1028 by Susan Salcedo RN  Skin Integrity Remains Intact: Monitor for areas of redness and/or skin breakdown  Taken 3/20/2024

## 2024-03-21 NOTE — PLAN OF CARE
Problem: Respiratory - Adult  Goal: Achieves optimal ventilation and oxygenation  3/21/2024 0818 by Khushi Davison, RCP  Outcome: Progressing

## 2024-03-21 NOTE — PROGRESS NOTES
Physician Progress Note      PATIENT:               NADEEM SAMSON  [facility] #:                  [id number]  :                       1950  ADMIT DATE:       3/5/2024 5:03 PM  DISCH DATE:  RESPONDING  PROVIDER #:        Charity HORVATH CNP          QUERY TEXT:    Patient admitted with Influenza and pneumonia. Noted documentation of acute   respiratory failure in Hospitalist notes starting on 3/9. In order to support   the diagnosis of acute respiratory failure, please include additional clinical   indicators in your documentation.  Or please document if the diagnosis of   acute respiratory failure has been ruled out after further study.    The medical record reflects the following:  Risk Factors: 72 yo male admitted with Influenza A and pneumonia    Clinical Indicators: 3/8 Acute respiratory failure secondary to pneumonia   . Reportedly had pulse ox of 88% overnight- placed on 4L  3/8 RR 12 -26  Treatment: Supplemental 02 @ 4L, Incentive Spirometry, close nurse observation    Acute Respiratory Failure Clinical Indicators per  MS-DRG Training Guide and   Quick Reference Guide:  pO2 < 60 mmHg or SpO2 (pulse oximetry) < 91% breathing room air  pCO2 > 50 and pH < 7.35  P/F ratio (pO2 / FIO2) < 300  pO2 decrease or pCO2 increase by 10 mmHg from baseline (if known)  Supplemental oxygen of 40% or more  Presence of respiratory distress, tachypnea, dyspnea, shortness of breath,   wheezing  Unable to speak in complete sentences  Use of accessory muscles to breathe  Extreme anxiety and feeling of impending doom  Tripod position  Confusion/altered mental status/obtunded      Thank you for your time,    Jovanna ALEXANDER, RN, CRCR  [address] [place] [address]  C: [phone]    [email or web address]  Options provided:  -- Acute Respiratory Failure as evidenced by, Please document evidence.  -- Acute Respiratory Failure ruled out after study  -- Other - I will add my own diagnosis  --

## 2024-03-21 NOTE — PROGRESS NOTES
[facility]  [facility] ORTHOPEDICS 7K  Occupational Therapy  Daily Note  Time:   Time In: 910  Time Out: 948  Timed Code Treatment Minutes: 38 Minutes  Minutes: 38          Date: 3/21/2024  Patient Name: Del Yoon,   Gender: male      Room: [facility]16/016-A  MRN: [id number]  : 1950  (73 y.o.)  Referring Practitioner: Klaudia Garcia PA-  Diagnosis: SOB  Additional Pertinent Hx: Per H & P on 3/5/2024:74 yo male that presents to the ED with SOB and weakness since . He states he felt short of breath and felt significant weakness that prevented him being able to ambulate. His legs \"would not move\" He recently moved into assisted living and gets around with a walker or wheelchair for longer distances. He did not take any medication for this. He had a fever this morning and made an appointment with his PCP that sent him to the hospital. He has a cough productive cough and denies chest pain, chills, abdominal pain, sore throat, vomiting.    Restrictions/Precautions:  Restrictions/Precautions: Fall Risk, General Precautions, Isolation     Social/Functional History:  Lives With: Alone  Type of Home: Assisted living  Home Layout: One level  Home Access: Level entry  Home Equipment: Wheelchair-manual, Walker, 4 wheeled, Cane, Lift chair, Reacher   Bathroom Shower/Tub: Walk-in shower  Bathroom Toilet: Standard  Bathroom Equipment: Grab bars in shower, Grab bars around toilet, Shower chair    Receives Help From: Personal care attendant  ADL Assistance: Needs assistance (A for  BADL & LE dressing)  Ambulation Assistance: Needs assistance  Transfer Assistance: Needs assistance          Additional Comments: Per PT eval: Pt reports that he was able to use furniture for mobility around his apartment, walker for short distances, and wheelchair for longer distances. Wife reports Pt has been needing A out of chairs, \"that' why he is in the nursing home\".    SUBJECTIVE: RN ok'ed session. Pt lying supine with HOB  elevated upon arrival. Pt pleasant and agreeable to OT session. Pt states he has not been out of bed for a few days and would like to get out of bed.     PAIN: no c/o    Vitals: Vitals not assessed per clinical judgement, see nursing flowsheet    COGNITION: WFL    ADL:   Grooming: Supervision, with set-up, and with increased time for completion.  To wash face seated in chair  Footwear Management: Dependent.    .    IADL:   Not Tested    BALANCE:  Sitting Balance:  Minimal Assistance. Pt progressed to SBA. Pt states feeling dizzy for up to 30 seconds    Standing Balance: Contact Guard Assistance. Pt c/o dizziness during stand for about 30 seconds. Pt completed x10 marches as preparatory activity before mobility.     *Pt completed standing task of reaching for x 4 towels and folding then  to increase standing balance and activity tolerance in order to improve Indep with BADL routine. Pt tolerated >2 min standing with no LOB noted.   BED MOBILITY:  Supine to Sit: Moderate Assistance, X 1, with head of bed raised, with rail, with increased time for completion      TRANSFERS:  Sit to Stand:  Minimal Assistance. From bedside chair and EOB  Stand to Sit: Minimal Assistance. To bedside chair with min vc for hand placement     FUNCTIONAL MOBILITY:  Assistive Device: Rolling Walker  Assist Level:  Contact Guard Assistance.   Distance:  up to 12 ft in room  Pt requires increased time to complete with slow paced steps.     ADDITIONAL ACTIVITIES:  Pt educated on nectar thick restrictions and risk of drinking thin liquids. Pt demo'ed good understanding.    AM-PAC Inpatient Daily Activity Raw Score: 14  AM-PAC Inpatient ADL T-Scale Score : 33.39  ADL Inpatient CMS 0-100% Score: 59.67    Modified [place] Scale:  +3 - Moderate disability; requiring some help, but able to walk without physical assistance (SBA/CGA).   Patient could not live alone but could walk from one room to another without physical help from another person.

## 2024-03-21 NOTE — PROGRESS NOTES
Hospitalist Progress Note    Patient:  Del Yoon    YOB: 1950  Unit/Bed:K-16/016-A  Date of Admission: 3/5/2024  Code Status: Limited      Assessment/Plan:    Acute hypoxic respiratory failure secondary to bilateral pneumonia, POA, resolved: CXR with bilateral infiltrates.  CTA (3/11) (-) PE but demonstrated bibasilar consolidations. PNA panel (+) H. Influenzae, Serratia, and Influenza A.  Completed 7 day course of Zosyn and 6 day course of Levaquin. Completed 5 day course of Prednisone. MRSA (-)  Legionella and strep pneumonia (-). MBS (-) aspiration. At max, required HHFNC. Weaned to room air.   Maintain saturations greater than 90%.  Continue to encourage pulmonary hygiene.    Duonebs PRN  Pulm following.  Appreciate recs.   Encourage ambulation.     Melena: EGD completed (3/19) significant for LA grade D esophagitis.  Copious retained contents were found in the stomach consistent with gastroparesis.  ASA held.   Continue PPI BID before breakfast and dinner.  Carafate BID before lunch and at bedtime  GI follow up in 6 weeks.  Recommend low fat diet.  Consider colonoscopy if no improvement of anemia.    Hypokalemia: Ongoing.  Check BMP.  Last K+ 3.1 on (3/19)  Replace per protocol.     Rectus sheath hematoma: CT abdomen/pelvis on 3/15 showed right rectus sheath hematoma measures up to 4.4 x 12.3 cm in short axis and extends approximately 10 cm in length to just above the level of the umbilicus. Scattered air-fluid levels in the small and large bowel loops is nonspecific and can indicate enteritis.   Will need outpatient repeat CT imaging to ensure resolution.   Supportive care.     Influenza A:   Completed course of Tamiflu    Accelerated HTN: Resolved.  Hx of HTN  Continue on current antihypertensive regimen.  PRN Hydralazine for SBP > 170    BRE-resolved: Baseline 1.0-1.1.  Currently 1.1  Avoid nephrotoxic agents.  CT contrast likely contributed.     Dizziness: Resolved.  Patient  electronically signed by Dr. Moreno Sanchez on 3/11/2024 2:53 PM      XR CHEST PORTABLE   Final Result   1. Borderline heart size. Right shoulder prosthesis. Prior surgery left shoulder.   2. Mild pleural thickening along lateral aspect left lung base. Minimal atelectatic/fibrotic stranding retrocardiac region left lung base.            **This report has been created using voice recognition software.  It may contain minor errors which are inherent in voice recognition technology.**      Final report electronically signed by Dr. Moreno Sanchez on 3/7/2024 11:59 AM      XR CHEST PORTABLE   Final Result   Possible focus of atelectasis or infiltrate at the base of the left lung.      This document has been electronically signed by: Dariela Iglesias MD on    03/05/2024 06:53 PM      CT CHEST W CONTRAST    (Results Pending)     XR CHEST PORTABLE    Result Date: 3/5/2024  1 view chest x-ray Comparison: CR/SR - XR CHEST PORTABLE - 07/23/2022 01:51 PM EDT Findings: Possible focus of airspace opacity within the left lower lung. The right lung is clear. Heart size is normal. Status post right shoulder replacement. No acute displaced fracture.     Possible focus of atelectasis or infiltrate at the base of the left lung. This document has been electronically signed by: Dariela Iglesias MD on 03/05/2024 06:53 PM      Electronically signed by MAGDA Dorado CNP on 3/21/2024 at 10:41 AM

## 2024-03-21 NOTE — PROGRESS NOTES
Physical Therapy   [facility]  INPATIENT PHYSICAL THERAPY  DAILY NOTE  [facility] ORTHOPEDICS 7K - 7K-16/016-A    Time In: 1024  Time Out: 1048  Timed Code Treatment Minutes: 24 Minutes  Minutes: 24          Date: 3/21/2024  Patient Name: Del Yoon,  Gender:  male        MRN: [id number]  : 1950  (73 y.o.)     Referring Practitioner: Kladuia Garcia PA-C  Diagnosis: Shortness of breath  Additional Pertinent Hx: HPI: Del Yoon is a 72 yo male that presents to the ED with SOB and weakness since . He states he felt short of breath and felt significant weakness that prevented him being able to ambulate. His legs \"would not move\" He recently moved into assisted living and gets around with a walker or wheelchair for longer distances. Pt admitted for pneumonia, Flu A.  Transferred to stepdown 3/11 for worsening respiratory status, CTA negative for PE.     Prior Level of Function:  Lives With: Alone  Type of Home: Assisted living  Home Layout: One level  Home Access: Level entry  Home Equipment: Wheelchair-manual, Walker, 4 wheeled, Cane, Lift chair, Reacher   Bathroom Shower/Tub: Walk-in shower  Bathroom Toilet: Standard  Bathroom Equipment: Grab bars in shower, Grab bars around toilet, Shower chair    Receives Help From: Personal care attendant  ADL Assistance: Needs assistance (A for  BADL & LE dressing)  Ambulation Assistance: Needs assistance  Transfer Assistance: Needs assistance  Additional Comments: Per PT eval: Pt reports that he was able to use furniture for mobility around his apartment, walker for short distances, and wheelchair for longer distances. Wife reports Pt has been needing A out of chairs, \"that' why he is in the nursing home\".    Restrictions/Precautions:  Restrictions/Precautions: Fall Risk, General Precautions, Isolation     SUBJECTIVE: RN and patient are agreeable for PT this morning. Patient ambulate with OT earlier today CGA x 1 for 12 ft. Patient comfortable  Recommendations:Equipment Needed: No  Discharge Recommendations: Subacte/Skilled Nursing Facility and Patient would benefit from continued PT at discharge  Plan: Current Treatment Recommendations: Strengthening, Balance training, Gait training, Functional mobility training, Home exercise program, Safety education & training, Patient/Caregiver education & training, Transfer training, Endurance training, Therapeutic activities  General Plan:  (3-5x GM)    Education  Education:  Learners: Patient  Patient Education: Plan of Care, Transfers, Gait, Use of Gait Belt, Verbal Exercise Instruction, Activity Pacing,  - Patient Verbalized Understanding    Goals:  Patient Goals : go to SNF  Short Term Goals  Time Frame for Short Term Goals: at discharge  Short Term Goal 1: Pt to be Mod I for supine <> sit to get in/out of bed  Short Term Goal 2: Pt to be Mod I for sit <> stand to get up to ambulate  Short Term Goal 3: Pt to ambulate >10 ft with RW with SBA to get to bathroom  Long Term Goals  Time Frame for Long Term Goals : not set due to short ELOS    Following session, patient left in safe position with all fall risk precautions in place.

## 2024-03-21 NOTE — PLAN OF CARE
Care plan reviewed with patient and patient verbalize understanding of the plan of care and contribute to goal setting.     Problem: Musculoskeletal - Adult  Goal: Return mobility to safest level of function  3/21/2024 1039 by Phil Farnsworth, RN  Outcome: Not Progressing  3/20/2024 2142 by Cari Lei, RN  Outcome: Progressing  Flowsheets (Taken 3/20/2024 2142)  Return Mobility to Safest Level of Function:   Assess patient stability and activity tolerance for standing, transferring and ambulating with or without assistive devices   Assist with transfers and ambulation using safe patient handling equipment as needed

## 2024-03-21 NOTE — PROGRESS NOTES
[facility]  INPATIENT SPEECH THERAPY  [facility] OR[facility] 7K  DAILY NOTE    TIME   SLP Individual Minutes  Time In: 1148  Time Out: 1202  Minutes: 14  Timed Code Treatment Minutes: 0 Minutes       Date: 3/21/2024  Patient Name: Del Yoon      CSN: [id number]   : 1950  (73 y.o.)  Gender: male   Referring Physician:  Behl, Ashita, MD   Diagnosis: Community acquired pneumonia of left lower lobe of lung    Precautions: fall risk, aspiration risk  Current Diet: Soft and bite sized, mildly thick  Respiratory Status: Room Air  Swallowing Strategies:  Full Upright Position, Small Bite/Sip, and Alternate Solids and Liquids       Date of Last MBS/FEES:  3/18/24    Pain:  No pain reported.    Subjective:  RN Anirudh approved ST services this date.  Patient sitting upright in chair with water bottle present in lap upon ST entry.  No family present at bedside.     Short-Term Goals:  SHORT TERM GOAL #1:  Goal 1: Patient will consume a soft and bite sized diet with mildly thick liquids (advanced trials with ST only) without overt s/s of aspiration or pulmonary decline to reach hydration/nutritional goals.  INTERVENTIONS:    ST provided education on results of MBS and diet recommendations.  Demonstrated how to thicken liquids with the patient this date.  Patient observed and verbalized understanding.    SHORT TERM GOAL #2:  Goal 2: Patient will complete x10+ pharyngeal strengthening exercises to improve hyolaryngeal exursion and epiglottic inversion.  INTERVENTIONS:  Effortful swallow x10 with good success  CTAR- x10 with good success    Written and verbal education of exercises provided in room.  Home programming left in room this date.         Long-Term Goals:   No long term goals established due to short ELOS.             Functional Oral Intake Scale: Total Oral Intake: 5.  Total oral intake of multiple consistencies requiring special preparation    EDUCATION:  Learner: Patient  Education:  Reviewed diet and  strategies, Reviewed signs, symptoms and risks of aspiration, Demonstrated how to thicken liquids appropriately, Reviewed ST goals and Plan of Care, and Reviewed recommendations for follow-up  Evaluation of Education: Verbalizes understanding, Needs further instruction, and Family not present    ASSESSMENT/PLAN:  Activity Tolerance:  Patient tolerance of  treatment: good. Patient agreeable and participatory in all tasks     Assessment/Plan: Patient progressing toward established goals.  Continues to require skilled care of licensed speech pathologist to progress toward achievement of established goals and plan of care..     Plan for Next Session: pharyngeal exercises, PO trials  Discharge Recommendations: Continue to Assess Pending Progress     Leta Messina M.A. CCC-SLP

## 2024-03-21 NOTE — CARE COORDINATION
3/21/24, 2:03 PM EDT    DISCHARGE PLANNING EVALUATION    Planning [facility] [place] [facility] at discharge, spoke with [facility] [place] admissions to update

## 2024-03-22 LAB
ANION GAP SERPL CALC-SCNC: 11 MEQ/L (ref 8–16)
BASOPHILS ABSOLUTE: 0.1 THOU/MM3 (ref 0–0.1)
BASOPHILS NFR BLD AUTO: 0.6 %
BUN SERPL-MCNC: 18 MG/DL (ref 7–22)
CALCIUM SERPL-MCNC: 9.1 MG/DL (ref 8.5–10.5)
CHLORIDE SERPL-SCNC: 101 MEQ/L (ref 98–111)
CO2 SERPL-SCNC: 25 MEQ/L (ref 23–33)
CREAT SERPL-MCNC: 1 MG/DL (ref 0.4–1.2)
DEPRECATED RDW RBC AUTO: 46 FL (ref 35–45)
DEPRECATED RDW RBC AUTO: 46 FL (ref 35–45)
EOSINOPHIL NFR BLD AUTO: 1.4 %
EOSINOPHILS ABSOLUTE: 0.2 THOU/MM3 (ref 0–0.4)
ERYTHROCYTE [DISTWIDTH] IN BLOOD BY AUTOMATED COUNT: 14.4 % (ref 11.5–14.5)
ERYTHROCYTE [DISTWIDTH] IN BLOOD BY AUTOMATED COUNT: 14.5 % (ref 11.5–14.5)
GFR SERPL CREATININE-BSD FRML MDRD: > 60 ML/MIN/1.73M2
GLUCOSE SERPL-MCNC: 125 MG/DL (ref 70–108)
HCT VFR BLD AUTO: 35.8 % (ref 42–52)
HCT VFR BLD AUTO: 37.8 % (ref 42–52)
HGB BLD-MCNC: 12.1 GM/DL (ref 14–18)
HGB BLD-MCNC: 12.5 GM/DL (ref 14–18)
IMM GRANULOCYTES # BLD AUTO: 0.18 THOU/MM3 (ref 0–0.07)
IMM GRANULOCYTES NFR BLD AUTO: 1.1 %
LYMPHOCYTES ABSOLUTE: 1.3 THOU/MM3 (ref 1–4.8)
LYMPHOCYTES NFR BLD AUTO: 8.1 %
MCH RBC QN AUTO: 29.4 PG (ref 26–33)
MCH RBC QN AUTO: 29.7 PG (ref 26–33)
MCHC RBC AUTO-ENTMCNC: 33.1 GM/DL (ref 32.2–35.5)
MCHC RBC AUTO-ENTMCNC: 33.8 GM/DL (ref 32.2–35.5)
MCV RBC AUTO: 87.7 FL (ref 80–94)
MCV RBC AUTO: 88.9 FL (ref 80–94)
MONOCYTES ABSOLUTE: 1.2 THOU/MM3 (ref 0.4–1.3)
MONOCYTES NFR BLD AUTO: 7.2 %
NEUTROPHILS NFR BLD AUTO: 81.6 %
NRBC BLD AUTO-RTO: 0 /100 WBC
PLATELET # BLD AUTO: 422 THOU/MM3 (ref 130–400)
PLATELET # BLD AUTO: 423 THOU/MM3 (ref 130–400)
PMV BLD AUTO: 10 FL (ref 9.4–12.4)
PMV BLD AUTO: 10.3 FL (ref 9.4–12.4)
POTASSIUM SERPL-SCNC: 3.9 MEQ/L (ref 3.5–5.2)
PROCALCITONIN SERPL IA-MCNC: 0.11 NG/ML (ref 0.01–0.09)
RBC # BLD AUTO: 4.08 MILL/MM3 (ref 4.7–6.1)
RBC # BLD AUTO: 4.25 MILL/MM3 (ref 4.7–6.1)
SEGMENTED NEUTROPHILS ABSOLUTE COUNT: 13.1 THOU/MM3 (ref 1.8–7.7)
SODIUM SERPL-SCNC: 137 MEQ/L (ref 135–145)
WBC # BLD AUTO: 14.2 THOU/MM3 (ref 4.8–10.8)
WBC # BLD AUTO: 16 THOU/MM3 (ref 4.8–10.8)

## 2024-03-22 PROCEDURE — 97530 THERAPEUTIC ACTIVITIES: CPT

## 2024-03-22 PROCEDURE — 97116 GAIT TRAINING THERAPY: CPT

## 2024-03-22 PROCEDURE — 85025 COMPLETE CBC W/AUTO DIFF WBC: CPT

## 2024-03-22 PROCEDURE — 84145 PROCALCITONIN (PCT): CPT

## 2024-03-22 PROCEDURE — 80048 BASIC METABOLIC PNL TOTAL CA: CPT

## 2024-03-22 PROCEDURE — 2580000003 HC RX 258: Performed by: PHYSICIAN ASSISTANT

## 2024-03-22 PROCEDURE — 99232 SBSQ HOSP IP/OBS MODERATE 35: CPT

## 2024-03-22 PROCEDURE — 6370000000 HC RX 637 (ALT 250 FOR IP): Performed by: PHYSICIAN ASSISTANT

## 2024-03-22 PROCEDURE — 1200000000 HC SEMI PRIVATE

## 2024-03-22 PROCEDURE — 6370000000 HC RX 637 (ALT 250 FOR IP): Performed by: STUDENT IN AN ORGANIZED HEALTH CARE EDUCATION/TRAINING PROGRAM

## 2024-03-22 PROCEDURE — 85027 COMPLETE CBC AUTOMATED: CPT

## 2024-03-22 PROCEDURE — 36415 COLL VENOUS BLD VENIPUNCTURE: CPT

## 2024-03-22 PROCEDURE — 6370000000 HC RX 637 (ALT 250 FOR IP): Performed by: INTERNAL MEDICINE

## 2024-03-22 PROCEDURE — 97110 THERAPEUTIC EXERCISES: CPT

## 2024-03-22 RX ADMIN — CHOLESTYRAMINE 4 G: 4 POWDER, FOR SUSPENSION ORAL at 08:36

## 2024-03-22 RX ADMIN — SUCRALFATE 1 G: 1 TABLET ORAL at 20:48

## 2024-03-22 RX ADMIN — LOSARTAN POTASSIUM 50 MG: 50 TABLET, FILM COATED ORAL at 20:48

## 2024-03-22 RX ADMIN — POTASSIUM CHLORIDE 40 MEQ: 1500 TABLET, EXTENDED RELEASE ORAL at 08:36

## 2024-03-22 RX ADMIN — TAMSULOSIN HYDROCHLORIDE 0.4 MG: 0.4 CAPSULE ORAL at 20:49

## 2024-03-22 RX ADMIN — CLONIDINE HYDROCHLORIDE 0.1 MG: 0.1 TABLET ORAL at 20:49

## 2024-03-22 RX ADMIN — POLYETHYLENE GLYCOL (3350) 17 G: 17 POWDER, FOR SOLUTION ORAL at 12:49

## 2024-03-22 RX ADMIN — CLONIDINE HYDROCHLORIDE 0.1 MG: 0.1 TABLET ORAL at 08:36

## 2024-03-22 RX ADMIN — SUCRALFATE 1 G: 1 TABLET ORAL at 08:36

## 2024-03-22 RX ADMIN — FUROSEMIDE 80 MG: 40 TABLET ORAL at 08:36

## 2024-03-22 RX ADMIN — PANTOPRAZOLE SODIUM 40 MG: 40 TABLET, DELAYED RELEASE ORAL at 15:09

## 2024-03-22 RX ADMIN — SODIUM CHLORIDE, PRESERVATIVE FREE 10 ML: 5 INJECTION INTRAVENOUS at 08:26

## 2024-03-22 RX ADMIN — ISOSORBIDE MONONITRATE 30 MG: 30 TABLET, EXTENDED RELEASE ORAL at 08:36

## 2024-03-22 RX ADMIN — SODIUM CHLORIDE, PRESERVATIVE FREE 10 ML: 5 INJECTION INTRAVENOUS at 20:48

## 2024-03-22 RX ADMIN — PANTOPRAZOLE SODIUM 40 MG: 40 TABLET, DELAYED RELEASE ORAL at 05:57

## 2024-03-22 RX ADMIN — LOSARTAN POTASSIUM 50 MG: 50 TABLET, FILM COATED ORAL at 08:36

## 2024-03-22 NOTE — PLAN OF CARE
Problem: Safety - Adult  Goal: Free from fall injury  Outcome: Progressing     Problem: Neurosensory - Adult  Goal: Achieves stable or improved neurological status  Outcome: Progressing  Flowsheets (Taken 3/21/2024 2103)  Achieves stable or improved neurological status: Assess for and report changes in neurological status     Problem: Respiratory - Adult  Goal: Achieves optimal ventilation and oxygenation  Outcome: Progressing  Flowsheets (Taken 3/21/2024 2103)  Achieves optimal ventilation and oxygenation: Assess for changes in respiratory status     Problem: Cardiovascular - Adult  Goal: Maintains optimal cardiac output and hemodynamic stability  Outcome: Progressing  Flowsheets (Taken 3/21/2024 2103)  Maintains optimal cardiac output and hemodynamic stability: Monitor blood pressure and heart rate

## 2024-03-22 NOTE — PROGRESS NOTES
OT session.     PAIN: Pt c/o generalized soreness in B shoulders (h/o arthritis and surgical repair)    Vitals: Vitals not assessed per clinical judgement, see nursing flowsheet    COGNITION: WFL    ADL:   Footwear Management: Dependent.  socks .  **Pt declined any other ADL completion at this time.  **Informed RN of wife giving pt normal (thin) water from bag she has in room despite orders for thickened liquids. Reinforced and educated re: importance of adhering to thickened liquids.    IADL:   Not Tested    BALANCE:  Sitting Balance:  Minimal Assistance. Initially ; once able to achieve proper positioning at EOB pt able to progress to SBA. Pt sitting at EOB for ~5 minutes in prep for transfer  Standing Balance: Minimal Assistance, X 1, with verbal cues ; Moderate cues for upright posture/gaze    BED MOBILITY:  Supine to Sit: Maximum Assistance, X 1, with head of bed raised, with rail, with verbal cues , with increased time for completion    Scooting: Moderate Assistance, X 1, with increased time for completion ; advancing hips forward to EOB    TRANSFERS:  Sit to Stand:  Minimal Assistance, X 1, with increased time for completion, cues for hand placement, with verbal cues. ; transfer completed from partially elevated EOB  Stand to Sit: Minimal Assistance, X 1, with increased time for completion, cues for hand placement, with verbal cues. ; assist for slow descent into chair    FUNCTIONAL MOBILITY:  Assistive Device: Rolling Walker  Assist Level:  Minimal Assistance, with verbal cues , and with increased time for completion.   Distance:  around bed to bedside chair  Moderate cues for posture and ensuring B feet fully clearing floor when advancing legs to take steps. Pt demoes increased difficulty with turning when backing up to chair     ADDITIONAL ACTIVITIES:  Pt completed BUE AROM exercises while seated in chair. Pt completed 1 set X 10 repetitions for shoulder rolls, elevation/depression, scap retraction, elbow  flex/ext, punches, pronation/supination, and digit flex/ext. Short rest breaks in between variations. Due to decreased shoulder ROM, tabletop slides completed X10 reps each with pt demoing slight improvement in ROM, reporting decreased tightness through shoulder joint. Exercises completed to increase overall strength/endurance needed for ADLs and transfers.     AM-PAC Inpatient Daily Activity Raw Score: 14  AM-PAC Inpatient ADL T-Scale Score : 33.39  ADL Inpatient CMS 0-100% Score: 59.67    Modified [place] Scale:  Not Applicable    ASSESSMENT:     Activity Tolerance:  Patient tolerance of  treatment: Fair treatment tolerance, Limited by fatigue, Reduced activity pace, and Need for increased rest breaks       Discharge Recommendations: ECF with OT  Equipment Recommendations: Other: monitor pending progress  Plan: Times Per Week: 3-5x  Current Treatment Recommendations: Balance training, Functional mobility training, Endurance training, Self-Care / ADL, Safety education & training    Education:  Learners: Patient and Family  Plan of Care, Home Exercise Program, Importance of Increasing Activity, Pursed Lip Breathing, and transfer training, safety with mobility    Goals  Short Term Goals  Time Frame for Short Term Goals: until discharge  Short Term Goal 1: Pt will complete various t/fs including BSC with CGA & 0-1 vcs for safety  Short Term Goal 2: Pt will tolerate standing 2-3 min with CGA for increased ease of toileting routine  Short Term Goal 3: Pt will complete mobility to/from bathroom with RW, CGA, & 0-2 vcs for safety  Short Term Goal 4: Pt will complete gentle BUE AROM exercises x 10 reps with min RBs to increase endurance to A with BADL  Long Term Goals  Time Frame for Long Term Goals : No LTG set d/t short ELOS    Following session, patient left in safe position with all fall risk precautions in place.

## 2024-03-22 NOTE — PROGRESS NOTES
Hospitalist Progress Note    Patient:  Del Yoon    YOB: 1950  Unit/Bed:7K-16/016-A  Date of Admission: 3/5/2024  Code Status: Limited      Assessment/Plan:    Acute hypoxic respiratory failure due 2/2 bilateral pneumonia, POA: Resolved.  Chest x-ray on arrival with bilateral infiltrates.  CTA chest (3/11) negative for PE but demonstrated bibasilar consolidations.  Pneumonia panel positive for H. influenzae, Serratia, and influenza A.  Completed 7-day course of Zosyn and 6-day course of Levaquin.  Completed 5-day course of prednisone.  Completed course of Tamiflu.  Legionella and strep pneumonia negative MBS negative on aspiration.  At max required HHFNC.  Now on room air.   Pulmonology previously following, stable for discharge per their standpoint.  Follow-up in 3 months with repeat CT chest and office visit  Encourage pulmonary hygiene to maintain SpO2 greater than 90%  DuoNebs as needed with encouraged ambulation    Leukocytosis: Noted after extensive time being here.  Patient denies fevers or chills or new signs of infection.  Pro-Finn 0.11.  Trend to ensure improvement.    Melena: EGD completed 3/19 showed esophagitis with copious retained, tense consistent with gastroparesis.  ASA resumed  Continue PPI twice daily before breakfast and dinner, Carafate twice daily before lunch and bedtime and low-fat diet   GI recommending colonoscopy if no improvement of anemia, however patient refused.  Follow-up with GI in 6 weeks    Hypokalemia, resolved: Start daily supplementation with 40 mill equivalents daily while taking Lasix.  And monitor    Rectus sheath hematoma: 3/15 CT abdomen/pelvis showed right rectus sheath hematoma measuring up to 4.4 x 12.3 cm in short axis and extends approximately 10 cm in length to just above the level of umbilicus.  Will need outpatient repeat CT imaging to ensure resolution    Accelerated hypertension: Resolved.  History of hypertension.  Continue on current

## 2024-03-22 NOTE — PROGRESS NOTES
Physical Therapy   [facility]  INPATIENT PHYSICAL THERAPY  DAILY NOTE  [facility] ORTHOPEDICS 7K - 7K-16/016-A    Time In: 1203  Time Out: 1228  Timed Code Treatment Minutes: 25 Minutes  Minutes: 25          Date: 3/22/2024  Patient Name: Del Yoon,  Gender:  male        MRN: [id number]  : 1950  (73 y.o.)     Referring Practitioner: Klaudia Garcia PA-C  Diagnosis: Shortness of breath  Additional Pertinent Hx: HPI: Del Yoon is a 72 yo male that presents to the ED with SOB and weakness since . He states he felt short of breath and felt significant weakness that prevented him being able to ambulate. His legs \"would not move\" He recently moved into assisted living and gets around with a walker or wheelchair for longer distances. Pt admitted for pneumonia, Flu A.  Transferred to stepdown 3/11 for worsening respiratory status, CTA negative for PE.     Prior Level of Function:  Lives With: Alone  Type of Home: Assisted living  Home Layout: One level  Home Access: Level entry  Home Equipment: Wheelchair-manual, Walker, 4 wheeled, Cane, Lift chair, Reacher   Bathroom Shower/Tub: Walk-in shower  Bathroom Toilet: Standard  Bathroom Equipment: Grab bars in shower, Grab bars around toilet, Shower chair    Receives Help From: Personal care attendant  ADL Assistance: Needs assistance (A for  BADL & LE dressing)  Ambulation Assistance: Needs assistance  Transfer Assistance: Needs assistance  Additional Comments: Per PT eval: Pt reports that he was able to use furniture for mobility around his apartment, walker for short distances, and wheelchair for longer distances. Wife reports Pt has been needing A out of chairs, \"that' why he is in the nursing home\".    Restrictions/Precautions:  Restrictions/Precautions: Fall Risk, General Precautions, Isolation     SUBJECTIVE: SHAUN Liu and patient are agreeable for rehab today. Patient is A&O x 3, needs assistance with today's date. He complains of posterior  hip pain/sore butt. He struggles with fatigue and SOB during today's session compared to yesterday. He needed a longer seated rest break before additional ambulation. Continues to be pleasant throughout session.     PAIN: Bottom discomfort does not rate on quantify    Vitals: Oxygen: 97-98% during ambulation, however SOB  Heart Rate: 78-82 during ambulation    OBJECTIVE:  Bed Mobility:  Not Tested    Transfers:  Sit to Stand: Moderate Assistance from recliner, Maximum Assistance from edge of bed  Stand to Sit:Minimal Assistance for eccentric control  Stand Pivot:Minimal Assistance for steadying during discontinuous stepping during pivot    Ambulation:  Contact Guard Assistance, Minimal Assistance  Distance: 15 ft + 15 ft  Surface: Level Tile  Device:Rolling Walker  Gait Deviations:  Forward Flexed Posture, Slow Sofi, Decreased Step Length Bilaterally, Decreased Weight Shift Bilaterally, Decreased Heel Strike Bilaterally, Decreased Foot Clearance, Wide Base of Support, Moderate Path Deviations, Unsteady Gait, Increased Reliance on Rolling Walker, and SOB with progressively slowing gait speed with fatigue    Balance:  Static Sitting Balance:  Stand By Assistance with no BUE assist  Static Standing Balance: Contact Guard Assistance with BUE on RW    Functional Outcome Measures: Completed  -PAC Inpatient Mobility without Stair Climbing Raw Score : 13  AM-PAC Inpatient without Stair Climbing T-Scale Score : 38.96  Modified [place] Scale:  Not Applicable    ASSESSMENT:  Assessment: Patient progressing toward established goals.  Activity Tolerance:  Patient tolerance of  treatment: fair. Patient has fair tolerance with mobility today. He demos increase fatigue and SOB with ambulation. Needs seated rest break before completing yesterdays ambulation distance. He demos decrease BLE strength, endurance and balance and would continue to benefit from PT upon discharge. Recommend SNF placement to address these deficits.

## 2024-03-22 NOTE — CARE COORDINATION
3/22/24, 11:14 AM EDT    DISCHARGE ON GOING EVALUATION    Del SCHAFER [facility] day: 17  Location: -16/016-A Reason for admit: Shortness of breath [R06.02]  Lactic acidosis [E87.20]  Lower extremity edema [R60.0]  Influenza A [J10.1]  Elevated troponin [R79.89]  Elevated brain natriuretic peptide (BNP) level [R79.89]  Pneumonia of left lower lobe due to infectious organism [J18.9]  Community acquired pneumonia of left lower lobe of lung [J18.9]   Procedure:   3/19 EGD by Dr Myrick  Barriers to Discharge: Hgb 12.5, weaned to room air, cont PT/OT. K+ 3.4 with replacement, hold ASA and Lovenox, PT/OT. Lasix, duo nebs, PPI and Carafate. Pulmonary, SLP, GI and hospitalist follows.   PCP: Faby Suh, APRN - CNP  Readmission Risk Score: 16%  Patient Goals/Plan/Treatment Preferences: plans [facility]; SW following.

## 2024-03-23 VITALS
BODY MASS INDEX: 41.76 KG/M2 | HEIGHT: 71 IN | TEMPERATURE: 98 F | WEIGHT: 298.28 LBS | RESPIRATION RATE: 6 BRPM | OXYGEN SATURATION: 92 % | DIASTOLIC BLOOD PRESSURE: 61 MMHG | SYSTOLIC BLOOD PRESSURE: 134 MMHG | HEART RATE: 65 BPM

## 2024-03-23 LAB
ANION GAP SERPL CALC-SCNC: 10 MEQ/L (ref 8–16)
BUN SERPL-MCNC: 17 MG/DL (ref 7–22)
CALCIUM SERPL-MCNC: 8.5 MG/DL (ref 8.5–10.5)
CHLORIDE SERPL-SCNC: 101 MEQ/L (ref 98–111)
CO2 SERPL-SCNC: 24 MEQ/L (ref 23–33)
CREAT SERPL-MCNC: 1.1 MG/DL (ref 0.4–1.2)
DEPRECATED RDW RBC AUTO: 46.8 FL (ref 35–45)
ERYTHROCYTE [DISTWIDTH] IN BLOOD BY AUTOMATED COUNT: 14.6 % (ref 11.5–14.5)
GFR SERPL CREATININE-BSD FRML MDRD: > 60 ML/MIN/1.73M2
GLUCOSE SERPL-MCNC: 112 MG/DL (ref 70–108)
HCT VFR BLD AUTO: 32.9 % (ref 42–52)
HGB BLD-MCNC: 11.1 GM/DL (ref 14–18)
MCH RBC QN AUTO: 29.8 PG (ref 26–33)
MCHC RBC AUTO-ENTMCNC: 33.7 GM/DL (ref 32.2–35.5)
MCV RBC AUTO: 88.2 FL (ref 80–94)
PLATELET # BLD AUTO: 355 THOU/MM3 (ref 130–400)
PMV BLD AUTO: 10.1 FL (ref 9.4–12.4)
POTASSIUM SERPL-SCNC: 3.7 MEQ/L (ref 3.5–5.2)
RBC # BLD AUTO: 3.73 MILL/MM3 (ref 4.7–6.1)
SODIUM SERPL-SCNC: 135 MEQ/L (ref 135–145)
WBC # BLD AUTO: 12 THOU/MM3 (ref 4.8–10.8)

## 2024-03-23 PROCEDURE — 94761 N-INVAS EAR/PLS OXIMETRY MLT: CPT

## 2024-03-23 PROCEDURE — 2580000003 HC RX 258: Performed by: PHYSICIAN ASSISTANT

## 2024-03-23 PROCEDURE — 6370000000 HC RX 637 (ALT 250 FOR IP): Performed by: PHYSICIAN ASSISTANT

## 2024-03-23 PROCEDURE — 80048 BASIC METABOLIC PNL TOTAL CA: CPT

## 2024-03-23 PROCEDURE — 99239 HOSP IP/OBS DSCHRG MGMT >30: CPT

## 2024-03-23 PROCEDURE — 6370000000 HC RX 637 (ALT 250 FOR IP): Performed by: INTERNAL MEDICINE

## 2024-03-23 PROCEDURE — 85027 COMPLETE CBC AUTOMATED: CPT

## 2024-03-23 PROCEDURE — 6370000000 HC RX 637 (ALT 250 FOR IP): Performed by: STUDENT IN AN ORGANIZED HEALTH CARE EDUCATION/TRAINING PROGRAM

## 2024-03-23 PROCEDURE — 36415 COLL VENOUS BLD VENIPUNCTURE: CPT

## 2024-03-23 RX ORDER — METOCLOPRAMIDE 10 MG/1
10 TABLET ORAL
Status: DISCONTINUED | OUTPATIENT
Start: 2024-03-23 | End: 2024-03-23 | Stop reason: HOSPADM

## 2024-03-23 RX ORDER — ONDANSETRON 4 MG/1
4 TABLET, ORALLY DISINTEGRATING ORAL EVERY 8 HOURS PRN
Qty: 30 TABLET | Refills: 0 | DISCHARGE
Start: 2024-03-23

## 2024-03-23 RX ORDER — LOSARTAN POTASSIUM 50 MG/1
50 TABLET ORAL 2 TIMES DAILY
Qty: 30 TABLET | Refills: 3 | DISCHARGE
Start: 2024-03-23

## 2024-03-23 RX ORDER — CLONIDINE HYDROCHLORIDE 0.1 MG/1
0.1 TABLET ORAL 2 TIMES DAILY
Qty: 60 TABLET | Refills: 0 | DISCHARGE
Start: 2024-03-23

## 2024-03-23 RX ORDER — FUROSEMIDE 80 MG
80 TABLET ORAL DAILY
Qty: 60 TABLET | Refills: 0 | DISCHARGE
Start: 2024-03-24

## 2024-03-23 RX ORDER — METOCLOPRAMIDE 10 MG/1
10 TABLET ORAL
Qty: 120 TABLET | Refills: 0 | DISCHARGE
Start: 2024-03-23

## 2024-03-23 RX ADMIN — LOSARTAN POTASSIUM 50 MG: 50 TABLET, FILM COATED ORAL at 08:16

## 2024-03-23 RX ADMIN — ISOSORBIDE MONONITRATE 30 MG: 30 TABLET, EXTENDED RELEASE ORAL at 08:16

## 2024-03-23 RX ADMIN — SODIUM CHLORIDE, PRESERVATIVE FREE 10 ML: 5 INJECTION INTRAVENOUS at 08:21

## 2024-03-23 RX ADMIN — SUCRALFATE 1 G: 1 TABLET ORAL at 08:20

## 2024-03-23 RX ADMIN — ASPIRIN 81 MG CHEWABLE TABLET 81 MG: 81 TABLET CHEWABLE at 08:16

## 2024-03-23 RX ADMIN — POTASSIUM CHLORIDE 40 MEQ: 1500 TABLET, EXTENDED RELEASE ORAL at 08:15

## 2024-03-23 RX ADMIN — PANTOPRAZOLE SODIUM 40 MG: 40 TABLET, DELAYED RELEASE ORAL at 05:50

## 2024-03-23 RX ADMIN — CLONIDINE HYDROCHLORIDE 0.1 MG: 0.1 TABLET ORAL at 08:16

## 2024-03-23 RX ADMIN — FUROSEMIDE 80 MG: 40 TABLET ORAL at 08:16

## 2024-03-23 NOTE — DISCHARGE SUMMARY
Hospitalist Discharge Summary    Patient: Del Yoon  YOB: 1950  MRN: [id number]   Acct: [id number]    Primary Care Physician: Faby Suh APRN - CNP    Admit date  3/5/2024    Discharge date:      Discharge Assessment and Plan:  Acute hypoxic respiratory failure due 2/2 bilateral pneumonia, POA: Resolved.  Chest x-ray on arrival with bilateral infiltrates.  CTA chest (3/11) negative for PE but demonstrated bibasilar consolidations.  Pneumonia panel positive for H. influenzae, Serratia, and influenza A.  Completed 7-day course of Zosyn and 6-day course of Levaquin.  Completed 5-day course of prednisone.  Completed course of Tamiflu.  Legionella and strep pneumonia negative MBS negative on aspiration.  At max required HHFNC.  Now on room air.   Pulmonology previously following, stable for discharge per their standpoint.  Follow-up in 3 months with repeat CT chest and office visit  Encourage pulmonary hygiene to maintain SpO2 greater than 90%  DuoNebs as needed with encouraged ambulation     Leukocytosis, improving: Noted after extensive time being here.  Patient denies fevers or chills or new signs of infection.  Pro-Finn 0.11.  Trend to ensure improvement.     Melena: EGD completed 3/19 showed esophagitis with copious retained, tense consistent with gastroparesis.  ASA resumed  Continue PPI twice daily before breakfast and dinner, Carafate twice daily before lunch and bedtime and low-fat diet   GI recommending colonoscopy if no improvement of anemia, however patient refused.  Follow-up with GI in 6 weeks     Hypokalemia, resolved: Start daily supplementation with 40 mill equivalents daily while taking Lasix.  And monitor     Rectus sheath hematoma: 3/15 CT abdomen/pelvis showed right rectus sheath hematoma measuring up to 4.4 x 12.3 cm in short axis and extends approximately 10 cm in length to just above the level of umbilicus.  Will need outpatient repeat CT imaging to ensure  to medical management. Consultants had signed off or were contacted and agree with discharge plan. The patient was discharged in stable condition with appropriate outpatient follow up arranged.        Physical Exam:-  Vitals: Patient Vitals for the past 24 hrs:   BP Temp Temp src Pulse Resp SpO2   03/23/24 0745 134/61 98 °F (36.7 °C) Oral 65 (!) 6 --   03/23/24 0457 -- -- -- 66 16 92 %   03/23/24 0430 126/72 98.3 °F (36.8 °C) Oral 72 16 92 %   03/23/24 0136 -- -- -- 68 16 91 %   03/22/24 2206 -- -- -- 72 16 92 %   03/22/24 2030 126/60 98.5 °F (36.9 °C) Oral 72 16 92 %   03/22/24 1511 125/70 98.8 °F (37.1 °C) Oral 64 16 92 %     Weight: Weight - Scale: 135.3 kg (298 lb 4.5 oz)     General appearance: No apparent distress, appears stated age and cooperative.   HEENT: Normal cephalic, atraumatic without obvious deformity. Pupils equal, round, and reactive to light.  Extra ocular muscles intact. Conjunctivae/corneas clear.  Neck: Supple, with full range of motion. Trachea midline.  Respiratory:  Normal respiratory effort. Clear to auscultation  Cardiovascular: Regular rate and rhythm without murmur  Abdomen: Soft, non-tender, non-distended   Musculoskeletal:  No clubbing, cyanosis or edema bilaterally.  Skin: Skin color, texture, turgor normal.  No rashes or lesions.  Neurologic:  Neurovascularly intact without any focal sensory/motor deficits.   Psychiatric: Alert and oriented, thought content appropriate, normal insight    Labs: For convenience the most recent labs are provided:  CBC:    Lab Results   Component Value Date/Time    WBC 12.0 03/23/2024 06:03 AM    HGB 11.1 03/23/2024 06:03 AM    HCT 32.9 03/23/2024 06:03 AM     03/23/2024 06:03 AM     Renal:    Lab Results   Component Value Date/Time     03/23/2024 06:03 AM    K 3.7 03/23/2024 06:03 AM     03/23/2024 06:03 AM    CO2 24 03/23/2024 06:03 AM    BUN 17 03/23/2024 06:03 AM    CREATININE 1.1 03/23/2024 06:03 AM    CALCIUM 8.5 03/23/2024

## 2024-03-23 NOTE — PLAN OF CARE
Problem: Safety - Adult  Goal: Free from fall injury  Outcome: Progressing     Problem: Neurosensory - Adult  Goal: Achieves stable or improved neurological status  Outcome: Progressing     Problem: Respiratory - Adult  Goal: Achieves optimal ventilation and oxygenation  Outcome: Progressing     Problem: Cardiovascular - Adult  Goal: Maintains optimal cardiac output and hemodynamic stability  Outcome: Progressing     Problem: Skin/Tissue Integrity - Adult  Goal: Skin integrity remains intact  Outcome: Progressing  Flowsheets (Taken 3/22/2024 2030)  Skin Integrity Remains Intact: Monitor for areas of redness and/or skin breakdown     Problem: Musculoskeletal - Adult  Goal: Return mobility to safest level of function  Outcome: Progressing     Problem: Infection - Adult  Goal: Absence of infection at discharge  Outcome: Progressing     Problem: Metabolic/Fluid and Electrolytes - Adult  Goal: Electrolytes maintained within normal limits  Outcome: Progressing

## 2024-03-23 NOTE — CARE COORDINATION
3/23/24, 2:29 PM EDT    Patient goals/plan/ treatment preferences discussed by  and .  Patient goals/plan/ treatment preferences reviewed with patient/ family.  Patient/ family verbalize understanding of discharge plan and are in agreement with goal/plan/treatment preferences.  Understanding was demonstrated using the teach back method.  AVS provided by RN at time of discharge, which includes all necessary medical information pertaining to the patients current course of illness, treatment, post-discharge goals of care, and treatment preferences.     Services At/After Discharge: Skilled Nursing Facility (SNF)       IMM Letter  IMM Letter given to Patient/Family/Significant other/Guardian/POA/by:: CM: Josee DUNN  IMM Letter date given:: 03/22/24  IMM Letter time given:: 1050

## 2024-04-12 PROBLEM — J10.1 INFLUENZA A: Status: RESOLVED | Noted: 2024-03-13 | Resolved: 2024-04-12

## 2024-05-08 ENCOUNTER — OFFICE VISIT (OUTPATIENT)
Dept: CARDIOLOGY CLINIC | Age: 74
End: 2024-05-08
Payer: MEDICARE

## 2024-05-08 VITALS
HEART RATE: 86 BPM | BODY MASS INDEX: 41.6 KG/M2 | DIASTOLIC BLOOD PRESSURE: 68 MMHG | HEIGHT: 71 IN | SYSTOLIC BLOOD PRESSURE: 118 MMHG

## 2024-05-08 DIAGNOSIS — I10 PRIMARY HYPERTENSION: Primary | ICD-10-CM

## 2024-05-08 DIAGNOSIS — E78.01 FAMILIAL HYPERCHOLESTEROLEMIA: ICD-10-CM

## 2024-05-08 DIAGNOSIS — I25.10 CORONARY ARTERY DISEASE INVOLVING NATIVE CORONARY ARTERY OF NATIVE HEART WITHOUT ANGINA PECTORIS: ICD-10-CM

## 2024-05-08 PROCEDURE — 3078F DIAST BP <80 MM HG: CPT | Performed by: NUCLEAR MEDICINE

## 2024-05-08 PROCEDURE — 99213 OFFICE O/P EST LOW 20 MIN: CPT | Performed by: NUCLEAR MEDICINE

## 2024-05-08 PROCEDURE — 1036F TOBACCO NON-USER: CPT | Performed by: NUCLEAR MEDICINE

## 2024-05-08 PROCEDURE — 1123F ACP DISCUSS/DSCN MKR DOCD: CPT | Performed by: NUCLEAR MEDICINE

## 2024-05-08 PROCEDURE — G8417 CALC BMI ABV UP PARAM F/U: HCPCS | Performed by: NUCLEAR MEDICINE

## 2024-05-08 PROCEDURE — 3074F SYST BP LT 130 MM HG: CPT | Performed by: NUCLEAR MEDICINE

## 2024-05-08 PROCEDURE — 3017F COLORECTAL CA SCREEN DOC REV: CPT | Performed by: NUCLEAR MEDICINE

## 2024-05-08 PROCEDURE — G8428 CUR MEDS NOT DOCUMENT: HCPCS | Performed by: NUCLEAR MEDICINE

## 2024-05-08 RX ORDER — CETIRIZINE HYDROCHLORIDE 5 MG/1
5 TABLET ORAL DAILY
COMMUNITY

## 2024-05-08 NOTE — PROGRESS NOTES
[facility] PHYSICIANS [place] [facility]  [facility] CARDIOLOGY  [address]  [address]  [place] [address]  Dept: [phone]  Dept Fax: [phone]  Loc: [phone]    Visit Date: 5/8/2024    Del Yoon is a 73 y.o. 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    PFO (patent foramen ovale) 08/2012    noted on ROSITA following stroke    Propionibacterium infection 07/24/2020    Rotator cuff injury     Stroke ([facility])     August 19/2012    TIA (transient ischemic attack) 05/2018      Past Surgical History:   Procedure Laterality Date    BACK SURGERY  05/16/2022    C5-6    CHOLECYSTECTOMY  06/25/2017    CORONARY ANGIOPLASTY WITH STENT PLACEMENT      CYST INCISION AND DRAINAGE      CYSTOSCOPY N/A 12/17/2019    CYSTOSCOPY, WITH  URETHRAL DILATION performed by Enrrique Monroe MD at [facility] OR    CYSTOSCOPY N/A 2/18/2020    CYSTOSCOPY WITH BLADDER BOTOX performed by Enrrique Monroe MD at [facility] OR    ERCP  06/23/2017    PAIN MANAGEMENT PROCEDURE Bilateral 2/9/2023    bilateral lumbar 4/5, 5/Sacral 1 medial branch blocks performed by Dustin Hurtado DO at [facility] OR    PAIN MANAGEMENT PROCEDURE Bilateral 2/23/2023    medial branch blocks at bilateral Lumbar 4/5 and Lumbar 5/Sacral 1 performed by Dustin Hurtado DO at [facility] OR    PAIN MANAGEMENT PROCEDURE Bilateral 3/15/2023    lumbar radiofrequency ablation at bilateral Lumbar 4/5, 5/Sacral 1 performed by Dustin Hurtado DO

## 2024-05-21 NOTE — PROGRESS NOTES
Discharge instructions reviewed with patient and questions answered. Skin warm and dry, color normal for ethnicity. Remains alert and cooperative. Denies further questions or concerns at this time. Universal Safety Interventions

## 2024-06-17 ENCOUNTER — HOSPITAL ENCOUNTER (OUTPATIENT)
Dept: CT IMAGING | Age: 74
Discharge: HOME | End: 2024-06-17
Payer: MEDICARE

## 2024-06-17 DIAGNOSIS — J18.9 PNEUMONIA OF LEFT LOWER LOBE DUE TO INFECTIOUS ORGANISM: ICD-10-CM

## 2024-06-17 LAB
CREAT BLD-MCNC: 1.2 MG/DL (ref 0.5–1.2)
GFR SERPL CREATININE-BSD FRML MDRD: 63 ML/MIN/1.73M2

## 2024-06-17 PROCEDURE — 82565 ASSAY OF CREATININE: CPT

## 2024-06-17 PROCEDURE — 6360000004 HC RX CONTRAST MEDICATION: Performed by: NURSE PRACTITIONER

## 2024-06-17 PROCEDURE — 71260 CT THORAX DX C+: CPT

## 2024-06-17 RX ADMIN — IOPAMIDOL 75 ML: 755 INJECTION, SOLUTION INTRAVENOUS at 11:19

## 2024-06-19 NOTE — PROGRESS NOTES
[facility]    Patient: DEL SAMSON, 73 y.o.   : 1950    Patient of Dr. Min    Assessment/Plan   1. Personal history of pneumonia - resolved  -Advised patient and wife of CT findings with resolved pneumonia. Patient is demonstrating chronic findings likely due to occupational history. Discussed obtaining a pulmonary function test for further evaluation and patient declines. He will call with change in breathing symptoms.  -Reviewed preventative vaccinations    2. History of respiratory failure - resolved  -ECF to continue to monitor patient's SpO2. Patient has not required O2 since hospitalization. Patient is to call if SpO2 < 90%    3. Suspected sleep apnea  -Patient declines testing for suspected sleep apnea    Advised patient to call office with any changes, questions, or concerns regarding respiratory status or issues with prescribed medications    Return if symptoms worsen or fail to improve.        Subjective     Chief Complaint   Patient presents with    Follow-up     Hospital 3 month f/u pneumonia with CT 24.        HPI    Pertinent negatives: Shortness of Breath, Cough, Sputum Production, Hemoptysis, Wheezing, and Chest Tightness  Risk factors for lung disease: no known risk factors      Del is here for hospital follow up for acute respiratory failure secondary to viral and bacterial pneumonia (H. Influenza, Influenza A, and Serratia). Patient was discharged to SNF. He reports he was moved to assisted living. He was living in ECF prior to hospitalization. He presents in wheelchair today. He is having issues with his right knee causing some problems with walking. He is still in physical therapy. He feels he is still physically recovering from hospitalization. He reports no breathing complaints post hospitalization.     Overall patient reports respiratory symptoms have been improved since hospitalization. Patient reports no physical

## 2024-06-24 ENCOUNTER — OFFICE VISIT (OUTPATIENT)
Dept: PULMONOLOGY | Age: 74
End: 2024-06-24
Payer: MEDICARE

## 2024-06-24 VITALS
WEIGHT: 272.2 LBS | OXYGEN SATURATION: 95 % | BODY MASS INDEX: 38.11 KG/M2 | HEIGHT: 71 IN | SYSTOLIC BLOOD PRESSURE: 128 MMHG | HEART RATE: 70 BPM | TEMPERATURE: 98 F | DIASTOLIC BLOOD PRESSURE: 60 MMHG

## 2024-06-24 DIAGNOSIS — R29.818 SUSPECTED SLEEP APNEA: ICD-10-CM

## 2024-06-24 DIAGNOSIS — Z87.01 PERSONAL HISTORY OF PNEUMONIA: Primary | ICD-10-CM

## 2024-06-24 DIAGNOSIS — Z87.09 HISTORY OF RESPIRATORY FAILURE: ICD-10-CM

## 2024-06-24 PROCEDURE — 3078F DIAST BP <80 MM HG: CPT

## 2024-06-24 PROCEDURE — 1123F ACP DISCUSS/DSCN MKR DOCD: CPT

## 2024-06-24 PROCEDURE — 1036F TOBACCO NON-USER: CPT

## 2024-06-24 PROCEDURE — 3074F SYST BP LT 130 MM HG: CPT

## 2024-06-24 PROCEDURE — G8427 DOCREV CUR MEDS BY ELIG CLIN: HCPCS

## 2024-06-24 PROCEDURE — 99214 OFFICE O/P EST MOD 30 MIN: CPT

## 2024-06-24 PROCEDURE — G8417 CALC BMI ABV UP PARAM F/U: HCPCS

## 2024-06-24 PROCEDURE — 3017F COLORECTAL CA SCREEN DOC REV: CPT

## 2024-06-24 ASSESSMENT — ENCOUNTER SYMPTOMS
SINUS PRESSURE: 0
RHINORRHEA: 0
SHORTNESS OF BREATH: 0
WHEEZING: 0
SINUS PAIN: 0
COUGH: 0
CHEST TIGHTNESS: 0

## 2025-05-07 ENCOUNTER — OFFICE VISIT (OUTPATIENT)
Dept: CARDIOLOGY CLINIC | Age: 75
End: 2025-05-07
Payer: MEDICARE

## 2025-05-07 VITALS
BODY MASS INDEX: 37.96 KG/M2 | HEIGHT: 71 IN | HEART RATE: 62 BPM | SYSTOLIC BLOOD PRESSURE: 152 MMHG | DIASTOLIC BLOOD PRESSURE: 76 MMHG

## 2025-05-07 DIAGNOSIS — I25.118 ATHEROSCLEROTIC HEART DISEASE OF NATIVE CORONARY ARTERY WITH OTHER FORMS OF ANGINA PECTORIS: ICD-10-CM

## 2025-05-07 DIAGNOSIS — E78.01 FAMILIAL HYPERCHOLESTEROLEMIA: ICD-10-CM

## 2025-05-07 DIAGNOSIS — I25.10 CORONARY ARTERY DISEASE INVOLVING NATIVE CORONARY ARTERY OF NATIVE HEART WITHOUT ANGINA PECTORIS: Primary | ICD-10-CM

## 2025-05-07 DIAGNOSIS — I10 PRIMARY HYPERTENSION: ICD-10-CM

## 2025-05-07 PROCEDURE — 1123F ACP DISCUSS/DSCN MKR DOCD: CPT | Performed by: NUCLEAR MEDICINE

## 2025-05-07 PROCEDURE — 1159F MED LIST DOCD IN RCRD: CPT | Performed by: NUCLEAR MEDICINE

## 2025-05-07 PROCEDURE — 1036F TOBACCO NON-USER: CPT | Performed by: NUCLEAR MEDICINE

## 2025-05-07 PROCEDURE — G8427 DOCREV CUR MEDS BY ELIG CLIN: HCPCS | Performed by: NUCLEAR MEDICINE

## 2025-05-07 PROCEDURE — 99214 OFFICE O/P EST MOD 30 MIN: CPT | Performed by: NUCLEAR MEDICINE

## 2025-05-07 PROCEDURE — 93000 ELECTROCARDIOGRAM COMPLETE: CPT | Performed by: NUCLEAR MEDICINE

## 2025-05-07 PROCEDURE — G8417 CALC BMI ABV UP PARAM F/U: HCPCS | Performed by: NUCLEAR MEDICINE

## 2025-05-07 PROCEDURE — 3077F SYST BP >= 140 MM HG: CPT | Performed by: NUCLEAR MEDICINE

## 2025-05-07 PROCEDURE — 3078F DIAST BP <80 MM HG: CPT | Performed by: NUCLEAR MEDICINE

## 2025-05-07 RX ORDER — LOPERAMIDE HYDROCHLORIDE 2 MG/1
2 CAPSULE ORAL 4 TIMES DAILY PRN
COMMUNITY

## 2025-05-07 NOTE — PROGRESS NOTES
Patient here for follow up.  EKG done today.  Patient denies any cardiac symptoms.  Patient's medication list updated with nursing home list.

## 2025-05-07 NOTE — PROGRESS NOTES
[facility] PHYSICIANS [place] [facility]  [facility] CARDIOLOGY  [address]  [address]  [place] [facility] [place] [facility] [address]  Dept: [phone]  Dept Fax: [phone]  Loc: [phone]    Visit Date: 5/7/2025    Del Yoon is a 74 y.o. male who presents todayfor:  Chief Complaint   Patient presents with    Follow-up    Hypertension    Coronary Artery Disease    Hyperlipidemia   Limited patient  Known CAD and stents  Mo chest pain  Does have baseline dyspnea  Some progressive dyspnea  Very limited in activity   Uses a walker  BP is stable  No dizziness  No syncope  On statins for hyperlipidemia  No issues with meds       HPI:  HPI  Past Medical History:   Diagnosis Date    Arthritis     Back pain     CAD in native artery 06/14/2021    Diarrhea     Hypertension     Infection of prosthetic shoulder joint 07/24/2020    PFO (patent foramen ovale) 08/2012    noted on ROSIAT following stroke    Propionibacterium infection 07/24/2020    Rotator cuff injury     Stroke (HCC)     August 19/2012    TIA (transient ischemic attack) 05/2018      Past Surgical History:   Procedure Laterality Date    BACK SURGERY  05/16/2022    C5-6    CHOLECYSTECTOMY  06/25/2017    CORONARY ANGIOPLASTY WITH STENT PLACEMENT      CYST INCISION AND DRAINAGE      CYSTOSCOPY N/A 12/17/2019    CYSTOSCOPY, WITH  URETHRAL DILATION performed by Enrrique Monroe MD at [facility] OR    CYSTOSCOPY N/A 2/18/2020    CYSTOSCOPY WITH BLADDER BOTOX performed by Enrrique Monroe MD at [facility] OR    ERCP  06/23/2017    PAIN MANAGEMENT PROCEDURE Bilateral 2/9/2023    bilateral lumbar 4/5, 5/Sacral 1 medial branch blocks performed by Dustin Hurtado DO at [facility] [place] OR    PAIN MANAGEMENT PROCEDURE Bilateral 2/23/2023    medial branch blocks at bilateral Lumbar 4/5 and Lumbar 5/Sacral 1 performed by Dustin Hurtado DO [place]    PAIN MANAGEMENT PROCEDURE Bilateral 3/15/2023    lumbar radiofrequency ablation at bilateral Lumbar 4/5, 5/Sacral 1 performed by Dustin

## (undated) DEVICE — NEEDLE SPNL 22GA L3.5IN BLK HUB S STL REG WALL FIT STYL W/

## (undated) DEVICE — MARKER,SKIN,WI/RULER AND LABELS: Brand: MEDLINE

## (undated) DEVICE — SYRINGE MED 10ML LUERLOCK TIP W/O SFTY DISP

## (undated) DEVICE — TOWEL,OR,DSP,ST,BLUE,STD,4/PK,20PK/CS: Brand: MEDLINE

## (undated) DEVICE — 3 ML SYRINGE LUER-LOCK TIP: Brand: MONOJECT

## (undated) DEVICE — GLOVE SURG SZ 65 THK91MIL LTX FREE SYN POLYISOPRENE

## (undated) DEVICE — COVER ARMBRD W13XL28.5IN IMPERV BLU FOR OP RM

## (undated) DEVICE — GAUZE SPONGES,USP TYPE VII GAUZE, 12 PLY: Brand: CURITY

## (undated) DEVICE — NEEDLE HYPO 18GA L1.5IN THN WALL PIVOTING SHLD BVL ORIENTED

## (undated) DEVICE — TUBING, SUCTION, 1/4" X 20', STRAIGHT: Brand: MEDLINE INDUSTRIES, INC.

## (undated) DEVICE — HYPODERMIC SAFETY NEEDLE: Brand: MAGELLAN

## (undated) DEVICE — HF-RESECTION ELECTRODE PLASMALOOP LOOP, MEDIUM, 24 FR., 12°-30°, ESG TURIS: Brand: OLYMPUS

## (undated) DEVICE — Z DISCONTINUED USE 2139346 GUIDEWIRE KAYAK URO STIFF 0.035 IN

## (undated) DEVICE — BAG DRNGE (SEE COMMENT) UROLOGY TBL 15.5X31 IN W/HOSE

## (undated) DEVICE — GLOVE BIOGEL POWDER FREE SZ 8

## (undated) DEVICE — SYRINGE MED 3ML CLR PLAS STD N CTRL LUERLOCK TIP DISP

## (undated) DEVICE — 1840 FOAM BLOCK NEEDLE COUNTER: Brand: DEVON

## (undated) DEVICE — SPONGE GZ W4XL4IN COT 12 PLY TYP VII WVN C FLD DSGN

## (undated) DEVICE — APPLICATOR MEDICATED 3 CC SOLUTION CLR STRL CHLORAPREP

## (undated) DEVICE — Z INACTIVE USE 2660664 SOLUTION IRRIG 3000ML 0.9% SOD CHL USP UROMATIC PLAS CONT

## (undated) DEVICE — CATHETER URETH 24FR BLLN 30CC STD LTX 3 W TWO OPP DRNGE EYE

## (undated) DEVICE — 6 ML SYRINGE LUER-LOCK TIP: Brand: MONOJECT

## (undated) DEVICE — DISPOSABLE NEEDLE: Brand: DISPOSABLE NEEDLE

## (undated) DEVICE — SOLUTION SCRB 4OZ 4% CHG H2O AIDED FOR PREOPERATIVE SKIN

## (undated) DEVICE — DRAINBAG,ANTI-REFLUX TOWER,L/F,2000ML,LL: Brand: MEDLINE

## (undated) DEVICE — SOLUTION IV IRRIG POUR BRL 0.9% SODIUM CHL 2F7124

## (undated) DEVICE — GLOVE ORANGE PI 7 1/2   MSG9075

## (undated) DEVICE — SYRINGE CATH TIP 50ML

## (undated) DEVICE — JELLY,LUBE,STERILE,FLIP TOP,TUBE,2-OZ: Brand: MEDLINE